# Patient Record
Sex: FEMALE | Race: BLACK OR AFRICAN AMERICAN | Employment: OTHER | URBAN - METROPOLITAN AREA
[De-identification: names, ages, dates, MRNs, and addresses within clinical notes are randomized per-mention and may not be internally consistent; named-entity substitution may affect disease eponyms.]

---

## 2017-03-10 ENCOUNTER — HOSPITAL ENCOUNTER (INPATIENT)
Age: 62
LOS: 1 days | Discharge: HOME HEALTH | DRG: 227 | End: 2017-03-14
Attending: EMERGENCY MEDICINE | Admitting: EMERGENCY MEDICINE
Payer: COMMERCIAL

## 2017-03-10 DIAGNOSIS — R42 DIZZINESS: Primary | ICD-10-CM

## 2017-03-10 LAB
ABSOLUTE EOS #: 0.3 K/UL (ref 0–0.4)
ABSOLUTE LYMPH #: 2.5 K/UL (ref 1–4.8)
ABSOLUTE MONO #: 0.5 K/UL (ref 0.1–1.2)
ALBUMIN SERPL-MCNC: 3.2 G/DL (ref 3.5–5.2)
ALBUMIN/GLOBULIN RATIO: 0.9 (ref 1–2.5)
ALP BLD-CCNC: 87 U/L (ref 35–104)
ALT SERPL-CCNC: 9 U/L (ref 5–33)
AMMONIA: 73 UMOL/L (ref 11–51)
ANION GAP SERPL CALCULATED.3IONS-SCNC: 14 MMOL/L (ref 9–17)
AST SERPL-CCNC: 14 U/L
BASOPHILS # BLD: 0 % (ref 0–2)
BASOPHILS ABSOLUTE: 0 K/UL (ref 0–0.2)
BILIRUB SERPL-MCNC: <0.1 MG/DL (ref 0.3–1.2)
BUN BLDV-MCNC: 21 MG/DL (ref 8–23)
BUN/CREAT BLD: ABNORMAL (ref 9–20)
CALCIUM IONIZED: 1.27 MMOL/L (ref 1.13–1.33)
CALCIUM SERPL-MCNC: 7.9 MG/DL (ref 8.6–10.4)
CHLORIDE BLD-SCNC: 106 MMOL/L (ref 98–107)
CO2: 19 MMOL/L (ref 20–31)
CREAT SERPL-MCNC: 0.65 MG/DL (ref 0.5–0.9)
DIFFERENTIAL TYPE: ABNORMAL
EOSINOPHILS RELATIVE PERCENT: 4 % (ref 1–4)
GFR AFRICAN AMERICAN: >60 ML/MIN
GFR NON-AFRICAN AMERICAN: >60 ML/MIN
GFR SERPL CREATININE-BSD FRML MDRD: ABNORMAL ML/MIN/{1.73_M2}
GFR SERPL CREATININE-BSD FRML MDRD: ABNORMAL ML/MIN/{1.73_M2}
GLUCOSE BLD-MCNC: 114 MG/DL (ref 65–105)
GLUCOSE BLD-MCNC: 71 MG/DL (ref 65–105)
GLUCOSE BLD-MCNC: 87 MG/DL (ref 70–99)
HCT VFR BLD CALC: 31.4 % (ref 36–46)
HEMOGLOBIN: 10.1 G/DL (ref 12–16)
LYMPHOCYTES # BLD: 27 % (ref 24–44)
MAGNESIUM: 2 MG/DL (ref 1.6–2.6)
MCH RBC QN AUTO: 26.2 PG (ref 26–34)
MCHC RBC AUTO-ENTMCNC: 32.3 G/DL (ref 31–37)
MCV RBC AUTO: 81 FL (ref 80–100)
MONOCYTES # BLD: 6 % (ref 2–11)
PDW BLD-RTO: 17.4 % (ref 12.5–15.4)
PLATELET # BLD: 284 K/UL (ref 140–450)
PLATELET ESTIMATE: ABNORMAL
PMV BLD AUTO: 7 FL (ref 6–12)
POTASSIUM SERPL-SCNC: 3.9 MMOL/L (ref 3.7–5.3)
RBC # BLD: 3.87 M/UL (ref 4–5.2)
RBC # BLD: ABNORMAL 10*6/UL
SEG NEUTROPHILS: 63 % (ref 36–66)
SEGMENTED NEUTROPHILS ABSOLUTE COUNT: 5.7 K/UL (ref 1.8–7.7)
SODIUM BLD-SCNC: 139 MMOL/L (ref 135–144)
TOTAL PROTEIN: 6.7 G/DL (ref 6.4–8.3)
WBC # BLD: 9 K/UL (ref 3.5–11)
WBC # BLD: ABNORMAL 10*3/UL

## 2017-03-10 PROCEDURE — 82140 ASSAY OF AMMONIA: CPT

## 2017-03-10 PROCEDURE — 6370000000 HC RX 637 (ALT 250 FOR IP): Performed by: EMERGENCY MEDICINE

## 2017-03-10 PROCEDURE — 80053 COMPREHEN METABOLIC PANEL: CPT

## 2017-03-10 PROCEDURE — 99285 EMERGENCY DEPT VISIT HI MDM: CPT

## 2017-03-10 PROCEDURE — 82947 ASSAY GLUCOSE BLOOD QUANT: CPT

## 2017-03-10 PROCEDURE — 85025 COMPLETE CBC W/AUTO DIFF WBC: CPT

## 2017-03-10 PROCEDURE — 94640 AIRWAY INHALATION TREATMENT: CPT

## 2017-03-10 PROCEDURE — 36415 COLL VENOUS BLD VENIPUNCTURE: CPT

## 2017-03-10 PROCEDURE — 93005 ELECTROCARDIOGRAM TRACING: CPT

## 2017-03-10 PROCEDURE — 94664 DEMO&/EVAL PT USE INHALER: CPT

## 2017-03-10 PROCEDURE — 83880 ASSAY OF NATRIURETIC PEPTIDE: CPT

## 2017-03-10 PROCEDURE — G0378 HOSPITAL OBSERVATION PER HR: HCPCS

## 2017-03-10 PROCEDURE — 82330 ASSAY OF CALCIUM: CPT

## 2017-03-10 PROCEDURE — 83735 ASSAY OF MAGNESIUM: CPT

## 2017-03-10 RX ORDER — INSULIN GLARGINE 100 [IU]/ML
42 INJECTION, SOLUTION SUBCUTANEOUS 2 TIMES DAILY
Status: DISCONTINUED | OUTPATIENT
Start: 2017-03-10 | End: 2017-03-14 | Stop reason: HOSPADM

## 2017-03-10 RX ORDER — COLCHICINE 0.6 MG/1
0.6 TABLET ORAL DAILY
Status: DISCONTINUED | OUTPATIENT
Start: 2017-03-10 | End: 2017-03-14 | Stop reason: HOSPADM

## 2017-03-10 RX ORDER — NICOTINE POLACRILEX 4 MG
15 LOZENGE BUCCAL PRN
Status: DISCONTINUED | OUTPATIENT
Start: 2017-03-10 | End: 2017-03-14 | Stop reason: HOSPADM

## 2017-03-10 RX ORDER — ACETAMINOPHEN 325 MG/1
650 TABLET ORAL EVERY 4 HOURS PRN
Status: DISCONTINUED | OUTPATIENT
Start: 2017-03-10 | End: 2017-03-11

## 2017-03-10 RX ORDER — FAMOTIDINE 20 MG/1
20 TABLET, FILM COATED ORAL DAILY
Status: DISCONTINUED | OUTPATIENT
Start: 2017-03-10 | End: 2017-03-14 | Stop reason: HOSPADM

## 2017-03-10 RX ORDER — ATORVASTATIN CALCIUM 20 MG/1
20 TABLET, FILM COATED ORAL NIGHTLY
Status: DISCONTINUED | OUTPATIENT
Start: 2017-03-10 | End: 2017-03-14 | Stop reason: HOSPADM

## 2017-03-10 RX ORDER — IPRATROPIUM BROMIDE AND ALBUTEROL SULFATE 2.5; .5 MG/3ML; MG/3ML
1 SOLUTION RESPIRATORY (INHALATION) EVERY 6 HOURS
Status: DISCONTINUED | OUTPATIENT
Start: 2017-03-10 | End: 2017-03-13

## 2017-03-10 RX ORDER — OXCARBAZEPINE 300 MG/1
300 TABLET, FILM COATED ORAL 2 TIMES DAILY
Status: DISCONTINUED | OUTPATIENT
Start: 2017-03-10 | End: 2017-03-14 | Stop reason: HOSPADM

## 2017-03-10 RX ORDER — DEXTROSE MONOHYDRATE 25 G/50ML
12.5 INJECTION, SOLUTION INTRAVENOUS PRN
Status: DISCONTINUED | OUTPATIENT
Start: 2017-03-10 | End: 2017-03-14 | Stop reason: HOSPADM

## 2017-03-10 RX ORDER — OLANZAPINE 10 MG/1
10 TABLET ORAL NIGHTLY
Status: DISCONTINUED | OUTPATIENT
Start: 2017-03-10 | End: 2017-03-14 | Stop reason: HOSPADM

## 2017-03-10 RX ORDER — DEXTROSE MONOHYDRATE 50 MG/ML
100 INJECTION, SOLUTION INTRAVENOUS PRN
Status: DISCONTINUED | OUTPATIENT
Start: 2017-03-10 | End: 2017-03-14 | Stop reason: HOSPADM

## 2017-03-10 RX ADMIN — ACETAMINOPHEN 650 MG: 325 TABLET ORAL at 22:07

## 2017-03-10 RX ADMIN — OLANZAPINE 10 MG: 10 TABLET, FILM COATED ORAL at 21:14

## 2017-03-10 RX ADMIN — IPRATROPIUM BROMIDE AND ALBUTEROL SULFATE 3 ML: .5; 3 SOLUTION RESPIRATORY (INHALATION) at 21:21

## 2017-03-10 RX ADMIN — OXCARBAZEPINE 300 MG: 300 TABLET, FILM COATED ORAL at 21:14

## 2017-03-10 RX ADMIN — IPRATROPIUM BROMIDE AND ALBUTEROL SULFATE 3 ML: .5; 3 SOLUTION RESPIRATORY (INHALATION) at 15:29

## 2017-03-10 RX ADMIN — METOPROLOL TARTRATE 25 MG: 25 TABLET ORAL at 21:14

## 2017-03-10 RX ADMIN — ATORVASTATIN CALCIUM 20 MG: 20 TABLET, FILM COATED ORAL at 21:14

## 2017-03-10 ASSESSMENT — PAIN DESCRIPTION - ORIENTATION
ORIENTATION: RIGHT
ORIENTATION: RIGHT

## 2017-03-10 ASSESSMENT — PAIN DESCRIPTION - ONSET
ONSET: ON-GOING
ONSET: ON-GOING

## 2017-03-10 ASSESSMENT — ENCOUNTER SYMPTOMS
SHORTNESS OF BREATH: 1
EYE DISCHARGE: 0
ABDOMINAL PAIN: 1
EYE REDNESS: 0
VOMITING: 0
RHINORRHEA: 0
CHEST TIGHTNESS: 0
NAUSEA: 0
SORE THROAT: 0
DIARRHEA: 0
COUGH: 1

## 2017-03-10 ASSESSMENT — PAIN DESCRIPTION - LOCATION
LOCATION: HEAD

## 2017-03-10 ASSESSMENT — PAIN DESCRIPTION - DESCRIPTORS
DESCRIPTORS: ACHING
DESCRIPTORS: SHARP
DESCRIPTORS: ACHING

## 2017-03-10 ASSESSMENT — PAIN DESCRIPTION - PAIN TYPE
TYPE: CHRONIC PAIN
TYPE: ACUTE PAIN
TYPE: CHRONIC PAIN
TYPE: CHRONIC PAIN

## 2017-03-10 ASSESSMENT — PAIN SCALES - GENERAL
PAINLEVEL_OUTOF10: 5
PAINLEVEL_OUTOF10: 5
PAINLEVEL_OUTOF10: 7
PAINLEVEL_OUTOF10: 5

## 2017-03-10 ASSESSMENT — PAIN DESCRIPTION - FREQUENCY
FREQUENCY: INTERMITTENT
FREQUENCY: INTERMITTENT

## 2017-03-11 LAB
BNP INTERPRETATION: ABNORMAL
EKG ATRIAL RATE: 102 BPM
EKG ATRIAL RATE: 84 BPM
EKG ATRIAL RATE: 90 BPM
EKG P AXIS: 33 DEGREES
EKG P AXIS: 42 DEGREES
EKG P AXIS: 48 DEGREES
EKG P-R INTERVAL: 150 MS
EKG P-R INTERVAL: 152 MS
EKG P-R INTERVAL: 152 MS
EKG Q-T INTERVAL: 334 MS
EKG Q-T INTERVAL: 372 MS
EKG Q-T INTERVAL: 380 MS
EKG QRS DURATION: 72 MS
EKG QRS DURATION: 76 MS
EKG QRS DURATION: 82 MS
EKG QTC CALCULATION (BAZETT): 435 MS
EKG QTC CALCULATION (BAZETT): 449 MS
EKG QTC CALCULATION (BAZETT): 455 MS
EKG R AXIS: -14 DEGREES
EKG R AXIS: -25 DEGREES
EKG R AXIS: -9 DEGREES
EKG T AXIS: 50 DEGREES
EKG T AXIS: 53 DEGREES
EKG T AXIS: 77 DEGREES
EKG VENTRICULAR RATE: 102 BPM
EKG VENTRICULAR RATE: 84 BPM
EKG VENTRICULAR RATE: 90 BPM
GLUCOSE BLD-MCNC: 155 MG/DL (ref 65–105)
GLUCOSE BLD-MCNC: 165 MG/DL (ref 65–105)
GLUCOSE BLD-MCNC: 201 MG/DL (ref 65–105)
GLUCOSE BLD-MCNC: 83 MG/DL (ref 65–105)
LV EF: 38 %
LVEF MODALITY: NORMAL
PRO-BNP: 390 PG/ML

## 2017-03-11 PROCEDURE — 6370000000 HC RX 637 (ALT 250 FOR IP): Performed by: EMERGENCY MEDICINE

## 2017-03-11 PROCEDURE — G0378 HOSPITAL OBSERVATION PER HR: HCPCS

## 2017-03-11 PROCEDURE — 2580000003 HC RX 258: Performed by: EMERGENCY MEDICINE

## 2017-03-11 PROCEDURE — 93306 TTE W/DOPPLER COMPLETE: CPT

## 2017-03-11 PROCEDURE — 82947 ASSAY GLUCOSE BLOOD QUANT: CPT

## 2017-03-11 PROCEDURE — 6370000000 HC RX 637 (ALT 250 FOR IP): Performed by: INTERNAL MEDICINE

## 2017-03-11 PROCEDURE — 94640 AIRWAY INHALATION TREATMENT: CPT

## 2017-03-11 PROCEDURE — 93005 ELECTROCARDIOGRAM TRACING: CPT

## 2017-03-11 RX ORDER — IBUPROFEN 800 MG/1
800 TABLET ORAL EVERY 6 HOURS PRN
Status: DISCONTINUED | OUTPATIENT
Start: 2017-03-11 | End: 2017-03-14 | Stop reason: HOSPADM

## 2017-03-11 RX ORDER — LISINOPRIL 2.5 MG/1
2.5 TABLET ORAL DAILY
Status: DISCONTINUED | OUTPATIENT
Start: 2017-03-11 | End: 2017-03-12

## 2017-03-11 RX ORDER — ONDANSETRON 2 MG/ML
4 INJECTION INTRAMUSCULAR; INTRAVENOUS EVERY 6 HOURS PRN
Status: DISCONTINUED | OUTPATIENT
Start: 2017-03-11 | End: 2017-03-14 | Stop reason: HOSPADM

## 2017-03-11 RX ORDER — PROMETHAZINE HYDROCHLORIDE 25 MG/ML
12.5 INJECTION, SOLUTION INTRAMUSCULAR; INTRAVENOUS EVERY 6 HOURS PRN
Status: DISCONTINUED | OUTPATIENT
Start: 2017-03-11 | End: 2017-03-14 | Stop reason: HOSPADM

## 2017-03-11 RX ORDER — SODIUM CHLORIDE 0.9 % (FLUSH) 0.9 %
10 SYRINGE (ML) INJECTION 2 TIMES DAILY
Status: DISCONTINUED | OUTPATIENT
Start: 2017-03-11 | End: 2017-03-14 | Stop reason: HOSPADM

## 2017-03-11 RX ORDER — LISINOPRIL 2.5 MG/1
2.5 TABLET ORAL DAILY
Qty: 30 TABLET | Refills: 3 | Status: SHIPPED | OUTPATIENT
Start: 2017-03-11 | End: 2017-10-17 | Stop reason: DRUGHIGH

## 2017-03-11 RX ADMIN — IPRATROPIUM BROMIDE AND ALBUTEROL SULFATE 3 ML: .5; 3 SOLUTION RESPIRATORY (INHALATION) at 08:10

## 2017-03-11 RX ADMIN — INSULIN LISPRO 10 UNITS: 100 INJECTION, SOLUTION INTRAVENOUS; SUBCUTANEOUS at 17:53

## 2017-03-11 RX ADMIN — METOPROLOL TARTRATE 25 MG: 25 TABLET ORAL at 21:20

## 2017-03-11 RX ADMIN — Medication 10 ML: at 09:53

## 2017-03-11 RX ADMIN — IPRATROPIUM BROMIDE AND ALBUTEROL SULFATE 3 ML: .5; 3 SOLUTION RESPIRATORY (INHALATION) at 14:21

## 2017-03-11 RX ADMIN — COLCHICINE 0.6 MG: 0.6 TABLET, FILM COATED ORAL at 09:53

## 2017-03-11 RX ADMIN — FAMOTIDINE 20 MG: 20 TABLET, FILM COATED ORAL at 09:52

## 2017-03-11 RX ADMIN — OLANZAPINE 10 MG: 10 TABLET, FILM COATED ORAL at 21:20

## 2017-03-11 RX ADMIN — OXCARBAZEPINE 300 MG: 300 TABLET, FILM COATED ORAL at 21:20

## 2017-03-11 RX ADMIN — Medication 42 UNITS: at 12:26

## 2017-03-11 RX ADMIN — Medication 10 ML: at 21:20

## 2017-03-11 RX ADMIN — IPRATROPIUM BROMIDE AND ALBUTEROL SULFATE 3 ML: .5; 3 SOLUTION RESPIRATORY (INHALATION) at 04:51

## 2017-03-11 RX ADMIN — Medication 42 UNITS: at 21:21

## 2017-03-11 RX ADMIN — METOPROLOL TARTRATE 25 MG: 25 TABLET ORAL at 09:52

## 2017-03-11 RX ADMIN — OXCARBAZEPINE 300 MG: 300 TABLET, FILM COATED ORAL at 08:52

## 2017-03-11 RX ADMIN — ATORVASTATIN CALCIUM 20 MG: 20 TABLET, FILM COATED ORAL at 21:20

## 2017-03-11 RX ADMIN — IPRATROPIUM BROMIDE AND ALBUTEROL SULFATE 3 ML: .5; 3 SOLUTION RESPIRATORY (INHALATION) at 20:19

## 2017-03-11 RX ADMIN — INSULIN LISPRO 10 UNITS: 100 INJECTION, SOLUTION INTRAVENOUS; SUBCUTANEOUS at 12:30

## 2017-03-11 RX ADMIN — LISINOPRIL 2.5 MG: 2.5 TABLET ORAL at 09:52

## 2017-03-11 ASSESSMENT — PAIN DESCRIPTION - FREQUENCY: FREQUENCY: INTERMITTENT

## 2017-03-11 ASSESSMENT — PAIN DESCRIPTION - PROGRESSION
CLINICAL_PROGRESSION: NOT CHANGED

## 2017-03-11 ASSESSMENT — PAIN DESCRIPTION - PAIN TYPE: TYPE: CHRONIC PAIN

## 2017-03-11 ASSESSMENT — PAIN DESCRIPTION - LOCATION: LOCATION: HEAD

## 2017-03-11 ASSESSMENT — PAIN DESCRIPTION - ONSET: ONSET: ON-GOING

## 2017-03-11 ASSESSMENT — PAIN SCALES - GENERAL: PAINLEVEL_OUTOF10: 5

## 2017-03-11 ASSESSMENT — PAIN DESCRIPTION - DESCRIPTORS: DESCRIPTORS: ACHING

## 2017-03-12 LAB
GLUCOSE BLD-MCNC: 103 MG/DL (ref 65–105)
GLUCOSE BLD-MCNC: 122 MG/DL (ref 65–105)
GLUCOSE BLD-MCNC: 99 MG/DL (ref 65–105)

## 2017-03-12 PROCEDURE — 94640 AIRWAY INHALATION TREATMENT: CPT

## 2017-03-12 PROCEDURE — G0378 HOSPITAL OBSERVATION PER HR: HCPCS

## 2017-03-12 PROCEDURE — 6370000000 HC RX 637 (ALT 250 FOR IP): Performed by: EMERGENCY MEDICINE

## 2017-03-12 PROCEDURE — 82947 ASSAY GLUCOSE BLOOD QUANT: CPT

## 2017-03-12 RX ORDER — LISINOPRIL 5 MG/1
5 TABLET ORAL DAILY
Status: DISCONTINUED | OUTPATIENT
Start: 2017-03-13 | End: 2017-03-14 | Stop reason: HOSPADM

## 2017-03-12 RX ORDER — BENZONATATE 100 MG/1
100 CAPSULE ORAL 3 TIMES DAILY PRN
Status: DISCONTINUED | OUTPATIENT
Start: 2017-03-12 | End: 2017-03-14 | Stop reason: HOSPADM

## 2017-03-12 RX ORDER — ASPIRIN 81 MG/1
81 TABLET ORAL DAILY
Status: DISCONTINUED | OUTPATIENT
Start: 2017-03-12 | End: 2017-03-14 | Stop reason: HOSPADM

## 2017-03-12 RX ADMIN — OXCARBAZEPINE 300 MG: 300 TABLET, FILM COATED ORAL at 21:52

## 2017-03-12 RX ADMIN — OXCARBAZEPINE 300 MG: 300 TABLET, FILM COATED ORAL at 10:42

## 2017-03-12 RX ADMIN — ATORVASTATIN CALCIUM 20 MG: 20 TABLET, FILM COATED ORAL at 21:53

## 2017-03-12 RX ADMIN — FAMOTIDINE 20 MG: 20 TABLET, FILM COATED ORAL at 10:40

## 2017-03-12 RX ADMIN — IBUPROFEN 800 MG: 800 TABLET, FILM COATED ORAL at 06:07

## 2017-03-12 RX ADMIN — Medication 42 UNITS: at 09:43

## 2017-03-12 RX ADMIN — IPRATROPIUM BROMIDE AND ALBUTEROL SULFATE 3 ML: .5; 3 SOLUTION RESPIRATORY (INHALATION) at 10:07

## 2017-03-12 RX ADMIN — COLCHICINE 0.6 MG: 0.6 TABLET, FILM COATED ORAL at 10:39

## 2017-03-12 RX ADMIN — OLANZAPINE 10 MG: 10 TABLET, FILM COATED ORAL at 21:52

## 2017-03-12 RX ADMIN — BENZONATATE 100 MG: 100 CAPSULE ORAL at 21:51

## 2017-03-12 RX ADMIN — ASPIRIN 81 MG: 81 TABLET, COATED ORAL at 22:34

## 2017-03-12 RX ADMIN — IPRATROPIUM BROMIDE AND ALBUTEROL SULFATE 3 ML: .5; 3 SOLUTION RESPIRATORY (INHALATION) at 20:44

## 2017-03-12 RX ADMIN — METOPROLOL TARTRATE 25 MG: 25 TABLET ORAL at 22:34

## 2017-03-12 RX ADMIN — METOPROLOL TARTRATE 25 MG: 25 TABLET ORAL at 10:40

## 2017-03-12 ASSESSMENT — PAIN SCALES - GENERAL
PAINLEVEL_OUTOF10: 9
PAINLEVEL_OUTOF10: 10

## 2017-03-13 ENCOUNTER — APPOINTMENT (OUTPATIENT)
Dept: CARDIAC CATH/INVASIVE PROCEDURES | Age: 62
DRG: 227 | End: 2017-03-13
Payer: COMMERCIAL

## 2017-03-13 ENCOUNTER — APPOINTMENT (OUTPATIENT)
Dept: GENERAL RADIOLOGY | Age: 62
DRG: 227 | End: 2017-03-13
Payer: COMMERCIAL

## 2017-03-13 PROBLEM — Z95.810 S/P IMPLANTATION OF AUTOMATIC CARDIOVERTER/DEFIBRILLATOR (AICD): Status: ACTIVE | Noted: 2017-03-13

## 2017-03-13 LAB
GLUCOSE BLD-MCNC: 69 MG/DL (ref 65–105)
GLUCOSE BLD-MCNC: 87 MG/DL (ref 65–105)
GLUCOSE BLD-MCNC: 99 MG/DL (ref 65–105)

## 2017-03-13 PROCEDURE — 82947 ASSAY GLUCOSE BLOOD QUANT: CPT

## 2017-03-13 PROCEDURE — 94640 AIRWAY INHALATION TREATMENT: CPT

## 2017-03-13 PROCEDURE — 6370000000 HC RX 637 (ALT 250 FOR IP): Performed by: EMERGENCY MEDICINE

## 2017-03-13 PROCEDURE — 6360000002 HC RX W HCPCS: Performed by: INTERNAL MEDICINE

## 2017-03-13 PROCEDURE — 71010 XR CHEST PORTABLE: CPT

## 2017-03-13 PROCEDURE — 6360000002 HC RX W HCPCS

## 2017-03-13 PROCEDURE — 6370000000 HC RX 637 (ALT 250 FOR IP): Performed by: INTERNAL MEDICINE

## 2017-03-13 PROCEDURE — C1894 INTRO/SHEATH, NON-LASER: HCPCS

## 2017-03-13 PROCEDURE — 02HK3KZ INSERTION OF DEFIBRILLATOR LEAD INTO RIGHT VENTRICLE, PERCUTANEOUS APPROACH: ICD-10-PCS | Performed by: INTERNAL MEDICINE

## 2017-03-13 PROCEDURE — 2580000003 HC RX 258

## 2017-03-13 PROCEDURE — 0JH608Z INSERTION OF DEFIBRILLATOR GENERATOR INTO CHEST SUBCUTANEOUS TISSUE AND FASCIA, OPEN APPROACH: ICD-10-PCS | Performed by: INTERNAL MEDICINE

## 2017-03-13 PROCEDURE — C1777 LEAD, AICD, ENDO SINGLE COIL: HCPCS

## 2017-03-13 PROCEDURE — 33249 INSJ/RPLCMT DEFIB W/LEAD(S): CPT | Performed by: INTERNAL MEDICINE

## 2017-03-13 PROCEDURE — 1200000000 HC SEMI PRIVATE

## 2017-03-13 PROCEDURE — C1882 AICD, OTHER THAN SING/DUAL: HCPCS

## 2017-03-13 PROCEDURE — 2500000003 HC RX 250 WO HCPCS

## 2017-03-13 PROCEDURE — 76937 US GUIDE VASCULAR ACCESS: CPT

## 2017-03-13 RX ORDER — IPRATROPIUM BROMIDE AND ALBUTEROL SULFATE 2.5; .5 MG/3ML; MG/3ML
1 SOLUTION RESPIRATORY (INHALATION) 3 TIMES DAILY
Status: DISCONTINUED | OUTPATIENT
Start: 2017-03-13 | End: 2017-03-14 | Stop reason: HOSPADM

## 2017-03-13 RX ORDER — VANCOMYCIN HYDROCHLORIDE 1 G/200ML
1000 INJECTION, SOLUTION INTRAVENOUS ONCE
Status: COMPLETED | OUTPATIENT
Start: 2017-03-13 | End: 2017-03-13

## 2017-03-13 RX ORDER — ACETAMINOPHEN 325 MG/1
650 TABLET ORAL EVERY 4 HOURS PRN
Status: DISCONTINUED | OUTPATIENT
Start: 2017-03-13 | End: 2017-03-14 | Stop reason: HOSPADM

## 2017-03-13 RX ORDER — SODIUM CHLORIDE 0.9 % (FLUSH) 0.9 %
10 SYRINGE (ML) INJECTION PRN
Status: DISCONTINUED | OUTPATIENT
Start: 2017-03-13 | End: 2017-03-14 | Stop reason: HOSPADM

## 2017-03-13 RX ORDER — SODIUM CHLORIDE 0.9 % (FLUSH) 0.9 %
10 SYRINGE (ML) INJECTION EVERY 12 HOURS SCHEDULED
Status: DISCONTINUED | OUTPATIENT
Start: 2017-03-13 | End: 2017-03-14 | Stop reason: HOSPADM

## 2017-03-13 RX ADMIN — METOPROLOL TARTRATE 25 MG: 25 TABLET ORAL at 09:21

## 2017-03-13 RX ADMIN — METOPROLOL TARTRATE 25 MG: 25 TABLET ORAL at 23:56

## 2017-03-13 RX ADMIN — ATORVASTATIN CALCIUM 20 MG: 20 TABLET, FILM COATED ORAL at 23:57

## 2017-03-13 RX ADMIN — IPRATROPIUM BROMIDE AND ALBUTEROL SULFATE 3 ML: .5; 3 SOLUTION RESPIRATORY (INHALATION) at 08:31

## 2017-03-13 RX ADMIN — Medication 42 UNITS: at 23:58

## 2017-03-13 RX ADMIN — ASPIRIN 81 MG: 81 TABLET, COATED ORAL at 09:22

## 2017-03-13 RX ADMIN — OXCARBAZEPINE 300 MG: 300 TABLET, FILM COATED ORAL at 23:57

## 2017-03-13 RX ADMIN — OXCARBAZEPINE 300 MG: 300 TABLET, FILM COATED ORAL at 09:21

## 2017-03-13 RX ADMIN — VANCOMYCIN HYDROCHLORIDE 1000 MG: 1 INJECTION, SOLUTION INTRAVENOUS at 18:35

## 2017-03-13 RX ADMIN — COLCHICINE 0.6 MG: 0.6 TABLET, FILM COATED ORAL at 09:21

## 2017-03-13 RX ADMIN — OLANZAPINE 10 MG: 10 TABLET, FILM COATED ORAL at 23:57

## 2017-03-13 RX ADMIN — FAMOTIDINE 20 MG: 20 TABLET, FILM COATED ORAL at 09:21

## 2017-03-13 RX ADMIN — LISINOPRIL 5 MG: 5 TABLET ORAL at 09:21

## 2017-03-13 ASSESSMENT — PAIN SCALES - GENERAL
PAINLEVEL_OUTOF10: 5
PAINLEVEL_OUTOF10: 5

## 2017-03-14 VITALS
WEIGHT: 172 LBS | DIASTOLIC BLOOD PRESSURE: 71 MMHG | TEMPERATURE: 97.7 F | HEART RATE: 111 BPM | OXYGEN SATURATION: 91 % | RESPIRATION RATE: 15 BRPM | SYSTOLIC BLOOD PRESSURE: 133 MMHG | HEIGHT: 61 IN | BODY MASS INDEX: 32.47 KG/M2

## 2017-03-14 LAB
GLUCOSE BLD-MCNC: 157 MG/DL (ref 65–105)
GLUCOSE BLD-MCNC: 206 MG/DL (ref 65–105)
GLUCOSE BLD-MCNC: 217 MG/DL (ref 65–105)

## 2017-03-14 PROCEDURE — 6370000000 HC RX 637 (ALT 250 FOR IP): Performed by: INTERNAL MEDICINE

## 2017-03-14 PROCEDURE — 6370000000 HC RX 637 (ALT 250 FOR IP): Performed by: EMERGENCY MEDICINE

## 2017-03-14 PROCEDURE — 82947 ASSAY GLUCOSE BLOOD QUANT: CPT

## 2017-03-14 PROCEDURE — 94640 AIRWAY INHALATION TREATMENT: CPT

## 2017-03-14 RX ADMIN — METOPROLOL TARTRATE 25 MG: 25 TABLET ORAL at 09:08

## 2017-03-14 RX ADMIN — INSULIN LISPRO 10 UNITS: 100 INJECTION, SOLUTION INTRAVENOUS; SUBCUTANEOUS at 12:38

## 2017-03-14 RX ADMIN — INSULIN LISPRO 10 UNITS: 100 INJECTION, SOLUTION INTRAVENOUS; SUBCUTANEOUS at 09:16

## 2017-03-14 RX ADMIN — COLCHICINE 0.6 MG: 0.6 TABLET, FILM COATED ORAL at 09:09

## 2017-03-14 RX ADMIN — ACETAMINOPHEN 650 MG: 325 TABLET ORAL at 13:35

## 2017-03-14 RX ADMIN — IPRATROPIUM BROMIDE AND ALBUTEROL SULFATE 3 ML: .5; 3 SOLUTION RESPIRATORY (INHALATION) at 08:24

## 2017-03-14 RX ADMIN — IPRATROPIUM BROMIDE AND ALBUTEROL SULFATE 3 ML: .5; 3 SOLUTION RESPIRATORY (INHALATION) at 14:22

## 2017-03-14 RX ADMIN — Medication 42 UNITS: at 09:15

## 2017-03-14 RX ADMIN — ASPIRIN 81 MG: 81 TABLET, COATED ORAL at 09:08

## 2017-03-14 RX ADMIN — IBUPROFEN 800 MG: 800 TABLET, FILM COATED ORAL at 09:12

## 2017-03-14 RX ADMIN — INSULIN LISPRO 10 UNITS: 100 INJECTION, SOLUTION INTRAVENOUS; SUBCUTANEOUS at 17:52

## 2017-03-14 RX ADMIN — LISINOPRIL 5 MG: 5 TABLET ORAL at 09:08

## 2017-03-14 RX ADMIN — OXCARBAZEPINE 300 MG: 300 TABLET, FILM COATED ORAL at 09:08

## 2017-03-14 RX ADMIN — FAMOTIDINE 20 MG: 20 TABLET, FILM COATED ORAL at 09:09

## 2017-03-14 ASSESSMENT — PAIN SCALES - GENERAL
PAINLEVEL_OUTOF10: 6
PAINLEVEL_OUTOF10: 3
PAINLEVEL_OUTOF10: 6

## 2017-03-25 ENCOUNTER — HOSPITAL ENCOUNTER (EMERGENCY)
Age: 62
Discharge: HOME | End: 2017-03-25
Attending: EMERGENCY MEDICINE
Payer: COMMERCIAL

## 2017-03-25 ENCOUNTER — APPOINTMENT (OUTPATIENT)
Dept: GENERAL RADIOLOGY | Age: 62
End: 2017-03-25
Payer: COMMERCIAL

## 2017-03-25 VITALS
TEMPERATURE: 98.1 F | RESPIRATION RATE: 16 BRPM | BODY MASS INDEX: 32.5 KG/M2 | DIASTOLIC BLOOD PRESSURE: 71 MMHG | HEART RATE: 90 BPM | WEIGHT: 172 LBS | SYSTOLIC BLOOD PRESSURE: 150 MMHG | OXYGEN SATURATION: 100 %

## 2017-03-25 DIAGNOSIS — D72.829 LEUKOCYTOSIS, UNSPECIFIED TYPE: ICD-10-CM

## 2017-03-25 DIAGNOSIS — D64.9 ANEMIA, UNSPECIFIED TYPE: ICD-10-CM

## 2017-03-25 DIAGNOSIS — R42 DIZZINESS: ICD-10-CM

## 2017-03-25 DIAGNOSIS — R79.89 ELEVATED BRAIN NATRIURETIC PEPTIDE (BNP) LEVEL: ICD-10-CM

## 2017-03-25 DIAGNOSIS — R06.02 SHORTNESS OF BREATH: Primary | ICD-10-CM

## 2017-03-25 DIAGNOSIS — E16.2 HYPOGLYCEMIA: ICD-10-CM

## 2017-03-25 LAB
ABSOLUTE EOS #: 0.31 K/UL (ref 0–0.4)
ABSOLUTE LYMPH #: 4.21 K/UL (ref 1–4.8)
ABSOLUTE MONO #: 0.47 K/UL (ref 0.1–0.8)
ANION GAP SERPL CALCULATED.3IONS-SCNC: 15 MMOL/L (ref 9–17)
BASOPHILS # BLD: 0 % (ref 0–2)
BASOPHILS ABSOLUTE: 0 K/UL (ref 0–0.2)
BNP INTERPRETATION: ABNORMAL
BUN BLDV-MCNC: 14 MG/DL (ref 8–23)
BUN/CREAT BLD: ABNORMAL (ref 9–20)
CALCIUM IONIZED: 1.19 MMOL/L (ref 1.13–1.33)
CALCIUM SERPL-MCNC: 8.7 MG/DL (ref 8.6–10.4)
CHLORIDE BLD-SCNC: 100 MMOL/L (ref 98–107)
CHP ED QC CHECK: NORMAL
CO2: 23 MMOL/L (ref 20–31)
CREAT SERPL-MCNC: 0.72 MG/DL (ref 0.5–0.9)
DIFFERENTIAL TYPE: ABNORMAL
EOSINOPHILS RELATIVE PERCENT: 2 % (ref 1–4)
GFR AFRICAN AMERICAN: >60 ML/MIN
GFR NON-AFRICAN AMERICAN: >60 ML/MIN
GFR SERPL CREATININE-BSD FRML MDRD: ABNORMAL ML/MIN/{1.73_M2}
GFR SERPL CREATININE-BSD FRML MDRD: ABNORMAL ML/MIN/{1.73_M2}
GLUCOSE BLD-MCNC: 118 MG/DL
GLUCOSE BLD-MCNC: 118 MG/DL (ref 65–105)
GLUCOSE BLD-MCNC: 44 MG/DL (ref 70–99)
HCT VFR BLD CALC: 35.3 % (ref 36–46)
HEMOGLOBIN: 11.7 G/DL (ref 12–16)
LYMPHOCYTES # BLD: 27 % (ref 24–44)
MAGNESIUM: 2 MG/DL (ref 1.6–2.6)
MCH RBC QN AUTO: 25.9 PG (ref 26–34)
MCHC RBC AUTO-ENTMCNC: 33.1 G/DL (ref 31–37)
MCV RBC AUTO: 78.4 FL (ref 80–100)
MONOCYTES # BLD: 3 % (ref 1–7)
MORPHOLOGY: ABNORMAL
PDW BLD-RTO: 17 % (ref 12.5–15.4)
PHOSPHORUS: 4 MG/DL (ref 2.6–4.5)
PLATELET # BLD: 337 K/UL (ref 140–450)
PLATELET ESTIMATE: ABNORMAL
PMV BLD AUTO: 7.1 FL (ref 6–12)
POC TROPONIN I: 0 NG/ML (ref 0–0.1)
POC TROPONIN I: 0.01 NG/ML (ref 0–0.1)
POC TROPONIN INTERP: NORMAL
POC TROPONIN INTERP: NORMAL
POTASSIUM SERPL-SCNC: 3.4 MMOL/L (ref 3.7–5.3)
PRO-BNP: 1002 PG/ML
RBC # BLD: 4.5 M/UL (ref 4–5.2)
RBC # BLD: ABNORMAL 10*6/UL
SEG NEUTROPHILS: 68 % (ref 36–66)
SEGMENTED NEUTROPHILS ABSOLUTE COUNT: 10.61 K/UL (ref 1.8–7.7)
SODIUM BLD-SCNC: 138 MMOL/L (ref 135–144)
WBC # BLD: 15.6 K/UL (ref 3.5–11)
WBC # BLD: ABNORMAL 10*3/UL

## 2017-03-25 PROCEDURE — 82947 ASSAY GLUCOSE BLOOD QUANT: CPT

## 2017-03-25 PROCEDURE — 2580000003 HC RX 258: Performed by: EMERGENCY MEDICINE

## 2017-03-25 PROCEDURE — 84484 ASSAY OF TROPONIN QUANT: CPT

## 2017-03-25 PROCEDURE — 85025 COMPLETE CBC W/AUTO DIFF WBC: CPT

## 2017-03-25 PROCEDURE — 83735 ASSAY OF MAGNESIUM: CPT

## 2017-03-25 PROCEDURE — 96374 THER/PROPH/DIAG INJ IV PUSH: CPT

## 2017-03-25 PROCEDURE — 84100 ASSAY OF PHOSPHORUS: CPT

## 2017-03-25 PROCEDURE — 71020 XR CHEST STANDARD TWO VW: CPT

## 2017-03-25 PROCEDURE — 83880 ASSAY OF NATRIURETIC PEPTIDE: CPT

## 2017-03-25 PROCEDURE — 6370000000 HC RX 637 (ALT 250 FOR IP): Performed by: EMERGENCY MEDICINE

## 2017-03-25 PROCEDURE — 93005 ELECTROCARDIOGRAM TRACING: CPT

## 2017-03-25 PROCEDURE — 80048 BASIC METABOLIC PNL TOTAL CA: CPT

## 2017-03-25 PROCEDURE — 96375 TX/PRO/DX INJ NEW DRUG ADDON: CPT

## 2017-03-25 PROCEDURE — 82330 ASSAY OF CALCIUM: CPT

## 2017-03-25 PROCEDURE — 99285 EMERGENCY DEPT VISIT HI MDM: CPT

## 2017-03-25 PROCEDURE — 2500000003 HC RX 250 WO HCPCS: Performed by: EMERGENCY MEDICINE

## 2017-03-25 RX ORDER — ASPIRIN 81 MG/1
324 TABLET, CHEWABLE ORAL DAILY
Status: DISCONTINUED | OUTPATIENT
Start: 2017-03-25 | End: 2017-03-25 | Stop reason: HOSPADM

## 2017-03-25 RX ORDER — 0.9 % SODIUM CHLORIDE 0.9 %
1000 INTRAVENOUS SOLUTION INTRAVENOUS ONCE
Status: DISCONTINUED | OUTPATIENT
Start: 2017-03-25 | End: 2017-03-25 | Stop reason: HOSPADM

## 2017-03-25 RX ORDER — BUMETANIDE 0.25 MG/ML
0.5 INJECTION, SOLUTION INTRAMUSCULAR; INTRAVENOUS ONCE
Status: COMPLETED | OUTPATIENT
Start: 2017-03-25 | End: 2017-03-25

## 2017-03-25 RX ORDER — MECLIZINE HCL 12.5 MG/1
25 TABLET ORAL ONCE
Status: COMPLETED | OUTPATIENT
Start: 2017-03-25 | End: 2017-03-25

## 2017-03-25 RX ORDER — DEXTROSE MONOHYDRATE 25 G/50ML
25 INJECTION, SOLUTION INTRAVENOUS ONCE
Status: COMPLETED | OUTPATIENT
Start: 2017-03-25 | End: 2017-03-25

## 2017-03-25 RX ADMIN — MECLIZINE HCL 25 MG: 12.5 TABLET ORAL at 18:26

## 2017-03-25 RX ADMIN — BUMETANIDE 0.5 MG: 0.25 INJECTION, SOLUTION INTRAMUSCULAR; INTRAVENOUS at 20:17

## 2017-03-25 RX ADMIN — ASPIRIN 81 MG 324 MG: 81 TABLET ORAL at 18:26

## 2017-03-25 RX ADMIN — DEXTROSE MONOHYDRATE 25 G: 25 INJECTION, SOLUTION INTRAVENOUS at 19:52

## 2017-03-25 ASSESSMENT — PAIN SCALES - GENERAL: PAINLEVEL_OUTOF10: 10

## 2017-03-25 ASSESSMENT — PAIN DESCRIPTION - FREQUENCY: FREQUENCY: CONTINUOUS

## 2017-03-25 ASSESSMENT — PAIN DESCRIPTION - ORIENTATION: ORIENTATION: MID

## 2017-03-25 ASSESSMENT — PAIN DESCRIPTION - DESCRIPTORS: DESCRIPTORS: ACHING

## 2017-03-25 ASSESSMENT — PAIN DESCRIPTION - ONSET: ONSET: SUDDEN

## 2017-03-25 ASSESSMENT — PAIN DESCRIPTION - PAIN TYPE: TYPE: ACUTE PAIN

## 2017-03-28 LAB
EKG ATRIAL RATE: 95 BPM
EKG P AXIS: 20 DEGREES
EKG P-R INTERVAL: 140 MS
EKG Q-T INTERVAL: 326 MS
EKG QRS DURATION: 80 MS
EKG QTC CALCULATION (BAZETT): 409 MS
EKG R AXIS: -28 DEGREES
EKG T AXIS: 77 DEGREES
EKG VENTRICULAR RATE: 95 BPM

## 2017-04-01 ENCOUNTER — HOSPITAL ENCOUNTER (EMERGENCY)
Age: 62
Discharge: HOME | End: 2017-04-01
Attending: EMERGENCY MEDICINE
Payer: COMMERCIAL

## 2017-04-01 VITALS
SYSTOLIC BLOOD PRESSURE: 135 MMHG | BODY MASS INDEX: 32.47 KG/M2 | RESPIRATION RATE: 20 BRPM | HEIGHT: 61 IN | TEMPERATURE: 97.6 F | OXYGEN SATURATION: 96 % | HEART RATE: 94 BPM | DIASTOLIC BLOOD PRESSURE: 74 MMHG | WEIGHT: 172 LBS

## 2017-04-01 DIAGNOSIS — E16.2 HYPOGLYCEMIA: Primary | ICD-10-CM

## 2017-04-01 LAB — GLUCOSE BLD-MCNC: 87 MG/DL (ref 65–105)

## 2017-04-01 PROCEDURE — 82947 ASSAY GLUCOSE BLOOD QUANT: CPT

## 2017-04-01 PROCEDURE — 99284 EMERGENCY DEPT VISIT MOD MDM: CPT

## 2017-04-03 LAB — GLUCOSE BLD-MCNC: 97 MG/DL (ref 65–105)

## 2017-08-11 ENCOUNTER — APPOINTMENT (OUTPATIENT)
Dept: CT IMAGING | Age: 62
End: 2017-08-11
Payer: COMMERCIAL

## 2017-08-11 ENCOUNTER — HOSPITAL ENCOUNTER (EMERGENCY)
Age: 62
Discharge: HOME | End: 2017-08-11
Attending: EMERGENCY MEDICINE | Admitting: EMERGENCY MEDICINE
Payer: COMMERCIAL

## 2017-08-11 VITALS
DIASTOLIC BLOOD PRESSURE: 79 MMHG | RESPIRATION RATE: 20 BRPM | TEMPERATURE: 99.1 F | HEIGHT: 61 IN | BODY MASS INDEX: 34.55 KG/M2 | WEIGHT: 183 LBS | SYSTOLIC BLOOD PRESSURE: 152 MMHG | HEART RATE: 93 BPM | OXYGEN SATURATION: 97 %

## 2017-08-11 DIAGNOSIS — R10.10 PAIN OF UPPER ABDOMEN: Primary | ICD-10-CM

## 2017-08-11 LAB
ABSOLUTE EOS #: 0.3 K/UL (ref 0–0.4)
ABSOLUTE LYMPH #: 2.7 K/UL (ref 1–4.8)
ABSOLUTE MONO #: 0.8 K/UL (ref 0.1–1.2)
ALBUMIN SERPL-MCNC: 4 G/DL (ref 3.5–5.2)
ALBUMIN/GLOBULIN RATIO: 1.1 (ref 1–2.5)
ALP BLD-CCNC: 96 U/L (ref 35–104)
ALT SERPL-CCNC: 9 U/L (ref 5–33)
ANION GAP SERPL CALCULATED.3IONS-SCNC: 11 MMOL/L (ref 9–17)
AST SERPL-CCNC: 14 U/L
BASOPHILS # BLD: 1 %
BASOPHILS ABSOLUTE: 0.1 K/UL (ref 0–0.2)
BILIRUB SERPL-MCNC: <0.1 MG/DL (ref 0.3–1.2)
BUN BLDV-MCNC: 19 MG/DL (ref 8–23)
BUN/CREAT BLD: ABNORMAL (ref 9–20)
CALCIUM SERPL-MCNC: 8.9 MG/DL (ref 8.6–10.4)
CHLORIDE BLD-SCNC: 97 MMOL/L (ref 98–107)
CO2: 29 MMOL/L (ref 20–31)
CREAT SERPL-MCNC: 0.89 MG/DL (ref 0.5–0.9)
DIFFERENTIAL TYPE: ABNORMAL
EOSINOPHILS RELATIVE PERCENT: 3 %
GFR AFRICAN AMERICAN: >60 ML/MIN
GFR NON-AFRICAN AMERICAN: >60 ML/MIN
GFR SERPL CREATININE-BSD FRML MDRD: ABNORMAL ML/MIN/{1.73_M2}
GFR SERPL CREATININE-BSD FRML MDRD: ABNORMAL ML/MIN/{1.73_M2}
GLUCOSE BLD-MCNC: 96 MG/DL (ref 70–99)
HCT VFR BLD CALC: 29.9 % (ref 36–46)
HEMOGLOBIN: 9.4 G/DL (ref 12–16)
LIPASE: 24 U/L (ref 13–60)
LYMPHOCYTES # BLD: 25 %
MCH RBC QN AUTO: 24.4 PG (ref 26–34)
MCHC RBC AUTO-ENTMCNC: 31.5 G/DL (ref 31–37)
MCV RBC AUTO: 77.5 FL (ref 80–100)
MONOCYTES # BLD: 7 %
PDW BLD-RTO: 16.2 % (ref 12.5–15.4)
PLATELET # BLD: 377 K/UL (ref 140–450)
PLATELET ESTIMATE: ABNORMAL
PMV BLD AUTO: 7.6 FL (ref 6–12)
POC TROPONIN I: 0 NG/ML (ref 0–0.1)
POC TROPONIN INTERP: NORMAL
POTASSIUM SERPL-SCNC: 4.3 MMOL/L (ref 3.7–5.3)
RBC # BLD: 3.86 M/UL (ref 4–5.2)
RBC # BLD: ABNORMAL 10*6/UL
SEG NEUTROPHILS: 64 %
SEGMENTED NEUTROPHILS ABSOLUTE COUNT: 6.9 K/UL (ref 1.8–7.7)
SODIUM BLD-SCNC: 137 MMOL/L (ref 135–144)
TOTAL PROTEIN: 7.8 G/DL (ref 6.4–8.3)
WBC # BLD: 10.7 K/UL (ref 3.5–11)
WBC # BLD: ABNORMAL 10*3/UL

## 2017-08-11 PROCEDURE — 99285 EMERGENCY DEPT VISIT HI MDM: CPT

## 2017-08-11 PROCEDURE — 74176 CT ABD & PELVIS W/O CONTRAST: CPT

## 2017-08-11 PROCEDURE — 80053 COMPREHEN METABOLIC PANEL: CPT

## 2017-08-11 PROCEDURE — 83690 ASSAY OF LIPASE: CPT

## 2017-08-11 PROCEDURE — 93005 ELECTROCARDIOGRAM TRACING: CPT

## 2017-08-11 PROCEDURE — 84484 ASSAY OF TROPONIN QUANT: CPT

## 2017-08-11 PROCEDURE — 85025 COMPLETE CBC W/AUTO DIFF WBC: CPT

## 2017-08-11 PROCEDURE — 6370000000 HC RX 637 (ALT 250 FOR IP): Performed by: EMERGENCY MEDICINE

## 2017-08-11 RX ORDER — HYDROCODONE BITARTRATE AND ACETAMINOPHEN 5; 325 MG/1; MG/1
2 TABLET ORAL ONCE
Status: COMPLETED | OUTPATIENT
Start: 2017-08-11 | End: 2017-08-11

## 2017-08-11 RX ADMIN — HYDROCODONE BITARTRATE AND ACETAMINOPHEN 2 TABLET: 5; 325 TABLET ORAL at 18:34

## 2017-08-11 ASSESSMENT — PAIN SCALES - GENERAL: PAINLEVEL_OUTOF10: 8

## 2017-08-11 ASSESSMENT — ENCOUNTER SYMPTOMS
CHEST TIGHTNESS: 0
TROUBLE SWALLOWING: 0
SHORTNESS OF BREATH: 0
EYE PAIN: 0
NAUSEA: 0
VOMITING: 0
COUGH: 0
SORE THROAT: 0
WHEEZING: 0
STRIDOR: 0
CONSTIPATION: 0
DIARRHEA: 0
ABDOMINAL PAIN: 1

## 2017-08-12 ENCOUNTER — HOSPITAL ENCOUNTER (EMERGENCY)
Age: 62
Discharge: HOME | End: 2017-08-12
Attending: EMERGENCY MEDICINE
Payer: COMMERCIAL

## 2017-08-12 VITALS
WEIGHT: 183 LBS | DIASTOLIC BLOOD PRESSURE: 72 MMHG | HEART RATE: 92 BPM | RESPIRATION RATE: 20 BRPM | HEIGHT: 61 IN | TEMPERATURE: 98.3 F | BODY MASS INDEX: 34.55 KG/M2 | OXYGEN SATURATION: 96 % | SYSTOLIC BLOOD PRESSURE: 115 MMHG

## 2017-08-12 DIAGNOSIS — N30.00 ACUTE CYSTITIS WITHOUT HEMATURIA: Primary | ICD-10-CM

## 2017-08-12 LAB
-: ABNORMAL
AMORPHOUS: ABNORMAL
BACTERIA: ABNORMAL
BILIRUBIN URINE: NEGATIVE
CASTS UA: ABNORMAL /LPF (ref 0–2)
COLOR: YELLOW
COMMENT UA: ABNORMAL
CRYSTALS, UA: ABNORMAL /HPF
EPITHELIAL CELLS UA: ABNORMAL /HPF (ref 0–5)
GLUCOSE URINE: NEGATIVE
KETONES, URINE: NEGATIVE
LEUKOCYTE ESTERASE, URINE: ABNORMAL
MUCUS: ABNORMAL
NITRITE, URINE: NEGATIVE
OTHER OBSERVATIONS UA: ABNORMAL
PH UA: 6.5 (ref 5–8)
PROTEIN UA: NEGATIVE
RBC UA: ABNORMAL /HPF (ref 0–2)
RENAL EPITHELIAL, UA: ABNORMAL /HPF
SPECIFIC GRAVITY UA: 1.03 (ref 1–1.03)
TRICHOMONAS: ABNORMAL
TURBIDITY: CLEAR
URINE HGB: NEGATIVE
UROBILINOGEN, URINE: NORMAL
WBC UA: ABNORMAL /HPF (ref 0–5)
YEAST: ABNORMAL

## 2017-08-12 PROCEDURE — 99284 EMERGENCY DEPT VISIT MOD MDM: CPT

## 2017-08-12 PROCEDURE — 87086 URINE CULTURE/COLONY COUNT: CPT

## 2017-08-12 PROCEDURE — 6370000000 HC RX 637 (ALT 250 FOR IP): Performed by: EMERGENCY MEDICINE

## 2017-08-12 PROCEDURE — 81001 URINALYSIS AUTO W/SCOPE: CPT

## 2017-08-12 RX ORDER — CEPHALEXIN 500 MG/1
500 CAPSULE ORAL ONCE
Status: DISCONTINUED | OUTPATIENT
Start: 2017-08-12 | End: 2017-08-12

## 2017-08-12 RX ORDER — CEPHALEXIN 500 MG/1
500 CAPSULE ORAL 2 TIMES DAILY
Qty: 14 CAPSULE | Refills: 0 | Status: ON HOLD | OUTPATIENT
Start: 2017-08-12 | End: 2017-08-20 | Stop reason: HOSPADM

## 2017-08-12 RX ORDER — CEPHALEXIN 500 MG/1
500 CAPSULE ORAL ONCE
Status: COMPLETED | OUTPATIENT
Start: 2017-08-12 | End: 2017-08-12

## 2017-08-12 RX ADMIN — CEPHALEXIN 500 MG: 500 CAPSULE ORAL at 15:29

## 2017-08-12 ASSESSMENT — ENCOUNTER SYMPTOMS
CHEST TIGHTNESS: 0
BACK PAIN: 0
BLOOD IN STOOL: 0
WHEEZING: 0
ABDOMINAL PAIN: 1
SHORTNESS OF BREATH: 0
NAUSEA: 0
DIARRHEA: 0
VOMITING: 0
COUGH: 0

## 2017-08-12 ASSESSMENT — PAIN DESCRIPTION - DESCRIPTORS: DESCRIPTORS: CONSTANT

## 2017-08-12 ASSESSMENT — PAIN DESCRIPTION - LOCATION: LOCATION: ABDOMEN

## 2017-08-12 ASSESSMENT — PAIN SCALES - GENERAL: PAINLEVEL_OUTOF10: 10

## 2017-08-12 ASSESSMENT — PAIN DESCRIPTION - PROGRESSION: CLINICAL_PROGRESSION: NOT CHANGED

## 2017-08-12 ASSESSMENT — PAIN DESCRIPTION - FREQUENCY: FREQUENCY: CONTINUOUS

## 2017-08-12 ASSESSMENT — PAIN DESCRIPTION - PAIN TYPE: TYPE: ACUTE PAIN

## 2017-08-13 LAB
CULTURE: NORMAL
CULTURE: NORMAL
Lab: NORMAL
SPECIMEN DESCRIPTION: NORMAL
STATUS: NORMAL

## 2017-08-14 ENCOUNTER — HOSPITAL ENCOUNTER (OUTPATIENT)
Age: 62
Setting detail: OBSERVATION
Discharge: HOME | End: 2017-08-15
Attending: EMERGENCY MEDICINE | Admitting: EMERGENCY MEDICINE
Payer: COMMERCIAL

## 2017-08-14 ENCOUNTER — APPOINTMENT (OUTPATIENT)
Dept: NUCLEAR MEDICINE | Age: 62
End: 2017-08-14
Payer: COMMERCIAL

## 2017-08-14 ENCOUNTER — APPOINTMENT (OUTPATIENT)
Dept: GENERAL RADIOLOGY | Age: 62
End: 2017-08-14
Payer: COMMERCIAL

## 2017-08-14 DIAGNOSIS — R55 NEAR SYNCOPE: Primary | ICD-10-CM

## 2017-08-14 DIAGNOSIS — R79.89 ELEVATED D-DIMER: ICD-10-CM

## 2017-08-14 DIAGNOSIS — R79.89 ELEVATED BRAIN NATRIURETIC PEPTIDE (BNP) LEVEL: ICD-10-CM

## 2017-08-14 DIAGNOSIS — D64.9 ANEMIA, UNSPECIFIED TYPE: ICD-10-CM

## 2017-08-14 DIAGNOSIS — N17.9 ACUTE KIDNEY INJURY (HCC): ICD-10-CM

## 2017-08-14 DIAGNOSIS — D72.829 LEUKOCYTOSIS, UNSPECIFIED TYPE: ICD-10-CM

## 2017-08-14 LAB
-: NORMAL
ABSOLUTE EOS #: 0.2 K/UL (ref 0–0.4)
ABSOLUTE LYMPH #: 2.3 K/UL (ref 1–4.8)
ABSOLUTE MONO #: 0.6 K/UL (ref 0.1–1.2)
AMORPHOUS: NORMAL
ANION GAP SERPL CALCULATED.3IONS-SCNC: 14 MMOL/L (ref 9–17)
BACTERIA: NORMAL
BASOPHILS # BLD: 0 %
BASOPHILS ABSOLUTE: 0 K/UL (ref 0–0.2)
BILIRUBIN URINE: NEGATIVE
BNP INTERPRETATION: ABNORMAL
BUN BLDV-MCNC: 15 MG/DL (ref 8–23)
BUN/CREAT BLD: ABNORMAL (ref 9–20)
CALCIUM SERPL-MCNC: 8.7 MG/DL (ref 8.6–10.4)
CASTS UA: NORMAL /LPF (ref 0–8)
CHLORIDE BLD-SCNC: 98 MMOL/L (ref 98–107)
CO2: 26 MMOL/L (ref 20–31)
COLOR: YELLOW
COMMENT UA: ABNORMAL
CREAT SERPL-MCNC: 1 MG/DL (ref 0.5–0.9)
CRYSTALS, UA: NORMAL /HPF
D-DIMER QUANTITATIVE: 2.27 MG/L FEU
DIFFERENTIAL TYPE: ABNORMAL
EOSINOPHILS RELATIVE PERCENT: 2 %
EPITHELIAL CELLS UA: NORMAL /HPF (ref 0–5)
GFR AFRICAN AMERICAN: >60 ML/MIN
GFR NON-AFRICAN AMERICAN: 57 ML/MIN
GFR SERPL CREATININE-BSD FRML MDRD: ABNORMAL ML/MIN/{1.73_M2}
GFR SERPL CREATININE-BSD FRML MDRD: ABNORMAL ML/MIN/{1.73_M2}
GLUCOSE BLD-MCNC: 133 MG/DL (ref 70–99)
GLUCOSE BLD-MCNC: 218 MG/DL (ref 65–105)
GLUCOSE BLD-MCNC: 63 MG/DL (ref 65–105)
GLUCOSE URINE: NEGATIVE
HCT VFR BLD CALC: 30 % (ref 36–46)
HEMOGLOBIN: 9.6 G/DL (ref 12–16)
KETONES, URINE: NEGATIVE
LEUKOCYTE ESTERASE, URINE: ABNORMAL
LYMPHOCYTES # BLD: 20 %
MCH RBC QN AUTO: 25.1 PG (ref 26–34)
MCHC RBC AUTO-ENTMCNC: 32.1 G/DL (ref 31–37)
MCV RBC AUTO: 78.1 FL (ref 80–100)
MONOCYTES # BLD: 5 %
MUCUS: NORMAL
NITRITE, URINE: NEGATIVE
OTHER OBSERVATIONS UA: NORMAL
PDW BLD-RTO: 16.1 % (ref 12.5–15.4)
PH UA: 7.5 (ref 5–8)
PLATELET # BLD: 359 K/UL (ref 140–450)
PLATELET ESTIMATE: ABNORMAL
PMV BLD AUTO: 7.5 FL (ref 6–12)
POC TROPONIN I: 0 NG/ML (ref 0–0.1)
POC TROPONIN I: 0 NG/ML (ref 0–0.1)
POC TROPONIN INTERP: NORMAL
POC TROPONIN INTERP: NORMAL
POTASSIUM SERPL-SCNC: 4.5 MMOL/L (ref 3.7–5.3)
PRO-BNP: 2320 PG/ML
PROTEIN UA: NEGATIVE
RBC # BLD: 3.84 M/UL (ref 4–5.2)
RBC # BLD: ABNORMAL 10*6/UL
RBC UA: NORMAL /HPF (ref 0–4)
RENAL EPITHELIAL, UA: NORMAL /HPF
SEG NEUTROPHILS: 73 %
SEGMENTED NEUTROPHILS ABSOLUTE COUNT: 8.2 K/UL (ref 1.8–7.7)
SODIUM BLD-SCNC: 138 MMOL/L (ref 135–144)
SPECIFIC GRAVITY UA: 1.01 (ref 1–1.03)
TRICHOMONAS: NORMAL
TROPONIN INTERP: NORMAL
TROPONIN T: <0.03 NG/ML
TURBIDITY: CLEAR
URINE HGB: NEGATIVE
UROBILINOGEN, URINE: NORMAL
WBC # BLD: 11.4 K/UL (ref 3.5–11)
WBC # BLD: ABNORMAL 10*3/UL
WBC UA: NORMAL /HPF (ref 0–5)
YEAST: NORMAL

## 2017-08-14 PROCEDURE — 80048 BASIC METABOLIC PNL TOTAL CA: CPT

## 2017-08-14 PROCEDURE — 87086 URINE CULTURE/COLONY COUNT: CPT

## 2017-08-14 PROCEDURE — 93005 ELECTROCARDIOGRAM TRACING: CPT

## 2017-08-14 PROCEDURE — 81001 URINALYSIS AUTO W/SCOPE: CPT

## 2017-08-14 PROCEDURE — 6370000000 HC RX 637 (ALT 250 FOR IP): Performed by: EMERGENCY MEDICINE

## 2017-08-14 PROCEDURE — 85379 FIBRIN DEGRADATION QUANT: CPT

## 2017-08-14 PROCEDURE — A9538 TC99M PYROPHOSPHATE: HCPCS | Performed by: EMERGENCY MEDICINE

## 2017-08-14 PROCEDURE — 94640 AIRWAY INHALATION TREATMENT: CPT

## 2017-08-14 PROCEDURE — 6360000002 HC RX W HCPCS: Performed by: EMERGENCY MEDICINE

## 2017-08-14 PROCEDURE — 84484 ASSAY OF TROPONIN QUANT: CPT

## 2017-08-14 PROCEDURE — 71020 XR CHEST STANDARD TWO VW: CPT

## 2017-08-14 PROCEDURE — A9540 TC99M MAA: HCPCS | Performed by: EMERGENCY MEDICINE

## 2017-08-14 PROCEDURE — 2580000003 HC RX 258: Performed by: EMERGENCY MEDICINE

## 2017-08-14 PROCEDURE — 78582 LUNG VENTILAT&PERFUS IMAGING: CPT

## 2017-08-14 PROCEDURE — 83880 ASSAY OF NATRIURETIC PEPTIDE: CPT

## 2017-08-14 PROCEDURE — 99285 EMERGENCY DEPT VISIT HI MDM: CPT

## 2017-08-14 PROCEDURE — 36415 COLL VENOUS BLD VENIPUNCTURE: CPT

## 2017-08-14 PROCEDURE — 85025 COMPLETE CBC W/AUTO DIFF WBC: CPT

## 2017-08-14 PROCEDURE — 96374 THER/PROPH/DIAG INJ IV PUSH: CPT

## 2017-08-14 PROCEDURE — 3430000000 HC RX DIAGNOSTIC RADIOPHARMACEUTICAL: Performed by: EMERGENCY MEDICINE

## 2017-08-14 PROCEDURE — 93970 EXTREMITY STUDY: CPT

## 2017-08-14 PROCEDURE — G0378 HOSPITAL OBSERVATION PER HR: HCPCS

## 2017-08-14 PROCEDURE — 82947 ASSAY GLUCOSE BLOOD QUANT: CPT

## 2017-08-14 RX ORDER — 0.9 % SODIUM CHLORIDE 0.9 %
1000 INTRAVENOUS SOLUTION INTRAVENOUS ONCE
Status: COMPLETED | OUTPATIENT
Start: 2017-08-14 | End: 2017-08-14

## 2017-08-14 RX ORDER — OLANZAPINE 10 MG/1
10 TABLET ORAL NIGHTLY
Status: DISCONTINUED | OUTPATIENT
Start: 2017-08-14 | End: 2017-08-15 | Stop reason: HOSPADM

## 2017-08-14 RX ORDER — DEXTROSE MONOHYDRATE 25 G/50ML
12.5 INJECTION, SOLUTION INTRAVENOUS PRN
Status: DISCONTINUED | OUTPATIENT
Start: 2017-08-14 | End: 2017-08-15 | Stop reason: HOSPADM

## 2017-08-14 RX ORDER — ATORVASTATIN CALCIUM 20 MG/1
20 TABLET, FILM COATED ORAL NIGHTLY
Status: DISCONTINUED | OUTPATIENT
Start: 2017-08-14 | End: 2017-08-15 | Stop reason: HOSPADM

## 2017-08-14 RX ORDER — SODIUM CHLORIDE 9 MG/ML
INJECTION, SOLUTION INTRAVENOUS CONTINUOUS
Status: CANCELLED | OUTPATIENT
Start: 2017-08-14

## 2017-08-14 RX ORDER — ONDANSETRON 2 MG/ML
4 INJECTION INTRAMUSCULAR; INTRAVENOUS ONCE
Status: COMPLETED | OUTPATIENT
Start: 2017-08-14 | End: 2017-08-14

## 2017-08-14 RX ORDER — DEXTROSE MONOHYDRATE 50 MG/ML
100 INJECTION, SOLUTION INTRAVENOUS PRN
Status: DISCONTINUED | OUTPATIENT
Start: 2017-08-14 | End: 2017-08-15 | Stop reason: HOSPADM

## 2017-08-14 RX ORDER — ONDANSETRON 2 MG/ML
4 INJECTION INTRAMUSCULAR; INTRAVENOUS EVERY 6 HOURS PRN
Status: DISCONTINUED | OUTPATIENT
Start: 2017-08-14 | End: 2017-08-15 | Stop reason: HOSPADM

## 2017-08-14 RX ORDER — OXCARBAZEPINE 300 MG/1
300 TABLET, FILM COATED ORAL 2 TIMES DAILY
Status: DISCONTINUED | OUTPATIENT
Start: 2017-08-14 | End: 2017-08-15 | Stop reason: HOSPADM

## 2017-08-14 RX ORDER — IPRATROPIUM BROMIDE AND ALBUTEROL SULFATE 2.5; .5 MG/3ML; MG/3ML
1 SOLUTION RESPIRATORY (INHALATION) EVERY 6 HOURS
Status: DISCONTINUED | OUTPATIENT
Start: 2017-08-14 | End: 2017-08-15 | Stop reason: HOSPADM

## 2017-08-14 RX ORDER — SODIUM CHLORIDE 0.9 % (FLUSH) 0.9 %
10 SYRINGE (ML) INJECTION EVERY 12 HOURS SCHEDULED
Status: DISCONTINUED | OUTPATIENT
Start: 2017-08-14 | End: 2017-08-15 | Stop reason: HOSPADM

## 2017-08-14 RX ORDER — ACETAMINOPHEN 325 MG/1
650 TABLET ORAL EVERY 6 HOURS PRN
Status: DISCONTINUED | OUTPATIENT
Start: 2017-08-14 | End: 2017-08-15 | Stop reason: HOSPADM

## 2017-08-14 RX ORDER — ASPIRIN 81 MG/1
81 TABLET ORAL DAILY
Status: DISCONTINUED | OUTPATIENT
Start: 2017-08-14 | End: 2017-08-15 | Stop reason: HOSPADM

## 2017-08-14 RX ORDER — NICOTINE POLACRILEX 4 MG
15 LOZENGE BUCCAL PRN
Status: DISCONTINUED | OUTPATIENT
Start: 2017-08-14 | End: 2017-08-15 | Stop reason: HOSPADM

## 2017-08-14 RX ORDER — LISINOPRIL 2.5 MG/1
2.5 TABLET ORAL DAILY
Status: DISCONTINUED | OUTPATIENT
Start: 2017-08-14 | End: 2017-08-15 | Stop reason: HOSPADM

## 2017-08-14 RX ORDER — INSULIN GLARGINE 100 [IU]/ML
42 INJECTION, SOLUTION SUBCUTANEOUS 2 TIMES DAILY
Status: DISCONTINUED | OUTPATIENT
Start: 2017-08-14 | End: 2017-08-15 | Stop reason: HOSPADM

## 2017-08-14 RX ORDER — SODIUM CHLORIDE 0.9 % (FLUSH) 0.9 %
10 SYRINGE (ML) INJECTION PRN
Status: DISCONTINUED | OUTPATIENT
Start: 2017-08-14 | End: 2017-08-15 | Stop reason: HOSPADM

## 2017-08-14 RX ORDER — FAMOTIDINE 20 MG/1
20 TABLET, FILM COATED ORAL DAILY
Status: DISCONTINUED | OUTPATIENT
Start: 2017-08-14 | End: 2017-08-15 | Stop reason: HOSPADM

## 2017-08-14 RX ADMIN — Medication 6 MILLICURIE: at 15:45

## 2017-08-14 RX ADMIN — Medication 10 ML: at 22:47

## 2017-08-14 RX ADMIN — OLANZAPINE 10 MG: 10 TABLET, FILM COATED ORAL at 22:41

## 2017-08-14 RX ADMIN — Medication 40 MILLICURIE: at 15:30

## 2017-08-14 RX ADMIN — SODIUM CHLORIDE 1000 ML: 9 INJECTION, SOLUTION INTRAVENOUS at 13:51

## 2017-08-14 RX ADMIN — ONDANSETRON 4 MG: 2 INJECTION INTRAMUSCULAR; INTRAVENOUS at 13:51

## 2017-08-14 RX ADMIN — OXCARBAZEPINE 300 MG: 300 TABLET, FILM COATED ORAL at 22:41

## 2017-08-14 RX ADMIN — IPRATROPIUM BROMIDE AND ALBUTEROL SULFATE 3 ML: .5; 3 SOLUTION RESPIRATORY (INHALATION) at 20:38

## 2017-08-14 RX ADMIN — LISINOPRIL 2.5 MG: 2.5 TABLET ORAL at 22:40

## 2017-08-14 ASSESSMENT — ENCOUNTER SYMPTOMS
VOMITING: 0
COLOR CHANGE: 0
SHORTNESS OF BREATH: 0
NAUSEA: 1
WHEEZING: 0

## 2017-08-14 ASSESSMENT — HEART SCORE: ECG: 1

## 2017-08-15 VITALS
RESPIRATION RATE: 20 BRPM | HEART RATE: 83 BPM | SYSTOLIC BLOOD PRESSURE: 134 MMHG | BODY MASS INDEX: 35.76 KG/M2 | WEIGHT: 189.4 LBS | OXYGEN SATURATION: 94 % | DIASTOLIC BLOOD PRESSURE: 76 MMHG | TEMPERATURE: 97.1 F | HEIGHT: 61 IN

## 2017-08-15 LAB
EKG ATRIAL RATE: 100 BPM
EKG ATRIAL RATE: 84 BPM
EKG ATRIAL RATE: 93 BPM
EKG ATRIAL RATE: 95 BPM
EKG P AXIS: 45 DEGREES
EKG P AXIS: 47 DEGREES
EKG P AXIS: 50 DEGREES
EKG P AXIS: 51 DEGREES
EKG P-R INTERVAL: 160 MS
EKG P-R INTERVAL: 166 MS
EKG P-R INTERVAL: 168 MS
EKG P-R INTERVAL: 170 MS
EKG Q-T INTERVAL: 316 MS
EKG Q-T INTERVAL: 316 MS
EKG Q-T INTERVAL: 318 MS
EKG Q-T INTERVAL: 382 MS
EKG QRS DURATION: 66 MS
EKG QRS DURATION: 80 MS
EKG QRS DURATION: 84 MS
EKG QRS DURATION: 86 MS
EKG QTC CALCULATION (BAZETT): 392 MS
EKG QTC CALCULATION (BAZETT): 399 MS
EKG QTC CALCULATION (BAZETT): 407 MS
EKG QTC CALCULATION (BAZETT): 451 MS
EKG R AXIS: -10 DEGREES
EKG R AXIS: -13 DEGREES
EKG R AXIS: -15 DEGREES
EKG R AXIS: -24 DEGREES
EKG T AXIS: 128 DEGREES
EKG T AXIS: 64 DEGREES
EKG T AXIS: 73 DEGREES
EKG T AXIS: 97 DEGREES
EKG VENTRICULAR RATE: 100 BPM
EKG VENTRICULAR RATE: 84 BPM
EKG VENTRICULAR RATE: 93 BPM
EKG VENTRICULAR RATE: 95 BPM
GLUCOSE BLD-MCNC: 38 MG/DL (ref 65–105)
GLUCOSE BLD-MCNC: 48 MG/DL (ref 65–105)
GLUCOSE BLD-MCNC: 56 MG/DL (ref 65–105)
GLUCOSE BLD-MCNC: 71 MG/DL (ref 65–105)
GLUCOSE BLD-MCNC: 74 MG/DL (ref 65–105)

## 2017-08-15 PROCEDURE — 93005 ELECTROCARDIOGRAM TRACING: CPT

## 2017-08-15 PROCEDURE — 2580000003 HC RX 258: Performed by: EMERGENCY MEDICINE

## 2017-08-15 PROCEDURE — 6370000000 HC RX 637 (ALT 250 FOR IP): Performed by: EMERGENCY MEDICINE

## 2017-08-15 PROCEDURE — 82947 ASSAY GLUCOSE BLOOD QUANT: CPT

## 2017-08-15 PROCEDURE — 94640 AIRWAY INHALATION TREATMENT: CPT

## 2017-08-15 PROCEDURE — G0378 HOSPITAL OBSERVATION PER HR: HCPCS

## 2017-08-15 RX ADMIN — IPRATROPIUM BROMIDE AND ALBUTEROL SULFATE 3 ML: .5; 3 SOLUTION RESPIRATORY (INHALATION) at 14:37

## 2017-08-15 RX ADMIN — LISINOPRIL 2.5 MG: 2.5 TABLET ORAL at 08:43

## 2017-08-15 RX ADMIN — METOPROLOL TARTRATE 25 MG: 25 TABLET ORAL at 08:42

## 2017-08-15 RX ADMIN — OXCARBAZEPINE 300 MG: 300 TABLET, FILM COATED ORAL at 08:43

## 2017-08-15 RX ADMIN — ASPIRIN 81 MG: 81 TABLET ORAL at 08:43

## 2017-08-15 RX ADMIN — FAMOTIDINE 20 MG: 20 TABLET, FILM COATED ORAL at 08:42

## 2017-08-15 RX ADMIN — DEXTROSE 15 G: 15 GEL ORAL at 08:41

## 2017-08-15 RX ADMIN — DEXTROSE MONOHYDRATE 100 ML/HR: 50 INJECTION, SOLUTION INTRAVENOUS at 08:57

## 2017-08-15 RX ADMIN — IPRATROPIUM BROMIDE AND ALBUTEROL SULFATE 3 ML: .5; 3 SOLUTION RESPIRATORY (INHALATION) at 09:39

## 2017-08-16 ENCOUNTER — APPOINTMENT (OUTPATIENT)
Dept: CT IMAGING | Age: 62
End: 2017-08-16
Payer: COMMERCIAL

## 2017-08-16 ENCOUNTER — HOSPITAL ENCOUNTER (EMERGENCY)
Age: 62
Discharge: HOME | End: 2017-08-16
Attending: EMERGENCY MEDICINE
Payer: COMMERCIAL

## 2017-08-16 VITALS
OXYGEN SATURATION: 97 % | BODY MASS INDEX: 34.55 KG/M2 | HEIGHT: 61 IN | DIASTOLIC BLOOD PRESSURE: 69 MMHG | TEMPERATURE: 97 F | WEIGHT: 183 LBS | SYSTOLIC BLOOD PRESSURE: 136 MMHG | RESPIRATION RATE: 12 BRPM | HEART RATE: 85 BPM

## 2017-08-16 DIAGNOSIS — G44.021 INTRACTABLE CHRONIC CLUSTER HEADACHE: Primary | ICD-10-CM

## 2017-08-16 LAB
CULTURE: NORMAL
CULTURE: NORMAL
Lab: NORMAL
SPECIMEN DESCRIPTION: NORMAL
STATUS: NORMAL

## 2017-08-16 PROCEDURE — 6360000002 HC RX W HCPCS: Performed by: EMERGENCY MEDICINE

## 2017-08-16 PROCEDURE — 99284 EMERGENCY DEPT VISIT MOD MDM: CPT

## 2017-08-16 PROCEDURE — 70450 CT HEAD/BRAIN W/O DYE: CPT

## 2017-08-16 PROCEDURE — 96372 THER/PROPH/DIAG INJ SC/IM: CPT

## 2017-08-16 RX ORDER — DIPHENHYDRAMINE HYDROCHLORIDE 50 MG/ML
50 INJECTION INTRAMUSCULAR; INTRAVENOUS ONCE
Status: COMPLETED | OUTPATIENT
Start: 2017-08-16 | End: 2017-08-16

## 2017-08-16 RX ORDER — HYDROCODONE BITARTRATE AND ACETAMINOPHEN 5; 325 MG/1; MG/1
1 TABLET ORAL EVERY 6 HOURS PRN
Qty: 10 TABLET | Refills: 0 | Status: SHIPPED | OUTPATIENT
Start: 2017-08-16 | End: 2017-08-23

## 2017-08-16 RX ORDER — METOCLOPRAMIDE HYDROCHLORIDE 5 MG/ML
10 INJECTION INTRAMUSCULAR; INTRAVENOUS ONCE
Status: COMPLETED | OUTPATIENT
Start: 2017-08-16 | End: 2017-08-16

## 2017-08-16 RX ADMIN — DIPHENHYDRAMINE HYDROCHLORIDE 50 MG: 50 INJECTION, SOLUTION INTRAMUSCULAR; INTRAVENOUS at 10:56

## 2017-08-16 RX ADMIN — METOCLOPRAMIDE 10 MG: 5 INJECTION, SOLUTION INTRAMUSCULAR; INTRAVENOUS at 10:56

## 2017-08-16 ASSESSMENT — ENCOUNTER SYMPTOMS
WHEEZING: 0
DIARRHEA: 0
NAUSEA: 0
BACK PAIN: 0
ABDOMINAL PAIN: 0
VOMITING: 0
COUGH: 0
SHORTNESS OF BREATH: 0
ORTHOPNEA: 0

## 2017-08-16 ASSESSMENT — PAIN DESCRIPTION - LOCATION: LOCATION: HEAD

## 2017-08-16 ASSESSMENT — PAIN SCALES - GENERAL: PAINLEVEL_OUTOF10: 10

## 2017-08-16 ASSESSMENT — PAIN DESCRIPTION - PAIN TYPE: TYPE: ACUTE PAIN

## 2017-08-17 ENCOUNTER — HOSPITAL ENCOUNTER (OUTPATIENT)
Age: 62
Setting detail: OBSERVATION
Discharge: HOME HEALTH | End: 2017-08-18
Attending: EMERGENCY MEDICINE | Admitting: EMERGENCY MEDICINE
Payer: COMMERCIAL

## 2017-08-17 ENCOUNTER — APPOINTMENT (OUTPATIENT)
Dept: GENERAL RADIOLOGY | Age: 62
End: 2017-08-17
Payer: COMMERCIAL

## 2017-08-17 DIAGNOSIS — D64.9 ANEMIA, UNSPECIFIED TYPE: ICD-10-CM

## 2017-08-17 DIAGNOSIS — R79.89 ELEVATED BRAIN NATRIURETIC PEPTIDE (BNP) LEVEL: ICD-10-CM

## 2017-08-17 DIAGNOSIS — E87.0 HYPERNATREMIA: ICD-10-CM

## 2017-08-17 DIAGNOSIS — R42 LIGHTHEADEDNESS: ICD-10-CM

## 2017-08-17 DIAGNOSIS — R55 SYNCOPE, UNSPECIFIED SYNCOPE TYPE: Primary | ICD-10-CM

## 2017-08-17 LAB
-: ABNORMAL
ABSOLUTE EOS #: 0.3 K/UL (ref 0–0.4)
ABSOLUTE LYMPH #: 2 K/UL (ref 1–4.8)
ABSOLUTE MONO #: 0.7 K/UL (ref 0.1–1.2)
AMORPHOUS: ABNORMAL
ANION GAP SERPL CALCULATED.3IONS-SCNC: 14 MMOL/L (ref 9–17)
BACTERIA: ABNORMAL
BASOPHILS # BLD: 1 %
BASOPHILS ABSOLUTE: 0.1 K/UL (ref 0–0.2)
BILIRUBIN URINE: NEGATIVE
BNP INTERPRETATION: ABNORMAL
BUN BLDV-MCNC: 16 MG/DL (ref 8–23)
BUN/CREAT BLD: ABNORMAL (ref 9–20)
CALCIUM SERPL-MCNC: 8.8 MG/DL (ref 8.6–10.4)
CASTS UA: ABNORMAL /LPF (ref 0–8)
CHLORIDE BLD-SCNC: 104 MMOL/L (ref 98–107)
CO2: 28 MMOL/L (ref 20–31)
COLOR: YELLOW
COMMENT UA: ABNORMAL
CREAT SERPL-MCNC: 0.87 MG/DL (ref 0.5–0.9)
CRYSTALS, UA: ABNORMAL /HPF
DIFFERENTIAL TYPE: ABNORMAL
EOSINOPHILS RELATIVE PERCENT: 3 %
EPITHELIAL CELLS UA: ABNORMAL /HPF (ref 0–5)
GFR AFRICAN AMERICAN: >60 ML/MIN
GFR NON-AFRICAN AMERICAN: >60 ML/MIN
GFR SERPL CREATININE-BSD FRML MDRD: ABNORMAL ML/MIN/{1.73_M2}
GFR SERPL CREATININE-BSD FRML MDRD: ABNORMAL ML/MIN/{1.73_M2}
GLUCOSE BLD-MCNC: 117 MG/DL (ref 70–99)
GLUCOSE BLD-MCNC: 157 MG/DL (ref 65–105)
GLUCOSE BLD-MCNC: 196 MG/DL (ref 65–105)
GLUCOSE BLD-MCNC: 97 MG/DL (ref 65–105)
GLUCOSE URINE: NEGATIVE
HCT VFR BLD CALC: 27.9 % (ref 36–46)
HEMOGLOBIN: 9.2 G/DL (ref 12–16)
KETONES, URINE: NEGATIVE
LEUKOCYTE ESTERASE, URINE: ABNORMAL
LYMPHOCYTES # BLD: 21 %
MCH RBC QN AUTO: 25.7 PG (ref 26–34)
MCHC RBC AUTO-ENTMCNC: 33 G/DL (ref 31–37)
MCV RBC AUTO: 77.8 FL (ref 80–100)
MONOCYTES # BLD: 7 %
MUCUS: ABNORMAL
NITRITE, URINE: NEGATIVE
OTHER OBSERVATIONS UA: ABNORMAL
PDW BLD-RTO: 15.9 % (ref 12.5–15.4)
PH UA: 7.5 (ref 5–8)
PLATELET # BLD: 374 K/UL (ref 140–450)
PLATELET ESTIMATE: ABNORMAL
PMV BLD AUTO: 7.7 FL (ref 6–12)
POC TROPONIN I: 0 NG/ML (ref 0–0.1)
POC TROPONIN INTERP: NORMAL
POTASSIUM SERPL-SCNC: 4 MMOL/L (ref 3.7–5.3)
PRO-BNP: 1782 PG/ML
PROTEIN UA: NEGATIVE
RBC # BLD: 3.59 M/UL (ref 4–5.2)
RBC # BLD: ABNORMAL 10*6/UL
RBC UA: ABNORMAL /HPF (ref 0–4)
RENAL EPITHELIAL, UA: ABNORMAL /HPF
SEG NEUTROPHILS: 68 %
SEGMENTED NEUTROPHILS ABSOLUTE COUNT: 6.3 K/UL (ref 1.8–7.7)
SODIUM BLD-SCNC: 146 MMOL/L (ref 135–144)
SPECIFIC GRAVITY UA: 1.02 (ref 1–1.03)
TRICHOMONAS: ABNORMAL
TROPONIN INTERP: NORMAL
TROPONIN INTERP: NORMAL
TROPONIN T: <0.03 NG/ML
TROPONIN T: <0.03 NG/ML
TURBIDITY: ABNORMAL
URINE HGB: ABNORMAL
UROBILINOGEN, URINE: NORMAL
WBC # BLD: 9.4 K/UL (ref 3.5–11)
WBC # BLD: ABNORMAL 10*3/UL
WBC UA: ABNORMAL /HPF (ref 0–5)
YEAST: ABNORMAL

## 2017-08-17 PROCEDURE — 84484 ASSAY OF TROPONIN QUANT: CPT

## 2017-08-17 PROCEDURE — 87186 SC STD MICRODIL/AGAR DIL: CPT

## 2017-08-17 PROCEDURE — 99285 EMERGENCY DEPT VISIT HI MDM: CPT

## 2017-08-17 PROCEDURE — 82947 ASSAY GLUCOSE BLOOD QUANT: CPT

## 2017-08-17 PROCEDURE — 87077 CULTURE AEROBIC IDENTIFY: CPT

## 2017-08-17 PROCEDURE — 6370000000 HC RX 637 (ALT 250 FOR IP): Performed by: EMERGENCY MEDICINE

## 2017-08-17 PROCEDURE — G0378 HOSPITAL OBSERVATION PER HR: HCPCS

## 2017-08-17 PROCEDURE — 85025 COMPLETE CBC W/AUTO DIFF WBC: CPT

## 2017-08-17 PROCEDURE — 94664 DEMO&/EVAL PT USE INHALER: CPT

## 2017-08-17 PROCEDURE — 87086 URINE CULTURE/COLONY COUNT: CPT

## 2017-08-17 PROCEDURE — 71020 XR CHEST STANDARD TWO VW: CPT

## 2017-08-17 PROCEDURE — 93005 ELECTROCARDIOGRAM TRACING: CPT

## 2017-08-17 PROCEDURE — 2580000003 HC RX 258: Performed by: EMERGENCY MEDICINE

## 2017-08-17 PROCEDURE — 80048 BASIC METABOLIC PNL TOTAL CA: CPT

## 2017-08-17 PROCEDURE — 83880 ASSAY OF NATRIURETIC PEPTIDE: CPT

## 2017-08-17 PROCEDURE — 81001 URINALYSIS AUTO W/SCOPE: CPT

## 2017-08-17 PROCEDURE — 94640 AIRWAY INHALATION TREATMENT: CPT

## 2017-08-17 PROCEDURE — 36415 COLL VENOUS BLD VENIPUNCTURE: CPT

## 2017-08-17 RX ORDER — FAMOTIDINE 20 MG/1
20 TABLET, FILM COATED ORAL DAILY
Status: DISCONTINUED | OUTPATIENT
Start: 2017-08-17 | End: 2017-08-18 | Stop reason: HOSPADM

## 2017-08-17 RX ORDER — DEXTROSE MONOHYDRATE 25 G/50ML
12.5 INJECTION, SOLUTION INTRAVENOUS PRN
Status: DISCONTINUED | OUTPATIENT
Start: 2017-08-17 | End: 2017-08-18 | Stop reason: HOSPADM

## 2017-08-17 RX ORDER — IPRATROPIUM BROMIDE AND ALBUTEROL SULFATE 2.5; .5 MG/3ML; MG/3ML
1 SOLUTION RESPIRATORY (INHALATION) EVERY 6 HOURS
Status: DISCONTINUED | OUTPATIENT
Start: 2017-08-17 | End: 2017-08-17

## 2017-08-17 RX ORDER — ONDANSETRON 2 MG/ML
4 INJECTION INTRAMUSCULAR; INTRAVENOUS EVERY 6 HOURS PRN
Status: DISCONTINUED | OUTPATIENT
Start: 2017-08-17 | End: 2017-08-18 | Stop reason: HOSPADM

## 2017-08-17 RX ORDER — OLANZAPINE 10 MG/1
10 TABLET ORAL NIGHTLY
Status: DISCONTINUED | OUTPATIENT
Start: 2017-08-17 | End: 2017-08-18 | Stop reason: HOSPADM

## 2017-08-17 RX ORDER — IPRATROPIUM BROMIDE AND ALBUTEROL SULFATE 2.5; .5 MG/3ML; MG/3ML
1 SOLUTION RESPIRATORY (INHALATION) EVERY 6 HOURS
Status: DISCONTINUED | OUTPATIENT
Start: 2017-08-17 | End: 2017-08-18

## 2017-08-17 RX ORDER — SODIUM CHLORIDE 0.9 % (FLUSH) 0.9 %
10 SYRINGE (ML) INJECTION PRN
Status: DISCONTINUED | OUTPATIENT
Start: 2017-08-17 | End: 2017-08-18 | Stop reason: HOSPADM

## 2017-08-17 RX ORDER — ACETAMINOPHEN 325 MG/1
650 TABLET ORAL EVERY 6 HOURS PRN
Status: DISCONTINUED | OUTPATIENT
Start: 2017-08-17 | End: 2017-08-18 | Stop reason: HOSPADM

## 2017-08-17 RX ORDER — OXCARBAZEPINE 300 MG/1
300 TABLET, FILM COATED ORAL 2 TIMES DAILY
Status: DISCONTINUED | OUTPATIENT
Start: 2017-08-17 | End: 2017-08-18 | Stop reason: HOSPADM

## 2017-08-17 RX ORDER — INSULIN GLARGINE 100 [IU]/ML
42 INJECTION, SOLUTION SUBCUTANEOUS 2 TIMES DAILY
Status: DISCONTINUED | OUTPATIENT
Start: 2017-08-17 | End: 2017-08-18 | Stop reason: HOSPADM

## 2017-08-17 RX ORDER — DEXTROSE MONOHYDRATE 50 MG/ML
100 INJECTION, SOLUTION INTRAVENOUS PRN
Status: DISCONTINUED | OUTPATIENT
Start: 2017-08-17 | End: 2017-08-18 | Stop reason: HOSPADM

## 2017-08-17 RX ORDER — ASPIRIN 81 MG/1
81 TABLET ORAL DAILY
Status: DISCONTINUED | OUTPATIENT
Start: 2017-08-18 | End: 2017-08-18 | Stop reason: HOSPADM

## 2017-08-17 RX ORDER — CEPHALEXIN 500 MG/1
500 CAPSULE ORAL 2 TIMES DAILY
Status: DISCONTINUED | OUTPATIENT
Start: 2017-08-17 | End: 2017-08-18 | Stop reason: HOSPADM

## 2017-08-17 RX ORDER — ATORVASTATIN CALCIUM 20 MG/1
20 TABLET, FILM COATED ORAL NIGHTLY
Status: DISCONTINUED | OUTPATIENT
Start: 2017-08-17 | End: 2017-08-18 | Stop reason: HOSPADM

## 2017-08-17 RX ORDER — SODIUM CHLORIDE 0.9 % (FLUSH) 0.9 %
10 SYRINGE (ML) INJECTION EVERY 12 HOURS SCHEDULED
Status: DISCONTINUED | OUTPATIENT
Start: 2017-08-17 | End: 2017-08-18 | Stop reason: HOSPADM

## 2017-08-17 RX ORDER — NICOTINE POLACRILEX 4 MG
15 LOZENGE BUCCAL PRN
Status: DISCONTINUED | OUTPATIENT
Start: 2017-08-17 | End: 2017-08-18 | Stop reason: HOSPADM

## 2017-08-17 RX ORDER — LISINOPRIL 2.5 MG/1
2.5 TABLET ORAL DAILY
Status: DISCONTINUED | OUTPATIENT
Start: 2017-08-18 | End: 2017-08-18 | Stop reason: HOSPADM

## 2017-08-17 RX ADMIN — IPRATROPIUM BROMIDE AND ALBUTEROL SULFATE 3 ML: .5; 3 SOLUTION RESPIRATORY (INHALATION) at 21:05

## 2017-08-17 RX ADMIN — Medication 10 ML: at 20:21

## 2017-08-17 RX ADMIN — OXCARBAZEPINE 300 MG: 300 TABLET, FILM COATED ORAL at 20:19

## 2017-08-17 RX ADMIN — OLANZAPINE 10 MG: 10 TABLET, FILM COATED ORAL at 20:20

## 2017-08-17 RX ADMIN — CEPHALEXIN 500 MG: 500 CAPSULE ORAL at 20:20

## 2017-08-17 RX ADMIN — INSULIN LISPRO 10 UNITS: 100 INJECTION, SOLUTION INTRAVENOUS; SUBCUTANEOUS at 18:19

## 2017-08-17 RX ADMIN — METOPROLOL TARTRATE 25 MG: 25 TABLET ORAL at 20:19

## 2017-08-17 RX ADMIN — ATORVASTATIN CALCIUM 20 MG: 20 TABLET, FILM COATED ORAL at 20:19

## 2017-08-17 ASSESSMENT — PAIN SCALES - GENERAL: PAINLEVEL_OUTOF10: 0

## 2017-08-17 ASSESSMENT — ENCOUNTER SYMPTOMS
SHORTNESS OF BREATH: 0
NAUSEA: 0
COLOR CHANGE: 0
VOMITING: 0
WHEEZING: 0

## 2017-08-17 ASSESSMENT — HEART SCORE: ECG: 1

## 2017-08-18 ENCOUNTER — APPOINTMENT (OUTPATIENT)
Dept: CARDIAC CATH/INVASIVE PROCEDURES | Age: 62
End: 2017-08-18
Payer: COMMERCIAL

## 2017-08-18 VITALS
RESPIRATION RATE: 15 BRPM | WEIGHT: 183 LBS | OXYGEN SATURATION: 100 % | BODY MASS INDEX: 34.55 KG/M2 | HEIGHT: 61 IN | HEART RATE: 91 BPM | TEMPERATURE: 98.4 F | DIASTOLIC BLOOD PRESSURE: 92 MMHG | SYSTOLIC BLOOD PRESSURE: 163 MMHG

## 2017-08-18 LAB
EKG ATRIAL RATE: 87 BPM
EKG ATRIAL RATE: 92 BPM
EKG P AXIS: 37 DEGREES
EKG P AXIS: 45 DEGREES
EKG P-R INTERVAL: 166 MS
EKG P-R INTERVAL: 166 MS
EKG Q-T INTERVAL: 340 MS
EKG Q-T INTERVAL: 348 MS
EKG QRS DURATION: 78 MS
EKG QRS DURATION: 84 MS
EKG QTC CALCULATION (BAZETT): 418 MS
EKG QTC CALCULATION (BAZETT): 420 MS
EKG R AXIS: -22 DEGREES
EKG R AXIS: -25 DEGREES
EKG T AXIS: 104 DEGREES
EKG T AXIS: 85 DEGREES
EKG VENTRICULAR RATE: 87 BPM
EKG VENTRICULAR RATE: 92 BPM
GLUCOSE BLD-MCNC: 103 MG/DL (ref 65–105)
GLUCOSE BLD-MCNC: 160 MG/DL (ref 65–105)

## 2017-08-18 PROCEDURE — 2580000003 HC RX 258: Performed by: EMERGENCY MEDICINE

## 2017-08-18 PROCEDURE — G8978 MOBILITY CURRENT STATUS: HCPCS

## 2017-08-18 PROCEDURE — G8979 MOBILITY GOAL STATUS: HCPCS

## 2017-08-18 PROCEDURE — 93005 ELECTROCARDIOGRAM TRACING: CPT

## 2017-08-18 PROCEDURE — 6370000000 HC RX 637 (ALT 250 FOR IP): Performed by: EMERGENCY MEDICINE

## 2017-08-18 PROCEDURE — 82947 ASSAY GLUCOSE BLOOD QUANT: CPT

## 2017-08-18 PROCEDURE — G0378 HOSPITAL OBSERVATION PER HR: HCPCS

## 2017-08-18 PROCEDURE — 94640 AIRWAY INHALATION TREATMENT: CPT

## 2017-08-18 PROCEDURE — 97530 THERAPEUTIC ACTIVITIES: CPT

## 2017-08-18 PROCEDURE — 97162 PT EVAL MOD COMPLEX 30 MIN: CPT

## 2017-08-18 RX ORDER — IPRATROPIUM BROMIDE AND ALBUTEROL SULFATE 2.5; .5 MG/3ML; MG/3ML
1 SOLUTION RESPIRATORY (INHALATION) EVERY 6 HOURS PRN
Status: DISCONTINUED | OUTPATIENT
Start: 2017-08-18 | End: 2017-08-18 | Stop reason: HOSPADM

## 2017-08-18 RX ORDER — SULFAMETHOXAZOLE AND TRIMETHOPRIM 800; 160 MG/1; MG/1
1 TABLET ORAL 2 TIMES DAILY
Qty: 10 TABLET | Refills: 0 | Status: SHIPPED | OUTPATIENT
Start: 2017-08-18 | End: 2017-08-18 | Stop reason: HOSPADM

## 2017-08-18 RX ORDER — LISINOPRIL 2.5 MG/1
2.5 TABLET ORAL DAILY
Qty: 30 TABLET | Refills: 3 | Status: SHIPPED | OUTPATIENT
Start: 2017-08-18 | End: 2017-10-17 | Stop reason: DRUGHIGH

## 2017-08-18 RX ADMIN — CEPHALEXIN 500 MG: 500 CAPSULE ORAL at 08:53

## 2017-08-18 RX ADMIN — FAMOTIDINE 20 MG: 20 TABLET, FILM COATED ORAL at 08:53

## 2017-08-18 RX ADMIN — LISINOPRIL 2.5 MG: 2.5 TABLET ORAL at 14:02

## 2017-08-18 RX ADMIN — IPRATROPIUM BROMIDE AND ALBUTEROL SULFATE 3 ML: .5; 3 SOLUTION RESPIRATORY (INHALATION) at 08:45

## 2017-08-18 RX ADMIN — Medication 10 ML: at 09:00

## 2017-08-18 RX ADMIN — ASPIRIN 81 MG: 81 TABLET ORAL at 14:01

## 2017-08-18 RX ADMIN — OXCARBAZEPINE 300 MG: 300 TABLET, FILM COATED ORAL at 08:53

## 2017-08-18 RX ADMIN — METOPROLOL TARTRATE 25 MG: 25 TABLET ORAL at 14:01

## 2017-08-18 ASSESSMENT — PAIN SCALES - GENERAL: PAINLEVEL_OUTOF10: 10

## 2017-08-19 ENCOUNTER — HOSPITAL ENCOUNTER (OUTPATIENT)
Age: 62
Setting detail: OBSERVATION
Discharge: HOME | End: 2017-08-20
Attending: EMERGENCY MEDICINE | Admitting: EMERGENCY MEDICINE
Payer: COMMERCIAL

## 2017-08-19 ENCOUNTER — APPOINTMENT (OUTPATIENT)
Dept: GENERAL RADIOLOGY | Age: 62
End: 2017-08-19
Payer: COMMERCIAL

## 2017-08-19 DIAGNOSIS — R55 SYNCOPE AND COLLAPSE: Primary | ICD-10-CM

## 2017-08-19 LAB
-: ABNORMAL
ABSOLUTE EOS #: 0.23 K/UL (ref 0–0.4)
ABSOLUTE LYMPH #: 2.71 K/UL (ref 1–4.8)
ABSOLUTE MONO #: 0.9 K/UL (ref 0.1–0.8)
AMORPHOUS: ABNORMAL
ANION GAP SERPL CALCULATED.3IONS-SCNC: 14 MMOL/L (ref 9–17)
BACTERIA: ABNORMAL
BASOPHILS # BLD: 0 %
BASOPHILS ABSOLUTE: 0 K/UL (ref 0–0.2)
BILIRUBIN URINE: ABNORMAL
BNP INTERPRETATION: ABNORMAL
BUN BLDV-MCNC: 13 MG/DL (ref 8–23)
BUN/CREAT BLD: ABNORMAL (ref 9–20)
CALCIUM SERPL-MCNC: 9.4 MG/DL (ref 8.6–10.4)
CASTS UA: ABNORMAL /LPF (ref 0–2)
CHLORIDE BLD-SCNC: 100 MMOL/L (ref 98–107)
CO2: 26 MMOL/L (ref 20–31)
COLOR: YELLOW
COMMENT UA: ABNORMAL
CREAT SERPL-MCNC: 0.95 MG/DL (ref 0.5–0.9)
CRYSTALS, UA: ABNORMAL /HPF
CULTURE: ABNORMAL
CULTURE: ABNORMAL
DIFFERENTIAL TYPE: ABNORMAL
EOSINOPHILS RELATIVE PERCENT: 2 %
EPITHELIAL CELLS UA: ABNORMAL /HPF (ref 0–5)
GFR AFRICAN AMERICAN: >60 ML/MIN
GFR NON-AFRICAN AMERICAN: >60 ML/MIN
GFR SERPL CREATININE-BSD FRML MDRD: ABNORMAL ML/MIN/{1.73_M2}
GFR SERPL CREATININE-BSD FRML MDRD: ABNORMAL ML/MIN/{1.73_M2}
GLUCOSE BLD-MCNC: 155 MG/DL (ref 65–105)
GLUCOSE BLD-MCNC: 171 MG/DL (ref 65–105)
GLUCOSE BLD-MCNC: 58 MG/DL (ref 65–105)
GLUCOSE BLD-MCNC: 91 MG/DL (ref 70–99)
GLUCOSE URINE: NEGATIVE
HCT VFR BLD CALC: 32.9 % (ref 36–46)
HEMOGLOBIN: 10.6 G/DL (ref 12–16)
KETONES, URINE: NEGATIVE
LEUKOCYTE ESTERASE, URINE: ABNORMAL
LYMPHOCYTES # BLD: 24 %
Lab: ABNORMAL
MCH RBC QN AUTO: 25 PG (ref 26–34)
MCHC RBC AUTO-ENTMCNC: 32 G/DL (ref 31–37)
MCV RBC AUTO: 77.9 FL (ref 80–100)
MONOCYTES # BLD: 8 %
MORPHOLOGY: ABNORMAL
MUCUS: ABNORMAL
NITRITE, URINE: NEGATIVE
ORGANISM: ABNORMAL
OTHER OBSERVATIONS UA: ABNORMAL
PDW BLD-RTO: 15.8 % (ref 12.5–15.4)
PH UA: 6.5 (ref 5–8)
PLATELET # BLD: 382 K/UL (ref 140–450)
PLATELET ESTIMATE: ABNORMAL
PMV BLD AUTO: 7.5 FL (ref 6–12)
POC TROPONIN I: 0 NG/ML (ref 0–0.1)
POC TROPONIN I: 0 NG/ML (ref 0–0.1)
POC TROPONIN INTERP: NORMAL
POC TROPONIN INTERP: NORMAL
POTASSIUM SERPL-SCNC: 4.1 MMOL/L (ref 3.7–5.3)
PRO-BNP: 3080 PG/ML
PROTEIN UA: ABNORMAL
RBC # BLD: 4.23 M/UL (ref 4–5.2)
RBC # BLD: ABNORMAL 10*6/UL
RBC UA: ABNORMAL /HPF (ref 0–2)
RENAL EPITHELIAL, UA: ABNORMAL /HPF
SEG NEUTROPHILS: 66 %
SEGMENTED NEUTROPHILS ABSOLUTE COUNT: 7.46 K/UL (ref 1.8–7.7)
SODIUM BLD-SCNC: 140 MMOL/L (ref 135–144)
SPECIFIC GRAVITY UA: 1.01 (ref 1–1.03)
SPECIMEN DESCRIPTION: ABNORMAL
STATUS: ABNORMAL
TRICHOMONAS: ABNORMAL
TURBIDITY: CLEAR
URINE HGB: ABNORMAL
UROBILINOGEN, URINE: NORMAL
WBC # BLD: 11.3 K/UL (ref 3.5–11)
WBC # BLD: ABNORMAL 10*3/UL
WBC UA: ABNORMAL /HPF (ref 0–5)
YEAST: ABNORMAL

## 2017-08-19 PROCEDURE — 85025 COMPLETE CBC W/AUTO DIFF WBC: CPT

## 2017-08-19 PROCEDURE — 83880 ASSAY OF NATRIURETIC PEPTIDE: CPT

## 2017-08-19 PROCEDURE — 93005 ELECTROCARDIOGRAM TRACING: CPT

## 2017-08-19 PROCEDURE — 6370000000 HC RX 637 (ALT 250 FOR IP): Performed by: EMERGENCY MEDICINE

## 2017-08-19 PROCEDURE — 99285 EMERGENCY DEPT VISIT HI MDM: CPT

## 2017-08-19 PROCEDURE — G0378 HOSPITAL OBSERVATION PER HR: HCPCS

## 2017-08-19 PROCEDURE — 84484 ASSAY OF TROPONIN QUANT: CPT

## 2017-08-19 PROCEDURE — 6360000002 HC RX W HCPCS: Performed by: EMERGENCY MEDICINE

## 2017-08-19 PROCEDURE — 2580000003 HC RX 258: Performed by: EMERGENCY MEDICINE

## 2017-08-19 PROCEDURE — 96374 THER/PROPH/DIAG INJ IV PUSH: CPT

## 2017-08-19 PROCEDURE — 80048 BASIC METABOLIC PNL TOTAL CA: CPT

## 2017-08-19 PROCEDURE — 96372 THER/PROPH/DIAG INJ SC/IM: CPT

## 2017-08-19 PROCEDURE — 94640 AIRWAY INHALATION TREATMENT: CPT

## 2017-08-19 PROCEDURE — 81001 URINALYSIS AUTO W/SCOPE: CPT

## 2017-08-19 PROCEDURE — 71020 XR CHEST STANDARD TWO VW: CPT

## 2017-08-19 PROCEDURE — 82947 ASSAY GLUCOSE BLOOD QUANT: CPT

## 2017-08-19 RX ORDER — SODIUM CHLORIDE 0.9 % (FLUSH) 0.9 %
10 SYRINGE (ML) INJECTION PRN
Status: DISCONTINUED | OUTPATIENT
Start: 2017-08-19 | End: 2017-08-20 | Stop reason: HOSPADM

## 2017-08-19 RX ORDER — DEXTROSE MONOHYDRATE 25 G/50ML
12.5 INJECTION, SOLUTION INTRAVENOUS PRN
Status: DISCONTINUED | OUTPATIENT
Start: 2017-08-19 | End: 2017-08-20 | Stop reason: HOSPADM

## 2017-08-19 RX ORDER — ACETAMINOPHEN 325 MG/1
650 TABLET ORAL EVERY 4 HOURS PRN
Status: DISCONTINUED | OUTPATIENT
Start: 2017-08-19 | End: 2017-08-20 | Stop reason: HOSPADM

## 2017-08-19 RX ORDER — DEXTROSE MONOHYDRATE 50 MG/ML
100 INJECTION, SOLUTION INTRAVENOUS PRN
Status: DISCONTINUED | OUTPATIENT
Start: 2017-08-19 | End: 2017-08-20 | Stop reason: HOSPADM

## 2017-08-19 RX ORDER — INSULIN GLARGINE 100 [IU]/ML
35 INJECTION, SOLUTION SUBCUTANEOUS DAILY
Status: DISCONTINUED | OUTPATIENT
Start: 2017-08-20 | End: 2017-08-19

## 2017-08-19 RX ORDER — INSULIN GLARGINE 100 [IU]/ML
42 INJECTION, SOLUTION SUBCUTANEOUS 2 TIMES DAILY
Status: DISCONTINUED | OUTPATIENT
Start: 2017-08-19 | End: 2017-08-19

## 2017-08-19 RX ORDER — OLANZAPINE 10 MG/1
10 TABLET ORAL NIGHTLY
Status: DISCONTINUED | OUTPATIENT
Start: 2017-08-19 | End: 2017-08-20 | Stop reason: HOSPADM

## 2017-08-19 RX ORDER — NICOTINE POLACRILEX 4 MG
15 LOZENGE BUCCAL PRN
Status: DISCONTINUED | OUTPATIENT
Start: 2017-08-19 | End: 2017-08-20 | Stop reason: HOSPADM

## 2017-08-19 RX ORDER — ATORVASTATIN CALCIUM 20 MG/1
20 TABLET, FILM COATED ORAL NIGHTLY
Status: DISCONTINUED | OUTPATIENT
Start: 2017-08-19 | End: 2017-08-20 | Stop reason: HOSPADM

## 2017-08-19 RX ORDER — AMOXICILLIN 250 MG
1 CAPSULE ORAL DAILY
Status: ON HOLD | COMMUNITY
End: 2017-12-18

## 2017-08-19 RX ORDER — IPRATROPIUM BROMIDE AND ALBUTEROL SULFATE 2.5; .5 MG/3ML; MG/3ML
1 SOLUTION RESPIRATORY (INHALATION) EVERY 6 HOURS
Status: DISCONTINUED | OUTPATIENT
Start: 2017-08-19 | End: 2017-08-20 | Stop reason: HOSPADM

## 2017-08-19 RX ORDER — FAMOTIDINE 20 MG/1
20 TABLET, FILM COATED ORAL DAILY
Status: DISCONTINUED | OUTPATIENT
Start: 2017-08-19 | End: 2017-08-20 | Stop reason: HOSPADM

## 2017-08-19 RX ORDER — ONDANSETRON 2 MG/ML
4 INJECTION INTRAMUSCULAR; INTRAVENOUS EVERY 8 HOURS PRN
Status: DISCONTINUED | OUTPATIENT
Start: 2017-08-19 | End: 2017-08-20 | Stop reason: HOSPADM

## 2017-08-19 RX ORDER — COLCHICINE 0.6 MG/1
0.6 TABLET ORAL DAILY
Status: DISCONTINUED | OUTPATIENT
Start: 2017-08-19 | End: 2017-08-20 | Stop reason: HOSPADM

## 2017-08-19 RX ORDER — MORPHINE SULFATE 2 MG/ML
2 INJECTION, SOLUTION INTRAMUSCULAR; INTRAVENOUS
Status: DISCONTINUED | OUTPATIENT
Start: 2017-08-19 | End: 2017-08-20

## 2017-08-19 RX ORDER — SODIUM CHLORIDE 0.9 % (FLUSH) 0.9 %
10 SYRINGE (ML) INJECTION EVERY 12 HOURS SCHEDULED
Status: DISCONTINUED | OUTPATIENT
Start: 2017-08-19 | End: 2017-08-20 | Stop reason: HOSPADM

## 2017-08-19 RX ORDER — INSULIN GLARGINE 100 [IU]/ML
36 INJECTION, SOLUTION SUBCUTANEOUS NIGHTLY
Status: DISCONTINUED | OUTPATIENT
Start: 2017-08-19 | End: 2017-08-20 | Stop reason: HOSPADM

## 2017-08-19 RX ORDER — MORPHINE SULFATE 4 MG/ML
4 INJECTION, SOLUTION INTRAMUSCULAR; INTRAVENOUS
Status: DISCONTINUED | OUTPATIENT
Start: 2017-08-19 | End: 2017-08-20

## 2017-08-19 RX ORDER — LISINOPRIL 2.5 MG/1
2.5 TABLET ORAL DAILY
Status: DISCONTINUED | OUTPATIENT
Start: 2017-08-19 | End: 2017-08-20 | Stop reason: HOSPADM

## 2017-08-19 RX ORDER — SODIUM CHLORIDE 9 MG/ML
INJECTION, SOLUTION INTRAVENOUS CONTINUOUS
Status: DISCONTINUED | OUTPATIENT
Start: 2017-08-19 | End: 2017-08-20 | Stop reason: HOSPADM

## 2017-08-19 RX ORDER — CEPHALEXIN 500 MG/1
500 CAPSULE ORAL 2 TIMES DAILY
Status: DISCONTINUED | OUTPATIENT
Start: 2017-08-19 | End: 2017-08-20 | Stop reason: HOSPADM

## 2017-08-19 RX ORDER — INSULIN GLARGINE 100 [IU]/ML
30 INJECTION, SOLUTION SUBCUTANEOUS DAILY
Status: DISCONTINUED | OUTPATIENT
Start: 2017-08-19 | End: 2017-08-19

## 2017-08-19 RX ORDER — OXCARBAZEPINE 300 MG/1
300 TABLET, FILM COATED ORAL 2 TIMES DAILY
Status: DISCONTINUED | OUTPATIENT
Start: 2017-08-19 | End: 2017-08-20 | Stop reason: HOSPADM

## 2017-08-19 RX ORDER — ASPIRIN 81 MG/1
81 TABLET ORAL DAILY
Status: DISCONTINUED | OUTPATIENT
Start: 2017-08-19 | End: 2017-08-20 | Stop reason: HOSPADM

## 2017-08-19 RX ADMIN — IPRATROPIUM BROMIDE AND ALBUTEROL SULFATE 3 ML: .5; 3 SOLUTION RESPIRATORY (INHALATION) at 20:44

## 2017-08-19 RX ADMIN — METOPROLOL TARTRATE 25 MG: 25 TABLET ORAL at 21:26

## 2017-08-19 RX ADMIN — MORPHINE SULFATE 2 MG: 2 INJECTION, SOLUTION INTRAMUSCULAR; INTRAVENOUS at 21:27

## 2017-08-19 RX ADMIN — INSULIN GLARGINE 36 UNITS: 100 INJECTION, SOLUTION SUBCUTANEOUS at 21:54

## 2017-08-19 RX ADMIN — ATORVASTATIN CALCIUM 20 MG: 20 TABLET, FILM COATED ORAL at 21:26

## 2017-08-19 RX ADMIN — INSULIN LISPRO 1 UNITS: 100 INJECTION, SOLUTION INTRAVENOUS; SUBCUTANEOUS at 21:29

## 2017-08-19 RX ADMIN — ENOXAPARIN SODIUM 40 MG: 40 INJECTION SUBCUTANEOUS at 21:26

## 2017-08-19 RX ADMIN — CEPHALEXIN 500 MG: 500 CAPSULE ORAL at 21:26

## 2017-08-19 RX ADMIN — SODIUM CHLORIDE: 9 INJECTION, SOLUTION INTRAVENOUS at 17:31

## 2017-08-19 RX ADMIN — OXCARBAZEPINE 300 MG: 300 TABLET, FILM COATED ORAL at 21:50

## 2017-08-19 ASSESSMENT — PAIN DESCRIPTION - ORIENTATION
ORIENTATION: LEFT
ORIENTATION: LEFT

## 2017-08-19 ASSESSMENT — ENCOUNTER SYMPTOMS
SHORTNESS OF BREATH: 0
STRIDOR: 0
CONSTIPATION: 0
ABDOMINAL PAIN: 0
COUGH: 0
WHEEZING: 0
DIARRHEA: 0
NAUSEA: 0

## 2017-08-19 ASSESSMENT — PAIN DESCRIPTION - PROGRESSION
CLINICAL_PROGRESSION: NOT CHANGED

## 2017-08-19 ASSESSMENT — PAIN DESCRIPTION - LOCATION
LOCATION: ARM
LOCATION: ARM

## 2017-08-19 ASSESSMENT — PAIN SCALES - GENERAL
PAINLEVEL_OUTOF10: 10
PAINLEVEL_OUTOF10: 6
PAINLEVEL_OUTOF10: 10

## 2017-08-19 ASSESSMENT — PAIN DESCRIPTION - DESCRIPTORS: DESCRIPTORS: ACHING

## 2017-08-19 ASSESSMENT — PAIN DESCRIPTION - PAIN TYPE
TYPE: ACUTE PAIN
TYPE: CHRONIC PAIN

## 2017-08-20 VITALS
WEIGHT: 179.4 LBS | DIASTOLIC BLOOD PRESSURE: 94 MMHG | TEMPERATURE: 97.3 F | SYSTOLIC BLOOD PRESSURE: 151 MMHG | HEIGHT: 61 IN | RESPIRATION RATE: 18 BRPM | OXYGEN SATURATION: 100 % | BODY MASS INDEX: 33.87 KG/M2 | HEART RATE: 79 BPM

## 2017-08-20 LAB — GLUCOSE BLD-MCNC: 111 MG/DL (ref 65–105)

## 2017-08-20 PROCEDURE — 82947 ASSAY GLUCOSE BLOOD QUANT: CPT

## 2017-08-20 PROCEDURE — G8988 SELF CARE GOAL STATUS: HCPCS

## 2017-08-20 PROCEDURE — G0378 HOSPITAL OBSERVATION PER HR: HCPCS

## 2017-08-20 PROCEDURE — 97530 THERAPEUTIC ACTIVITIES: CPT

## 2017-08-20 PROCEDURE — 97535 SELF CARE MNGMENT TRAINING: CPT

## 2017-08-20 PROCEDURE — G8979 MOBILITY GOAL STATUS: HCPCS

## 2017-08-20 PROCEDURE — 97162 PT EVAL MOD COMPLEX 30 MIN: CPT

## 2017-08-20 PROCEDURE — G8978 MOBILITY CURRENT STATUS: HCPCS

## 2017-08-20 PROCEDURE — 6370000000 HC RX 637 (ALT 250 FOR IP): Performed by: EMERGENCY MEDICINE

## 2017-08-20 PROCEDURE — G8987 SELF CARE CURRENT STATUS: HCPCS

## 2017-08-20 PROCEDURE — 6370000000 HC RX 637 (ALT 250 FOR IP): Performed by: STUDENT IN AN ORGANIZED HEALTH CARE EDUCATION/TRAINING PROGRAM

## 2017-08-20 PROCEDURE — 94640 AIRWAY INHALATION TREATMENT: CPT

## 2017-08-20 PROCEDURE — 97166 OT EVAL MOD COMPLEX 45 MIN: CPT

## 2017-08-20 RX ORDER — SENNA AND DOCUSATE SODIUM 50; 8.6 MG/1; MG/1
2 TABLET, FILM COATED ORAL DAILY PRN
Status: DISCONTINUED | OUTPATIENT
Start: 2017-08-20 | End: 2017-08-20 | Stop reason: HOSPADM

## 2017-08-20 RX ADMIN — DOCUSATE SODIUM -SENNOSIDES 2 TABLET: 50; 8.6 TABLET, COATED ORAL at 10:38

## 2017-08-20 RX ADMIN — OXCARBAZEPINE 300 MG: 300 TABLET, FILM COATED ORAL at 10:38

## 2017-08-20 RX ADMIN — IPRATROPIUM BROMIDE AND ALBUTEROL SULFATE 3 ML: .5; 3 SOLUTION RESPIRATORY (INHALATION) at 09:38

## 2017-08-20 ASSESSMENT — PAIN DESCRIPTION - PROGRESSION

## 2017-08-20 ASSESSMENT — PAIN DESCRIPTION - PAIN TYPE
TYPE: CHRONIC PAIN
TYPE: ACUTE PAIN
TYPE: ACUTE PAIN

## 2017-08-20 ASSESSMENT — PAIN SCALES - GENERAL
PAINLEVEL_OUTOF10: 5
PAINLEVEL_OUTOF10: 5
PAINLEVEL_OUTOF10: 10

## 2017-08-20 ASSESSMENT — PAIN DESCRIPTION - LOCATION
LOCATION: LEG;ABDOMEN
LOCATION: LEG;ABDOMEN
LOCATION: ARM

## 2017-08-20 ASSESSMENT — PAIN DESCRIPTION - ORIENTATION
ORIENTATION: RIGHT
ORIENTATION: RIGHT

## 2017-08-20 ASSESSMENT — PAIN DESCRIPTION - FREQUENCY: FREQUENCY: INTERMITTENT

## 2017-08-20 ASSESSMENT — PAIN DESCRIPTION - DESCRIPTORS: DESCRIPTORS: ACHING

## 2017-08-23 LAB
EKG ATRIAL RATE: 82 BPM
EKG P AXIS: 49 DEGREES
EKG P-R INTERVAL: 168 MS
EKG Q-T INTERVAL: 348 MS
EKG QRS DURATION: 74 MS
EKG QTC CALCULATION (BAZETT): 406 MS
EKG R AXIS: -6 DEGREES
EKG T AXIS: 114 DEGREES
EKG VENTRICULAR RATE: 82 BPM

## 2017-09-16 ENCOUNTER — HOSPITAL ENCOUNTER (EMERGENCY)
Age: 62
Discharge: HOME | End: 2017-09-16
Attending: EMERGENCY MEDICINE
Payer: COMMERCIAL

## 2017-09-16 ENCOUNTER — APPOINTMENT (OUTPATIENT)
Dept: GENERAL RADIOLOGY | Age: 62
End: 2017-09-16
Payer: COMMERCIAL

## 2017-09-16 VITALS
TEMPERATURE: 97.9 F | OXYGEN SATURATION: 98 % | RESPIRATION RATE: 16 BRPM | SYSTOLIC BLOOD PRESSURE: 140 MMHG | WEIGHT: 175 LBS | BODY MASS INDEX: 33.04 KG/M2 | HEART RATE: 83 BPM | HEIGHT: 61 IN | DIASTOLIC BLOOD PRESSURE: 78 MMHG

## 2017-09-16 DIAGNOSIS — M79.18 MUSCULOSKELETAL PAIN: Primary | ICD-10-CM

## 2017-09-16 PROCEDURE — 73590 X-RAY EXAM OF LOWER LEG: CPT

## 2017-09-16 PROCEDURE — 6370000000 HC RX 637 (ALT 250 FOR IP): Performed by: EMERGENCY MEDICINE

## 2017-09-16 PROCEDURE — 99285 EMERGENCY DEPT VISIT HI MDM: CPT

## 2017-09-16 PROCEDURE — 72170 X-RAY EXAM OF PELVIS: CPT

## 2017-09-16 PROCEDURE — 73562 X-RAY EXAM OF KNEE 3: CPT

## 2017-09-16 PROCEDURE — 73630 X-RAY EXAM OF FOOT: CPT

## 2017-09-16 RX ORDER — ACETAMINOPHEN 500 MG
1000 TABLET ORAL ONCE
Status: COMPLETED | OUTPATIENT
Start: 2017-09-16 | End: 2017-09-16

## 2017-09-16 RX ORDER — NAPROXEN 500 MG/1
500 TABLET ORAL 2 TIMES DAILY
Qty: 30 TABLET | Refills: 0 | Status: ON HOLD | OUTPATIENT
Start: 2017-09-16 | End: 2017-12-01 | Stop reason: HOSPADM

## 2017-09-16 RX ADMIN — ACETAMINOPHEN 1000 MG: 500 TABLET ORAL at 11:17

## 2017-09-16 ASSESSMENT — PAIN SCALES - GENERAL
PAINLEVEL_OUTOF10: 10
PAINLEVEL_OUTOF10: 10

## 2017-09-16 ASSESSMENT — ENCOUNTER SYMPTOMS
SHORTNESS OF BREATH: 0
ABDOMINAL PAIN: 0
NAUSEA: 0
VOMITING: 0
COUGH: 0

## 2017-09-16 ASSESSMENT — PAIN DESCRIPTION - LOCATION: LOCATION: GENERALIZED

## 2017-09-16 ASSESSMENT — PAIN DESCRIPTION - PAIN TYPE: TYPE: ACUTE PAIN

## 2017-09-16 ASSESSMENT — PAIN DESCRIPTION - DESCRIPTORS: DESCRIPTORS: CONSTANT

## 2017-09-16 ASSESSMENT — PAIN DESCRIPTION - FREQUENCY: FREQUENCY: CONTINUOUS

## 2017-09-16 ASSESSMENT — PAIN DESCRIPTION - PROGRESSION: CLINICAL_PROGRESSION: NOT CHANGED

## 2017-09-16 ASSESSMENT — PAIN DESCRIPTION - ORIENTATION: ORIENTATION: LEFT

## 2017-09-19 ENCOUNTER — APPOINTMENT (OUTPATIENT)
Dept: GENERAL RADIOLOGY | Age: 62
End: 2017-09-19
Payer: COMMERCIAL

## 2017-09-19 ENCOUNTER — HOSPITAL ENCOUNTER (EMERGENCY)
Age: 62
Discharge: HOME | End: 2017-09-19
Attending: EMERGENCY MEDICINE
Payer: COMMERCIAL

## 2017-09-19 VITALS
WEIGHT: 173 LBS | TEMPERATURE: 98.2 F | HEART RATE: 94 BPM | OXYGEN SATURATION: 95 % | HEIGHT: 61 IN | RESPIRATION RATE: 18 BRPM | DIASTOLIC BLOOD PRESSURE: 60 MMHG | BODY MASS INDEX: 32.66 KG/M2 | SYSTOLIC BLOOD PRESSURE: 113 MMHG

## 2017-09-19 DIAGNOSIS — E16.2 HYPOGLYCEMIA: Primary | ICD-10-CM

## 2017-09-19 LAB
-: NORMAL
ABSOLUTE EOS #: 0.3 K/UL (ref 0–0.4)
ABSOLUTE LYMPH #: 2.3 K/UL (ref 1–4.8)
ABSOLUTE MONO #: 0.6 K/UL (ref 0.1–1.2)
ALBUMIN SERPL-MCNC: 3.9 G/DL (ref 3.5–5.2)
ALBUMIN/GLOBULIN RATIO: 1 (ref 1–2.5)
ALP BLD-CCNC: 100 U/L (ref 35–104)
ALT SERPL-CCNC: 14 U/L (ref 5–33)
AMORPHOUS: NORMAL
ANION GAP SERPL CALCULATED.3IONS-SCNC: 12 MMOL/L (ref 9–17)
AST SERPL-CCNC: 33 U/L
BACTERIA: NORMAL
BASOPHILS # BLD: 1 %
BASOPHILS ABSOLUTE: 0.1 K/UL (ref 0–0.2)
BILIRUB SERPL-MCNC: 0.19 MG/DL (ref 0.3–1.2)
BILIRUBIN URINE: NEGATIVE
BNP INTERPRETATION: ABNORMAL
BUN BLDV-MCNC: 18 MG/DL (ref 8–23)
BUN/CREAT BLD: ABNORMAL (ref 9–20)
CALCIUM SERPL-MCNC: 8.8 MG/DL (ref 8.6–10.4)
CASTS UA: NORMAL /LPF (ref 0–8)
CHLORIDE BLD-SCNC: 97 MMOL/L (ref 98–107)
CO2: 26 MMOL/L (ref 20–31)
COLOR: YELLOW
COMMENT UA: ABNORMAL
CREAT SERPL-MCNC: 1.01 MG/DL (ref 0.5–0.9)
CRYSTALS, UA: NORMAL /HPF
DIFFERENTIAL TYPE: ABNORMAL
EOSINOPHILS RELATIVE PERCENT: 3 %
EPITHELIAL CELLS UA: NORMAL /HPF (ref 0–5)
GFR AFRICAN AMERICAN: >60 ML/MIN
GFR NON-AFRICAN AMERICAN: 56 ML/MIN
GFR SERPL CREATININE-BSD FRML MDRD: ABNORMAL ML/MIN/{1.73_M2}
GFR SERPL CREATININE-BSD FRML MDRD: ABNORMAL ML/MIN/{1.73_M2}
GLUCOSE BLD-MCNC: 104 MG/DL (ref 65–105)
GLUCOSE BLD-MCNC: 143 MG/DL (ref 70–99)
GLUCOSE URINE: NEGATIVE
HCT VFR BLD CALC: 33.4 % (ref 36–46)
HEMOGLOBIN: 10.8 G/DL (ref 12–16)
KETONES, URINE: NEGATIVE
LEUKOCYTE ESTERASE, URINE: ABNORMAL
LYMPHOCYTES # BLD: 20 %
MCH RBC QN AUTO: 24.8 PG (ref 26–34)
MCHC RBC AUTO-ENTMCNC: 32.4 G/DL (ref 31–37)
MCV RBC AUTO: 76.7 FL (ref 80–100)
MONOCYTES # BLD: 5 %
MUCUS: NORMAL
NITRITE, URINE: NEGATIVE
OTHER OBSERVATIONS UA: NORMAL
PDW BLD-RTO: 15.8 % (ref 12.5–15.4)
PH UA: 5 (ref 5–8)
PLATELET # BLD: 347 K/UL (ref 140–450)
PLATELET ESTIMATE: ABNORMAL
PMV BLD AUTO: 7.6 FL (ref 6–12)
POC TROPONIN I: 0.01 NG/ML (ref 0–0.1)
POC TROPONIN I: 0.01 NG/ML (ref 0–0.1)
POC TROPONIN INTERP: NORMAL
POC TROPONIN INTERP: NORMAL
POTASSIUM SERPL-SCNC: 4.2 MMOL/L (ref 3.7–5.3)
PRO-BNP: 2118 PG/ML
PROTEIN UA: NEGATIVE
RBC # BLD: 4.35 M/UL (ref 4–5.2)
RBC # BLD: ABNORMAL 10*6/UL
RBC UA: NORMAL /HPF (ref 0–4)
RENAL EPITHELIAL, UA: NORMAL /HPF
SEG NEUTROPHILS: 71 %
SEGMENTED NEUTROPHILS ABSOLUTE COUNT: 8.2 K/UL (ref 1.8–7.7)
SODIUM BLD-SCNC: 135 MMOL/L (ref 135–144)
SPECIFIC GRAVITY UA: 1.01 (ref 1–1.03)
TOTAL PROTEIN: 8 G/DL (ref 6.4–8.3)
TRICHOMONAS: NORMAL
TSH SERPL DL<=0.05 MIU/L-ACNC: 0.98 MIU/L (ref 0.3–5)
TURBIDITY: CLEAR
URINE HGB: NEGATIVE
UROBILINOGEN, URINE: NORMAL
WBC # BLD: 11.4 K/UL (ref 3.5–11)
WBC # BLD: ABNORMAL 10*3/UL
WBC UA: NORMAL /HPF (ref 0–5)
YEAST: NORMAL

## 2017-09-19 PROCEDURE — 87086 URINE CULTURE/COLONY COUNT: CPT

## 2017-09-19 PROCEDURE — 83880 ASSAY OF NATRIURETIC PEPTIDE: CPT

## 2017-09-19 PROCEDURE — 84484 ASSAY OF TROPONIN QUANT: CPT

## 2017-09-19 PROCEDURE — 85025 COMPLETE CBC W/AUTO DIFF WBC: CPT

## 2017-09-19 PROCEDURE — 71010 XR CHEST PORTABLE: CPT

## 2017-09-19 PROCEDURE — 80053 COMPREHEN METABOLIC PANEL: CPT

## 2017-09-19 PROCEDURE — 81001 URINALYSIS AUTO W/SCOPE: CPT

## 2017-09-19 PROCEDURE — 84443 ASSAY THYROID STIM HORMONE: CPT

## 2017-09-19 PROCEDURE — 93005 ELECTROCARDIOGRAM TRACING: CPT

## 2017-09-19 PROCEDURE — 99285 EMERGENCY DEPT VISIT HI MDM: CPT

## 2017-09-19 PROCEDURE — 82947 ASSAY GLUCOSE BLOOD QUANT: CPT

## 2017-09-19 ASSESSMENT — ENCOUNTER SYMPTOMS
SHORTNESS OF BREATH: 0
WHEEZING: 0
COUGH: 0
SORE THROAT: 0
CHEST TIGHTNESS: 0
VOMITING: 0
BACK PAIN: 0
ABDOMINAL PAIN: 0
TROUBLE SWALLOWING: 0
NAUSEA: 0

## 2017-09-19 ASSESSMENT — PAIN DESCRIPTION - LOCATION: LOCATION: ABDOMEN

## 2017-09-20 LAB
CULTURE: NORMAL
CULTURE: NORMAL
Lab: NORMAL
SPECIMEN DESCRIPTION: NORMAL
STATUS: NORMAL

## 2017-09-21 LAB
EKG ATRIAL RATE: 86 BPM
EKG P AXIS: 38 DEGREES
EKG P-R INTERVAL: 156 MS
EKG Q-T INTERVAL: 386 MS
EKG QRS DURATION: 78 MS
EKG QTC CALCULATION (BAZETT): 461 MS
EKG R AXIS: -27 DEGREES
EKG T AXIS: 90 DEGREES
EKG VENTRICULAR RATE: 86 BPM

## 2017-09-28 ENCOUNTER — TELEPHONE (OUTPATIENT)
Dept: FAMILY MEDICINE CLINIC | Age: 62
End: 2017-09-28

## 2017-10-04 ENCOUNTER — TELEPHONE (OUTPATIENT)
Dept: FAMILY MEDICINE CLINIC | Age: 62
End: 2017-10-04

## 2017-10-10 ENCOUNTER — TELEPHONE (OUTPATIENT)
Dept: FAMILY MEDICINE CLINIC | Age: 62
End: 2017-10-10

## 2017-10-16 ENCOUNTER — TELEPHONE (OUTPATIENT)
Dept: FAMILY MEDICINE CLINIC | Age: 62
End: 2017-10-16

## 2017-10-17 ENCOUNTER — CARE COORDINATION (OUTPATIENT)
Dept: FAMILY MEDICINE CLINIC | Age: 62
End: 2017-10-17

## 2017-10-17 RX ORDER — LISINOPRIL 5 MG/1
5 TABLET ORAL DAILY
Status: ON HOLD | COMMUNITY
End: 2017-11-28 | Stop reason: HOSPADM

## 2017-10-17 RX ORDER — CARVEDILOL 3.12 MG/1
3.12 TABLET ORAL 2 TIMES DAILY WITH MEALS
Status: ON HOLD | COMMUNITY
End: 2017-12-15 | Stop reason: HOSPADM

## 2017-10-17 RX ORDER — ATORVASTATIN CALCIUM 20 MG/1
20 TABLET, FILM COATED ORAL DAILY
Status: ON HOLD | COMMUNITY
End: 2017-11-28 | Stop reason: HOSPADM

## 2017-10-17 NOTE — CARE COORDINATION
[facility] Nurse Evaluation    10/17/2017      Cherylene Bleacher Stone 1955    Living Situation    Vaughn Lazo is a 65 y.o. female living alone. The home is: apartment, number of outside stairs: 1 step, number of inside stairs: 0, can live on one level, bedroom on first floor, bathroom on first floor, tub-shower, equipment: Walkers, Type:  Michael Duenas. She has minimal supports. Support includes: patient, friend, adult caretaker, boyfriend, cousin. She has applied for disability housing with [facility] [address] with [address] in [facility] and reports she has been accepted for senior housing. She does not have a move in date and is preparing to move to new apartment. Social History:   Social History     Social History    Marital status: Single     Spouse name: N/A    Number of children: N/A    Years of education: N/A     Occupational History    Not on file. Social History Main Topics    Smoking status: Current Every Day Smoker     Packs/day: 1.00    Smokeless tobacco: Not on file    Alcohol use Yes      Comment: 1 bottle per month    Drug use: No    Sexual activity: Yes     Partners: Male     Other Topics Concern    Not on file     Social History Narrative    No narrative on file       Smoke Detectors in home: Yes  Phone/Emergency Numbers accessible: Yes  Alarm System: No      Self Care Deficits and Durable Medical Equipment     Vaughn Lazo presented Alert, oriented, thought content appropriate, alertness: alert, orientation: time, date, person, place, city, affect: normal, increased in range and redirectable, thought content exhibits tangential connections, when questioned about suicide, the patient expresses no suicidal ideation. Self Care Deficits Noted:  grooming, bathing, ambulating, housekeeping, meal preparation, shopping and transportation.       Durable Medical Equipment: walker and wheelchair  Oxygen: No   Nebulizer: No   CPAP: No   Enteral Feedings: No   Home Infusions: No   Wound Vac: No   Transportation services-  Medicaid cab    Risk Analysis    Frequent Admission or ED visits: Yes   Chronic Illness: Coronary Artery Disease, HTN, Congestive Heart Failure, Cerebral Vascular Accident, Diabetes Mellitus, Emotional Problems  Homeless or lacks support: No   Drives: No   Compliance Issues: no  Guardianship issues:no  Age > 80: No   Uninsured or under insured: No   10 + Medications: Yes   History of Falls: No   Balance Issues: Yes   Unsteady Gait: Yes     Risk for Falls- Internal Environment  Questions for the Client:    Do you urinate frequently, especially at night? No  If yes, how often? 1 times. Are you taking medication that increases urination frequency? No    Do you remain alert for sudden movements of pets underfoot, if present? NA      Do you avoid rushing when you move, such as to answer the door or telephone? Yes    Do you hold on to grab bars when getting in or out of the tub or shower or when changing position while bathing? Yes    Have you had any previous falls or injuries in the home?   No    Financial Assessment      Affordable Medications:   Reports she has had recent change in insurance and [address] [place] [address] Aid pharmacy needs clarification of insurance to obtain medication refills  Adequate Food: Yes   Appropriate Housing: No   Connected with [address] [place] [address] Aid  5 days a week      Health Literacy Assessment    Patient demonstrates the ability to:      Current Outpatient Prescriptions   Medication Sig Dispense Refill    atorvastatin (LIPITOR) 20 MG tablet Take 20 mg by mouth daily      lisinopril (PRINIVIL;ZESTRIL) 5 MG tablet Take 5 mg by mouth daily      carvedilol (COREG) 3.125 MG tablet Take 3.125 mg by mouth 2 times daily (with meals)      senna-docusate (PERICOLACE) 8.6-50 MG per tablet Take 1 tablet by mouth daily      metoprolol tartrate (LOPRESSOR) 25 MG tablet Take 1 tablet by mouth 2 times daily 60 tablet 0    aspirin 81 MG tablet Take 81 mg by mouth daily      ipratropium-albuterol (DUONEB) 0.5-2.5 (3) MG/3ML SOLN nebulizer solution Inhale 1 vial into the lungs every 6 hours      insulin glargine (LANTUS) 100 UNIT/ML injection vial Inject 35 Units into the skin every morning       naproxen (NAPROSYN) 500 MG tablet Take 1 tablet by mouth 2 times daily 30 tablet 0    lurasidone (LATUDA) 40 MG TABS tablet Take 60 mg by mouth daily       OLANZapine (ZYPREXA) 10 MG tablet Take 1 tablet by mouth nightly 30 tablet 0    colchicine (COLCRYS) 0.6 MG tablet Take 1 tablet by mouth daily 30 tablet 3    ketoconazole (NIZORAL) 2 % cream Apply once daily to bilateral groin for 2-3 weeks. Please follow up with your primary care doctor if it does not improve within a week 30 g 2    insulin lispro (HUMALOG KWIKPEN) 100 UNIT/ML pen Inject 10 Units into the skin 3 times daily (before meals). 2 Pen 3    famotidine (PEPCID) 20 MG tablet Take 20 mg by mouth daily.  OXcarbazepine (TRILEPTAL) 300 MG tablet Take 300 mg by mouth 2 times daily Per pharmacy she is to be taking 300 mg twice daily but patient states that she only takes once daily. No current facility-administered medications for this visit. Reviewed Medications with patient: Yes   Frequency of Internet use: No   Pt demonstrates accurate med administration: No    Medication discrepancies-  Patient pharmacy does not have validation of prescription coverage from insurance resulting in patient inability to obtain prescriptions. She does not have scripts for trileptal 300 mg, latuda 40 mg and zyprexa 10 mg in the home. Ms Gabby Vo reports difficulty in opening medication bottles. She takes medications from med bottles and leave caps off of the medication containers to assist in medication administration. Discussed options for medication set up- med box, medication blister packs. She is not receptive to use of med box or med blister pack at this time.     Vital signs   B/P 133/81   Pulse 72  resp  18 Oxygen saturation  96% room air    Anticipated Needs Post Evaluation     to follow up with [facility] to request clarification on patient medical insurance coverage for patient to obtain medications from pharmacy. Discussed community services for transportation. [employer] application to be completed for transportation services. Patient requests increase in frequency of [employer] home health aid services from 5 x week to 7 x week. [facility]. Community services -  She has [employer] home health aid services 5 days a week. [facility]    Ian Case RN, [address] [place] [address] in one week for re evaluation,  Completion of [employer] application and coordination with [employer] services. English

## 2017-10-19 ENCOUNTER — TELEPHONE (OUTPATIENT)
Dept: FAMILY MEDICINE CLINIC | Age: 62
End: 2017-10-19

## 2017-10-19 NOTE — TELEPHONE ENCOUNTER
Linda Calvin social work intern, met with client in the home. Client affect was flat with bouts of laughter. Thoughts were scattered and client had difficulty with recall of specifics. Client displayed ticks and involuntary tremors. Client swatted at unseen items around the head. Cristopher Bowen intern did not observe any gnats, flies or other flying objects around head. Client home was tidy. Client was excited about prospect of moving on November 2, 2017. Client had a lapse in insurance that began September 30, 2017. Client was not able to get medications delivered from pharmacy. Linda Calvin contacted client [employer] to get most recent medication list. While in the home, Linda Calvin collaborated with client, to facilitate reinstatement of [employer], via telephone. Cristopher Bowen social work intern, was able to get client medications from pharmacy and deliver to client. Cristopher Bowen will accompany client to appointment with [facility] for mental and behavioral health, and obtain current prescribed medication list from [facility] providers.

## 2017-10-22 ENCOUNTER — APPOINTMENT (OUTPATIENT)
Dept: CT IMAGING | Age: 62
DRG: 392 | End: 2017-10-22
Payer: COMMERCIAL

## 2017-10-22 ENCOUNTER — APPOINTMENT (OUTPATIENT)
Dept: GENERAL RADIOLOGY | Age: 62
DRG: 392 | End: 2017-10-22
Payer: COMMERCIAL

## 2017-10-22 ENCOUNTER — HOSPITAL ENCOUNTER (INPATIENT)
Age: 62
LOS: 4 days | Discharge: HOME HEALTH | DRG: 392 | End: 2017-10-27
Attending: EMERGENCY MEDICINE | Admitting: INTERNAL MEDICINE
Payer: COMMERCIAL

## 2017-10-22 DIAGNOSIS — K57.32 DIVERTICULITIS OF SIGMOID COLON: ICD-10-CM

## 2017-10-22 DIAGNOSIS — R11.2 INTRACTABLE VOMITING WITH NAUSEA, UNSPECIFIED VOMITING TYPE: ICD-10-CM

## 2017-10-22 DIAGNOSIS — N17.9 ACUTE RENAL FAILURE, UNSPECIFIED ACUTE RENAL FAILURE TYPE (HCC): Primary | ICD-10-CM

## 2017-10-22 LAB
-: NORMAL
ABSOLUTE EOS #: 0.19 K/UL (ref 0–0.4)
ABSOLUTE IMMATURE GRANULOCYTE: ABNORMAL K/UL (ref 0–0.3)
ABSOLUTE LYMPH #: 2.22 K/UL (ref 1–4.8)
ABSOLUTE MONO #: 0.74 K/UL (ref 0.1–0.8)
ALBUMIN SERPL-MCNC: 3.7 G/DL (ref 3.5–5.2)
ALBUMIN/GLOBULIN RATIO: 0.9 (ref 1–2.5)
ALP BLD-CCNC: 116 U/L (ref 35–104)
ALT SERPL-CCNC: 17 U/L (ref 5–33)
AMORPHOUS: NORMAL
ANION GAP SERPL CALCULATED.3IONS-SCNC: 14 MMOL/L (ref 9–17)
AST SERPL-CCNC: 22 U/L
BACTERIA: NORMAL
BASOPHILS # BLD: 1 %
BASOPHILS ABSOLUTE: 0.19 K/UL (ref 0–0.2)
BILIRUB SERPL-MCNC: 0.49 MG/DL (ref 0.3–1.2)
BILIRUBIN URINE: NEGATIVE
BUN BLDV-MCNC: 18 MG/DL (ref 8–23)
BUN/CREAT BLD: ABNORMAL (ref 9–20)
CALCIUM SERPL-MCNC: 9 MG/DL (ref 8.6–10.4)
CASTS UA: NORMAL /LPF (ref 0–8)
CHLORIDE BLD-SCNC: 96 MMOL/L (ref 98–107)
CO2: 26 MMOL/L (ref 20–31)
COLOR: YELLOW
COMMENT UA: ABNORMAL
CREAT SERPL-MCNC: 1.31 MG/DL (ref 0.5–0.9)
CRYSTALS, UA: NORMAL /HPF
DIFFERENTIAL TYPE: ABNORMAL
EOSINOPHILS RELATIVE PERCENT: 1 %
EPITHELIAL CELLS UA: NORMAL /HPF (ref 0–5)
GFR AFRICAN AMERICAN: 50 ML/MIN
GFR NON-AFRICAN AMERICAN: 41 ML/MIN
GFR SERPL CREATININE-BSD FRML MDRD: ABNORMAL ML/MIN/{1.73_M2}
GFR SERPL CREATININE-BSD FRML MDRD: ABNORMAL ML/MIN/{1.73_M2}
GLUCOSE BLD-MCNC: 166 MG/DL (ref 70–99)
GLUCOSE URINE: NEGATIVE
HCT VFR BLD CALC: 35 % (ref 36–46)
HEMOGLOBIN: 11.2 G/DL (ref 12–16)
IMMATURE GRANULOCYTES: ABNORMAL %
KETONES, URINE: ABNORMAL
LACTIC ACID, WHOLE BLOOD: 0.8 MMOL/L (ref 0.7–2.1)
LEUKOCYTE ESTERASE, URINE: ABNORMAL
LIPASE: 17 U/L (ref 13–60)
LYMPHOCYTES # BLD: 12 %
MCH RBC QN AUTO: 24.6 PG (ref 26–34)
MCHC RBC AUTO-ENTMCNC: 32.1 G/DL (ref 31–37)
MCV RBC AUTO: 76.8 FL (ref 80–100)
MONOCYTES # BLD: 4 %
MORPHOLOGY: ABNORMAL
MUCUS: NORMAL
NITRITE, URINE: NEGATIVE
OTHER OBSERVATIONS UA: NORMAL
PDW BLD-RTO: 16.3 % (ref 12.5–15.4)
PH UA: 5 (ref 5–8)
PLATELET # BLD: ABNORMAL K/UL (ref 140–450)
PLATELET ESTIMATE: ABNORMAL
PMV BLD AUTO: ABNORMAL FL (ref 6–12)
POTASSIUM SERPL-SCNC: 4.8 MMOL/L (ref 3.7–5.3)
PROTEIN UA: ABNORMAL
RBC # BLD: 4.55 M/UL (ref 4–5.2)
RBC # BLD: ABNORMAL 10*6/UL
RBC UA: NORMAL /HPF (ref 0–4)
RENAL EPITHELIAL, UA: NORMAL /HPF
SEG NEUTROPHILS: 82 %
SEGMENTED NEUTROPHILS ABSOLUTE COUNT: 15.16 K/UL (ref 1.8–7.7)
SODIUM BLD-SCNC: 136 MMOL/L (ref 135–144)
SPECIFIC GRAVITY UA: 1.02 (ref 1–1.03)
TOTAL PROTEIN: 8 G/DL (ref 6.4–8.3)
TRICHOMONAS: NORMAL
TURBIDITY: CLEAR
URINE HGB: NEGATIVE
UROBILINOGEN, URINE: NORMAL
WBC # BLD: 18.5 K/UL (ref 3.5–11)
WBC # BLD: ABNORMAL 10*3/UL
WBC UA: NORMAL /HPF (ref 0–5)
YEAST: NORMAL

## 2017-10-22 PROCEDURE — 74022 RADEX COMPL AQT ABD SERIES: CPT

## 2017-10-22 PROCEDURE — 83036 HEMOGLOBIN GLYCOSYLATED A1C: CPT

## 2017-10-22 PROCEDURE — 83690 ASSAY OF LIPASE: CPT

## 2017-10-22 PROCEDURE — 6360000002 HC RX W HCPCS: Performed by: PHYSICIAN ASSISTANT

## 2017-10-22 PROCEDURE — 81001 URINALYSIS AUTO W/SCOPE: CPT

## 2017-10-22 PROCEDURE — 2580000003 HC RX 258: Performed by: PHYSICIAN ASSISTANT

## 2017-10-22 PROCEDURE — 86403 PARTICLE AGGLUT ANTBDY SCRN: CPT

## 2017-10-22 PROCEDURE — 96372 THER/PROPH/DIAG INJ SC/IM: CPT

## 2017-10-22 PROCEDURE — 87086 URINE CULTURE/COLONY COUNT: CPT

## 2017-10-22 PROCEDURE — 80053 COMPREHEN METABOLIC PANEL: CPT

## 2017-10-22 PROCEDURE — 96374 THER/PROPH/DIAG INJ IV PUSH: CPT

## 2017-10-22 PROCEDURE — 83605 ASSAY OF LACTIC ACID: CPT

## 2017-10-22 PROCEDURE — 85025 COMPLETE CBC W/AUTO DIFF WBC: CPT

## 2017-10-22 PROCEDURE — 74176 CT ABD & PELVIS W/O CONTRAST: CPT

## 2017-10-22 PROCEDURE — 99285 EMERGENCY DEPT VISIT HI MDM: CPT

## 2017-10-22 RX ORDER — ONDANSETRON 2 MG/ML
4 INJECTION INTRAMUSCULAR; INTRAVENOUS ONCE
Status: COMPLETED | OUTPATIENT
Start: 2017-10-22 | End: 2017-10-22

## 2017-10-22 RX ORDER — KETOROLAC TROMETHAMINE 30 MG/ML
30 INJECTION, SOLUTION INTRAMUSCULAR; INTRAVENOUS ONCE
Status: COMPLETED | OUTPATIENT
Start: 2017-10-22 | End: 2017-10-22

## 2017-10-22 RX ORDER — 0.9 % SODIUM CHLORIDE 0.9 %
1000 INTRAVENOUS SOLUTION INTRAVENOUS ONCE
Status: COMPLETED | OUTPATIENT
Start: 2017-10-22 | End: 2017-10-22

## 2017-10-22 RX ORDER — DICYCLOMINE HYDROCHLORIDE 10 MG/ML
20 INJECTION INTRAMUSCULAR ONCE
Status: COMPLETED | OUTPATIENT
Start: 2017-10-22 | End: 2017-10-22

## 2017-10-22 RX ADMIN — SODIUM CHLORIDE 1000 ML: 9 INJECTION, SOLUTION INTRAVENOUS at 17:08

## 2017-10-22 RX ADMIN — ONDANSETRON 4 MG: 2 INJECTION INTRAMUSCULAR; INTRAVENOUS at 17:08

## 2017-10-22 RX ADMIN — DICYCLOMINE HYDROCHLORIDE 20 MG: 20 INJECTION, SOLUTION INTRAMUSCULAR at 17:08

## 2017-10-22 RX ADMIN — KETOROLAC TROMETHAMINE 30 MG: 30 INJECTION, SOLUTION INTRAMUSCULAR; INTRAVENOUS at 19:15

## 2017-10-22 ASSESSMENT — PAIN DESCRIPTION - ORIENTATION: ORIENTATION: MID

## 2017-10-22 ASSESSMENT — PAIN DESCRIPTION - LOCATION: LOCATION: ABDOMEN

## 2017-10-22 ASSESSMENT — PAIN DESCRIPTION - PAIN TYPE: TYPE: CHRONIC PAIN;ACUTE PAIN

## 2017-10-22 ASSESSMENT — ENCOUNTER SYMPTOMS
VOMITING: 1
ABDOMINAL PAIN: 1
BACK PAIN: 0
ALLERGIC/IMMUNOLOGIC NEGATIVE: 1
NAUSEA: 1
SHORTNESS OF BREATH: 1

## 2017-10-22 ASSESSMENT — PAIN SCALES - GENERAL
PAINLEVEL_OUTOF10: 10
PAINLEVEL_OUTOF10: 10

## 2017-10-22 NOTE — ED PROVIDER NOTES
[facility] ED  Emergency Department Encounter  Mid Level Provider     Pt Name: Inés Ramirez  MRN: [id number]  [place] 1955  Date of evaluation: 10/22/17  PCP:  Vane Jones MD    [address] [place] [address]       Chief Complaint   Patient presents with    Abdominal Pain     umbilical region, seen at [facility] yesterday, pt states \"I have an infection in my stomach, I know I do. \"    Nausea    Emesis       HISTORY OF PRESENT ILLNESS  (Location/Symptom, Timing/Onset, Context/Setting, Quality, Duration, Modifying Factors, Severity.)      Inés Ramirez is a 65 y.o. female who presents with A few days of diffuse abdominal pain with nausea and vomiting. She cannot tell me the last time she vomited. She lives at home alone in a trailer house and she ambulates via a cane and sometimes a wheelchair. She states that she's had a stroke and has left-sided deficits and vitals are at her baseline today. She said she had to call the ambulance to come and get her. She says that she normally is short of breath and is on 3 L of oxygen via nasal cannula at home. She denies any chest pain today. She has not taken anything for the nausea or vomiting. She says her last bowel movement was yesterday and was very hard. Patient states that she's not had any follow-up odor to her urine, any urinary frequency or dysuria or diarrhea. She status post hysterectomy is having no vaginal bleeding or discharge. She does not know of her ovaries were left intact or not. She cannot tell me what other abdominal surgeries. She's had but she tells me that she has her gallbladder and her appendix still. She denies having bowel obstruction or diverticulitis. Please note that I do not know. The patient is the best historian. She is a diabetic and says she has not been checking her blood sugar. She denies fevers or chills. No weakness in the upper or lower extremities. Patient says she does not take blood thinners.   She 8.6-50 MG per tablet Take 1 tablet by mouth daily    Historical Provider, MD   metoprolol tartrate (LOPRESSOR) 25 MG tablet Take 1 tablet by mouth 2 times daily 17   Kale Gaffney MD   OLANZapine (ZYPREXA) 10 MG tablet Take 1 tablet by mouth nightly 16   Yamil Cabrales MD   aspirin 81 MG tablet Take 81 mg by mouth daily    Historical Provider, MD   ipratropium-albuterol (DUONEB) 0.5-2.5 (3) MG/3ML SOLN nebulizer solution Inhale 1 vial into the lungs every 6 hours    Historical Provider, MD   insulin glargine (LANTUS) 100 UNIT/ML injection vial Inject 35 Units into the skin every morning     Historical Provider, MD   colchicine (COLCRYS) 0.6 MG tablet Take 1 tablet by mouth daily 7/15/16   Brigitte Goldberg, DO   ketoconazole (NIZORAL) 2 % cream Apply once daily to bilateral groin for 2-3 weeks. Please follow up with your primary care doctor if it does not improve within a week 5/3/16   Shonna Giron, DO   insulin lispro (HUMALOG KWIKPEN) 100 UNIT/ML pen Inject 10 Units into the skin 3 times daily (before meals). 1/27/15   Lakshmi Brandon MD   famotidine (PEPCID) 20 MG tablet Take 20 mg by mouth daily. Historical Provider, MD   OXcarbazepine (TRILEPTAL) 300 MG tablet Take 300 mg by mouth 2 times daily Per pharmacy she is to be taking 300 mg twice daily but patient states that she only takes once daily. Historical Provider, MD       REVIEW OF SYSTEMS    (2-9 systems for level 4, 10 or more for level 5)      Review of Systems   Constitutional: Positive for chills. Respiratory: Positive for shortness of breath (baseline). Cardiovascular: Negative. Negative for chest pain. Gastrointestinal: Positive for abdominal pain, nausea and vomiting. Genitourinary: Negative. Musculoskeletal: Negative. Negative for back pain. Skin: Negative. Negative for rash. Allergic/Immunologic: Negative. Neurological: Negative. Hematological: Negative. Psychiatric/Behavioral: Negative. PHYSICAL EXAM   (up to 7 for level 4, 8 or more for level 5)      INITIAL VITALS:  height is 5' (1.524 m) and weight is 173 lb (78.5 kg). Her oral temperature is 97.8 °F (36.6 °C). Her blood pressure is 119/78 and her pulse is 88. Her respiration is 18 and oxygen saturation is 94%. Physical Exam   Constitutional: She is oriented to person, place, and time. Vital signs are normal. She appears well-developed and well-nourished. No distress. Patient is laying in the bed in her nightgown resting in no acute distress and not actively vomiting. She is nondiaphoretic and nontoxic appearing   HENT:   Head: Normocephalic and atraumatic. Nose: Nose normal.   Mouth/Throat: Mucous membranes are normal.   Eyes: Conjunctivae are normal. Pupils are equal, round, and reactive to light. Neck: Trachea normal, normal range of motion and full passive range of motion without pain. Cardiovascular: Normal rate, regular rhythm, S1 normal, S2 normal, normal heart sounds and intact distal pulses. Exam reveals no gallop and no friction rub. No murmur heard. Pulses:       Radial pulses are 2+ on the right side, and 2+ on the left side. Pulmonary/Chest: Effort normal and breath sounds normal. No respiratory distress. She has no wheezes. She has no rales. Abdominal: Soft. Normal appearance and bowel sounds are normal. She exhibits no distension and no mass. There is tenderness. There is no rebound, no guarding and no CVA tenderness. Positive bowel sounds. Morbidly obese abdomen. No large surgical scars noted. Patient has tenderness diffusely, however it's greatest in the left lower and left upper quadrants. Negative Gil sign. Negative McBurney's point tenderness. No flank tenderness. Musculoskeletal: Normal range of motion. No pedal edema bilaterally. Soft and nontender. 1+ DP pulses bilaterally.   Patient has a contracture noted of the left upper extremity from stroke and has limited use of her hand and her hand is held in a fist.  Patient has normal sensation in the upper and lower extremities bilaterally. Neurological: She is alert and oriented to person, place, and time. She has normal strength. Skin: Skin is warm and intact. She is not diaphoretic. Psychiatric: She has a normal mood and affect. Her speech is normal and behavior is normal. Judgment and thought content normal.   Nursing note and vitals reviewed. DIFFERENTIAL  DIAGNOSIS   Diverticulitis. Hepatitis. Pancreatitis. Bowel obstruction. Free air in the abdomen. Hyperglycemia. Uncontrolled diabetes  PLAN (LABS / IMAGING / EKG):  Orders Placed This Encounter   Procedures    Urine culture clean catch    XR Acute Abd Series Chest 1 VW    CT ABDOMEN PELVIS W IV CONTRAST Additional Contrast? None    UA W/REFLEX CULTURE    CBC WITH AUTO DIFFERENTIAL    Comprehensive Metabolic Panel    LIPASE    Microscopic Urinalysis    Insert peripheral IV       MEDICATIONS ORDERED:  Orders Placed This Encounter   Medications    0.9 % sodium chloride bolus    dicyclomine (BENTYL) injection 20 mg    ondansetron (ZOFRAN) injection 4 mg    ketorolac (TORADOL) injection 30 mg       Controlled Substances Monitoring:      DIAGNOSTIC RESULTS / EMERGENCY DEPARTMENT COURSE / MDM     RADIOLOGY:   I directly visualized (with the attending physician) the following  images and reviewed the radiologist interpretations:  Xr Acute Abd Series Chest 1 Vw    Result Date: 10/22/2017  EXAMINATION: ACUTE ABDOMINAL SERIES 10/22/2017 5:03 pm COMPARISON: None. HISTORY: ORDERING SYSTEM PROVIDED HISTORY: nausea/vomitting ab pain x 2days, eval for obstructive pattern vs constipation. TECHNOLOGIST PROVIDED HISTORY: Reason for exam:->nausea/vomitting ab pain x 2days, eval for obstructive pattern vs constipation. FINDINGS: Frontal chest radiograph demonstrates mild cardiomegaly and evidence of a median sternotomy. Patient's left hand obscures the left lung base.   No mg/dL    BUN 18 8 - 23 mg/dL    CREATININE 1.31 (H) 0.50 - 0.90 mg/dL    Bun/Cre Ratio NOT REPORTED 9 - 20    Calcium 9.0 8.6 - 10.4 mg/dL    Sodium 136 135 - 144 mmol/L    Potassium 4.8 3.7 - 5.3 mmol/L    Chloride 96 (L) 98 - 107 mmol/L    CO2 26 20 - 31 mmol/L    Anion Gap 14 9 - 17 mmol/L    Alkaline Phosphatase 116 (H) 35 - 104 U/L    ALT 17 5 - 33 U/L    AST 22 <32 U/L    Total Bilirubin 0.49 0.3 - 1.2 mg/dL    Total Protein 8.0 6.4 - 8.3 g/dL    Alb 3.7 3.5 - 5.2 g/dL    Albumin/Globulin Ratio 0.9 (L) 1.0 - 2.5    GFR Non- 41 (L) >60 mL/min    GFR African American 50 (L) >60 mL/min    GFR Comment          GFR Staging NOT REPORTED    LIPASE   Result Value Ref Range    Lipase 17 13 - 60 U/L   Microscopic Urinalysis   Result Value Ref Range    -          WBC, UA 2 TO 5 0 - 5 /HPF    RBC, UA 0 TO 2 0 - 4 /HPF    Casts UA 10 TO 20 HYALINE 0 - 8 /LPF    Crystals UA NOT REPORTED NONE /HPF    Epithelial Cells UA 2 TO 5 0 - 5 /HPF    Renal Epithelial, Urine NOT REPORTED 0 /HPF    Bacteria, UA NOT REPORTED NONE    Mucus, UA NOT REPORTED NONE    Trichomonas, UA NOT REPORTED NONE    Amorphous, UA NOT REPORTED NONE    Other Observations UA NOT REPORTED NREQ    Yeast, UA NOT REPORTED NONE   Leukocytosis  Acute renal failure    EMERGENCY DEPARTMENT COURSE:  Peripheral IV, CBC, CMP, lipase, Zofran, fluid bolus, urinalysis with reflex culture, abdominal series x-ray. Evaluated patient a few times when I go in the room. She doesn't appear uncomfortable, but says her pain is not any better. After reviewing her labs, we'll plan on doing a CT scan based on her elevated white blood cell count. She could have diverticulitis. She also has not a very well-controlled diabetic. Lactate added on after leukocytosis noted. PROCEDURES:  None    FINAL IMPRESSION      1. Acute renal failure, unspecified acute renal failure type ([address]    2.  Intractable vomiting with nausea, unspecified vomiting type DISPOSITION / PLAN     DISPOSITION   Sign out to Dr. Ledy Gómez pending CT scan and possible admission. PATIENT REFERRED TO:  No follow-up provider specified.     DISCHARGE MEDICATIONS:  New Prescriptions    No medications on file       Ekaterina Narvaez PA-C PA-C  Emergency Medicine Physician Assistant    (Please note that portions of this note were completed with a voice recognition program.  Efforts were made to edit the dictations but occasionally words are mis-transcribed.)     Ekaterina Narvaez PA-C  10/22/17 [id number]

## 2017-10-22 NOTE — ED PROVIDER NOTES
[facility] ED     Emergency Department     Faculty Attestation        I performed a history and physical examination of the patient and discussed management with the resident. I reviewed the residents note and agree with the documented findings and plan of care. Any areas of disagreement are noted on the chart. I was personally present for the key portions of any procedures. I have documented in the chart those procedures where I was not present during the key portions. I have reviewed the emergency nurses triage note. I agree with the chief complaint, past medical history, past surgical history, allergies, medications, social and family history as documented unless otherwise noted below. For mid-level providers such as nurse practitioners as well as physicians assistants:    I have personally seen and evaluated the patient. I find the patient's history and physical exam are consistent with NP/PA documentation. I agree with the care provided, treatment rendered, disposition, & follow-up plan. Additional findings are as noted. Vital Signs: /78   Pulse 88   Temp 97.8 °F (36.6 °C) (Oral)   Resp 18   Ht 5' (1.524 m)   Wt 173 lb (78.5 kg)   LMP  (LMP Unknown)   SpO2 94%   BMI 33.79 kg/m²   PCP:  Lydia Kong MD    Pertinent Comments:     Patient reports diffuse nonspecific abdominal pain with nausea and one episode of vomiting. She is afebrile nontoxic. Sleeping when I entered the examination room. Abdomen is soft, minimally tender throughout. There is no rebound or guarding. Critical Care  None         Note, if the patient's blood pressure was elevated, and they have no history of hypertension, they were informed of the following:   The patient may have Pre-hypertension/Hypertension: The patient has been informed that they may have pre-hypertension or Hypertension based on a blood pressure reading in the emergency

## 2017-10-23 PROBLEM — K57.92 ACUTE DIVERTICULITIS: Status: ACTIVE | Noted: 2017-10-23

## 2017-10-23 LAB
CULTURE: ABNORMAL
CULTURE: ABNORMAL
ESTIMATED AVERAGE GLUCOSE: 148 MG/DL
GLUCOSE BLD-MCNC: 118 MG/DL (ref 65–105)
GLUCOSE BLD-MCNC: 186 MG/DL (ref 65–105)
GLUCOSE BLD-MCNC: 189 MG/DL (ref 65–105)
GLUCOSE BLD-MCNC: 271 MG/DL (ref 65–105)
HBA1C MFR BLD: 6.8 % (ref 4–6)
Lab: ABNORMAL
SPECIMEN DESCRIPTION: ABNORMAL
STATUS: ABNORMAL

## 2017-10-23 PROCEDURE — 6370000000 HC RX 637 (ALT 250 FOR IP): Performed by: INTERNAL MEDICINE

## 2017-10-23 PROCEDURE — 2500000003 HC RX 250 WO HCPCS: Performed by: NURSE PRACTITIONER

## 2017-10-23 PROCEDURE — 2580000003 HC RX 258: Performed by: NURSE PRACTITIONER

## 2017-10-23 PROCEDURE — 2580000003 HC RX 258: Performed by: INTERNAL MEDICINE

## 2017-10-23 PROCEDURE — 1200000000 HC SEMI PRIVATE

## 2017-10-23 PROCEDURE — 6360000002 HC RX W HCPCS: Performed by: INTERNAL MEDICINE

## 2017-10-23 PROCEDURE — S0028 INJECTION, FAMOTIDINE, 20 MG: HCPCS | Performed by: NURSE PRACTITIONER

## 2017-10-23 PROCEDURE — 99222 1ST HOSP IP/OBS MODERATE 55: CPT | Performed by: INTERNAL MEDICINE

## 2017-10-23 PROCEDURE — 6370000000 HC RX 637 (ALT 250 FOR IP): Performed by: NURSE PRACTITIONER

## 2017-10-23 PROCEDURE — 6360000002 HC RX W HCPCS: Performed by: NURSE PRACTITIONER

## 2017-10-23 PROCEDURE — 6370000000 HC RX 637 (ALT 250 FOR IP): Performed by: EMERGENCY MEDICINE

## 2017-10-23 PROCEDURE — 82947 ASSAY GLUCOSE BLOOD QUANT: CPT

## 2017-10-23 RX ORDER — IPRATROPIUM BROMIDE AND ALBUTEROL SULFATE 2.5; .5 MG/3ML; MG/3ML
1 SOLUTION RESPIRATORY (INHALATION) EVERY 6 HOURS
Status: DISCONTINUED | OUTPATIENT
Start: 2017-10-23 | End: 2017-10-24

## 2017-10-23 RX ORDER — DEXTROSE MONOHYDRATE 25 G/50ML
12.5 INJECTION, SOLUTION INTRAVENOUS PRN
Status: DISCONTINUED | OUTPATIENT
Start: 2017-10-23 | End: 2017-10-27 | Stop reason: HOSPADM

## 2017-10-23 RX ORDER — CEFTRIAXONE 1 G/1
1 INJECTION, POWDER, FOR SOLUTION INTRAMUSCULAR; INTRAVENOUS EVERY 24 HOURS
Status: DISCONTINUED | OUTPATIENT
Start: 2017-10-23 | End: 2017-10-23

## 2017-10-23 RX ORDER — NICOTINE POLACRILEX 4 MG
15 LOZENGE BUCCAL PRN
Status: DISCONTINUED | OUTPATIENT
Start: 2017-10-23 | End: 2017-10-27 | Stop reason: HOSPADM

## 2017-10-23 RX ORDER — SODIUM CHLORIDE 0.9 % (FLUSH) 0.9 %
10 SYRINGE (ML) INJECTION EVERY 12 HOURS SCHEDULED
Status: DISCONTINUED | OUTPATIENT
Start: 2017-10-23 | End: 2017-10-27 | Stop reason: HOSPADM

## 2017-10-23 RX ORDER — POTASSIUM CHLORIDE 7.45 MG/ML
10 INJECTION INTRAVENOUS PRN
Status: DISCONTINUED | OUTPATIENT
Start: 2017-10-23 | End: 2017-10-27 | Stop reason: HOSPADM

## 2017-10-23 RX ORDER — OLANZAPINE 10 MG/1
10 TABLET ORAL NIGHTLY
Status: DISCONTINUED | OUTPATIENT
Start: 2017-10-23 | End: 2017-10-27 | Stop reason: HOSPADM

## 2017-10-23 RX ORDER — SODIUM CHLORIDE 9 MG/ML
INJECTION, SOLUTION INTRAVENOUS CONTINUOUS
Status: DISCONTINUED | OUTPATIENT
Start: 2017-10-23 | End: 2017-10-26

## 2017-10-23 RX ORDER — MORPHINE SULFATE 2 MG/ML
2 INJECTION, SOLUTION INTRAMUSCULAR; INTRAVENOUS
Status: DISCONTINUED | OUTPATIENT
Start: 2017-10-23 | End: 2017-10-23

## 2017-10-23 RX ORDER — DIPHENHYDRAMINE HYDROCHLORIDE 50 MG/ML
25 INJECTION INTRAMUSCULAR; INTRAVENOUS EVERY 6 HOURS PRN
Status: DISCONTINUED | OUTPATIENT
Start: 2017-10-23 | End: 2017-10-27 | Stop reason: HOSPADM

## 2017-10-23 RX ORDER — POTASSIUM CHLORIDE 20MEQ/15ML
40 LIQUID (ML) ORAL PRN
Status: DISCONTINUED | OUTPATIENT
Start: 2017-10-23 | End: 2017-10-27 | Stop reason: HOSPADM

## 2017-10-23 RX ORDER — INSULIN GLARGINE 100 [IU]/ML
35 INJECTION, SOLUTION SUBCUTANEOUS EVERY MORNING
Status: DISCONTINUED | OUTPATIENT
Start: 2017-10-23 | End: 2017-10-23

## 2017-10-23 RX ORDER — OXCARBAZEPINE 300 MG/1
300 TABLET, FILM COATED ORAL 2 TIMES DAILY
Status: DISCONTINUED | OUTPATIENT
Start: 2017-10-23 | End: 2017-10-27 | Stop reason: HOSPADM

## 2017-10-23 RX ORDER — COLCHICINE 0.6 MG/1
0.6 TABLET ORAL DAILY
Status: DISCONTINUED | OUTPATIENT
Start: 2017-10-23 | End: 2017-10-27 | Stop reason: HOSPADM

## 2017-10-23 RX ORDER — LISINOPRIL 5 MG/1
5 TABLET ORAL DAILY
Status: DISCONTINUED | OUTPATIENT
Start: 2017-10-23 | End: 2017-10-27 | Stop reason: HOSPADM

## 2017-10-23 RX ORDER — MORPHINE SULFATE 4 MG/ML
4 INJECTION, SOLUTION INTRAMUSCULAR; INTRAVENOUS
Status: DISCONTINUED | OUTPATIENT
Start: 2017-10-23 | End: 2017-10-23

## 2017-10-23 RX ORDER — MAGNESIUM SULFATE 1 G/100ML
1 INJECTION INTRAVENOUS PRN
Status: DISCONTINUED | OUTPATIENT
Start: 2017-10-23 | End: 2017-10-27 | Stop reason: HOSPADM

## 2017-10-23 RX ORDER — ALBUTEROL SULFATE 2.5 MG/3ML
2.5 SOLUTION RESPIRATORY (INHALATION)
Status: DISCONTINUED | OUTPATIENT
Start: 2017-10-23 | End: 2017-10-24

## 2017-10-23 RX ORDER — SULFAMETHOXAZOLE AND TRIMETHOPRIM 800; 160 MG/1; MG/1
1 TABLET ORAL ONCE
Status: COMPLETED | OUTPATIENT
Start: 2017-10-23 | End: 2017-10-23

## 2017-10-23 RX ORDER — ATORVASTATIN CALCIUM 20 MG/1
20 TABLET, FILM COATED ORAL DAILY
Status: DISCONTINUED | OUTPATIENT
Start: 2017-10-23 | End: 2017-10-27 | Stop reason: HOSPADM

## 2017-10-23 RX ORDER — CARVEDILOL 3.12 MG/1
3.12 TABLET ORAL 2 TIMES DAILY WITH MEALS
Status: DISCONTINUED | OUTPATIENT
Start: 2017-10-23 | End: 2017-10-27 | Stop reason: HOSPADM

## 2017-10-23 RX ORDER — NICOTINE 21 MG/24HR
1 PATCH, TRANSDERMAL 24 HOURS TRANSDERMAL DAILY PRN
Status: DISCONTINUED | OUTPATIENT
Start: 2017-10-23 | End: 2017-10-27 | Stop reason: HOSPADM

## 2017-10-23 RX ORDER — ONDANSETRON 2 MG/ML
4 INJECTION INTRAMUSCULAR; INTRAVENOUS EVERY 6 HOURS PRN
Status: DISCONTINUED | OUTPATIENT
Start: 2017-10-23 | End: 2017-10-27 | Stop reason: HOSPADM

## 2017-10-23 RX ORDER — BISACODYL 10 MG
10 SUPPOSITORY, RECTAL RECTAL DAILY PRN
Status: DISCONTINUED | OUTPATIENT
Start: 2017-10-23 | End: 2017-10-27 | Stop reason: HOSPADM

## 2017-10-23 RX ORDER — INSULIN GLARGINE 100 [IU]/ML
36 INJECTION, SOLUTION SUBCUTANEOUS EVERY MORNING
Status: DISCONTINUED | OUTPATIENT
Start: 2017-10-24 | End: 2017-10-27 | Stop reason: HOSPADM

## 2017-10-23 RX ORDER — POTASSIUM CHLORIDE 20 MEQ/1
40 TABLET, EXTENDED RELEASE ORAL PRN
Status: DISCONTINUED | OUTPATIENT
Start: 2017-10-23 | End: 2017-10-27 | Stop reason: HOSPADM

## 2017-10-23 RX ORDER — ASPIRIN 81 MG/1
81 TABLET, CHEWABLE ORAL DAILY
Status: DISCONTINUED | OUTPATIENT
Start: 2017-10-23 | End: 2017-10-27 | Stop reason: HOSPADM

## 2017-10-23 RX ORDER — DEXTROSE MONOHYDRATE 50 MG/ML
100 INJECTION, SOLUTION INTRAVENOUS PRN
Status: DISCONTINUED | OUTPATIENT
Start: 2017-10-23 | End: 2017-10-27 | Stop reason: HOSPADM

## 2017-10-23 RX ORDER — METRONIDAZOLE 500 MG/1
500 TABLET ORAL ONCE
Status: COMPLETED | OUTPATIENT
Start: 2017-10-23 | End: 2017-10-23

## 2017-10-23 RX ORDER — SODIUM CHLORIDE 0.9 % (FLUSH) 0.9 %
10 SYRINGE (ML) INJECTION PRN
Status: DISCONTINUED | OUTPATIENT
Start: 2017-10-23 | End: 2017-10-27 | Stop reason: HOSPADM

## 2017-10-23 RX ADMIN — COLCHICINE 0.6 MG: 0.6 TABLET, FILM COATED ORAL at 10:31

## 2017-10-23 RX ADMIN — SODIUM CHLORIDE 1000 MG: 9 INJECTION, SOLUTION INTRAVENOUS at 19:03

## 2017-10-23 RX ADMIN — FAMOTIDINE 20 MG: 10 INJECTION, SOLUTION INTRAVENOUS at 20:04

## 2017-10-23 RX ADMIN — OXCARBAZEPINE 300 MG: 300 TABLET, FILM COATED ORAL at 10:31

## 2017-10-23 RX ADMIN — CARVEDILOL 3.12 MG: 3.12 TABLET, FILM COATED ORAL at 18:13

## 2017-10-23 RX ADMIN — INSULIN LISPRO 9 UNITS: 100 INJECTION, SOLUTION INTRAVENOUS; SUBCUTANEOUS at 12:40

## 2017-10-23 RX ADMIN — SODIUM CHLORIDE: 9 INJECTION, SOLUTION INTRAVENOUS at 12:30

## 2017-10-23 RX ADMIN — LISINOPRIL 5 MG: 5 TABLET ORAL at 10:31

## 2017-10-23 RX ADMIN — CARVEDILOL 3.12 MG: 3.12 TABLET, FILM COATED ORAL at 10:32

## 2017-10-23 RX ADMIN — ASPIRIN 81 MG 81 MG: 81 TABLET ORAL at 10:32

## 2017-10-23 RX ADMIN — SULFAMETHOXAZOLE AND TRIMETHOPRIM 1 TABLET: 800; 160 TABLET ORAL at 01:10

## 2017-10-23 RX ADMIN — FAMOTIDINE 20 MG: 10 INJECTION, SOLUTION INTRAVENOUS at 14:42

## 2017-10-23 RX ADMIN — INSULIN LISPRO 3 UNITS: 100 INJECTION, SOLUTION INTRAVENOUS; SUBCUTANEOUS at 22:17

## 2017-10-23 RX ADMIN — OLANZAPINE 10 MG: 10 TABLET, FILM COATED ORAL at 20:33

## 2017-10-23 RX ADMIN — CEFTRIAXONE SODIUM 1 G: 1 INJECTION, POWDER, FOR SOLUTION INTRAMUSCULAR; INTRAVENOUS at 14:43

## 2017-10-23 RX ADMIN — INSULIN LISPRO 2 UNITS: 100 INJECTION, SOLUTION INTRAVENOUS; SUBCUTANEOUS at 03:47

## 2017-10-23 RX ADMIN — METRONIDAZOLE 500 MG: 500 TABLET ORAL at 01:11

## 2017-10-23 RX ADMIN — ENOXAPARIN SODIUM 40 MG: 40 INJECTION SUBCUTANEOUS at 10:33

## 2017-10-23 RX ADMIN — ATORVASTATIN CALCIUM 20 MG: 20 TABLET, FILM COATED ORAL at 10:31

## 2017-10-23 RX ADMIN — METRONIDAZOLE 500 MG: 500 INJECTION, SOLUTION INTRAVENOUS at 12:30

## 2017-10-23 RX ADMIN — OXCARBAZEPINE 300 MG: 300 TABLET, FILM COATED ORAL at 20:33

## 2017-10-23 ASSESSMENT — PAIN SCALES - GENERAL: PAINLEVEL_OUTOF10: 6

## 2017-10-23 NOTE — PROGRESS NOTES
Physical Therapy  DATE: 10/23/2017    NAME: Davey Kayser  MRN: [id number]   : 1955    Patient not seen this date for Physical Therapy due to:  [] Blood transfusion in progress  [] Hemodialysis  [x]  Patient Declined; Pt. Educated on importance of getting up and PT.  [] Spine Precautions   [] Strict Bedrest  [] Surgery/ Procedure  [] Testing      [] Other        [] PT being discontinued at this time. Patient independent. No further needs. [] PT being discontinued at this time as the patient has been transferred to palliative care. No further needs.     Angelina Rodriguez

## 2017-10-23 NOTE — ED PROVIDER NOTES
[address] [facility]  Emergency Department  Faculty Sign-Out Addendum     Care of Kaylene Curry was assumed from previous attending and is being seen for Abdominal Pain (umbilical region, seen at [facility] [place] yesterday, pt states \"I have an infection in my stomach, I know I do.\"); Nausea; and Emesis  . The patient's initial evaluation and plan have been discussed with the prior provider who initially evaluated the patient.       EMERGENCY DEPARTMENT COURSE / MEDICAL DECISION MAKING:       MEDICATIONS GIVEN:  Orders Placed This Encounter   Medications    0.9 % sodium chloride bolus    dicyclomine (BENTYL) injection 20 mg    ondansetron (ZOFRAN) injection 4 mg    ketorolac (TORADOL) injection 30 mg    DISCONTD: ceFAZolin (ANCEF) 1 g in dextrose 5 % 50 mL IVPB (premix)    DISCONTD: metronidazole (FLAGYL) 500 mg in NaCl 100 mL IVPB premix    sulfamethoxazole-trimethoprim (BACTRIM DS;SEPTRA DS) 800-160 MG per tablet 1 tablet    metroNIDAZOLE (FLAGYL) tablet 500 mg    metronidazole (FLAGYL) 500 mg in NaCl 100 mL IVPB premix       LABS / RADIOLOGY:     Labs Reviewed   UA W/REFLEX CULTURE - Abnormal; Notable for the following:        Result Value    Ketones, Urine SMALL (*)     Protein, UA 1+ (*)     Leukocyte Esterase, Urine TRACE (*)     All other components within normal limits   CBC WITH AUTO DIFFERENTIAL - Abnormal; Notable for the following:     WBC 18.5 (*)     Hemoglobin 11.2 (*)     Hematocrit 35.0 (*)     MCV 76.8 (*)     MCH 24.6 (*)     RDW 16.3 (*)     Segs Absolute 15.16 (*)     All other components within normal limits   COMPREHENSIVE METABOLIC PANEL - Abnormal; Notable for the following:     Glucose 166 (*)     CREATININE 1.31 (*)     Chloride 96 (*)     Alkaline Phosphatase 116 (*)     Albumin/Globulin Ratio 0.9 (*)     GFR Non- 41 (*)     GFR  50 (*)     All other components within normal limits   URINE CULTURE CLEAN CATCH   LIPASE   MICROSCOPIC URINALYSIS LACTIC ACID, WHOLE BLOOD       Ct Abdomen Pelvis Wo Iv Contrast Additional Contrast? None    Result Date: 10/22/2017  EXAMINATION: CT OF THE ABDOMEN AND PELVIS WITHOUT CONTRAST 10/22/2017 10:52 pm TECHNIQUE: CT of the abdomen and pelvis was performed without the administration of intravenous contrast. Multiplanar reformatted images are provided for review. Dose modulation, iterative reconstruction, and/or weight based adjustment of the mA/kV was utilized to reduce the radiation dose to as low as reasonably achievable. COMPARISON: 08/11/2017 HISTORY: ORDERING SYSTEM PROVIDED HISTORY: ABDOMINAL PAIN TECHNOLOGIST PROVIDED HISTORY: Additional Contrast?->None FINDINGS: Lower Chest: Mild right lower lobe atelectasis is identified. Coronary artery disease is noted. Organs: The solid organs are limited in evaluation due to lack of use of intravenous contrast.  The contours of the liver, spleen, pancreas, adrenal glands and kidneys are unremarkable. The gallbladder is intact. GI/Bowel: Small hiatal hernia is noted. The appendix is normal.  Small fat containing periumbilical hernia is noted incidentally. Moderate sigmoid colon diverticulosis is appreciated. There is inflammation involving proximal sigmoid colon compatible with acute diverticulitis. Pelvis: The urinary bladder is collapsed. Hysterectomy is identified. Peritoneum/Retroperitoneum: No lymphadenopathy is evident. Mild aortic atherosclerosis is identified. Bones/Soft Tissues: No acute osseous abnormality is seen. Sternotomy changes are identified. Osteopenia is suspected. There is deformity of the superior endplate of L4 secondary to a Schmorl's node. Mild acute sigmoid colon diverticulitis. No abscess or perforation is evident. Moderate diverticulosis is noted. Hysterectomy. Small hiatal hernia. Xr Acute Abd Series Chest 1 Vw    Result Date: 10/22/2017  EXAMINATION: ACUTE ABDOMINAL SERIES 10/22/2017 5:03 pm COMPARISON: None.  HISTORY: ORDERING

## 2017-10-23 NOTE — PLAN OF CARE
Problem: Falls - Risk of  Goal: Absence of falls  Pt alert and oriented, pt responds to questions at times, flat affect, bed in lowest position at all times, call light in reach and patient able to call out for assistance as needed

## 2017-10-23 NOTE — ED NOTES
Ct call states line is no good, Dr. Lyndsay Edward and Dr. Omar Ware aware okay to ct without contrast .     Nadia Velazquez RN  10/22/17 3593

## 2017-10-23 NOTE — H&P
[facility] [address]    HISTORY AND PHYSICAL EXAMINATION            Date:   10/23/2017  Patient name:  George Sandoval  Date of admission:  10/22/2017  3:16 PM  MRN:   [id number]  Account:  [de-identified]  YOB: 1955  PCP:    Ollie Burroughs MD  Room:   [facility]0318-  Code Status:    Full Code    Chief Complaint:     Chief Complaint   Patient presents with    Abdominal Pain     umbilical region, seen at [address] yesterday, pt states \"I have an infection in my stomach, I know I do. \"    Nausea    Emesis       History Obtained From:     patient, electronic medical record    History of Present Illness: The patient is a 65 y.o. Non-/non  female who presents with Abdominal Pain (umbilical region, seen at [address] yesterday, pt states \"I have an infection in my stomach, I know I do.\"); Nausea; and Emesis   and she is admitted to the hospital for the management of Acute diverticulitis. She has the following significant co-morbidities: IDDM type 2, HTN, previous CVA with residual left-sided weakness, CAD s/p BMS stent placement on plavix and Ischemic CM s/p AICD placement in March of 2017. She presents to the ED with intractable abdominal pain, nausea and vomiting. She believes ? Mr. Juventino Abdullahi? Has implanted an abdominal infection in her again. Patient does not elaborate when asked for details? Otherwise, patient is fully oriented. On evaluation, CT Abd Pelvis revealed Acute diverticulitis of the sigmoid colon. Labs revealed DHARMESH, with elevated creatinine. She was admitted for further care.      Past Medical History:     Past Medical History:   Diagnosis Date    Arthritis     Asthma     CAD (coronary artery disease)     CHF (congestive heart failure) (HCC)     Clinical trial participant at discharge 03/13/2017    [employer]     COPD [facility]ch[facility] obstructive pulmonary disease) ([facility])     Diabetes mellitus ([facility] [address]     Hepatitis C famotidine (PEPCID) 20 MG tablet Take 20 mg by mouth daily. Historical Provider, MD        Allergies:     Levofloxacin and Pcn [penicillins]    Social History:     Tobacco:    reports that she has been smoking. She has been smoking about 1.00 pack per day. She has never used smokeless tobacco.  Alcohol:      reports that she drinks alcohol. Drug Use:  reports that she does not use drugs. Family History:     Family History   Problem Relation Age of Onset    Family history unknown: Yes       Review of Systems:     Positive and Negative as described in HPI. CONSTITUTIONAL:  negative for fevers, chills, sweats, fatigue, weight loss  HEENT:  negative for vision, hearing changes, runny nose, throat pain  RESPIRATORY:  negative for shortness of breath, cough, congestion, wheezing. CARDIOVASCULAR:  negative for chest pain, palpitations. GASTROINTESTINAL:  +nausea yesterday. +constipation. +abdominal pain  GENITOURINARY:  negative for difficulty of urination, burning with urination, frequency   INTEGUMENT:  negative for rash, skin lesions, easy bruising   HEMATOLOGIC/LYMPHATIC:  negative for swelling/edema   ALLERGIC/IMMUNOLOGIC:  negative for urticaria , itching  ENDOCRINE:  negative increase in drinking, increase in urination, hot or cold intolerance  MUSCULOSKELETAL:  negative joint pains, muscle aches, swelling of joints  NEUROLOGICAL:  negative for headaches, dizziness, lightheadedness, numbness, pain, tingling extremities  BEHAVIOR/PSYCH:  + delusions, + depression.  ?suicidal ideation    Physical Exam:   BP (!) 150/78   Pulse 104   Temp 98.8 °F (37.1 °C)   Resp 20   Ht 5' (1.524 m)   Wt 173 lb (78.5 kg)   LMP  (LMP Unknown)   SpO2 100%   BMI 33.79 kg/m²   Temp (24hrs), Av.4 °F (36.9 °C), Min:97.8 °F (36.6 °C), Max:98.8 °F (37.1 °C)    Recent Labs      10/23/17   0344   POCGLU  189*     No intake or output data in the 24 hours ending 10/23/17 0905    General Appearance:  Lying bed, expresses that she has abdominal pain, asks for a glass of milk  Mental status: oriented to person, place, and time, However, expressing delusional notions  Head:  normocephalic, atraumatic. Eye: no icterus, redness, pupils equal and reactive, extraocular eye movements intact, conjunctiva clear  Ear: normal external ear, no discharge, hearing intact  Nose:  no drainage noted  Mouth: mucous membranes moist  Neck: supple, no carotid bruits, thyroid not palpable  Lungs: Bilateral equal air entry, clear to ausculation, no wheezing, rales or rhonchi, normal effort  Cardiovascular: normal rate, regular rhythm, no murmur, gallop, rub. Abdomen: Soft, nondistended, normal bowel sounds, no hepatomegaly or splenomegaly. +tenderness to superficial palpation of abdomen, particularly clayton-umbilically and LLQ. Neurologic: 3/5 strength Left upper and lower extremities noted. This is residual from previous CVA. Skin: No gross lesions, rashes, bruising or bleeding on exposed skin area  Extremities:  peripheral pulses palpable, no pedal edema or calf pain with palpation  Psych: delusions, depression, ?suicidal ideation    Investigations:      Laboratory Testing:  Recent Results (from the past 24 hour(s))   Urine culture clean catch    Collection Time: 10/22/17  4:15 PM   Result Value Ref Range    Specimen Description . CLEAN CATCH URINE     Special Requests NOT REPORTED     Culture CULTURE IN PROGRESS     Culture       [address] [phone]    Status Pending    UA W/REFLEX CULTURE    Collection Time: 10/22/17  6:49 PM   Result Value Ref Range    Color, UA YELLOW YEL    Turbidity UA CLEAR CLEAR    Glucose, Ur NEGATIVE NEG    Bilirubin Urine NEGATIVE NEG    Ketones, Urine SMALL (A) NEG    Specific Gravity, UA 1.019 1.005 - 1.030    Urine Hgb NEGATIVE NEG    pH, UA 5.0 5.0 - 8.0    Protein, UA 1+ (A) NEG    Urobilinogen, Urine Normal NORM    Nitrite, Urine NEGATIVE NEG    Leukocyte Esterase, Urine Microscopic Urinalysis    Collection Time: 10/22/17  6:49 PM   Result Value Ref Range    -          WBC, UA 2 TO 5 0 - 5 /HPF    RBC, UA 0 TO 2 0 - 4 /HPF    Casts UA 10 TO 20 HYALINE 0 - 8 /LPF    Crystals UA NOT REPORTED NONE /HPF    Epithelial Cells UA 2 TO 5 0 - 5 /HPF    Renal Epithelial, Urine NOT REPORTED 0 /HPF    Bacteria, UA NOT REPORTED NONE    Mucus, UA NOT REPORTED NONE    Trichomonas, UA NOT REPORTED NONE    Amorphous, UA NOT REPORTED NONE    Other Observations UA NOT REPORTED NREQ    Yeast, UA NOT REPORTED NONE   Lactic Acid, Whole Blood    Collection Time: 10/22/17 10:04 PM   Result Value Ref Range    Lactic Acid, Whole Blood 0.8 0.7 - 2.1 mmol/L   POC Glucose Fingerstick    Collection Time: 10/23/17  3:44 AM   Result Value Ref Range    POC Glucose 189 (H) 65 - 105 mg/dL       Imaging/Diagnostics:    EXAMINATION: ACUTE ABDOMINAL SERIES   10/22/2017 5:03 pm  Impression Scattered gas in the jejunal loops suggesting mild degree of adynamic small bowel ileus. EXAMINATION: CT OF THE ABDOMEN AND PELVIS WITHOUT CONTRAST 10/22/2017 10:52 pm  Impression Mild acute sigmoid colon diverticulitis. No abscess or perforation is evident. Moderate diverticulosis is noted. Hysterectomy. Small hiatal hernia. Assessment :      Primary Problem  Acute diverticulitis    Active Hospital Problems    Diagnosis Date Noted    Acute diverticulitis [K57.92] 10/23/2017    DHARMESH (acute kidney injury) ([facility] [address] [N17.9] 05/07/2016    Diabetes mellitus type 2, controlled ([facility] [address] [E11.9] 06/15/2013       Plan:     Patient status Admit as inpatient in the  Med/Surge    Acute Diverticulitis of Sigmoid colon, as seen on CT Abd pelvis, and further corroborated by patient's location and character of pain. Cont IVF Hydration, but no more than 75 cc/hr due to patient's sig h/o ischemic CM s/p AICD placement. Will start on clear liquid diet. Cont IV flagyl and IV rocephin (patient has penicillin allergy.  This was discussed with patient

## 2017-10-23 NOTE — PLAN OF CARE
Problem: RESPIRATORY  Intervention: Respiratory assessment  NNEKA GUO, [facility]. Sigmoid diverticulitis [K57.32] . Vitals:    10/23/17 0315   BP: 120/75   Pulse: 98   Resp: 18   Temp: 98.5 °F (36.9 °C)   SpO2: 100%   . Patients home meds are   Prior to Admission medications    Medication Sig Start Date End Date Taking? Authorizing Provider   atorvastatin (LIPITOR) 20 MG tablet Take 20 mg by mouth daily   Yes Nawaf Burks MD   lisinopril (PRINIVIL;ZESTRIL) 5 MG tablet Take 5 mg by mouth daily   Yes Maria Elena Zepeda MD   naproxen (NAPROSYN) 500 MG tablet Take 1 tablet by mouth 2 times daily 9/16/17  Yes Maris Ramirez MD   aspirin 81 MG tablet Take 81 mg by mouth daily   Yes Historical Provider, MD   insulin glargine (LANTUS) 100 UNIT/ML injection vial Inject 35 Units into the skin every morning    Yes Historical Provider, MD   insulin lispro (HUMALOG KWIKPEN) 100 UNIT/ML pen Inject 10 Units into the skin 3 times daily (before meals). 1/27/15  Yes Lakshmi Rosas MD   OXcarbazepine (TRILEPTAL) 300 MG tablet Take 300 mg by mouth 2 times daily Per pharmacy she is to be taking 300 mg twice daily but patient states that she only takes once daily. Yes Historical Provider, MD   carvedilol (COREG) 3.125 MG tablet Take 3.125 mg by mouth 2 times daily (with meals)    Citlali Lassiter, DO   senna-docusate (PERICOLACE) 8.6-50 MG per tablet Take 1 tablet by mouth daily    Historical Provider, MD   metoprolol tartrate (LOPRESSOR) 25 MG tablet Take 1 tablet by mouth 2 times daily 8/18/17   Neela Vega MD   OLANZapine (ZYPREXA) 10 MG tablet Take 1 tablet by mouth nightly 11/30/16   Demond Carter MD   colchicine (COLCRYS) 0.6 MG tablet Take 1 tablet by mouth daily 7/15/16   Jung Ast P Blood, DO   famotidine (PEPCID) 20 MG tablet Take 20 mg by mouth daily. Historical Provider, MD       Assessment: Pt is currently not c/o any shortness of breath. Pt is currently on home O2 (2-3L).  Pt is [phone]

## 2017-10-23 NOTE — ED NOTES
Dr. Shepherd [place] made aware of no IV access for this patient after mutiple attempts even with US> Paramedic at bedside to attempt nothing noted .       Sumeet Morales RN  10/23/17 0994

## 2017-10-23 NOTE — ED NOTES
Pt back in ED from [address] [facility] [address] [facility], [facility]  80/61/83 [id number]

## 2017-10-24 LAB
ABSOLUTE EOS #: 0.3 K/UL (ref 0–0.4)
ABSOLUTE IMMATURE GRANULOCYTE: ABNORMAL K/UL (ref 0–0.3)
ABSOLUTE LYMPH #: 2.4 K/UL (ref 1–4.8)
ABSOLUTE MONO #: 0.8 K/UL (ref 0.1–1.2)
ALBUMIN SERPL-MCNC: 3 G/DL (ref 3.5–5.2)
ALBUMIN/GLOBULIN RATIO: 0.8 (ref 1–2.5)
ALP BLD-CCNC: 92 U/L (ref 35–104)
ALT SERPL-CCNC: 7 U/L (ref 5–33)
ANION GAP SERPL CALCULATED.3IONS-SCNC: 13 MMOL/L (ref 9–17)
AST SERPL-CCNC: 8 U/L
BASOPHILS # BLD: 1 %
BASOPHILS ABSOLUTE: 0.1 K/UL (ref 0–0.2)
BILIRUB SERPL-MCNC: <0.1 MG/DL (ref 0.3–1.2)
BUN BLDV-MCNC: 13 MG/DL (ref 8–23)
BUN/CREAT BLD: ABNORMAL (ref 9–20)
CALCIUM SERPL-MCNC: 8.6 MG/DL (ref 8.6–10.4)
CHLORIDE BLD-SCNC: 102 MMOL/L (ref 98–107)
CO2: 24 MMOL/L (ref 20–31)
CREAT SERPL-MCNC: 0.82 MG/DL (ref 0.5–0.9)
DIFFERENTIAL TYPE: ABNORMAL
EOSINOPHILS RELATIVE PERCENT: 3 %
GFR AFRICAN AMERICAN: >60 ML/MIN
GFR NON-AFRICAN AMERICAN: >60 ML/MIN
GFR SERPL CREATININE-BSD FRML MDRD: ABNORMAL ML/MIN/{1.73_M2}
GFR SERPL CREATININE-BSD FRML MDRD: ABNORMAL ML/MIN/{1.73_M2}
GLUCOSE BLD-MCNC: 119 MG/DL (ref 65–105)
GLUCOSE BLD-MCNC: 125 MG/DL (ref 70–99)
GLUCOSE BLD-MCNC: 143 MG/DL (ref 65–105)
GLUCOSE BLD-MCNC: 158 MG/DL (ref 65–105)
GLUCOSE BLD-MCNC: 180 MG/DL (ref 65–105)
GLUCOSE BLD-MCNC: 187 MG/DL (ref 65–105)
GLUCOSE BLD-MCNC: 190 MG/DL (ref 65–105)
GLUCOSE BLD-MCNC: 194 MG/DL (ref 65–105)
HCT VFR BLD CALC: 30.6 % (ref 36–46)
HEMOGLOBIN: 9.7 G/DL (ref 12–16)
IMMATURE GRANULOCYTES: ABNORMAL %
LYMPHOCYTES # BLD: 26 %
MAGNESIUM: 2.3 MG/DL (ref 1.6–2.6)
MCH RBC QN AUTO: 24.5 PG (ref 26–34)
MCHC RBC AUTO-ENTMCNC: 31.8 G/DL (ref 31–37)
MCV RBC AUTO: 77.3 FL (ref 80–100)
MONOCYTES # BLD: 8 %
PDW BLD-RTO: 16 % (ref 12.5–15.4)
PLATELET # BLD: 280 K/UL (ref 140–450)
PLATELET ESTIMATE: ABNORMAL
PMV BLD AUTO: 7.5 FL (ref 6–12)
POTASSIUM SERPL-SCNC: 4.6 MMOL/L (ref 3.7–5.3)
RBC # BLD: 3.96 M/UL (ref 4–5.2)
RBC # BLD: ABNORMAL 10*6/UL
SEG NEUTROPHILS: 62 %
SEGMENTED NEUTROPHILS ABSOLUTE COUNT: 5.7 K/UL (ref 1.8–7.7)
SODIUM BLD-SCNC: 139 MMOL/L (ref 135–144)
TOTAL PROTEIN: 6.7 G/DL (ref 6.4–8.3)
WBC # BLD: 9.2 K/UL (ref 3.5–11)
WBC # BLD: ABNORMAL 10*3/UL

## 2017-10-24 PROCEDURE — G8987 SELF CARE CURRENT STATUS: HCPCS

## 2017-10-24 PROCEDURE — 6360000002 HC RX W HCPCS: Performed by: INTERNAL MEDICINE

## 2017-10-24 PROCEDURE — 6360000002 HC RX W HCPCS: Performed by: NURSE PRACTITIONER

## 2017-10-24 PROCEDURE — 2580000003 HC RX 258: Performed by: INTERNAL MEDICINE

## 2017-10-24 PROCEDURE — 85025 COMPLETE CBC W/AUTO DIFF WBC: CPT

## 2017-10-24 PROCEDURE — 97166 OT EVAL MOD COMPLEX 45 MIN: CPT

## 2017-10-24 PROCEDURE — 97535 SELF CARE MNGMENT TRAINING: CPT

## 2017-10-24 PROCEDURE — S0028 INJECTION, FAMOTIDINE, 20 MG: HCPCS | Performed by: NURSE PRACTITIONER

## 2017-10-24 PROCEDURE — 6370000000 HC RX 637 (ALT 250 FOR IP): Performed by: INTERNAL MEDICINE

## 2017-10-24 PROCEDURE — 83735 ASSAY OF MAGNESIUM: CPT

## 2017-10-24 PROCEDURE — 1200000000 HC SEMI PRIVATE

## 2017-10-24 PROCEDURE — 82947 ASSAY GLUCOSE BLOOD QUANT: CPT

## 2017-10-24 PROCEDURE — 6370000000 HC RX 637 (ALT 250 FOR IP): Performed by: NURSE PRACTITIONER

## 2017-10-24 PROCEDURE — 99232 SBSQ HOSP IP/OBS MODERATE 35: CPT | Performed by: INTERNAL MEDICINE

## 2017-10-24 PROCEDURE — G8988 SELF CARE GOAL STATUS: HCPCS

## 2017-10-24 PROCEDURE — 80053 COMPREHEN METABOLIC PANEL: CPT

## 2017-10-24 PROCEDURE — 2500000003 HC RX 250 WO HCPCS: Performed by: NURSE PRACTITIONER

## 2017-10-24 PROCEDURE — 94640 AIRWAY INHALATION TREATMENT: CPT

## 2017-10-24 PROCEDURE — 76937 US GUIDE VASCULAR ACCESS: CPT

## 2017-10-24 PROCEDURE — 36415 COLL VENOUS BLD VENIPUNCTURE: CPT

## 2017-10-24 RX ORDER — ALBUTEROL SULFATE 2.5 MG/3ML
2.5 SOLUTION RESPIRATORY (INHALATION) EVERY 6 HOURS PRN
Status: DISCONTINUED | OUTPATIENT
Start: 2017-10-24 | End: 2017-10-27 | Stop reason: HOSPADM

## 2017-10-24 RX ORDER — IPRATROPIUM BROMIDE AND ALBUTEROL SULFATE 2.5; .5 MG/3ML; MG/3ML
1 SOLUTION RESPIRATORY (INHALATION) EVERY 4 HOURS PRN
Status: DISCONTINUED | OUTPATIENT
Start: 2017-10-24 | End: 2017-10-27 | Stop reason: HOSPADM

## 2017-10-24 RX ADMIN — LISINOPRIL 5 MG: 5 TABLET ORAL at 09:31

## 2017-10-24 RX ADMIN — COLCHICINE 0.6 MG: 0.6 TABLET, FILM COATED ORAL at 09:32

## 2017-10-24 RX ADMIN — ATORVASTATIN CALCIUM 20 MG: 20 TABLET, FILM COATED ORAL at 09:32

## 2017-10-24 RX ADMIN — INSULIN LISPRO 3 UNITS: 100 INJECTION, SOLUTION INTRAVENOUS; SUBCUTANEOUS at 14:25

## 2017-10-24 RX ADMIN — CARVEDILOL 3.12 MG: 3.12 TABLET, FILM COATED ORAL at 09:32

## 2017-10-24 RX ADMIN — INSULIN LISPRO 3 UNITS: 100 INJECTION, SOLUTION INTRAVENOUS; SUBCUTANEOUS at 18:31

## 2017-10-24 RX ADMIN — ASPIRIN 81 MG 81 MG: 81 TABLET ORAL at 09:32

## 2017-10-24 RX ADMIN — CARVEDILOL 3.12 MG: 3.12 TABLET, FILM COATED ORAL at 18:31

## 2017-10-24 RX ADMIN — SODIUM CHLORIDE 1000 MG: 9 INJECTION, SOLUTION INTRAVENOUS at 18:50

## 2017-10-24 RX ADMIN — OXCARBAZEPINE 300 MG: 300 TABLET, FILM COATED ORAL at 20:15

## 2017-10-24 RX ADMIN — OXCARBAZEPINE 300 MG: 300 TABLET, FILM COATED ORAL at 09:32

## 2017-10-24 RX ADMIN — ENOXAPARIN SODIUM 40 MG: 40 INJECTION SUBCUTANEOUS at 09:33

## 2017-10-24 RX ADMIN — INSULIN LISPRO 3 UNITS: 100 INJECTION, SOLUTION INTRAVENOUS; SUBCUTANEOUS at 02:19

## 2017-10-24 RX ADMIN — INSULIN GLARGINE 36 UNITS: 100 INJECTION, SOLUTION SUBCUTANEOUS at 09:32

## 2017-10-24 RX ADMIN — FAMOTIDINE 20 MG: 10 INJECTION, SOLUTION INTRAVENOUS at 09:33

## 2017-10-24 RX ADMIN — OLANZAPINE 10 MG: 10 TABLET, FILM COATED ORAL at 20:15

## 2017-10-24 RX ADMIN — INSULIN LISPRO 3 UNITS: 100 INJECTION, SOLUTION INTRAVENOUS; SUBCUTANEOUS at 22:32

## 2017-10-24 RX ADMIN — LURASIDONE HYDROCHLORIDE 60 MG: 40 TABLET, FILM COATED ORAL at 09:32

## 2017-10-24 RX ADMIN — FAMOTIDINE 20 MG: 10 INJECTION, SOLUTION INTRAVENOUS at 20:15

## 2017-10-24 ASSESSMENT — PAIN DESCRIPTION - PAIN TYPE: TYPE: ACUTE PAIN

## 2017-10-24 ASSESSMENT — PAIN SCALES - GENERAL
PAINLEVEL_OUTOF10: 10

## 2017-10-24 ASSESSMENT — PAIN DESCRIPTION - ORIENTATION: ORIENTATION: MID

## 2017-10-24 ASSESSMENT — PAIN DESCRIPTION - DESCRIPTORS: DESCRIPTORS: CRAMPING

## 2017-10-24 ASSESSMENT — PAIN DESCRIPTION - FREQUENCY: FREQUENCY: CONTINUOUS

## 2017-10-24 ASSESSMENT — PAIN DESCRIPTION - LOCATION: LOCATION: ABDOMEN

## 2017-10-24 NOTE — PROGRESS NOTES
O2  Social/Functional History  Social/Functional History  Lives With: Alone  Type of Home: Apartment (Pt reports will be moving on the 3rd)  Home Layout: One level (Currently on 1st floor- will be moving to apartment with elevator)  Home Access: Stairs to enter without rails  Entrance Stairs - Number of Steps: 1  Bathroom Shower/Tub: Walk-in shower, Shower chair with back (Pt unsure of new apartment setup)  Bathroom Equipment: Hand-held shower, Grab bars in shower  Bathroom Accessibility: Accessible  Home Equipment: Darylene Re  ADL Assistance: Needs assistance (Home health assistant helps with bathing and cooking)  [address]: [address] [facility] [address] Responsibilities: Yes (Pt reports having a hard time completing d/t decreased lung compacity. Brother-in-law completes grocery shopping)  Ambulation Assistance: Needs assistance (Uses cane at home and wheelchair when in the community)  Transfer Assistance: Independent  Active : No  Patient's  Info: Brother in law drives  Occupation: On disability  Leisure & Hobbies: Watch TV, listen to music, casino, dancing, cleaning, spending time with grandchildren (4 and 8)       Objective   Vision: Impaired  Vision Exceptions: Wears glasses at all times  Hearing: Within functional limits    Orientation  Overall Orientation Status: Within Normal Limits   Cognition: Exceptions (pt display's decreased cognition at times AEB stating \"the doctors put the infection in me\" multiple time during conversation)     Balance  Sitting Balance: Supervision (Pt sat EOB for approximately 25 mins)  Standing Balance: Contact guard assistance  Standing Balance  Time: 1 Min  Activity: Pt stood at bedside but d/t dizziness pt unable to stand for more than 1 min.    Sit to stand: Contact guard assistance  Stand to sit: Contact guard assistance  Functional Mobility  Functional Mobility Comments: Unable to complete d/t pt reporting dizziness  ADL  Feeding: Independent (Pt sat EOB to eat breakfast)  Grooming: Increased time to complete;Minimal assistance;Setup  UE Bathing: Maximum assistance;Setup  LE Bathing: Maximum assistance;Setup  UE Dressing: Moderate assistance;Setup (D/t hemiperesis of L UE)  LE Dressing: Dependent/Total (Dependent to dariana slippers)  Toileting: Dependent/Total  Tone RUE  RUE Tone: Normotonic  Tone LUE  LUE Tone: Normotonic  Coordination  Movements Are Fluid And Coordinated: Yes  Coordination and Movement description: Decreased speed (Decreased speed d/t pain)     Bed mobility  Rolling to Left: Contact guard assistance  Rolling to Right: Contact guard assistance  Supine to Sit: Moderate assistance (Assist for LE )  Sit to Supine: Moderate assistance (Assist for LE )  Scooting: Minimal assistance  Transfers  Sit to stand: Contact guard assistance  Stand to sit: Contact guard assistance     Cognition  Overall Cognitive Status: WNL  Sensation  Overall Sensation Status: WNL     LUE AROM (degrees)  LUE General AROM: Pt reports having a stroke in 2014 (therapy after), unable to use L UE  RUE AROM (degrees)  RUE AROM : WFL  Right Hand AROM (degrees)  Right Hand AROM: WFL  LUE Strength  LUE Strength Comment: Unable to assess d/t hemiperesis of L UE from stroke  RUE Strength  Gross RUE Strength: WFL    Assessment   Performance deficits / Impairments: Decreased functional mobility ; Decreased ADL status; Decreased balance;Decreased endurance;Decreased high-level IADLs  Prognosis: Fair  Decision Making: Medium Complexity  Patient Education: Pt educated on OT POC, discharge recommendation, importance of mobility, pain management, and bed mobility techniques. Pt verbalized understanding. Discharge Recommendations: Subacute/Skilled Nursing Facility  REQUIRES OT FOLLOW UP: Yes  Activity Tolerance  Activity Tolerance: Patient limited by pain; Patient limited by fatigue  Safety Devices  Safety Devices in place: Yes  Type of devices: Left in bed;Nurse notified;Call light within

## 2017-10-24 NOTE — PROGRESS NOTES
BRONCHOSPASM/BRONCHOCONSTRICTION     [x]         IMPROVE AERATION/BREATH SOUNDS  [x]   ADMINISTER BRONCHODILATOR THERAPY AS APPROPRIATE  [x]   ASSESS BREATH SOUNDS  [x]   IMPLEMENT AEROSOL/MDI PROTOCOL  [x]   PATIENT EDUCATION AS NEEDED    [facility]. Sigmoid diverticulitis [K57.32] . Vitals:    10/23/17 1953   BP: 105/82   Pulse: 63   Resp: 18   Temp: 98.2 °F (36.8 °C)   SpO2: 96%   . Patients home meds are   Prior to Admission medications    Medication Sig Start Date End Date Taking? Authorizing Provider   atorvastatin (LIPITOR) 20 MG tablet Take 20 mg by mouth daily   Yes Vanna Dave MD   lisinopril (PRINIVIL;ZESTRIL) 5 MG tablet Take 5 mg by mouth daily   Yes Lydia Kong MD   naproxen (NAPROSYN) 500 MG tablet Take 1 tablet by mouth 2 times daily 9/16/17  Yes Tennille Ng MD   aspirin 81 MG tablet Take 81 mg by mouth daily   Yes Historical Provider, MD   insulin glargine (LANTUS) 100 UNIT/ML injection vial Inject 35 Units into the skin every morning    Yes Historical Provider, MD   insulin lispro (HUMALOG KWIKPEN) 100 UNIT/ML pen Inject 10 Units into the skin 3 times daily (before meals). 1/27/15  Yes Lakshmi Fournier MD   OXcarbazepine (TRILEPTAL) 300 MG tablet Take 300 mg by mouth 2 times daily Per pharmacy she is to be taking 300 mg twice daily but patient states that she only takes once daily.    Yes Historical Provider, MD   carvedilol (COREG) 3.125 MG tablet Take 3.125 mg by mouth 2 times daily (with meals)    Carol Merlos,    senna-docusate (PERICOLACE) 8.6-50 MG per tablet Take 1 tablet by mouth daily    Historical Provider, MD   metoprolol tartrate (LOPRESSOR) 25 MG tablet Take 1 tablet by mouth 2 times daily 8/18/17   Abbi Jimenez MD   OLANZapine (ZYPREXA) 10 MG tablet Take 1 tablet by mouth nightly 11/30/16   Antonio Beltran MD   colchicine (COLCRYS) 0.6 MG tablet Take 1 tablet by mouth daily 7/15/16   Spenser Apo P Blood, DO   famotidine (PEPCID) 20 MG tablet Take 20 mg by mouth daily.     Historical Provider, MD   .  Recent Surgical History: None = 0     Assessment  Pt has refused all treatments last 24 hours, will change to PRN  Peak Flow (asthma only)      RR 18  Breath Sounds: clear      · Bronchodilator assessment at level  1  · Hyperinflation assessment at level   · Secretion Management assessment at level    ·   · [x]    Bronchodilator Assessment  BRONCHODILATOR ASSESSMENT SCORE  Score 0 1 2 3 4 5   Breath Sounds   [x]  Patient Baseline [x]  No Wheeze good aeration []  Faint, scattered wheezing, good aeration []  Expiratory Wheezing and or moderately diminished []  Insp/Exp wheeze and/or very diminished []  Insp/Exp and/ or marked distress   Respiratory Rate   [x]  Patient Baseline [x]  Less than 20 [x]  Less than 20 []  20-25 []  Greater than 25 []  Greater than 25   Peak flow % of Pred or PB [x]  NA   []  Greater than 90%  []  81-90% []  71-80% []  Less than or equal to 70%  or unable to perform []  Unable due to Respiratory Distress   Dyspnea re []  Patient Baseline [x]  No SOB [x]  No SOB []  SOB on exertion []  SOB min activity []  At rest/acute   e FEV% Predicted       [x]  NA []  Above 69%  []  Unable []  Above 60-69%  []  Unable []  Above 50-59%  []  Unable []  Above 35-49%  []  Unable []  Less than 35%  []  Unable

## 2017-10-24 NOTE — PROGRESS NOTES
Physical Therapy  DATE: 10/24/2017    NAME: George Sandoval  MRN: [id number]   : 1955    Patient not seen this date for Physical Therapy due to:  [] Blood transfusion in progress  [] Hemodialysis  [x]  Patient Declined- pt kept blanket over head during entire encounter. Explained to pt the importance of mobility, only thing pt stated was \"too early\" (time was 11:30am). Pt then ignored writer.   [] Spine Precautions   [] Strict Bedrest  [] Surgery/ Procedure  [] Testing      [] Other        [] PT being discontinued at this time. Patient independent. No further needs. [] PT being discontinued at this time as the patient has been transferred to palliative care. No further needs.     Beto Odonnell, PT

## 2017-10-24 NOTE — PLAN OF CARE
Problem: RESPIRATORY  Intervention: Respiratory assessment  BRONCHOSPASM/BRONCHOCONSTRICTION   [x]  IMPROVE AERATION/BREATH SOUNDS  [x]   ADMINISTER BRONCHODILATOR THERAPY AS APPROPRIATE  [x]   ASSESS BREATH SOUNDS  [x]   IMPLEMENT AEROSOL/MDI PROTOCOL  [x]   PATIENT EDUCATION AS NEEDED

## 2017-10-25 LAB
GLUCOSE BLD-MCNC: 112 MG/DL (ref 65–105)
GLUCOSE BLD-MCNC: 128 MG/DL (ref 65–105)
GLUCOSE BLD-MCNC: 147 MG/DL (ref 65–105)
GLUCOSE BLD-MCNC: 267 MG/DL (ref 65–105)
GLUCOSE BLD-MCNC: 74 MG/DL (ref 65–105)

## 2017-10-25 PROCEDURE — 6370000000 HC RX 637 (ALT 250 FOR IP): Performed by: NURSE PRACTITIONER

## 2017-10-25 PROCEDURE — 6360000002 HC RX W HCPCS: Performed by: NURSE PRACTITIONER

## 2017-10-25 PROCEDURE — 6360000002 HC RX W HCPCS: Performed by: INTERNAL MEDICINE

## 2017-10-25 PROCEDURE — 1200000000 HC SEMI PRIVATE

## 2017-10-25 PROCEDURE — 2580000003 HC RX 258: Performed by: INTERNAL MEDICINE

## 2017-10-25 PROCEDURE — 99232 SBSQ HOSP IP/OBS MODERATE 35: CPT | Performed by: INTERNAL MEDICINE

## 2017-10-25 PROCEDURE — S0028 INJECTION, FAMOTIDINE, 20 MG: HCPCS | Performed by: NURSE PRACTITIONER

## 2017-10-25 PROCEDURE — 6370000000 HC RX 637 (ALT 250 FOR IP): Performed by: INTERNAL MEDICINE

## 2017-10-25 PROCEDURE — 2500000003 HC RX 250 WO HCPCS: Performed by: NURSE PRACTITIONER

## 2017-10-25 PROCEDURE — 82947 ASSAY GLUCOSE BLOOD QUANT: CPT

## 2017-10-25 RX ORDER — FAMOTIDINE 20 MG/1
20 TABLET, FILM COATED ORAL 2 TIMES DAILY
Status: DISCONTINUED | OUTPATIENT
Start: 2017-10-25 | End: 2017-10-27 | Stop reason: HOSPADM

## 2017-10-25 RX ORDER — METOPROLOL TARTRATE 5 MG/5ML
5 INJECTION INTRAVENOUS EVERY 4 HOURS PRN
Status: DISCONTINUED | OUTPATIENT
Start: 2017-10-25 | End: 2017-10-27 | Stop reason: HOSPADM

## 2017-10-25 RX ADMIN — ONDANSETRON 4 MG: 2 INJECTION, SOLUTION INTRAMUSCULAR; INTRAVENOUS at 10:50

## 2017-10-25 RX ADMIN — CARVEDILOL 3.12 MG: 3.12 TABLET, FILM COATED ORAL at 17:26

## 2017-10-25 RX ADMIN — ATORVASTATIN CALCIUM 20 MG: 20 TABLET, FILM COATED ORAL at 08:59

## 2017-10-25 RX ADMIN — OXCARBAZEPINE 300 MG: 300 TABLET, FILM COATED ORAL at 22:05

## 2017-10-25 RX ADMIN — COLCHICINE 0.6 MG: 0.6 TABLET, FILM COATED ORAL at 08:59

## 2017-10-25 RX ADMIN — CARVEDILOL 3.12 MG: 3.12 TABLET, FILM COATED ORAL at 08:59

## 2017-10-25 RX ADMIN — LURASIDONE HYDROCHLORIDE 60 MG: 40 TABLET, FILM COATED ORAL at 08:57

## 2017-10-25 RX ADMIN — ASPIRIN 81 MG 81 MG: 81 TABLET ORAL at 08:57

## 2017-10-25 RX ADMIN — FAMOTIDINE 20 MG: 10 INJECTION, SOLUTION INTRAVENOUS at 08:59

## 2017-10-25 RX ADMIN — LISINOPRIL 5 MG: 5 TABLET ORAL at 08:59

## 2017-10-25 RX ADMIN — SODIUM CHLORIDE 1000 MG: 9 INJECTION, SOLUTION INTRAVENOUS at 17:45

## 2017-10-25 RX ADMIN — ENOXAPARIN SODIUM 40 MG: 40 INJECTION SUBCUTANEOUS at 09:00

## 2017-10-25 RX ADMIN — INSULIN LISPRO 9 UNITS: 100 INJECTION, SOLUTION INTRAVENOUS; SUBCUTANEOUS at 17:26

## 2017-10-25 RX ADMIN — OXCARBAZEPINE 300 MG: 300 TABLET, FILM COATED ORAL at 08:59

## 2017-10-25 ASSESSMENT — PAIN SCALES - GENERAL
PAINLEVEL_OUTOF10: 5
PAINLEVEL_OUTOF10: 8
PAINLEVEL_OUTOF10: 6
PAINLEVEL_OUTOF10: 4
PAINLEVEL_OUTOF10: 5

## 2017-10-25 ASSESSMENT — PAIN DESCRIPTION - LOCATION
LOCATION: ABDOMEN

## 2017-10-25 NOTE — PLAN OF CARE
Problem: Falls - Risk of  Goal: Absence of falls  Outcome: Ongoing      Problem: Pain:  Goal: Pain level will decrease  Pain level will decrease   Outcome: Ongoing    Goal: Control of acute pain  Control of acute pain   Outcome: Ongoing    Goal: Control of chronic pain  Control of chronic pain   Outcome: Ongoing      Problem: Risk for Impaired Skin Integrity  Goal: Tissue integrity - skin and mucous membranes  Structural intactness and normal physiological function of skin and  mucous membranes.    Outcome: Ongoing

## 2017-10-25 NOTE — PROGRESS NOTES
Manisha Montenegro 20    Progress Note    10/24/2017    9:04 PM    Name:   Kaylene Curry  MRN:     [id number]     Acct:      [de-identified]   Room:   [facility] Day:  1  Admit Date:  10/22/2017  3:16 PM    PCP:   Shaun Fernandez MD  Code Status:  Full Code    Subjective:     C/C:   Chief Complaint   Patient presents with    Abdominal Pain     umbilical region, seen at [facility] yesterday, pt states \"I have an infection in my stomach, I know I do. \"    Nausea    Emesis     Interval History Status: improved. Patient despondent today. Little response to questions. Denies nausea or vomiting. Says her stomach \"still hurts\". I reviewed new lab results, imaging, and vitals summary from the past 24 hours. Also, I spoke with the RN caring for patient. Also, I reviewed all nursing/consult/specialty notes and addressed any concerns that may have been brought to light by Consultants, RNs, , or Social Workers. Brief History:     The patient is a 65 y.o. Non-/non  female who presents with Abdominal Pain (umbilical region, seen at [facility] yesterday, pt states \"I have an infection in my stomach, I know I do.\"); Nausea; and Emesis   and she is admitted to the hospital for the management of Acute diverticulitis.      She has the following significant co-morbidities: IDDM type 2, HTN, previous CVA with residual left-sided weakness, CAD s/p BMS stent placement on plavix and Ischemic CM s/p AICD placement in March of 2017.     She presents to the ED with intractable abdominal pain, nausea and vomiting. She believes ? Mr. Nathaly Suarez? Has implanted an abdominal infection in her again. Patient does not elaborate when asked for details? Otherwise, patient is fully oriented.      On evaluation, CT Abd Pelvis revealed Acute diverticulitis of the sigmoid colon. Labs revealed DHARMESH, with elevated creatinine.      She was admitted for further care. Review of Systems:     Constitutional:  negative for chills, fevers, sweats  Respiratory:  negative for cough, dyspnea on exertion, hemoptysis, shortness of breath, wheezing  Cardiovascular:  negative for chest pain, chest pressure/discomfort, lower extremity edema, palpitations  Gastrointestinal:  negative for constipation, diarrhea, nausea, vomiting. +abdominal pain  Neurological:  negative for dizziness, headache    Medications: Allergies: Allergies   Allergen Reactions    Levofloxacin Hives    Pcn [Penicillins] Hives       Current Meds:   Scheduled Meds:    aspirin  81 mg Oral Daily    atorvastatin  20 mg Oral Daily    carvedilol  3.125 mg Oral BID WC    colchicine  0.6 mg Oral Daily    insulin lispro  10 Units Subcutaneous TID AC    lisinopril  5 mg Oral Daily    lurasidone  60 mg Oral Daily    OLANZapine  10 mg Oral Nightly    OXcarbazepine  300 mg Oral BID    sodium chloride flush  10 mL Intravenous 2 times per day    enoxaparin  40 mg Subcutaneous Daily    famotidine (PEPCID) injection  20 mg Intravenous BID    insulin lispro  0-18 Units Subcutaneous Q4H    insulin glargine  36 Units Subcutaneous QAM    ertapenem (INVANZ) IVPB  1 g Intravenous Q24H     Continuous Infusions:    dextrose      sodium chloride 75 mL/hr at 10/23/17 1230     PRN Meds: ipratropium-albuterol, albuterol, sodium chloride flush, potassium chloride **OR** potassium chloride **OR** potassium chloride, magnesium sulfate, bisacodyl, ondansetron, nicotine, glucose, dextrose, glucagon (rDNA), dextrose, HYDROmorphone **OR** HYDROmorphone, diphenhydrAMINE    Data:     Past Medical History:   has a past medical history of Arthritis; Asthma; CAD (coronary artery disease); [facility]est[facility] Clinical trial participant at discharge; COPD (chronic obstructive pulmonary disease) ([facility] Diabetes mellitus ([facility] Hepatitis C; Hyperlipidemia;  Hypertension; Pneumonia; and Unspecified cerebral artery occlusion with cerebral infarction. Social History:   reports that she has been smoking. She has been smoking about 1.00 pack per day. She has never used smokeless tobacco. She reports that she drinks alcohol. She reports that she does not use drugs. Family History:   Family History   Problem Relation Age of Onset    Family history unknown: Yes       Vitals:  BP (!) 159/78   Pulse 91   Temp 97.7 °F (36.5 °C) (Oral)   Resp 16   Ht 5' (1.524 m)   Wt 173 lb (78.5 kg)   LMP  (LMP Unknown)   SpO2 98%   BMI 33.79 kg/m²   Temp (24hrs), Av.1 °F (36.7 °C), Min:97.6 °F (36.4 °C), Max:98.6 °F (37 °C)    Recent Labs      10/24/17   0649  10/24/17   1116  10/24/17   1359  10/24/17   1637   POCGLU  119*  187*  180*  194*       I/O (24Hr): Intake/Output Summary (Last 24 hours) at 10/24/17 210  Last data filed at 10/24/17 0650   Gross per 24 hour   Intake              400 ml   Output                3 ml   Net              397 ml       Labs:    Hematology:  Recent Labs      10/22/17   1849  10/24/17   0649   WBC  18.5*  9.2   RBC  4.55  3.96*   HGB  11.2*  9.7*   HCT  35.0*  30.6*   MCV  76.8*  77.3*   MCH  24.6*  24.5*   MCHC  32.1  31.8   RDW  16.3*  16.0*   PLT  Platelet clumps present, count appears adequate.   280   MPV  NOT REPORTED  7.5     Chemistry:  Recent Labs      10/22/17   1849  10/22/17   2204  10/24/17   0649   NA  136   --   139   K  4.8   --   4.6   CL  96*   --   102   CO2  26   --   24   GLUCOSE  166*   --   125*   BUN  18   --   13   CREATININE  1.31*   --   0.82   MG   --    --   2.3   ANIONGAP  14   --   13   LABGLOM  41*   --   >60   GFRAA  50*   --   >60   CALCIUM  9.0   --   8.6   LACTACIDWB   --   0.8   --      Recent Labs      10/22/17   1849   10/23/17   2159  10/24/17   0214  10/24/17   0649  10/24/17   1116  10/24/17   1359  10/24/17   1637   PROT  8.0   --    --    --   6.7   --    --    --    LABALBU  3.7   --    --    --   3.0*   --    --    --    LABA1C  6.8*   --    --

## 2017-10-25 NOTE — PROGRESS NOTES
Manisha Montenegro 20    Progress Note    10/25/2017    8:06 AM    Name:   Landy   MRN:     [id number]     Alainaide:      [de-identified]   Room:   [address] [place] [address] Day:  2  Admit Date:  10/22/2017  3:16 PM    PCP:   Deb White MD  Code Status:  Full Code    Subjective:     C/C:   Chief Complaint   Patient presents with    Abdominal Pain     umbilical region, seen at [facility] yesterday, pt states \"I have an infection in my stomach, I know I do. \"    Nausea    Emesis     Interval History Status: improved. Patient occasionally cooperative and able to answer questions appropriately, denying nausea, vomiting, abdominal pain. But at other times, she is seemingly experiencing delusions and saying someone is making her sick, and throwing her blanket over her head. Concerned about patient's ability to care for herself once discharged and safety at home. Reaching out to Dr. Alta Hooks, psychiatrist, for help. I reviewed new lab results, imaging, and vitals summary from the past 24 hours. Also, I spoke with the RN caring for patient. Also, I reviewed all nursing/consult/specialty notes and addressed any concerns that may have been brought to light by Consultants, RNs, , or Social Workers. Brief History:     The patient is a 65 y.o. Non-/non  female who presents with Abdominal Pain (umbilical region, seen at [facility] yesterday, pt states \"I have an infection in my stomach, I know I do.\"); Nausea; and Emesis   and she is admitted to the hospital for the management of Acute diverticulitis.      She has the following significant co-morbidities: IDDM type 2, HTN, previous CVA with residual left-sided weakness, CAD s/p BMS stent placement on plavix and Ischemic CM s/p AICD placement in March of 2017.     She presents to the ED with intractable abdominal pain, nausea and vomiting. She believes ? Mr. Brian Au?  Has implanted an abdominal infection in her again. Patient does not elaborate when asked for details? Otherwise, patient is fully oriented.      On evaluation, CT Abd Pelvis revealed Acute diverticulitis of the sigmoid colon. Labs revealed DHARMESH, with elevated creatinine.      She was admitted for further care. Review of Systems:     Constitutional:  negative for chills, fevers, sweats  Respiratory:  negative for cough, dyspnea on exertion, hemoptysis, shortness of breath, wheezing  Cardiovascular:  negative for chest pain, chest pressure/discomfort, lower extremity edema, palpitations  Gastrointestinal:  negative for constipation, diarrhea, nausea, vomiting. +abdominal pain  Neurological:  negative for dizziness, headache    Medications: Allergies:     Allergies   Allergen Reactions    Levofloxacin Hives    Pcn [Penicillins] Hives       Current Meds:   Scheduled Meds:    aspirin  81 mg Oral Daily    atorvastatin  20 mg Oral Daily    carvedilol  3.125 mg Oral BID WC    colchicine  0.6 mg Oral Daily    insulin lispro  10 Units Subcutaneous TID AC    lisinopril  5 mg Oral Daily    lurasidone  60 mg Oral Daily    OLANZapine  10 mg Oral Nightly    OXcarbazepine  300 mg Oral BID    sodium chloride flush  10 mL Intravenous 2 times per day    enoxaparin  40 mg Subcutaneous Daily    famotidine (PEPCID) injection  20 mg Intravenous BID    insulin lispro  0-18 Units Subcutaneous Q4H    insulin glargine  36 Units Subcutaneous QAM    ertapenem (INVANZ) IVPB  1 g Intravenous Q24H     Continuous Infusions:    dextrose      sodium chloride 75 mL/hr at 10/23/17 1230     PRN Meds: ipratropium-albuterol, albuterol, sodium chloride flush, potassium chloride **OR** potassium chloride **OR** potassium chloride, magnesium sulfate, bisacodyl, ondansetron, nicotine, glucose, dextrose, glucagon (rDNA), dextrose, HYDROmorphone **OR** HYDROmorphone, diphenhydrAMINE    Data:     Past Medical History:   has a past medical history of 10/25/17   0209  10/25/17   0629   PROT  8.0   --   6.7   --    --    --    --    --    --    --    LABALBU  3.7   --   3.0*   --    --    --    --    --    --    --    LABA1C  6.8*   --    --    --    --    --    --    --    --    --    AST  22   --   8   --    --    --    --    --    --    --    ALT  17   --   7   --    --    --    --    --    --    --    ALKPHOS  116*   --   92   --    --    --    --    --    --    --    BILITOT  0.49   --   <0.10*   --    --    --    --    --    --    --    LIPASE  17   --    --    --    --    --    --    --    --    --    POCGLU   --    < >  119*   < >  180*  194*  158*  143*  112*  74    < > = values in this interval not displayed. Lab Results   Component Value Date/Time    SPECIAL NOT REPORTED 10/22/2017 04:15 PM     Lab Results   Component Value Date/Time    CULTURE (A) 10/22/2017 04:15 PM     STREPTOCOCCI, BETA HEMOLYTIC GROUP B 10 to 50,000 CFU/ML    CULTURE  10/22/2017 04:15 PM     [facility] [id number] [place] [address] [phone]       No results found for: POCPH, PHART, PH, POCPCO2, RSE4CZV, PCO2, POCPO2, PO2ART, PO2, POCHCO3, AYL8EHX, HCO3, NBEA, PBEA, BEART, BE, THGBART, THB, DKY7QYM, KJVT1JRQ, O6VHKRTI, O2SAT, FIO2    Radiology:    EXAMINATION: ACUTE ABDOMINAL SERIES   10/22/2017 5:03 pm  Impression Scattered gas in the jejunal loops suggesting mild degree of adynamic small bowel ileus.     EXAMINATION: CT OF THE ABDOMEN AND PELVIS WITHOUT CONTRAST 10/22/2017 10:52 pm  Impression Mild acute sigmoid colon diverticulitis.  No abscess or perforation is evident. Moderate diverticulosis is noted.   Hysterectomy.   Small hiatal hernia.     Physical Examination:        General appearance:  alert, cooperative and no distress  Mental Status:  oriented to person, place and time and normal affect  Lungs:  clear to auscultation bilaterally, normal effort  Heart:  regular rate and rhythm, no murmur  Abdomen:  soft, nondistended, normal bowel sounds, no

## 2017-10-26 LAB
GLUCOSE BLD-MCNC: 105 MG/DL (ref 65–105)
GLUCOSE BLD-MCNC: 170 MG/DL (ref 65–105)
GLUCOSE BLD-MCNC: 206 MG/DL (ref 65–105)
GLUCOSE BLD-MCNC: 230 MG/DL (ref 65–105)
GLUCOSE BLD-MCNC: 95 MG/DL (ref 65–105)

## 2017-10-26 PROCEDURE — 6360000002 HC RX W HCPCS: Performed by: INTERNAL MEDICINE

## 2017-10-26 PROCEDURE — 2500000003 HC RX 250 WO HCPCS: Performed by: NURSE PRACTITIONER

## 2017-10-26 PROCEDURE — 97162 PT EVAL MOD COMPLEX 30 MIN: CPT

## 2017-10-26 PROCEDURE — 6370000000 HC RX 637 (ALT 250 FOR IP): Performed by: INTERNAL MEDICINE

## 2017-10-26 PROCEDURE — 99232 SBSQ HOSP IP/OBS MODERATE 35: CPT | Performed by: INTERNAL MEDICINE

## 2017-10-26 PROCEDURE — G8979 MOBILITY GOAL STATUS: HCPCS

## 2017-10-26 PROCEDURE — 6370000000 HC RX 637 (ALT 250 FOR IP): Performed by: NURSE PRACTITIONER

## 2017-10-26 PROCEDURE — G8978 MOBILITY CURRENT STATUS: HCPCS

## 2017-10-26 PROCEDURE — 82947 ASSAY GLUCOSE BLOOD QUANT: CPT

## 2017-10-26 PROCEDURE — 1200000000 HC SEMI PRIVATE

## 2017-10-26 PROCEDURE — 97530 THERAPEUTIC ACTIVITIES: CPT

## 2017-10-26 PROCEDURE — 6360000002 HC RX W HCPCS: Performed by: NURSE PRACTITIONER

## 2017-10-26 PROCEDURE — 2580000003 HC RX 258: Performed by: INTERNAL MEDICINE

## 2017-10-26 RX ORDER — SULFAMETHOXAZOLE AND TRIMETHOPRIM 800; 160 MG/1; MG/1
1 TABLET ORAL 2 TIMES DAILY
Qty: 14 TABLET | Refills: 0 | Status: SHIPPED | OUTPATIENT
Start: 2017-10-26 | End: 2017-11-02

## 2017-10-26 RX ORDER — IPRATROPIUM BROMIDE AND ALBUTEROL SULFATE 2.5; .5 MG/3ML; MG/3ML
1 SOLUTION RESPIRATORY (INHALATION) EVERY 4 HOURS PRN
Qty: 360 ML | Refills: 2 | Status: ON HOLD | OUTPATIENT
Start: 2017-10-26 | End: 2017-12-18

## 2017-10-26 RX ORDER — SULFAMETHOXAZOLE AND TRIMETHOPRIM 800; 160 MG/1; MG/1
1 TABLET ORAL EVERY 12 HOURS SCHEDULED
Status: DISCONTINUED | OUTPATIENT
Start: 2017-10-26 | End: 2017-10-27 | Stop reason: HOSPADM

## 2017-10-26 RX ADMIN — OXCARBAZEPINE 300 MG: 300 TABLET, FILM COATED ORAL at 10:01

## 2017-10-26 RX ADMIN — SULFAMETHOXAZOLE AND TRIMETHOPRIM 1 TABLET: 800; 160 TABLET ORAL at 20:36

## 2017-10-26 RX ADMIN — INSULIN LISPRO 6 UNITS: 100 INJECTION, SOLUTION INTRAVENOUS; SUBCUTANEOUS at 13:30

## 2017-10-26 RX ADMIN — CARVEDILOL 3.12 MG: 3.12 TABLET, FILM COATED ORAL at 18:47

## 2017-10-26 RX ADMIN — ATORVASTATIN CALCIUM 20 MG: 20 TABLET, FILM COATED ORAL at 10:02

## 2017-10-26 RX ADMIN — METOPROLOL TARTRATE 5 MG: 5 INJECTION INTRAVENOUS at 02:21

## 2017-10-26 RX ADMIN — INSULIN LISPRO 3 UNITS: 100 INJECTION, SOLUTION INTRAVENOUS; SUBCUTANEOUS at 18:47

## 2017-10-26 RX ADMIN — OLANZAPINE 10 MG: 10 TABLET, FILM COATED ORAL at 20:36

## 2017-10-26 RX ADMIN — LISINOPRIL 5 MG: 5 TABLET ORAL at 10:02

## 2017-10-26 RX ADMIN — CARVEDILOL 3.12 MG: 3.12 TABLET, FILM COATED ORAL at 10:02

## 2017-10-26 RX ADMIN — INSULIN GLARGINE 36 UNITS: 100 INJECTION, SOLUTION SUBCUTANEOUS at 10:02

## 2017-10-26 RX ADMIN — LURASIDONE HYDROCHLORIDE 60 MG: 40 TABLET, FILM COATED ORAL at 10:01

## 2017-10-26 RX ADMIN — INSULIN LISPRO 6 UNITS: 100 INJECTION, SOLUTION INTRAVENOUS; SUBCUTANEOUS at 20:40

## 2017-10-26 RX ADMIN — OXCARBAZEPINE 300 MG: 300 TABLET, FILM COATED ORAL at 20:36

## 2017-10-26 RX ADMIN — ENOXAPARIN SODIUM 40 MG: 40 INJECTION SUBCUTANEOUS at 10:02

## 2017-10-26 RX ADMIN — COLCHICINE 0.6 MG: 0.6 TABLET, FILM COATED ORAL at 10:02

## 2017-10-26 RX ADMIN — FAMOTIDINE 20 MG: 20 TABLET, FILM COATED ORAL at 20:36

## 2017-10-26 RX ADMIN — ASPIRIN 81 MG 81 MG: 81 TABLET ORAL at 10:02

## 2017-10-26 RX ADMIN — FAMOTIDINE 20 MG: 20 TABLET, FILM COATED ORAL at 10:02

## 2017-10-26 ASSESSMENT — PAIN SCALES - GENERAL
PAINLEVEL_OUTOF10: 0
PAINLEVEL_OUTOF10: 6

## 2017-10-26 ASSESSMENT — PAIN DESCRIPTION - LOCATION: LOCATION: LEG;ARM

## 2017-10-26 ASSESSMENT — PAIN DESCRIPTION - PAIN TYPE: TYPE: CHRONIC PAIN

## 2017-10-26 ASSESSMENT — PAIN DESCRIPTION - ORIENTATION: ORIENTATION: LEFT

## 2017-10-26 NOTE — PLAN OF CARE
Problem: Risk for Impaired Skin Integrity  Goal: Tissue integrity - skin and mucous membranes  Structural intactness and normal physiological function of skin and  mucous membranes.      Intervention: SKIN ASSESSMENT  IV site intact, no drainage or redness noted on assessment, pt able to get up with assistance per cane, no open skin areas noted on assessment

## 2017-10-26 NOTE — PLAN OF CARE
Problem: Falls - Risk of  Goal: Absence of falls  Pt able to call out for assistance as needed, pt uses cane at bedside, history of CVA and left sided weakness, bed in lowest position at all times, call light in reach at all times

## 2017-10-26 NOTE — PROGRESS NOTES
Physical Therapy    Facility/Department: [facility] RENAL//MED SURG  Initial Assessment    NAME: Lj Buckner  : 1955  MRN: [id number]    Date of Service: 10/26/2017  The patient is a 65 y.o. Non-/non  female who presents with Abdominal Pain (umbilical region, seen at [facility] yesterday, pt states \"I have an infection in my stomach, I know I do.\"); Nausea; and Emesis   and she is admitted to the hospital for the management of Acute diverticulitis.      She has the following significant co-morbidities: IDDM type 2, HTN, previous CVA with residual left-sided weakness, CAD s/p BMS stent placement on plavix and Ischemic CM s/p AICD placement in 2017.     She presents to the ED with intractable abdominal pain, nausea and vomiting. She believes ? Mr. Hussein Davis? Has implanted an abdominal infection in her again. Patient does not elaborate when asked for details? Otherwise, patient is fully oriented.      On evaluation, CT Abd Pelvis revealed Acute diverticulitis of the sigmoid colon. Labs revealed DHARMESH, with elevated creatinine.      She was admitted for further care. Patient Diagnosis(es): The primary encounter diagnosis was Acute renal failure, unspecified acute renal failure type ([address] Diagnoses of Intractable vomiting with nausea, unspecified vomiting type and Diverticulitis of sigmoid colon were also pertinent to this visit. has a past medical history of Arthritis; Asthma; CAD (coronary artery disease); CHF (congestive heart failure) ([address] Clinical trial participant at discharge; COPD (chronic obstructive pulmonary disease) ([address] Diabetes mellitus ([address] Hepatitis C; Hyperlipidemia; Hypertension; Pneumonia; and Unspecified cerebral artery occlusion with cerebral infarction. has a past surgical history that includes Tonsillectomy; Foot surgery; Cardiac surgery (2016); and Cardiac pacemaker placement.     Restrictions  Restrictions/Precautions  Restrictions/Precautions: Contact Precautions, touched  Strength RUE  Strength RUE: WFL  Strength LUE  Comment: Unable to assess  Motor Control  Gross Motor?: WFL (With RUE)     Bed mobility  Supine to Sit: Supervision. HOB was raised. To EOB with difficulty but patient dd not want assist.  Sit to Supine: Supervision  Scooting: Supervision  Transfers  Sit to Stand: Supervision  Stand to sit: Supervision  Ambulation  Ambulation?: Yes  Ambulation 1  Surface: level tile  Device: Large Anthony Gresham  Assistance: Supervision  Quality of Gait: Step to gait pattern. Unsteady  Distance: 10 ft to bathoom and back. Pt with c/o dizziness. Pt rolled x 6 for placement of brief. Independent to left. Min A to right. Balance  Sitting - Static: Good  Sitting - Dynamic: Good  Standing - Static: Fair (+)   Standing - Dynamic: Fair    Assessment   Body structures, Functions, Activity limitations: Decreased functional mobility ; Decreased endurance;Decreased balance  Prognosis: Good  Decision Making: Medium Complexity  Patient Education: PT POC  REQUIRES PT FOLLOW UP: Yes  Activity Tolerance  Activity Tolerance: Patient limited by pain; Patient limited by endurance; Patient limited by fatigue  PT Equipment Recommendations  Equipment Needed: No     Discharge Recommendations:  Continue to assess pending progress, Home with nursing aide      Plan   Plan  Times per week: 5x/week  Current Treatment Recommendations: Balance Training, Functional Mobility Training, Transfer Training, Gait Training, Endurance Training, Safety Education & Training  Safety Devices  Type of devices: Nurse notified, Call light within reach, Left in bed    G-Code  PT G-Codes  Functional Assessment Tool Used: [place] Tool  Score: 16  Functional Limitation: Mobility: Walking and moving around  Mobility: Walking and Moving Around Current Status (): At least 40 percent but less than 60 percent impaired, limited or restricted  Mobility: Walking and Moving Around Goal Status ():  At least 1 percent but less

## 2017-10-26 NOTE — PROGRESS NOTES
Smoking Cessation - topics covered   []  Health Risks  []  Benefits of Quitting   []  Smoking Cessation  []  Patient has no history of tobacco use  []  Patient is former smoker. Patient quit in   []  No need for tobacco cessation education. []  Booklet given  []  Patient verbalizes understanding. [x]  Patient denies need for tobacco cessation education.   Ya Casillas  1:01 PM

## 2017-10-26 NOTE — PROGRESS NOTES
Manisha Montenegro 20    Progress Note    10/26/2017    7:27 AM    Name:   Kaylene Curry  MRN:     [id number]     Jovitalyside:      [de-identified]   Room:   [address] [place] [address] Day:  3  Admit Date:  10/22/2017  3:16 PM    PCP:   Shaun Fernandez MD  Code Status:  Full Code    Subjective:     C/C:   Chief Complaint   Patient presents with    Abdominal Pain     umbilical region, seen at [facility] yesterday, pt states \"I have an infection in my stomach, I know I do. \"    Nausea    Emesis     Interval History Status: improved. Concerned about patient's ability to care for herself at home. Spoke with Dr. Cady Shine yesterday, Psychiatrist. Per Dr. Cady Shine, she is not a candidate for inpatient psychiatric rehabilitation as she is not a harm to herself or others and can still make decisions for herself, despite her delusions. CM working to get home health arranged for her. I reviewed new lab results, imaging, and vitals summary from the past 24 hours. Also, I spoke with the RN caring for patient. Also, I reviewed all nursing/consult/specialty notes and addressed any concerns that may have been brought to light by Consultants, RNs, , or Social Workers. Brief History:     The patient is a 65 y.o. Non-/non  female who presents with Abdominal Pain (umbilical region, seen at [facility] yesterday, pt states \"I have an infection in my stomach, I know I do.\"); Nausea; and Emesis   and she is admitted to the hospital for the management of Acute diverticulitis.      She has the following significant co-morbidities: IDDM type 2, HTN, previous CVA with residual left-sided weakness, CAD s/p BMS stent placement on plavix and Ischemic CM s/p AICD placement in March of 2017.     She presents to the ED with intractable abdominal pain, nausea and vomiting. She believes ? Mr. Nathaly Suarez? Has implanted an abdominal infection in her again.  Patient does not elaborate when asked for details? Otherwise, patient is fully oriented.      On evaluation, CT Abd Pelvis revealed Acute diverticulitis of the sigmoid colon. Labs revealed DHARMESH, with elevated creatinine.      She was admitted for further care. Review of Systems:     Constitutional:  negative for chills, fevers, sweats  Respiratory:  negative for cough, dyspnea on exertion, hemoptysis, shortness of breath, wheezing  Cardiovascular:  negative for chest pain, chest pressure/discomfort, lower extremity edema, palpitations  Gastrointestinal:  negative for constipation, diarrhea, nausea, vomiting. +abdominal pain  Neurological:  negative for dizziness, headache    Medications: Allergies: Allergies   Allergen Reactions    Levofloxacin Hives    Pcn [Penicillins] Hives       Current Meds:   Scheduled Meds:    famotidine  20 mg Oral BID    aspirin  81 mg Oral Daily    atorvastatin  20 mg Oral Daily    carvedilol  3.125 mg Oral BID WC    colchicine  0.6 mg Oral Daily    insulin lispro  10 Units Subcutaneous TID AC    lisinopril  5 mg Oral Daily    lurasidone  60 mg Oral Daily    OLANZapine  10 mg Oral Nightly    OXcarbazepine  300 mg Oral BID    sodium chloride flush  10 mL Intravenous 2 times per day    enoxaparin  40 mg Subcutaneous Daily    insulin lispro  0-18 Units Subcutaneous Q4H    insulin glargine  36 Units Subcutaneous QAM    ertapenem (INVANZ) IVPB  1 g Intravenous Q24H     Continuous Infusions:    dextrose      sodium chloride 75 mL/hr at 10/23/17 1230     PRN Meds: metoprolol, ipratropium-albuterol, albuterol, sodium chloride flush, potassium chloride **OR** potassium chloride **OR** potassium chloride, magnesium sulfate, bisacodyl, ondansetron, nicotine, glucose, dextrose, glucagon (rDNA), dextrose, HYDROmorphone **OR** HYDROmorphone, diphenhydrAMINE    Data:     Past Medical History:   has a past medical history of Arthritis;  Asthma; CAD (coronary artery disease); CHF (congestive heart failure) ([facility] Clinical trial participant at discharge; COPD (chronic obstructive pulmonary disease) ([facility] Diabetes mellitus ([facility] Hepatitis C; Hyperlipidemia; Hypertension; Pneumonia; and Unspecified cerebral artery occlusion with cerebral infarction. Social History:   reports that she has been smoking. She has been smoking about 1.00 pack per day. She has never used smokeless tobacco. She reports that she drinks alcohol. She reports that she does not use drugs. Family History:   Family History   Problem Relation Age of Onset    Family history unknown: Yes       Vitals:  BP (!) 160/98   Pulse 90   Temp 98 °F (36.7 °C) (Oral)   Resp 17   Ht 5' (1.524 m)   Wt 173 lb (78.5 kg)   LMP  (LMP Unknown)   SpO2 100%   BMI 33.79 kg/m²   Temp (24hrs), Av.4 °F (36.3 °C), Min:96.5 °F (35.8 °C), Max:98 °F (36.7 °C)    Recent Labs      10/25/17   1116  10/25/17   1633  10/25/17   2154  10/26/17   0354   POCGLU  128*  267*  147*  95       I/O (24Hr):   No intake or output data in the 24 hours ending 10/26/17 0727    Labs:    Hematology:  Recent Labs      10/24/17   0649   WBC  9.2   RBC  3.96*   HGB  9.7*   HCT  30.6*   MCV  77.3*   MCH  24.5*   MCHC  31.8   RDW  16.0*   PLT  280   MPV  7.5     Chemistry:  Recent Labs      10/24/17   0649   NA  139   K  4.6   CL  102   CO2  24   GLUCOSE  125*   BUN  13   CREATININE  0.82   MG  2.3   ANIONGAP  13   LABGLOM  >60   GFRAA  >60   CALCIUM  8.6     Recent Labs      10/24/17   0649   10/25/17   0209  10/25/17   0629  10/25/17   1116  10/25/17   1633  10/25/17   2154  10/26/17   0354   PROT  6.7   --    --    --    --    --    --    --    LABALBU  3.0*   --    --    --    --    --    --    --    AST  8   --    --    --    --    --    --    --    ALT  7   --    --    --    --    --    --    --    ALKPHOS  92   --    --    --    --    --    --    --    BILITOT  <0.10*   --    --    --    --    --    --    --    POCGLU  119*   < >  112*  74  128*  267*  147*  95    < > = values in this interval not displayed. Lab Results   Component Value Date/Time    SPECIAL NOT REPORTED 10/22/2017 04:15 PM     Lab Results   Component Value Date/Time    CULTURE (A) 10/22/2017 04:15 PM     STREPTOCOCCI, BETA HEMOLYTIC GROUP B 10 to 50,000 CFU/ML    CULTURE  10/22/2017 04:15 PM     [facility] [address] [facility], [address] [place] [address] [phone]       No results found for: POCPH, PHART, PH, POCPCO2, JIW1TPJ, PCO2, POCPO2, PO2ART, PO2, POCHCO3, EPV0FNQ, HCO3, NBEA, PBEA, BEART, BE, THGBART, THB, CPR7SDD, DPSD4IUU, U2CCTYWK, O2SAT, FIO2    Radiology:    EXAMINATION: ACUTE ABDOMINAL SERIES   10/22/2017 5:03 pm  Impression Scattered gas in the jejunal loops suggesting mild degree of adynamic small bowel ileus.     EXAMINATION: CT OF THE ABDOMEN AND PELVIS WITHOUT CONTRAST 10/22/2017 10:52 pm  Impression Mild acute sigmoid colon diverticulitis.  No abscess or perforation is evident. Moderate diverticulosis is noted.   Hysterectomy.   Small hiatal hernia. Physical Examination:        General appearance:  alert, cooperative and no distress  Mental Status:  oriented to person, place and time and normal affect  Lungs:  clear to auscultation bilaterally, normal effort  Heart:  regular rate and rhythm, no murmur  Abdomen:  soft, nondistended, normal bowel sounds, no masses, hepatomegaly, splenomegaly. Tenderness to palpation of LLQ and periumbilical region. Extremities:  no edema, redness, tenderness in the calves  Skin:  no gross lesions, rashes, induration    Assessment:        Primary Problem  Acute diverticulitis    Active Hospital Problems    Diagnosis Date Noted    Acute diverticulitis [K57.92] 10/23/2017    DHARMESH (acute kidney injury) ([facility] [N17.9] 05/07/2016    Diabetes mellitus type 2, controlled ([address] [E11.9] 06/15/2013       Plan:        Acute Diverticulitis of Sigmoid colon, as seen on CT Abd pelvis, and further corroborated by patient's location and character of pain.  Cont IVF Hydration, but no more than 75 cc/hr due to patient's sig h/o ischemic CM s/p AICD placement. Changed Abx to Ertapenem 10/23. Also, advanced diet as patient is tolerating well. DHARMESH, likely pre-renal 2/2 dehydration, resolved. NS at 75 cc/hr and repeat BMP in AM. Avoid nephrotoxins.      Psychotic Disorder. Psych on board. Cont zyprexa and latuda. Appreciate their help in the management of this patient.      Pain Mx/Nausea Mx/DVT PPx. Discharge planning: Likely discharge with home health today.      Isaiah Guardado MD  10/26/2017  7:27 AM

## 2017-10-27 VITALS
WEIGHT: 179 LBS | HEART RATE: 103 BPM | BODY MASS INDEX: 35.14 KG/M2 | SYSTOLIC BLOOD PRESSURE: 150 MMHG | DIASTOLIC BLOOD PRESSURE: 98 MMHG | OXYGEN SATURATION: 100 % | RESPIRATION RATE: 6 BRPM | TEMPERATURE: 98.3 F | HEIGHT: 60 IN

## 2017-10-27 LAB
ABSOLUTE EOS #: 0.2 K/UL (ref 0–0.4)
ABSOLUTE IMMATURE GRANULOCYTE: ABNORMAL K/UL (ref 0–0.3)
ABSOLUTE LYMPH #: 1.8 K/UL (ref 1–4.8)
ABSOLUTE MONO #: 0.8 K/UL (ref 0.1–1.2)
ANION GAP SERPL CALCULATED.3IONS-SCNC: 12 MMOL/L (ref 9–17)
BASOPHILS # BLD: 1 %
BASOPHILS ABSOLUTE: 0.1 K/UL (ref 0–0.2)
BUN BLDV-MCNC: 11 MG/DL (ref 8–23)
BUN/CREAT BLD: ABNORMAL (ref 9–20)
CALCIUM SERPL-MCNC: 8.5 MG/DL (ref 8.6–10.4)
CHLORIDE BLD-SCNC: 100 MMOL/L (ref 98–107)
CO2: 26 MMOL/L (ref 20–31)
CREAT SERPL-MCNC: 0.8 MG/DL (ref 0.5–0.9)
DIFFERENTIAL TYPE: ABNORMAL
EOSINOPHILS RELATIVE PERCENT: 2 %
GFR AFRICAN AMERICAN: >60 ML/MIN
GFR NON-AFRICAN AMERICAN: >60 ML/MIN
GFR SERPL CREATININE-BSD FRML MDRD: ABNORMAL ML/MIN/{1.73_M2}
GFR SERPL CREATININE-BSD FRML MDRD: ABNORMAL ML/MIN/{1.73_M2}
GLUCOSE BLD-MCNC: 138 MG/DL (ref 65–105)
GLUCOSE BLD-MCNC: 153 MG/DL (ref 70–99)
GLUCOSE BLD-MCNC: 160 MG/DL (ref 65–105)
GLUCOSE BLD-MCNC: 60 MG/DL (ref 65–105)
GLUCOSE BLD-MCNC: 87 MG/DL (ref 65–105)
HCT VFR BLD CALC: 30.1 % (ref 36–46)
HEMOGLOBIN: 10.1 G/DL (ref 12–16)
IMMATURE GRANULOCYTES: ABNORMAL %
LYMPHOCYTES # BLD: 17 %
MCH RBC QN AUTO: 25.1 PG (ref 26–34)
MCHC RBC AUTO-ENTMCNC: 33.4 G/DL (ref 31–37)
MCV RBC AUTO: 75.3 FL (ref 80–100)
MONOCYTES # BLD: 8 %
PDW BLD-RTO: 15.9 % (ref 12.5–15.4)
PLATELET # BLD: 293 K/UL (ref 140–450)
PLATELET ESTIMATE: ABNORMAL
PMV BLD AUTO: 7.6 FL (ref 6–12)
POTASSIUM SERPL-SCNC: 3.8 MMOL/L (ref 3.7–5.3)
RBC # BLD: 4 M/UL (ref 4–5.2)
RBC # BLD: ABNORMAL 10*6/UL
SEG NEUTROPHILS: 72 %
SEGMENTED NEUTROPHILS ABSOLUTE COUNT: 7.5 K/UL (ref 1.8–7.7)
SODIUM BLD-SCNC: 138 MMOL/L (ref 135–144)
WBC # BLD: 10.5 K/UL (ref 3.5–11)
WBC # BLD: ABNORMAL 10*3/UL

## 2017-10-27 PROCEDURE — 82947 ASSAY GLUCOSE BLOOD QUANT: CPT

## 2017-10-27 PROCEDURE — 6370000000 HC RX 637 (ALT 250 FOR IP): Performed by: INTERNAL MEDICINE

## 2017-10-27 PROCEDURE — 36415 COLL VENOUS BLD VENIPUNCTURE: CPT

## 2017-10-27 PROCEDURE — 80048 BASIC METABOLIC PNL TOTAL CA: CPT

## 2017-10-27 PROCEDURE — 6370000000 HC RX 637 (ALT 250 FOR IP): Performed by: NURSE PRACTITIONER

## 2017-10-27 PROCEDURE — 99232 SBSQ HOSP IP/OBS MODERATE 35: CPT | Performed by: INTERNAL MEDICINE

## 2017-10-27 PROCEDURE — 85025 COMPLETE CBC W/AUTO DIFF WBC: CPT

## 2017-10-27 PROCEDURE — 6360000002 HC RX W HCPCS: Performed by: NURSE PRACTITIONER

## 2017-10-27 RX ADMIN — OXCARBAZEPINE 300 MG: 300 TABLET, FILM COATED ORAL at 09:51

## 2017-10-27 RX ADMIN — CARVEDILOL 3.12 MG: 3.12 TABLET, FILM COATED ORAL at 09:51

## 2017-10-27 RX ADMIN — LURASIDONE HYDROCHLORIDE 60 MG: 40 TABLET, FILM COATED ORAL at 09:52

## 2017-10-27 RX ADMIN — INSULIN GLARGINE 36 UNITS: 100 INJECTION, SOLUTION SUBCUTANEOUS at 09:51

## 2017-10-27 RX ADMIN — FAMOTIDINE 20 MG: 20 TABLET, FILM COATED ORAL at 09:52

## 2017-10-27 RX ADMIN — INSULIN LISPRO 3 UNITS: 100 INJECTION, SOLUTION INTRAVENOUS; SUBCUTANEOUS at 00:57

## 2017-10-27 RX ADMIN — COLCHICINE 0.6 MG: 0.6 TABLET, FILM COATED ORAL at 09:52

## 2017-10-27 RX ADMIN — SULFAMETHOXAZOLE AND TRIMETHOPRIM 1 TABLET: 800; 160 TABLET ORAL at 09:51

## 2017-10-27 RX ADMIN — ENOXAPARIN SODIUM 40 MG: 40 INJECTION SUBCUTANEOUS at 09:52

## 2017-10-27 ASSESSMENT — PAIN SCALES - GENERAL: PAINLEVEL_OUTOF10: 3

## 2017-10-27 NOTE — PROGRESS NOTES
Physical Therapy    DATE: 10/27/2017    NAME: Mary Grace Durant  MRN: [id number]   : 1955    Patient not seen this date for Physical Therapy due to:  [] Blood transfusion in progress  [] Hemodialysis  [x]  Patient Declined - sleeping  [] Spine Precautions   [] Strict Bedrest  [] Surgery/ Procedure  [] Testing      [x] Other: 2nd check, awaiting DC        [] PT being discontinued at this time. Patient independent. No further needs. [] PT being discontinued at this time as the patient has been transferred to palliative care. No further needs.     Tonya Velez, PTA

## 2017-10-27 NOTE — PROGRESS NOTES
Dr. Jonelle Goodpasture notified of pts IV infiltrating while antibiotic was infusing, she was ok with her IV being removed and she placed orders for PO antibiotics, plan for discharge tomorrow with home care

## 2017-10-27 NOTE — PLAN OF CARE
Problem: Falls - Risk of  Goal: Absence of falls  Outcome: Ongoing      Problem: Pain:  Goal: Pain level will decrease  Pain level will decrease   Outcome: Ongoing

## 2017-10-27 NOTE — PROGRESS NOTES
for chills, fevers, sweats  Respiratory:  negative for cough, dyspnea on exertion, hemoptysis, shortness of breath, wheezing  Cardiovascular:  negative for chest pain, chest pressure/discomfort, lower extremity edema, palpitations  Gastrointestinal:  negative for constipation, diarrhea, nausea, vomiting. +abdominal pain  Neurological:  negative for dizziness, headache    Medications: Allergies: Allergies   Allergen Reactions    Levofloxacin Hives    Pcn [Penicillins] Hives       Current Meds:   Scheduled Meds:    sulfamethoxazole-trimethoprim  1 tablet Oral 2 times per day    famotidine  20 mg Oral BID    aspirin  81 mg Oral Daily    atorvastatin  20 mg Oral Daily    carvedilol  3.125 mg Oral BID WC    colchicine  0.6 mg Oral Daily    insulin lispro  10 Units Subcutaneous TID AC    lisinopril  5 mg Oral Daily    lurasidone  60 mg Oral Daily    OLANZapine  10 mg Oral Nightly    OXcarbazepine  300 mg Oral BID    sodium chloride flush  10 mL Intravenous 2 times per day    enoxaparin  40 mg Subcutaneous Daily    insulin lispro  0-18 Units Subcutaneous Q4H    insulin glargine  36 Units Subcutaneous QAM     Continuous Infusions:    dextrose       PRN Meds: metoprolol, ipratropium-albuterol, albuterol, sodium chloride flush, potassium chloride **OR** potassium chloride **OR** potassium chloride, magnesium sulfate, bisacodyl, ondansetron, nicotine, glucose, dextrose, glucagon (rDNA), dextrose, HYDROmorphone **OR** HYDROmorphone, diphenhydrAMINE    Data:     Past Medical History:   has a past medical history of Arthritis; Asthma; CAD (coronary artery disease); CHF (congestive heart failure) ([facility] Clinical trial participant at discharge; COPD (chronic obstructive pulmonary disease) ([facility] Diabetes mellitus ([facility] Hepatitis C; Hyperlipidemia; Hypertension; Pneumonia; and Unspecified cerebral artery occlusion with cerebral infarction. Social History:   reports that she has been smoking.   She has been smoking about 1.00 pack per day. She has never used smokeless tobacco. She reports that she drinks alcohol. She reports that she does not use drugs. Family History:   Family History   Problem Relation Age of Onset    Family history unknown: Yes       Vitals:  BP (!) 157/83   Pulse 105   Temp 98.4 °F (36.9 °C) (Oral)   Resp 16   Ht 5' (1.524 m)   Wt 179 lb (81.2 kg)   LMP  (LMP Unknown)   SpO2 100%   BMI 34.96 kg/m²   Temp (24hrs), Av °F (36.7 °C), Min:97.6 °F (36.4 °C), Max:98.4 °F (36.9 °C)    Recent Labs      10/26/17   2007  10/27/17   0014  10/27/17   0419  10/27/17   0631   POCGLU  206*  160*  60*  138*       I/O (24Hr):     Intake/Output Summary (Last 24 hours) at 10/27/17 0822  Last data filed at 10/27/17 0503   Gross per 24 hour   Intake              600 ml   Output                0 ml   Net              600 ml       Labs:    Hematology:  Recent Labs      10/27/17   0627   WBC  10.5   RBC  4.00   HGB  10.1*   HCT  30.1*   MCV  75.3*   MCH  25.1*   MCHC  33.4   RDW  15.9*   PLT  293   MPV  7.6     Chemistry:  Recent Labs      10/27/17   0627   NA  138   K  3.8   CL  100   CO2  26   GLUCOSE  153*   BUN  11   CREATININE  0.80   ANIONGAP  12   LABGLOM  >60   GFRAA  >60   CALCIUM  8.5*     Recent Labs      10/26/17   1237  10/26/17   1705  10/26/17   2007  10/27/17   0014  10/27/17   0419  10/27/17   0631   POCGLU  230*  170*  206*  160*  60*  138*         Lab Results   Component Value Date/Time    SPECIAL NOT REPORTED 10/22/2017 04:15 PM     Lab Results   Component Value Date/Time    CULTURE (A) 10/22/2017 04:15 PM     STREPTOCOCCI, BETA HEMOLYTIC GROUP B 10 to 50,000 CFU/ML    CULTURE  10/22/2017 04:15 PM     [address], [address] [place] [address] [phone]       No results found for: POCPH, PHART, PH, POCPCO2, XPS0LRL, PCO2, POCPO2, PO2ART, PO2, POCHCO3, PBE8HZQ, HCO3, NBEA, PBEA, BEART, BE, THGBART, THB, LFO2CEF, XSOU0OZB, F8FXZGIC, O2SAT, FIO2    Radiology:    EXAMINATION: ACUTE ABDOMINAL SERIES   10/22/2017 5:03 pm  Impression Scattered gas in the jejunal loops suggesting mild degree of adynamic small bowel ileus.     EXAMINATION: CT OF THE ABDOMEN AND PELVIS WITHOUT CONTRAST 10/22/2017 10:52 pm  Impression Mild acute sigmoid colon diverticulitis.  No abscess or perforation is evident. Moderate diverticulosis is noted.   Hysterectomy.   Small hiatal hernia. Physical Examination:        General appearance:  alert, cooperative and no distress  Mental Status:  oriented to person, place and time and normal affect  Lungs:  clear to auscultation bilaterally, normal effort  Heart:  regular rate and rhythm, no murmur  Abdomen:  soft, nondistended, normal bowel sounds, no masses, hepatomegaly, splenomegaly. Tenderness to palpation of LLQ and periumbilical region. Extremities:  no edema, redness, tenderness in the calves  Skin:  no gross lesions, rashes, induration    Assessment:        Primary Problem  Acute diverticulitis    Active Hospital Problems    Diagnosis Date Noted    Acute diverticulitis [K57.92] 10/23/2017    DHARMESH (acute kidney injury) ([facility] [N17.9] 05/07/2016    Diabetes mellitus type 2, controlled ([facility] [E11.9] 06/15/2013       Plan:        Acute Diverticulitis of Sigmoid colon, as seen on CT Abd pelvis, and further corroborated by patient's location and character of pain. Cont IVF Hydration, but no more than 75 cc/hr due to patient's sig h/o ischemic CM s/p AICD placement. Changed Abx to Ertapenem 10/23. Also, advanced diet as patient is tolerating well. Will dc her with bactrim for 6 more days. To have home health services. DHARMESH, likely pre-renal 2/2 dehydration, resolved. NS at 75 cc/hr and repeat BMP in AM. Avoid nephrotoxins.      Psychotic Disorder, not in acute episode. Psych on board. Patient is not harm to herself or others. Cont zyprexa and latuda.  Appreciate their help in the management of this patient.      Pain Mx/Nausea

## 2017-10-27 NOTE — PROGRESS NOTES
She is doing slightly better and responding to the questions more spontaneously but continues to be delusional as claims that somebody is putting infection in her body which causes her GI problems. Affect-Superficial  Mood - improving  Thought process -  Slightly better organized but still showing looseness of association . Reality testing-Impaired  Cognition -  Intact  Memory- Recent-Intact                Remote-Intact  Orientation-Oriented to time ,place and person. Insight-Poor  Judgement-Superficial                                              Attempted to develop insight and improve reality testing. Medications reviewed. Side effects of medications reviewed and medication education provided  No side effects including akathisia,tremers,dystonia or orthostasis reported or observed. She  is not in any psychiatric distress ,does not need inpatient psychiatric t/t so may be discharged when medically stable. She has follow-up scheduled at [facility]: Stabilization with antipsychotic  medications, supportive therapy and develop coping skills,discharge to community. Marisel Kapoor

## 2017-10-29 NOTE — DISCHARGE SUMMARY
Manisha Montenegro 19    Discharge Summary     Patient ID: Miriam Rashid  :  1955   MRN: [id number]     ACCOUNT:  [de-identified]   Patient's PCP: Hermes Alonzo MD  Admit Date: 10/22/2017   Discharge Date: 10/27/17    Length of Stay: 4  Code Status:  Prior  Admitting Physician: Orlando Fontaine MD  Discharge Physician: Orlando Fontaine MD     Active Discharge Diagnoses:     Primary Problem  Acute diverticulitis      Hospital Problems  Active Hospital Problems    Diagnosis Date Noted    Acute diverticulitis [K57.92] 10/23/2017    DHARMESH (acute kidney injury) ([facility] [N17.9] 2016    Diabetes mellitus type 2, controlled ([facility] [E11.9] 06/15/2013       Admission Condition:  fair     Discharged Condition: good    Hospital Stay:     Hospital Course:  Miriam Rashid is a 65 y.o. female who was admitted for the management of   Acute diverticulitis , presented to ER with Abdominal Pain (umbilical region, seen at [facility] yesterday, pt states \"I have an infection in my stomach, I know I do.\"); Nausea; and Emesis    The patient is a 65 y.o.  Non-/non  female who presents with Abdominal Pain (umbilical region, seen at [facility] yesterday, pt states \"I have an infection in my stomach, I know I do.\"); Nausea; and Emesis   and she is admitted to the hospital for the management of Acute diverticulitis.      She has the following significant co-morbidities: IDDM type 2, HTN, previous CVA with residual left-sided weakness, CAD s/p BMS stent placement on plavix and Ischemic CM s/p AICD placement in 2017.     She presents to the ED with intractable abdominal pain, nausea and vomiting. She believes ? Mr. Deborah Stoddard? Has implanted an abdominal infection in her again. Patient does not elaborate when asked for details? Otherwise, patient is fully oriented.      On evaluation, CT Abd Pelvis revealed Acute diverticulitis of the sigmoid colon.  Labs revealed DHARMESH, with elevated examined on day of discharge and this discharge summary is in conjunction with any daily progress note from day of discharge. Discharge plan:     Disposition: Home with [facility]    Physician Follow Up:     Ollie Burroughs MD  [address]  [phone]             Requiring Further Evaluation/Follow Up POST HOSPITALIZATION/Incidental Findings: F/U with Comm Psych, PCP. Home health. Diet: clear liquids, advance as tolerated    Activity: As tolerated    Instructions to Patient: Ruiz Luis course of bactrim. F/U with PCP, Psychiatry    Discharge Medications:      Medication List      START taking these medications    sulfamethoxazole-trimethoprim 800-160 MG per tablet  Commonly known as:  BACTRIM DS  Take 1 tablet by mouth 2 times daily for 7 days        CHANGE how you take these medications    ipratropium-albuterol 0.5-2.5 (3) MG/3ML Soln nebulizer solution  Commonly known as:  DUONEB  Inhale 3 mLs into the lungs every 4 hours as needed for Shortness of Breath  What changed:  · when to take this  · reasons to take this        CONTINUE taking these medications    aspirin 81 MG tablet     atorvastatin 20 MG tablet  Commonly known as:  LIPITOR     carvedilol 3.125 MG tablet  Commonly known as:  COREG     colchicine 0.6 MG tablet  Commonly known as:  COLCRYS  Take 1 tablet by mouth daily     famotidine 20 MG tablet  Commonly known as:  PEPCID     insulin glargine 100 UNIT/ML injection vial  Commonly known as:  LANTUS     insulin lispro 100 UNIT/ML pen  Commonly known as:  HUMALOG KWIKPEN  Inject 10 Units into the skin 3 times daily (before meals).      lisinopril 5 MG tablet  Commonly known as:  PRINIVIL;ZESTRIL     naproxen 500 MG tablet  Commonly known as:  NAPROSYN  Take 1 tablet by mouth 2 times daily     OLANZapine 10 MG tablet  Commonly known as:  ZYPREXA  Take 1 tablet by mouth nightly     OXcarbazepine 300 MG tablet  Commonly known as:  TRILEPTAL     senna-docusate 8.6-50 MG per tablet  Commonly known as:  PERICOLACE        STOP taking these medications    metoprolol tartrate 25 MG tablet  Commonly known as:  LOPRESSOR           Where to Get Your Medications      These medications were sent to [address] [place] [address]    Phone:  [phone]   · ipratropium-albuterol 0.5-2.5 (3) MG/3ML Soln nebulizer solution  · sulfamethoxazole-trimethoprim 800-160 MG per tablet         Time Spent on discharge is  31 mins in patient examination, evaluation, counseling as well as medication reconciliation, prescriptions for required medications, discharge plan and follow up. Electronically signed by   Jose Castillo MD  10/28/2017  9:16 PM      Thank you Dr. Amilcar Goldsmith MD for the opportunity to be involved in this patient's care.

## 2017-10-30 ENCOUNTER — CARE COORDINATION (OUTPATIENT)
Dept: FAMILY MEDICINE CLINIC | Age: 62
End: 2017-10-30

## 2017-10-30 VITALS — DIASTOLIC BLOOD PRESSURE: 96 MMHG | SYSTOLIC BLOOD PRESSURE: 144 MMHG

## 2017-11-06 ENCOUNTER — CARE COORDINATION (OUTPATIENT)
Dept: FAMILY MEDICINE CLINIC | Age: 62
End: 2017-11-06

## 2017-11-14 ENCOUNTER — APPOINTMENT (OUTPATIENT)
Dept: CT IMAGING | Age: 62
End: 2017-11-14
Payer: COMMERCIAL

## 2017-11-14 ENCOUNTER — APPOINTMENT (OUTPATIENT)
Dept: GENERAL RADIOLOGY | Age: 62
End: 2017-11-14
Payer: COMMERCIAL

## 2017-11-14 ENCOUNTER — HOSPITAL ENCOUNTER (OUTPATIENT)
Age: 62
Setting detail: OBSERVATION
Discharge: HOME | End: 2017-11-15
Attending: EMERGENCY MEDICINE | Admitting: EMERGENCY MEDICINE
Payer: COMMERCIAL

## 2017-11-14 DIAGNOSIS — R79.89 ELEVATED BRAIN NATRIURETIC PEPTIDE (BNP) LEVEL: ICD-10-CM

## 2017-11-14 DIAGNOSIS — R79.9 ELEVATED BUN: ICD-10-CM

## 2017-11-14 DIAGNOSIS — D64.9 ANEMIA, UNSPECIFIED TYPE: ICD-10-CM

## 2017-11-14 DIAGNOSIS — R07.9 CHEST PAIN, UNSPECIFIED TYPE: Primary | ICD-10-CM

## 2017-11-14 LAB
ABSOLUTE EOS #: 0.26 K/UL (ref 0–0.44)
ABSOLUTE IMMATURE GRANULOCYTE: 0.05 K/UL (ref 0–0.3)
ABSOLUTE LYMPH #: 2.51 K/UL (ref 1.1–3.7)
ABSOLUTE MONO #: 0.78 K/UL (ref 0.1–1.2)
ANION GAP SERPL CALCULATED.3IONS-SCNC: 10 MMOL/L (ref 9–17)
BASOPHILS # BLD: 1 % (ref 0–2)
BASOPHILS ABSOLUTE: 0.07 K/UL (ref 0–0.2)
BNP INTERPRETATION: ABNORMAL
BUN BLDV-MCNC: 28 MG/DL (ref 8–23)
BUN/CREAT BLD: ABNORMAL (ref 9–20)
CALCIUM SERPL-MCNC: 9.2 MG/DL (ref 8.6–10.4)
CHLORIDE BLD-SCNC: 102 MMOL/L (ref 98–107)
CO2: 27 MMOL/L (ref 20–31)
CREAT SERPL-MCNC: 0.78 MG/DL (ref 0.5–0.9)
D-DIMER QUANTITATIVE: 1.07 MG/L FEU
DIFFERENTIAL TYPE: ABNORMAL
EOSINOPHILS RELATIVE PERCENT: 2 % (ref 1–4)
GFR AFRICAN AMERICAN: >60 ML/MIN
GFR NON-AFRICAN AMERICAN: >60 ML/MIN
GFR SERPL CREATININE-BSD FRML MDRD: ABNORMAL ML/MIN/{1.73_M2}
GFR SERPL CREATININE-BSD FRML MDRD: ABNORMAL ML/MIN/{1.73_M2}
GLUCOSE BLD-MCNC: 148 MG/DL (ref 65–105)
GLUCOSE BLD-MCNC: 48 MG/DL (ref 65–105)
GLUCOSE BLD-MCNC: 68 MG/DL (ref 65–105)
GLUCOSE BLD-MCNC: 86 MG/DL (ref 70–99)
HCT VFR BLD CALC: 33.9 % (ref 36.3–47.1)
HEMOGLOBIN: 10.3 G/DL (ref 11.9–15.1)
IMMATURE GRANULOCYTES: 0 %
LYMPHOCYTES # BLD: 22 % (ref 24–43)
MCH RBC QN AUTO: 23.9 PG (ref 25.2–33.5)
MCHC RBC AUTO-ENTMCNC: 30.4 G/DL (ref 28.4–34.8)
MCV RBC AUTO: 78.7 FL (ref 82.6–102.9)
MONOCYTES # BLD: 7 % (ref 3–12)
PDW BLD-RTO: 15.9 % (ref 11.8–14.4)
PLATELET # BLD: ABNORMAL K/UL (ref 138–453)
PLATELET ESTIMATE: ABNORMAL
PLATELET, FLUORESCENCE: NORMAL K/UL (ref 138–453)
PLATELET, IMMATURE FRACTION: NORMAL % (ref 1.1–10.3)
PMV BLD AUTO: ABNORMAL FL (ref 8.1–13.5)
POC TROPONIN I: 0.01 NG/ML (ref 0–0.1)
POC TROPONIN INTERP: NORMAL
POTASSIUM SERPL-SCNC: 4.6 MMOL/L (ref 3.7–5.3)
PRO-BNP: 1133 PG/ML
RBC # BLD: 4.31 M/UL (ref 3.95–5.11)
RBC # BLD: ABNORMAL 10*6/UL
SEG NEUTROPHILS: 68 % (ref 36–65)
SEGMENTED NEUTROPHILS ABSOLUTE COUNT: 7.63 K/UL (ref 1.5–8.1)
SODIUM BLD-SCNC: 139 MMOL/L (ref 135–144)
TROPONIN INTERP: NORMAL
TROPONIN INTERP: NORMAL
TROPONIN T: <0.03 NG/ML
TROPONIN T: <0.03 NG/ML
WBC # BLD: 11.3 K/UL (ref 3.5–11.3)
WBC # BLD: ABNORMAL 10*3/UL

## 2017-11-14 PROCEDURE — 2580000003 HC RX 258: Performed by: EMERGENCY MEDICINE

## 2017-11-14 PROCEDURE — 6370000000 HC RX 637 (ALT 250 FOR IP): Performed by: EMERGENCY MEDICINE

## 2017-11-14 PROCEDURE — 99285 EMERGENCY DEPT VISIT HI MDM: CPT

## 2017-11-14 PROCEDURE — 85025 COMPLETE CBC W/AUTO DIFF WBC: CPT

## 2017-11-14 PROCEDURE — 93005 ELECTROCARDIOGRAM TRACING: CPT

## 2017-11-14 PROCEDURE — 36415 COLL VENOUS BLD VENIPUNCTURE: CPT

## 2017-11-14 PROCEDURE — 93970 EXTREMITY STUDY: CPT

## 2017-11-14 PROCEDURE — 71260 CT THORAX DX C+: CPT

## 2017-11-14 PROCEDURE — 96374 THER/PROPH/DIAG INJ IV PUSH: CPT

## 2017-11-14 PROCEDURE — 82947 ASSAY GLUCOSE BLOOD QUANT: CPT

## 2017-11-14 PROCEDURE — G0378 HOSPITAL OBSERVATION PER HR: HCPCS

## 2017-11-14 PROCEDURE — 6360000004 HC RX CONTRAST MEDICATION: Performed by: EMERGENCY MEDICINE

## 2017-11-14 PROCEDURE — 71020 XR CHEST STANDARD TWO VW: CPT

## 2017-11-14 PROCEDURE — 6360000002 HC RX W HCPCS: Performed by: EMERGENCY MEDICINE

## 2017-11-14 PROCEDURE — 83880 ASSAY OF NATRIURETIC PEPTIDE: CPT

## 2017-11-14 PROCEDURE — 84484 ASSAY OF TROPONIN QUANT: CPT

## 2017-11-14 PROCEDURE — 85379 FIBRIN DEGRADATION QUANT: CPT

## 2017-11-14 PROCEDURE — 80048 BASIC METABOLIC PNL TOTAL CA: CPT

## 2017-11-14 RX ORDER — ASPIRIN 81 MG/1
162 TABLET, CHEWABLE ORAL ONCE
Status: COMPLETED | OUTPATIENT
Start: 2017-11-14 | End: 2017-11-14

## 2017-11-14 RX ORDER — FAMOTIDINE 20 MG/1
20 TABLET, FILM COATED ORAL DAILY
Status: DISCONTINUED | OUTPATIENT
Start: 2017-11-14 | End: 2017-11-15 | Stop reason: HOSPADM

## 2017-11-14 RX ORDER — OLANZAPINE 10 MG/1
10 TABLET ORAL NIGHTLY
Status: DISCONTINUED | OUTPATIENT
Start: 2017-11-14 | End: 2017-11-15 | Stop reason: HOSPADM

## 2017-11-14 RX ORDER — IPRATROPIUM BROMIDE AND ALBUTEROL SULFATE 2.5; .5 MG/3ML; MG/3ML
1 SOLUTION RESPIRATORY (INHALATION) EVERY 4 HOURS PRN
Status: DISCONTINUED | OUTPATIENT
Start: 2017-11-14 | End: 2017-11-15 | Stop reason: HOSPADM

## 2017-11-14 RX ORDER — DEXTROSE MONOHYDRATE 25 G/50ML
12.5 INJECTION, SOLUTION INTRAVENOUS PRN
Status: DISCONTINUED | OUTPATIENT
Start: 2017-11-14 | End: 2017-11-15 | Stop reason: HOSPADM

## 2017-11-14 RX ORDER — LISINOPRIL 5 MG/1
5 TABLET ORAL DAILY
Status: DISCONTINUED | OUTPATIENT
Start: 2017-11-15 | End: 2017-11-15 | Stop reason: HOSPADM

## 2017-11-14 RX ORDER — INSULIN GLARGINE 100 [IU]/ML
35 INJECTION, SOLUTION SUBCUTANEOUS EVERY MORNING
Status: DISCONTINUED | OUTPATIENT
Start: 2017-11-15 | End: 2017-11-15 | Stop reason: HOSPADM

## 2017-11-14 RX ORDER — OXCARBAZEPINE 300 MG/1
300 TABLET, FILM COATED ORAL 2 TIMES DAILY
Status: DISCONTINUED | OUTPATIENT
Start: 2017-11-14 | End: 2017-11-15 | Stop reason: HOSPADM

## 2017-11-14 RX ORDER — FENTANYL CITRATE 50 UG/ML
50 INJECTION, SOLUTION INTRAMUSCULAR; INTRAVENOUS
Status: DISCONTINUED | OUTPATIENT
Start: 2017-11-14 | End: 2017-11-15 | Stop reason: HOSPADM

## 2017-11-14 RX ORDER — HYDROCODONE BITARTRATE AND ACETAMINOPHEN 5; 325 MG/1; MG/1
2 TABLET ORAL EVERY 6 HOURS PRN
Status: DISCONTINUED | OUTPATIENT
Start: 2017-11-14 | End: 2017-11-15 | Stop reason: HOSPADM

## 2017-11-14 RX ORDER — NITROGLYCERIN 0.4 MG/1
0.4 TABLET SUBLINGUAL EVERY 5 MIN PRN
Status: DISCONTINUED | OUTPATIENT
Start: 2017-11-14 | End: 2017-11-15 | Stop reason: HOSPADM

## 2017-11-14 RX ORDER — SENNA AND DOCUSATE SODIUM 50; 8.6 MG/1; MG/1
1 TABLET, FILM COATED ORAL DAILY
Status: DISCONTINUED | OUTPATIENT
Start: 2017-11-14 | End: 2017-11-15 | Stop reason: HOSPADM

## 2017-11-14 RX ORDER — NICOTINE POLACRILEX 4 MG
15 LOZENGE BUCCAL PRN
Status: DISCONTINUED | OUTPATIENT
Start: 2017-11-14 | End: 2017-11-15 | Stop reason: HOSPADM

## 2017-11-14 RX ORDER — FENTANYL CITRATE 50 UG/ML
50 INJECTION, SOLUTION INTRAMUSCULAR; INTRAVENOUS ONCE
Status: DISCONTINUED | OUTPATIENT
Start: 2017-11-14 | End: 2017-11-15 | Stop reason: HOSPADM

## 2017-11-14 RX ORDER — CARVEDILOL 3.12 MG/1
3.12 TABLET ORAL 2 TIMES DAILY WITH MEALS
Status: DISCONTINUED | OUTPATIENT
Start: 2017-11-14 | End: 2017-11-15 | Stop reason: HOSPADM

## 2017-11-14 RX ORDER — NITROGLYCERIN 0.4 MG/1
0.4 TABLET SUBLINGUAL EVERY 5 MIN PRN
Status: DISCONTINUED | OUTPATIENT
Start: 2017-11-14 | End: 2017-11-14

## 2017-11-14 RX ORDER — DEXTROSE MONOHYDRATE 50 MG/ML
100 INJECTION, SOLUTION INTRAVENOUS PRN
Status: DISCONTINUED | OUTPATIENT
Start: 2017-11-14 | End: 2017-11-15 | Stop reason: HOSPADM

## 2017-11-14 RX ORDER — SODIUM CHLORIDE 0.9 % (FLUSH) 0.9 %
10 SYRINGE (ML) INJECTION PRN
Status: DISCONTINUED | OUTPATIENT
Start: 2017-11-14 | End: 2017-11-14

## 2017-11-14 RX ORDER — SODIUM CHLORIDE 0.9 % (FLUSH) 0.9 %
10 SYRINGE (ML) INJECTION EVERY 12 HOURS SCHEDULED
Status: DISCONTINUED | OUTPATIENT
Start: 2017-11-14 | End: 2017-11-15 | Stop reason: HOSPADM

## 2017-11-14 RX ORDER — ONDANSETRON 2 MG/ML
4 INJECTION INTRAMUSCULAR; INTRAVENOUS EVERY 6 HOURS PRN
Status: DISCONTINUED | OUTPATIENT
Start: 2017-11-14 | End: 2017-11-15 | Stop reason: HOSPADM

## 2017-11-14 RX ORDER — SODIUM CHLORIDE 9 MG/ML
INJECTION, SOLUTION INTRAVENOUS ONCE
Status: DISCONTINUED | OUTPATIENT
Start: 2017-11-14 | End: 2017-11-14

## 2017-11-14 RX ORDER — METOPROLOL TARTRATE 5 MG/5ML
2.5 INJECTION INTRAVENOUS PRN
Status: DISCONTINUED | OUTPATIENT
Start: 2017-11-14 | End: 2017-11-14

## 2017-11-14 RX ORDER — ACETAMINOPHEN 325 MG/1
650 TABLET ORAL EVERY 6 HOURS PRN
Status: DISCONTINUED | OUTPATIENT
Start: 2017-11-14 | End: 2017-11-15 | Stop reason: HOSPADM

## 2017-11-14 RX ORDER — SODIUM CHLORIDE 0.9 % (FLUSH) 0.9 %
10 SYRINGE (ML) INJECTION PRN
Status: DISCONTINUED | OUTPATIENT
Start: 2017-11-14 | End: 2017-11-15 | Stop reason: HOSPADM

## 2017-11-14 RX ORDER — AMINOPHYLLINE DIHYDRATE 25 MG/ML
100 INJECTION, SOLUTION INTRAVENOUS
Status: DISCONTINUED | OUTPATIENT
Start: 2017-11-14 | End: 2017-11-14

## 2017-11-14 RX ADMIN — CARVEDILOL 3.12 MG: 3.12 TABLET, FILM COATED ORAL at 17:47

## 2017-11-14 RX ADMIN — DEXTROSE 15 G: 15 GEL ORAL at 16:31

## 2017-11-14 RX ADMIN — OXCARBAZEPINE 300 MG: 300 TABLET, FILM COATED ORAL at 22:48

## 2017-11-14 RX ADMIN — Medication 10 ML: at 22:48

## 2017-11-14 RX ADMIN — ASPIRIN 81 MG 162 MG: 81 TABLET ORAL at 10:36

## 2017-11-14 RX ADMIN — IOVERSOL 75 ML: 741 INJECTION INTRA-ARTERIAL; INTRAVENOUS at 13:59

## 2017-11-14 RX ADMIN — NITROGLYCERIN 0.4 MG: 0.4 TABLET SUBLINGUAL at 10:36

## 2017-11-14 RX ADMIN — OLANZAPINE 10 MG: 10 TABLET, FILM COATED ORAL at 22:48

## 2017-11-14 ASSESSMENT — ENCOUNTER SYMPTOMS
SHORTNESS OF BREATH: 1
WHEEZING: 0
NAUSEA: 0
COLOR CHANGE: 0
VOMITING: 0

## 2017-11-14 ASSESSMENT — PAIN DESCRIPTION - LOCATION: LOCATION: CHEST

## 2017-11-14 ASSESSMENT — PAIN DESCRIPTION - ORIENTATION: ORIENTATION: MID

## 2017-11-14 ASSESSMENT — HEART SCORE: ECG: 1

## 2017-11-14 ASSESSMENT — PAIN SCALES - GENERAL: PAINLEVEL_OUTOF10: 4

## 2017-11-14 NOTE — ED PROVIDER NOTES
for the following:        Result Value    BUN 28 (*)     All other components within normal limits   CBC WITH AUTO DIFFERENTIAL - Abnormal; Notable for the following:     Hemoglobin 10.3 (*)     Hematocrit 33.9 (*)     MCV 78.7 (*)     MCH 23.9 (*)     RDW 15.9 (*)     Seg Neutrophils 68 (*)     Lymphocytes 22 (*)     All other components within normal limits   BRAIN NATRIURETIC PEPTIDE - Abnormal; Notable for the following:     Pro-BNP 1,133 (*)     All other components within normal limits   D-DIMER, QUANTITATIVE   IMMATURE PLATELET FRACTION   POCT TROPONIN   POCT TROPONIN   POCT TROPONIN         EMERGENCY DEPARTMENT COURSE:     -------------------------  BP: (!) 136/103, Temp: 98.6 °F (37 °C), Pulse: 95, Resp: 22      Comments            Reynolds MD, F.A.C.E.P.   Attending Emergency Physician         Yessy Alcantara MD  11/14/17 [id number]

## 2017-11-14 NOTE — ED NOTES
Unsuccessful IVC attempts by writer and Ana Lilia Montgomery RN. Yahaira phlebotomist to bedside.      Shelly Carroll RN  11/14/17 [id number]

## 2017-11-14 NOTE — ED PROVIDER NOTES
[facility]  Emergency Department Encounter  Emergency Medicine Resident     Pt Name: Ronald Drummond  MRN: [id number]  [place] 1955  Date of evaluation: 11/14/17  PCP:  Alvaro Albrecht MD    [address] [place] [address]       Chief Complaint   Patient presents with    Chest Pain     triple bypass last year    Dizziness       HISTORY OF PRESENT ILLNESS  (Location/Symptom, Timing/Onset, Context/Setting, Quality, Duration, Modifying Factors, Severity.)      Ronald Drummond is a 65 y.o. female who presents By squad with chest pain since this morning. Complains of lightheadedness/presyncope, shortness of breath,  and chest pain. No true dizziness or vertigo. No actual syncope and no falls, no trauma. She has a history of triple bypass and per chart review last had stents placed at her catheterization on May 2016. Does wear 4 L nasal cannula oxygen at home at all times. Also has some left-sided weakness residual from a stroke. No history of DVT or PE. Reports she is a patient of Dr. Jevon Marroquin. Other past history is below    PAST MEDICAL / SURGICAL / SOCIAL / FAMILY HISTORY      has a past medical history of Arthritis; Asthma; CAD (coronary artery disease); CHF (congestive heart failure) ([facility] [address] Clinical trial participant at discharge; COPD (chronic obstructive pulmonary disease) ([facility] [address] Diabetes mellitus ([facility] [address] Hepatitis C; Hyperlipidemia; Hypertension; Pneumonia; and Unspecified cerebral artery occlusion with cerebral infarction. has a past surgical history that includes Tonsillectomy; Foot surgery; Cardiac surgery (5/6/2016); and Cardiac pacemaker placement. Social History     Social History    Marital status: Single     Spouse name: N/A    Number of children: N/A    Years of education: N/A     Occupational History    Not on file.      Social History Main Topics    Smoking status: Current Every Day Smoker     Packs/day: 1.00     Types: Cigarettes    Smokeless tobacco: Never Used    Alcohol use Yes Exercise or Rx    VL DUP LOWER EXTREMITY VENOUS BILATERAL    Basic Metabolic Panel    CBC Auto Differential    D-Dimer, Quantitative    Brain Natriuretic Peptide    Immature Platelet Fraction    Troponin    Diet NPO Effective Now    Continuous Pulse Oximetry    Telemetry monitoring    Orthostatic blood pressure and pulse    Vital signs per unit routine    Notify physician    Notify physician    Place intermittent pneumatic compression device    Full Code    Inpatient consult to Cardiology    Stress Test/Cardiac Monitor - do not uncheck    POCT troponin    POCT troponin    EKG 12 Lead    EKG 12 Lead    Insert peripheral IV    PATIENT STATUS (FROM ED OR OR/PROCEDURAL) Observation     If the patient was admitted, some of the above orders may have been placed by the admitting team(s) and were auto populated above when I refreshed my note prior to signing it. If there is any question, please check for the responsible provider for individual orders in the EHR system.     MEDICATIONS ORDERED:  Orders Placed This Encounter   Medications    aspirin chewable tablet 162 mg    nitroGLYCERIN (NITROSTAT) SL tablet 0.4 mg    fentaNYL (SUBLIMAZE) injection 50 mcg    OXcarbazepine (TRILEPTAL) tablet 300 mg    famotidine (PEPCID) tablet 20 mg    insulin lispro (HUMALOG) injection pen 10 Units    insulin glargine (LANTUS) injection vial 35 Units    OLANZapine (ZYPREXA) tablet 10 mg    senna-docusate (PERICOLACE) 8.6-50 MG per tablet 1 tablet    lisinopril (PRINIVIL;ZESTRIL) tablet 5 mg    carvedilol (COREG) tablet 3.125 mg    ipratropium-albuterol (DUONEB) nebulizer solution 3 mL    regadenoson (LEXISCAN) injection 0.4 mg    aminophylline injection 100 mg    0.9 % sodium chloride infusion    sodium chloride flush 0.9 % injection 10 mL    metoprolol (LOPRESSOR) injection 2.5 mg    nitroGLYCERIN (NITROSTAT) SL tablet 0.4 mg    ondansetron (ZOFRAN) injection 4 mg    HYDROcodone-acetaminophen (NORCO) 5-325 MG per tablet 2 tablet    fentaNYL (SUBLIMAZE) injection 50 mcg    sodium chloride flush 0.9 % injection 10 mL    sodium chloride flush 0.9 % injection 10 mL    acetaminophen (TYLENOL) tablet 650 mg    enoxaparin (LOVENOX) injection 40 mg    ioversol (OPTIRAY) 74 % injection 75 mL     If the patient was admitted, some of the above orders may have been placed by the admitting team(s) and were auto populated above when I refreshed my note prior to signing it. If there is any question, please check for the responsible provider for individual orders in the EHR system. DDX: Cardiac patient, rule out PE and dissection with CT scan    DIAGNOSTIC RESULTS / EMERGENCY DEPARTMENT COURSE / MDM     LABS:  Labs Reviewed   BASIC METABOLIC PANEL - Abnormal; Notable for the following:        Result Value    BUN 28 (*)     All other components within normal limits   CBC WITH AUTO DIFFERENTIAL - Abnormal; Notable for the following:     Hemoglobin 10.3 (*)     Hematocrit 33.9 (*)     MCV 78.7 (*)     MCH 23.9 (*)     RDW 15.9 (*)     Seg Neutrophils 68 (*)     Lymphocytes 22 (*)     All other components within normal limits   BRAIN NATRIURETIC PEPTIDE - Abnormal; Notable for the following:     Pro-BNP 1,133 (*)     All other components within normal limits   D-DIMER, QUANTITATIVE   IMMATURE PLATELET FRACTION   TROPONIN   TROPONIN   POCT TROPONIN   POCT TROPONIN   POCT TROPONIN     If the patient was admitted, some of the above labs may have been ordered by the admitting team(s) and were auto populated above when I refreshed my note prior to signing it. If there is any question, please check for the responsible provider for individual orders in the EHR system. Additionally, some of the above labs results may still be pending. RADIOLOGY:  All forms of imaging other than ED bedside ultrasounds are viewed and interpreted by a radiologist and their interpretations are displayed below.     Xr Chest Standard (2 EKG    Interpreted by me    Rhythm: normal sinus   Rate: normal  Axis: normal  Ectopy: none  Conduction: normal  ST Segments: no acute change  T Waves: no acute change  Q Waves: no acute change    Clinical Impression: normal sinus rhythm, no acute changes    Heart Score for chest pain patients  History: Moderately Suspicious  ECG: Non-Specifc repolarization disturbance/LBTB/PM  Patient Age: > 39 and < 65 years  *Risk factors for Atherosclerotic disease: Coronary Artery Disease, Hypercholesterolemia, Hypertension, Diabetes Mellitus, Cigarette smoking  Risk Factors: > 3 Risk factors or history of atherosclerotic disease*  Troponin: < 1X normal limit  Heart Score Total: 5     EMERGENCY DEPARTMENT COURSE AND MEDICAL DECISION MAKING:  ED Course as of Nov 14 1450   Tue Nov 14, 2017   1154 RN and medic staff still unable to get a iV access. Did get enough blood for an initial troponin. Requested They use ultrasound guidanceFor further peripheral access attempts  [DC]   Ann mid-level with [facility] cardiology consultants aware that patient is here and that I have ordered a stress test for the extended treatment unit and we'll trend troponins. She agrees and states they'll see the patient. [DC]      ED Course User Index  [DC] Subha Valdivia,      58-year-old female with chest pain, lightheadedness, shortness of breath. Cardiac patient. CT negative for dissection and PE. BNP elevated which is noted but actually below patient's Baseline and not in Having clinical signs of CHF. Anemia but again at baseline for patient. We'll admit to the extended treatment unit for stress testing, cardiology consulted and I discussed case, see above. .  On my reassessment patient reported much improvement in her chest pain and declined any further pain medicine at that time.   Serial troponins ordered for the floor as well    PROCEDURES:  None     CONSULTS:  IP CONSULT TO CARDIOLOGY  If the patient was admitted, some of the above consults may have been placed by the admitting team(s) and were auto populated above when I refreshed my note prior to signing it. If there is any question, please check for the responsible provider for individual orders in the EHR system. CRITICAL CARE:  None     FINAL IMPRESSION      1. Chest pain, unspecified type    2. Elevated brain natriuretic peptide (BNP) level    3. Elevated BUN    4. Anemia, unspecified type             DISPOSITION / PLAN     DISPOSITION Admitted     If discharged, the patient was instructed to return to the emergency department with any worsening symptoms, if new symptoms arise, or if they have any other concerns. Patient was instructed not to drive home if discharged today and received pain medications or other mind-altering medications while here. Pre-hypertension/Hypertension: In the case that there was an elevated blood pressure reading for this patient today in the emergency department, the patient was informed that he or she may have pre-hypertension or hypertension. It was recommended to the patient to call the primary care provider listed in the discharge instructions or a physician of the patient's choice this week to arrange follow up for further evaluation of possible pre-hypertension or hypertension. PATIENT REFERRED TO:  Kinsey Yeung MD  [address]  [phone]            DISCHARGE MEDICATIONS:  Current Discharge Medication List          Guillermo OHARA   Emergency Medicine Resident Physician    (Please note that portions of this note were completed with a voice recognition program.  Efforts were made to edit the dictations but occasionally words are mis-transcribed.)     Guillermo Dunne DO  Resident  11/14/17 6458

## 2017-11-14 NOTE — ED TRIAGE NOTES
Pt to ED via EMS with c/o dizziness and chest pain since she woke up this morning. Pt reports hx of triple bypass a year ago and hx of CVA in 2014. Pt wears O2 at home via [place] on 4L.   Pt is alert and oriented x4, placed on full cardiac monitor upon arrival.

## 2017-11-14 NOTE — ED NOTES
Pt transported to [address] [place] [address], On license of [facility]  11/14/17 4900

## 2017-11-15 ENCOUNTER — APPOINTMENT (OUTPATIENT)
Dept: NUCLEAR MEDICINE | Age: 62
End: 2017-11-15
Payer: COMMERCIAL

## 2017-11-15 VITALS
TEMPERATURE: 98.5 F | HEART RATE: 85 BPM | WEIGHT: 175 LBS | HEIGHT: 61 IN | SYSTOLIC BLOOD PRESSURE: 132 MMHG | OXYGEN SATURATION: 99 % | DIASTOLIC BLOOD PRESSURE: 77 MMHG | RESPIRATION RATE: 16 BRPM | BODY MASS INDEX: 33.04 KG/M2

## 2017-11-15 LAB
EKG ATRIAL RATE: 75 BPM
EKG ATRIAL RATE: 83 BPM
EKG ATRIAL RATE: 91 BPM
EKG P AXIS: 38 DEGREES
EKG P AXIS: 45 DEGREES
EKG P AXIS: 9 DEGREES
EKG P-R INTERVAL: 156 MS
EKG P-R INTERVAL: 164 MS
EKG P-R INTERVAL: 174 MS
EKG Q-T INTERVAL: 336 MS
EKG Q-T INTERVAL: 380 MS
EKG Q-T INTERVAL: 382 MS
EKG QRS DURATION: 82 MS
EKG QRS DURATION: 82 MS
EKG QRS DURATION: 88 MS
EKG QTC CALCULATION (BAZETT): 413 MS
EKG QTC CALCULATION (BAZETT): 426 MS
EKG QTC CALCULATION (BAZETT): 446 MS
EKG R AXIS: -20 DEGREES
EKG R AXIS: -25 DEGREES
EKG R AXIS: -26 DEGREES
EKG T AXIS: 36 DEGREES
EKG T AXIS: 62 DEGREES
EKG T AXIS: 74 DEGREES
EKG VENTRICULAR RATE: 75 BPM
EKG VENTRICULAR RATE: 83 BPM
EKG VENTRICULAR RATE: 91 BPM
GLUCOSE BLD-MCNC: 109 MG/DL (ref 65–105)
GLUCOSE BLD-MCNC: 112 MG/DL (ref 65–105)
LV EF: 36 %
LVEF MODALITY: NORMAL

## 2017-11-15 PROCEDURE — 6360000002 HC RX W HCPCS: Performed by: EMERGENCY MEDICINE

## 2017-11-15 PROCEDURE — G0378 HOSPITAL OBSERVATION PER HR: HCPCS

## 2017-11-15 PROCEDURE — 82947 ASSAY GLUCOSE BLOOD QUANT: CPT

## 2017-11-15 PROCEDURE — 2580000003 HC RX 258: Performed by: EMERGENCY MEDICINE

## 2017-11-15 PROCEDURE — 78452 HT MUSCLE IMAGE SPECT MULT: CPT

## 2017-11-15 PROCEDURE — 96372 THER/PROPH/DIAG INJ SC/IM: CPT

## 2017-11-15 PROCEDURE — A9500 TC99M SESTAMIBI: HCPCS | Performed by: EMERGENCY MEDICINE

## 2017-11-15 PROCEDURE — 3430000000 HC RX DIAGNOSTIC RADIOPHARMACEUTICAL: Performed by: EMERGENCY MEDICINE

## 2017-11-15 PROCEDURE — 6370000000 HC RX 637 (ALT 250 FOR IP): Performed by: STUDENT IN AN ORGANIZED HEALTH CARE EDUCATION/TRAINING PROGRAM

## 2017-11-15 PROCEDURE — 93017 CV STRESS TEST TRACING ONLY: CPT

## 2017-11-15 PROCEDURE — 93005 ELECTROCARDIOGRAM TRACING: CPT

## 2017-11-15 PROCEDURE — 6370000000 HC RX 637 (ALT 250 FOR IP): Performed by: EMERGENCY MEDICINE

## 2017-11-15 RX ORDER — METOPROLOL TARTRATE 5 MG/5ML
2.5 INJECTION INTRAVENOUS PRN
Status: DISCONTINUED | OUTPATIENT
Start: 2017-11-15 | End: 2017-11-15 | Stop reason: ALTCHOICE

## 2017-11-15 RX ORDER — NITROGLYCERIN 0.4 MG/1
0.4 TABLET SUBLINGUAL EVERY 5 MIN PRN
Status: DISCONTINUED | OUTPATIENT
Start: 2017-11-15 | End: 2017-11-15 | Stop reason: ALTCHOICE

## 2017-11-15 RX ORDER — AMINOPHYLLINE DIHYDRATE 25 MG/ML
100 INJECTION, SOLUTION INTRAVENOUS
Status: COMPLETED | OUTPATIENT
Start: 2017-11-15 | End: 2017-11-15

## 2017-11-15 RX ORDER — ATORVASTATIN CALCIUM 20 MG/1
20 TABLET, FILM COATED ORAL DAILY
Status: DISCONTINUED | OUTPATIENT
Start: 2017-11-15 | End: 2017-11-15 | Stop reason: HOSPADM

## 2017-11-15 RX ORDER — SODIUM CHLORIDE 0.9 % (FLUSH) 0.9 %
10 SYRINGE (ML) INJECTION 2 TIMES DAILY
Status: DISCONTINUED | OUTPATIENT
Start: 2017-11-15 | End: 2017-11-15 | Stop reason: HOSPADM

## 2017-11-15 RX ORDER — SODIUM CHLORIDE 0.9 % (FLUSH) 0.9 %
10 SYRINGE (ML) INJECTION PRN
Status: DISCONTINUED | OUTPATIENT
Start: 2017-11-15 | End: 2017-11-15 | Stop reason: ALTCHOICE

## 2017-11-15 RX ORDER — SODIUM CHLORIDE 9 MG/ML
INJECTION, SOLUTION INTRAVENOUS ONCE
Status: COMPLETED | OUTPATIENT
Start: 2017-11-15 | End: 2017-11-15

## 2017-11-15 RX ADMIN — LISINOPRIL 5 MG: 5 TABLET ORAL at 13:39

## 2017-11-15 RX ADMIN — INSULIN GLARGINE 35 UNITS: 100 INJECTION, SOLUTION SUBCUTANEOUS at 13:42

## 2017-11-15 RX ADMIN — TETRAKIS(2-METHOXYISOBUTYLISOCYANIDE)COPPER(I) TETRAFLUOROBORATE 15.3 MILLICURIE: 1 INJECTION, POWDER, LYOPHILIZED, FOR SOLUTION INTRAVENOUS at 07:24

## 2017-11-15 RX ADMIN — ENOXAPARIN SODIUM 40 MG: 40 INJECTION SUBCUTANEOUS at 13:41

## 2017-11-15 RX ADMIN — SODIUM CHLORIDE, PRESERVATIVE FREE 10 ML: 5 INJECTION INTRAVENOUS at 07:24

## 2017-11-15 RX ADMIN — REGADENOSON 0.4 MG: 0.08 INJECTION, SOLUTION INTRAVENOUS at 11:42

## 2017-11-15 RX ADMIN — SODIUM CHLORIDE, PRESERVATIVE FREE 10 ML: 5 INJECTION INTRAVENOUS at 11:40

## 2017-11-15 RX ADMIN — CARVEDILOL 3.12 MG: 3.12 TABLET, FILM COATED ORAL at 13:46

## 2017-11-15 RX ADMIN — AMINOPHYLLINE 50 MG: 25 INJECTION, SOLUTION INTRAVENOUS at 11:45

## 2017-11-15 RX ADMIN — FAMOTIDINE 20 MG: 20 TABLET, FILM COATED ORAL at 13:40

## 2017-11-15 RX ADMIN — Medication 10 ML: at 11:26

## 2017-11-15 RX ADMIN — ATORVASTATIN CALCIUM 20 MG: 20 TABLET, FILM COATED ORAL at 13:40

## 2017-11-15 RX ADMIN — TETRAKIS(2-METHOXYISOBUTYLISOCYANIDE)COPPER(I) TETRAFLUOROBORATE 40 MILLICURIE: 1 INJECTION, POWDER, LYOPHILIZED, FOR SOLUTION INTRAVENOUS at 11:40

## 2017-11-15 RX ADMIN — SODIUM CHLORIDE: 9 INJECTION, SOLUTION INTRAVENOUS at 11:27

## 2017-11-15 RX ADMIN — DOCUSATE SODIUM -SENNOSIDES 1 TABLET: 50; 8.6 TABLET, COATED ORAL at 13:39

## 2017-11-15 RX ADMIN — OXCARBAZEPINE 300 MG: 300 TABLET, FILM COATED ORAL at 13:39

## 2017-11-15 ASSESSMENT — ENCOUNTER SYMPTOMS
PHOTOPHOBIA: 0
WHEEZING: 0
SORE THROAT: 0
ABDOMINAL PAIN: 0
TROUBLE SWALLOWING: 0
COLOR CHANGE: 0
CHEST TIGHTNESS: 1
SHORTNESS OF BREATH: 1
DIARRHEA: 0
VOMITING: 0
CHOKING: 0
ABDOMINAL DISTENTION: 0
CONSTIPATION: 0
BACK PAIN: 0
COUGH: 0
NAUSEA: 0

## 2017-11-15 NOTE — H&P
pressure, aching and radiating  Radiation: into back  Severity:  4/10  Timing/Duration: Intermittent   Modifying Factors: none    REVIEW OF SYSTEMS       Review of Systems   Constitutional: Negative for chills and fever. HENT: Negative for congestion, nosebleeds, sore throat and trouble swallowing. Eyes: Negative for photophobia and visual disturbance. Respiratory: Positive for chest tightness and shortness of breath. Negative for cough, choking and wheezing. Cardiovascular: Negative for chest pain. Gastrointestinal: Negative for abdominal distention, abdominal pain, constipation, diarrhea, nausea and vomiting. Endocrine: Negative for polydipsia, polyphagia and polyuria. Genitourinary: Negative for difficulty urinating, dysuria, flank pain, frequency and hematuria. Musculoskeletal: Negative for arthralgias, back pain, joint swelling, myalgias and neck stiffness. Skin: Negative for color change and pallor. Neurological: Positive for dizziness and light-headedness. Negative for syncope, speech difficulty, weakness, numbness and headaches. (PQRS) Advance directives on face sheet per hospital policy. No change unless specifically mentioned in chart    PAST MEDICAL HISTORY    has a past medical history of Arthritis; Asthma; CAD (coronary artery disease); CHF (congestive heart failure) ([facility] [address] Clinical trial participant at discharge; COPD (chronic obstructive pulmonary disease) ([facility] [address] Diabetes mellitus ([facility] [address] Hepatitis C; Hyperlipidemia; Hypertension; Pneumonia; and Unspecified cerebral artery occlusion with cerebral infarction. I have reviewed the past medical history with the patient and it is pertinent to this complaint. SURGICAL HISTORY      has a past surgical history that includes Tonsillectomy; Foot surgery; Cardiac surgery (5/6/2016); and Cardiac pacemaker placement. I have reviewed and agree with Surgical History entered and it is pertinent to this complaint.      CURRENT MEDICATIONS       nitroGLYCERIN (NITROSTAT) SL tablet 0.4 mg Q5 Min PRN   fentaNYL (SUBLIMAZE) injection 50 mcg Once   OXcarbazepine (TRILEPTAL) tablet 300 mg BID   famotidine (PEPCID) tablet 20 mg Daily   insulin lispro (HUMALOG) injection pen 10 Units TID AC   insulin glargine (LANTUS) injection vial 35 Units QAM   OLANZapine (ZYPREXA) tablet 10 mg Nightly   sennosides-docusate sodium (SENOKOT-S) 8.6-50 MG tablet 1 tablet Daily   lisinopril (PRINIVIL;ZESTRIL) tablet 5 mg Daily   carvedilol (COREG) tablet 3.125 mg BID WC   ipratropium-albuterol (DUONEB) nebulizer solution 3 mL Q4H PRN   ondansetron (ZOFRAN) injection 4 mg Q6H PRN   HYDROcodone-acetaminophen (NORCO) 5-325 MG per tablet 2 tablet Q6H PRN   fentaNYL (SUBLIMAZE) injection 50 mcg Q3H PRN   sodium chloride flush 0.9 % injection 10 mL 2 times per day   sodium chloride flush 0.9 % injection 10 mL PRN   acetaminophen (TYLENOL) tablet 650 mg Q6H PRN   enoxaparin (LOVENOX) injection 40 mg Daily   glucose (GLUTOSE) 40 % oral gel 15 g PRN   dextrose 50 % solution 12.5 g PRN   glucagon (rDNA) injection 1 mg PRN   dextrose 5 % solution PRN     All medication charted and reviewed. ALLERGIES     is allergic to levofloxacin and pcn [penicillins]. FAMILY HISTORY     has no family status information on file. Family history is unknown by patient. The patient denies any pertinent family history. I have reviewed and agree with the family history entered. I have reviewed the Family History and it is not significant to the case    SOCIAL HISTORY      reports that she has been smoking Cigarettes. She has been smoking about 1.00 pack per day. She has never used smokeless tobacco. She reports that she drinks alcohol. She reports that she does not use drugs. I have reviewed and agree with all Social.  There are no concerns for substance abuse/use. PHYSICAL EXAM     INITIAL VITALS:  height is 5' 1\" (1.549 m) and weight is 175 lb (79.4 kg).  Her oral temperature Concepcion Schaefer MD  Observation Resident      RADIOLOGY:   I directly visualized the following  images and reviewed the radiologist interpretations:    Xr Chest Standard (2 Vw)    Result Date: 11/14/2017  EXAMINATION: TWO VIEWS OF THE CHEST 11/14/2017 11:12 am COMPARISON: September 19, 2017 HISTORY: Right anterior chest pain FINDINGS: Postsurgical changes of CABG are re- demonstrated. Stable left-sided ICD which partially obscures the left mid lung field. Stable borderline cardiomegaly. No consolidation, sizable or pneumothorax. Osseous structures grossly intact without acute process. No acute process. Ct Chest Pulmonary Embolism W Contrast    Result Date: 11/14/2017  EXAMINATION: CTA OF THE CHEST 11/14/2017 2:07 pm TECHNIQUE: CTA of the chest was performed after the administration of intravenous contrast.  Multiplanar reformatted images are provided for review. MIP images are provided for review. Dose modulation, iterative reconstruction, and/or weight based adjustment of the mA/kV was utilized to reduce the radiation dose to as low as reasonably achievable. 75 mL intravenous Optiray 350 was used. COMPARISON: None. HISTORY: ORDERING SYSTEM PROVIDED HISTORY: r/o pe FINDINGS: Pulmonary Arteries: Pulmonary arteries are adequately opacified for evaluation. No evidence of intraluminal filling defect to suggest pulmonary embolism. Main pulmonary artery is normal in caliber. Mediastinum: No evidence of mediastinal lymphadenopathy. The heart and pericardium demonstrate no acute abnormality. Heart is mildly enlarged. There is extensive calcification of the native coronary arteries with evidence of previous CABG. Cardiac pacemaker device is noted. There is no acute abnormality of the thoracic aorta. There is a tiny hiatal hernia. Lungs/pleura: The lungs are without acute process. No focal consolidation or pulmonary edema. No evidence of pleural effusion or pneumothorax.  Upper Abdomen: Limited images of the +------------------------------------+----------+---------------+----------+ ! Dist Femoral                        !Yes       ! Yes            ! None      ! +------------------------------------+----------+---------------+----------+ ! Popliteal                           !Yes       ! Yes            ! None      ! +------------------------------------+----------+---------------+----------+ ! Sapheno Femoral Junction            ! Yes       ! Yes            ! None      ! +------------------------------------+----------+---------------+----------+ ! PTV                                 ! Yes       ! Yes            ! None      ! +------------------------------------+----------+---------------+----------+ ! Peroneal                            !Partial   !Yes            ! None      ! +------------------------------------+----------+---------------+----------+ ! Gastroc                             ! Yes       ! Yes            ! None      ! +------------------------------------+----------+---------------+----------+ ! GSV Thigh                           ! Yes       ! Yes            ! None      ! +------------------------------------+----------+---------------+----------+ ! GSV Knee                            ! No        !               !          ! +------------------------------------+----------+---------------+----------+ ! GSV Ankle                           ! Yes       ! Yes            ! None      ! +------------------------------------+----------+---------------+----------+ ! SSV                                 ! Yes       ! Yes            ! None      ! +------------------------------------+----------+---------------+----------+ Left Doppler Measurements +-----------------------------+------+------+------------------------------+ ! Location                     ! Signal!Reflux! Reflux (sec)                  ! +-----------------------------+------+------+------------------------------+ ! Common Femoral               !Phasic!      ! Glucose 109 (*)     All other components within normal limits   D-DIMER, QUANTITATIVE   IMMATURE PLATELET FRACTION   TROPONIN   TROPONIN   POCT TROPONIN   POC GLUCOSE FINGERSTICK   POCT TROPONIN   POCT TROPONIN       HEART Risk Score for Chest Pain Patients      History and Physical Exam Suspicion Level   · Nausea/Vomiting   · Diaphoresis   · Radiation   · Related to Exertion  · Quality of Pain     EKG Interpretation  · Normal (0 pts)  · Non-Specific Repolarization Disturbance (1 pt)  · Significant ST-Depression (2 pts)     Age of Patient (in years)  · = 45 (0 pts)  · 46-64 (1 pt)  · = 65 (2 pts)    Risk Factors (number present)  · Hypercholesterolemia  · Hypertension  · Diabetes Mellitus  · Cigarette smoking  · Positive family history  · Obesity  · CAD  · Automatically get 2 points for - SLE, CKDz, HIV, Cocaine abuse     Troponin Levels  · = Normal Limit (0 pts)  · 1-3 Times Normal Limit (1 pt)  · > 3 Times Normal Limit (2 pts)      H&P ECG  Patient Age Risk Factors Troponins   0   Slight Normal   45   None   Normal level   1   Moderate Non-specific    46-64   1-2 risk factors   1-3 x Normal   2   High ST Changes   65 or older   3+ risk factors   3+ x Normal   Total 1 1 1 2 0     TOTAL: 5    Percent Risk for Major Adverse Cardiac Event (MACE)  0-3 pts indicates low risk for MACE   2.5% (DISCHARGE)   4-7 pts indicates moderate risk for MACE  20.3% (OBS)  8-10 pts indicates high risk for MACE   72.7% (EARLY INVASIVE TX)    CDU IMPRESSION / PLAN     Ronald Drummond is a 65 y.o. female presented to the emergency department with Acute onset of chest pain yesterday morning. Patient denies any activity at the time of her chest pain but did state that she became short of breath and lightheaded at the same time. Overnight, the patient's stated that her pain was well controlled and is considerably better. Primary Complaint: Acute chest pain localized to the left anterior chest with radiation into the back.   Chest pain

## 2017-11-15 NOTE — CARE COORDINATION
1500 Patient informs writer that she cannot ride in a taxi to go home and must ride in a wheelchair via [address]. Writer informs the patient that [facility] Walls requires medical necessity and the patient 's insurance might not pay for non emergency transport home from the hospital.  Patient states that she always requires [facility] to return home. Writer looked back to find that [facility] was used the last 2 visits to return home. Writer called [facility] and spoke to Margot who informed writer that if oxygen is needed, she will have to travel via stretcher. Transport is set up for 1930.
services. Pt is unsure how often each service is seeing her. Pt states she receives [employer] three times a week but she states this will end in 2 weeks. Pt is on 4l O2 supplied by [facility]. Pt states she usually is transported home by [facility] [employer] ambulance. [facility]OYA initiated, face sheet faxed. Pt is also current with [facility].          Electronically signed by Melissa Tran RN on 11/14/17 at 1:40 PM

## 2017-11-15 NOTE — CONSULTS
[facility]    Consult / H&P               Today's Date: 11/15/2017  Patient Name: Mitchell Schaefer  Date of admission: 11/14/2017 10:10 AM  Patient's age: 65 y.o., 1955  Admission Dx: Chest pain [R07.9]    Reason for Consult:  Cardiac evaluation    Requesting Physician: Charmian Burkitt, MD    CHIEF COMPLAINT:  Chest pain    History Obtained From:   electronic medical record    HISTORY OF PRESENT ILLNESS:      The patient is a 65 y.o.  female who is admitted to the hospital for chest pain which started yesterday morning. His chest pain on the right side of the chest non radiating, reproducible. She has some baseline shortness of breath she is on 4 L home oxygen she denies any worsening of shortness of breath. She also complained of some dizziness and blackout. Denies losing any consciousness. No blood per bowel incontinence or seizure-like activity. Her troponin's were negative. D- Dimer were elevated to 1.07. Doppler Lower extremity done was negative and  r/o any DVT. Past Medical History:   has a past medical history of Arthritis; Asthma; CAD (coronary artery disease); CHF (congestive heart failure) ([facility] [address] Clinical trial participant at discharge; COPD (chronic obstructive pulmonary disease) ([facility] [address] Diabetes mellitus ([facility] [address] Hepatitis C; Hyperlipidemia; Hypertension; Pneumonia; and Unspecified cerebral artery occlusion with cerebral infarction. Past Surgical History:   has a past surgical history that includes Tonsillectomy; Foot surgery; Cardiac surgery (5/6/2016); and Cardiac pacemaker placement. Home Medications:    Prior to Admission medications    Medication Sig Start Date End Date Taking?  Authorizing Provider   lisinopril (PRINIVIL;ZESTRIL) 5 MG tablet Take 5 mg by mouth daily   Yes Milena Gonzalez MD   carvedilol (COREG) 3.125 MG tablet Take 3.125 mg by mouth 2 times daily (with meals)   Yes Manohar Davis DO   aspirin 81 MG tablet Take 81 mg by mouth daily   Yes Historical Provider, MD   insulin glargine (LANTUS) 100 UNIT/ML injection vial Inject 35 Units into the skin every morning    Yes Historical Provider, MD   colchicine (COLCRYS) 0.6 MG tablet Take 1 tablet by mouth daily 7/15/16  Yes Ayaan Goldberg,    insulin lispro (HUMALOG KWIKPEN) 100 UNIT/ML pen Inject 10 Units into the skin 3 times daily (before meals). 1/27/15  Yes Bridget Osman MD   famotidine (PEPCID) 20 MG tablet Take 20 mg by mouth daily. Yes Historical Provider, MD   OXcarbazepine (TRILEPTAL) 300 MG tablet Take 300 mg by mouth 2 times daily Per pharmacy she is to be taking 300 mg twice daily but patient states that she only takes once daily.    Yes Historical Provider, MD   ipratropium-albuterol (DUONEB) 0.5-2.5 (3) MG/3ML SOLN nebulizer solution Inhale 3 mLs into the lungs every 4 hours as needed for Shortness of Breath 10/26/17   Shalini Burch MD   atorvastatin (LIPITOR) 20 MG tablet Take 20 mg by mouth daily    Tae Austin MD   naproxen (NAPROSYN) 500 MG tablet Take 1 tablet by mouth 2 times daily 9/16/17   Negin Woods MD   senna-docusate (PERICOLACE) 8.6-50 MG per tablet Take 1 tablet by mouth daily    Historical Provider, MD   OLANZapine (ZYPREXA) 10 MG tablet Take 1 tablet by mouth nightly 11/30/16   Dalila Woo MD      Current Facility-Administered Medications: nitroGLYCERIN (NITROSTAT) SL tablet 0.4 mg, 0.4 mg, Sublingual, Q5 Min PRN  fentaNYL (SUBLIMAZE) injection 50 mcg, 50 mcg, Intravenous, Once  OXcarbazepine (TRILEPTAL) tablet 300 mg, 300 mg, Oral, BID  famotidine (PEPCID) tablet 20 mg, 20 mg, Oral, Daily  insulin lispro (HUMALOG) injection pen 10 Units, 10 Units, Subcutaneous, TID AC  insulin glargine (LANTUS) injection vial 35 Units, 35 Units, Subcutaneous, QAM  OLANZapine (ZYPREXA) tablet 10 mg, 10 mg, Oral, Nightly  sennosides-docusate sodium (SENOKOT-S) 8.6-50 MG tablet 1 tablet, 1 tablet, Oral, Daily  lisinopril (PRINIVIL;ZESTRIL) tablet 5 mg, 5 mg, Oral, Daily  carvedilol (COREG) tablet 3.125 mg, 3.125 mg, Oral, BID WC  ipratropium-albuterol (DUONEB) nebulizer solution 3 mL, 1 vial, Inhalation, Q4H PRN  ondansetron (ZOFRAN) injection 4 mg, 4 mg, Intravenous, Q6H PRN  HYDROcodone-acetaminophen (NORCO) 5-325 MG per tablet 2 tablet, 2 tablet, Oral, Q6H PRN  fentaNYL (SUBLIMAZE) injection 50 mcg, 50 mcg, Intravenous, Q3H PRN  sodium chloride flush 0.9 % injection 10 mL, 10 mL, Intravenous, 2 times per day  sodium chloride flush 0.9 % injection 10 mL, 10 mL, Intravenous, PRN  acetaminophen (TYLENOL) tablet 650 mg, 650 mg, Oral, Q6H PRN  enoxaparin (LOVENOX) injection 40 mg, 40 mg, Subcutaneous, Daily  glucose (GLUTOSE) 40 % oral gel 15 g, 15 g, Oral, PRN  dextrose 50 % solution 12.5 g, 12.5 g, Intravenous, PRN  glucagon (rDNA) injection 1 mg, 1 mg, Intramuscular, PRN  dextrose 5 % solution, 100 mL/hr, Intravenous, PRN    Allergies:  Levofloxacin and Pcn [penicillins]    Social History:   reports that she has been smoking Cigarettes. She has been smoking about 1.00 pack per day. She has never used smokeless tobacco. She reports that she drinks alcohol. She reports that she does not use drugs. Family History: Family history is unknown by patient. No h/o sudden cardiac death. for premature CAD    REVIEW OF SYSTEMS:    · Constitutional: there has been no unanticipated weight loss. There's been No change in energy level, No change in activity level. · Eyes: No visual changes or diplopia. No scleral icterus. · ENT: No Headaches  · Cardiovascular: As above. · Respiratory: No previous pulmonary problems, No cough  · Gastrointestinal: No abdominal pain. No change in bowel or bladder habits. · Genitourinary: No dysuria, trouble voiding, or hematuria. · Musculoskeletal:  No gait disturbance, No weakness or joint complaints. · Integumentary: No rash or pruritis. · Neurological: No headache, diplopia, change in muscle strength, numbness or tingling.  No change in gait, balance, coordination, mood, affect, memory, mentation, behavior. · Psychiatric: No anxiety, or depression. · Endocrine: No temperature intolerance. No excessive thirst, fluid intake, or urination. No tremor. · Hematologic/Lymphatic: No abnormal bruising or bleeding, blood clots or swollen lymph nodes. · Allergic/Immunologic: No nasal congestion or hives. PHYSICAL EXAM:      /77   Pulse 85   Temp 98.5 °F (36.9 °C) (Oral)   Resp 16   Ht 5' 1\" (1.549 m)   Wt 175 lb (79.4 kg)   LMP  (LMP Unknown)   SpO2 99%   BMI 33.07 kg/m²      Constitutional and General Appearance: alert, cooperative, no distress and appears stated age  HEENT: PERRL, no cervical lymphadenopathy. No masses palpable. Normal oral mucosa  Respiratory:  · Normal excursion and expansion without use of accessory muscles  · Resp Auscultation: Good respiratory effort. No for increased work of breathing. On auscultation: clear to auscultation bilaterally  Cardiovascular:  · The apical impulse is not displaced  · Heart tones are crisp and normal. regular S1 and S2.  · Jugular venous pulsation Normal  · The carotid upstroke is normal in amplitude and contour without delay or bruit  · Peripheral pulses are symmetrical and full   Abdomen:   · No masses or tenderness  · Bowel sounds present  Extremities:  ·  No Cyanosis or Clubbing  ·  Lower extremity edema: No  ·  Skin: Warm and dry  Neurological:  · Alert and oriented. · Moves all extremities well  · No abnormalities of mood, affect, memory, mentation, or behavior are noted      DATA:    Diagnostics:    ICD 3/13/17: Medtronic device placed by Dr. Mckinley Oliver for ICM.    CABG 9/1/16: LIMA-LAD, SVG-OM3.      CATH 8/15/16: Severe MVD. Normal right-sided pulmonary and wedge pressure.      ECHO 5/6/16: EF 15-20% with wall motion abnormalities, DD, mild MR/TR, RVSP is 41 mmHg. Trivial circumferential PCE.      1. CAD, s/p STEMI on 05/06/2016.  Coronary angiography showed LMCA mild disease,

## 2017-11-15 NOTE — PLAN OF CARE
Problem: Pain:  Goal: Pain level will decrease  Pain level will decrease   Outcome: Ongoing    Goal: Control of acute pain  Control of acute pain   Outcome: Ongoing    Goal: Control of chronic pain  Control of chronic pain   Outcome: Ongoing      Problem: Falls - Risk of  Goal: Absence of falls  Outcome: Ongoing

## 2017-11-17 ENCOUNTER — CARE COORDINATION (OUTPATIENT)
Dept: FAMILY MEDICINE CLINIC | Age: 62
End: 2017-11-17

## 2017-11-20 NOTE — CARE COORDINATION
script. Does the patient have a list of all the medications that were prescribed to be taken after discharge? yes   Does the patient have all the medications that were prescribed?  no - See notes. Patient refused to take Salome Roldan and does not have script in the home   Is the patient taking all the prescribed medications?  no - See previous notes   Does the patient understand what all the medications are taken for? yes      ( Update medication list and refresh medication smartlinks below)        All Active Meds in Chart - (keep all current active meds, add hospital meds)  Current Outpatient Prescriptions   Medication Sig Dispense Refill    atorvastatin (LIPITOR) 20 MG tablet Take 20 mg by mouth daily      lisinopril (PRINIVIL;ZESTRIL) 5 MG tablet Take 5 mg by mouth daily      carvedilol (COREG) 3.125 MG tablet Take 3.125 mg by mouth 2 times daily (with meals)      naproxen (NAPROSYN) 500 MG tablet Take 1 tablet by mouth 2 times daily 30 tablet 0    OLANZapine (ZYPREXA) 10 MG tablet Take 1 tablet by mouth nightly 30 tablet 0    aspirin 81 MG tablet Take 81 mg by mouth daily      insulin glargine (LANTUS) 100 UNIT/ML injection vial Inject 35 Units into the skin every morning       famotidine (PEPCID) 20 MG tablet Take 20 mg by mouth daily.  OXcarbazepine (TRILEPTAL) 300 MG tablet Take 300 mg by mouth 2 times daily Per pharmacy she is to be taking 300 mg twice daily but patient states that she only takes once daily.  ipratropium-albuterol (DUONEB) 0.5-2.5 (3) MG/3ML SOLN nebulizer solution Inhale 3 mLs into the lungs every 4 hours as needed for Shortness of Breath 360 mL 2    senna-docusate (PERICOLACE) 8.6-50 MG per tablet Take 1 tablet by mouth daily      colchicine (COLCRYS) 0.6 MG tablet Take 1 tablet by mouth daily 30 tablet 3    insulin lispro (HUMALOG KWIKPEN) 100 UNIT/ML pen Inject 10 Units into the skin 3 times daily (before meals).  2 Pen 3     No current facility-administered medications for this visit. Current Medications (record all meds as 'taken' or 'not taken' in home med activity)  Outpatient Prescriptions Marked as Taking for the 11/17/17 encounter (Care Coordinator Phone) with Swapna Rizo RN   Medication Sig Dispense Refill    atorvastatin (LIPITOR) 20 MG tablet Take 20 mg by mouth daily      lisinopril (PRINIVIL;ZESTRIL) 5 MG tablet Take 5 mg by mouth daily      carvedilol (COREG) 3.125 MG tablet Take 3.125 mg by mouth 2 times daily (with meals)      naproxen (NAPROSYN) 500 MG tablet Take 1 tablet by mouth 2 times daily 30 tablet 0    OLANZapine (ZYPREXA) 10 MG tablet Take 1 tablet by mouth nightly 30 tablet 0    aspirin 81 MG tablet Take 81 mg by mouth daily      insulin glargine (LANTUS) 100 UNIT/ML injection vial Inject 35 Units into the skin every morning       famotidine (PEPCID) 20 MG tablet Take 20 mg by mouth daily.  OXcarbazepine (TRILEPTAL) 300 MG tablet Take 300 mg by mouth 2 times daily Per pharmacy she is to be taking 300 mg twice daily but patient states that she only takes once daily. Are there any questions about how the patient should take care of herself? Yes  Ms Tim Cornelius requires home health aid daily Monday- Friday for bathing assistance, dressing and Grooming   Does the patient understand her diet regimen? yes   Does the patient understand his or her activity level? yes   Does the patient understand how to care for her wound? N/A   Does the patient understand how to monitor her weight?  yes   Does the patient understand what to do if she becomes short of breath? yes   Does the patient understand what to do if she has increased edema? yes    Does the patient understand how to monitor vital signs? Blood Pressure?  no - unable to perform independently    Heart Rate?  no - unable to perform    Blood sugar?  yes    Is the patient having any trouble with ADL's?  Yes   She has paralysis of left arm and is partial assistance for bathing, dressing, grooming, dressing and has passport home aid daily   Any problems showering or bathing? Yes unable to perform independently   Does the patient have trouble eating or feeding themselves? No   Is the patient having trouble getting out of bed or going to the toilet? No   Is the patient able to move around as expected? No  Patient has paralysis       Home care services initiated: No     Services provided: CHF Nurse      Does that patient have equipment needs? No   Does the patient have the appropriate equipment? Yes   Can the patient use the equipment properly and safely? Yes    Reinforce Follow-Up  - Does patient have an appointment with her PCP? yes  - Does patient have an appointment with her specialist physicians? Yes        Follow up appointment date: (7 days for more severe illness, 14 days for others)  No future appointments. Dr Albin Morris  11/16/17    Other Interventions or Actions taken as result of  Assessment  - Lungs clear. She denies chest pain and is able to resume routine activities without pain or shortness of breath.          BESS Rios [place]

## 2017-11-22 ENCOUNTER — HOSPITAL ENCOUNTER (INPATIENT)
Age: 62
LOS: 6 days | Discharge: HOME HEALTH | DRG: 247 | End: 2017-11-28
Attending: EMERGENCY MEDICINE | Admitting: INTERNAL MEDICINE
Payer: COMMERCIAL

## 2017-11-22 DIAGNOSIS — R55 NEAR SYNCOPE: Primary | ICD-10-CM

## 2017-11-22 PROBLEM — I20.0 UNSTABLE ANGINA (HCC): Status: ACTIVE | Noted: 2017-11-22

## 2017-11-22 LAB
-: ABNORMAL
ALBUMIN SERPL-MCNC: 3.9 G/DL (ref 3.5–5.2)
ALBUMIN/GLOBULIN RATIO: 1 (ref 1–2.5)
ALP BLD-CCNC: 112 U/L (ref 35–104)
ALT SERPL-CCNC: 13 U/L (ref 5–33)
AMORPHOUS: ABNORMAL
ANION GAP SERPL CALCULATED.3IONS-SCNC: 11 MMOL/L (ref 9–17)
AST SERPL-CCNC: 20 U/L
BACTERIA: ABNORMAL
BILIRUB SERPL-MCNC: 0.34 MG/DL (ref 0.3–1.2)
BILIRUBIN URINE: NEGATIVE
BNP INTERPRETATION: ABNORMAL
BUN BLDV-MCNC: 24 MG/DL (ref 8–23)
BUN/CREAT BLD: ABNORMAL (ref 9–20)
CALCIUM SERPL-MCNC: 9 MG/DL (ref 8.6–10.4)
CASTS UA: ABNORMAL /LPF (ref 0–8)
CHLORIDE BLD-SCNC: 100 MMOL/L (ref 98–107)
CO2: 28 MMOL/L (ref 20–31)
COLOR: ABNORMAL
CREAT SERPL-MCNC: 0.8 MG/DL (ref 0.5–0.9)
CRYSTALS, UA: ABNORMAL /HPF
EKG ATRIAL RATE: 86 BPM
EKG P AXIS: 31 DEGREES
EKG P-R INTERVAL: 156 MS
EKG Q-T INTERVAL: 330 MS
EKG QRS DURATION: 86 MS
EKG QTC CALCULATION (BAZETT): 394 MS
EKG R AXIS: -25 DEGREES
EKG T AXIS: 104 DEGREES
EKG VENTRICULAR RATE: 86 BPM
EPITHELIAL CELLS UA: ABNORMAL /HPF (ref 0–5)
GFR AFRICAN AMERICAN: >60 ML/MIN
GFR NON-AFRICAN AMERICAN: >60 ML/MIN
GFR SERPL CREATININE-BSD FRML MDRD: ABNORMAL ML/MIN/{1.73_M2}
GFR SERPL CREATININE-BSD FRML MDRD: ABNORMAL ML/MIN/{1.73_M2}
GLUCOSE BLD-MCNC: 110 MG/DL (ref 65–105)
GLUCOSE BLD-MCNC: 243 MG/DL (ref 65–105)
GLUCOSE BLD-MCNC: 73 MG/DL (ref 70–99)
GLUCOSE URINE: NEGATIVE
HCT VFR BLD CALC: 35.6 % (ref 36.3–47.1)
HEMOGLOBIN: 10.7 G/DL (ref 11.9–15.1)
KETONES, URINE: NEGATIVE
LEUKOCYTE ESTERASE, URINE: ABNORMAL
MCH RBC QN AUTO: 24 PG (ref 25.2–33.5)
MCHC RBC AUTO-ENTMCNC: 30.1 G/DL (ref 28.4–34.8)
MCV RBC AUTO: 80 FL (ref 82.6–102.9)
MUCUS: ABNORMAL
NITRITE, URINE: NEGATIVE
OTHER OBSERVATIONS UA: ABNORMAL
PDW BLD-RTO: 16 % (ref 11.8–14.4)
PH UA: 5 (ref 5–8)
PLATELET # BLD: 255 K/UL (ref 138–453)
PMV BLD AUTO: 9.6 FL (ref 8.1–13.5)
POC TROPONIN I: 0 NG/ML (ref 0–0.1)
POC TROPONIN I: 0 NG/ML (ref 0–0.1)
POC TROPONIN INTERP: NORMAL
POC TROPONIN INTERP: NORMAL
POTASSIUM SERPL-SCNC: 4.1 MMOL/L (ref 3.7–5.3)
PRO-BNP: 1599 PG/ML
PROTEIN UA: NEGATIVE
RBC # BLD: 4.45 M/UL (ref 3.95–5.11)
RBC UA: ABNORMAL /HPF (ref 0–4)
RENAL EPITHELIAL, UA: ABNORMAL /HPF
SODIUM BLD-SCNC: 139 MMOL/L (ref 135–144)
SPECIFIC GRAVITY UA: 1.02 (ref 1–1.03)
TOTAL PROTEIN: 7.9 G/DL (ref 6.4–8.3)
TRICHOMONAS: ABNORMAL
TROPONIN INTERP: NORMAL
TROPONIN INTERP: NORMAL
TROPONIN T: <0.03 NG/ML
TROPONIN T: <0.03 NG/ML
TURBIDITY: CLEAR
URINE HGB: ABNORMAL
UROBILINOGEN, URINE: NORMAL
WBC # BLD: 9.7 K/UL (ref 3.5–11.3)
WBC UA: ABNORMAL /HPF (ref 0–5)
YEAST: ABNORMAL

## 2017-11-22 PROCEDURE — 82947 ASSAY GLUCOSE BLOOD QUANT: CPT

## 2017-11-22 PROCEDURE — G0384 LEV 5 HOSP TYPE B ED VISIT: HCPCS

## 2017-11-22 PROCEDURE — 6370000000 HC RX 637 (ALT 250 FOR IP): Performed by: INTERNAL MEDICINE

## 2017-11-22 PROCEDURE — 85027 COMPLETE CBC AUTOMATED: CPT

## 2017-11-22 PROCEDURE — 93005 ELECTROCARDIOGRAM TRACING: CPT

## 2017-11-22 PROCEDURE — 36415 COLL VENOUS BLD VENIPUNCTURE: CPT

## 2017-11-22 PROCEDURE — 1200000000 HC SEMI PRIVATE

## 2017-11-22 PROCEDURE — 2580000003 HC RX 258: Performed by: EMERGENCY MEDICINE

## 2017-11-22 PROCEDURE — B2151ZZ FLUOROSCOPY OF LEFT HEART USING LOW OSMOLAR CONTRAST: ICD-10-PCS | Performed by: INTERNAL MEDICINE

## 2017-11-22 PROCEDURE — 6360000002 HC RX W HCPCS: Performed by: INTERNAL MEDICINE

## 2017-11-22 PROCEDURE — 83880 ASSAY OF NATRIURETIC PEPTIDE: CPT

## 2017-11-22 PROCEDURE — 027135Z DILATION OF CORONARY ARTERY, TWO ARTERIES WITH TWO DRUG-ELUTING INTRALUMINAL DEVICES, PERCUTANEOUS APPROACH: ICD-10-PCS | Performed by: INTERNAL MEDICINE

## 2017-11-22 PROCEDURE — 84484 ASSAY OF TROPONIN QUANT: CPT

## 2017-11-22 PROCEDURE — 81001 URINALYSIS AUTO W/SCOPE: CPT

## 2017-11-22 PROCEDURE — 80053 COMPREHEN METABOLIC PANEL: CPT

## 2017-11-22 PROCEDURE — B2111ZZ FLUOROSCOPY OF MULTIPLE CORONARY ARTERIES USING LOW OSMOLAR CONTRAST: ICD-10-PCS | Performed by: INTERNAL MEDICINE

## 2017-11-22 PROCEDURE — 4A023N7 MEASUREMENT OF CARDIAC SAMPLING AND PRESSURE, LEFT HEART, PERCUTANEOUS APPROACH: ICD-10-PCS | Performed by: INTERNAL MEDICINE

## 2017-11-22 PROCEDURE — B2131ZZ FLUOROSCOPY OF MULTIPLE CORONARY ARTERY BYPASS GRAFTS USING LOW OSMOLAR CONTRAST: ICD-10-PCS | Performed by: INTERNAL MEDICINE

## 2017-11-22 RX ORDER — SODIUM CHLORIDE 0.9 % (FLUSH) 0.9 %
10 SYRINGE (ML) INJECTION EVERY 12 HOURS SCHEDULED
Status: DISCONTINUED | OUTPATIENT
Start: 2017-11-22 | End: 2017-11-28 | Stop reason: HOSPADM

## 2017-11-22 RX ORDER — LISINOPRIL 5 MG/1
5 TABLET ORAL DAILY
Status: DISCONTINUED | OUTPATIENT
Start: 2017-11-22 | End: 2017-11-24

## 2017-11-22 RX ORDER — SODIUM CHLORIDE 0.9 % (FLUSH) 0.9 %
10 SYRINGE (ML) INJECTION PRN
Status: DISCONTINUED | OUTPATIENT
Start: 2017-11-22 | End: 2017-11-28 | Stop reason: HOSPADM

## 2017-11-22 RX ORDER — IPRATROPIUM BROMIDE AND ALBUTEROL SULFATE 2.5; .5 MG/3ML; MG/3ML
1 SOLUTION RESPIRATORY (INHALATION) EVERY 4 HOURS PRN
Status: DISCONTINUED | OUTPATIENT
Start: 2017-11-22 | End: 2017-11-28 | Stop reason: HOSPADM

## 2017-11-22 RX ORDER — FAMOTIDINE 20 MG/1
20 TABLET, FILM COATED ORAL DAILY
Status: DISCONTINUED | OUTPATIENT
Start: 2017-11-23 | End: 2017-11-28 | Stop reason: HOSPADM

## 2017-11-22 RX ORDER — COLCHICINE 0.6 MG/1
0.6 TABLET ORAL DAILY
Status: DISCONTINUED | OUTPATIENT
Start: 2017-11-23 | End: 2017-11-28 | Stop reason: HOSPADM

## 2017-11-22 RX ORDER — ACETAMINOPHEN 325 MG/1
650 TABLET ORAL EVERY 4 HOURS PRN
Status: DISCONTINUED | OUTPATIENT
Start: 2017-11-22 | End: 2017-11-28 | Stop reason: HOSPADM

## 2017-11-22 RX ORDER — ATORVASTATIN CALCIUM 20 MG/1
20 TABLET, FILM COATED ORAL DAILY
Status: DISCONTINUED | OUTPATIENT
Start: 2017-11-22 | End: 2017-11-27

## 2017-11-22 RX ORDER — INSULIN GLARGINE 100 [IU]/ML
35 INJECTION, SOLUTION SUBCUTANEOUS EVERY MORNING
Status: DISCONTINUED | OUTPATIENT
Start: 2017-11-23 | End: 2017-11-24

## 2017-11-22 RX ORDER — 0.9 % SODIUM CHLORIDE 0.9 %
500 INTRAVENOUS SOLUTION INTRAVENOUS ONCE
Status: COMPLETED | OUTPATIENT
Start: 2017-11-22 | End: 2017-11-22

## 2017-11-22 RX ORDER — SENNA AND DOCUSATE SODIUM 50; 8.6 MG/1; MG/1
1 TABLET, FILM COATED ORAL DAILY
Status: DISCONTINUED | OUTPATIENT
Start: 2017-11-22 | End: 2017-11-28 | Stop reason: HOSPADM

## 2017-11-22 RX ORDER — NAPROXEN 250 MG/1
500 TABLET ORAL 2 TIMES DAILY
Status: DISCONTINUED | OUTPATIENT
Start: 2017-11-22 | End: 2017-11-27

## 2017-11-22 RX ORDER — ONDANSETRON 2 MG/ML
4 INJECTION INTRAMUSCULAR; INTRAVENOUS EVERY 6 HOURS PRN
Status: DISCONTINUED | OUTPATIENT
Start: 2017-11-22 | End: 2017-11-28 | Stop reason: HOSPADM

## 2017-11-22 RX ORDER — OXCARBAZEPINE 300 MG/1
300 TABLET, FILM COATED ORAL 2 TIMES DAILY
Status: DISCONTINUED | OUTPATIENT
Start: 2017-11-22 | End: 2017-11-28 | Stop reason: HOSPADM

## 2017-11-22 RX ORDER — CARVEDILOL 3.12 MG/1
3.12 TABLET ORAL 2 TIMES DAILY WITH MEALS
Status: DISCONTINUED | OUTPATIENT
Start: 2017-11-22 | End: 2017-11-24

## 2017-11-22 RX ORDER — ASPIRIN 81 MG/1
81 TABLET ORAL DAILY
Status: DISCONTINUED | OUTPATIENT
Start: 2017-11-23 | End: 2017-11-28 | Stop reason: HOSPADM

## 2017-11-22 RX ORDER — OLANZAPINE 10 MG/1
10 TABLET ORAL NIGHTLY
Status: DISCONTINUED | OUTPATIENT
Start: 2017-11-22 | End: 2017-11-28 | Stop reason: HOSPADM

## 2017-11-22 RX ADMIN — SODIUM CHLORIDE 500 ML: 9 INJECTION, SOLUTION INTRAVENOUS at 11:45

## 2017-11-22 RX ADMIN — ATORVASTATIN CALCIUM 20 MG: 20 TABLET, FILM COATED ORAL at 18:55

## 2017-11-22 RX ADMIN — INSULIN LISPRO 2 UNITS: 100 INJECTION, SOLUTION INTRAVENOUS; SUBCUTANEOUS at 21:08

## 2017-11-22 RX ADMIN — ENOXAPARIN SODIUM 40 MG: 40 INJECTION SUBCUTANEOUS at 18:55

## 2017-11-22 RX ADMIN — LISINOPRIL 5 MG: 5 TABLET ORAL at 18:58

## 2017-11-22 RX ADMIN — DOCUSATE SODIUM -SENNOSIDES 1 TABLET: 50; 8.6 TABLET, COATED ORAL at 18:56

## 2017-11-22 RX ADMIN — OLANZAPINE 10 MG: 10 TABLET, FILM COATED ORAL at 21:07

## 2017-11-22 RX ADMIN — NAPROXEN 500 MG: 250 TABLET ORAL at 21:07

## 2017-11-22 RX ADMIN — CARVEDILOL 3.12 MG: 3.12 TABLET, FILM COATED ORAL at 18:55

## 2017-11-22 RX ADMIN — OXCARBAZEPINE 300 MG: 300 TABLET, FILM COATED ORAL at 21:07

## 2017-11-22 ASSESSMENT — ENCOUNTER SYMPTOMS
DIARRHEA: 0
PHOTOPHOBIA: 0
COUGH: 0
HEMOPTYSIS: 0
VOMITING: 0
ORTHOPNEA: 0
NAUSEA: 0
ABDOMINAL PAIN: 0
SPUTUM PRODUCTION: 0
CONSTIPATION: 0
BACK PAIN: 0
SINUS PRESSURE: 0
BLURRED VISION: 1
EYE PAIN: 0
SINUS PAIN: 0
HEARTBURN: 0
CHEST TIGHTNESS: 0
SHORTNESS OF BREATH: 1
DOUBLE VISION: 0
SORE THROAT: 0
SHORTNESS OF BREATH: 0
EYE DISCHARGE: 0

## 2017-11-22 ASSESSMENT — PAIN SCALES - GENERAL
PAINLEVEL_OUTOF10: 0
PAINLEVEL_OUTOF10: 0
PAINLEVEL_OUTOF10: 10
PAINLEVEL_OUTOF10: 0
PAINLEVEL_OUTOF10: 3

## 2017-11-22 ASSESSMENT — PAIN DESCRIPTION - PAIN TYPE: TYPE: CHRONIC PAIN

## 2017-11-22 ASSESSMENT — PAIN DESCRIPTION - LOCATION: LOCATION: ABDOMEN

## 2017-11-22 NOTE — PLAN OF CARE
Problem: Respiratory  Intervention: Respiratory assessment  BRONCHOSPASM/BRONCHOCONSTRICTION     [x]         IMPROVE AERATION/BREATH SOUNDS  [x]   ADMINISTER BRONCHODILATOR THERAPY AS APPROPRIATE  [x]   ASSESS BREATH SOUNDS  []   IMPLEMENT AEROSOL/MDI PROTOCOL  [x]   PATIENT EDUCATION AS NEEDED    Pt states she wears oxygen at 4lpm at home.   Pt state she has taken breathing treatments at home in the past, nothing currently

## 2017-11-22 NOTE — ED NOTES
Pt uses bedside commode. She is aware of plan for admission and the wait for a clean bed.       Kayleigh Jarrell RN  11/22/17 [id number]

## 2017-11-22 NOTE — PROGRESS NOTES
Cardiac Testing      STRESS 11/15/17:   There is apparent remote infarction within the inferior wall and   moderate-sized partially reversible perfusion defect within the anterior   wall, concerning for ischemia. WMA. EF 36%     ICD 3/13/17: Medtronic device placed by Dr. Nieves Paredes for ICM.    CABG 9/1/16: LIMA-LAD, SVG-OM3.      CATH 8/15/16: Severe MVD. Normal right-sided pulmonary and wedge pressure.      ECHO 5/6/16: EF 15-20% with wall motion abnormalities, DD, mild MR/TR, RVSP is 41 mmHg. Trivial circumferential PCE.      1. CAD, s/p STEMI on 05/06/2016. Coronary angiography showed LMCA mild disease, LAD 99% proximal stenosis, reduced to 0% using a 2.75 x 23 mm BMS, 80% stenosis proximal RCA, reduced to 0% using 2.75 x 23 BMS, 100% very distal PDA occlusion, small in caliber, LCx mild disease and OM3 70% stenosis with JOHNNA III flow. 2. Hypertension   3. Ischemic cardiomyopathy. 4. CKD. 5. Cardiac cath 08/15/16: Severe MV CAD. EF 35%   6. CABG 09/01/16: LIMA-LAD, SVG-OM3.   7. AICD implant 03/13/17   8. Hx of CVA with residual left sided weakness.

## 2017-11-22 NOTE — PROGRESS NOTES
SHORT CALL NOTE:     65year old female with significant past medical history of coronary artery disease, status post CABG, stents, hypertension, ischemic cardiomyopathy, CK D, AICD implantation, history of CVA with residual left-sided weakness presenting after an episode of near syncope. Pt seen and examined in the ED. Pt is alert and oriented. Denies any chest pain, SOB, nausea, vomiting. ASSESSMENT AND PLAN:    Principal Problem:    Dizziness  Active Problems:    Hypertension    Diabetes type 2, uncontrolled ([address]    History of CVA (cerebrovascular accident)    Near syncope    ST elevation myocardial infarction involving left anterior descending (LAD) coronary artery (HCC)    S/P implantation of automatic cardioverter/defibrillator (AICD) 3/13/1- Dr. Rachel Borden    Unstable angina [facility])    Dizziness with extensive cardiac history  Cardiology consult : plan for cardiac cath on Friday   Trend troponin  BNP at baseline   Orthostatic vitals, but was given IV fluids in the ER    DM   Monitor Glucose  Insulin sliding scale     Diet- cardiac   GI prophylaxis- Pepcid  DVT prophylaxis- Lovenox  Consults- cardiology  Outpatient needs- pending    Allena Najjar, MD   PGY-1  Department of Internal Medicine  [facility]         11/22/2017, 4:42 PM    I have discussed the care of Zuri Mora , including pertinent history and exam findings,    today with the resident. I have seen and examined the patient and the key elements of all parts of the encounter have been performed by me . I agree with the assessment, plan and orders as documented by the resident.      Principal Problem:    Dizziness  Active Problems:    Hypertension    Diabetes type 2, uncontrolled ([facility] [address]    History of CVA (cerebrovascular accident)    Near syncope    ST elevation myocardial infarction involving left anterior descending (LAD) coronary artery (HCC)    S/P implantation of automatic cardioverter/defibrillator (AICD) 3/13/1- Dr. Dumont Agent    Unstable angina [facility])        Symptoms or ailment  course ;                                   are improving over time.         Electronically signed by Misty Weathers MD

## 2017-11-22 NOTE — ED NOTES
Bed: 33  Expected date:   Expected time:   Means of arrival:   Comments:  [id number] Rossana Zacarias RN  11/22/17 [id number]

## 2017-11-22 NOTE — ED NOTES
Report attempted to [facility]. RN states that she just got a patient and that she is going to talk to the charge. Told to call back. Report to Ana Cabrera RN. Pt remains alert and oriented. Vitals stable.       Genaro Hartman RN  11/22/17 [id number]

## 2017-11-22 NOTE — H&P
Internal Medicine         History and Physical Examination         NAME:  Azra Michaud  MRN:  [id number]  Acct: [de-identified]   : 1955  PCP:Damaris Maldonado MD  Admit date:2017  History Obtained From:  Patient     CHIEF COMPLAINT:  \"Dizziness\"    HISTORY OF PRESENT ILLNESS:    The patient is a 65 y.o. female with significant past medical history of coronary artery disease, status post CABG, stents, hypertension, ischemic cardiomyopathy, CK D, AICD implantation, history of CVA with residual left-sided weakness , type 2 diabetes presented with complaints of lightheadedness. She states that she has episodes of lightheadedness and near syncope. She is not short of breath. She says that her vision closes and on her when this happens. She reports that she feels dizzy when she gets up from sitting position sometimes but on my encounter she felt dizzy while she was laying back in the bed while talking with me. He reports this does dizzy spell lasts for about 20 minutes every time. She has never lost her consciousness during these episodes. She was seen for the same complaint of week ago and was admitted in the observation unit. She got a stress test done which showed dyskinesis of the apex, severe hypokinesis of the septum, inferoseptal wall and inferior wall with calculated ejection fraction of 36%. She has residual left upper extremity spasticity from previous CVA. She has been eating and drinking properly. [facility] before which she was living by herself. REVIEW OF SYSTEMS:  Review of Systems   Constitutional: Negative for chills, diaphoresis, fever, malaise/fatigue and weight loss. HENT: Negative for congestion, ear discharge, ear pain, hearing loss, nosebleeds, sinus pain and tinnitus. Eyes: Positive for blurred vision (during the dizzy spells). Negative for double vision, photophobia and pain.    Respiratory: Negative for cough, hemoptysis, sputum production and shortness of breath. Cardiovascular: Negative for chest pain, palpitations and orthopnea. Gastrointestinal: Negative for abdominal pain, constipation, diarrhea, heartburn, nausea and vomiting. Genitourinary: Negative for dysuria, frequency and urgency. Musculoskeletal: Negative for back pain, joint pain, myalgias and neck pain. Skin: Negative for itching and rash. Neurological: Positive for dizziness and weakness. Negative for tingling, tremors, sensory change, speech change, focal weakness, seizures, loss of consciousness and headaches. PAST MEDICAL HISTORY:  Past Medical History:   Diagnosis Date    Arthritis     Asthma     CAD (coronary artery disease)     [facility]     Clinical trial participant at discharge 03/13/2017    [employer]     [facility]     Diabetes mellitus [facility]     Hepatitis C     Hyperlipidemia     Hypertension     Pneumonia     Unspecified cerebral artery occlusion with cerebral infarction        PAST SURGICAL HISTORY:   Past Surgical History:   Procedure Laterality Date    CARDIAC PACEMAK[facility] PLACEMENT      [facility]    cardiac cath-1xBMS prox. RCA; 1xBMS prox. LAD    FOOT SURGERY      cyst removed from L foot    TONSILLECTOMY         MEDICATIONS PRIOR TO ADMISSION:  Not in a hospital admission.     ALLERGIES:  Levofloxacin and Pcn [penicillins]    SOCIAL HISTORY:  Social History     Social History    Marital status: Single     Spouse name: N/A    Number of children: N/A    Years of education: N/A     Social History Main Topics    Smoking status: Current Every Day Smoker     Packs/day: 1.00     Types: Cigarettes    Smokeless tobacco: Never Used    Alcohol use Yes      Comment: 1 bottle per month wine    Drug use: No    Sexual activity: Yes     Partners: Male     Other Topics Concern    None     Social History Narrative    None       FAMILY HISTORY:  Family History   Problem Relation Age of Onset    Family history unknown: Yes       PHYSICAL EXAM:  BP (!) 143/78   Pulse 87   Temp 98.3 °F (36.8 °C) (Oral)   Resp 15   Wt 175 lb (79.4 kg)   LMP  (LMP Unknown)   SpO2 100%   BMI 33.07 kg/m²   Physical Exam   Constitutional: She is oriented to person, place, and time. She appears well-developed and well-nourished. No distress. HENT:   Head: Normocephalic and atraumatic. Eyes: Conjunctivae and EOM are normal. Pupils are equal, round, and reactive to light. Neck: Normal range of motion. Neck supple. Cardiovascular: Normal rate and regular rhythm. Exam reveals friction rub. Exam reveals no gallop. No murmur heard. Pulmonary/Chest: Effort normal and breath sounds normal. No respiratory distress. She has no wheezes. She has no rales. She exhibits no tenderness. Abdominal: Soft. Bowel sounds are normal. She exhibits no distension. There is no tenderness. Musculoskeletal: Normal range of motion. She exhibits no edema, tenderness or deformity. Neurological: She is alert and oriented to person, place, and time. She has normal reflexes. Left arm spasticity   Skin: Skin is warm. She is not diaphoretic.        DATA:  CBC:   Lab Results   Component Value Date    WBC 9.7 11/22/2017    RBC 4.45 11/22/2017    HGB 10.7 11/22/2017    HCT 35.6 11/22/2017    MCV 80.0 11/22/2017    MCH 24.0 11/22/2017    MCHC 30.1 11/22/2017    RDW 16.0 11/22/2017     11/22/2017    MPV 9.6 11/22/2017     BMP:   Lab Results   Component Value Date     11/22/2017    K 4.1 11/22/2017     11/22/2017    CO2 28 11/22/2017    BUN 24 11/22/2017    LABALBU 3.9 11/22/2017    CREATININE 0.80 11/22/2017    CALCIUM 9.0 11/22/2017    GFRAA >60 11/22/2017    LABGLOM >60 11/22/2017    GLUCOSE 73 11/22/2017     Hepatic Function Panel:    Lab Results   Component Value Date    ALKPHOS 112 11/22/2017    ALT 13 11/22/2017    AST 20 plan and orders as documented by the resident. Principal Problem:    Dizziness  Active Problems:    Hypertension    Diabetes type 2, uncontrolled ([facility])    History of CVA (cerebrovascular accident)    Near syncope    ST elevation myocardial infarction involving left anterior descending (LAD) coronary artery ([facility])    S/P implantation of automatic cardioverter/defibrillator (AICD) 3/13/1- Dr. Dumont Agent    Unstable angina [facility])        Symptoms or ailment  course ;                                   are improving over time.         Electronically signed by Misty Weathers MD

## 2017-11-22 NOTE — ED NOTES
Pt is brought by EMS to room 33. Pt reports dizziness x20-30 minutes pta. She denies LOC and fall. She states that she has \"black out episodes. \"  Pt is also reporting chronic abd pain. Pt is alert, oriented, speaking in full sentences. Cardiac workup in progress.       Kayleigh Jarrell RN  11/22/17 [id number]

## 2017-11-23 LAB
ABSOLUTE EOS #: 0.42 K/UL (ref 0–0.44)
ABSOLUTE IMMATURE GRANULOCYTE: 0.06 K/UL (ref 0–0.3)
ABSOLUTE LYMPH #: 3.49 K/UL (ref 1.1–3.7)
ABSOLUTE MONO #: 0.91 K/UL (ref 0.1–1.2)
ANION GAP SERPL CALCULATED.3IONS-SCNC: 14 MMOL/L (ref 9–17)
BASOPHILS # BLD: 1 % (ref 0–2)
BASOPHILS ABSOLUTE: 0.06 K/UL (ref 0–0.2)
BUN BLDV-MCNC: 20 MG/DL (ref 8–23)
BUN/CREAT BLD: ABNORMAL (ref 9–20)
CALCIUM SERPL-MCNC: 8.5 MG/DL (ref 8.6–10.4)
CHLORIDE BLD-SCNC: 101 MMOL/L (ref 98–107)
CO2: 24 MMOL/L (ref 20–31)
CREAT SERPL-MCNC: 0.77 MG/DL (ref 0.5–0.9)
DIFFERENTIAL TYPE: ABNORMAL
EOSINOPHILS RELATIVE PERCENT: 5 % (ref 1–4)
GFR AFRICAN AMERICAN: >60 ML/MIN
GFR NON-AFRICAN AMERICAN: >60 ML/MIN
GFR SERPL CREATININE-BSD FRML MDRD: ABNORMAL ML/MIN/{1.73_M2}
GFR SERPL CREATININE-BSD FRML MDRD: ABNORMAL ML/MIN/{1.73_M2}
GLUCOSE BLD-MCNC: 135 MG/DL (ref 65–105)
GLUCOSE BLD-MCNC: 163 MG/DL (ref 65–105)
GLUCOSE BLD-MCNC: 237 MG/DL (ref 65–105)
GLUCOSE BLD-MCNC: 36 MG/DL (ref 70–99)
GLUCOSE BLD-MCNC: 39 MG/DL (ref 65–105)
HCT VFR BLD CALC: 31.4 % (ref 36.3–47.1)
HEMOGLOBIN: 9.7 G/DL (ref 11.9–15.1)
IMMATURE GRANULOCYTES: 1 %
LYMPHOCYTES # BLD: 39 % (ref 24–43)
MCH RBC QN AUTO: 24.1 PG (ref 25.2–33.5)
MCHC RBC AUTO-ENTMCNC: 30.9 G/DL (ref 28.4–34.8)
MCV RBC AUTO: 77.9 FL (ref 82.6–102.9)
MONOCYTES # BLD: 10 % (ref 3–12)
PDW BLD-RTO: 16.1 % (ref 11.8–14.4)
PLATELET # BLD: 184 K/UL (ref 138–453)
PLATELET ESTIMATE: ABNORMAL
PMV BLD AUTO: 10.1 FL (ref 8.1–13.5)
POTASSIUM SERPL-SCNC: 4.7 MMOL/L (ref 3.7–5.3)
RBC # BLD: 4.03 M/UL (ref 3.95–5.11)
RBC # BLD: ABNORMAL 10*6/UL
SEG NEUTROPHILS: 44 % (ref 36–65)
SEGMENTED NEUTROPHILS ABSOLUTE COUNT: 3.93 K/UL (ref 1.5–8.1)
SODIUM BLD-SCNC: 139 MMOL/L (ref 135–144)
TROPONIN INTERP: NORMAL
TROPONIN T: <0.03 NG/ML
WBC # BLD: 8.9 K/UL (ref 3.5–11.3)
WBC # BLD: ABNORMAL 10*3/UL

## 2017-11-23 PROCEDURE — 6370000000 HC RX 637 (ALT 250 FOR IP): Performed by: INTERNAL MEDICINE

## 2017-11-23 PROCEDURE — 84484 ASSAY OF TROPONIN QUANT: CPT

## 2017-11-23 PROCEDURE — 6360000002 HC RX W HCPCS: Performed by: INTERNAL MEDICINE

## 2017-11-23 PROCEDURE — 36415 COLL VENOUS BLD VENIPUNCTURE: CPT

## 2017-11-23 PROCEDURE — 82947 ASSAY GLUCOSE BLOOD QUANT: CPT

## 2017-11-23 PROCEDURE — 1200000000 HC SEMI PRIVATE

## 2017-11-23 PROCEDURE — 2580000003 HC RX 258: Performed by: INTERNAL MEDICINE

## 2017-11-23 PROCEDURE — 80048 BASIC METABOLIC PNL TOTAL CA: CPT

## 2017-11-23 PROCEDURE — 99223 1ST HOSP IP/OBS HIGH 75: CPT | Performed by: INTERNAL MEDICINE

## 2017-11-23 PROCEDURE — 85025 COMPLETE CBC W/AUTO DIFF WBC: CPT

## 2017-11-23 RX ORDER — DEXTROSE MONOHYDRATE 50 MG/ML
100 INJECTION, SOLUTION INTRAVENOUS PRN
Status: CANCELLED | OUTPATIENT
Start: 2017-11-23

## 2017-11-23 RX ORDER — DEXTROSE MONOHYDRATE 25 G/50ML
12.5 INJECTION, SOLUTION INTRAVENOUS PRN
Status: CANCELLED | OUTPATIENT
Start: 2017-11-23

## 2017-11-23 RX ORDER — NICOTINE POLACRILEX 4 MG
15 LOZENGE BUCCAL PRN
Status: CANCELLED | OUTPATIENT
Start: 2017-11-23

## 2017-11-23 RX ADMIN — NAPROXEN 500 MG: 250 TABLET ORAL at 08:32

## 2017-11-23 RX ADMIN — CARVEDILOL 3.12 MG: 3.12 TABLET, FILM COATED ORAL at 08:32

## 2017-11-23 RX ADMIN — FAMOTIDINE 20 MG: 20 TABLET, FILM COATED ORAL at 08:33

## 2017-11-23 RX ADMIN — ENOXAPARIN SODIUM 40 MG: 40 INJECTION SUBCUTANEOUS at 08:31

## 2017-11-23 RX ADMIN — ATORVASTATIN CALCIUM 20 MG: 20 TABLET, FILM COATED ORAL at 08:32

## 2017-11-23 RX ADMIN — OXCARBAZEPINE 300 MG: 300 TABLET, FILM COATED ORAL at 21:18

## 2017-11-23 RX ADMIN — Medication 10 ML: at 08:33

## 2017-11-23 RX ADMIN — COLCHICINE 0.6 MG: 0.6 TABLET, FILM COATED ORAL at 08:31

## 2017-11-23 RX ADMIN — LISINOPRIL 5 MG: 5 TABLET ORAL at 08:32

## 2017-11-23 RX ADMIN — NAPROXEN 500 MG: 250 TABLET ORAL at 21:18

## 2017-11-23 RX ADMIN — INSULIN LISPRO 4 UNITS: 100 INJECTION, SOLUTION INTRAVENOUS; SUBCUTANEOUS at 17:30

## 2017-11-23 RX ADMIN — Medication 10 ML: at 21:18

## 2017-11-23 RX ADMIN — ASPIRIN 81 MG: 81 TABLET, COATED ORAL at 08:33

## 2017-11-23 RX ADMIN — CARVEDILOL 3.12 MG: 3.12 TABLET, FILM COATED ORAL at 17:31

## 2017-11-23 RX ADMIN — DOCUSATE SODIUM -SENNOSIDES 1 TABLET: 50; 8.6 TABLET, COATED ORAL at 08:32

## 2017-11-23 RX ADMIN — INSULIN LISPRO 1 UNITS: 100 INJECTION, SOLUTION INTRAVENOUS; SUBCUTANEOUS at 21:19

## 2017-11-23 RX ADMIN — OLANZAPINE 10 MG: 10 TABLET, FILM COATED ORAL at 21:18

## 2017-11-23 ASSESSMENT — PAIN SCALES - GENERAL
PAINLEVEL_OUTOF10: 0
PAINLEVEL_OUTOF10: 9
PAINLEVEL_OUTOF10: 0
PAINLEVEL_OUTOF10: 10

## 2017-11-23 NOTE — CONSULTS
[facility] Cardiology Cardiology    Consult / H&P               Today's Date: 11/23/2017  Patient Name: Gail Gale  Date of admission: 11/22/2017 10:41 AM  Patient's age: 65 y.o., 1955  Admission Dx: Unstable angina ([facility] [I20.0]    Reason for Consult:  Cardiac evaluation    Requesting Physician: Patrice Gonzalez MD    CHIEF COMPLAINT:  dizziness    History Obtained From:  patient    HISTORY OF PRESENT ILLNESS:      The patient is a 65 y.o.  female who is admitted to the hospital for lightheaded. Patient states that she has recurrent episodes of blurred vision and lightheadedness while she sits up. She states she cannot move when she has these episodes and is also not aware of the surroundings. She denies any tonic clonic movements of the extremities    On reviewing the chart it looks like she has these episodes of lightheadedness since long time, 2015 and had extensive work up. She was just discharged on 11/17/17, after being admitted for similar complaints and had abnormal stress test .      Past Medical History:   has a past medical history of Arthritis; Asthma; CAD (coronary artery disease); CHF (congestive heart failure) ([facility] Clinical trial participant at discharge; COPD (chronic obstructive pulmonary disease) ([facility] Diabetes mellitus ([facility] Hepatitis C; Hyperlipidemia; Hypertension; Pneumonia; and Unspecified cerebral artery occlusion with cerebral infarction. Past Surgical History:   has a past surgical history that includes Tonsillectomy; Foot surgery; Cardiac surgery (5/6/2016); and Cardiac pacemaker placement. Home Medications:    Prior to Admission medications    Medication Sig Start Date End Date Taking?  Authorizing Provider   ipratropium-albuterol (DUONEB) 0.5-2.5 (3) MG/3ML SOLN nebulizer solution Inhale 3 mLs into the lungs every 4 hours as needed for Shortness of Breath 10/26/17  Yes Naty Ford MD   atorvastatin (LIPITOR) 20 MG tablet Take 20 mg by mouth daily 97.7 °F (36.5 °C) (Oral)   Resp 18   Ht 5' 1\" (1.549 m)   Wt 179 lb 6.4 oz (81.4 kg)   LMP  (LMP Unknown)   SpO2 100%   BMI 33.90 kg/m²    Constitutional and General Appearance: alert, cooperative, no distress and appears stated age  HEENT: PERRL, no cervical lymphadenopathy. No masses palpable. Normal oral mucosa  Respiratory:  · Normal excursion and expansion without use of accessory muscles  · Resp Auscultation: Good respiratory effort. No for increased work of breathing. On auscultation: clear to auscultation bilaterally  Cardiovascular:  · The apical impulse is not displaced  · Heart tones are crisp and normal. regular S1 and S2.  · Jugular venous pulsation Normal  · The carotid upstroke is normal in amplitude and contour without delay or bruit  · Peripheral pulses are symmetrical and full   Abdomen:   · No masses or tenderness  · Bowel sounds present  Extremities:  ·  No Cyanosis or Clubbing  ·  Lower extremity edema: No  ·  Skin: Warm and dry  Neurological:  · Alert and oriented. · Left upper and lower extremity weakness  · No abnormalities of mood, affect, memory, mentation, or behavior are noted    DATA:    Diagnostics:    STRESS 11/15/17:   There is apparent remote infarction within the inferior wall and   moderate-sized partially reversible perfusion defect within the anterior   wall, concerning for ischemia. WMA. EF 36%      ICD 3/13/17: [employer]tr[employer] device placed by Dr. Qamar Guerra for ICM.    CABG 9/1/16: LIMA-LAD, SVG-OM3.      CATH 8/15/16: Severe MVD. Normal right-sided pulmonary and wedge pressure.      ECHO 5/6/16: EF 15-20% with wall motion abnormalities, DD, mild MR/TR, RVSP is 41 mmHg.  Trivial circumferential PCE      Labs:     CBC:   Recent Labs      11/22/17   1114   WBC  9.7   HGB  10.7*   HCT  35.6*   PLT  255     BMP:   Recent Labs      11/22/17   1114   NA  139   K  4.1   CO2  28   BUN  24*   CREATININE  0.80   LABGLOM  >60   GLUCOSE  73     BNP: No results for input(s): BNP in the last 72 hours. PT/INR: No results for input(s): PROTIME, INR in the last 72 hours. APTT:No results for input(s): APTT in the last 72 hours. CARDIAC ENZYMES:  Recent Labs      11/22/17   1106  11/22/17   1418   TROPONINI  0.00  0.00     FASTING LIPID PANEL:  Lab Results   Component Value Date    HDL 67 07/15/2016    TRIG 90 07/15/2016     LIVER PROFILE:  Recent Labs      11/22/17   1114   AST  20   ALT  13   LABALBU  3.9           Patient Active Problem List   Diagnosis    Cerebral infarction ([address]    Hypertension    Diabetes mellitus type 2, controlled ([facility]    Candidal intertrigo    Diverticulitis of colon    Lower urinary tract infectious disease    Diabetes type 2, uncontrolled ([facility]    Noncompliance with diabetes treatment    History of CVA (cerebrovascular accident)    Left-sided weakness    Dizziness    Acute hyperglycemia    Near syncope    Noncompliance    Encounter for counseling    Hepatitis C    ST elevation myocardial infarction involving left anterior descending (LAD) coronary artery (HCC)    DHARMESH (acute kidney injury) ([facility]    ST elevation myocardial infarction (STEMI) (HCC)    Leukocytosis    Acute idiopathic gout of left knee    Late effects of CVA (cerebrovascular accident)    Inability to perform activities of daily living    S/P implantation of automatic cardioverter/defibrillator (AICD) 3/13/1- Dr. Lofton Lobe    Acute diverticulitis    Unstable angina ([facility]     IMPRESSION:  1. Recurrent Lightheadedness   2. CAD s/p CABG   3. Ischemic cardiomyopathy   4. Hx of CVA with left side weakness  5. DM2    RECOMMENDATIONS:  1. Please check orthostatics  2. Will get a cardiac cath on Friday , abnormal stress test on 11/15/17  3. Continue ASA, coreg , lisinopril, zyprexa and trileptal      Discussed with patient and Nurse.     Electronically signed by Adeola Owens MD on 11/23/2017 at 6:27 [address] [place] [address] [facility]      [phone]

## 2017-11-23 NOTE — PROGRESS NOTES
sodium chloride flush  10 mL Intravenous 2 times per day    enoxaparin  40 mg Subcutaneous Daily    insulin lispro  0-12 Units Subcutaneous TID WC    insulin lispro  0-6 Units Subcutaneous Nightly       Diagnostic Labs and Imaging    CBC: Recent Labs      11/22/17   1114  11/23/17   0637   WBC  9.7  8.9   RBC  4.45  4.03   HGB  10.7*  9.7*   HCT  35.6*  31.4*   MCV  80.0*  77.9*   RDW  16.0*  16.1*   PLT  255  184     BMP: Recent Labs      11/22/17   1114  11/23/17   0637   NA  139  139   K  4.1  4.7   CL  100  101   CO2  28  24   BUN  24*  20   CREATININE  0.80  0.77     BNP: No results for input(s): BNP in the last 72 hours. PT/INR: No results for input(s): PROTIME, INR in the last 72 hours. APTT: No results for input(s): APTT in the last 72 hours.   CARDIAC ENZYMES: Recent Labs      11/22/17   1106  11/22/17   1418   TROPONINI  0.00  0.00     FASTING LIPID PANEL:  Lab Results   Component Value Date    CHOL 287 (H) 07/15/2016    HDL 67 07/15/2016    TRIG 90 07/15/2016     LIVER PROFILE: Recent Labs      11/22/17   1114   AST  20   ALT  13   BILITOT  0.34   ALKPHOS  112*        Assessment and Plan:   Principal Problem:    Dizziness  Active Problems:    Hypertension    Diabetes type 2, uncontrolled ([address]    History of CVA (cerebrovascular accident)    Near syncope    ST elevation myocardial infarction involving left anterior descending (LAD) coronary artery (HCC)    S/P implantation of automatic cardioverter/defibrillator (AICD) 3/13/1- Dr. Anthony Ashley    Unstable angina [facility])    Dizziness with extensive cardiac history  Cardiology consult : plan for cardiac cath on Friday   Trend troponin: Negative   BNP at baseline   Orthostatic vitals     DM   Monitor Glucose  Insulin sliding scale  lantus 35 U in the AM- held today       Diet- cardiac   GI prophylaxis- Pepcid  DVT prophylaxis- Lovenox  Consults- cardiology  Outpatient needs- pending        Aren Brower MD   PGY-1  Department of Internal Medicine  [facility] [id number]          11/23/2017, 10:20 AM    I have discussed the care of [facility] , including pertinent history and exam findings,    today with the resident. I have seen and examined the patient and the key elements of all parts of the encounter have been performed by me . I agree with the assessment, plan and orders as documented by the resident. Principal Problem:    Dizziness  Active Problems:    Hypertension    Diabetes type 2, uncontrolled ([facility])    History of CVA (cerebrovascular accident)    Near syncope    ST elevation myocardial infarction involving left anterior descending (LAD) coronary artery ([facility])    S/P implantation of automatic cardioverter/defibrillator (AICD) 3/13/1- Dr. Marlon Jarrell    Unstable angina [facility])        Symptoms or ailment  course ;                                   are improving over time.         Electronically signed by Jose Moya MD

## 2017-11-23 NOTE — PROGRESS NOTES
Physical Therapy  DATE: 2017    NAME: Ronald Drummond  MRN: [id number]   : 1955    Patient not seen this date for Physical Therapy due to:  [] Blood transfusion in progress  [] Hemodialysis  []  Patient Declined  [] Spine Precautions   [] Strict Bedrest  [] Surgery/ Procedure  [] Testing      [x] Other--pt was sleeping soundly--I was able to awaken her, but she fell back to sleep; I attempted to get her OOB, she was just too sleepy (she states she didn't sleep well last night). Pt requested PT to return tomorrow. [] PT being discontinued at this time. Patient independent. No further needs. [] PT being discontinued at this time as the patient has been transferred to palliative care. No further needs.     Arcelia Hall, PT

## 2017-11-24 LAB
ANION GAP SERPL CALCULATED.3IONS-SCNC: 11 MMOL/L (ref 9–17)
BUN BLDV-MCNC: 16 MG/DL (ref 8–23)
BUN/CREAT BLD: ABNORMAL (ref 9–20)
CALCIUM SERPL-MCNC: 8.4 MG/DL (ref 8.6–10.4)
CHLORIDE BLD-SCNC: 96 MMOL/L (ref 98–107)
CO2: 24 MMOL/L (ref 20–31)
CREAT SERPL-MCNC: 0.73 MG/DL (ref 0.5–0.9)
GFR AFRICAN AMERICAN: >60 ML/MIN
GFR NON-AFRICAN AMERICAN: >60 ML/MIN
GFR SERPL CREATININE-BSD FRML MDRD: ABNORMAL ML/MIN/{1.73_M2}
GFR SERPL CREATININE-BSD FRML MDRD: ABNORMAL ML/MIN/{1.73_M2}
GLUCOSE BLD-MCNC: 159 MG/DL (ref 65–105)
GLUCOSE BLD-MCNC: 185 MG/DL (ref 65–105)
GLUCOSE BLD-MCNC: 186 MG/DL (ref 65–105)
GLUCOSE BLD-MCNC: 197 MG/DL (ref 70–99)
GLUCOSE BLD-MCNC: 208 MG/DL (ref 65–105)
GLUCOSE BLD-MCNC: 64 MG/DL (ref 65–105)
GLUCOSE BLD-MCNC: 72 MG/DL (ref 65–105)
POTASSIUM SERPL-SCNC: 4 MMOL/L (ref 3.7–5.3)
SODIUM BLD-SCNC: 131 MMOL/L (ref 135–144)

## 2017-11-24 PROCEDURE — 82947 ASSAY GLUCOSE BLOOD QUANT: CPT

## 2017-11-24 PROCEDURE — 6370000000 HC RX 637 (ALT 250 FOR IP): Performed by: INTERNAL MEDICINE

## 2017-11-24 PROCEDURE — 97166 OT EVAL MOD COMPLEX 45 MIN: CPT

## 2017-11-24 PROCEDURE — 1200000000 HC SEMI PRIVATE

## 2017-11-24 PROCEDURE — 6360000002 HC RX W HCPCS: Performed by: INTERNAL MEDICINE

## 2017-11-24 PROCEDURE — G8988 SELF CARE GOAL STATUS: HCPCS

## 2017-11-24 PROCEDURE — 80048 BASIC METABOLIC PNL TOTAL CA: CPT

## 2017-11-24 PROCEDURE — 6370000000 HC RX 637 (ALT 250 FOR IP): Performed by: STUDENT IN AN ORGANIZED HEALTH CARE EDUCATION/TRAINING PROGRAM

## 2017-11-24 PROCEDURE — 97535 SELF CARE MNGMENT TRAINING: CPT

## 2017-11-24 PROCEDURE — 2580000003 HC RX 258: Performed by: INTERNAL MEDICINE

## 2017-11-24 PROCEDURE — G8987 SELF CARE CURRENT STATUS: HCPCS

## 2017-11-24 PROCEDURE — 36415 COLL VENOUS BLD VENIPUNCTURE: CPT

## 2017-11-24 RX ORDER — BACITRACIN 500 [USP'U]/G
OINTMENT TOPICAL 4 TIMES DAILY PRN
Status: DISCONTINUED | OUTPATIENT
Start: 2017-11-24 | End: 2017-11-28 | Stop reason: HOSPADM

## 2017-11-24 RX ORDER — METOPROLOL SUCCINATE 25 MG/1
25 TABLET, EXTENDED RELEASE ORAL DAILY
Status: DISCONTINUED | OUTPATIENT
Start: 2017-11-24 | End: 2017-11-25

## 2017-11-24 RX ADMIN — FAMOTIDINE 20 MG: 20 TABLET, FILM COATED ORAL at 10:37

## 2017-11-24 RX ADMIN — METOPROLOL SUCCINATE 25 MG: 25 TABLET, FILM COATED, EXTENDED RELEASE ORAL at 14:49

## 2017-11-24 RX ADMIN — DOCUSATE SODIUM -SENNOSIDES 1 TABLET: 50; 8.6 TABLET, COATED ORAL at 14:50

## 2017-11-24 RX ADMIN — ZINC OXIDE: 200 OINTMENT TOPICAL at 14:50

## 2017-11-24 RX ADMIN — Medication 10 ML: at 10:39

## 2017-11-24 RX ADMIN — OLANZAPINE 10 MG: 10 TABLET, FILM COATED ORAL at 20:44

## 2017-11-24 RX ADMIN — OXCARBAZEPINE 300 MG: 300 TABLET, FILM COATED ORAL at 10:38

## 2017-11-24 RX ADMIN — OXCARBAZEPINE 300 MG: 300 TABLET, FILM COATED ORAL at 20:44

## 2017-11-24 RX ADMIN — COLCHICINE 0.6 MG: 0.6 TABLET, FILM COATED ORAL at 10:38

## 2017-11-24 RX ADMIN — ENOXAPARIN SODIUM 40 MG: 40 INJECTION SUBCUTANEOUS at 10:38

## 2017-11-24 RX ADMIN — NAPROXEN 500 MG: 250 TABLET ORAL at 10:37

## 2017-11-24 RX ADMIN — INSULIN LISPRO 2 UNITS: 100 INJECTION, SOLUTION INTRAVENOUS; SUBCUTANEOUS at 17:52

## 2017-11-24 RX ADMIN — ATORVASTATIN CALCIUM 20 MG: 20 TABLET, FILM COATED ORAL at 10:37

## 2017-11-24 RX ADMIN — ASPIRIN 81 MG: 81 TABLET, COATED ORAL at 10:38

## 2017-11-24 RX ADMIN — NAPROXEN 500 MG: 250 TABLET ORAL at 20:44

## 2017-11-24 ASSESSMENT — PAIN SCALES - GENERAL
PAINLEVEL_OUTOF10: 7
PAINLEVEL_OUTOF10: 6
PAINLEVEL_OUTOF10: 6

## 2017-11-24 ASSESSMENT — PAIN DESCRIPTION - LOCATION
LOCATION: ABDOMEN
LOCATION: ABDOMEN

## 2017-11-24 ASSESSMENT — PAIN DESCRIPTION - DESCRIPTORS: DESCRIPTORS: DISCOMFORT;CRAMPING;CONSTANT

## 2017-11-24 ASSESSMENT — PAIN DESCRIPTION - ONSET: ONSET: ON-GOING

## 2017-11-24 ASSESSMENT — PAIN DESCRIPTION - FREQUENCY: FREQUENCY: CONTINUOUS

## 2017-11-24 ASSESSMENT — PAIN DESCRIPTION - PAIN TYPE
TYPE: CHRONIC PAIN
TYPE: CHRONIC PAIN

## 2017-11-24 NOTE — PROGRESS NOTES
[address] [place] [address]     Department of Internal Medicine - Staff Internal Medicine Service     DAILY PROGRESS NOTE  TEAM 2      Patient:  Meseret San  YOB: 1955  MRN: [id number]     Acct: [de-identified]     Admit date: 11/22/2017  Admitting Diagnosis: Dizziness    Subjective:   Pt seen and Chart reviewed. No acute overnight events  Plan for cardiac cath today.    Denies any SOB, chest pain, or palpitations   Remains hemodynamically stable   She has been refusing orthostatics      Objective:   BP (!) 156/95   Pulse 83   Temp 98.2 °F (36.8 °C) (Oral)   Resp 16   Ht 5' 1\" (1.549 m)   Wt 180 lb (81.6 kg)   LMP  (LMP Unknown)   SpO2 97%   BMI 34.01 kg/m²     General appearance - alert, well appearing, and in no distress  Mental status - alert, oriented to person, place, and time  Chest - clear to auscultation, no wheezes, rales or rhonchi, symmetric air entry  Heart - normal rate, regular rhythm, normal S1, S2, no murmurs, rubs, clicks or gallops  Abdomen - soft, nontender, nondistended, no masses or organomegaly  Extremities - peripheral pulses normal, no pedal edema, no clubbing or cyanosis  Skin - normal coloration and turgor, no rashes, no suspicious skin lesions noted      Intake/Output Summary (Last 24 hours) at 11/24/17 0938  Last data filed at 11/24/17 0634   Gross per 24 hour   Intake             1240 ml   Output              450 ml   Net              790 ml         Medications:      OXcarbazepine  300 mg Oral BID    famotidine  20 mg Oral Daily    colchicine  0.6 mg Oral Daily    aspirin  81 mg Oral Daily    OLANZapine  10 mg Oral Nightly    sennosides-docusate sodium  1 tablet Oral Daily    naproxen  500 mg Oral BID    atorvastatin  20 mg Oral Daily    lisinopril  5 mg Oral Daily    carvedilol  3.125 mg Oral BID WC    sodium chloride flush  10 mL Intravenous 2 times per day    enoxaparin  40 mg Subcutaneous Daily    insulin lispro  0-12 Units Subcutaneous TID WC    insulin lispro  0-6 Units Subcutaneous Nightly       Diagnostic Labs and Imaging    CBC:   Recent Labs      11/22/17   1114  11/23/17   0637   WBC  9.7  8.9   RBC  4.45  4.03   HGB  10.7*  9.7*   HCT  35.6*  31.4*   MCV  80.0*  77.9*   RDW  16.0*  16.1*   PLT  255  184     BMP:   Recent Labs      11/22/17   1114  11/23/17   0637   NA  139  139   K  4.1  4.7   CL  100  101   CO2  28  24   BUN  24*  20   CREATININE  0.80  0.77     BNP: No results for input(s): BNP in the last 72 hours. PT/INR: No results for input(s): PROTIME, INR in the last 72 hours. APTT: No results for input(s): APTT in the last 72 hours.   CARDIAC ENZYMES:   Recent Labs      11/22/17   1106  11/22/17   1418   TROPONINI  0.00  0.00     FASTING LIPID PANEL:  Lab Results   Component Value Date    CHOL 287 (H) 07/15/2016    HDL 67 07/15/2016    TRIG 90 07/15/2016     LIVER PROFILE:   Recent Labs      11/22/17   1114   AST  20   ALT  13   BILITOT  0.34   ALKPHOS  112*        Assessment and Plan:   Principal Problem:    Dizziness  Active Problems:    Hypertension    Diabetes type 2, uncontrolled ([address]    History of CVA (cerebrovascular accident)    Near syncope    ST elevation myocardial infarction involving left anterior descending (LAD) coronary artery (HCC)    S/P implantation of automatic cardioverter/defibrillator (AICD) 3/13/1- Dr. Shant Plascencia    Unstable angina [facility])    Dizziness with extensive cardiac history  Cardiology consult : plan for cardiac cath today  Trend troponin: Negative   BNP at baseline   Orthostatic vitals  Continue ASA, Coreg, lisinopril      DM   Monitor Glucose  Insulin sliding scale       Diet- NPO for now   GI prophylaxis- Pepcid  DVT prophylaxis- Lovenox  Consults- cardiology        Marina Bateman MD   PGY-1  Department of Internal Medicine  [facility]         11/24/2017, 9:38 AM  Attending Physician Statement For Internal Medicine  I have discussed the care of George Sandoval, including pertinent history and exam findings,  with the Resident. I have seen and examined the patient with the resident and reviewed the key elements of all parts of the encounter have been performed by me. I agree with the assessment, plan and orders as documented by the resident.     Liz Andersen MD, MRCP Golden Herzog, FACP   11/24/2017 12:32 PM  Internal Medicine and Nephrology

## 2017-11-24 NOTE — PROGRESS NOTES
Smoking Cessation - topics covered   []  Health Risks  []  Benefits of Quitting   []  Smoking Cessation  []  Patient has no history of tobacco use  []  Patient is former smoker. []  No need for tobacco cessation education. []  Booklet given  [x]  Patient verbalizes understanding. [x]  Patient denies need for tobacco cessation education.   Rob Hernandez  11:49 AM

## 2017-11-24 NOTE — FLOWSHEET NOTE
· Patient was sitting in chair watching TV. · Patient spoke of the nervousness she feels about the upcoming procedure she is having today. · Patient accepted prayer with . ·  provided a listening and supportive presence, and prayer. Chaplains to remain available for support if needed. 11/24/17 0924   Encounter Summary   Services provided to: Patient   Referral/Consult From: [facility] Family members   Continue Visiting (11/24/17)   Complexity of Encounter Low   Length of Encounter 15 minutes   Spiritual Assessment Completed Yes   Spiritual/[employer]   Type Spiritual support   Assessment Calm; Approachable   Intervention Active listening;Explored feelings, thoughts, concerns;Prayer   Outcome Expressed gratitude;Engaged in conversation;Expressed feelings/needs/concerns;Receptive

## 2017-11-24 NOTE — FLOWSHEET NOTE
11/24/17 1009   Vital Signs   Pulse 86   BP (!) 123/107   BP Location Right upper arm   BP Upper/Lower Upper   MAP (mmHg) 110   Patient Position Sitting     Pt refused orthostaic BP due to inability to stand for very long on sore feet

## 2017-11-24 NOTE — PROGRESS NOTES
(LMP Unknown)   SpO2 96%   BMI 34.01 kg/m²     General appearance: awake, alert, in no apparent respiratory distress   HEENT: Head: Normocephalic, no lesions, without obvious abnormality  Neck: no JVD  Lungs: clear to auscultation bilaterally, no basilar rales, no wheezing   Heart: regular rate and rhythm, S1, S2 normal, no murmur, click, rub or gallop  Abdomen: soft, non-tender; bowel sounds normal  Extremities: No LE edema  Neurologic: Mental status: Alert, oriented. Motor and sensory not done. STRESS 11/15/17:   There is apparent remote infarction within the inferior wall and   moderate-sized partially reversible perfusion defect within the anterior   wall, concerning for ischemia. WMA. EF 36%      ICD 3/13/17: [employer] device placed by Dr. Laura Lindsey for ICM.    CABG 9/1/16: LIMA-LAD, SVG-OM3.      CATH 8/15/16: Severe MVD. Normal right-sided pulmonary and wedge pressure.      ECHO 5/6/16: EF 15-20% with wall motion abnormalities, DD, mild MR/TR, RVSP is 41 mmHg. Trivial circumferential PCE        Assessment / Acute Cardiac Problems/PLAN:   1. Recurrent Lightheadedness   2. CAD s/p CABG   3. Ischemic cardiomyopathy   4. Hx of CVA with left side weakness  5. DM2  6. Cardiac cath on monday , abnormal stress test on 11/15/17  7. Continue ASA, zyprexa and trileptal  8. Will hold Lisinopril and Coreg      Discussed with patient and nursing. Zena Petty MD      Department of Internal Medicine  [facility]         11/24/2017, 6:53 AM    Attending Cardiologist Addendum: I have reviewed and performed the history, physical, subjective, objective, assessment, and plan with the resident/fellow and agree with the note. I performed the history and physical personally. I have made changes to the note above as needed. Thank you for allowing me to participate in the care of this patient, please do not hesitate to call if you have any questions.     [facility]  [facility]. [facility]  [phone]

## 2017-11-24 NOTE — PLAN OF CARE
Problem: Falls - Risk of  Goal: Absence of falls  Outcome: Ongoing  Pt remained free of falls this shift.

## 2017-11-24 NOTE — PROGRESS NOTES
Physical Therapy  DATE: 2017    NAME: Nahum Thomas  MRN: [id number]   : 1955    Patient not seen this date for Physical Therapy due to:  [] Blood transfusion in progress  [] Hemodialysis  []  Patient Declined  [] Spine Precautions   [] Strict Bedrest  [] Surgery/ Procedure  [] Testing      [x] Other--pt eating breakfast, then OT going in to treat pt; ck back later        [] PT being discontinued at this time. Patient independent. No further needs. [] PT being discontinued at this time as the patient has been transferred to palliative care. No further needs.     Amita Juarez PT

## 2017-11-24 NOTE — PROGRESS NOTES
Occupational Therapy   Occupational Therapy Initial Assessment  Date: 2017   Patient Name: David Deal  MRN: [id number]     : 1955       HISTORY OF PRESENT ILLNESS:    The patient is a 65 y.o. female with significant past medical history of coronary artery disease, status post CABG, stents, hypertension, ischemic cardiomyopathy, CK D, AICD implantation, history of CVA with residual left-sided weakness , type 2 diabetes presented with complaints of lightheadedness. She states that she has episodes of lightheadedness and near syncope. She is not short of breath. She says that her vision closes and on her when this happens. She reports that she feels dizzy when she gets up from sitting position sometimes but on my encounter she felt dizzy while she was laying back in the bed while talking with me. He reports this does dizzy spell lasts for about 20 minutes every time. She has never lost her consciousness during these episodes.     She was seen for the same complaint of week ago and was admitted in the observation unit. She got a stress test done which showed dyskinesis of the apex, severe hypokinesis of the septum, inferoseptal wall and inferior wall with calculated ejection fraction of 36%.     She has residual left upper extremity spasticity from previous CVA. She has been eating and drinking properly. [facility] at [facility] facility before which she was living by herself.       Patient Diagnosis(es): The encounter diagnosis was Near syncope.     has a past medical history of Arthritis; Asthma; CAD (coronary artery disease); CHF (congestive heart failure) ([facility] [address] Clinical trial participant at discharge; COPD (chronic obstructive pulmonary disease) ([facility] [address] Diabetes mellitus ([facility] [address] Hepatitis C; Hyperlipidemia; Hypertension; Pneumonia; and Unspecified cerebral artery occlusion with cerebral infarction. has a past surgical history that includes Tonsillectomy;  Foot surgery; Cardiac surgery cane, pt reports residual deficits from previous CVA  Functional Mobility  Functional - Mobility Device: Cane  Activity: To/from bathroom  Assist Level: Contact guard assistance  Toilet Transfers  Toilet - Technique: Ambulating  Equipment Used: Standard toilet  Toilet Transfer: Contact guard assistance  Toilet Transfers Comments: use of grab bar  Shower Transfers  Shower - Transfer From: Aurora & Chris - Transfer Type: To and From  Shower - Transfer To: Shower seat with back  Shower - Technique: Ambulating  Shower Transfers: Contact Guard  ADL  Feeding: Modified independent ;Setup  Grooming: Contact guard assistance (to wash hair)  UE Bathing:  (to wash R side of chest, back and RUE )  LE Bathing: Maximum assistance (to wash legs and feet seated in chair)  UE Dressing: Moderate assistance (to don/ doff gown)  LE Dressing: Maximum assistance (to don/ doff socks )  Toileting: Minimal assistance  Additional Comments: Pt supine in bed on arrival. Pt assisted to complete bed mobility and sat at EOB. Pt performed sit <> stand transfer and functional mobility into shower for ADL. Pt assisted to complete ADL activities with above documented assist. Pt assisted to return to chair, encouraged to remain up for as long as possible. Pt remained in chair, call light in reach and RN notified on therapist exit.  RN Shabnam Elizondo present during session to assist.   Tone RUE  RUE Tone: Normotonic  Tone LUE  LUE Tone: Hypertonic     Bed mobility  Supine to Sit: Minimal assistance  Sit to Supine:  (pt left up in chair)  Scooting: Contact guard assistance  Transfers  Stand Step Transfers: Contact guard assistance (use of quad cane, wide ENRIQUE, pt noted to swing LLE wide to assist with mobility )  Stand to sit: Contact guard assistance     Cognition  Overall Cognitive Status: Exceptions  Safety Judgement: Decreased awareness of need for assistance;Decreased awareness of need for safety  Insights: Decreased awareness of deficits (awareness of need for assistance)  Cognition Comment: Pt impulsive during session, poor return to education to allow writer to assist pt for safety. Pt became agitated on attempts to assist pt stating \"Stop treating me like a child\", writer apologized to pt and RN and writer explained to pt that assist is required for safety of all involved during ADL. Perception  Overall Perceptual Status: WFL     Sensation  Overall Sensation Status: Impaired (LUE)      LUE AROM (degrees)  LUE AROM : Exceptions  Left Hand PROM (degrees)  Left Hand PROM: Exceptions  Left Hand AROM (degrees)  Left Hand AROM: Exceptions  RUE AROM (degrees)  RUE AROM : WFL  LUE Strength  Gross LUE Strength: Exceptions to WFL  LUE Strength Comment: contracted d/t previous CVA  RUE Strength  Gross RUE Strength: WFL    Assessment   Performance deficits / Impairments: Decreased functional mobility ; Decreased endurance;Decreased ADL status; Decreased high-level IADLs;Decreased safe awareness  Prognosis: Fair  Decision Making: Medium Complexity  Patient Education: OT POC, safety, purpose for evaluation   REQUIRES OT FOLLOW UP: Yes  Activity Tolerance  Activity Tolerance: Patient Tolerated treatment well  Safety Devices  Safety Devices in place: Yes  Type of devices: Nurse notified; Left in chair;Call light within reach; Chair alarm in place;Gait belt;Patient at risk for falls  Restraints  Initially in place: No  OT Equipment Recommendations  Equipment Needed: No        Plan   Plan  Times per week: 4-5x/wk   Current Treatment Recommendations: Functional Mobility Training, Endurance Training, Safety Education & Training, Self-Care / ADL, Patient/Caregiver Education & Training, Equipment Evaluation, Education, & procurement    G-Code  OT G-codes  Functional Assessment Tool Used: Barthel Index  Score: 12/20  Functional Limitation: Self care  Self Care Current Status (): At least 40 percent but less than 60 percent impaired, limited or restricted  Self Care Goal Status ():  At least 20 percent but less than 40 percent impaired, limited or restricted    Goals  Short term goals  Time Frame for Short term goals: by discharge, pt will  Short term goal 1: demo min A for UE ADL activites with AD as needed  Short term goal 2: demo MI in LE ADL activites with AD as needed  Short term goal 3: demo good safety awareness during functional activites   Short term goal 4: demo understanding and I use of EC/WS, fall prevention tech and proper pursed lipped breathing tech during functional activites   Patient Goals   Patient goals : to go home      Therapy Time   Individual Concurrent Group Co-treatment   Time In 1115         Time Out 1205         Minutes 50          See above for LOF. RN reports patient is medically stable for therapy treatment this date. Chart reviewed prior to treatment and patient is agreeable for therapy. All lines intact and patient positioned comfortably at end of treatment. All patient needs addressed prior to ending therapy session.       Cynthia Moody, OTR/L

## 2017-11-25 LAB
ABSOLUTE EOS #: 0.56 K/UL (ref 0–0.44)
ABSOLUTE IMMATURE GRANULOCYTE: <0.03 K/UL (ref 0–0.3)
ABSOLUTE LYMPH #: 2.33 K/UL (ref 1.1–3.7)
ABSOLUTE MONO #: 0.79 K/UL (ref 0.1–1.2)
ANION GAP SERPL CALCULATED.3IONS-SCNC: 13 MMOL/L (ref 9–17)
BASOPHILS # BLD: 1 % (ref 0–2)
BASOPHILS ABSOLUTE: 0.05 K/UL (ref 0–0.2)
BUN BLDV-MCNC: 12 MG/DL (ref 8–23)
BUN/CREAT BLD: ABNORMAL (ref 9–20)
CALCIUM SERPL-MCNC: 8.5 MG/DL (ref 8.6–10.4)
CHLORIDE BLD-SCNC: 97 MMOL/L (ref 98–107)
CO2: 27 MMOL/L (ref 20–31)
CREAT SERPL-MCNC: 0.89 MG/DL (ref 0.5–0.9)
DIFFERENTIAL TYPE: ABNORMAL
EOSINOPHILS RELATIVE PERCENT: 8 % (ref 1–4)
GFR AFRICAN AMERICAN: >60 ML/MIN
GFR NON-AFRICAN AMERICAN: >60 ML/MIN
GFR SERPL CREATININE-BSD FRML MDRD: ABNORMAL ML/MIN/{1.73_M2}
GFR SERPL CREATININE-BSD FRML MDRD: ABNORMAL ML/MIN/{1.73_M2}
GLUCOSE BLD-MCNC: 114 MG/DL (ref 65–105)
GLUCOSE BLD-MCNC: 161 MG/DL (ref 70–99)
GLUCOSE BLD-MCNC: 179 MG/DL (ref 65–105)
GLUCOSE BLD-MCNC: 190 MG/DL (ref 65–105)
GLUCOSE BLD-MCNC: 222 MG/DL (ref 65–105)
HCT VFR BLD CALC: 34.2 % (ref 36.3–47.1)
HEMOGLOBIN: 10.2 G/DL (ref 11.9–15.1)
IMMATURE GRANULOCYTES: 0 %
LYMPHOCYTES # BLD: 32 % (ref 24–43)
MCH RBC QN AUTO: 24.2 PG (ref 25.2–33.5)
MCHC RBC AUTO-ENTMCNC: 29.8 G/DL (ref 28.4–34.8)
MCV RBC AUTO: 81 FL (ref 82.6–102.9)
MONOCYTES # BLD: 11 % (ref 3–12)
PDW BLD-RTO: 15.9 % (ref 11.8–14.4)
PLATELET # BLD: 236 K/UL (ref 138–453)
PLATELET ESTIMATE: ABNORMAL
PMV BLD AUTO: 9.9 FL (ref 8.1–13.5)
POTASSIUM SERPL-SCNC: 3.9 MMOL/L (ref 3.7–5.3)
RBC # BLD: 4.22 M/UL (ref 3.95–5.11)
RBC # BLD: ABNORMAL 10*6/UL
SEG NEUTROPHILS: 49 % (ref 36–65)
SEGMENTED NEUTROPHILS ABSOLUTE COUNT: 3.61 K/UL (ref 1.5–8.1)
SODIUM BLD-SCNC: 137 MMOL/L (ref 135–144)
WBC # BLD: 7.4 K/UL (ref 3.5–11.3)
WBC # BLD: ABNORMAL 10*3/UL

## 2017-11-25 PROCEDURE — 97530 THERAPEUTIC ACTIVITIES: CPT

## 2017-11-25 PROCEDURE — 97162 PT EVAL MOD COMPLEX 30 MIN: CPT

## 2017-11-25 PROCEDURE — 1200000000 HC SEMI PRIVATE

## 2017-11-25 PROCEDURE — 36415 COLL VENOUS BLD VENIPUNCTURE: CPT

## 2017-11-25 PROCEDURE — 6370000000 HC RX 637 (ALT 250 FOR IP): Performed by: INTERNAL MEDICINE

## 2017-11-25 PROCEDURE — 99233 SBSQ HOSP IP/OBS HIGH 50: CPT | Performed by: INTERNAL MEDICINE

## 2017-11-25 PROCEDURE — 2580000003 HC RX 258: Performed by: INTERNAL MEDICINE

## 2017-11-25 PROCEDURE — 6360000002 HC RX W HCPCS: Performed by: INTERNAL MEDICINE

## 2017-11-25 PROCEDURE — G8979 MOBILITY GOAL STATUS: HCPCS

## 2017-11-25 PROCEDURE — G8978 MOBILITY CURRENT STATUS: HCPCS

## 2017-11-25 PROCEDURE — 82947 ASSAY GLUCOSE BLOOD QUANT: CPT

## 2017-11-25 PROCEDURE — 80048 BASIC METABOLIC PNL TOTAL CA: CPT

## 2017-11-25 PROCEDURE — 6370000000 HC RX 637 (ALT 250 FOR IP): Performed by: STUDENT IN AN ORGANIZED HEALTH CARE EDUCATION/TRAINING PROGRAM

## 2017-11-25 PROCEDURE — 85025 COMPLETE CBC W/AUTO DIFF WBC: CPT

## 2017-11-25 RX ORDER — METOPROLOL SUCCINATE 50 MG/1
50 TABLET, EXTENDED RELEASE ORAL DAILY
Status: DISCONTINUED | OUTPATIENT
Start: 2017-11-25 | End: 2017-11-28 | Stop reason: HOSPADM

## 2017-11-25 RX ADMIN — METOPROLOL SUCCINATE 50 MG: 50 TABLET, FILM COATED, EXTENDED RELEASE ORAL at 13:27

## 2017-11-25 RX ADMIN — OXCARBAZEPINE 300 MG: 300 TABLET, FILM COATED ORAL at 20:41

## 2017-11-25 RX ADMIN — Medication 10 ML: at 13:32

## 2017-11-25 RX ADMIN — ENOXAPARIN SODIUM 40 MG: 40 INJECTION SUBCUTANEOUS at 13:27

## 2017-11-25 RX ADMIN — NAPROXEN 500 MG: 250 TABLET ORAL at 20:41

## 2017-11-25 RX ADMIN — ATORVASTATIN CALCIUM 20 MG: 20 TABLET, FILM COATED ORAL at 09:16

## 2017-11-25 RX ADMIN — NAPROXEN 500 MG: 250 TABLET ORAL at 09:17

## 2017-11-25 RX ADMIN — COLCHICINE 0.6 MG: 0.6 TABLET, FILM COATED ORAL at 09:17

## 2017-11-25 RX ADMIN — INSULIN LISPRO 2 UNITS: 100 INJECTION, SOLUTION INTRAVENOUS; SUBCUTANEOUS at 18:02

## 2017-11-25 RX ADMIN — Medication 10 ML: at 20:42

## 2017-11-25 RX ADMIN — FAMOTIDINE 20 MG: 20 TABLET, FILM COATED ORAL at 09:17

## 2017-11-25 RX ADMIN — INSULIN LISPRO 2 UNITS: 100 INJECTION, SOLUTION INTRAVENOUS; SUBCUTANEOUS at 13:30

## 2017-11-25 RX ADMIN — INSULIN LISPRO 2 UNITS: 100 INJECTION, SOLUTION INTRAVENOUS; SUBCUTANEOUS at 21:23

## 2017-11-25 RX ADMIN — ASPIRIN 81 MG: 81 TABLET, COATED ORAL at 09:16

## 2017-11-25 RX ADMIN — OLANZAPINE 10 MG: 10 TABLET, FILM COATED ORAL at 20:41

## 2017-11-25 RX ADMIN — DOCUSATE SODIUM -SENNOSIDES 1 TABLET: 50; 8.6 TABLET, COATED ORAL at 09:17

## 2017-11-25 RX ADMIN — OXCARBAZEPINE 300 MG: 300 TABLET, FILM COATED ORAL at 09:17

## 2017-11-25 ASSESSMENT — PAIN SCALES - GENERAL
PAINLEVEL_OUTOF10: 6
PAINLEVEL_OUTOF10: 5

## 2017-11-25 NOTE — PROGRESS NOTES
[facility]   Progress Note                   Date:   11/25/2017  Patient name: Yovana Corona  Date of admission:  11/22/2017 10:41 AM  MRN:   [id number]  YOB: 1955  PCP: Fernando Snell MD    Reason for Admission:      Subjective: There were no acute events overnight, remained hemodynamically stable, denies chest pain, dyspnea, orthopnea or palpitations. Patient refused orthostatic vitals yesterday    Medications:   Scheduled Meds:   metoprolol succinate  25 mg Oral Daily    OXcarbazepine  300 mg Oral BID    famotidine  20 mg Oral Daily    colchicine  0.6 mg Oral Daily    aspirin  81 mg Oral Daily    OLANZapine  10 mg Oral Nightly    sennosides-docusate sodium  1 tablet Oral Daily    naproxen  500 mg Oral BID    atorvastatin  20 mg Oral Daily    sodium chloride flush  10 mL Intravenous 2 times per day    enoxaparin  40 mg Subcutaneous Daily    insulin lispro  0-12 Units Subcutaneous TID WC    insulin lispro  0-6 Units Subcutaneous Nightly       Continuous Infusions:     CBC:   Recent Labs      11/22/17   1114  11/23/17   0637   WBC  9.7  8.9   HGB  10.7*  9.7*   PLT  255  184     BMP:    Recent Labs      11/22/17   1114  11/23/17   0637  11/24/17   1404   NA  139  139  131*   K  4.1  4.7  4.0   CL  100  101  96*   CO2  28  24  24   BUN  24*  20  16   CREATININE  0.80  0.77  0.73   GLUCOSE  73  36*  197*     Hepatic:   Recent Labs      11/22/17   1114   AST  20   ALT  13   BILITOT  0.34   ALKPHOS  112*     Troponin:   Recent Labs      11/22/17   1106  11/22/17   1418   TROPONINI  0.00  0.00     BNP: No results for input(s): BNP in the last 72 hours. Lipids: No results for input(s): CHOL, HDL in the last 72 hours. Invalid input(s): LDLCALCU  INR: No results for input(s): INR in the last 72 hours.     Objective:   Vitals: BP (!) 152/71   Pulse 93   Temp 98.3 °F (36.8 °C) (Oral)   Resp 18   Ht 5' 1\" (1.549 m)   Wt 180 lb (81.6 kg)   LMP  (LMP Unknown) Consultants  [facility]  [phone]

## 2017-11-25 NOTE — PROGRESS NOTES
[address] [place] [address]     Department of Internal Medicine - Staff Internal Medicine Service     DAILY PROGRESS NOTE  TEAM 2      Patient:  Kaylene Curry  YOB: 1955  MRN: [id number]     Acct: [de-identified]     Admit date: 11/22/2017  Admitting Diagnosis: Dizziness    Subjective:   Pt seen and Chart reviewed. No acute overnight events  Denies any chest pain, SOB, lightheadedness.       Objective:   BP (!) 152/71   Pulse 93   Temp 98.3 °F (36.8 °C) (Oral)   Resp 18   Ht 5' 1\" (1.549 m)   Wt 180 lb (81.6 kg)   LMP  (LMP Unknown)   SpO2 100%   BMI 34.01 kg/m²     General appearance - alert, well appearing, and in no distress  Mental status - alert, oriented to person, place, and time  Chest - clear to auscultation, no wheezes, rales or rhonchi, symmetric air entry  Heart - normal rate, regular rhythm, normal S1, S2, no murmurs, rubs, clicks or gallops  Abdomen - soft, nontender, nondistended, no masses or organomegaly  Extremities - peripheral pulses normal, no pedal edema, no clubbing or cyanosis  Skin - normal coloration and turgor, no rashes, no suspicious skin lesions noted      Intake/Output Summary (Last 24 hours) at 11/25/17 0651  Last data filed at 11/25/17 0425   Gross per 24 hour   Intake              460 ml   Output              750 ml   Net             -290 ml         Medications:      metoprolol succinate  25 mg Oral Daily    OXcarbazepine  300 mg Oral BID    famotidine  20 mg Oral Daily    colchicine  0.6 mg Oral Daily    aspirin  81 mg Oral Daily    OLANZapine  10 mg Oral Nightly    sennosides-docusate sodium  1 tablet Oral Daily    naproxen  500 mg Oral BID    atorvastatin  20 mg Oral Daily    sodium chloride flush  10 mL Intravenous 2 times per day    enoxaparin  40 mg Subcutaneous Daily    insulin lispro  0-12 Units Subcutaneous TID WC    insulin lispro  0-6 Units Subcutaneous Nightly       Diagnostic Labs and Imaging    CBC:   Recent Labs      11/22/17 1114  11/23/17   0637   WBC  9.7  8.9   RBC  4.45  4.03   HGB  10.7*  9.7*   HCT  35.6*  31.4*   MCV  80.0*  77.9*   RDW  16.0*  16.1*   PLT  255  184     BMP:   Recent Labs      11/22/17   1114  11/23/17   0637  11/24/17   1404   NA  139  139  131*   K  4.1  4.7  4.0   CL  100  101  96*   CO2  28  24  24   BUN  24*  20  16   CREATININE  0.80  0.77  0.73     BNP: No results for input(s): BNP in the last 72 hours. PT/INR: No results for input(s): PROTIME, INR in the last 72 hours. APTT: No results for input(s): APTT in the last 72 hours. CARDIAC ENZYMES:   Recent Labs      11/22/17   1106  11/22/17   1418   TROPONINI  0.00  0.00     FASTING LIPID PANEL:  Lab Results   Component Value Date    CHOL 287 (H) 07/15/2016    HDL 67 07/15/2016    TRIG 90 07/15/2016     LIVER PROFILE:   Recent Labs      11/22/17   1114   AST  20   ALT  13   BILITOT  0.34   ALKPHOS  112*        Assessment and Plan:   Principal Problem:    Dizziness  Active Problems:    Hypertension    Diabetes type 2, uncontrolled ([address]    History of CVA (cerebrovascular accident)    Near syncope    ST elevation myocardial infarction involving left anterior descending (LAD) coronary artery (HCC)    S/P implantation of automatic cardioverter/defibrillator (AICD) 3/13/1- Dr. Serena Thomson    Unstable angina [facility])    Dizziness with extensive cardiac history  Cardiology consult : plan for cardiac cath on Monday   Trend troponin: Negative   BNP at baseline   Orthostatic vitals  Continue ASA, Coreg, lisinopril      DM   Monitor Glucose  Insulin sliding scale       Diet- cardiac- NPO from Nov 26th midnight   GI prophylaxis- Pepcid  DVT prophylaxis- Lovenox  Consults- cardiology        John Gomez MD   PGY-1  Department of Internal Medicine  [facility]         11/25/2017, 6:51 AM      Attending Physician Statement  I have discussed the care of Ya Gaines, including pertinent history and exam findings with the resident.  I have

## 2017-11-25 NOTE — PROGRESS NOTES
Equipment: Shower chair, Grab bars in shower  Bathroom Accessibility: Accessible  Home Equipment: Wheelchair-manual, Oxygen, Quad cane (Use of quad cane in the apartment, use of manual w/c when leaving apartment, 4L of O2 use at all times)  Receives Help From: Home health (Jace Valdez comes every other day)  ADL Assistance: Needs assistance  Homemaking Assistance: Needs assistance  Homemaking Responsibilities: Yes  Meal Prep Responsibility: Primary (Pt states she has been starting to cook some of her own meals because if note \"they always send me the same thing\")  Laundry Responsibility: No (aide)  Cleaning Responsibility: No (aide)  Ambulation Assistance: Independent (with quad cane)  Transfer Assistance: Independent  Active : No  Patient's  Info: Brother in law drives  Mode of Transportation: Family  Occupation: On disability  Leisure & Hobbies: Likes to watch [facility]  Objective          AROM RLE (degrees)  RLE AROM: WFL  PROM LLE (degrees)  LLE General PROM: Pt refused to let therapist attempt ROM and refuses to show available AROM  AROM LLE (degrees)  LLE General AROM: Pt refused to let therapist attempt ROM and refuses to show available AROM  AROM RUE (degrees)  RUE AROM : WFL  PROM LUE (degrees)  LUE General PROM: Pt refused to let therapist attempt ROM and refuses to show available AROM  AROM LUE (degrees)  LUE General AROM: Pt refused to let therapist attempt ROM and refuses to show available AROM  Strength RLE  Strength RLE: WFL  Strength LLE  Comment: Pt refuses attempting even AROM  Strength RUE  Strength RUE: WFL  Strength LUE  Comment: Pt refuses attempting even AROM        Bed mobility  Supine to Sit: Minimal assistance  Sit to Supine: Stand by assistance  Scooting: Contact guard assistance  Transfers  Sit to Stand: Stand by assistance  Stand to sit: Stand by assistance  Ambulation  Ambulation?: Yes  Ambulation 1  Surface: level tile  Device: Small Anthony Christian  Assistance: Stand by assistance;Contact guard assistance  Quality of Gait: mild unsteady without LOB, decreased left step length-limited availability of advancement, hold LUE into elbow flex/IR during gait, decreased L knee flexion noted in ambulation  Distance: 15ftx2  Stairs/Curb  Stairs?: No     Balance  Posture: Fair  Sitting - Static: Good  Sitting - Dynamic: Good  Standing - Static: Fair;+  Standing - Dynamic: Fair  Comments: standing balance assessed with quad cane        Assessment   Body structures, Functions, Activity limitations: Decreased functional mobility ; Decreased ROM; Decreased strength;Decreased balance;Decreased endurance  Assessment: Pt with baseline LUE and LLE deficits secondary to prior stroke. Pt able to ambulate 15ftx2 with quad cane SB-CGA. Pt would benefit from additional acute care therapy as well as possible post acute therapy for general ROM and strengthening x4 extremities as able. Prognosis: Good  Decision Making: Medium Complexity  Patient Education: Educated on importance of mobility and attempting ROM of LUE/LE  Barriers to Learning: None  REQUIRES PT FOLLOW UP: Yes  Activity Tolerance  Activity Tolerance: Patient limited by fatigue  PT Equipment Recommendations  Equipment Needed: No  Other: owns quad cane         Plan   Plan  Times per week: 5x  Times per day: Daily  Current Treatment Recommendations: Strengthening, Balance Training, ROM, Functional Mobility Training, Transfer Training, Endurance Training, Gait Training, Neuromuscular Re-education, Home Exercise Program, Safety Education & Training, Patient/Caregiver Education & Training  Safety Devices  Type of devices: Call light within reach, Gait belt, Left in bed, Nurse notified  Restraints  Initially in place: No    G-Code  PT G-Codes  Functional Assessment Tool Used: [place] Tool  Score: 15/28  Functional Limitation: Mobility: Walking and moving around  Mobility: Walking and Moving Around Current Status ():  At least 40 percent but less than 60 percent impaired, limited or restricted  Mobility: Walking and Moving Around Goal Status ():  At least 1 percent but less than 20 percent impaired, limited or restricted    Goals  Short term goals  Time Frame for Short term goals: 14 visits  Short term goal 1: Pt to ambulate 100ft modified independent with quad cane  Short term goal 2: Pt to sit to stand transfer independent  Short term goal 3: Pt to demonstrate independent bed mobility  Short term goal 4: Pt to tolerate 30 minutes of therapy and participate in exercises x4 extremities  Short term goal 5: Pt to demonstrate good (-) dynamic standing balance to decrease risk of falls  Patient Goals   Patient goals : Pt would like to go home       Therapy Time   Individual Concurrent Group Co-treatment   Time In 0819         Time Out 0838         Minutes 19         Timed Code Treatment Minutes: [id number] [facility], PT

## 2017-11-26 LAB
GLUCOSE BLD-MCNC: 149 MG/DL (ref 65–105)
GLUCOSE BLD-MCNC: 176 MG/DL (ref 65–105)
GLUCOSE BLD-MCNC: 180 MG/DL (ref 65–105)
GLUCOSE BLD-MCNC: 215 MG/DL (ref 65–105)

## 2017-11-26 PROCEDURE — 6370000000 HC RX 637 (ALT 250 FOR IP): Performed by: INTERNAL MEDICINE

## 2017-11-26 PROCEDURE — 1200000000 HC SEMI PRIVATE

## 2017-11-26 PROCEDURE — 82947 ASSAY GLUCOSE BLOOD QUANT: CPT

## 2017-11-26 PROCEDURE — 99232 SBSQ HOSP IP/OBS MODERATE 35: CPT | Performed by: INTERNAL MEDICINE

## 2017-11-26 PROCEDURE — 6370000000 HC RX 637 (ALT 250 FOR IP): Performed by: HOSPITALIST

## 2017-11-26 PROCEDURE — 6370000000 HC RX 637 (ALT 250 FOR IP): Performed by: STUDENT IN AN ORGANIZED HEALTH CARE EDUCATION/TRAINING PROGRAM

## 2017-11-26 PROCEDURE — 2580000003 HC RX 258: Performed by: INTERNAL MEDICINE

## 2017-11-26 PROCEDURE — 6360000002 HC RX W HCPCS: Performed by: INTERNAL MEDICINE

## 2017-11-26 RX ORDER — NICOTINE POLACRILEX 4 MG
15 LOZENGE BUCCAL PRN
Status: DISCONTINUED | OUTPATIENT
Start: 2017-11-26 | End: 2017-11-28 | Stop reason: HOSPADM

## 2017-11-26 RX ORDER — DEXTROSE MONOHYDRATE 50 MG/ML
100 INJECTION, SOLUTION INTRAVENOUS PRN
Status: DISCONTINUED | OUTPATIENT
Start: 2017-11-26 | End: 2017-11-28 | Stop reason: HOSPADM

## 2017-11-26 RX ORDER — DEXTROSE MONOHYDRATE 25 G/50ML
12.5 INJECTION, SOLUTION INTRAVENOUS PRN
Status: DISCONTINUED | OUTPATIENT
Start: 2017-11-26 | End: 2017-11-28 | Stop reason: HOSPADM

## 2017-11-26 RX ADMIN — METOPROLOL SUCCINATE 50 MG: 50 TABLET, FILM COATED, EXTENDED RELEASE ORAL at 09:53

## 2017-11-26 RX ADMIN — Medication 10 ML: at 11:29

## 2017-11-26 RX ADMIN — ATORVASTATIN CALCIUM 20 MG: 20 TABLET, FILM COATED ORAL at 09:52

## 2017-11-26 RX ADMIN — COLCHICINE 0.6 MG: 0.6 TABLET, FILM COATED ORAL at 09:52

## 2017-11-26 RX ADMIN — INSULIN LISPRO 2 UNITS: 100 INJECTION, SOLUTION INTRAVENOUS; SUBCUTANEOUS at 11:28

## 2017-11-26 RX ADMIN — INSULIN LISPRO 3 UNITS: 100 INJECTION, SOLUTION INTRAVENOUS; SUBCUTANEOUS at 21:31

## 2017-11-26 RX ADMIN — NAPROXEN 500 MG: 250 TABLET ORAL at 21:39

## 2017-11-26 RX ADMIN — OXCARBAZEPINE 300 MG: 300 TABLET, FILM COATED ORAL at 21:39

## 2017-11-26 RX ADMIN — OLANZAPINE 10 MG: 10 TABLET, FILM COATED ORAL at 21:40

## 2017-11-26 RX ADMIN — ENOXAPARIN SODIUM 40 MG: 40 INJECTION SUBCUTANEOUS at 09:54

## 2017-11-26 RX ADMIN — OXCARBAZEPINE 300 MG: 300 TABLET, FILM COATED ORAL at 09:52

## 2017-11-26 RX ADMIN — INSULIN LISPRO 2 UNITS: 100 INJECTION, SOLUTION INTRAVENOUS; SUBCUTANEOUS at 09:45

## 2017-11-26 RX ADMIN — DOCUSATE SODIUM -SENNOSIDES 1 TABLET: 50; 8.6 TABLET, COATED ORAL at 09:52

## 2017-11-26 RX ADMIN — NAPROXEN 500 MG: 250 TABLET ORAL at 09:52

## 2017-11-26 RX ADMIN — Medication 10 ML: at 21:44

## 2017-11-26 RX ADMIN — INSULIN LISPRO 3 UNITS: 100 INJECTION, SOLUTION INTRAVENOUS; SUBCUTANEOUS at 18:17

## 2017-11-26 RX ADMIN — FAMOTIDINE 20 MG: 20 TABLET, FILM COATED ORAL at 09:52

## 2017-11-26 RX ADMIN — ASPIRIN 81 MG: 81 TABLET, COATED ORAL at 09:52

## 2017-11-26 ASSESSMENT — PAIN SCALES - GENERAL
PAINLEVEL_OUTOF10: 6
PAINLEVEL_OUTOF10: 2

## 2017-11-26 NOTE — PROGRESS NOTES
11/25/17   1448   WBC  7.4   RBC  4.22   HGB  10.2*   HCT  34.2*   MCV  81.0*   RDW  15.9*   PLT  236     BMP:   Recent Labs      11/24/17   1404  11/25/17   1448   NA  131*  137   K  4.0  3.9   CL  96*  97*   CO2  24  27   BUN  16  12   CREATININE  0.73  0.89     BNP: No results for input(s): BNP in the last 72 hours. PT/INR: No results for input(s): PROTIME, INR in the last 72 hours. APTT: No results for input(s): APTT in the last 72 hours. CARDIAC ENZYMES:   No results for input(s): CKMB, CKMBINDEX, TROPONINI in the last 72 hours. Invalid input(s): CKTOTAL;3  FASTING LIPID PANEL:  Lab Results   Component Value Date    CHOL 287 (H) 07/15/2016    HDL 67 07/15/2016    TRIG 90 07/15/2016     LIVER PROFILE:   No results for input(s): AST, ALT, ALB, BILIDIR, BILITOT, ALKPHOS in the last 72 hours. Assessment and Plan:   Principal Problem:    Dizziness  Active Problems:    Hypertension    Diabetes type 2, uncontrolled (HCC)    History of CVA (cerebrovascular accident)    Near syncope    ST elevation myocardial infarction involving left anterior descending (LAD) coronary artery (HCC)    S/P implantation of automatic cardioverter/defibrillator (AICD) 3/13/1- Dr. Alexandro Blas    Unstable angina [facility])    Dizziness with extensive cardiac history  Cardiology consult : plan for cardiac cath on Monday   Trend troponin: Negative   BNP at baseline   Orthostatic vitals  Continue Toprol XL 50 mg      DM   Monitor Glucose  Insulin sliding scale     Home medications Zyprexa and Trileptal resumed    Diet- Carb control. NPO after midnight    GI prophylaxis- Pepcid  DVT prophylaxis- Lovenox  Consults- cardiology        Carmelo Germain MD   PGY-1  Department of Internal Medicine  [address] [facility]         11/26/2017, 9:20 AM    Attending Physician Statement  I have discussed the care of Von Arriaga, including pertinent history and exam findings with the resident.  I have reviewed the key elements of all

## 2017-11-26 NOTE — PLAN OF CARE
Problem: Falls - Risk of  Goal: Absence of falls  Outcome: Ongoing      Comments: Electronically signed by Mireya Hwang RN on 11/25/2017 at 10:03 PM

## 2017-11-26 NOTE — PROGRESS NOTES
[facility]   Progress Note                   Date:   11/26/2017  Patient name: Mary Grace Durant  Date of admission:  11/22/2017 10:41 AM  MRN:   [id number]  YOB: 1955  PCP: Lyric Churchill MD    Reason for Admission:      Subjective: There were no acute events overnight, remained hemodynamically stable, denies chest pain, dyspnea, orthopnea or palpitations. Dizziness improving    Medications:   Scheduled Meds:   metoprolol succinate  50 mg Oral Daily    OXcarbazepine  300 mg Oral BID    famotidine  20 mg Oral Daily    colchicine  0.6 mg Oral Daily    aspirin  81 mg Oral Daily    OLANZapine  10 mg Oral Nightly    sennosides-docusate sodium  1 tablet Oral Daily    naproxen  500 mg Oral BID    atorvastatin  20 mg Oral Daily    sodium chloride flush  10 mL Intravenous 2 times per day    enoxaparin  40 mg Subcutaneous Daily    insulin lispro  0-12 Units Subcutaneous TID WC    insulin lispro  0-6 Units Subcutaneous Nightly       Continuous Infusions:     CBC:   Recent Labs      11/25/17   1448   WBC  7.4   HGB  10.2*   PLT  236     BMP:    Recent Labs      11/24/17   1404  11/25/17   1448   NA  131*  137   K  4.0  3.9   CL  96*  97*   CO2  24  27   BUN  16  12   CREATININE  0.73  0.89   GLUCOSE  197*  161*     Hepatic:   No results for input(s): AST, ALT, ALB, BILITOT, ALKPHOS in the last 72 hours. Troponin:   No results for input(s): TROPONINI in the last 72 hours. BNP: No results for input(s): BNP in the last 72 hours. Lipids: No results for input(s): CHOL, HDL in the last 72 hours. Invalid input(s): LDLCALCU  INR: No results for input(s): INR in the last 72 hours.     Objective:   Vitals: BP (!) 161/88   Pulse 83   Temp 99 °F (37.2 °C) (Oral)   Resp 14   Ht 5' 1\" (1.549 m)   Wt 180 lb (81.6 kg)   LMP  (LMP Unknown)   SpO2 100%   BMI 34.01 kg/m²     General appearance: awake, alert, in no apparent respiratory distress   HEENT: Head: Normocephalic, no lesions, without obvious abnormality  Neck: no JVD  Lungs: clear to auscultation bilaterally, no basilar rales, no wheezing   Heart: regular rate and rhythm, S1, S2 normal, no murmur, click, rub or gallop  Abdomen: soft, non-tender; bowel sounds normal  Extremities: No LE edema  Neurologic: Mental status: Alert, oriented. Motor and sensory not done. STRESS 11/15/17:   There is apparent remote infarction within the inferior wall and   moderate-sized partially reversible perfusion defect within the anterior   wall, concerning for ischemia. WMA. EF 36%      ICD 3/13/17: Medtronic device placed by Dr. Raheem Odom for ICM.    CABG 9/1/16: LIMA-LAD, SVG-OM3.      CATH 8/15/16: Severe MVD. Normal right-sided pulmonary and wedge pressure.      ECHO 5/6/16: EF 15-20% with wall motion abnormalities, DD, mild MR/TR, RVSP is 41 mmHg. Trivial circumferential PCE        Assessment / Acute Cardiac Problems/PLAN:   1. Recurrent Lightheadedness-Will continue to hold Lisinopril  2. On Toprol XL 50 daily  3. CAD s/p CABG   4. Ischemic cardiomyopathy   5. Hx of CVA with left side weakness  6. DM 2  7. Abnormal stress- Cardiac cath on monday , abnormal stress test on 11/15/17. Risks, benefits, alternatives, and details discussed extensively. Accepts and consents. 8.     Discussed with patient and nursing. Pravin Billingsley MD      Department of Internal Medicine  [facility]         11/26/2017, 7:20 AM      Attending Cardiologist Addendum: I have reviewed and performed the history, physical, subjective, objective, assessment, and plan with the resident/fellow and agree with the note. I performed the history and physical personally. I have made changes to the note above as needed. Thank you for allowing me to participate in the care of this patient, please do not hesitate to call if you have any questions. Chely Cheatham, [address] [facility]  [facility] [employer]  [phone]

## 2017-11-27 ENCOUNTER — TELEPHONE (OUTPATIENT)
Dept: FAMILY MEDICINE CLINIC | Age: 62
End: 2017-11-27

## 2017-11-27 LAB
ACTIVATED CLOTTING TIME: 225 SEC (ref 79–149)
GLUCOSE BLD-MCNC: 100 MG/DL (ref 65–105)
GLUCOSE BLD-MCNC: 128 MG/DL (ref 65–105)
GLUCOSE BLD-MCNC: 211 MG/DL (ref 65–105)
GLUCOSE BLD-MCNC: 231 MG/DL (ref 65–105)
GLUCOSE BLD-MCNC: 262 MG/DL (ref 65–105)
GLUCOSE BLD-MCNC: 311 MG/DL (ref 65–105)
TROPONIN INTERP: NORMAL
TROPONIN INTERP: NORMAL
TROPONIN T: <0.03 NG/ML
TROPONIN T: <0.03 NG/ML

## 2017-11-27 PROCEDURE — C1760 CLOSURE DEV, VASC: HCPCS

## 2017-11-27 PROCEDURE — C1725 CATH, TRANSLUMIN NON-LASER: HCPCS

## 2017-11-27 PROCEDURE — 76937 US GUIDE VASCULAR ACCESS: CPT

## 2017-11-27 PROCEDURE — C1874 STENT, COATED/COV W/DEL SYS: HCPCS

## 2017-11-27 PROCEDURE — C1887 CATHETER, GUIDING: HCPCS

## 2017-11-27 PROCEDURE — 7100000011 HC PHASE II RECOVERY - ADDTL 15 MIN

## 2017-11-27 PROCEDURE — 93459 L HRT ART/GRFT ANGIO: CPT | Performed by: INTERNAL MEDICINE

## 2017-11-27 PROCEDURE — 6360000002 HC RX W HCPCS

## 2017-11-27 PROCEDURE — 94762 N-INVAS EAR/PLS OXIMTRY CONT: CPT

## 2017-11-27 PROCEDURE — 92928 PRQ TCAT PLMT NTRAC ST 1 LES: CPT | Performed by: INTERNAL MEDICINE

## 2017-11-27 PROCEDURE — 36415 COLL VENOUS BLD VENIPUNCTURE: CPT

## 2017-11-27 PROCEDURE — 99211 OFF/OP EST MAY X REQ PHY/QHP: CPT

## 2017-11-27 PROCEDURE — 7100000010 HC PHASE II RECOVERY - FIRST 15 MIN

## 2017-11-27 PROCEDURE — 2580000003 HC RX 258: Performed by: STUDENT IN AN ORGANIZED HEALTH CARE EDUCATION/TRAINING PROGRAM

## 2017-11-27 PROCEDURE — 2500000003 HC RX 250 WO HCPCS

## 2017-11-27 PROCEDURE — 6370000000 HC RX 637 (ALT 250 FOR IP): Performed by: STUDENT IN AN ORGANIZED HEALTH CARE EDUCATION/TRAINING PROGRAM

## 2017-11-27 PROCEDURE — C1894 INTRO/SHEATH, NON-LASER: HCPCS

## 2017-11-27 PROCEDURE — 85347 COAGULATION TIME ACTIVATED: CPT

## 2017-11-27 PROCEDURE — 6370000000 HC RX 637 (ALT 250 FOR IP): Performed by: HOSPITALIST

## 2017-11-27 PROCEDURE — 92929 HC PRQ CARD STENT W/ANGIO ADDL: CPT | Performed by: INTERNAL MEDICINE

## 2017-11-27 PROCEDURE — 82947 ASSAY GLUCOSE BLOOD QUANT: CPT

## 2017-11-27 PROCEDURE — 2580000003 HC RX 258: Performed by: INTERNAL MEDICINE

## 2017-11-27 PROCEDURE — 6370000000 HC RX 637 (ALT 250 FOR IP)

## 2017-11-27 PROCEDURE — C1769 GUIDE WIRE: HCPCS

## 2017-11-27 PROCEDURE — 1200000000 HC SEMI PRIVATE

## 2017-11-27 PROCEDURE — 6370000000 HC RX 637 (ALT 250 FOR IP): Performed by: INTERNAL MEDICINE

## 2017-11-27 PROCEDURE — 84484 ASSAY OF TROPONIN QUANT: CPT

## 2017-11-27 RX ORDER — ACETAMINOPHEN 325 MG/1
650 TABLET ORAL EVERY 4 HOURS PRN
Status: DISCONTINUED | OUTPATIENT
Start: 2017-11-27 | End: 2017-11-28 | Stop reason: HOSPADM

## 2017-11-27 RX ORDER — SODIUM CHLORIDE 0.9 % (FLUSH) 0.9 %
10 SYRINGE (ML) INJECTION PRN
Status: DISCONTINUED | OUTPATIENT
Start: 2017-11-27 | End: 2017-11-28 | Stop reason: HOSPADM

## 2017-11-27 RX ORDER — SODIUM CHLORIDE 0.9 % (FLUSH) 0.9 %
10 SYRINGE (ML) INJECTION EVERY 12 HOURS SCHEDULED
Status: DISCONTINUED | OUTPATIENT
Start: 2017-11-27 | End: 2017-11-28 | Stop reason: HOSPADM

## 2017-11-27 RX ORDER — LABETALOL HYDROCHLORIDE 5 MG/ML
10 INJECTION, SOLUTION INTRAVENOUS EVERY 30 MIN PRN
Status: DISCONTINUED | OUTPATIENT
Start: 2017-11-27 | End: 2017-11-28 | Stop reason: HOSPADM

## 2017-11-27 RX ORDER — SODIUM CHLORIDE 9 MG/ML
INJECTION, SOLUTION INTRAVENOUS CONTINUOUS
Status: DISCONTINUED | OUTPATIENT
Start: 2017-11-27 | End: 2017-11-28 | Stop reason: HOSPADM

## 2017-11-27 RX ORDER — CLOPIDOGREL BISULFATE 75 MG/1
75 TABLET ORAL DAILY
Status: DISCONTINUED | OUTPATIENT
Start: 2017-11-27 | End: 2017-11-28 | Stop reason: HOSPADM

## 2017-11-27 RX ORDER — ATORVASTATIN CALCIUM 40 MG/1
40 TABLET, FILM COATED ORAL NIGHTLY
Status: DISCONTINUED | OUTPATIENT
Start: 2017-11-28 | End: 2017-11-28 | Stop reason: HOSPADM

## 2017-11-27 RX ORDER — SODIUM CHLORIDE 9 MG/ML
INJECTION, SOLUTION INTRAVENOUS CONTINUOUS
Status: DISCONTINUED | OUTPATIENT
Start: 2017-11-27 | End: 2017-11-27

## 2017-11-27 RX ORDER — ONDANSETRON 2 MG/ML
4 INJECTION INTRAMUSCULAR; INTRAVENOUS EVERY 6 HOURS PRN
Status: DISCONTINUED | OUTPATIENT
Start: 2017-11-27 | End: 2017-11-28 | Stop reason: HOSPADM

## 2017-11-27 RX ADMIN — INSULIN LISPRO 5 UNITS: 100 INJECTION, SOLUTION INTRAVENOUS; SUBCUTANEOUS at 21:03

## 2017-11-27 RX ADMIN — OLANZAPINE 10 MG: 10 TABLET, FILM COATED ORAL at 20:57

## 2017-11-27 RX ADMIN — SODIUM CHLORIDE: 9 INJECTION, SOLUTION INTRAVENOUS at 12:18

## 2017-11-27 RX ADMIN — Medication 10 ML: at 21:06

## 2017-11-27 RX ADMIN — OXCARBAZEPINE 300 MG: 300 TABLET, FILM COATED ORAL at 20:57

## 2017-11-27 RX ADMIN — FAMOTIDINE 20 MG: 20 TABLET, FILM COATED ORAL at 09:57

## 2017-11-27 RX ADMIN — OXCARBAZEPINE 300 MG: 300 TABLET, FILM COATED ORAL at 09:57

## 2017-11-27 RX ADMIN — METOPROLOL SUCCINATE 50 MG: 50 TABLET, FILM COATED, EXTENDED RELEASE ORAL at 09:57

## 2017-11-27 ASSESSMENT — PAIN SCALES - GENERAL: PAINLEVEL_OUTOF10: 0

## 2017-11-27 NOTE — PROGRESS NOTES
[address]  Occupational Therapy Not Seen Note    Patient not available for Occupational Therapy due to:    [] Testing:    [] Hemodialysis    [] Blood Transfusion in Progress    []Refusal by Patient:    [] Surgery/Procedure:    [] Strict Bedrest    [] Sedation    [] Spine Precautions     [] Pt being transferred to palliative care at this time. Spoke with pt/family and OT services to be defered. [] Pt independent with functional mobility and functional tasks. Pt with no OT acute care needs at this time, will defer OT eval.    [x] Other: Pt just back from cath lab, must lay flat for another hour. @1540pm    Next Scheduled Treatment: 11-28-17    Signature: SHANNAN Milton

## 2017-11-27 NOTE — PROGRESS NOTES
Labs      11/25/17   1448   WBC  7.4   RBC  4.22   HGB  10.2*   HCT  34.2*   MCV  81.0*   RDW  15.9*   PLT  236     BMP:   Recent Labs      11/24/17   1404  11/25/17   1448   NA  131*  137   K  4.0  3.9   CL  96*  97*   CO2  24  27   BUN  16  12   CREATININE  0.73  0.89     BNP: No results for input(s): BNP in the last 72 hours. PT/INR: No results for input(s): PROTIME, INR in the last 72 hours. APTT: No results for input(s): APTT in the last 72 hours. CARDIAC ENZYMES:   No results for input(s): CKMB, CKMBINDEX, TROPONINI in the last 72 hours. Invalid input(s): CKTOTAL;3  FASTING LIPID PANEL:  Lab Results   Component Value Date    CHOL 287 (H) 07/15/2016    HDL 67 07/15/2016    TRIG 90 07/15/2016     LIVER PROFILE:   No results for input(s): AST, ALT, ALB, BILIDIR, BILITOT, ALKPHOS in the last 72 hours. Assessment and Plan:   Principal Problem:    Dizziness  Active Problems:    Hypertension    Diabetes type 2, uncontrolled (HCC)    History of CVA (cerebrovascular accident)    Near syncope    ST elevation myocardial infarction involving left anterior descending (LAD) coronary artery (HCC)    S/P implantation of automatic cardioverter/defibrillator (AICD) 3/13/1- Dr. Enid Jones    Unstable angina [facility])    Dizziness with extensive cardiac history  Cardiology consult : plan for cardiac cath today  Cont IV fluids. Trend troponin: Negative   BNP at baseline   Orthostatic vitals  Continue Toprol XL 50 mg      DM   Monitor Glucose  Insulin sliding scale     Home medications Zyprexa and Trileptal resumed    Diet- Carb control. NPO after midnight    GI prophylaxis- Pepcid  DVT prophylaxis- Lovenox  Consults- cardiology      Sierra Martinez MD   PGY-2  Department of Internal Medicine  [facility]         11/27/2017, 8:36 AM  Attending Physician Statement For Internal Medicine  I have discussed the care of Landy Da Silva, including pertinent history and exam findings,  with the Resident. I have seen and examined the patient with the resident and reviewed the key elements of all parts of the encounter have been performed by me. I agree with the assessment, plan and orders as documented by the resident.     Antoine Durbin MD, MRCP Hilario Waller, FACP   11/27/2017 12:48 PM  Internal Medicine and Nephrology

## 2017-11-27 NOTE — CARE COORDINATION
Discharge plan remains for pt to return home, is current with [facility] home care, wants to continue

## 2017-11-27 NOTE — PROGRESS NOTES
Physical Therapy  DATE: 2017    NAME: Emanuel Rockwell  MRN: [id number]   : 1955    Patient not seen this date for Physical Therapy due to:  [] Blood transfusion in progress  [] Hemodialysis  []  Patient Declined  [] Spine Precautions   [] Strict Bedrest  [] Surgery/ Procedure  [x] Testing: cath lab per RN. Will check       [] Other        [] PT being discontinued at this time. Patient independent. No further needs. [] PT being discontinued at this time as the patient has been transferred to palliative care. No further needs.     Mirella Neely, PT

## 2017-11-27 NOTE — PROGRESS NOTES
Patient received from # [id number] to [address] [place] [address] # 4 for Cardiac cath. Assessment obtained. Denies pain at present. Verbalized understanding of procedure and consents signed. Bilateral groins prepped. Resting without complaints or S/S of distress. Side rails up 2/2 with call light in reach. No visitors present. Patient incontinent. Brief changed and clayton care given, redness noted to perineal area.

## 2017-11-27 NOTE — PROGRESS NOTES
[id number] [facility] Wound Ostomy Continence Nurse  Consult Note       NAME:  Flor Jack  MEDICAL RECORD NUMBER:  [id number]  AGE: 65 y.o. GENDER: female  : 1955  TODAY'S DATE:  2017    Subjective:     Reason for WOCN Evaluation and Assessment: \"extreme excoriation under breast and groin abdominal folds. \"      Flor Jack is a 65 y.o. female referred by:   [] Physician  [] Nursing  [] Other:     Wound Identification:  Wound Type: no wounds found          PAST MEDICAL HISTORY        Diagnosis Date    Arthritis     Asthma     CAD (coronary artery disease)     [facility]ive [facility]     Clinical trial participant at discharge 2017    [employer]tr[employer] PSR     COPD (chronic obstructive pulmonary disease) [facility]     Diabetes mellitus ([facility]     Hepatitis C     Hyperlipidemia     Hypertension     Pneumonia     Unspecified cerebral artery occlusion with cerebral infarction        PAST SURGICAL HISTORY    Past Surgical History:   Procedure Laterality Date    CARDIAC CATHETERIZATION  2017    CARDIAC PACEMAKER PLACEMENT      placed 1 month ago at [address] [facility]  2016    cardiac cath-1xBMS prox. RCA; 1xBMS prox.  LAD    FOOT SURGERY      cyst removed from L foot    TONSILLECTOMY         FAMILY HISTORY    Family History   Problem Relation Age of Onset    Family history unknown: Yes       SOCIAL HISTORY    Social History   Substance Use Topics    Smoking status: Current Every Day Smoker     Packs/day: 1.00     Types: Cigarettes    Smokeless tobacco: Never Used    Alcohol use Yes      Comment: 1 bottle per month wine       ALLERGIES    Allergies   Allergen Reactions    Levofloxacin Hives    Pcn [Penicillins] Hives           Objective:      BP (!) 169/84   Pulse 75   Temp 97.8 °F (36.6 °C) (Oral)   Resp 11   Ht 5' 1\" (1.549 m)   Wt 177 lb 11.2 oz (80.6 kg)   LMP  (LMP Unknown)   SpO2 100%   BMI 33.58 kg/m²   Tony Risk Score: Tony Scale Score: Plan:  Placement for patient upon discharge: home with support   Hospice Care: No   Patient appropriate for Outpatient [address] [facility] [address]: No    Patient/Caregiver Teaching:    [] Indicates understanding       [] Needs reinforcement  [] Unsuccessful      [x] Verbal Understanding  [] Demonstrated understanding       [] No evidence of learning  [] Refused teaching         [] N/A       Electronically signed by Boogie Joiner RN, CWON on 11/27/2017 at 1:26 PM

## 2017-11-27 NOTE — OP NOTE
examined the patient personally  Agree with treatment plan, correction innotes was made as appropriate, and discussed final arrangement based on  my evaluation and exam  Risk and benefit of procedure explained     Procedure was performed by me, with all aspect of the procedure being done using standard protocol.     Dereje Kiser MD  [facility]

## 2017-11-28 ENCOUNTER — TELEPHONE (OUTPATIENT)
Dept: FAMILY MEDICINE CLINIC | Age: 62
End: 2017-11-28

## 2017-11-28 VITALS
WEIGHT: 179 LBS | HEIGHT: 61 IN | DIASTOLIC BLOOD PRESSURE: 72 MMHG | OXYGEN SATURATION: 100 % | HEART RATE: 88 BPM | BODY MASS INDEX: 33.79 KG/M2 | RESPIRATION RATE: 18 BRPM | SYSTOLIC BLOOD PRESSURE: 148 MMHG | TEMPERATURE: 97.9 F

## 2017-11-28 LAB
ABSOLUTE EOS #: 0.43 K/UL (ref 0–0.44)
ABSOLUTE IMMATURE GRANULOCYTE: 0.04 K/UL (ref 0–0.3)
ABSOLUTE LYMPH #: 1.44 K/UL (ref 1.1–3.7)
ABSOLUTE MONO #: 0.68 K/UL (ref 0.1–1.2)
ANION GAP SERPL CALCULATED.3IONS-SCNC: 15 MMOL/L (ref 9–17)
BASOPHILS # BLD: 1 % (ref 0–2)
BASOPHILS ABSOLUTE: 0.05 K/UL (ref 0–0.2)
BUN BLDV-MCNC: 15 MG/DL (ref 8–23)
BUN/CREAT BLD: ABNORMAL (ref 9–20)
CALCIUM SERPL-MCNC: 8.6 MG/DL (ref 8.6–10.4)
CHLORIDE BLD-SCNC: 94 MMOL/L (ref 98–107)
CO2: 23 MMOL/L (ref 20–31)
CREAT SERPL-MCNC: 0.85 MG/DL (ref 0.5–0.9)
DIFFERENTIAL TYPE: ABNORMAL
EOSINOPHILS RELATIVE PERCENT: 5 % (ref 1–4)
GFR AFRICAN AMERICAN: >60 ML/MIN
GFR NON-AFRICAN AMERICAN: >60 ML/MIN
GFR SERPL CREATININE-BSD FRML MDRD: ABNORMAL ML/MIN/{1.73_M2}
GFR SERPL CREATININE-BSD FRML MDRD: ABNORMAL ML/MIN/{1.73_M2}
GLUCOSE BLD-MCNC: 165 MG/DL (ref 65–105)
GLUCOSE BLD-MCNC: 173 MG/DL (ref 65–105)
GLUCOSE BLD-MCNC: 210 MG/DL (ref 65–105)
GLUCOSE BLD-MCNC: 248 MG/DL (ref 70–99)
HCT VFR BLD CALC: 32 % (ref 36.3–47.1)
HEMOGLOBIN: 10 G/DL (ref 11.9–15.1)
IMMATURE GRANULOCYTES: 1 %
LYMPHOCYTES # BLD: 18 % (ref 24–43)
MCH RBC QN AUTO: 24.3 PG (ref 25.2–33.5)
MCHC RBC AUTO-ENTMCNC: 31.3 G/DL (ref 28.4–34.8)
MCV RBC AUTO: 77.9 FL (ref 82.6–102.9)
MONOCYTES # BLD: 9 % (ref 3–12)
PDW BLD-RTO: 16.2 % (ref 11.8–14.4)
PLATELET # BLD: 240 K/UL (ref 138–453)
PLATELET ESTIMATE: ABNORMAL
PMV BLD AUTO: 9.7 FL (ref 8.1–13.5)
POTASSIUM SERPL-SCNC: 5 MMOL/L (ref 3.7–5.3)
RBC # BLD: 4.11 M/UL (ref 3.95–5.11)
RBC # BLD: ABNORMAL 10*6/UL
SEG NEUTROPHILS: 67 % (ref 36–65)
SEGMENTED NEUTROPHILS ABSOLUTE COUNT: 5.38 K/UL (ref 1.5–8.1)
SODIUM BLD-SCNC: 132 MMOL/L (ref 135–144)
WBC # BLD: 8 K/UL (ref 3.5–11.3)
WBC # BLD: ABNORMAL 10*3/UL

## 2017-11-28 PROCEDURE — 36415 COLL VENOUS BLD VENIPUNCTURE: CPT

## 2017-11-28 PROCEDURE — 6370000000 HC RX 637 (ALT 250 FOR IP): Performed by: STUDENT IN AN ORGANIZED HEALTH CARE EDUCATION/TRAINING PROGRAM

## 2017-11-28 PROCEDURE — 82947 ASSAY GLUCOSE BLOOD QUANT: CPT

## 2017-11-28 PROCEDURE — 6370000000 HC RX 637 (ALT 250 FOR IP): Performed by: NURSE PRACTITIONER

## 2017-11-28 PROCEDURE — 94762 N-INVAS EAR/PLS OXIMTRY CONT: CPT

## 2017-11-28 PROCEDURE — 6370000000 HC RX 637 (ALT 250 FOR IP): Performed by: INTERNAL MEDICINE

## 2017-11-28 PROCEDURE — 97530 THERAPEUTIC ACTIVITIES: CPT

## 2017-11-28 PROCEDURE — 97116 GAIT TRAINING THERAPY: CPT

## 2017-11-28 PROCEDURE — 6360000002 HC RX W HCPCS: Performed by: INTERNAL MEDICINE

## 2017-11-28 PROCEDURE — 85025 COMPLETE CBC W/AUTO DIFF WBC: CPT

## 2017-11-28 PROCEDURE — 80048 BASIC METABOLIC PNL TOTAL CA: CPT

## 2017-11-28 PROCEDURE — 6370000000 HC RX 637 (ALT 250 FOR IP): Performed by: HOSPITALIST

## 2017-11-28 RX ORDER — LISINOPRIL 2.5 MG/1
2.5 TABLET ORAL DAILY
Status: DISCONTINUED | OUTPATIENT
Start: 2017-11-28 | End: 2017-11-28

## 2017-11-28 RX ORDER — LISINOPRIL 5 MG/1
5 TABLET ORAL DAILY
Status: DISCONTINUED | OUTPATIENT
Start: 2017-11-28 | End: 2017-11-28

## 2017-11-28 RX ORDER — LISINOPRIL 2.5 MG/1
2.5 TABLET ORAL DAILY
Qty: 30 TABLET | Refills: 3 | Status: ON HOLD | OUTPATIENT
Start: 2017-11-28 | End: 2017-12-01 | Stop reason: HOSPADM

## 2017-11-28 RX ORDER — ATORVASTATIN CALCIUM 40 MG/1
40 TABLET, FILM COATED ORAL NIGHTLY
Qty: 30 TABLET | Refills: 11 | Status: ON HOLD | OUTPATIENT
Start: 2017-11-28 | End: 2017-12-01 | Stop reason: HOSPADM

## 2017-11-28 RX ORDER — NITROGLYCERIN 0.4 MG/1
0.4 TABLET SUBLINGUAL EVERY 5 MIN PRN
Qty: 25 TABLET | Refills: 3 | Status: ON HOLD | OUTPATIENT
Start: 2017-11-28 | End: 2017-12-05

## 2017-11-28 RX ORDER — BACITRACIN 500 [USP'U]/G
OINTMENT TOPICAL
Qty: 1 TUBE | Refills: 0 | Status: ON HOLD | OUTPATIENT
Start: 2017-11-28 | End: 2017-12-18 | Stop reason: HOSPADM

## 2017-11-28 RX ORDER — CLOPIDOGREL BISULFATE 75 MG/1
75 TABLET ORAL DAILY
Qty: 30 TABLET | Refills: 11 | Status: ON HOLD | OUTPATIENT
Start: 2017-11-29 | End: 2017-12-15 | Stop reason: HOSPADM

## 2017-11-28 RX ORDER — LISINOPRIL 2.5 MG/1
2.5 TABLET ORAL DAILY
Status: DISCONTINUED | OUTPATIENT
Start: 2017-11-28 | End: 2017-11-28 | Stop reason: HOSPADM

## 2017-11-28 RX ADMIN — METOPROLOL SUCCINATE 50 MG: 50 TABLET, FILM COATED, EXTENDED RELEASE ORAL at 10:14

## 2017-11-28 RX ADMIN — OXCARBAZEPINE 300 MG: 300 TABLET, FILM COATED ORAL at 10:15

## 2017-11-28 RX ADMIN — LISINOPRIL 2.5 MG: 2.5 TABLET ORAL at 13:46

## 2017-11-28 RX ADMIN — CLOPIDOGREL 75 MG: 75 TABLET, FILM COATED ORAL at 10:15

## 2017-11-28 RX ADMIN — FAMOTIDINE 20 MG: 20 TABLET, FILM COATED ORAL at 10:13

## 2017-11-28 RX ADMIN — ENOXAPARIN SODIUM 40 MG: 40 INJECTION SUBCUTANEOUS at 10:13

## 2017-11-28 RX ADMIN — INSULIN LISPRO 3 UNITS: 100 INJECTION, SOLUTION INTRAVENOUS; SUBCUTANEOUS at 10:20

## 2017-11-28 RX ADMIN — COLCHICINE 0.6 MG: 0.6 TABLET, FILM COATED ORAL at 10:13

## 2017-11-28 RX ADMIN — DOCUSATE SODIUM -SENNOSIDES 1 TABLET: 50; 8.6 TABLET, COATED ORAL at 18:04

## 2017-11-28 RX ADMIN — ASPIRIN 81 MG: 81 TABLET, COATED ORAL at 10:14

## 2017-11-28 ASSESSMENT — PAIN SCALES - GENERAL
PAINLEVEL_OUTOF10: 0

## 2017-11-28 NOTE — PROGRESS NOTES
Oral Daily    sodium chloride flush  10 mL Intravenous 2 times per day    enoxaparin  40 mg Subcutaneous Daily       Diagnostic Labs and Imaging    CBC:   Recent Labs      11/25/17   1448  11/28/17   1030   WBC  7.4  8.0   RBC  4.22  4.11   HGB  10.2*  10.0*   HCT  34.2*  32.0*   MCV  81.0*  77.9*   RDW  15.9*  16.2*   PLT  236  240     BMP:   Recent Labs      11/25/17   1448  11/28/17   1030   NA  137  132*   K  3.9  5.0   CL  97*  94*   CO2  27  23   BUN  12  15   CREATININE  0.89  0.85     BNP: No results for input(s): BNP in the last 72 hours. PT/INR: No results for input(s): PROTIME, INR in the last 72 hours. APTT: No results for input(s): APTT in the last 72 hours. CARDIAC ENZYMES:   No results for input(s): CKMB, CKMBINDEX, TROPONINI in the last 72 hours. Invalid input(s): CKTOTAL;3  FASTING LIPID PANEL:  Lab Results   Component Value Date    CHOL 287 (H) 07/15/2016    HDL 67 07/15/2016    TRIG 90 07/15/2016     LIVER PROFILE:   No results for input(s): AST, ALT, ALB, BILIDIR, BILITOT, ALKPHOS in the last 72 hours. Assessment and Plan:   Principal Problem:    Dizziness  Active Problems:    Hypertension    Diabetes type 2, uncontrolled (HCC)    History of CVA (cerebrovascular accident)    Near syncope    ST elevation myocardial infarction involving left anterior descending (LAD) coronary artery ([facility])    S/P implantation of automatic cardioverter/defibrillator (AICD) 3/13/1- Dr. Jenn Shabazz    Unstable angina [facility])    Dizziness with extensive cardiac history  S/p cardiac cath and stents yesterday.   ASA and plavix   Lisinopril 5 mg daily   Lipitor 40 mg nightly   Trend troponin: Negative   BNP at baseline   Continue Toprol XL 50 mg      DM   Monitor Glucose  Insulin sliding scale     Home medications Zyprexa and Trileptal resumed    GI prophylaxis- Pepcid  DVT prophylaxis- Lovenox  Consults- cardiology    Plan for discharge home with home care if cleared from cardiology standpoint        Lexy Christianson, MD   PGY-1  Department of Internal Medicine  [facility]         11/28/2017, 11:17 AM  Attending Physician Statement For Internal Medicine  I have discussed the care of Azra Michaud, including pertinent history and exam findings,  with the Resident. I have seen and examined the patient with the resident and reviewed the key elements of all parts of the encounter have been performed by me. I agree with the assessment, plan and orders as documented by the resident.     Warden Ana WU, MRCFELIBERTO Fuentes, FACP   11/28/2017 11:35 AM  Internal Medicine and Nephrology

## 2017-11-28 NOTE — PROGRESS NOTES
CATHETERIZATION  11/27/2017    CARDIAC PACEMAKER PLACEMENT      placed 1 month ago at st [address]  5/6/2016    cardiac cath-1xBMS prox. RCA; 1xBMS prox. LAD    FOOT SURGERY      cyst removed from L foot    TONSILLECTOMY         Restrictions  Restrictions/Precautions  Restrictions/Precautions: Fall Risk  Required Braces or Orthoses?: No  Position Activity Restriction  Other position/activity restrictions: Up with assistance  Subjective    Pt sitting up in bed upon arrival.   Pt has no c/o pain   Orientation    Overall Orientation Status: Within Functional Limits  Objective     Bed mobility  Supine to Sit: SBA  Sit to Supine: Stand by assistance  Scooting: SBA  Transfers  Sit to Stand: Stand by assistance  Stand to sit: Stand by assistance  Ambulation  Ambulation?: Yes  Ambulation 1  Surface: level tile  Device: Small Anthony Christian  Assistance: Stand by assistance;Contact guard assistance  Quality of Gait: mild unsteady without LOB, decreased left step length-limited availability of advancement, hold LUE into elbow flex/IR during gait, decreased L knee flexion noted in ambulation  Distance: 25' x 1  02: 3L via NC. Stairs/Curb  Stairs?: No     Balance  Posture: Fair  Sitting - Static: Good  Sitting - Dynamic: Good  Standing - Static: Fair;+  Standing - Dynamic: Fair  Comments: standing balance assessed with quad cane   Assessment     Body structures, Functions, Activity limitations: Decreased functional mobility ; Decreased ROM; Decreased strength;Decreased balance;Decreased endurance  Assessment: Pt with baseline LUE and LLE deficits secondary to prior stroke. Pt able to ambulate 25' with quad cane SB-CGA. Pt would benefit from additional acute care therapy as well as possible post acute therapy for general ROM and strengthening x4 extremities as able.    Prognosis: Good  Decision Making: Medium Complexity  Patient Education: Educated on importance of mobility and attempting ROM of LUE/LE  Barriers to Learning: None  REQUIRES PT FOLLOW UP: Yes  Activity Tolerance  Activity Tolerance: Patient limited by fatigue  PT Equipment Recommendations  Equipment Needed: No  Other: owns quad cane     Goals  Short term goals  Time Frame for Short term goals: 14 visits  Short term goal 1: Pt to ambulate 100ft modified independent with quad cane  Short term goal 2: Pt to sit to stand transfer independent  Short term goal 3: Pt to demonstrate independent bed mobility  Short term goal 4: Pt to tolerate 30 minutes of therapy and participate in exercises x4 extremities  Short term goal 5: Pt to demonstrate good (-) dynamic standing balance to decrease risk of falls  Patient Goals   Patient goals : Pt would like to go home    Plan    Plan  Times per week: 5x  Times per day: Daily  Current Treatment Recommendations: Strengthening, Balance Training, ROM, Functional Mobility Training, Transfer Training, Endurance Training, Gait Training, Neuromuscular Re-education, Home Exercise Program, Safety Education & Training, Patient/Caregiver Education & Training  Safety Devices  Type of devices: Call light within reach, Gait belt, Left in bed, Nurse notified  Restraints  Initially in place: No     Therapy Time   Individual Concurrent Group Co-treatment   Time In  1015         Time Out  1045         Minutes  30                 [place], [place]

## 2017-11-28 NOTE — PROGRESS NOTES
[facility]   Progress Note                   Date:   11/28/2017  Patient name: Ami Roper  Date of admission:  11/22/2017 10:41 AM  MRN:   [id number]  YOB: 1955  PCP: Beverley Sanabria MD    Reason for Admission: Unstable angina ([facility] [address] [I20.0]    Subjective:       Clinical Changes / Abnormalities: Patient seen and examined. Denies Cp, SOB, N/V, or dizziness. Denies post cath site complications. States she has been up walking in the room and feels ready to go home. Staff without present concerns. ROS    Medications:   Scheduled Meds:   lisinopril  5 mg Oral Daily    atorvastatin  40 mg Oral Nightly    clopidogrel  75 mg Oral Daily    sodium chloride flush  10 mL Intravenous 2 times per day    insulin lispro  0-18 Units Subcutaneous TID WC    insulin lispro  0-9 Units Subcutaneous Nightly    metoprolol succinate  50 mg Oral Daily    OXcarbazepine  300 mg Oral BID    famotidine  20 mg Oral Daily    colchicine  0.6 mg Oral Daily    aspirin  81 mg Oral Daily    OLANZapine  10 mg Oral Nightly    sennosides-docusate sodium  1 tablet Oral Daily    sodium chloride flush  10 mL Intravenous 2 times per day    enoxaparin  40 mg Subcutaneous Daily     Continuous Infusions:   sodium chloride      dextrose       CBC:   Recent Labs      11/25/17   1448  11/28/17   1030   WBC  7.4  8.0   HGB  10.2*  10.0*   PLT  236  240     BMP:    Recent Labs      11/25/17   1448   NA  137   K  3.9   CL  97*   CO2  27   BUN  12   CREATININE  0.89   GLUCOSE  161*     Hepatic: No results for input(s): AST, ALT, ALB, BILITOT, ALKPHOS in the last 72 hours. Troponin: No results for input(s): TROPONINI in the last 72 hours. BNP: No results for input(s): BNP in the last 72 hours. Lipids: No results for input(s): CHOL, HDL in the last 72 hours. Invalid input(s): LDLCALCU  INR: No results for input(s): INR in the last 72 hours.     Objective:   Vitals: BP (!) 125/112   Pulse 83   Temp 97.6

## 2017-11-29 ENCOUNTER — HOSPITAL ENCOUNTER (OUTPATIENT)
Age: 62
Setting detail: OBSERVATION
Discharge: HOME HEALTH | DRG: 069 | End: 2017-12-01
Attending: EMERGENCY MEDICINE | Admitting: INTERNAL MEDICINE
Payer: COMMERCIAL

## 2017-11-29 ENCOUNTER — APPOINTMENT (OUTPATIENT)
Dept: CT IMAGING | Age: 62
DRG: 069 | End: 2017-11-29
Payer: COMMERCIAL

## 2017-11-29 ENCOUNTER — APPOINTMENT (OUTPATIENT)
Dept: GENERAL RADIOLOGY | Age: 62
DRG: 069 | End: 2017-11-29
Payer: COMMERCIAL

## 2017-11-29 DIAGNOSIS — G45.0 VERTEBROBASILAR INSUFFICIENCY: Primary | ICD-10-CM

## 2017-11-29 PROBLEM — I77.9 VERTEBRAL ARTERY DISEASE (HCC): Status: ACTIVE | Noted: 2017-11-29

## 2017-11-29 LAB
ANION GAP SERPL CALCULATED.3IONS-SCNC: 11 MMOL/L (ref 9–17)
BUN BLDV-MCNC: 17 MG/DL (ref 8–23)
BUN/CREAT BLD: ABNORMAL (ref 9–20)
CALCIUM SERPL-MCNC: 8.3 MG/DL (ref 8.6–10.4)
CHLORIDE BLD-SCNC: 103 MMOL/L (ref 98–107)
CO2: 27 MMOL/L (ref 20–31)
CREAT SERPL-MCNC: 0.84 MG/DL (ref 0.5–0.9)
GFR AFRICAN AMERICAN: >60 ML/MIN
GFR NON-AFRICAN AMERICAN: >60 ML/MIN
GFR SERPL CREATININE-BSD FRML MDRD: ABNORMAL ML/MIN/{1.73_M2}
GFR SERPL CREATININE-BSD FRML MDRD: ABNORMAL ML/MIN/{1.73_M2}
GLUCOSE BLD-MCNC: 125 MG/DL (ref 70–99)
HCT VFR BLD CALC: 29.1 % (ref 36.3–47.1)
HEMOGLOBIN: 8.9 G/DL (ref 11.9–15.1)
MCH RBC QN AUTO: 24.5 PG (ref 25.2–33.5)
MCHC RBC AUTO-ENTMCNC: 30.6 G/DL (ref 28.4–34.8)
MCV RBC AUTO: 80.2 FL (ref 82.6–102.9)
PDW BLD-RTO: 16.3 % (ref 11.8–14.4)
PLATELET # BLD: 242 K/UL (ref 138–453)
PMV BLD AUTO: 9.6 FL (ref 8.1–13.5)
POTASSIUM SERPL-SCNC: 4.4 MMOL/L (ref 3.7–5.3)
RBC # BLD: 3.63 M/UL (ref 3.95–5.11)
SODIUM BLD-SCNC: 141 MMOL/L (ref 135–144)
WBC # BLD: 8.7 K/UL (ref 3.5–11.3)

## 2017-11-29 PROCEDURE — 80048 BASIC METABOLIC PNL TOTAL CA: CPT

## 2017-11-29 PROCEDURE — 2060000000 HC ICU INTERMEDIATE R&B

## 2017-11-29 PROCEDURE — G0378 HOSPITAL OBSERVATION PER HR: HCPCS

## 2017-11-29 PROCEDURE — 71010 XR CHEST PORTABLE: CPT

## 2017-11-29 PROCEDURE — 82607 VITAMIN B-12: CPT

## 2017-11-29 PROCEDURE — 99222 1ST HOSP IP/OBS MODERATE 55: CPT | Performed by: NURSE PRACTITIONER

## 2017-11-29 PROCEDURE — G0384 LEV 5 HOSP TYPE B ED VISIT: HCPCS

## 2017-11-29 PROCEDURE — 70498 CT ANGIOGRAPHY NECK: CPT

## 2017-11-29 PROCEDURE — 6370000000 HC RX 637 (ALT 250 FOR IP): Performed by: NURSE PRACTITIONER

## 2017-11-29 PROCEDURE — 76937 US GUIDE VASCULAR ACCESS: CPT

## 2017-11-29 PROCEDURE — 82746 ASSAY OF FOLIC ACID SERUM: CPT

## 2017-11-29 PROCEDURE — 96372 THER/PROPH/DIAG INJ SC/IM: CPT

## 2017-11-29 PROCEDURE — 85027 COMPLETE CBC AUTOMATED: CPT

## 2017-11-29 PROCEDURE — 70496 CT ANGIOGRAPHY HEAD: CPT

## 2017-11-29 PROCEDURE — 93005 ELECTROCARDIOGRAM TRACING: CPT

## 2017-11-29 PROCEDURE — 6360000004 HC RX CONTRAST MEDICATION: Performed by: EMERGENCY MEDICINE

## 2017-11-29 PROCEDURE — 6360000002 HC RX W HCPCS: Performed by: EMERGENCY MEDICINE

## 2017-11-29 PROCEDURE — 6370000000 HC RX 637 (ALT 250 FOR IP): Performed by: EMERGENCY MEDICINE

## 2017-11-29 PROCEDURE — 70450 CT HEAD/BRAIN W/O DYE: CPT

## 2017-11-29 PROCEDURE — 99223 1ST HOSP IP/OBS HIGH 75: CPT | Performed by: INTERNAL MEDICINE

## 2017-11-29 RX ORDER — CLOPIDOGREL BISULFATE 75 MG/1
75 TABLET ORAL DAILY
Status: DISCONTINUED | OUTPATIENT
Start: 2017-11-30 | End: 2017-12-01 | Stop reason: HOSPADM

## 2017-11-29 RX ORDER — LIDOCAINE HYDROCHLORIDE 10 MG/ML
5 INJECTION, SOLUTION INFILTRATION; PERINEURAL ONCE
Status: DISCONTINUED | OUTPATIENT
Start: 2017-11-29 | End: 2017-11-30

## 2017-11-29 RX ORDER — FENTANYL CITRATE 50 UG/ML
25 INJECTION, SOLUTION INTRAMUSCULAR; INTRAVENOUS ONCE
Status: COMPLETED | OUTPATIENT
Start: 2017-11-29 | End: 2017-11-29

## 2017-11-29 RX ORDER — SODIUM CHLORIDE 0.9 % (FLUSH) 0.9 %
10 SYRINGE (ML) INJECTION EVERY 12 HOURS SCHEDULED
Status: DISCONTINUED | OUTPATIENT
Start: 2017-11-29 | End: 2017-12-01 | Stop reason: HOSPADM

## 2017-11-29 RX ORDER — SODIUM CHLORIDE 0.9 % (FLUSH) 0.9 %
10 SYRINGE (ML) INJECTION PRN
Status: DISCONTINUED | OUTPATIENT
Start: 2017-11-29 | End: 2017-12-01 | Stop reason: HOSPADM

## 2017-11-29 RX ORDER — MECLIZINE HCL 12.5 MG/1
25 TABLET ORAL ONCE
Status: COMPLETED | OUTPATIENT
Start: 2017-11-29 | End: 2017-11-29

## 2017-11-29 RX ORDER — 0.9 % SODIUM CHLORIDE 0.9 %
500 INTRAVENOUS SOLUTION INTRAVENOUS ONCE
Status: DISCONTINUED | OUTPATIENT
Start: 2017-11-29 | End: 2017-11-30

## 2017-11-29 RX ORDER — ATORVASTATIN CALCIUM 80 MG/1
80 TABLET, FILM COATED ORAL NIGHTLY
Status: DISCONTINUED | OUTPATIENT
Start: 2017-11-29 | End: 2017-12-01 | Stop reason: HOSPADM

## 2017-11-29 RX ADMIN — FENTANYL CITRATE 25 MCG: 50 INJECTION, SOLUTION INTRAMUSCULAR; INTRAVENOUS at 14:39

## 2017-11-29 RX ADMIN — IOVERSOL 90 ML: 741 INJECTION INTRA-ARTERIAL; INTRAVENOUS at 16:30

## 2017-11-29 RX ADMIN — MECLIZINE HCL 25 MG: 12.5 TABLET ORAL at 12:11

## 2017-11-29 RX ADMIN — ATORVASTATIN CALCIUM 80 MG: 80 TABLET, FILM COATED ORAL at 22:36

## 2017-11-29 ASSESSMENT — PAIN DESCRIPTION - LOCATION: LOCATION: GENERALIZED

## 2017-11-29 ASSESSMENT — PAIN SCALES - GENERAL
PAINLEVEL_OUTOF10: 10
PAINLEVEL_OUTOF10: 8

## 2017-11-29 ASSESSMENT — ENCOUNTER SYMPTOMS
ABDOMINAL PAIN: 0
PHOTOPHOBIA: 0
SORE THROAT: 0
NAUSEA: 0
DIARRHEA: 0
VOMITING: 0
SHORTNESS OF BREATH: 0
CHEST TIGHTNESS: 0

## 2017-11-29 ASSESSMENT — PAIN DESCRIPTION - DESCRIPTORS: DESCRIPTORS: DISCOMFORT;ACHING

## 2017-11-29 NOTE — DISCHARGE SUMMARY
[address]   Department of Internal Medicine - Staff Internal Medicine Service    INPATIENT DISCHARGE SUMMARY      PATIENT IDENTIFICATION:  NAME: George Sandoval : 1955 MRN: [id number]  ACCT: [de-identified]     Admit Date: 2017  Discharge date: 2017  7:50 PM     Attending Provider: Emily att. providers found                                     Dictating Provider: Marina Bateman MD  ______________________________________________________________________________    REASON FOR HOSPITALIZATION:     Principal Problem:    Dizziness  Active Problems:    Hypertension    Diabetes type 2, uncontrolled ([address]    History of CVA (cerebrovascular accident)    Near syncope    ST elevation myocardial infarction involving left anterior descending (LAD) coronary artery (HCC)    S/P implantation of automatic cardioverter/defibrillator (AICD) 3/13/1- Dr. Shant Plascencia    Unstable angina [facility] COURSE AND TREATMENT:  George Sandoval is a 65 y.o. female with significant past medical history of coronary artery disease, status post CABG, stents, hypertension, ischemic cardiomyopathy, CK D, AICD implantation, history of CVA with residual left-sided weakness , type 2 diabetes presented with complaints of lightheadedness. She was seen for the same complaint of week ago and was admitted in the observation unit. She got a stress test done which showed dyskinesis of the apex, severe hypokinesis of the septum, inferoseptal wall and inferior wall with calculated ejection fraction of 36%.     She has residual left upper extremity spasticity from previous CVA. Cardiology was consulted. Pt had cardiac cath done on  with stents placement. During course of hospitalization pt remained stable. Chest pain improved. No further episodes of near syncope were noted.  Pt to continue ASA and plavix   Lisinopril 5 mg daily and Lipitor 40 mg nightly     Consults: cardiology     Procedures:  Cath     Any Hospital Acquired Infections: none       PATIENT'S DISCHARGE CONDITION:      [facility] MEDICATIONS    Discharge Medication List as of 11/28/2017  4:58 PM      START taking these medications    Details   clopidogrel (PLAVIX) 75 MG tablet Take 1 tablet by mouth daily, Disp-30 tablet, R-11Print      nitroGLYCERIN (NITROSTAT) 0.4 MG SL tablet Place 1 tablet under the tongue every 5 minutes as needed for Chest pain up to max of 3 total doses. If no relief after 1 dose, call 911., Disp-25 tablet, R-3Print      zinc oxide 20 % ointment Apply topically as needed. , Disp-1 Tube, R-0, Normal         CONTINUE these medications which have CHANGED    Details   atorvastatin (LIPITOR) 40 MG tablet Take 1 tablet by mouth nightly, Disp-30 tablet, R-11Print      lisinopril (PRINIVIL;ZESTRIL) 2.5 MG tablet Take 1 tablet by mouth daily, Disp-30 tablet, R-3Print         CONTINUE these medications which have NOT CHANGED    Details   ipratropium-albuterol (DUONEB) 0.5-2.5 (3) MG/3ML SOLN nebulizer solution Inhale 3 mLs into the lungs every 4 hours as needed for Shortness of Breath, Disp-360 mL, R-2Normal      carvedilol (COREG) 3.125 MG tablet Take 3.125 mg by mouth 2 times daily (with meals)Historical Med      naproxen (NAPROSYN) 500 MG tablet Take 1 tablet by mouth 2 times daily, Disp-30 tablet, R-0Print      senna-docusate (PERICOLACE) 8.6-50 MG per tablet Take 1 tablet by mouth dailyHistorical Med      OLANZapine (ZYPREXA) 10 MG tablet Take 1 tablet by mouth nightly, Disp-30 tablet, R-0      aspirin 81 MG tablet Take 81 mg by mouth daily      insulin glargine (LANTUS) 100 UNIT/ML injection vial Inject 35 Units into the skin every morning Historical Med      colchicine (COLCRYS) 0.6 MG tablet Take 1 tablet by mouth daily, Disp-30 tablet, R-3      insulin lispro (HUMALOG KWIKPEN) 100 UNIT/ML pen Inject 10 Units into the skin 3 times daily (before meals). , Disp-2 Pen, R-3      famotidine (PEPCID) 20 MG tablet Take 20 mg by

## 2017-11-29 NOTE — PROGRESS NOTES
Pt. Discharged home via [facility] with all possesions. Groin site clean, dry, intact. No new complaints at this time.

## 2017-11-29 NOTE — TELEPHONE ENCOUNTER
Met with patient in the hospital.  Patient was admitted on 11/22 and is scheduled for discharge today. Patient stated that she has been having episodes of feeling she is \"blacking out\". Asked patient if she has been monitoring her blood sugars in the home and taking medication as prescribed. Patient answered yes to both questions. Patient called [facility] during visit to resume in home care for tomorrow. Asked patient if she needed assistance with scheduling a PCP visit, to which she declined. Patient stated that she is happy in her new home and it is now fully furnished. Patient stated that she stays to herself to avoid drama in the building. Patient stated that she has a new \"friend\" and no longer associates with her former significant other, \"Saul\". Reminded patient that she will have a transitional visit with a day or so from the [facility] and since there was possibly a change in her medication, her medication will be reviewed.

## 2017-11-30 LAB
ABSOLUTE EOS #: 0.53 K/UL (ref 0–0.44)
ABSOLUTE IMMATURE GRANULOCYTE: <0.03 K/UL (ref 0–0.3)
ABSOLUTE LYMPH #: 2.71 K/UL (ref 1.1–3.7)
ABSOLUTE MONO #: 0.87 K/UL (ref 0.1–1.2)
ABSOLUTE RETIC #: 0.03 M/UL (ref 0.03–0.08)
ANION GAP SERPL CALCULATED.3IONS-SCNC: 15 MMOL/L (ref 9–17)
BASOPHILS # BLD: 1 % (ref 0–2)
BASOPHILS ABSOLUTE: 0.05 K/UL (ref 0–0.2)
BUN BLDV-MCNC: 17 MG/DL (ref 8–23)
BUN/CREAT BLD: ABNORMAL (ref 9–20)
CALCIUM SERPL-MCNC: 8.7 MG/DL (ref 8.6–10.4)
CHLORIDE BLD-SCNC: 99 MMOL/L (ref 98–107)
CHOLESTEROL/HDL RATIO: 3.6
CHOLESTEROL: 192 MG/DL
CO2: 25 MMOL/L (ref 20–31)
CREAT SERPL-MCNC: 0.87 MG/DL (ref 0.5–0.9)
DIFFERENTIAL TYPE: ABNORMAL
EOSINOPHILS RELATIVE PERCENT: 7 % (ref 1–4)
FERRITIN: 80 UG/L (ref 13–150)
FOLATE: 9.8 NG/ML
GFR AFRICAN AMERICAN: >60 ML/MIN
GFR NON-AFRICAN AMERICAN: >60 ML/MIN
GFR SERPL CREATININE-BSD FRML MDRD: ABNORMAL ML/MIN/{1.73_M2}
GFR SERPL CREATININE-BSD FRML MDRD: ABNORMAL ML/MIN/{1.73_M2}
GLUCOSE BLD-MCNC: 101 MG/DL (ref 65–105)
GLUCOSE BLD-MCNC: 126 MG/DL (ref 65–105)
GLUCOSE BLD-MCNC: 135 MG/DL (ref 65–105)
GLUCOSE BLD-MCNC: 142 MG/DL (ref 70–99)
GLUCOSE BLD-MCNC: 204 MG/DL (ref 65–105)
HCT VFR BLD CALC: 33.5 % (ref 36.3–47.1)
HDLC SERPL-MCNC: 53 MG/DL
HEMOGLOBIN: 9.9 G/DL (ref 11.9–15.1)
IMMATURE GRANULOCYTES: 0 %
IMMATURE RETIC FRACT: 16.7 % (ref 2.7–18.3)
IRON SATURATION: 15 % (ref 20–55)
IRON: 48 UG/DL (ref 37–145)
LDL CHOLESTEROL: 100 MG/DL (ref 0–130)
LV EF: 35 %
LVEF MODALITY: NORMAL
LYMPHOCYTES # BLD: 34 % (ref 24–43)
MCH RBC QN AUTO: 24 PG (ref 25.2–33.5)
MCHC RBC AUTO-ENTMCNC: 29.6 G/DL (ref 28.4–34.8)
MCV RBC AUTO: 81.3 FL (ref 82.6–102.9)
MONOCYTES # BLD: 11 % (ref 3–12)
PDW BLD-RTO: 16.5 % (ref 11.8–14.4)
PLATELET # BLD: 288 K/UL (ref 138–453)
PLATELET ESTIMATE: ABNORMAL
PMV BLD AUTO: 9.9 FL (ref 8.1–13.5)
POTASSIUM SERPL-SCNC: 4.4 MMOL/L (ref 3.7–5.3)
RBC # BLD: 4.12 M/UL (ref 3.95–5.11)
RBC # BLD: ABNORMAL 10*6/UL
RETIC %: 0.8 % (ref 0.5–1.9)
RETIC HEMOGLOBIN: 30.6 PG (ref 28.2–35.7)
SEG NEUTROPHILS: 47 % (ref 36–65)
SEGMENTED NEUTROPHILS ABSOLUTE COUNT: 3.72 K/UL (ref 1.5–8.1)
SODIUM BLD-SCNC: 139 MMOL/L (ref 135–144)
TOTAL IRON BINDING CAPACITY: 313 UG/DL (ref 250–450)
TRIGL SERPL-MCNC: 193 MG/DL
UNSATURATED IRON BINDING CAPACITY: 265 UG/DL (ref 112–347)
VITAMIN B-12: 721 PG/ML (ref 211–946)
VLDLC SERPL CALC-MCNC: ABNORMAL MG/DL (ref 1–30)
WBC # BLD: 7.9 K/UL (ref 3.5–11.3)
WBC # BLD: ABNORMAL 10*3/UL

## 2017-11-30 PROCEDURE — 6370000000 HC RX 637 (ALT 250 FOR IP): Performed by: NURSE PRACTITIONER

## 2017-11-30 PROCEDURE — 6370000000 HC RX 637 (ALT 250 FOR IP): Performed by: STUDENT IN AN ORGANIZED HEALTH CARE EDUCATION/TRAINING PROGRAM

## 2017-11-30 PROCEDURE — 80048 BASIC METABOLIC PNL TOTAL CA: CPT

## 2017-11-30 PROCEDURE — 96374 THER/PROPH/DIAG INJ IV PUSH: CPT

## 2017-11-30 PROCEDURE — 80061 LIPID PANEL: CPT

## 2017-11-30 PROCEDURE — 2060000000 HC ICU INTERMEDIATE R&B

## 2017-11-30 PROCEDURE — 99232 SBSQ HOSP IP/OBS MODERATE 35: CPT | Performed by: PSYCHIATRY & NEUROLOGY

## 2017-11-30 PROCEDURE — 2580000003 HC RX 258: Performed by: EMERGENCY MEDICINE

## 2017-11-30 PROCEDURE — G0378 HOSPITAL OBSERVATION PER HR: HCPCS

## 2017-11-30 PROCEDURE — 85045 AUTOMATED RETICULOCYTE COUNT: CPT

## 2017-11-30 PROCEDURE — 2500000003 HC RX 250 WO HCPCS: Performed by: STUDENT IN AN ORGANIZED HEALTH CARE EDUCATION/TRAINING PROGRAM

## 2017-11-30 PROCEDURE — 83540 ASSAY OF IRON: CPT

## 2017-11-30 PROCEDURE — 83550 IRON BINDING TEST: CPT

## 2017-11-30 PROCEDURE — 82728 ASSAY OF FERRITIN: CPT

## 2017-11-30 PROCEDURE — 6360000002 HC RX W HCPCS: Performed by: STUDENT IN AN ORGANIZED HEALTH CARE EDUCATION/TRAINING PROGRAM

## 2017-11-30 PROCEDURE — 82947 ASSAY GLUCOSE BLOOD QUANT: CPT

## 2017-11-30 PROCEDURE — 36415 COLL VENOUS BLD VENIPUNCTURE: CPT

## 2017-11-30 PROCEDURE — 85025 COMPLETE CBC W/AUTO DIFF WBC: CPT

## 2017-11-30 PROCEDURE — 2580000003 HC RX 258: Performed by: STUDENT IN AN ORGANIZED HEALTH CARE EDUCATION/TRAINING PROGRAM

## 2017-11-30 PROCEDURE — 99222 1ST HOSP IP/OBS MODERATE 55: CPT | Performed by: PSYCHIATRY & NEUROLOGY

## 2017-11-30 PROCEDURE — 96372 THER/PROPH/DIAG INJ SC/IM: CPT

## 2017-11-30 PROCEDURE — 93306 TTE W/DOPPLER COMPLETE: CPT

## 2017-11-30 RX ORDER — DEXTROSE MONOHYDRATE 50 MG/ML
100 INJECTION, SOLUTION INTRAVENOUS PRN
Status: DISCONTINUED | OUTPATIENT
Start: 2017-11-30 | End: 2017-12-01 | Stop reason: HOSPADM

## 2017-11-30 RX ORDER — LABETALOL HYDROCHLORIDE 5 MG/ML
10 INJECTION, SOLUTION INTRAVENOUS ONCE
Status: COMPLETED | OUTPATIENT
Start: 2017-11-30 | End: 2017-11-30

## 2017-11-30 RX ORDER — OXCARBAZEPINE 300 MG/1
300 TABLET, FILM COATED ORAL 2 TIMES DAILY
Status: DISCONTINUED | OUTPATIENT
Start: 2017-11-30 | End: 2017-12-01 | Stop reason: HOSPADM

## 2017-11-30 RX ORDER — CARVEDILOL 3.12 MG/1
3.12 TABLET ORAL 2 TIMES DAILY WITH MEALS
Status: DISCONTINUED | OUTPATIENT
Start: 2017-11-30 | End: 2017-12-01 | Stop reason: HOSPADM

## 2017-11-30 RX ORDER — FAMOTIDINE 20 MG/1
20 TABLET, FILM COATED ORAL DAILY
Status: DISCONTINUED | OUTPATIENT
Start: 2017-11-30 | End: 2017-12-01 | Stop reason: HOSPADM

## 2017-11-30 RX ORDER — OLANZAPINE 10 MG/1
10 TABLET ORAL NIGHTLY
Status: DISCONTINUED | OUTPATIENT
Start: 2017-11-30 | End: 2017-12-01 | Stop reason: HOSPADM

## 2017-11-30 RX ORDER — DEXTROSE MONOHYDRATE 25 G/50ML
12.5 INJECTION, SOLUTION INTRAVENOUS PRN
Status: DISCONTINUED | OUTPATIENT
Start: 2017-11-30 | End: 2017-12-01 | Stop reason: HOSPADM

## 2017-11-30 RX ORDER — ACETAMINOPHEN 325 MG/1
650 TABLET ORAL EVERY 4 HOURS PRN
Status: DISCONTINUED | OUTPATIENT
Start: 2017-11-30 | End: 2017-12-01 | Stop reason: HOSPADM

## 2017-11-30 RX ORDER — LISINOPRIL 5 MG/1
5 TABLET ORAL DAILY
Status: DISCONTINUED | OUTPATIENT
Start: 2017-11-30 | End: 2017-12-01 | Stop reason: HOSPADM

## 2017-11-30 RX ORDER — INSULIN GLARGINE 100 [IU]/ML
35 INJECTION, SOLUTION SUBCUTANEOUS EVERY MORNING
Status: DISCONTINUED | OUTPATIENT
Start: 2017-11-30 | End: 2017-12-01 | Stop reason: HOSPADM

## 2017-11-30 RX ORDER — SODIUM CHLORIDE 0.9 % (FLUSH) 0.9 %
10 SYRINGE (ML) INJECTION PRN
Status: DISCONTINUED | OUTPATIENT
Start: 2017-11-30 | End: 2017-12-01 | Stop reason: HOSPADM

## 2017-11-30 RX ORDER — COLCHICINE 0.6 MG/1
0.6 TABLET ORAL DAILY
Status: DISCONTINUED | OUTPATIENT
Start: 2017-11-30 | End: 2017-11-30

## 2017-11-30 RX ORDER — NICOTINE POLACRILEX 4 MG
15 LOZENGE BUCCAL PRN
Status: DISCONTINUED | OUTPATIENT
Start: 2017-11-30 | End: 2017-12-01 | Stop reason: HOSPADM

## 2017-11-30 RX ORDER — LISINOPRIL 2.5 MG/1
2.5 TABLET ORAL DAILY
Status: DISCONTINUED | OUTPATIENT
Start: 2017-11-30 | End: 2017-11-30

## 2017-11-30 RX ORDER — SODIUM CHLORIDE 0.9 % (FLUSH) 0.9 %
10 SYRINGE (ML) INJECTION EVERY 12 HOURS SCHEDULED
Status: DISCONTINUED | OUTPATIENT
Start: 2017-11-30 | End: 2017-12-01 | Stop reason: HOSPADM

## 2017-11-30 RX ORDER — IPRATROPIUM BROMIDE AND ALBUTEROL SULFATE 2.5; .5 MG/3ML; MG/3ML
1 SOLUTION RESPIRATORY (INHALATION) EVERY 4 HOURS PRN
Status: DISCONTINUED | OUTPATIENT
Start: 2017-11-30 | End: 2017-12-01 | Stop reason: HOSPADM

## 2017-11-30 RX ORDER — ONDANSETRON 2 MG/ML
4 INJECTION INTRAMUSCULAR; INTRAVENOUS EVERY 6 HOURS PRN
Status: DISCONTINUED | OUTPATIENT
Start: 2017-11-30 | End: 2017-12-01 | Stop reason: HOSPADM

## 2017-11-30 RX ADMIN — INSULIN LISPRO 4 UNITS: 100 INJECTION, SOLUTION INTRAVENOUS; SUBCUTANEOUS at 14:45

## 2017-11-30 RX ADMIN — Medication 10 ML: at 20:18

## 2017-11-30 RX ADMIN — ENOXAPARIN SODIUM 40 MG: 40 INJECTION SUBCUTANEOUS at 09:39

## 2017-11-30 RX ADMIN — INSULIN GLARGINE 35 UNITS: 100 INJECTION, SOLUTION SUBCUTANEOUS at 10:53

## 2017-11-30 RX ADMIN — ASPIRIN 325 MG: 325 TABLET, COATED ORAL at 09:38

## 2017-11-30 RX ADMIN — ATORVASTATIN CALCIUM 80 MG: 80 TABLET, FILM COATED ORAL at 20:18

## 2017-11-30 RX ADMIN — CLOPIDOGREL 75 MG: 75 TABLET, FILM COATED ORAL at 09:38

## 2017-11-30 RX ADMIN — Medication 10 ML: at 09:42

## 2017-11-30 RX ADMIN — OLANZAPINE 10 MG: 10 TABLET, FILM COATED ORAL at 20:18

## 2017-11-30 RX ADMIN — CARVEDILOL 3.12 MG: 3.12 TABLET, FILM COATED ORAL at 09:38

## 2017-11-30 RX ADMIN — LISINOPRIL 5 MG: 2.5 TABLET ORAL at 09:38

## 2017-11-30 RX ADMIN — Medication 10 ML: at 20:19

## 2017-11-30 RX ADMIN — FAMOTIDINE 20 MG: 20 TABLET, FILM COATED ORAL at 09:38

## 2017-11-30 RX ADMIN — LABETALOL HYDROCHLORIDE 10 MG: 5 INJECTION INTRAVENOUS at 21:58

## 2017-11-30 RX ADMIN — OXCARBAZEPINE 300 MG: 300 TABLET, FILM COATED ORAL at 09:39

## 2017-11-30 RX ADMIN — CARVEDILOL 3.12 MG: 3.12 TABLET, FILM COATED ORAL at 16:52

## 2017-11-30 RX ADMIN — OXCARBAZEPINE 300 MG: 300 TABLET, FILM COATED ORAL at 20:18

## 2017-11-30 ASSESSMENT — PAIN DESCRIPTION - PAIN TYPE: TYPE: CHRONIC PAIN

## 2017-11-30 ASSESSMENT — PAIN SCALES - GENERAL
PAINLEVEL_OUTOF10: 5
PAINLEVEL_OUTOF10: 0

## 2017-12-01 VITALS
WEIGHT: 172 LBS | OXYGEN SATURATION: 98 % | RESPIRATION RATE: 16 BRPM | BODY MASS INDEX: 32.47 KG/M2 | HEART RATE: 95 BPM | DIASTOLIC BLOOD PRESSURE: 137 MMHG | TEMPERATURE: 97.2 F | HEIGHT: 61 IN | SYSTOLIC BLOOD PRESSURE: 154 MMHG

## 2017-12-01 LAB
ABSOLUTE EOS #: 0.45 K/UL (ref 0–0.44)
ABSOLUTE IMMATURE GRANULOCYTE: <0.03 K/UL (ref 0–0.3)
ABSOLUTE LYMPH #: 2.82 K/UL (ref 1.1–3.7)
ABSOLUTE MONO #: 0.89 K/UL (ref 0.1–1.2)
ANION GAP SERPL CALCULATED.3IONS-SCNC: 13 MMOL/L (ref 9–17)
BASOPHILS # BLD: 1 % (ref 0–2)
BASOPHILS ABSOLUTE: 0.04 K/UL (ref 0–0.2)
BUN BLDV-MCNC: 33 MG/DL (ref 8–23)
BUN/CREAT BLD: ABNORMAL (ref 9–20)
CALCIUM SERPL-MCNC: 8.4 MG/DL (ref 8.6–10.4)
CHLORIDE BLD-SCNC: 104 MMOL/L (ref 98–107)
CO2: 24 MMOL/L (ref 20–31)
CREAT SERPL-MCNC: 0.83 MG/DL (ref 0.5–0.9)
DIFFERENTIAL TYPE: ABNORMAL
EOSINOPHILS RELATIVE PERCENT: 6 % (ref 1–4)
GFR AFRICAN AMERICAN: >60 ML/MIN
GFR NON-AFRICAN AMERICAN: >60 ML/MIN
GFR SERPL CREATININE-BSD FRML MDRD: ABNORMAL ML/MIN/{1.73_M2}
GFR SERPL CREATININE-BSD FRML MDRD: ABNORMAL ML/MIN/{1.73_M2}
GLUCOSE BLD-MCNC: 156 MG/DL (ref 65–105)
GLUCOSE BLD-MCNC: 161 MG/DL (ref 70–99)
GLUCOSE BLD-MCNC: 180 MG/DL (ref 65–105)
HCT VFR BLD CALC: 29.2 % (ref 36.3–47.1)
HEMOGLOBIN: 8.6 G/DL (ref 11.9–15.1)
IMMATURE GRANULOCYTES: 0 %
LYMPHOCYTES # BLD: 36 % (ref 24–43)
MCH RBC QN AUTO: 24.4 PG (ref 25.2–33.5)
MCHC RBC AUTO-ENTMCNC: 29.5 G/DL (ref 28.4–34.8)
MCV RBC AUTO: 82.7 FL (ref 82.6–102.9)
MONOCYTES # BLD: 12 % (ref 3–12)
PATHOLOGIST REVIEW: NORMAL
PDW BLD-RTO: 16.5 % (ref 11.8–14.4)
PLATELET # BLD: 264 K/UL (ref 138–453)
PLATELET ESTIMATE: ABNORMAL
PMV BLD AUTO: 9.6 FL (ref 8.1–13.5)
POTASSIUM SERPL-SCNC: 4.4 MMOL/L (ref 3.7–5.3)
RBC # BLD: 3.53 M/UL (ref 3.95–5.11)
RBC # BLD: ABNORMAL 10*6/UL
SEG NEUTROPHILS: 45 % (ref 36–65)
SEGMENTED NEUTROPHILS ABSOLUTE COUNT: 3.55 K/UL (ref 1.5–8.1)
SODIUM BLD-SCNC: 141 MMOL/L (ref 135–144)
SURGICAL PATHOLOGY REPORT: NORMAL
WBC # BLD: 7.8 K/UL (ref 3.5–11.3)
WBC # BLD: ABNORMAL 10*3/UL

## 2017-12-01 PROCEDURE — 82947 ASSAY GLUCOSE BLOOD QUANT: CPT

## 2017-12-01 PROCEDURE — 36415 COLL VENOUS BLD VENIPUNCTURE: CPT

## 2017-12-01 PROCEDURE — G0378 HOSPITAL OBSERVATION PER HR: HCPCS

## 2017-12-01 PROCEDURE — 99233 SBSQ HOSP IP/OBS HIGH 50: CPT | Performed by: INTERNAL MEDICINE

## 2017-12-01 PROCEDURE — 99232 SBSQ HOSP IP/OBS MODERATE 35: CPT | Performed by: PSYCHIATRY & NEUROLOGY

## 2017-12-01 PROCEDURE — 80048 BASIC METABOLIC PNL TOTAL CA: CPT

## 2017-12-01 PROCEDURE — 85025 COMPLETE CBC W/AUTO DIFF WBC: CPT

## 2017-12-01 RX ORDER — LISINOPRIL 5 MG/1
5 TABLET ORAL DAILY
Qty: 30 TABLET | Refills: 3 | Status: ON HOLD | OUTPATIENT
Start: 2017-12-02 | End: 2017-12-18

## 2017-12-01 RX ORDER — MECLIZINE HCL 12.5 MG/1
12.5 TABLET ORAL 3 TIMES DAILY PRN
Qty: 30 TABLET | Refills: 0 | Status: ON HOLD | OUTPATIENT
Start: 2017-12-01 | End: 2017-12-15 | Stop reason: HOSPADM

## 2017-12-01 RX ORDER — MECLIZINE HCL 12.5 MG/1
12.5 TABLET ORAL 3 TIMES DAILY PRN
Status: DISCONTINUED | OUTPATIENT
Start: 2017-12-01 | End: 2017-12-01 | Stop reason: HOSPADM

## 2017-12-01 RX ORDER — ATORVASTATIN CALCIUM 80 MG/1
80 TABLET, FILM COATED ORAL NIGHTLY
Qty: 30 TABLET | Refills: 3 | Status: ON HOLD | OUTPATIENT
Start: 2017-12-01 | End: 2017-12-15 | Stop reason: HOSPADM

## 2017-12-03 ENCOUNTER — HOSPITAL ENCOUNTER (INPATIENT)
Age: 62
LOS: 2 days | Discharge: HOME HEALTH | DRG: 281 | End: 2017-12-05
Attending: EMERGENCY MEDICINE | Admitting: INTERNAL MEDICINE
Payer: MEDICARE

## 2017-12-03 ENCOUNTER — APPOINTMENT (OUTPATIENT)
Dept: CT IMAGING | Age: 62
DRG: 281 | End: 2017-12-03
Payer: MEDICARE

## 2017-12-03 ENCOUNTER — APPOINTMENT (OUTPATIENT)
Dept: GENERAL RADIOLOGY | Age: 62
DRG: 281 | End: 2017-12-03
Payer: MEDICARE

## 2017-12-03 DIAGNOSIS — R42 VERTIGO: Primary | ICD-10-CM

## 2017-12-03 DIAGNOSIS — R11.0 NAUSEA: ICD-10-CM

## 2017-12-03 DIAGNOSIS — G45.0 VERTEBROBASILAR INSUFFICIENCY: ICD-10-CM

## 2017-12-03 DIAGNOSIS — R55 NEAR SYNCOPE: ICD-10-CM

## 2017-12-03 PROBLEM — I95.1 ORTHOSTATIC HYPOTENSION: Status: ACTIVE | Noted: 2017-12-03

## 2017-12-03 LAB
ABSOLUTE EOS #: 0.58 K/UL (ref 0–0.44)
ABSOLUTE IMMATURE GRANULOCYTE: 0.07 K/UL (ref 0–0.3)
ABSOLUTE LYMPH #: 2.89 K/UL (ref 1.1–3.7)
ABSOLUTE MONO #: 1.07 K/UL (ref 0.1–1.2)
ALBUMIN SERPL-MCNC: 3.8 G/DL (ref 3.5–5.2)
ALBUMIN/GLOBULIN RATIO: 0.9 (ref 1–2.5)
ALP BLD-CCNC: 112 U/L (ref 35–104)
ALT SERPL-CCNC: 11 U/L (ref 5–33)
ANION GAP SERPL CALCULATED.3IONS-SCNC: 17 MMOL/L (ref 9–17)
AST SERPL-CCNC: 18 U/L
BASOPHILS # BLD: 1 % (ref 0–2)
BASOPHILS ABSOLUTE: 0.09 K/UL (ref 0–0.2)
BILIRUB SERPL-MCNC: 0.44 MG/DL (ref 0.3–1.2)
BUN BLDV-MCNC: 24 MG/DL (ref 8–23)
BUN/CREAT BLD: ABNORMAL (ref 9–20)
CALCIUM SERPL-MCNC: 8.8 MG/DL (ref 8.6–10.4)
CHLORIDE BLD-SCNC: 99 MMOL/L (ref 98–107)
CO2: 23 MMOL/L (ref 20–31)
CREAT SERPL-MCNC: 0.97 MG/DL (ref 0.5–0.9)
DIFFERENTIAL TYPE: ABNORMAL
EOSINOPHILS RELATIVE PERCENT: 4 % (ref 1–4)
GFR AFRICAN AMERICAN: >60 ML/MIN
GFR NON-AFRICAN AMERICAN: 58 ML/MIN
GFR SERPL CREATININE-BSD FRML MDRD: ABNORMAL ML/MIN/{1.73_M2}
GFR SERPL CREATININE-BSD FRML MDRD: ABNORMAL ML/MIN/{1.73_M2}
GLUCOSE BLD-MCNC: 168 MG/DL (ref 70–99)
GLUCOSE BLD-MCNC: 188 MG/DL (ref 65–105)
GLUCOSE BLD-MCNC: 196 MG/DL (ref 65–105)
HCT VFR BLD CALC: 34.5 % (ref 36.3–47.1)
HEMOGLOBIN: 10.3 G/DL (ref 11.9–15.1)
IMMATURE GRANULOCYTES: 1 %
LACTIC ACID, WHOLE BLOOD: 1.9 MMOL/L (ref 0.7–2.1)
LYMPHOCYTES # BLD: 19 % (ref 24–43)
MCH RBC QN AUTO: 24 PG (ref 25.2–33.5)
MCHC RBC AUTO-ENTMCNC: 29.9 G/DL (ref 28.4–34.8)
MCV RBC AUTO: 80.4 FL (ref 82.6–102.9)
MONOCYTES # BLD: 7 % (ref 3–12)
PDW BLD-RTO: 16.5 % (ref 11.8–14.4)
PLATELET # BLD: 378 K/UL (ref 138–453)
PLATELET ESTIMATE: ABNORMAL
PMV BLD AUTO: 9.1 FL (ref 8.1–13.5)
POC TROPONIN I: 0.01 NG/ML (ref 0–0.1)
POC TROPONIN I: 0.04 NG/ML (ref 0–0.1)
POC TROPONIN INTERP: NORMAL
POC TROPONIN INTERP: NORMAL
POTASSIUM SERPL-SCNC: 4.1 MMOL/L (ref 3.7–5.3)
RBC # BLD: 4.29 M/UL (ref 3.95–5.11)
RBC # BLD: ABNORMAL 10*6/UL
SEG NEUTROPHILS: 69 % (ref 36–65)
SEGMENTED NEUTROPHILS ABSOLUTE COUNT: 10.71 K/UL (ref 1.5–8.1)
SODIUM BLD-SCNC: 139 MMOL/L (ref 135–144)
TOTAL PROTEIN: 7.9 G/DL (ref 6.4–8.3)
TROPONIN INTERP: ABNORMAL
TROPONIN T: 0.28 NG/ML
WBC # BLD: 15.4 K/UL (ref 3.5–11.3)
WBC # BLD: ABNORMAL 10*3/UL

## 2017-12-03 PROCEDURE — 1200000000 HC SEMI PRIVATE

## 2017-12-03 PROCEDURE — 6370000000 HC RX 637 (ALT 250 FOR IP): Performed by: STUDENT IN AN ORGANIZED HEALTH CARE EDUCATION/TRAINING PROGRAM

## 2017-12-03 PROCEDURE — 96374 THER/PROPH/DIAG INJ IV PUSH: CPT

## 2017-12-03 PROCEDURE — 80053 COMPREHEN METABOLIC PANEL: CPT

## 2017-12-03 PROCEDURE — 93005 ELECTROCARDIOGRAM TRACING: CPT

## 2017-12-03 PROCEDURE — 36415 COLL VENOUS BLD VENIPUNCTURE: CPT

## 2017-12-03 PROCEDURE — 70450 CT HEAD/BRAIN W/O DYE: CPT

## 2017-12-03 PROCEDURE — 2580000003 HC RX 258: Performed by: INTERNAL MEDICINE

## 2017-12-03 PROCEDURE — 2580000003 HC RX 258: Performed by: STUDENT IN AN ORGANIZED HEALTH CARE EDUCATION/TRAINING PROGRAM

## 2017-12-03 PROCEDURE — 99285 EMERGENCY DEPT VISIT HI MDM: CPT

## 2017-12-03 PROCEDURE — 6360000002 HC RX W HCPCS: Performed by: STUDENT IN AN ORGANIZED HEALTH CARE EDUCATION/TRAINING PROGRAM

## 2017-12-03 PROCEDURE — 84484 ASSAY OF TROPONIN QUANT: CPT

## 2017-12-03 PROCEDURE — 83605 ASSAY OF LACTIC ACID: CPT

## 2017-12-03 PROCEDURE — 71020 XR CHEST STANDARD TWO VW: CPT

## 2017-12-03 PROCEDURE — 85025 COMPLETE CBC W/AUTO DIFF WBC: CPT

## 2017-12-03 PROCEDURE — 82947 ASSAY GLUCOSE BLOOD QUANT: CPT

## 2017-12-03 RX ORDER — OXCARBAZEPINE 300 MG/1
300 TABLET, FILM COATED ORAL 2 TIMES DAILY
Status: DISCONTINUED | OUTPATIENT
Start: 2017-12-03 | End: 2017-12-05 | Stop reason: HOSPADM

## 2017-12-03 RX ORDER — OLANZAPINE 10 MG/1
10 TABLET ORAL NIGHTLY
Status: DISCONTINUED | OUTPATIENT
Start: 2017-12-03 | End: 2017-12-05 | Stop reason: HOSPADM

## 2017-12-03 RX ORDER — MECLIZINE HCL 12.5 MG/1
25 TABLET ORAL ONCE
Status: COMPLETED | OUTPATIENT
Start: 2017-12-03 | End: 2017-12-03

## 2017-12-03 RX ORDER — ATORVASTATIN CALCIUM 80 MG/1
80 TABLET, FILM COATED ORAL NIGHTLY
Status: DISCONTINUED | OUTPATIENT
Start: 2017-12-03 | End: 2017-12-05 | Stop reason: HOSPADM

## 2017-12-03 RX ORDER — LISINOPRIL 5 MG/1
5 TABLET ORAL DAILY
Status: DISCONTINUED | OUTPATIENT
Start: 2017-12-03 | End: 2017-12-05 | Stop reason: HOSPADM

## 2017-12-03 RX ORDER — SODIUM CHLORIDE 0.9 % (FLUSH) 0.9 %
10 SYRINGE (ML) INJECTION EVERY 12 HOURS SCHEDULED
Status: DISCONTINUED | OUTPATIENT
Start: 2017-12-03 | End: 2017-12-05 | Stop reason: HOSPADM

## 2017-12-03 RX ORDER — PROMETHAZINE HYDROCHLORIDE 25 MG/ML
6.25 INJECTION, SOLUTION INTRAMUSCULAR; INTRAVENOUS EVERY 6 HOURS PRN
Status: DISCONTINUED | OUTPATIENT
Start: 2017-12-03 | End: 2017-12-05 | Stop reason: HOSPADM

## 2017-12-03 RX ORDER — CLOPIDOGREL BISULFATE 75 MG/1
75 TABLET ORAL DAILY
Status: DISCONTINUED | OUTPATIENT
Start: 2017-12-03 | End: 2017-12-05 | Stop reason: HOSPADM

## 2017-12-03 RX ORDER — COLCHICINE 0.6 MG/1
0.6 TABLET ORAL DAILY
Status: DISCONTINUED | OUTPATIENT
Start: 2017-12-03 | End: 2017-12-05 | Stop reason: HOSPADM

## 2017-12-03 RX ORDER — SODIUM CHLORIDE 9 MG/ML
INJECTION, SOLUTION INTRAVENOUS CONTINUOUS
Status: DISCONTINUED | OUTPATIENT
Start: 2017-12-03 | End: 2017-12-05

## 2017-12-03 RX ORDER — ONDANSETRON 2 MG/ML
4 INJECTION INTRAMUSCULAR; INTRAVENOUS ONCE
Status: COMPLETED | OUTPATIENT
Start: 2017-12-03 | End: 2017-12-03

## 2017-12-03 RX ORDER — MECLIZINE HCL 12.5 MG/1
12.5 TABLET ORAL 3 TIMES DAILY PRN
Status: DISCONTINUED | OUTPATIENT
Start: 2017-12-03 | End: 2017-12-05 | Stop reason: HOSPADM

## 2017-12-03 RX ORDER — SODIUM CHLORIDE 0.9 % (FLUSH) 0.9 %
10 SYRINGE (ML) INJECTION PRN
Status: DISCONTINUED | OUTPATIENT
Start: 2017-12-03 | End: 2017-12-05 | Stop reason: HOSPADM

## 2017-12-03 RX ORDER — 0.9 % SODIUM CHLORIDE 0.9 %
1000 INTRAVENOUS SOLUTION INTRAVENOUS ONCE
Status: COMPLETED | OUTPATIENT
Start: 2017-12-03 | End: 2017-12-03

## 2017-12-03 RX ORDER — ONDANSETRON 2 MG/ML
4 INJECTION INTRAMUSCULAR; INTRAVENOUS EVERY 6 HOURS PRN
Status: DISCONTINUED | OUTPATIENT
Start: 2017-12-03 | End: 2017-12-05 | Stop reason: HOSPADM

## 2017-12-03 RX ORDER — CARVEDILOL 3.12 MG/1
3.12 TABLET ORAL 2 TIMES DAILY WITH MEALS
Status: DISCONTINUED | OUTPATIENT
Start: 2017-12-03 | End: 2017-12-05 | Stop reason: HOSPADM

## 2017-12-03 RX ORDER — ACETAMINOPHEN 325 MG/1
650 TABLET ORAL EVERY 4 HOURS PRN
Status: DISCONTINUED | OUTPATIENT
Start: 2017-12-03 | End: 2017-12-05 | Stop reason: HOSPADM

## 2017-12-03 RX ORDER — INSULIN GLARGINE 100 [IU]/ML
35 INJECTION, SOLUTION SUBCUTANEOUS EVERY MORNING
Status: DISCONTINUED | OUTPATIENT
Start: 2017-12-04 | End: 2017-12-05 | Stop reason: HOSPADM

## 2017-12-03 RX ORDER — FAMOTIDINE 20 MG/1
20 TABLET, FILM COATED ORAL DAILY
Status: DISCONTINUED | OUTPATIENT
Start: 2017-12-03 | End: 2017-12-05 | Stop reason: HOSPADM

## 2017-12-03 RX ADMIN — OXCARBAZEPINE 300 MG: 300 TABLET, FILM COATED ORAL at 20:58

## 2017-12-03 RX ADMIN — ASPIRIN 325 MG: 325 TABLET, COATED ORAL at 17:28

## 2017-12-03 RX ADMIN — CLOPIDOGREL 75 MG: 75 TABLET, FILM COATED ORAL at 17:29

## 2017-12-03 RX ADMIN — LISINOPRIL 5 MG: 5 TABLET ORAL at 17:29

## 2017-12-03 RX ADMIN — MECLIZINE HCL 25 MG: 12.5 TABLET ORAL at 11:07

## 2017-12-03 RX ADMIN — CARVEDILOL 3.12 MG: 3.12 TABLET, FILM COATED ORAL at 17:29

## 2017-12-03 RX ADMIN — ATORVASTATIN CALCIUM 80 MG: 80 TABLET, FILM COATED ORAL at 20:58

## 2017-12-03 RX ADMIN — ONDANSETRON 4 MG: 2 INJECTION INTRAMUSCULAR; INTRAVENOUS at 10:54

## 2017-12-03 RX ADMIN — COLCHICINE 0.6 MG: 0.6 TABLET, FILM COATED ORAL at 17:29

## 2017-12-03 RX ADMIN — INSULIN LISPRO 2 UNITS: 100 INJECTION, SOLUTION INTRAVENOUS; SUBCUTANEOUS at 21:01

## 2017-12-03 RX ADMIN — INSULIN LISPRO 3 UNITS: 100 INJECTION, SOLUTION INTRAVENOUS; SUBCUTANEOUS at 17:08

## 2017-12-03 RX ADMIN — OLANZAPINE 10 MG: 10 TABLET, FILM COATED ORAL at 20:58

## 2017-12-03 RX ADMIN — ENOXAPARIN SODIUM 40 MG: 40 INJECTION SUBCUTANEOUS at 17:29

## 2017-12-03 RX ADMIN — SODIUM CHLORIDE 1000 ML: 9 INJECTION, SOLUTION INTRAVENOUS at 11:07

## 2017-12-03 RX ADMIN — FAMOTIDINE 20 MG: 20 TABLET, FILM COATED ORAL at 17:29

## 2017-12-03 ASSESSMENT — PAIN DESCRIPTION - PAIN TYPE: TYPE: CHRONIC PAIN

## 2017-12-03 ASSESSMENT — ENCOUNTER SYMPTOMS
HEMOPTYSIS: 0
CONSTIPATION: 0
NAUSEA: 1
SORE THROAT: 0
HEARTBURN: 0
BACK PAIN: 0
ABDOMINAL PAIN: 0
VOMITING: 1
PHOTOPHOBIA: 0
BLURRED VISION: 1
COUGH: 0
SHORTNESS OF BREATH: 1
ORTHOPNEA: 0
DOUBLE VISION: 0
DIARRHEA: 0

## 2017-12-03 ASSESSMENT — PAIN DESCRIPTION - LOCATION: LOCATION: BACK

## 2017-12-03 ASSESSMENT — PAIN SCALES - GENERAL
PAINLEVEL_OUTOF10: 0
PAINLEVEL_OUTOF10: 10
PAINLEVEL_OUTOF10: 0

## 2017-12-03 ASSESSMENT — PAIN DESCRIPTION - DESCRIPTORS: DESCRIPTORS: DISCOMFORT

## 2017-12-03 NOTE — ED NOTES
Pt transported to [address] [place] [address], [address] [place] [address]  12/03/17 1218

## 2017-12-03 NOTE — Clinical Note
Patient Class: Inpatient [101]   REQUIRED: Diagnosis: Vertigo [590184]   Estimated Length of Stay: Estimated stay of more than 2 midnights   Future Attending Provider: Saima Rouse [[id number]]   Admitting Provider: Saima Rouse [[id number]]   Telemetry Bed Required?: Yes

## 2017-12-03 NOTE — ED PROVIDER NOTES
Care  None      (Please note that portions of this note were completed with a voice recognition program. Efforts were made to edit the dictations but occasionally words are mis-transcribed.  Whenever words are used in this note in any gender, they shall be construed as though they were used in the gender appropriate to the circumstances; and whenever words are used in this note in the singular or plural form, they shall be construed as though they were used in the form appropriate to the circumstances.)    Gera Moon MD [facility]  Attending Emergency Medicine Physician             Andrea Sullivan MD  12/03/17 1037       Andrea Sullivan MD  12/03/17 1135

## 2017-12-03 NOTE — CONSULTS
NEUROLOGY INPATIENT CONSULT NOTE    12/3/2017         Ignacia White is a  65 y.o. female admitted on 12/3/2017 with  Vertigo [R42]  Vertigo [R42]      History is obtained mostly from the patient and the medical record. Chart is reviewed and patient is examined. Briefly, this is a  65 y.o. right handed female admitted on 12/3/2017 with past medical history of Vertebrobasilar insufficiency, CAD, CHF s/p AICD, COPD, HTN presented with dizziness and nausea. Patient states that she woke up this morning went to restroom and increased the heat on, when she felt dizzy. Had sweating and blackout of vision. describes it as spinning sensation of herself not associated with positional change, denies ringing sensation in ears , fullness in ears, speech problems, palpitations, did not pass out. Has  been having dizziness for two months. The episode lasted for 5 minutes. No recent change in medications, admits to poor oral intake of solute. Has had recent hospital admission with similar complaints. Hx of stroke with left sided residual deficits walks with a walker. States she has history of seizures and is not on any medication. No current facility-administered medications on file prior to encounter. Current Outpatient Prescriptions on File Prior to Encounter   Medication Sig Dispense Refill    aspirin 325 MG EC tablet Take 1 tablet by mouth daily 30 tablet 3    atorvastatin (LIPITOR) 80 MG tablet Take 1 tablet by mouth nightly 30 tablet 3    lisinopril (PRINIVIL;ZESTRIL) 5 MG tablet Take 1 tablet by mouth daily 30 tablet 3    meclizine (ANTIVERT) 12.5 MG tablet Take 1 tablet by mouth 3 times daily as needed for Dizziness 30 tablet 0    clopidogrel (PLAVIX) 75 MG tablet Take 1 tablet by mouth daily 30 tablet 11    zinc oxide 20 % ointment Apply topically as needed.  1 Tube 0    ipratropium-albuterol (DUONEB) 0.5-2.5 (3) MG/3ML SOLN nebulizer solution Inhale 3 mLs into the lungs every 4 hours as needed for Shortness of Breath 360 mL 2    carvedilol (COREG) 3.125 MG tablet Take 3.125 mg by mouth 2 times daily (with meals)      senna-docusate (PERICOLACE) 8.6-50 MG per tablet Take 1 tablet by mouth daily      insulin glargine (LANTUS) 100 UNIT/ML injection vial Inject 35 Units into the skin every morning       colchicine (COLCRYS) 0.6 MG tablet Take 1 tablet by mouth daily 30 tablet 3    famotidine (PEPCID) 20 MG tablet Take 20 mg by mouth daily.  OXcarbazepine (TRILEPTAL) 300 MG tablet Take 300 mg by mouth 2 times daily Per pharmacy she is to be taking 300 mg twice daily but patient states that she only takes once daily.  nitroGLYCERIN (NITROSTAT) 0.4 MG SL tablet Place 1 tablet under the tongue every 5 minutes as needed for Chest pain up to max of 3 total doses. If no relief after 1 dose, call 911. 25 tablet 3    OLANZapine (ZYPREXA) 10 MG tablet Take 1 tablet by mouth nightly 30 tablet 0     Allergies: Patrick Hou is allergic to levofloxacin and pcn [penicillins]. Past Medical History:   Diagnosis Date    Arthritis     Asthma     CAD (coronary artery disease)     [facility]congestive heart failure) [facility]     Clinical trial participant at discharge 03/13/2017    [employer]     [facility]     Diabetes mellitus ([facility]     Hepatitis C     Hyperlipidemia     Hypertension     Pneumonia     Unspecified cerebral artery occlusion with cerebral infarction        Past Surgical History:   Procedure Laterality Date    CARDIAC CATHETERIZATION  11/27/2017    CARDIAC PACEMAKER PLACEMENT      [facility]  5/6/2016    cardiac cath-1xBMS prox. RCA; 1xBMS prox. LAD    FOOT SURGERY      cyst removed from L foot    TONSILLECTOMY       Social History: Patrick Hou  reports that she has been smoking Cigarettes. She has been smoking about 1.00 pack per day.  She has never used smokeless tobacco. She reports that she drinks alcohol. She reports that she does not use drugs. Family History   Problem Relation Age of Onset    Family history unknown: Yes       Current Medications:     sodium chloride flush  10 mL Intravenous 2 times per day    aspirin  325 mg Oral Daily    atorvastatin  80 mg Oral Nightly    carvedilol  3.125 mg Oral BID WC    clopidogrel  75 mg Oral Daily    colchicine  0.6 mg Oral Daily    famotidine  20 mg Oral Daily    [START ON 12/4/2017] insulin glargine  35 Units Subcutaneous QAM    lisinopril  5 mg Oral Daily    OLANZapine  10 mg Oral Nightly    OXcarbazepine  300 mg Oral BID    sodium chloride flush  10 mL Intravenous 2 times per day    enoxaparin  40 mg Subcutaneous Daily    insulin lispro  0-18 Units Subcutaneous TID WC    insulin lispro  0-9 Units Subcutaneous Nightly     PRN Meds include: sodium chloride flush, acetaminophen, meclizine, sodium chloride flush, acetaminophen, magnesium hydroxide, ondansetron, promethazine    ROS:   Constitutional Negative for fever and chills   HEENT Negative for ear discharge, ear pain, nosebleed   Eyes Negative for photophobia, pain and discharge   Respiratory Negative for hemoptysis and sputum   Cardiovascular Negative for orthopnea, claudication and PND   Gastrointestinal Positive for nausea, vomiting and dizziness   Musculoskeletal Negative for joint pain, negative for myalgia   Skin Negative for rash or itching   Endo/heme/allergies Negative for polydipsia, environmental allergy   Psychiatric Negative for suicidal ideation.   Patient is not anxious           Objective:   BP (!) 170/105   Pulse 95   Temp 98.1 °F (36.7 °C) (Oral)   Resp 13   Ht 5' 1\" (1.549 m)   Wt 179 lb 1 oz (81.2 kg)   LMP  (LMP Unknown)   SpO2 97%   BMI 33.83 kg/m²     Blood pressure range: Systolic (78BTT), TIE:155 , Min:88 , VYY:766   ; Diastolic (55FRG), BELA:99, Min:65, Max:105      Continuous infusions:      ON EXAMINATION:  GENERAL  Appears comfortable and in no distress   HEENT  NC/ AT   NECK  Supple and no bruits heard   MENTAL STATUS:  Alert, oriented, intact memory, no confusion, normal speech, normal language, no hallucination or delusion   CRANIAL NERVES: II     -       Pupils reactive b/l. Visual fields intact to confrontation  III,IV,VI -  EOMs full, no afferent defect, no                      MOIRA, no ptosis  V     -     Normal facial sensation  VII    -     Normal facial symmetry  VIII   -     Intact hearing  IX,X -     Symmetrical palate  XI    -     Symmetrical shoulder shrug  XII   -     Midline tongue, no atrophy    MOTOR FUNCTION:  Normal bulk, normal tone, normal power;  no involuntary movements, no tremor   SENSORY FUNCTION:  Normal touch, normal pin, normal vibration, normal proprioception   CEREBELLAR FUNCTION:  Intact fine motor control over upper limbs   REFLEX FUNCTION:  Symmetric, no perverted reflex, no Babinski sign   STATION and GAIT  Not tested         Data:    Lab Results:     Lab Results   Component Value Date    CHOL 192 11/30/2017    LDLCHOLESTEROL 100 11/30/2017    HDL 53 11/30/2017    TRIG 193 (H) 11/30/2017    ALT 11 12/03/2017    AST 18 12/03/2017    TSH 0.98 09/19/2017    INR 1.0 11/27/2016    LABA1C 6.8 (H) 10/22/2017    [id number] 721 11/29/2017     Hematology:  Recent Labs      12/01/17   0550  12/03/17   1047   WBC  7.8  15.4*   HGB  8.6*  10.3*   HCT  29.2*  34.5*   PLT  264  378     Chemistry:  Recent Labs      12/01/17   0550  12/03/17   1047   NA  141  139   K  4.4  4.1   CL  104  99   CO2  24  23   GLUCOSE  161*  168*   BUN  33*  24*   CREATININE  0.83  0.97*   CALCIUM  8.4*  8.8     Recent Labs      12/03/17   1047   PROT  7.9   LABALBU  3.8   AST  18   ALT  11       No results found for: PHENYTOIN, PHENYTOIN, VALPROATE, CBMZ        Diagnostic data reviewed:  CXR normal.  Pending carotid dopplers and UDS  CT head no acute intracranial abnormality seen.  Scattered subcortical and periventricular white matter hypo densities with

## 2017-12-03 NOTE — ED PROVIDER NOTES
the \"blockage\" in her brain and insists she has been told she needs surgery this week. PAST MEDICAL / SURGICAL / SOCIAL / FAMILY HISTORY      has a past medical history of Arthritis; Asthma; CAD (coronary artery disease); CHF (congestive heart failure) ([facility] Clinical trial participant at discharge; [facility] Diabetes mellitus ([facility] Hepatitis C; Hyperlipidemia; Hypertension; Pneumonia; and Unspecified cerebral artery occlusion with cerebral infarction. has a past surgical history that includes Tonsillectomy; Foot surgery; Cardiac surgery (5/6/2016); Cardiac pacemaker placement; and Cardiac catheterization (11/27/2017). Social History     Social History    Marital status: Single     Spouse name: N/A    Number of children: N/A    Years of education: N/A     Occupational History    Not on file. Social History Main Topics    Smoking status: Current Every Day Smoker     Packs/day: 1.00     Types: Cigarettes    Smokeless tobacco: Never Used    Alcohol use Yes      Comment: 1 bottle per month wine    Drug use: No    Sexual activity: Yes     Partners: Male     Other Topics Concern    Not on file     Social History Narrative    No narrative on file       Family History   Problem Relation Age of Onset    Family history unknown: Yes       Allergies:  Levofloxacin and Pcn [penicillins]    Home Medications:  Prior to Admission medications    Medication Sig Start Date End Date Taking?  Authorizing Provider   aspirin 325 MG EC tablet Take 1 tablet by mouth daily 12/2/17  Yes Cynthia Bojorquez MD   atorvastatin (LIPITOR) 80 MG tablet Take 1 tablet by mouth nightly 12/1/17  Yes Cynthia Bojorquez MD   lisinopril (PRINIVIL;ZESTRIL) 5 MG tablet Take 1 tablet by mouth daily 12/2/17  Yes Cynthia Bojorquez MD   meclizine (ANTIVERT) 12.5 MG tablet Take 1 tablet by mouth 3 times daily as needed for Dizziness 12/1/17 12/11/17 Yes Mary Guzman MD   clopidogrel Notify physician    Place intermittent pneumatic compression device    Telemetry Monitoring    Vital signs per unit routine    Telemetry monitoring    Orthostatic blood pressure and pulse    Neuro checks    Tobacco cessation education    Notify physician    Up with assistance    Intake and output    Daily weights    Elevate heels off of bed at all times if patient is not able to move lower extremities    Turn or assist with turn every 2 hours if patient is unable to turn self. Remind patient to turn if necessary.     Inspect skin per unit guidelines    Maintain HOB at the lowest elevation consistent with medical plan of care    Gradual Compression Stockings (FOUZIA)    Full Code    Inpatient consult to Neurology    Inpatient consult to Internal Medicine    Consult to Cardiology    Inpatient consult to IV Team    Initiate Oxygen Therapy Protocol    Pacer Interrogate    POCT troponin    POCT troponin    POCT troponin    POC Glucose Fingerstick    EKG 12 Lead    PATIENT STATUS (FROM ED OR OR/PROCEDURAL) Inpatient    PATIENT STATUS (FROM ED OR OR/PROCEDURAL) Inpatient       MEDICATIONS ORDERED:  Orders Placed This Encounter   Medications    ondansetron (ZOFRAN) injection 4 mg    meclizine (ANTIVERT) tablet 25 mg    0.9 % sodium chloride bolus    sodium chloride flush 0.9 % injection 10 mL    sodium chloride flush 0.9 % injection 10 mL    acetaminophen (TYLENOL) tablet 650 mg    aspirin EC tablet 325 mg    atorvastatin (LIPITOR) tablet 80 mg    carvedilol (COREG) tablet 3.125 mg    clopidogrel (PLAVIX) tablet 75 mg    colchicine (COLCRYS) tablet 0.6 mg    famotidine (PEPCID) tablet 20 mg    insulin glargine (LANTUS) injection vial 35 Units    lisinopril (PRINIVIL;ZESTRIL) tablet 5 mg    meclizine (ANTIVERT) tablet 12.5 mg    OLANZapine (ZYPREXA) tablet 10 mg    OXcarbazepine (TRILEPTAL) tablet 300 mg    sodium chloride flush 0.9 % injection 10 mL    sodium chloride flush 0.9 taken her dose today. RADIOLOGY:  XR CHEST STANDARD (2 VW) (Final result)   Result time 12/03/17 12:33:51   Final result by Melyssa Guajardo DO (12/03/17 12:33:51)                Impression:    No acute cardiopulmonary process. Narrative:    EXAMINATION:  TWO VIEWS OF THE CHEST    12/3/2017 12:24 pm    COMPARISON:  Chest November 29, 2017. HISTORY:  ORDERING SYSTEM PROVIDED HISTORY: SOB  TECHNOLOGIST PROVIDED HISTORY:  Reason for exam:->SOB  Acuity: Unknown  Type of Exam: Unknown    FINDINGS:  Defibrillator device is in place.  Right IJ line has now been removed. Sternotomy wires are present. Iraida Bonds appears normal in size.  No focal lung  consolidation, pneumothorax, pleural effusion or free air.  Osseous  structures are grossly intact.                    CT HEAD WO CONTRAST (Final result)   Result time 12/03/17 12:30:03   Final result by Julian Juárez MD (12/03/17 12:30:03)                Impression:    No acute intracranial abnormality. Scattered subcortical and periventricular white matter hypodensities are most  compatible with chronic small vessel disease. Narrative:    EXAMINATION:  CT OF THE HEAD WITHOUT CONTRAST  12/3/2017 12:17 pm    TECHNIQUE:  CT of the head was performed without the administration of intravenous  contrast. Dose modulation, iterative reconstruction, and/or weight based  adjustment of the mA/kV was utilized to reduce the radiation dose to as low  as reasonably achievable. COMPARISON:  None. HISTORY:  ORDERING SYSTEM PROVIDED HISTORY: vertigo, vomiting, on plavix  TECHNOLOGIST PROVIDED HISTORY:  Has a \"code stroke\" or \"stroke alert\" been called? ->No    FINDINGS:  BRAIN/VENTRICLES: There is no acute intracranial hemorrhage, mass effect or  midline shift.  No abnormal extra-axial fluid collection.  The gray-white  differentiation is maintained without evidence of an acute infarct.  There is  no evidence of hydrocephalus.     ORBITS: The visualized

## 2017-12-03 NOTE — H&P
and hemoptysis. Cardiovascular: Negative for chest pain, palpitations and orthopnea. Gastrointestinal: Positive for nausea and vomiting. Negative for heartburn. Genitourinary: Negative for dysuria and urgency. Musculoskeletal: Negative for myalgias and neck pain. Skin: Negative for rash. Neurological: Positive for dizziness and headaches. Endo/Heme/Allergies: Negative for environmental allergies. Does not bruise/bleed easily. Psychiatric/Behavioral: Negative for depression and suicidal ideas. PHYSICAL EXAM:  /88   Pulse 95   Temp 98.1 °F (36.7 °C) (Oral)   Resp 13   Ht 5' 1\" (1.549 m)   Wt 179 lb 1 oz (81.2 kg)   LMP  (LMP Unknown)   SpO2 97%   BMI 33.83 kg/m²   Physical Exam   Constitutional: She is oriented to person, place, and time. She appears well-developed. No distress. HENT:   Head: Normocephalic. Eyes: EOM are normal. Pupils are equal, round, and reactive to light. Right eye exhibits no discharge. Left eye exhibits no discharge. No scleral icterus. Neck: Normal range of motion. Neck supple. No JVD present. No tracheal deviation present. No thyromegaly present. Cardiovascular: Normal rate and regular rhythm. No murmur heard. Pulmonary/Chest: Effort normal and breath sounds normal. No stridor. No respiratory distress. She has no wheezes. Abdominal: Soft. She exhibits no distension. There is no tenderness. There is no guarding. Musculoskeletal: Normal range of motion. She exhibits no edema. Neurological: She is alert and oriented to person, place, and time. She displays normal reflexes. No cranial nerve deficit or sensory deficit. She exhibits normal muscle tone. Skin: Skin is warm and dry. She is not diaphoretic. Psychiatric: She has a normal mood and affect.          Lab and Imaging Review   Recent Labs      12/01/17   0550  12/03/17   1047   WBC  7.8  15.4*   HGB  8.6*  10.3*   MCV  82.7  80.4*   PLT  264  378   NA  141  139   K  4.4  4.1   CL  104 hemorrhage, mass effect or midline shift. No abnormal extra-axial fluid collection. The gray-white differentiation is maintained without evidence of an acute infarct. There is no evidence of hydrocephalus. ORBITS: The visualized portion of the orbits demonstrate no acute abnormality. SINUSES: The visualized paranasal sinuses and mastoid air cells demonstrate no acute abnormality. SOFT TISSUES/SKULL:  No acute abnormality of the visualized skull or soft tissues. No acute intracranial abnormality. Scattered subcortical and periventricular white matter hypodensities are most compatible with chronic small vessel disease. Ct Head Wo Contrast    Result Date: 11/29/2017  EXAMINATION: CT OF THE HEAD WITHOUT CONTRAST  11/29/2017 4:43 pm TECHNIQUE: CT of the head was performed without the administration of intravenous contrast. Dose modulation, iterative reconstruction, and/or weight based adjustment of the mA/kV was utilized to reduce the radiation dose to as low as reasonably achievable. COMPARISON: August 16, 2017 HISTORY: ORDERING SYSTEM PROVIDED HISTORY: vertigo, headache FINDINGS: BRAIN/VENTRICLES: There is no acute intracranial hemorrhage, mass effect or midline shift. No abnormal extra-axial fluid collection. The gray-white differentiation is maintained without evidence of an acute infarct. No evidence of hydrocephalus. There are nonspecific areas of hypoattenuation within the periventricular and subcortical white matter, which likely represent chronic microvascular ischemic change. Bilateral basal ganglia calcifications. ORBITS: The visualized portion of the orbits demonstrate no acute abnormality. SINUSES: The visualized paranasal sinuses and mastoid air cells demonstrate no acute abnormality. Minimal polypoid mucosal thickening is again noted and inferior left maxillary sinus. SOFT TISSUES/SKULL: No acute abnormality of the visualized skull or soft tissues.      Stable noncontrast examination of the brain without CT evidence of acute intracranial abnormality and chronic findings as described. Xr Chest Portable    Result Date: 11/29/2017  EXAMINATION: SINGLE VIEW OF THE CHEST 11/29/2017 3:43 pm COMPARISON: 11/14/2017 HISTORY: ORDERING SYSTEM PROVIDED HISTORY: RIJ placement TECHNOLOGIST PROVIDED HISTORY: Reason for exam:->RIJ placement FINDINGS: Interval placement right jugular central venous catheter with its tip projecting in the expected location of the right atrium. No definite pneumothorax is seen. Sternal wires and clips from prior heart surgery. Left subclavian defibrillator re- demonstrated. Stable mild cardiomegaly. Lungs show no focal infiltrates or large pleural effusions. Monitor leads overlie the chest     Right jugular central venous catheter tip projects over the expected location of the right atrium without convincing evidence of pneumothorax. Ct Chest Pulmonary Embolism W Contrast    Result Date: 11/14/2017  EXAMINATION: CTA OF THE CHEST 11/14/2017 2:07 pm TECHNIQUE: CTA of the chest was performed after the administration of intravenous contrast.  Multiplanar reformatted images are provided for review. MIP images are provided for review. Dose modulation, iterative reconstruction, and/or weight based adjustment of the mA/kV was utilized to reduce the radiation dose to as low as reasonably achievable. 75 mL intravenous Optiray 350 was used. COMPARISON: None. HISTORY: ORDERING SYSTEM PROVIDED HISTORY: r/o pe FINDINGS: Pulmonary Arteries: Pulmonary arteries are adequately opacified for evaluation. No evidence of intraluminal filling defect to suggest pulmonary embolism. Main pulmonary artery is normal in caliber. Mediastinum: No evidence of mediastinal lymphadenopathy. The heart and pericardium demonstrate no acute abnormality. Heart is mildly enlarged. There is extensive calcification of the native coronary arteries with evidence of previous CABG.   Cardiac pacemaker device is noted.  There is no acute abnormality of the thoracic aorta. There is a tiny hiatal hernia. Lungs/pleura: The lungs are without acute process. No focal consolidation or pulmonary edema. No evidence of pleural effusion or pneumothorax. Upper Abdomen: Limited images of the upper abdomen are unremarkable. Soft Tissues/Bones: No acute bone or soft tissue abnormality. No evidence of pulmonary embolism or acute pulmonary abnormality. Vl Dup Lower Extremity Venous Bilateral    Result Date: 11/14/2017    [facility]  Vascular Lower Extremities DVT Study Procedure   Patient Name  Kade Mcbride Date of Study         11/14/2017                D   Date of Birth 1955  Gender                Female   Age           65 year(s)  Race                  Black   Room Number   0339   Corporate ID  [id number]  #   Patient Acct  [de-identified]  #   MR #          [id number]     Sonographer           Bill Wick   Accession #   [id number]   Interpreting          [facility]                            Physician   Referring                 Referring Physician   Sri Gonzalez, *  Nurse  Practitioner  Procedure Type of Study:   Veins: Lower Extremities DVT Study, Venous Scan Lower Bilateral.  Indications for Study:R/O DVT. Risk Factors History +---------+----+-----------------------------------------------------------+ ! Diagnosis! Date! Comments                                                   ! +---------+----+-----------------------------------------------------------+ ! Other    ! !H/O CVA; PM                                                ! +---------+----+-----------------------------------------------------------+ Findings:   Right                                Left  The common femoral, femoral,         The common femoral, femoral,  popliteal, tibials, and saphenous    popliteal, tibials and saphenous  veins are compressible with normal   veins are compressible with normal  doppler responses. doppler responses. Right great saphenous vein           Left great saphenous vein harvested. harvested. Limited visualization of the  Limited visualization of the lower   posterior tibial and peroneal veins. thigh and calf veins. Allergies   - Allergy:Penicillin(Drug). Patient Status: In Patient. Technical Quality:Limited visualization. Velocities are measured in cm/s ; Diameters are measured in mm Right Lower Extremities DVT Study Measurements Right 2D Measurements +------------------------------------+----------+---------------+----------+ ! Location                            ! Visualized! Compressibility! Thrombosis! +------------------------------------+----------+---------------+----------+ ! Common Femoral                      !Yes       ! Yes            ! None      ! +------------------------------------+----------+---------------+----------+ ! Prox Femoral                        !Yes       ! Yes            ! None      ! +------------------------------------+----------+---------------+----------+ ! Mid Femoral                         !Yes       ! Yes            ! None      ! +------------------------------------+----------+---------------+----------+ ! Dist Femoral                        !Yes       ! Yes            ! None      ! +------------------------------------+----------+---------------+----------+ ! Popliteal                           !Yes       ! Yes            ! None      ! +------------------------------------+----------+---------------+----------+ ! Sapheno Femoral Junction            ! Yes       ! Yes            ! None      ! +------------------------------------+----------+---------------+----------+ ! PTV                                 ! Yes       ! Yes            ! None      ! +------------------------------------+----------+---------------+----------+ ! Peroneal                            !Partial   !Yes            ! None      ! RECOMMENDATIONS: Managing Incidental Thyroid Nodule Detected at CT or MRI or US 1. Further evaluation by thyroid Ultrasound recommended for these incidental nodules: Patient Age 25 years or less - Any nodule. Patient Age 21-27 years old - Nodule 1 cm in size or greater Patient Age 28 years or more - Nodule 1.5 cm in size or greater 2. Follow up thyroid ultrasound also recommend in these scenarios -Solitary nodule with high risk imaging features (locally invasive nodule or suspicious lymph nodes) -Any nodule in a heterogeneous enlarged thyroid gland 3. NO further imaging is recommended in the following scenarios -No f/u imaging is recommended for ITNs not meeting the above criteria. -No US or f/u recommended for ITNs without high risk features in pts. with limited life expectancy or significant co-morbidities, unless clinically warranted. Note: These recommendations do not apply to pts. w/ increased risk for thyroid cancer or pts. with symptomatic thyroid disease. ________________________________________________________________ Recommendations for f/u of Incidental Thyroid Nodules (ITN) found on CT, MR, NM and Extrathyroidal US are based upon the ACR white paper and [facility] 3-tiered system for managing ITNs: J Am Katia Radiol. 2015 Feb;12(2): 143-50     Cta Head W Contrast    Addendum Date: 11/30/2017    ADDENDUM: 3D images were also submitted as part of the exam.     Result Date: 11/30/2017  EXAMINATION: CTA OF THE HEAD WITH CONTRAST; CTA OF THE NECK  11/29/2017 4:38 pm: TECHNIQUE: CTA of the head/brain was performed with the administration of intravenous contrast. Multiplanar reformatted images are provided for review. MIP images are provided for review.  Dose modulation, iterative reconstruction, and/or weight based adjustment of the mA/kV was utilized to reduce the radiation dose to as low as reasonably achievable.; CTA of the neck was performed with the administration of intravenous contrast. Multiplanar Incidental Thyroid Nodule Detected at CT or MRI or US 1. Further evaluation by thyroid Ultrasound recommended for these incidental nodules: Patient Age 25 years or less - Any nodule. Patient Age 21-27 years old - Nodule 1 cm in size or greater Patient Age 28 years or more - Nodule 1.5 cm in size or greater 2. Follow up thyroid ultrasound also recommend in these scenarios -Solitary nodule with high risk imaging features (locally invasive nodule or suspicious lymph nodes) -Any nodule in a heterogeneous enlarged thyroid gland 3. NO further imaging is recommended in the following scenarios -No f/u imaging is recommended for ITNs not meeting the above criteria. -No US or f/u recommended for ITNs without high risk features in pts. with limited life expectancy or significant co-morbidities, unless clinically warranted. Note: These recommendations do not apply to pts. w/ increased risk for thyroid cancer or pts. with symptomatic thyroid disease. ________________________________________________________________ Recommendations for f/u of Incidental Thyroid Nodules (ITN) found on CT, MR, NM and Extrathyroidal US are based upon the ACR white paper and [facility] 3-tiered system for managing ITNs: J Am Katia Radiol. 2015 Feb;12(2): 143-50     Nm Myocardial Spect Rest Exercise Or Rx    Result Date: 11/15/2017  EXAMINATION: MYOCARDIAL PERFUSION IMAGING 11/15/2017 1:28 pm TECHNIQUE: For the rest study, 15.3 mCi of Tc 99 labeled sestamibi were injected. SPECT images were acquired. Under cardiology supervision, 0.4mg [place] was infused. After pharmacologic stress, 40.0 mCi of Tc 99 labeled sestamibi were injected. SPECT images with ECG gating were acquired. COMPARISON: None Available.  HISTORY: ORDERING SYSTEM PROVIDED HISTORY: stress test TECHNOLOGIST PROVIDED HISTORY: Ordering Physician Provided Reason for Exam: Chest pain,SOB,lightheaded,dizzy,nausea/vomiting,unusual sweating,prior heart attack,paemaker,prior heart bypass surgery,high cholesterol,prior smoker and family Hx of CAD. FINDINGS: Examination is suboptimal as the patient could not raise her arm for the examination. Additionally, photopenia from the patient's implanted cardiac device partially obscures evaluation of the left ventricle. Images interpreted utilizing [employer] and [employer]. There are perfusion defect within the anterior and inferior walls. Moderate-sized perfusion defect within the anterior wall extends from the base to the apex. Similar distribution identified within the inferior wall. There is apparent partial reversibility of the anterior wall perfusion defect, concerning for ischemia. Inferior wall perfusion defect appears fixed, most compatible with infarction. Perfusion scores are visually adjusted to account for artifact. Summed stress score:  18 Summed rest score:  9 Summed reversibility score:  9 Function: End diastolic volume:  82CI Left ventricular ejection fraction:  36% Wall motion abnormalities:  Dyskinesis within the apex. Severe hypokinesis of the septum, inferoseptal wall, and inferior walls. TID score:  0.92  (Scores greater than 1.39 are considered elevated for Lexiscan stress with Tc99m)     1. Artifact limited examination as described above. Within these limitations, there is apparent remote infarction within the inferior wall and moderate-sized partially reversible perfusion defect within the anterior wall, concerning for ischemia. 2. Dyskinesis of the apex. Severe hypokinesis of the septum, inferoseptal wall, and inferior wall. Calculated ejection fraction of 36%. 3. Risk stratification: High Notes concerning risk stratification: Risk stratification incorporates both clinical history and some testing results. Final risk determination is the responsibility of the ordering provider as other patient information and test results may increase or decrease the risk assessment reported for this examination.  Risk

## 2017-12-03 NOTE — ED NOTES
Bed: 26  Expected date: 12/3/17  Expected time: 10:10 AM  Means of arrival: Ambulance  Comments:  Isabel Amaral RN  12/03/17 1016

## 2017-12-03 NOTE — ED NOTES
Pt resting on cart, remains in no distress, rr even and NL. Awaiting CT, will continue to monitor.      Lukas Strong RN  12/03/17 0316

## 2017-12-04 ENCOUNTER — APPOINTMENT (OUTPATIENT)
Dept: CARDIAC CATH/INVASIVE PROCEDURES | Age: 62
DRG: 281 | End: 2017-12-04
Payer: MEDICARE

## 2017-12-04 LAB
ALBUMIN SERPL-MCNC: 3.6 G/DL (ref 3.5–5.2)
ALBUMIN/GLOBULIN RATIO: 1 (ref 1–2.5)
ALP BLD-CCNC: 108 U/L (ref 35–104)
ALT SERPL-CCNC: 22 U/L (ref 5–33)
ANION GAP SERPL CALCULATED.3IONS-SCNC: 17 MMOL/L (ref 9–17)
AST SERPL-CCNC: 102 U/L
BILIRUB SERPL-MCNC: 0.32 MG/DL (ref 0.3–1.2)
BUN BLDV-MCNC: 26 MG/DL (ref 8–23)
BUN/CREAT BLD: ABNORMAL (ref 9–20)
CALCIUM SERPL-MCNC: 8.5 MG/DL (ref 8.6–10.4)
CHLORIDE BLD-SCNC: 96 MMOL/L (ref 98–107)
CO2: 22 MMOL/L (ref 20–31)
CREAT SERPL-MCNC: 0.85 MG/DL (ref 0.5–0.9)
EKG ATRIAL RATE: 80 BPM
EKG ATRIAL RATE: 85 BPM
EKG P AXIS: -13 DEGREES
EKG P AXIS: 3 DEGREES
EKG P-R INTERVAL: 156 MS
EKG P-R INTERVAL: 156 MS
EKG Q-T INTERVAL: 380 MS
EKG Q-T INTERVAL: 402 MS
EKG QRS DURATION: 82 MS
EKG QRS DURATION: 88 MS
EKG QTC CALCULATION (BAZETT): 452 MS
EKG QTC CALCULATION (BAZETT): 463 MS
EKG R AXIS: -18 DEGREES
EKG R AXIS: -23 DEGREES
EKG T AXIS: 68 DEGREES
EKG T AXIS: 71 DEGREES
EKG VENTRICULAR RATE: 80 BPM
EKG VENTRICULAR RATE: 85 BPM
GFR AFRICAN AMERICAN: >60 ML/MIN
GFR NON-AFRICAN AMERICAN: >60 ML/MIN
GFR SERPL CREATININE-BSD FRML MDRD: ABNORMAL ML/MIN/{1.73_M2}
GFR SERPL CREATININE-BSD FRML MDRD: ABNORMAL ML/MIN/{1.73_M2}
GLUCOSE BLD-MCNC: 112 MG/DL (ref 65–105)
GLUCOSE BLD-MCNC: 179 MG/DL (ref 65–105)
GLUCOSE BLD-MCNC: 211 MG/DL (ref 70–99)
GLUCOSE BLD-MCNC: 244 MG/DL (ref 65–105)
GLUCOSE BLD-MCNC: 267 MG/DL (ref 65–105)
HCT VFR BLD CALC: 30 % (ref 36.3–47.1)
HEMOGLOBIN: 9.1 G/DL (ref 11.9–15.1)
MCH RBC QN AUTO: 23.9 PG (ref 25.2–33.5)
MCHC RBC AUTO-ENTMCNC: 30.3 G/DL (ref 28.4–34.8)
MCV RBC AUTO: 78.9 FL (ref 82.6–102.9)
PARTIAL THROMBOPLASTIN TIME: 20.1 SEC (ref 21.3–31.3)
PARTIAL THROMBOPLASTIN TIME: 21.7 SEC (ref 21.3–31.3)
PARTIAL THROMBOPLASTIN TIME: 32.6 SEC (ref 21.3–31.3)
PDW BLD-RTO: 16.6 % (ref 11.8–14.4)
PLATELET # BLD: 374 K/UL (ref 138–453)
PMV BLD AUTO: 9.3 FL (ref 8.1–13.5)
POTASSIUM SERPL-SCNC: 4.4 MMOL/L (ref 3.7–5.3)
RBC # BLD: 3.8 M/UL (ref 3.95–5.11)
SODIUM BLD-SCNC: 135 MMOL/L (ref 135–144)
TOTAL PROTEIN: 7.2 G/DL (ref 6.4–8.3)
TROPONIN INTERP: ABNORMAL
TROPONIN T: 0.55 NG/ML
TROPONIN T: 1 NG/ML
TROPONIN T: 1.06 NG/ML
WBC # BLD: 13.4 K/UL (ref 3.5–11.3)

## 2017-12-04 PROCEDURE — 6360000002 HC RX W HCPCS: Performed by: STUDENT IN AN ORGANIZED HEALTH CARE EDUCATION/TRAINING PROGRAM

## 2017-12-04 PROCEDURE — 6370000000 HC RX 637 (ALT 250 FOR IP): Performed by: STUDENT IN AN ORGANIZED HEALTH CARE EDUCATION/TRAINING PROGRAM

## 2017-12-04 PROCEDURE — C1769 GUIDE WIRE: HCPCS

## 2017-12-04 PROCEDURE — 87040 BLOOD CULTURE FOR BACTERIA: CPT

## 2017-12-04 PROCEDURE — B2181ZZ FLUOROSCOPY OF LEFT INTERNAL MAMMARY BYPASS GRAFT USING LOW OSMOLAR CONTRAST: ICD-10-PCS | Performed by: INTERNAL MEDICINE

## 2017-12-04 PROCEDURE — 36415 COLL VENOUS BLD VENIPUNCTURE: CPT

## 2017-12-04 PROCEDURE — 2060000000 HC ICU INTERMEDIATE R&B

## 2017-12-04 PROCEDURE — 84484 ASSAY OF TROPONIN QUANT: CPT

## 2017-12-04 PROCEDURE — C1760 CLOSURE DEV, VASC: HCPCS

## 2017-12-04 PROCEDURE — 6360000002 HC RX W HCPCS

## 2017-12-04 PROCEDURE — B2151ZZ FLUOROSCOPY OF LEFT HEART USING LOW OSMOLAR CONTRAST: ICD-10-PCS | Performed by: INTERNAL MEDICINE

## 2017-12-04 PROCEDURE — 99223 1ST HOSP IP/OBS HIGH 75: CPT | Performed by: PSYCHIATRY & NEUROLOGY

## 2017-12-04 PROCEDURE — 85730 THROMBOPLASTIN TIME PARTIAL: CPT

## 2017-12-04 PROCEDURE — 85027 COMPLETE CBC AUTOMATED: CPT

## 2017-12-04 PROCEDURE — 99233 SBSQ HOSP IP/OBS HIGH 50: CPT | Performed by: INTERNAL MEDICINE

## 2017-12-04 PROCEDURE — C1725 CATH, TRANSLUMIN NON-LASER: HCPCS

## 2017-12-04 PROCEDURE — C1894 INTRO/SHEATH, NON-LASER: HCPCS

## 2017-12-04 PROCEDURE — 94762 N-INVAS EAR/PLS OXIMTRY CONT: CPT

## 2017-12-04 PROCEDURE — B2131ZZ FLUOROSCOPY OF MULTIPLE CORONARY ARTERY BYPASS GRAFTS USING LOW OSMOLAR CONTRAST: ICD-10-PCS | Performed by: INTERNAL MEDICINE

## 2017-12-04 PROCEDURE — 93459 L HRT ART/GRFT ANGIO: CPT | Performed by: INTERNAL MEDICINE

## 2017-12-04 PROCEDURE — 2580000003 HC RX 258: Performed by: STUDENT IN AN ORGANIZED HEALTH CARE EDUCATION/TRAINING PROGRAM

## 2017-12-04 PROCEDURE — 4A023N7 MEASUREMENT OF CARDIAC SAMPLING AND PRESSURE, LEFT HEART, PERCUTANEOUS APPROACH: ICD-10-PCS | Performed by: INTERNAL MEDICINE

## 2017-12-04 PROCEDURE — 80053 COMPREHEN METABOLIC PANEL: CPT

## 2017-12-04 PROCEDURE — B2111ZZ FLUOROSCOPY OF MULTIPLE CORONARY ARTERIES USING LOW OSMOLAR CONTRAST: ICD-10-PCS | Performed by: INTERNAL MEDICINE

## 2017-12-04 PROCEDURE — 82947 ASSAY GLUCOSE BLOOD QUANT: CPT

## 2017-12-04 RX ORDER — HEPARIN SODIUM 10000 [USP'U]/100ML
12 INJECTION, SOLUTION INTRAVENOUS CONTINUOUS
Status: DISCONTINUED | OUTPATIENT
Start: 2017-12-04 | End: 2017-12-05

## 2017-12-04 RX ORDER — DEXTROSE MONOHYDRATE 25 G/50ML
12.5 INJECTION, SOLUTION INTRAVENOUS PRN
Status: DISCONTINUED | OUTPATIENT
Start: 2017-12-04 | End: 2017-12-05 | Stop reason: HOSPADM

## 2017-12-04 RX ORDER — HEPARIN SODIUM 1000 [USP'U]/ML
4000 INJECTION, SOLUTION INTRAVENOUS; SUBCUTANEOUS PRN
Status: DISCONTINUED | OUTPATIENT
Start: 2017-12-04 | End: 2017-12-05

## 2017-12-04 RX ORDER — HEPARIN SODIUM 1000 [USP'U]/ML
4000 INJECTION, SOLUTION INTRAVENOUS; SUBCUTANEOUS ONCE
Status: COMPLETED | OUTPATIENT
Start: 2017-12-04 | End: 2017-12-04

## 2017-12-04 RX ORDER — NICOTINE POLACRILEX 4 MG
15 LOZENGE BUCCAL PRN
Status: DISCONTINUED | OUTPATIENT
Start: 2017-12-04 | End: 2017-12-05 | Stop reason: HOSPADM

## 2017-12-04 RX ORDER — SODIUM CHLORIDE 0.9 % (FLUSH) 0.9 %
10 SYRINGE (ML) INJECTION EVERY 12 HOURS SCHEDULED
Status: DISCONTINUED | OUTPATIENT
Start: 2017-12-04 | End: 2017-12-05 | Stop reason: HOSPADM

## 2017-12-04 RX ORDER — SODIUM CHLORIDE 0.9 % (FLUSH) 0.9 %
10 SYRINGE (ML) INJECTION EVERY 12 HOURS SCHEDULED
Status: CANCELLED | OUTPATIENT
Start: 2017-12-04

## 2017-12-04 RX ORDER — DEXTROSE MONOHYDRATE 50 MG/ML
100 INJECTION, SOLUTION INTRAVENOUS PRN
Status: DISCONTINUED | OUTPATIENT
Start: 2017-12-04 | End: 2017-12-05 | Stop reason: HOSPADM

## 2017-12-04 RX ORDER — ASPIRIN 81 MG/1
81 TABLET, CHEWABLE ORAL DAILY
Status: DISCONTINUED | OUTPATIENT
Start: 2017-12-04 | End: 2017-12-05 | Stop reason: HOSPADM

## 2017-12-04 RX ORDER — SODIUM CHLORIDE 0.9 % (FLUSH) 0.9 %
10 SYRINGE (ML) INJECTION PRN
Status: DISCONTINUED | OUTPATIENT
Start: 2017-12-04 | End: 2017-12-05 | Stop reason: HOSPADM

## 2017-12-04 RX ORDER — NITROGLYCERIN 0.4 MG/1
0.4 TABLET SUBLINGUAL EVERY 5 MIN PRN
Status: DISCONTINUED | OUTPATIENT
Start: 2017-12-04 | End: 2017-12-05 | Stop reason: HOSPADM

## 2017-12-04 RX ORDER — HEPARIN SODIUM 1000 [USP'U]/ML
2000 INJECTION, SOLUTION INTRAVENOUS; SUBCUTANEOUS PRN
Status: DISCONTINUED | OUTPATIENT
Start: 2017-12-04 | End: 2017-12-05

## 2017-12-04 RX ADMIN — INSULIN LISPRO 5 UNITS: 100 INJECTION, SOLUTION INTRAVENOUS; SUBCUTANEOUS at 22:02

## 2017-12-04 RX ADMIN — HEPARIN SODIUM 4000 UNITS: 1000 INJECTION, SOLUTION INTRAVENOUS; SUBCUTANEOUS at 20:57

## 2017-12-04 RX ADMIN — HEPARIN SODIUM AND DEXTROSE 9.7 UNITS/KG/HR: 10000; 5 INJECTION INTRAVENOUS at 20:49

## 2017-12-04 RX ADMIN — ATORVASTATIN CALCIUM 80 MG: 80 TABLET, FILM COATED ORAL at 21:36

## 2017-12-04 RX ADMIN — OLANZAPINE 10 MG: 10 TABLET, FILM COATED ORAL at 21:36

## 2017-12-04 RX ADMIN — OXCARBAZEPINE 300 MG: 300 TABLET, FILM COATED ORAL at 21:36

## 2017-12-04 RX ADMIN — HEPARIN SODIUM 4000 UNITS: 1000 INJECTION, SOLUTION INTRAVENOUS; SUBCUTANEOUS at 03:09

## 2017-12-04 RX ADMIN — HEPARIN SODIUM AND DEXTROSE 14 UNITS/KG/HR: 10000; 5 INJECTION INTRAVENOUS at 11:07

## 2017-12-04 RX ADMIN — NITROGLYCERIN 0.4 MG: 0.4 TABLET SUBLINGUAL at 00:45

## 2017-12-04 RX ADMIN — Medication 10 ML: at 21:37

## 2017-12-04 RX ADMIN — HEPARIN SODIUM AND DEXTROSE 12 UNITS/KG/HR: 10000; 5 INJECTION INTRAVENOUS at 03:09

## 2017-12-04 RX ADMIN — HEPARIN SODIUM 2000 UNITS: 1000 INJECTION, SOLUTION INTRAVENOUS; SUBCUTANEOUS at 11:05

## 2017-12-04 ASSESSMENT — PAIN SCALES - GENERAL: PAINLEVEL_OUTOF10: 0

## 2017-12-04 NOTE — CONSULTS
Q4H PRN  magnesium hydroxide (MILK OF MAGNESIA) 400 MG/5ML suspension 30 mL, 30 mL, Oral, Daily PRN  ondansetron (ZOFRAN) injection 4 mg, 4 mg, Intravenous, Q6H PRN  insulin lispro (HUMALOG) injection vial 0-18 Units, 0-18 Units, Subcutaneous, TID WC  insulin lispro (HUMALOG) injection vial 0-9 Units, 0-9 Units, Subcutaneous, Nightly  promethazine (PHENERGAN) injection 6.25 mg, 6.25 mg, Intravenous, Q6H PRN  0.9 % sodium chloride infusion, , Intravenous, Continuous    Allergies:  Levofloxacin and Pcn [penicillins]    Social History:   reports that she has been smoking Cigarettes. She has been smoking about 1.00 pack per day. She has never used smokeless tobacco. She reports that she drinks alcohol. She reports that she does not use drugs. Family History: Family history is unknown by patient. No h/o sudden cardiac death. No for premature CAD    REVIEW OF SYSTEMS:    · Constitutional: there has been no unanticipated weight loss. There's been No change in energy level, No change in activity level. · Eyes: No visual changes or diplopia. No scleral icterus. · ENT: No Headaches  · Cardiovascular: chest pain,vominting,nausea  · Respiratory: No previous pulmonary problems, No cough  · Gastrointestinal: No abdominal pain. No change in bowel or bladder habits. · Genitourinary: No dysuria, trouble voiding, or hematuria. · Musculoskeletal:L sided weakness  · Integumentary: No rash or pruritis. · Neurological: L sided residual deficit after stroke in 2014, diazziness   · Psychiatric: No anxiety, or depression. · Endocrine: No temperature intolerance. No excessive thirst, fluid intake, or urination. No tremor. · Hematologic/Lymphatic: No abnormal bruising or bleeding, blood clots or swollen lymph nodes. · Allergic/Immunologic: No nasal congestion or hives.       PHYSICAL EXAM:      BP (!) 158/94   Pulse 97   Temp 97.5 °F (36.4 °C) (Oral)   Resp 20   Ht 5' 1\" (1.549 m)   Wt 179 lb 1 oz (81.2 kg)   LMP  (LMP Unknown)   SpO2 100%   BMI 33.83 kg/m²    Constitutional and General Appearance: alert, cooperative, no distress and appears stated age  [de-identified]: PERRL, no cervical lymphadenopathy. No masses palpable. Normal oral mucosa  Respiratory:  · Normal excursion and expansion without use of accessory muscles  · Resp Auscultation: Good respiratory effort. No for increased work of breathing. On auscultation: clear to auscultation bilaterally  Cardiovascular:  · The apical impulse is not displaced  · AICD placed   · Jugular venous pulsation Normal  · The carotid upstroke is normal in amplitude and contour without delay or bruit  · Peripheral pulses are symmetrical and full   Abdomen:   · No masses or tenderness  · Bowel sounds present  Extremities:  ·  No Cyanosis or Clubbing  ·  Lower extremity edema: No  ·  Skin: Warm and dry  Neurological:  · Alert and oriented. Lsided weakness  DATA:    Diagnostics:      EKG: L atrial enlargement,old anterior and inferior infarcts  ECHO:     Stress Test:STRESS 11/15/17:   There is apparent remote infarction within the inferior wall and   moderate-sized partially reversible perfusion defect within the anterior   wall, concerning for ischemia. WMA. EF 36%       Cardiac Angiography: Patent LIMA - LAD and SVG - OM3   Patent RCA stent   Compromised flow in LCX and OM2 required PTCA - ULISES in both lesions.   Moderate to severe LV dysfunction   Louis-apical akinesia    Labs:     CBC:   Recent Labs      12/03/17   1047  12/04/17   0206   WBC  15.4*  13.4*   HGB  10.3*  9.1*   HCT  34.5*  30.0*   PLT  378  374     BMP:   Recent Labs      12/03/17   1047   NA  139   K  4.1   CO2  23   BUN  24*   CREATININE  0.97*   LABGLOM  58*   GLUCOSE  168*     BNP: No results for input(s): BNP in the last 72 hours. PT/INR: No results for input(s): PROTIME, INR in the last 72 hours.   APTT:  Recent Labs      12/04/17   0206   APTT  21.7     CARDIAC ENZYMES:  Recent Labs      12/03/17   1034  12/03/17   1235 TROPONINI  0.04  0.01     FASTING LIPID PANEL:  Lab Results   Component Value Date    HDL 53 11/30/2017    TRIG 193 11/30/2017     LIVER PROFILE:  Recent Labs      12/03/17   1047   AST  18   ALT  11   LABALBU  3.8       IMPRESSION:    Patient Active Problem List   Diagnosis    Cerebral infarction ([address]    Hypertension    Diabetes mellitus type 2, controlled ([facility]    Candidal intertrigo    Diverticulitis of colon    Lower urinary tract infectious disease    Diabetes type 2, uncontrolled ([facility]    Noncompliance with diabetes treatment    History of CVA (cerebrovascular accident)    Left-sided weakness    Postural dizziness with presyncope    Acute hyperglycemia    Near syncope    Noncompliance    Encounter for counseling    Hepatitis C    ST elevation myocardial infarction involving left anterior descending (LAD) coronary artery (HCC)    DHARMESH (acute kidney injury) ([address]    ST elevation myocardial infarction (STEMI) (HCC)    Leukocytosis    Acute idiopathic gout of left knee    Late effects of CVA (cerebrovascular accident)    Inability to perform activities of daily living    S/P implantation of automatic cardioverter/defibrillator (AICD) 3/13/1- Dr. Praveen Knight    Acute diverticulitis    Unstable angina ([facility]    Vertebral artery disease ([facility]    Vertebrobasilar insufficiency    Vertigo    Orthostatic hypotension       RECOMMENDATIONS:  1. Unstable angina/NSTEMI- optimize meds  2. Dizziness - Neuro on board    3. Combined Systolic and diastolic HF- optimize meds    Will discuss with rounding attending  for final recommendations. Corie Le MD      Attending Cardiologist Addendum: I have reviewed and performed the history, physical, subjective, objective, assessment, and plan with the resident/fellow and agree with the note. I performed the history and physical personally. I have made changes to the note above as needed.     Thank you for allowing me to participate in the care of this patient, please do not hesitate to call if you have any questions. Ashanti Banks, [id number] [facility]  [facility]. [facility]  [phone]

## 2017-12-04 NOTE — OP NOTE
[facility]        Date:   12/4/2017  Patient name:  Ami Roper  Date of admission:  12/3/2017 10:16 AM  MRN:   [id number]  YOB: 1955    CARDIAC CATHETERIZATION    Operators:  Primary: Orly Cortes M.D. Indications for cath: Elevated Troponin. Hx of recent PCI     Procedure performed: Coronary Angiography. Left Ventriculography. Access:  Right Femoral artery    Procedure: After informed consent was obtained with explanation of the risks and benefits, patient was brought to the cath lab. The right and left groin were prepped and draped in sterile fashion. 1% lidocaine was used for local block. The  right femoral artery was cannulated with 6  Fr sheath with brisk arterial blood return. The side port was frequently flushed and aspirated with normal saline. Findings:    Left main: Normal    LAD: 100% mid stenosis    LIMA-LAD: patent graft    LCX: 100% proximal stenosis. SVG-OM3 patent graft and supplying OM1 in a retrograde fashion. RCA: diffuse irregularities 40-50% with patent prior stents. The LV gram was performed in the GAITAN 30 position. LVEF: 30%. LV Wall Motion: global hypokinesis      Conclusions:  1. Multivessel CAD. 2. Patent LIMA-LAD and SVG-OM3.  3. Patent prior RCA stents with non-obstructive disease  4. Moderately reduced LV systolic function     Recommendations:  1. Medical Therapy. 2. Risk Factors Modification including smoking cessation. Domonique Ambrose MD  Fellow, Cardiovascular Diseases    [address] [place] [address]          Attending Physician Statement  I have discussed the case of Ami Roper including pertinent history and exam findings with the resident. I have seen and examined the patient and the key elements of the encounter have been performed by me. I agree with the assessment, plan and orders as documented by the resident With changes made to the note.  Procedure performed by me.    Electronically signed by Eric Mirza MD on 12/8/2017 at 10:10 AM.    [facility]      [phone]

## 2017-12-04 NOTE — PROGRESS NOTES
Smoking Cessation - topics covered   []  Health Risks  []  Benefits of Quitting   []  Smoking Cessation  []  Patient has no history of tobacco use  []  Patient is former smoker. []  No need for tobacco cessation education. []  Booklet given  [x]  Patient verbalizes understanding. [x]  Patient denies need for tobacco cessation education.   Duong Marion  8:28 AM

## 2017-12-04 NOTE — PROGRESS NOTES
results for input(s): BNP in the last 72 hours. Glucose:  Recent Labs      12/01/17   1501  12/03/17   1608  12/03/17   2036   POCGLU  180*  188*  196*     HgbA1C: No results for input(s): LABA1C in the last 72 hours. INR: No results for input(s): INR in the last 72 hours. Hepatic:   Recent Labs      12/04/17   0840   ALKPHOS  108*   ALT  22   AST  102*   PROT  7.2   BILITOT  0.32   LABALBU  3.6     Amylase and Lipase:No results for input(s): LACTA, AMYLASE in the last 72 hours. Lactic Acid: No results for input(s): LACTA in the last 72 hours. CARDIAC ENZYMES:  Recent Labs      12/03/17   1034  12/03/17   1235   TROPONINI  0.04  0.01     BNP: No results for input(s): BNP in the last 72 hours. Lipids: No results for input(s): CHOL, TRIG, HDL, LDLCALC in the last 72 hours. Invalid input(s): LDL  ABGs: No results found for: PH, PCO2, PO2, HCO3, O2SAT  Thyroid:   Lab Results   Component Value Date    TSH 0.98 09/19/2017      Urinalysis:   Color, UA   Date Value Ref Range Status   11/22/2017 DARK YELLOW (A) YEL Final     pH, UA   Date Value Ref Range Status   11/22/2017 5.0 5.0 - 8.0 Final     Specific Gravity, UA   Date Value Ref Range Status   11/22/2017 1.021 1.005 - 1.030 Final     Protein, UA   Date Value Ref Range Status   11/22/2017 NEGATIVE NEG Final     RBC, UA   Date Value Ref Range Status   11/22/2017 None 0 - 4 /HPF Final     Comment:     Reference range defined for non-centrifuged specimen.      Bacteria, UA   Date Value Ref Range Status   11/22/2017 NOT REPORTED NONE Final     Nitrite, Urine   Date Value Ref Range Status   11/22/2017 NEGATIVE NEG Final     WBC, UA   Date Value Ref Range Status   11/22/2017 5 TO 10 0 - 5 /HPF Final     Leukocyte Esterase, Urine   Date Value Ref Range Status   11/22/2017 SMALL (A) NEG Final     Yeast, UA   Date Value Ref Range Status   11/22/2017 NOT REPORTED NONE Final     Glucose, Ur   Date Value Ref Range Status   11/22/2017 NEGATIVE NEG Final     Bilirubin Urine Date Value Ref Range Status   11/22/2017 NEGATIVE NEG Final       Assessment:  Principal Problem:    Orthostatic hypotension  Active Problems:    S/P implantation of automatic cardioverter/defibrillator (AICD) 3/13/1- Dr. Enid Jones    Hypertension    Diabetes type 2, uncontrolled (HCC)    Postural dizziness with presyncope    Near syncope    Vertebral artery disease (HCC)    Vertebrobasilar insufficiency    Vertigo    Plan:    Postural Hypotension  - Oral meclizine.  When necessary IV Phenergan  - Orthostatics - shared above  - Continue oxcarbazepine  - Neurology consulted.  Spoke with Dr. Raiza Drew , who feels that her symptoms are due to multiple medical issues. CTH and CXR negative for any acute pathology. [place] elevation, CAD s/p ULISES in LAD and proximal RCA,Hypertension, Chronic combined systolic and diastolic CHF in the setting of CAD - s/p AICD. Elevated WBC. Likely NSTEMI  - Trend trops x 4.  - Heparin gtt  - Cardiology consulted. - Pacer interrogation  - Continue aspirin, Plavix, atorvastatin and lisinopril. - Appreciate cardiology recs. Possible Cath with Cardiology. Diabetes Mellitus - insulin dependent  HbA1C - 6.8 (10/22). Lantus 35 units morning. High dose ISS. Hyperlipidemia  Lipitor 80 nightly. Chronic respiratory failure in the setting of COPD being treated with O2 supplementation, resp eval and treat  - Active smoker  - Encourage cessation.  - Albuterol nebulizations prn      PT/OT  DVT prophylaxis - Heparin gtt, Pepcid OD/  SW: Discharge planning  Diet: NPO in morning. Vinny Saenz MD  PGY-1, Internal Medicine  [facility]    I have discussed the care of Landy   including pertinent history and exam findings with the resident. I have reviewed the key elements of all parts of the encounter with the resident.  I have seen and examined the patient with the resident and the key elements of all parts of the encounter have been performed by me.    See H&P  Kaykay Cooley MD   12/4/17

## 2017-12-04 NOTE — PROGRESS NOTES
Pt. Dangled on the edge of the stretcher. Tolerated well. Assisted to the bathroom. Gait is steady. Voided post op. Assisted to the chair.

## 2017-12-04 NOTE — CONSULTS
for polydipsia, environmental allergy   Psychiatric Negative for suicidal ideation. Patient is not anxious           Objective:   BP (!) 149/86   Pulse 88   Temp 98.4 °F (36.9 °C) (Oral)   Resp 18   Ht 5' 1\" (1.549 m)   Wt 179 lb 1 oz (81.2 kg)   LMP  (LMP Unknown)   SpO2 100%   BMI 33.83 kg/m²     Blood pressure range: Systolic (80HFI), NNQ:740 , Min:147 , BPJ:371   ; Diastolic (28LSO), TAMI:76, Min:75, Max:105      Continuous infusions:    heparin (porcine) 14 Units/kg/hr (12/04/17 1107)    sodium chloride         ON EXAMINATION:    Pt presently in cardiac cath lab, can not be examined. Data:    Lab Results:     Lab Results   Component Value Date    CHOL 192 11/30/2017    LDLCHOLESTEROL 100 11/30/2017    HDL 53 11/30/2017    TRIG 193 (H) 11/30/2017    ALT 22 12/04/2017     (H) 12/04/2017    TSH 0.98 09/19/2017    INR 1.0 11/27/2016    LABA1C 6.8 (H) 10/22/2017    NEYZTQOG21 721 11/29/2017     Hematology:  Recent Labs      12/03/17   1047  12/04/17   0206   WBC  15.4*  13.4*   HGB  10.3*  9.1*   HCT  34.5*  30.0*   PLT  378  374     Chemistry:  Recent Labs      12/03/17   1047  12/04/17   0840   NA  139  135   K  4.1  4.4   CL  99  96*   CO2  23  22   GLUCOSE  168*  211*   BUN  24*  26*   CREATININE  0.97*  0.85   CALCIUM  8.8  8.5*     Recent Labs      12/03/17   1047  12/04/17   0840   PROT  7.9  7.2   LABALBU  3.8  3.6   AST  18  102*   ALT  11  22               Diagnostic data reviewed:  Xr Chest Standard (2 Vw)    Result Date: 12/3/2017  EXAMINATION: TWO VIEWS OF THE CHEST 12/3/2017 12:24 pm COMPARISON: Chest November 29, 2017. HISTORY: ORDERING SYSTEM PROVIDED HISTORY: SOB TECHNOLOGIST PROVIDED HISTORY: Reason for exam:->SOB Acuity: Unknown Type of Exam: Unknown FINDINGS: Defibrillator device is in place. Right IJ line has now been removed. Sternotomy wires are present. Heart appears normal in size. No focal lung consolidation, pneumothorax, pleural effusion or free air.   Osseous structures are grossly intact. No acute cardiopulmonary process. Xr Chest Standard (2 Vw)    Result Date: 11/14/2017  EXAMINATION: TWO VIEWS OF THE CHEST 11/14/2017 11:12 am COMPARISON: September 19, 2017 HISTORY: Right anterior chest pain FINDINGS: Postsurgical changes of CABG are re- demonstrated. Stable left-sided ICD which partially obscures the left mid lung field. Stable borderline cardiomegaly. No consolidation, sizable or pneumothorax. Osseous structures grossly intact without acute process. No acute process. Ct Head Wo Contrast    Result Date: 12/3/2017  EXAMINATION: CT OF THE HEAD WITHOUT CONTRAST  12/3/2017 12:17 pm TECHNIQUE: CT of the head was performed without the administration of intravenous contrast. Dose modulation, iterative reconstruction, and/or weight based adjustment of the mA/kV was utilized to reduce the radiation dose to as low as reasonably achievable. COMPARISON: None. HISTORY: ORDERING SYSTEM PROVIDED HISTORY: vertigo, vomiting, on plavix TECHNOLOGIST PROVIDED HISTORY: Has a \"code stroke\" or \"stroke alert\" been called? ->No FINDINGS: BRAIN/VENTRICLES: There is no acute intracranial hemorrhage, mass effect or midline shift. No abnormal extra-axial fluid collection. The gray-white differentiation is maintained without evidence of an acute infarct. There is no evidence of hydrocephalus. ORBITS: The visualized portion of the orbits demonstrate no acute abnormality. SINUSES: The visualized paranasal sinuses and mastoid air cells demonstrate no acute abnormality. SOFT TISSUES/SKULL:  No acute abnormality of the visualized skull or soft tissues. No acute intracranial abnormality. Scattered subcortical and periventricular white matter hypodensities are most compatible with chronic small vessel disease.      Ct Head Wo Contrast    Result Date: 11/29/2017  EXAMINATION: CT OF THE HEAD WITHOUT CONTRAST  11/29/2017 4:43 pm TECHNIQUE: CT of the head was performed without the administration of intravenous contrast. Dose modulation, iterative reconstruction, and/or weight based adjustment of the mA/kV was utilized to reduce the radiation dose to as low as reasonably achievable. COMPARISON: August 16, 2017 HISTORY: ORDERING SYSTEM PROVIDED HISTORY: vertigo, headache FINDINGS: BRAIN/VENTRICLES: There is no acute intracranial hemorrhage, mass effect or midline shift. No abnormal extra-axial fluid collection. The gray-white differentiation is maintained without evidence of an acute infarct. No evidence of hydrocephalus. There are nonspecific areas of hypoattenuation within the periventricular and subcortical white matter, which likely represent chronic microvascular ischemic change. Bilateral basal ganglia calcifications. ORBITS: The visualized portion of the orbits demonstrate no acute abnormality. SINUSES: The visualized paranasal sinuses and mastoid air cells demonstrate no acute abnormality. Minimal polypoid mucosal thickening is again noted and inferior left maxillary sinus. SOFT TISSUES/SKULL: No acute abnormality of the visualized skull or soft tissues. Stable noncontrast examination of the brain without CT evidence of acute intracranial abnormality and chronic findings as described. Xr Chest Portable    Result Date: 11/29/2017  EXAMINATION: SINGLE VIEW OF THE CHEST 11/29/2017 3:43 pm COMPARISON: 11/14/2017 HISTORY: ORDERING SYSTEM PROVIDED HISTORY: RIJ placement TECHNOLOGIST PROVIDED HISTORY: Reason for exam:->RIJ placement FINDINGS: Interval placement right jugular central venous catheter with its tip projecting in the expected location of the right atrium. No definite pneumothorax is seen. Sternal wires and clips from prior heart surgery. Left subclavian defibrillator re- demonstrated. Stable mild cardiomegaly. Lungs show no focal infiltrates or large pleural effusions.   Monitor leads overlie the chest     Right jugular central venous catheter tip projects over the expected location of the right atrium without convincing evidence of pneumothorax. Ct Chest Pulmonary Embolism W Contrast    Result Date: 11/14/2017  EXAMINATION: CTA OF THE CHEST 11/14/2017 2:07 pm TECHNIQUE: CTA of the chest was performed after the administration of intravenous contrast.  Multiplanar reformatted images are provided for review. MIP images are provided for review. Dose modulation, iterative reconstruction, and/or weight based adjustment of the mA/kV was utilized to reduce the radiation dose to as low as reasonably achievable. 75 mL intravenous Optiray 350 was used. COMPARISON: None. HISTORY: ORDERING SYSTEM PROVIDED HISTORY: r/o pe FINDINGS: Pulmonary Arteries: Pulmonary arteries are adequately opacified for evaluation. No evidence of intraluminal filling defect to suggest pulmonary embolism. Main pulmonary artery is normal in caliber. Mediastinum: No evidence of mediastinal lymphadenopathy. The heart and pericardium demonstrate no acute abnormality. Heart is mildly enlarged. There is extensive calcification of the native coronary arteries with evidence of previous CABG. Cardiac pacemaker device is noted. There is no acute abnormality of the thoracic aorta. There is a tiny hiatal hernia. Lungs/pleura: The lungs are without acute process. No focal consolidation or pulmonary edema. No evidence of pleural effusion or pneumothorax. Upper Abdomen: Limited images of the upper abdomen are unremarkable. Soft Tissues/Bones: No acute bone or soft tissue abnormality. No evidence of pulmonary embolism or acute pulmonary abnormality.      Vl Dup Lower Extremity Venous Bilateral    Result Date: 11/14/2017    [facility]  Vascular Lower Extremities DVT Study Procedure   Patient Name  Jose Copier Date of Study         11/14/2017                D   Date of Birth 1955  Gender                Female   Age           65 year(s)  Race                  Black   Room Number   [address] [employer] ID  [id number]  #   Patient Jennifer  [de-identified]  #   MR #          [id number]     Sonographer           William Callahan   Accession #   [id number]   Interpreting          [facility]   Referring                 Referring Physician   Catalina Wall, *  Nurse  Practitioner  Procedure Type of Study:   Veins: Lower Extremities DVT Study, Venous Scan Lower Bilateral.  Indications for Study:R/O DVT. Risk Factors History +---------+----+-----------------------------------------------------------+ ! Diagnosis! Date! Comments                                                   ! +---------+----+-----------------------------------------------------------+ ! Other    ! !H/O CVA; PM                                                ! +---------+----+-----------------------------------------------------------+ Findings:   Right                                Left  The common femoral, femoral,         The common femoral, femoral,  popliteal, tibials, and saphenous    popliteal, tibials and saphenous  veins are compressible with normal   veins are compressible with normal  doppler responses. doppler responses. Right great saphenous vein           Left great saphenous vein harvested. harvested. Limited visualization of the  Limited visualization of the lower   posterior tibial and peroneal veins. thigh and calf veins. Allergies   - Allergy:Penicillin(Drug). Patient Status: In Patient. Technical Quality:Limited visualization. Velocities are measured in cm/s ; Diameters are measured in mm Right Lower Extremities DVT Study Measurements Right 2D Measurements +------------------------------------+----------+---------------+----------+ ! Location                            ! Visualized! Compressibility! Thrombosis! +------------------------------------+----------+---------------+----------+ ! Common Femoral                      !Yes       ! Yes !None      ! +------------------------------------+----------+---------------+----------+ ! Prox Femoral                        !Yes       ! Yes            ! None      ! +------------------------------------+----------+---------------+----------+ ! Mid Femoral                         !Yes       ! Yes            ! None      ! +------------------------------------+----------+---------------+----------+ ! Dist Femoral                        !Yes       ! Yes            ! None      ! +------------------------------------+----------+---------------+----------+ ! Popliteal                           !Yes       ! Yes            ! None      ! +------------------------------------+----------+---------------+----------+ ! Sapheno Femoral Junction            ! Yes       ! Yes            ! None      ! +------------------------------------+----------+---------------+----------+ ! PTV                                 ! Yes       ! Yes            ! None      ! +------------------------------------+----------+---------------+----------+ ! Peroneal                            !Partial   !Yes            ! None      ! +------------------------------------+----------+---------------+----------+ ! Gastroc                             ! Yes       ! Yes            ! None      ! +------------------------------------+----------+---------------+----------+ ! GSV Thigh                           ! Yes       ! Yes            ! None      ! +------------------------------------+----------+---------------+----------+ ! GSV Knee                            ! No        !               !          ! +------------------------------------+----------+---------------+----------+ ! GSV Ankle                           ! Yes       ! Yes            ! None      ! +------------------------------------+----------+---------------+----------+ ! SSV                                 ! Yes       ! Yes            ! None      ! +------------------------------------+----------+---------------+----------+ Right Doppler Measurements +-----------------------------+------+------+------------------------------+ ! Location                     ! Signal!Reflux! Reflux (sec)                  ! +-----------------------------+------+------+------------------------------+ ! Common Femoral               !Phasic!      !                              ! +-----------------------------+------+------+------------------------------+ ! Prox Femoral                 !Phasic!      !                              ! +-----------------------------+------+------+------------------------------+ ! Popliteal                    !Phasic!      !                              ! +-----------------------------+------+------+------------------------------+ Left Lower Extremities DVT Study Measurements Left 2D Measurements +------------------------------------+----------+---------------+----------+ ! Location                            ! Visualized! Compressibility! Thrombosis! +------------------------------------+----------+---------------+----------+ ! Common Femoral                      !Yes       ! Yes            ! None      ! +------------------------------------+----------+---------------+----------+ ! Prox Femoral                        !Yes       ! Yes            ! None      ! +------------------------------------+----------+---------------+----------+ ! Mid Femoral                         !Yes       ! Yes            ! None      ! +------------------------------------+----------+---------------+----------+ ! Dist Femoral                        !Yes       ! Yes            ! None      ! +------------------------------------+----------+---------------+----------+ ! Popliteal                           !Yes       ! Yes            ! None      ! +------------------------------------+----------+---------------+----------+ ! Sapheno Femoral Junction            ! Yes       ! Yes            ! None      ! +------------------------------------+----------+---------------+----------+ ! PTV !Yes       !Yes            ! None      ! +------------------------------------+----------+---------------+----------+ ! Peroneal                            !Partial   !Yes            ! None      ! +------------------------------------+----------+---------------+----------+ ! Gastroc                             ! Yes       ! Yes            ! None      ! +------------------------------------+----------+---------------+----------+ ! GSV Thigh                           ! Yes       ! Yes            ! None      ! +------------------------------------+----------+---------------+----------+ ! GSV Knee                            ! No        !               !          ! +------------------------------------+----------+---------------+----------+ ! GSV Ankle                           ! Yes       ! Yes            ! None      ! +------------------------------------+----------+---------------+----------+ ! SSV                                 ! Yes       ! Yes            ! None      ! +------------------------------------+----------+---------------+----------+ Left Doppler Measurements +-----------------------------+------+------+------------------------------+ ! Location                     ! Signal!Reflux! Reflux (sec)                  ! +-----------------------------+------+------+------------------------------+ ! Common Femoral               !Phasic!      !                              ! +-----------------------------+------+------+------------------------------+ ! Prox Femoral                 !Phasic!      !                              ! +-----------------------------+------+------+------------------------------+ ! Popliteal                    !Phasic!      !                              ! +-----------------------------+------+------+------------------------------+  Conclusions   Summary   No evidence of superficial or deep venous thrombosis in both lower  extremities. Technically limited visualization.    Signature warranted. Note: These recommendations do not apply to pts. w/ increased risk for thyroid cancer or pts. with symptomatic thyroid disease. ________________________________________________________________ Recommendations for f/u of Incidental Thyroid Nodules (ITN) found on CT, MR, NM and Extrathyroidal US are based upon the ACR white paper and [facility] 3-tiered system for managing ITNs: J Am Katia Radiol. 2015 Feb;12(2): 143-50     Nm Myocardial Spect Rest Exercise Or Rx    Result Date: 11/15/2017  EXAMINATION: MYOCARDIAL PERFUSION IMAGING 11/15/2017 1:28 pm TECHNIQUE: For the rest study, 15.3 mCi of Tc 99 labeled sestamibi were injected. SPECT images were acquired. Under cardiology supervision, 0.4mg Excell Crock was infused. After pharmacologic stress, 40.0 mCi of Tc 99 labeled sestamibi were injected. SPECT images with ECG gating were acquired. COMPARISON: None Available. HISTORY: ORDERING SYSTEM PROVIDED HISTORY: stress test TECHNOLOGIST PROVIDED HISTORY: Ordering Physician Provided Reason for Exam: Chest pain,SOB,lightheaded,dizzy,nausea/vomiting,unusual sweating,prior heart attack,paemaker,prior heart bypass surgery,high cholesterol,prior smoker and family Hx of CAD. FINDINGS: Examination is suboptimal as the patient could not raise her arm for the examination. Additionally, photopenia from the patient's implanted cardiac device partially obscures evaluation of the left ventricle. Images interpreted utilizing [employer] system and [employer]. There are perfusion defect within the anterior and inferior walls. Moderate-sized perfusion defect within the anterior wall extends from the base to the apex. Similar distribution identified within the inferior wall. There is apparent partial reversibility of the anterior wall perfusion defect, concerning for ischemia. Inferior wall perfusion defect appears fixed, most compatible with infarction. Perfusion scores are visually adjusted to account for artifact.  Summed Intermediate risk (1% to 3% annual death or MI) 1. Mild/moderate resting LV dysfunction (LVEF 35% to 49%) not readily explained by non coronary causes. 2. Resting perfusion abnormalities in 5%-9.9% of the myocardium in patients without a history or prior evidence of MI 3. Stress-induced perfusion abnormality encumbering 5%-9.9% of the myocardium or stress segmental scores indicating 1 vascular territory with abnormalities but without LV dilation 4. Small wall motion abnormality involving 1-2 segments and only 1 coronary bed. Low Risk (Less than 1% annual death or MI) 1. Normal or small myocardial perfusion defect at rest or with stress encumbering less than 5% of the myocardium. Impression and Plan: Ms. Joe is a 65 y.o. female with 2-month Hx/o dizziness with cerebrovascular and cardiac evaluation / test results as above, presenting again with c/o dizziness accompanied by N/V. She has acute NSTEMI and is now in cath lab. Endovascular service declined intervention for B VA severe stenosis just a few days ago but plans on 1-month f/u and possible later conventional cerebral angiography. Her admission symptoms from a management standpoint should be considered due to NSTEMI. No change in treatment nor additional neurologic testing during this admission on a neurologic basis is anticipated. Will return to evaluate patient 12/5. Discussed with  Nurse. Will follow with you. Thank you for consultation.

## 2017-12-04 NOTE — PROGRESS NOTES
Unable to restart iv. Called supervisor to restart. Able to start iv but then she jerked and her iv came out. Order placed for iv team to start. Resident notified.

## 2017-12-04 NOTE — CONSULTS
[facility]    Consult / H&P               Today's Date: 12/4/2017  Patient Name: Robyn Bennett  Date of admission: 12/3/2017 10:16 AM  Patient's age: 65 y.o., 1955  Admission Dx: Vertigo [R42]  Vertigo [R42]    Reason for Consult:  Cardiac evaluation    Requesting Physician: Heber Chau MD    CHIEF COMPLAINT:  Dizziness    History Obtained From:  patient    HISTORY OF PRESENT ILLNESS:      The patient is a 65 y.o.  female with  significant past medical history of vertebrobasilar insufficency, CAD , has AICD, chf , copd   who is admitted to the hospital for dizziness and nausea. She states that she is felling dizzy for last few days. She also had black out yesterday but did not pass out. She had vomiting also and was not eating and drinking properly. She was admitted on 29th also for same complaints. She had CT head done which showed no acute abnormality. She had chest pain yesterday and her first 2 trops were negative and then 3rd one  was 0.28 and then 0.55. Now 1.00. Cardiology was consulted. Had echo done  on 1st december which showed EF 35 % along with grade 1 diastolic dysfunction. Pacing lead seen in the right atrium. Pacemaker / ICD lead seen in right ventricle. Cardiac cath was done on 27th November which showed  Patent LIMA - LAD and SVG - OM3   Patent RCA stent   Compromised flow in LCX and OM2 required PTCA - ULISES in both lesions.   Moderate to severe LV dysfunction   Louis-apical akinesia    STRESS 11/15/17:   There is apparent remote infarction within the inferior wall and   moderate-sized partially reversible perfusion defect within the anterior   wall, concerning for ischemia. WMA. EF 36%      ICD 3/13/17: Medtronic device placed by Dr. Loree Waterman for ICM.    CABG 9/1/16: LIMA-LAD, SVG-OM3.                Past Medical History:   has a past medical history of Arthritis;  Asthma; CAD (coronary artery disease); CHF (congestive heart failure) ([address] Clinical trial participant at discharge; COPD (chronic obstructive pulmonary disease) ([facility] Diabetes mellitus ([facility] Hepatitis C; Hyperlipidemia; Hypertension; Pneumonia; and Unspecified cerebral artery occlusion with cerebral infarction. Past Surgical History:   has a past surgical history that includes Tonsillectomy; Foot surgery; Cardiac surgery (5/6/2016); Cardiac pacemaker placement; and Cardiac catheterization (11/27/2017). Home Medications:    Prior to Admission medications    Medication Sig Start Date End Date Taking? Authorizing Provider   aspirin 325 MG EC tablet Take 1 tablet by mouth daily 12/2/17  Yes Cris Gallo MD   atorvastatin (LIPITOR) 80 MG tablet Take 1 tablet by mouth nightly 12/1/17  Yes Cris Gallo MD   lisinopril (PRINIVIL;ZESTRIL) 5 MG tablet Take 1 tablet by mouth daily 12/2/17  Yes Cris Gallo MD   meclizine (ANTIVERT) 12.5 MG tablet Take 1 tablet by mouth 3 times daily as needed for Dizziness 12/1/17 12/11/17 Yes Rossy Hough MD   clopidogrel (PLAVIX) 75 MG tablet Take 1 tablet by mouth daily 11/29/17  Yes ROSALIO Owens   zinc oxide 20 % ointment Apply topically as needed. 11/28/17  Yes Aurea Alvarez MD   ipratropium-albuterol (DUONEB) 0.5-2.5 (3) MG/3ML SOLN nebulizer solution Inhale 3 mLs into the lungs every 4 hours as needed for Shortness of Breath 10/26/17  Yes Malik Wall MD   carvedilol (COREG) 3.125 MG tablet Take 3.125 mg by mouth 2 times daily (with meals)   Yes Tj Solis,    senna-docusate (PERICOLACE) 8.6-50 MG per tablet Take 1 tablet by mouth daily   Yes Historical Provider, MD   insulin glargine (LANTUS) 100 UNIT/ML injection vial Inject 35 Units into the skin every morning    Yes Historical Provider, MD   colchicine (COLCRYS) 0.6 MG tablet Take 1 tablet by mouth daily 7/15/16  Yes Ayaan Goldberg,    famotidine (PEPCID) 20 MG tablet Take 20 mg by mouth daily.    Yes Historical Provider, MD   OXcarbazepine (TRILEPTAL) 300 MG tablet Take 300 mg by mouth 2 times daily Per pharmacy she is to be taking 300 mg twice daily but patient states that she only takes once daily. Yes Historical Provider, MD   nitroGLYCERIN (NITROSTAT) 0.4 MG SL tablet Place 1 tablet under the tongue every 5 minutes as needed for Chest pain up to max of 3 total doses.  If no relief after 1 dose, call 911. 11/28/17   ROSALIO Lopez   OLANZapine (ZYPREXA) 10 MG tablet Take 1 tablet by mouth nightly 11/30/16   Tony Brown MD      Current Facility-Administered Medications: nitroGLYCERIN (NITROSTAT) SL tablet 0.4 mg, 0.4 mg, Sublingual, Q5 Min PRN  aspirin chewable tablet 81 mg, 81 mg, Oral, Daily  heparin (porcine) injection 4,000 Units, 4,000 Units, Intravenous, PRN  heparin (porcine) injection 2,000 Units, 2,000 Units, Intravenous, PRN  heparin 25,000 units in dextrose 5% 250 mL infusion, 12 Units/kg/hr, Intravenous, Continuous  sodium chloride flush 0.9 % injection 10 mL, 10 mL, Intravenous, 2 times per day  sodium chloride flush 0.9 % injection 10 mL, 10 mL, Intravenous, PRN  acetaminophen (TYLENOL) tablet 650 mg, 650 mg, Oral, Q4H PRN  atorvastatin (LIPITOR) tablet 80 mg, 80 mg, Oral, Nightly  carvedilol (COREG) tablet 3.125 mg, 3.125 mg, Oral, BID WC  clopidogrel (PLAVIX) tablet 75 mg, 75 mg, Oral, Daily  colchicine (COLCRYS) tablet 0.6 mg, 0.6 mg, Oral, Daily  famotidine (PEPCID) tablet 20 mg, 20 mg, Oral, Daily  insulin glargine (LANTUS) injection vial 35 Units, 35 Units, Subcutaneous, QAM  lisinopril (PRINIVIL;ZESTRIL) tablet 5 mg, 5 mg, Oral, Daily  meclizine (ANTIVERT) tablet 12.5 mg, 12.5 mg, Oral, TID PRN  OLANZapine (ZYPREXA) tablet 10 mg, 10 mg, Oral, Nightly  OXcarbazepine (TRILEPTAL) tablet 300 mg, 300 mg, Oral, BID  sodium chloride flush 0.9 % injection 10 mL, 10 mL, Intravenous, 2 times per day  sodium chloride flush 0.9 % injection 10 mL, 10 mL, Intravenous, PRN  acetaminophen (TYLENOL) tablet 650 mg, 650 mg, Oral, Q4H PRN  magnesium hydroxide (MILK OF MAGNESIA) 400 MG/5ML suspension 30 mL, 30 mL, Oral, Daily PRN  ondansetron (ZOFRAN) injection 4 mg, 4 mg, Intravenous, Q6H PRN  insulin lispro (HUMALOG) injection vial 0-18 Units, 0-18 Units, Subcutaneous, TID WC  insulin lispro (HUMALOG) injection vial 0-9 Units, 0-9 Units, Subcutaneous, Nightly  promethazine (PHENERGAN) injection 6.25 mg, 6.25 mg, Intravenous, Q6H PRN  0.9 % sodium chloride infusion, , Intravenous, Continuous    Allergies:  Levofloxacin and Pcn [penicillins]    Social History:   reports that she has been smoking Cigarettes. She has been smoking about 1.00 pack per day. She has never used smokeless tobacco. She reports that she drinks alcohol. She reports that she does not use drugs. Family History: Family history is unknown by patient. No h/o sudden cardiac death. No for premature CAD    REVIEW OF SYSTEMS:    · Constitutional: there has been no unanticipated weight loss. There's been No change in energy level, No change in activity level. · Eyes: No visual changes or diplopia. No scleral icterus. · ENT: No Headaches  · Cardiovascular: chest pain,vominting,nausea  · Respiratory: No previous pulmonary problems, No cough  · Gastrointestinal: No abdominal pain. No change in bowel or bladder habits. · Genitourinary: No dysuria, trouble voiding, or hematuria. · Musculoskeletal:L sided weakness  · Integumentary: No rash or pruritis. · Neurological: L sided residual deficit after stroke in 2014, diazziness   · Psychiatric: No anxiety, or depression. · Endocrine: No temperature intolerance. No excessive thirst, fluid intake, or urination. No tremor. · Hematologic/Lymphatic: No abnormal bruising or bleeding, blood clots or swollen lymph nodes. · Allergic/Immunologic: No nasal congestion or hives.       PHYSICAL EXAM:      BP (!) 149/86   Pulse 88   Temp 98.4 °F (36.9 °C) (Oral)   Resp 18   Ht 5' 1\" (1.549 m)   Wt 179 lb 1 oz (81.2 kg)   LMP  (LMP Unknown)   SpO2 100%   BMI 33.83 kg/m²    Constitutional and General Appearance: alert, cooperative, no distress and appears stated age  [de-identified]: PERRL, no cervical lymphadenopathy. No masses palpable. Normal oral mucosa  Respiratory:  · Normal excursion and expansion without use of accessory muscles  · Resp Auscultation: Good respiratory effort. No for increased work of breathing. On auscultation: clear to auscultation bilaterally  Cardiovascular:  · The apical impulse is not displaced  · AICD placed   · Jugular venous pulsation Normal  · The carotid upstroke is normal in amplitude and contour without delay or bruit  · Peripheral pulses are symmetrical and full   Abdomen:   · No masses or tenderness  · Bowel sounds present  Extremities:  ·  No Cyanosis or Clubbing  ·  Lower extremity edema: No  ·  Skin: Warm and dry  Neurological:  · Alert and oriented. Lsided weakness  DATA:    Diagnostics:      EKG: L atrial enlargement,old anterior and inferior infarcts  ECHO:     Stress Test:STRESS 11/15/17:   There is apparent remote infarction within the inferior wall and   moderate-sized partially reversible perfusion defect within the anterior   wall, concerning for ischemia. WMA. EF 36%       Cardiac Angiography: Patent LIMA - LAD and SVG - OM3   Patent RCA stent   Compromised flow in LCX and OM2 required PTCA - ULISES in both lesions.   Moderate to severe LV dysfunction   Louis-apical akinesia    Labs:     CBC:   Recent Labs      12/03/17   1047  12/04/17   0206   WBC  15.4*  13.4*   HGB  10.3*  9.1*   HCT  34.5*  30.0*   PLT  378  374     BMP:   Recent Labs      12/03/17   1047  12/04/17   0840   NA  139  135   K  4.1  4.4   CO2  23  22   BUN  24*  26*   CREATININE  0.97*  0.85   LABGLOM  58*  >60   GLUCOSE  168*  211*     BNP: No results for input(s): BNP in the last 72 hours. PT/INR: No results for input(s): PROTIME, INR in the last 72 hours.   APTT:  Recent Labs      12/04/17   0206  12/04/17   0840   APTT  21.7  32.6* CARDIAC ENZYMES:  Recent Labs      12/03/17   1034  12/03/17   1235   TROPONINI  0.04  0.01     FASTING LIPID PANEL:  Lab Results   Component Value Date    HDL 53 11/30/2017    TRIG 193 11/30/2017     LIVER PROFILE:  Recent Labs      12/03/17   1047  12/04/17   0840   AST  18  102*   ALT  11  22   LABALBU  3.8  3.6         Patient Active Problem List   Diagnosis    Cerebral infarction ([address]    Hypertension    Diabetes mellitus type 2, controlled ([address]    Candidal intertrigo    Diverticulitis of colon    Lower urinary tract infectious disease    Diabetes type 2, uncontrolled ([address]    Noncompliance with diabetes treatment    History of CVA (cerebrovascular accident)    Left-sided weakness    Postural dizziness with presyncope    Acute hyperglycemia    Near syncope    Noncompliance    Encounter for counseling    Hepatitis C    ST elevation myocardial infarction involving left anterior descending (LAD) coronary artery (HCC)    DHARMESH (acute kidney injury) ([address]    ST elevation myocardial infarction (STEMI) (HCC)    Leukocytosis    Acute idiopathic gout of left knee    Late effects of CVA (cerebrovascular accident)    Inability to perform activities of daily living    S/P implantation of automatic cardioverter/defibrillator (AICD) 3/13/1- Dr. Candelaria Cruz    Acute diverticulitis    Unstable angina ([address]    Vertebral artery disease ([address]    Vertebrobasilar insufficiency    Vertigo    Orthostatic hypotension     Cardiac Testing      ECHO 11/30/17: EF 35%, grade I DD.      CATH 11/27/17: Patent LIMA-LAD and SVG-OM3. Patent RCA stent. Compromised flow in LCx and OM2 required PTCA/ES in both lesions. EF 35%. Reinaldo-apical akinesis.      STRESS 11/15/17: Apparent remote infarction within the inferior wall and moderate-sized partially reversible perfusion defect within the anterior wall, concerning for ischemia. Dyskinesis of the apex.  Severe hypokinesis of the septum, inferoseptal wall and inferior wall. EF 36%.      ICD 3/13/17: Medtronic device placed by Dr. Jd Recinos for ICM.    CABG 9/1/16: LIMA-LAD, SVG-OM3.     IMPRESSION &   RECOMMENDATIONS:  1. NSTEMI- Troponin now 1.00, normal renal function. Hx of multiple stents and CABG recently (see above). Active chest pain. Will send to cath lab for cath. Risks, benefits, alternatives, and details discussed extensively. Accepts and consents. 2. Dizziness- neuro following  3. Combined Systolic and diastolic HF    Attending Cardiologist Addendum: I have reviewed and performed the history, physical, subjective, objective, assessment, and plan with the resident/fellow and agree with the note. I performed the history and physical personally. I have made changes to the note above as needed. Thank you for allowing me to participate in the care of this patient, please do not hesitate to call if you have any questions. Roge Treviño, [id number] [facility]  [facility]. [facility]  [phone]

## 2017-12-05 VITALS
RESPIRATION RATE: 16 BRPM | SYSTOLIC BLOOD PRESSURE: 116 MMHG | DIASTOLIC BLOOD PRESSURE: 69 MMHG | BODY MASS INDEX: 33.81 KG/M2 | HEIGHT: 61 IN | WEIGHT: 179.06 LBS | HEART RATE: 106 BPM | TEMPERATURE: 98.2 F | OXYGEN SATURATION: 100 %

## 2017-12-05 PROBLEM — N30.00 ACUTE CYSTITIS: Status: ACTIVE | Noted: 2017-12-05

## 2017-12-05 LAB
-: ABNORMAL
AMORPHOUS: ABNORMAL
AMPHETAMINE SCREEN URINE: NEGATIVE
BACTERIA: ABNORMAL
BARBITURATE SCREEN URINE: NEGATIVE
BENZODIAZEPINE SCREEN, URINE: POSITIVE
BILIRUBIN URINE: NEGATIVE
BUPRENORPHINE URINE: ABNORMAL
CANNABINOID SCREEN URINE: NEGATIVE
CASTS UA: ABNORMAL /LPF (ref 0–8)
COCAINE METABOLITE, URINE: NEGATIVE
COLOR: YELLOW
COMMENT UA: ABNORMAL
CRYSTALS, UA: ABNORMAL /HPF
EPITHELIAL CELLS UA: ABNORMAL /HPF (ref 0–5)
GLUCOSE BLD-MCNC: 184 MG/DL (ref 65–105)
GLUCOSE URINE: ABNORMAL
KETONES, URINE: NEGATIVE
LEUKOCYTE ESTERASE, URINE: ABNORMAL
MDMA URINE: ABNORMAL
METHADONE SCREEN, URINE: NEGATIVE
METHAMPHETAMINE, URINE: ABNORMAL
MUCUS: ABNORMAL
NITRITE, URINE: POSITIVE
OPIATES, URINE: NEGATIVE
OTHER OBSERVATIONS UA: ABNORMAL
OXYCODONE SCREEN URINE: NEGATIVE
PH UA: 5.5 (ref 5–8)
PHENCYCLIDINE, URINE: NEGATIVE
PROPOXYPHENE, URINE: ABNORMAL
PROTEIN UA: NEGATIVE
RBC UA: ABNORMAL /HPF (ref 0–4)
RENAL EPITHELIAL, UA: ABNORMAL /HPF
SPECIFIC GRAVITY UA: 1.02 (ref 1–1.03)
TEST INFORMATION: ABNORMAL
TRICHOMONAS: ABNORMAL
TRICYCLIC ANTIDEPRESSANTS, UR: ABNORMAL
TURBIDITY: ABNORMAL
URINE HGB: NEGATIVE
UROBILINOGEN, URINE: NORMAL
WBC UA: ABNORMAL /HPF (ref 0–5)
YEAST: ABNORMAL

## 2017-12-05 PROCEDURE — 6370000000 HC RX 637 (ALT 250 FOR IP): Performed by: STUDENT IN AN ORGANIZED HEALTH CARE EDUCATION/TRAINING PROGRAM

## 2017-12-05 PROCEDURE — 81001 URINALYSIS AUTO W/SCOPE: CPT

## 2017-12-05 PROCEDURE — 87088 URINE BACTERIA CULTURE: CPT

## 2017-12-05 PROCEDURE — 94762 N-INVAS EAR/PLS OXIMTRY CONT: CPT

## 2017-12-05 PROCEDURE — 99233 SBSQ HOSP IP/OBS HIGH 50: CPT | Performed by: PSYCHIATRY & NEUROLOGY

## 2017-12-05 PROCEDURE — 87086 URINE CULTURE/COLONY COUNT: CPT

## 2017-12-05 PROCEDURE — 87186 SC STD MICRODIL/AGAR DIL: CPT

## 2017-12-05 PROCEDURE — 80307 DRUG TEST PRSMV CHEM ANLYZR: CPT

## 2017-12-05 PROCEDURE — 2580000003 HC RX 258: Performed by: INTERNAL MEDICINE

## 2017-12-05 PROCEDURE — 93970 EXTREMITY STUDY: CPT

## 2017-12-05 PROCEDURE — 99232 SBSQ HOSP IP/OBS MODERATE 35: CPT | Performed by: INTERNAL MEDICINE

## 2017-12-05 PROCEDURE — 82947 ASSAY GLUCOSE BLOOD QUANT: CPT

## 2017-12-05 RX ORDER — ASPIRIN 81 MG/1
81 TABLET, CHEWABLE ORAL DAILY
Qty: 30 TABLET | Refills: 3 | Status: ON HOLD | OUTPATIENT
Start: 2017-12-05 | End: 2017-12-18

## 2017-12-05 RX ORDER — NITROGLYCERIN 0.4 MG/1
0.4 TABLET SUBLINGUAL EVERY 5 MIN PRN
Qty: 25 TABLET | Refills: 3 | Status: ON HOLD | OUTPATIENT
Start: 2017-12-05 | End: 2017-12-18

## 2017-12-05 RX ORDER — SULFAMETHOXAZOLE AND TRIMETHOPRIM 800; 160 MG/1; MG/1
1 TABLET ORAL ONCE
Status: DISCONTINUED | OUTPATIENT
Start: 2017-12-05 | End: 2017-12-05 | Stop reason: HOSPADM

## 2017-12-05 RX ORDER — SULFAMETHOXAZOLE AND TRIMETHOPRIM 800; 160 MG/1; MG/1
1 TABLET ORAL 2 TIMES DAILY
Qty: 10 TABLET | Refills: 0 | Status: ON HOLD | OUTPATIENT
Start: 2017-12-05 | End: 2017-12-15 | Stop reason: HOSPADM

## 2017-12-05 RX ADMIN — LISINOPRIL 5 MG: 5 TABLET ORAL at 09:51

## 2017-12-05 RX ADMIN — COLCHICINE 0.6 MG: 0.6 TABLET, FILM COATED ORAL at 09:51

## 2017-12-05 RX ADMIN — MECLIZINE HCL 12.5 MG: 12.5 TABLET ORAL at 09:55

## 2017-12-05 RX ADMIN — INSULIN GLARGINE 35 UNITS: 100 INJECTION, SOLUTION SUBCUTANEOUS at 10:00

## 2017-12-05 RX ADMIN — CARVEDILOL 3.12 MG: 3.12 TABLET, FILM COATED ORAL at 09:00

## 2017-12-05 RX ADMIN — ASPIRIN 81 MG: 81 TABLET, CHEWABLE ORAL at 09:50

## 2017-12-05 RX ADMIN — FAMOTIDINE 20 MG: 20 TABLET, FILM COATED ORAL at 09:52

## 2017-12-05 RX ADMIN — INSULIN LISPRO 9 UNITS: 100 INJECTION, SOLUTION INTRAVENOUS; SUBCUTANEOUS at 10:01

## 2017-12-05 RX ADMIN — INSULIN LISPRO 3 UNITS: 100 INJECTION, SOLUTION INTRAVENOUS; SUBCUTANEOUS at 13:15

## 2017-12-05 RX ADMIN — Medication 10 ML: at 10:03

## 2017-12-05 RX ADMIN — OXCARBAZEPINE 300 MG: 300 TABLET, FILM COATED ORAL at 09:00

## 2017-12-05 RX ADMIN — CLOPIDOGREL 75 MG: 75 TABLET, FILM COATED ORAL at 09:50

## 2017-12-05 ASSESSMENT — PAIN SCALES - GENERAL
PAINLEVEL_OUTOF10: 0
PAINLEVEL_OUTOF10: 0

## 2017-12-05 NOTE — DISCHARGE SUMMARY
[address] [place] [address]     Department of Internal Medicine - Staff Internal Medicine Service    INPATIENT DISCHARGE SUMMARY      PATIENT IDENTIFICATION:  NAME:  Ava Pichardo   :   1955  MRN:    [id number]     Acct:    [de-identified]   Admit Date:  12/3/2017  Discharge date:  2017  3:39 PM   Attending Provider: No att. providers found                                     REASON FOR HOSPITALIZATION:   Ava Pichardo is a 65 y.o. female who presented with lightheadedness. DIAGNOSES:  Principal Problem:    NSTEMI (non-ST elevated myocardial infarction) ([facility]  Active Problems:    S/P implantation of automatic cardioverter/defibrillator (AICD) 3/13/1- Dr. Melvi Alvarado hypertension    Diabetes type 2, uncontrolled (HCC)    Postural dizziness with presyncope    Near syncope    Vertebral artery disease ([facility]    Vertebrobasilar insufficiency    Vertigo    Orthostatic hypotension    Acute cystitis    Uncontrolled diabetes mellitus type 2 without complications (HCC)    Chronic respiratory failure (HCC)      TREATMENT:  Brief Inpatient Course: pt is a 65 YOF with PMH of CAD s/p CABG and recent stents was admitted with light headedness and she had orthostatic hypotension. EKG was NSR, no ST-T changes. She was admitted and her troponins were trended and she developed new chest pain and elevated troponin with new T inversion in lateral leads. Cardiology was consulted and heparin was started. She had a cardiac cath which showed MVD but patent stents and grafts. Her symptoms improved with hydration. She also has UTI and is being discharged on bactrim. Consults:   cardiology    Procedures:  Cardiac cath   Multi-vessel Coronary Artery Disease.   Patent grafts to LAD and OM.   Patent prior RCA stents with non-obstructive disease.   Moderately impaired ventricular function.      Recommendations      Medical therapy as needed.   Risk factor modification.     Any Hospital Acquired Infections: [address] [facility] [address]  [address] [place] [id number]    Phone:  [phone]   · aspirin 81 MG chewable tablet  · nitroGLYCERIN 0.4 MG SL tablet     These medications were sent to [address], [address] [facility]  [address] [facility], [address] [facility] [address] [address]    Phone:  [phone]   · sulfamethoxazole-trimethoprim 800-160 MG per tablet       Activity: activity as tolerated    Diet: cardiac diet    Disposition: home with home care. Follow-up:  in 2 weeks with PCP. With endovascular surgery in 4 weeks. Carolina Pelayo MD,   PGY-3 Internal Medicine Resident. [facility] [address] [facility].   12/7/2017  9:12 AM

## 2017-12-05 NOTE — PROGRESS NOTES
NEUROLOGY INPATIENT PROGRESS NOTE    12/5/2017         Subjective: Flor Jack is a  65 y.o. female admitted on 12/3/2017 with Vertigo [R42]  Vertigo [R42]      Briefly, this is a  65 y.o. female readmitted on 12/3/2017  after short hospitalization 11/29/2017 with essentially same c/o of 2 months  dizziness. When seen 11/30/2017 by Dr. Jocelyn Lucas - endovascular fellow and neurointensivist - he described recent contrast CTA head and neck for which the reading is severe stenosis origin proximal R VA and B distal VA, saying that this does not require immediate endovascular intervention and recommended maximal medical management - dual antiplatelet Rx and lipitor with 1-month o/p endovascular clinic f/u and consideration later of conventional cerebral angiography. He also indicated that there might well be a hemodynamic cause for her dizziness. She has PMH of previous CVA in 2014 with residual left sided weakness, CAD s/p CABG stent and pacemaker, HTN, IDDM, Hep C. She had echo done  on12/1/17 which showed EF 35 % along with grade 1 diastolic dysfunction; pacing lead seen in the right atrium; pacemaker / ICD lead seen in right ventricle. Cardiac cath 11/27/17 showed patent LIMA - LAD and SVG - OM3,   patent RCA stent, compromised flow in LCX and OM2 required PTCA - ULISES in both lesions; moderate to severe LV dysfunction   kristi-apical akinesia. Current cardiology consult indicates she has acute NSTEMI, troponin now 1.00 with active CP and plan to send pt to cath lab for cath; plus Dx/o systolic and diastolic HF. Impression of neurology consultant 12/4 was that her admission symptoms from a management standpoint should be considered due to NSTEMI. No change in treatment nor additional neurologic testing during this admission on a neurologic basis is anticipated. [facility] 12/4: multivessel CAD with patent prior RCA stents with nonobstructive disease, patent LIMA-LAD and SVG-OM3, mod reduced LVSF. Medical Tx recommended. Patient already cleared by IM.                    No current facility-administered medications on file prior to encounter. Current Outpatient Prescriptions on File Prior to Encounter   Medication Sig Dispense Refill    atorvastatin (LIPITOR) 80 MG tablet Take 1 tablet by mouth nightly 30 tablet 3    lisinopril (PRINIVIL;ZESTRIL) 5 MG tablet Take 1 tablet by mouth daily 30 tablet 3    meclizine (ANTIVERT) 12.5 MG tablet Take 1 tablet by mouth 3 times daily as needed for Dizziness 30 tablet 0    clopidogrel (PLAVIX) 75 MG tablet Take 1 tablet by mouth daily 30 tablet 11    zinc oxide 20 % ointment Apply topically as needed. 1 Tube 0    ipratropium-albuterol (DUONEB) 0.5-2.5 (3) MG/3ML SOLN nebulizer solution Inhale 3 mLs into the lungs every 4 hours as needed for Shortness of Breath 360 mL 2    carvedilol (COREG) 3.125 MG tablet Take 3.125 mg by mouth 2 times daily (with meals)      senna-docusate (PERICOLACE) 8.6-50 MG per tablet Take 1 tablet by mouth daily      insulin glargine (LANTUS) 100 UNIT/ML injection vial Inject 35 Units into the skin every morning       colchicine (COLCRYS) 0.6 MG tablet Take 1 tablet by mouth daily 30 tablet 3    famotidine (PEPCID) 20 MG tablet Take 20 mg by mouth daily.  OXcarbazepine (TRILEPTAL) 300 MG tablet Take 300 mg by mouth 2 times daily Per pharmacy she is to be taking 300 mg twice daily but patient states that she only takes once daily.  OLANZapine (ZYPREXA) 10 MG tablet Take 1 tablet by mouth nightly 30 tablet 0       Allergies: Natali Isaac is allergic to levofloxacin and pcn [penicillins].     Past Medical History:   Diagnosis Date    Arthritis     Asthma     CAD (coronary artery disease)     [facility]     Clinical trial participant at discharge 03/13/2017    Medtronic PSR     COPD (chronic obstructive [facility] disease) [facility]     Diabetes mellitus (HCC)     Hepatitis C     Hyperlipidemia     Hypertension  Pneumonia     Unspecified cerebral artery occlusion with cerebral infarction        Past Surgical History:   Procedure Laterality Date    CARDIAC CATHETERIZATION  11/27/2017    CARDIAC PACEMAKER PLACEMENT      placed 1 month ago at st [address]  5/6/2016    cardiac cath-1xBMS prox. RCA; 1xBMS prox. LAD    FOOT SURGERY      cyst removed from L foot    TONSILLECTOMY             Medications:     aspirin  81 mg Oral Daily    sodium chloride flush  10 mL Intravenous 2 times per day    sodium chloride flush  10 mL Intravenous 2 times per day    atorvastatin  80 mg Oral Nightly    carvedilol  3.125 mg Oral BID WC    clopidogrel  75 mg Oral Daily    colchicine  0.6 mg Oral Daily    famotidine  20 mg Oral Daily    insulin glargine  35 Units Subcutaneous QAM    lisinopril  5 mg Oral Daily    OLANZapine  10 mg Oral Nightly    OXcarbazepine  300 mg Oral BID    sodium chloride flush  10 mL Intravenous 2 times per day    insulin lispro  0-18 Units Subcutaneous TID WC    insulin lispro  0-9 Units Subcutaneous Nightly     PRN Meds include: nitroGLYCERIN, heparin (porcine), heparin (porcine), sodium chloride flush, glucose, dextrose, glucagon (rDNA), dextrose, sodium chloride flush, acetaminophen, meclizine, sodium chloride flush, acetaminophen, magnesium hydroxide, ondansetron, promethazine    Objective:   /69   Pulse 106   Temp 98.2 °F (36.8 °C) (Oral)   Resp 16   Ht 5' 1\" (1.549 m)   Wt 179 lb 1 oz (81.2 kg)   LMP  (LMP Unknown)   SpO2 100%   BMI 33.83 kg/m²     Blood pressure range: Systolic (25OAQ), TOK:514 , Min:96 , AWN:058   ; Diastolic (55YTC), OWZ:60, Min:59, Max:136    ON EXAMINATION:     Screening MS, CN, M/S/Cerebellar/TR exams WNL.     Data:     Date:                            12/4/2017  Patient name:              Nahum Thomas  Date of admission:      12/3/2017 10:16 AM  MRN:                           [id number]  Date of Birth: 09/19/2016    INR 1.0 11/27/2016    LABA1C 6.8 (H) 10/22/2017    [id number] 11/29/2017     Impression and Plan: Ms. Yovana Corona is a 65 y.o. female with 2-month Hx/o dizziness with cerebrovascular and cardiac evaluation / test results as above, presenting again with c/o dizziness accompanied by N/V. She has acute NSTEMI and is now in cath lab. Endovascular service declined intervention for B VA severe stenosis just a few days ago but plans on 1-month f/u and possible later conventional cerebral angiography. Her admission symptoms from a management standpoint should be considered due to NSTEMI. No change in treatment nor additional neurologic testing during this admission on a neurologic basis is recommended. Pt is neurologically clear for anticipated home d/c today.

## 2017-12-05 NOTE — CARE COORDINATION
Spoke with patient regarding discharge planning. Plan is home with [facility]. Called Clarissa Townsend with [facility] to notify of planned discharge today.   Patient requesting ambulette home, papers faxed to [facility],  time scheduled for 3pm

## 2017-12-05 NOTE — PROGRESS NOTES
[facility]   Progress Note                   Date:   12/5/2017  Patient name: Ignacia White  Date of admission:  12/3/2017 10:16 AM  MRN:   [id number]  YOB: 1955  PCP: Shannan Obregon MD    Reason for Admission:  NSTEMI     Subjective:       Patient is post repeat heart cath with results as below. She denies any further chest discomfort or anginal pain. No dyspnea. Continues to complain of dizziness and headache      Medications:   Scheduled Meds:   sulfamethoxazole-trimethoprim  1 tablet Oral Once    aspirin  81 mg Oral Daily    sodium chloride flush  10 mL Intravenous 2 times per day    sodium chloride flush  10 mL Intravenous 2 times per day    atorvastatin  80 mg Oral Nightly    carvedilol  3.125 mg Oral BID WC    clopidogrel  75 mg Oral Daily    colchicine  0.6 mg Oral Daily    famotidine  20 mg Oral Daily    insulin glargine  35 Units Subcutaneous QAM    lisinopril  5 mg Oral Daily    OLANZapine  10 mg Oral Nightly    OXcarbazepine  300 mg Oral BID    sodium chloride flush  10 mL Intravenous 2 times per day    insulin lispro  0-18 Units Subcutaneous TID WC    insulin lispro  0-9 Units Subcutaneous Nightly       Continuous Infusions:   dextrose         CBC:   Recent Labs      12/03/17   1047  12/04/17   0206   WBC  15.4*  13.4*   HGB  10.3*  9.1*   PLT  378  374     BMP:    Recent Labs      12/03/17   1047  12/04/17   0840   NA  139  135   K  4.1  4.4   CL  99  96*   CO2  23  22   BUN  24*  26*   CREATININE  0.97*  0.85   GLUCOSE  168*  211*     Hepatic:   Recent Labs      12/03/17   1047  12/04/17   0840   AST  18  102*   ALT  11  22   BILITOT  0.44  0.32   ALKPHOS  112*  108*     Troponin:   Recent Labs      12/03/17   1034  12/03/17   1235   TROPONINI  0.04  0.01     BNP: No results for input(s): BNP in the last 72 hours. Lipids: No results for input(s): CHOL, HDL in the last 72 hours.     Invalid input(s): LDLCALCU  INR: No results for input(s): INR in the last 72 hours. Objective:   Vitals: /69   Pulse 106   Temp 98.2 °F (36.8 °C) (Oral)   Resp 16   Ht 5' 1\" (1.549 m)   Wt 179 lb 1 oz (81.2 kg)   LMP  (LMP Unknown)   SpO2 100%   BMI 33.83 kg/m²     General appearance: awake, alert, in no apparent respiratory distress   HEENT: Head: Normocephalic, no lesions, without obvious abnormality  Neck: no JVD  Lungs: clear to auscultation bilaterally, no basilar rales, no wheezing   Heart: regular rate and rhythm, S1, S2 normal, no murmur, click, rub or gallop  Abdomen: soft, non-tender; bowel sounds normal  Extremities: No LE edema  Neurologic: Mental status: Alert, oriented. Motor and sensory not done. Coronary Angiography:   Left main: Normal  LAD: 100% mid stenosis. LIMA-LAD: patent graft  LCX: 100% proximal stenosis. SVG-OM3 patent graft and supplying OM1 in a retrograde fashion. RCA: diffuse irregularities 40-50% with patent prior stents. LVEF: 30%. LV Wall Motion: global hypokinesis      Assessment / Acute Cardiac Problems:   1. Non Q MI  2. Multivessel coronary artery disease s/p CABG X 2 and subsequent PCI of LCX/OM2. Cath results as above  3. Chronic systolic heart failure due to ischemic cardiomyopathy with ICD in situ   4. vertebro basilar insufficiency  5. Type 2 Diabetes mellitus  6. Hypertension   7. Hx of CVA with left hemiparesis   8. Hepatitis C       Plan of Treatment:   1. Optimize medical therapy with dual antiplatelets, BB and statin   2. Neurology following for c/o dizziness and vertebral artery stenosis with plans of future cerebral angiography noted in the chart   3. Recommend ambulation and PT/OT   4. OK to d/c from cardiology perspective with outpatient follow up     Discussed with patient and RN.        Janna Cox MD  Fellow, Cardiovascular Diseases   [facility]   Pager - [phone]

## 2017-12-06 LAB
CULTURE: ABNORMAL
CULTURE: ABNORMAL
Lab: ABNORMAL
ORGANISM: ABNORMAL
SPECIMEN DESCRIPTION: ABNORMAL
STATUS: ABNORMAL

## 2017-12-07 PROBLEM — J96.10 CHRONIC RESPIRATORY FAILURE (HCC): Status: ACTIVE | Noted: 2017-12-07

## 2017-12-08 ENCOUNTER — HOSPITAL ENCOUNTER (INPATIENT)
Age: 62
LOS: 8 days | Discharge: LEFT BEFORE DISCHARGE | DRG: 260 | End: 2017-12-16
Attending: EMERGENCY MEDICINE | Admitting: INTERNAL MEDICINE
Payer: MEDICARE

## 2017-12-08 ENCOUNTER — APPOINTMENT (OUTPATIENT)
Dept: CT IMAGING | Age: 62
DRG: 260 | End: 2017-12-08
Payer: MEDICARE

## 2017-12-08 ENCOUNTER — APPOINTMENT (OUTPATIENT)
Dept: GENERAL RADIOLOGY | Age: 62
DRG: 260 | End: 2017-12-08
Payer: MEDICARE

## 2017-12-08 DIAGNOSIS — B95.8 ENDOCARDITIS DUE TO STAPHYLOCOCCUS: ICD-10-CM

## 2017-12-08 DIAGNOSIS — I21.4 NSTEMI (NON-ST ELEVATED MYOCARDIAL INFARCTION) (HCC): ICD-10-CM

## 2017-12-08 DIAGNOSIS — E87.5 HYPERKALEMIA: Primary | ICD-10-CM

## 2017-12-08 DIAGNOSIS — N39.0 URINARY TRACT INFECTION WITHOUT HEMATURIA, SITE UNSPECIFIED: ICD-10-CM

## 2017-12-08 DIAGNOSIS — A41.9 SEPSIS, DUE TO UNSPECIFIED ORGANISM: ICD-10-CM

## 2017-12-08 DIAGNOSIS — I33.0 ENDOCARDITIS DUE TO STAPHYLOCOCCUS: ICD-10-CM

## 2017-12-08 LAB
-: ABNORMAL
ABSOLUTE EOS #: 0.05 K/UL (ref 0–0.44)
ABSOLUTE IMMATURE GRANULOCYTE: 0.07 K/UL (ref 0–0.3)
ABSOLUTE LYMPH #: 1.42 K/UL (ref 1.1–3.7)
ABSOLUTE MONO #: 1.27 K/UL (ref 0.1–1.2)
ALBUMIN SERPL-MCNC: 3.3 G/DL (ref 3.5–5.2)
ALBUMIN/GLOBULIN RATIO: 0.8 (ref 1–2.5)
ALLEN TEST: ABNORMAL
ALP BLD-CCNC: 87 U/L (ref 35–104)
ALT SERPL-CCNC: 14 U/L (ref 5–33)
AMORPHOUS: ABNORMAL
ANION GAP SERPL CALCULATED.3IONS-SCNC: 15 MMOL/L (ref 9–17)
ANION GAP: 7 MMOL/L (ref 7–16)
AST SERPL-CCNC: 22 U/L
BACTERIA: ABNORMAL
BASOPHILS # BLD: 0 % (ref 0–2)
BASOPHILS ABSOLUTE: 0.05 K/UL (ref 0–0.2)
BILIRUB SERPL-MCNC: 0.51 MG/DL (ref 0.3–1.2)
BILIRUBIN URINE: NEGATIVE
BNP INTERPRETATION: ABNORMAL
BUN BLDV-MCNC: 24 MG/DL (ref 8–23)
BUN/CREAT BLD: ABNORMAL (ref 9–20)
CALCIUM SERPL-MCNC: 8.2 MG/DL (ref 8.6–10.4)
CASTS UA: ABNORMAL /LPF (ref 0–8)
CHLORIDE BLD-SCNC: 95 MMOL/L (ref 98–107)
CK MB: 2.1 NG/ML
CO2: 22 MMOL/L (ref 20–31)
COLOR: ABNORMAL
CREAT SERPL-MCNC: 1.09 MG/DL (ref 0.5–0.9)
CRYSTALS, UA: ABNORMAL /HPF
D-DIMER QUANTITATIVE: 3.37 MG/L FEU
DIFFERENTIAL TYPE: ABNORMAL
EOSINOPHILS RELATIVE PERCENT: 0 % (ref 1–4)
EPITHELIAL CELLS UA: ABNORMAL /HPF (ref 0–5)
FIO2: ABNORMAL
GFR AFRICAN AMERICAN: >60 ML/MIN
GFR NON-AFRICAN AMERICAN: 51 ML/MIN
GFR NON-AFRICAN AMERICAN: 53 ML/MIN
GFR SERPL CREATININE-BSD FRML MDRD: >60 ML/MIN
GFR SERPL CREATININE-BSD FRML MDRD: ABNORMAL ML/MIN/{1.73_M2}
GLUCOSE BLD-MCNC: 222 MG/DL (ref 70–99)
GLUCOSE BLD-MCNC: 227 MG/DL (ref 74–100)
GLUCOSE BLD-MCNC: 335 MG/DL (ref 65–105)
GLUCOSE URINE: NEGATIVE
HCO3 VENOUS: 23.9 MMOL/L (ref 22–29)
HCT VFR BLD CALC: 27.5 % (ref 36.3–47.1)
HEMOGLOBIN: 8.4 G/DL (ref 11.9–15.1)
IMMATURE GRANULOCYTES: 0 %
INR BLD: 1
KETONES, URINE: ABNORMAL
LEUKOCYTE ESTERASE, URINE: ABNORMAL
LIPASE: 18 U/L (ref 13–60)
LYMPHOCYTES # BLD: 9 % (ref 24–43)
MCH RBC QN AUTO: 25.4 PG (ref 25.2–33.5)
MCHC RBC AUTO-ENTMCNC: 30.5 G/DL (ref 28.4–34.8)
MCV RBC AUTO: 83.1 FL (ref 82.6–102.9)
MODE: ABNORMAL
MONOCYTES # BLD: 8 % (ref 3–12)
MUCUS: ABNORMAL
MYOGLOBIN: 59 NG/ML (ref 25–58)
NEGATIVE BASE EXCESS, VEN: 1 (ref 0–2)
NITRITE, URINE: POSITIVE
O2 DEVICE/FLOW/%: ABNORMAL
O2 SAT, VEN: 55 % (ref 60–85)
OTHER OBSERVATIONS UA: ABNORMAL
PARTIAL THROMBOPLASTIN TIME: 16.9 SEC (ref 21.3–31.3)
PARTIAL THROMBOPLASTIN TIME: 21.1 SEC (ref 21.3–31.3)
PATIENT TEMP: ABNORMAL
PCO2, VEN: 40.3 MM HG (ref 41–51)
PDW BLD-RTO: 17.6 % (ref 11.8–14.4)
PH UA: 5 (ref 5–8)
PH VENOUS: 7.38 (ref 7.32–7.43)
PLATELET # BLD: 317 K/UL (ref 138–453)
PLATELET ESTIMATE: ABNORMAL
PMV BLD AUTO: 9.5 FL (ref 8.1–13.5)
PO2, VEN: 29.4 MM HG (ref 30–50)
POC CHLORIDE: 103 MMOL/L (ref 98–107)
POC CREATININE: 1.05 MG/DL (ref 0.51–1.19)
POC HEMATOCRIT: 26 % (ref 36–46)
POC HEMOGLOBIN: 8.7 G/DL (ref 12–16)
POC IONIZED CALCIUM: 1.12 MMOL/L (ref 1.15–1.33)
POC LACTIC ACID: 1.31 MMOL/L (ref 0.56–1.39)
POC PCO2 TEMP: ABNORMAL MM HG
POC PH TEMP: ABNORMAL
POC PO2 TEMP: ABNORMAL MM HG
POC POTASSIUM: 4.3 MMOL/L (ref 3.5–4.5)
POC SODIUM: 134 MMOL/L (ref 138–146)
POC TROPONIN I: 3.27 NG/ML (ref 0–0.1)
POC TROPONIN INTERP: ABNORMAL
POSITIVE BASE EXCESS, VEN: ABNORMAL (ref 0–3)
POTASSIUM SERPL-SCNC: 4.2 MMOL/L (ref 3.7–5.3)
PRO-BNP: 4715 PG/ML
PROTEIN UA: ABNORMAL
PROTHROMBIN TIME: 10.6 SEC (ref 9.4–12.6)
RBC # BLD: 3.31 M/UL (ref 3.95–5.11)
RBC # BLD: ABNORMAL 10*6/UL
RBC UA: ABNORMAL /HPF (ref 0–4)
RENAL EPITHELIAL, UA: ABNORMAL /HPF
SAMPLE SITE: ABNORMAL
SEG NEUTROPHILS: 83 % (ref 36–65)
SEGMENTED NEUTROPHILS ABSOLUTE COUNT: 13.91 K/UL (ref 1.5–8.1)
SODIUM BLD-SCNC: 132 MMOL/L (ref 135–144)
SPECIFIC GRAVITY UA: 1.02 (ref 1–1.03)
TOTAL CO2, VENOUS: 25 MMOL/L (ref 23–30)
TOTAL PROTEIN: 7.4 G/DL (ref 6.4–8.3)
TRICHOMONAS: ABNORMAL
TROPONIN INTERP: ABNORMAL
TROPONIN INTERP: ABNORMAL
TROPONIN T: 1.67 NG/ML
TROPONIN T: 1.9 NG/ML
TURBIDITY: ABNORMAL
URINE HGB: ABNORMAL
UROBILINOGEN, URINE: NORMAL
WBC # BLD: 16.8 K/UL (ref 3.5–11.3)
WBC # BLD: ABNORMAL 10*3/UL
WBC UA: ABNORMAL /HPF (ref 0–5)
YEAST: ABNORMAL

## 2017-12-08 PROCEDURE — 85610 PROTHROMBIN TIME: CPT

## 2017-12-08 PROCEDURE — 82553 CREATINE MB FRACTION: CPT

## 2017-12-08 PROCEDURE — 93005 ELECTROCARDIOGRAM TRACING: CPT

## 2017-12-08 PROCEDURE — 6370000000 HC RX 637 (ALT 250 FOR IP): Performed by: STUDENT IN AN ORGANIZED HEALTH CARE EDUCATION/TRAINING PROGRAM

## 2017-12-08 PROCEDURE — 87147 CULTURE TYPE IMMUNOLOGIC: CPT

## 2017-12-08 PROCEDURE — 82330 ASSAY OF CALCIUM: CPT

## 2017-12-08 PROCEDURE — 2580000003 HC RX 258: Performed by: EMERGENCY MEDICINE

## 2017-12-08 PROCEDURE — 83880 ASSAY OF NATRIURETIC PEPTIDE: CPT

## 2017-12-08 PROCEDURE — 81001 URINALYSIS AUTO W/SCOPE: CPT

## 2017-12-08 PROCEDURE — 6360000002 HC RX W HCPCS: Performed by: INTERNAL MEDICINE

## 2017-12-08 PROCEDURE — 85014 HEMATOCRIT: CPT

## 2017-12-08 PROCEDURE — 86403 PARTICLE AGGLUT ANTBDY SCRN: CPT

## 2017-12-08 PROCEDURE — 76937 US GUIDE VASCULAR ACCESS: CPT

## 2017-12-08 PROCEDURE — 87086 URINE CULTURE/COLONY COUNT: CPT

## 2017-12-08 PROCEDURE — 96374 THER/PROPH/DIAG INJ IV PUSH: CPT

## 2017-12-08 PROCEDURE — 87149 DNA/RNA DIRECT PROBE: CPT

## 2017-12-08 PROCEDURE — 83874 ASSAY OF MYOGLOBIN: CPT

## 2017-12-08 PROCEDURE — 02HV33Z INSERTION OF INFUSION DEVICE INTO SUPERIOR VENA CAVA, PERCUTANEOUS APPROACH: ICD-10-PCS | Performed by: EMERGENCY MEDICINE

## 2017-12-08 PROCEDURE — 85025 COMPLETE CBC W/AUTO DIFF WBC: CPT

## 2017-12-08 PROCEDURE — 71260 CT THORAX DX C+: CPT

## 2017-12-08 PROCEDURE — 6360000002 HC RX W HCPCS: Performed by: EMERGENCY MEDICINE

## 2017-12-08 PROCEDURE — 6360000004 HC RX CONTRAST MEDICATION: Performed by: EMERGENCY MEDICINE

## 2017-12-08 PROCEDURE — 84484 ASSAY OF TROPONIN QUANT: CPT

## 2017-12-08 PROCEDURE — 36415 COLL VENOUS BLD VENIPUNCTURE: CPT

## 2017-12-08 PROCEDURE — 87040 BLOOD CULTURE FOR BACTERIA: CPT

## 2017-12-08 PROCEDURE — 87186 SC STD MICRODIL/AGAR DIL: CPT

## 2017-12-08 PROCEDURE — 82947 ASSAY GLUCOSE BLOOD QUANT: CPT

## 2017-12-08 PROCEDURE — 87205 SMEAR GRAM STAIN: CPT

## 2017-12-08 PROCEDURE — 83690 ASSAY OF LIPASE: CPT

## 2017-12-08 PROCEDURE — 99285 EMERGENCY DEPT VISIT HI MDM: CPT

## 2017-12-08 PROCEDURE — 80053 COMPREHEN METABOLIC PANEL: CPT

## 2017-12-08 PROCEDURE — 85730 THROMBOPLASTIN TIME PARTIAL: CPT

## 2017-12-08 PROCEDURE — 2000000000 HC ICU R&B

## 2017-12-08 PROCEDURE — 36556 INSERT NON-TUNNEL CV CATH: CPT

## 2017-12-08 PROCEDURE — 71020 XR CHEST STANDARD TWO VW: CPT

## 2017-12-08 PROCEDURE — 83605 ASSAY OF LACTIC ACID: CPT

## 2017-12-08 PROCEDURE — 84132 ASSAY OF SERUM POTASSIUM: CPT

## 2017-12-08 PROCEDURE — 82565 ASSAY OF CREATININE: CPT

## 2017-12-08 PROCEDURE — 85379 FIBRIN DEGRADATION QUANT: CPT

## 2017-12-08 PROCEDURE — 2500000003 HC RX 250 WO HCPCS: Performed by: EMERGENCY MEDICINE

## 2017-12-08 PROCEDURE — 87088 URINE BACTERIA CULTURE: CPT

## 2017-12-08 PROCEDURE — 82435 ASSAY OF BLOOD CHLORIDE: CPT

## 2017-12-08 PROCEDURE — 84295 ASSAY OF SERUM SODIUM: CPT

## 2017-12-08 PROCEDURE — 82803 BLOOD GASES ANY COMBINATION: CPT

## 2017-12-08 PROCEDURE — 87077 CULTURE AEROBIC IDENTIFY: CPT

## 2017-12-08 RX ORDER — ACETAMINOPHEN 325 MG/1
650 TABLET ORAL EVERY 4 HOURS PRN
Status: DISCONTINUED | OUTPATIENT
Start: 2017-12-08 | End: 2017-12-17 | Stop reason: HOSPADM

## 2017-12-08 RX ORDER — INSULIN GLARGINE 100 [IU]/ML
30 INJECTION, SOLUTION SUBCUTANEOUS NIGHTLY
Status: DISCONTINUED | OUTPATIENT
Start: 2017-12-08 | End: 2017-12-17 | Stop reason: HOSPADM

## 2017-12-08 RX ORDER — POTASSIUM CHLORIDE 7.45 MG/ML
10 INJECTION INTRAVENOUS PRN
Status: DISCONTINUED | OUTPATIENT
Start: 2017-12-08 | End: 2017-12-17 | Stop reason: HOSPADM

## 2017-12-08 RX ORDER — HEPARIN SODIUM 1000 [USP'U]/ML
60 INJECTION, SOLUTION INTRAVENOUS; SUBCUTANEOUS ONCE
Status: COMPLETED | OUTPATIENT
Start: 2017-12-08 | End: 2017-12-08

## 2017-12-08 RX ORDER — HEPARIN SODIUM 1000 [USP'U]/ML
30 INJECTION, SOLUTION INTRAVENOUS; SUBCUTANEOUS PRN
Status: DISCONTINUED | OUTPATIENT
Start: 2017-12-08 | End: 2017-12-09

## 2017-12-08 RX ORDER — HEPARIN SODIUM 1000 [USP'U]/ML
60 INJECTION, SOLUTION INTRAVENOUS; SUBCUTANEOUS PRN
Status: DISCONTINUED | OUTPATIENT
Start: 2017-12-08 | End: 2017-12-09

## 2017-12-08 RX ORDER — 0.9 % SODIUM CHLORIDE 0.9 %
30 INTRAVENOUS SOLUTION INTRAVENOUS ONCE
Status: COMPLETED | OUTPATIENT
Start: 2017-12-08 | End: 2017-12-08

## 2017-12-08 RX ORDER — FAMOTIDINE 20 MG/1
20 TABLET, FILM COATED ORAL DAILY
Status: DISCONTINUED | OUTPATIENT
Start: 2017-12-08 | End: 2017-12-17 | Stop reason: HOSPADM

## 2017-12-08 RX ORDER — ACETAMINOPHEN 325 MG/1
650 TABLET ORAL EVERY 6 HOURS PRN
Status: DISCONTINUED | OUTPATIENT
Start: 2017-12-08 | End: 2017-12-17 | Stop reason: HOSPADM

## 2017-12-08 RX ORDER — FUROSEMIDE 10 MG/ML
40 INJECTION INTRAMUSCULAR; INTRAVENOUS ONCE
Status: COMPLETED | OUTPATIENT
Start: 2017-12-08 | End: 2017-12-08

## 2017-12-08 RX ORDER — CLOPIDOGREL BISULFATE 75 MG/1
75 TABLET ORAL DAILY
Status: DISCONTINUED | OUTPATIENT
Start: 2017-12-08 | End: 2017-12-17 | Stop reason: HOSPADM

## 2017-12-08 RX ORDER — SODIUM CHLORIDE 9 MG/ML
INJECTION, SOLUTION INTRAVENOUS CONTINUOUS
Status: DISCONTINUED | OUTPATIENT
Start: 2017-12-08 | End: 2017-12-08

## 2017-12-08 RX ORDER — OXCARBAZEPINE 300 MG/1
300 TABLET, FILM COATED ORAL 2 TIMES DAILY
Status: DISCONTINUED | OUTPATIENT
Start: 2017-12-08 | End: 2017-12-17 | Stop reason: HOSPADM

## 2017-12-08 RX ORDER — MAGNESIUM SULFATE 1 G/100ML
1 INJECTION INTRAVENOUS PRN
Status: DISCONTINUED | OUTPATIENT
Start: 2017-12-08 | End: 2017-12-17 | Stop reason: HOSPADM

## 2017-12-08 RX ORDER — MECLIZINE HCL 12.5 MG/1
12.5 TABLET ORAL 3 TIMES DAILY PRN
Status: DISCONTINUED | OUTPATIENT
Start: 2017-12-08 | End: 2017-12-17 | Stop reason: HOSPADM

## 2017-12-08 RX ORDER — SODIUM CHLORIDE 0.9 % (FLUSH) 0.9 %
10 SYRINGE (ML) INJECTION EVERY 12 HOURS SCHEDULED
Status: DISCONTINUED | OUTPATIENT
Start: 2017-12-08 | End: 2017-12-17 | Stop reason: HOSPADM

## 2017-12-08 RX ORDER — POTASSIUM CHLORIDE 20 MEQ/1
40 TABLET, EXTENDED RELEASE ORAL PRN
Status: DISCONTINUED | OUTPATIENT
Start: 2017-12-08 | End: 2017-12-17 | Stop reason: HOSPADM

## 2017-12-08 RX ORDER — ASPIRIN 81 MG/1
81 TABLET, CHEWABLE ORAL DAILY
Status: DISCONTINUED | OUTPATIENT
Start: 2017-12-08 | End: 2017-12-17 | Stop reason: HOSPADM

## 2017-12-08 RX ORDER — POTASSIUM CHLORIDE 20MEQ/15ML
40 LIQUID (ML) ORAL PRN
Status: DISCONTINUED | OUTPATIENT
Start: 2017-12-08 | End: 2017-12-17 | Stop reason: HOSPADM

## 2017-12-08 RX ORDER — LIDOCAINE HYDROCHLORIDE 10 MG/ML
5 INJECTION, SOLUTION INFILTRATION; PERINEURAL ONCE
Status: DISCONTINUED | OUTPATIENT
Start: 2017-12-08 | End: 2017-12-17 | Stop reason: HOSPADM

## 2017-12-08 RX ORDER — ONDANSETRON 2 MG/ML
4 INJECTION INTRAMUSCULAR; INTRAVENOUS EVERY 6 HOURS PRN
Status: DISCONTINUED | OUTPATIENT
Start: 2017-12-08 | End: 2017-12-17 | Stop reason: HOSPADM

## 2017-12-08 RX ORDER — HEPARIN SODIUM 10000 [USP'U]/100ML
12 INJECTION, SOLUTION INTRAVENOUS CONTINUOUS
Status: DISCONTINUED | OUTPATIENT
Start: 2017-12-08 | End: 2017-12-09

## 2017-12-08 RX ORDER — SODIUM CHLORIDE 0.9 % (FLUSH) 0.9 %
10 SYRINGE (ML) INJECTION PRN
Status: DISCONTINUED | OUTPATIENT
Start: 2017-12-08 | End: 2017-12-17 | Stop reason: HOSPADM

## 2017-12-08 RX ORDER — ATORVASTATIN CALCIUM 80 MG/1
80 TABLET, FILM COATED ORAL NIGHTLY
Status: DISCONTINUED | OUTPATIENT
Start: 2017-12-08 | End: 2017-12-17 | Stop reason: HOSPADM

## 2017-12-08 RX ADMIN — INSULIN GLARGINE 30 UNITS: 100 INJECTION, SOLUTION SUBCUTANEOUS at 20:59

## 2017-12-08 RX ADMIN — HEPARIN SODIUM 2900 UNITS: 1000 INJECTION, SOLUTION INTRAVENOUS; SUBCUTANEOUS at 14:50

## 2017-12-08 RX ADMIN — ATORVASTATIN CALCIUM 80 MG: 80 TABLET, FILM COATED ORAL at 20:53

## 2017-12-08 RX ADMIN — CEFTRIAXONE SODIUM 1 G: 2 INJECTION, POWDER, FOR SOLUTION INTRAMUSCULAR; INTRAVENOUS at 14:46

## 2017-12-08 RX ADMIN — CLOPIDOGREL 75 MG: 75 TABLET, FILM COATED ORAL at 20:47

## 2017-12-08 RX ADMIN — ACETAMINOPHEN 650 MG: 325 TABLET ORAL at 20:48

## 2017-12-08 RX ADMIN — SODIUM CHLORIDE 2340 ML: 9 INJECTION, SOLUTION INTRAVENOUS at 13:38

## 2017-12-08 RX ADMIN — HEPARIN SODIUM AND DEXTROSE 12 UNITS/KG/HR: 10000; 5 INJECTION INTRAVENOUS at 14:51

## 2017-12-08 RX ADMIN — FUROSEMIDE 40 MG: 10 INJECTION, SOLUTION INTRAMUSCULAR; INTRAVENOUS at 20:48

## 2017-12-08 RX ADMIN — OXCARBAZEPINE 300 MG: 300 TABLET, FILM COATED ORAL at 20:47

## 2017-12-08 RX ADMIN — INSULIN LISPRO 6 UNITS: 100 INJECTION, SOLUTION INTRAVENOUS; SUBCUTANEOUS at 20:59

## 2017-12-08 RX ADMIN — HEPARIN SODIUM 2900 UNITS: 1000 INJECTION, SOLUTION INTRAVENOUS; SUBCUTANEOUS at 20:59

## 2017-12-08 RX ADMIN — IOPAMIDOL 75 ML: 755 INJECTION, SOLUTION INTRAVENOUS at 17:50

## 2017-12-08 RX ADMIN — MECLIZINE HCL 12.5 MG: 12.5 TABLET ORAL at 22:55

## 2017-12-08 RX ADMIN — FAMOTIDINE 20 MG: 20 TABLET, FILM COATED ORAL at 20:47

## 2017-12-08 ASSESSMENT — ENCOUNTER SYMPTOMS
DOUBLE VISION: 0
EYE PAIN: 0
BLURRED VISION: 1
PHOTOPHOBIA: 0
HEMOPTYSIS: 0
CONSTIPATION: 0
EYE DISCHARGE: 0
VOMITING: 0
NAUSEA: 0
ORTHOPNEA: 0
BACK PAIN: 0
SPUTUM PRODUCTION: 0
COUGH: 0
DIARRHEA: 0
SHORTNESS OF BREATH: 0
HEARTBURN: 0
ABDOMINAL PAIN: 1

## 2017-12-08 ASSESSMENT — PAIN SCALES - GENERAL: PAINLEVEL_OUTOF10: 0

## 2017-12-08 NOTE — ED PROVIDER NOTES
[facility]  ED  Emergency Department Encounter  Emergency Medicine Resident     Pt Name: Azra Michaud  MRN: [id number]  [place] 1955  Date of evaluation: 12/8/17  PCP:  Debby Elliott MD    [address] [place] [address]       Chief Complaint   Patient presents with    Fatigue     Pt to ED per EMS with c/o generalized weakness, pt called 911 3 times other 2 times for falls, pt does not like questions being asked, will not state why she fell, poor historian, aggitated easily, denies pain, ambulates with a cane       HISTORY OF PRESENT ILLNESS  (Location/Symptom, Timing/Onset, Context/Setting, Quality, Duration, Modifying Factors, Severity.)      Azra Michaud is a 65 y.o. female who presents with Generalized fatigue. The patient reports that she has had chronic dizziness and vertigo for months and she came to the hospital approximately one week ago. She was subsequently admitted to the cardiac ICU for an NSTEMI and pneumonia. She states that she was discharged 3 days ago with a prescription for an antibiotic to treat her pneumonia. The patient states that she lives at home alone and is ambulatory with a cane. This morning around 7 AM she states that she had another \"dizzy spell\" at home and fell. She denies striking her head or lost consciousness. The patient states that her dizziness is unchanged from baseline despite taking Antivert. The patient was unable to get up and called EMS. The patient arrives complaining of generalized fatigue and abdominal pain. She does not list any provoking or palliating factors for her symptoms. She does take Plavix and aspirin but denies taking any anticoagulants. The patient's cardiologist is Dr. Meredith Peters and she does not think she had any changes to her medications after her last admission. Patient has history of a CABG and AICD placement. She had a cardiac catheterization on 11/27/17 which required 2 additional stents.   The patient states that she is Systems   Constitutional: Positive for fatigue. Negative for chills, diaphoresis and fever. HENT: Negative for congestion. Eyes: Negative for visual disturbance. Respiratory: Negative for cough and shortness of breath. Cardiovascular: Negative for chest pain. Gastrointestinal: Positive for abdominal pain. Negative for constipation, diarrhea, nausea and vomiting. Genitourinary: Negative for decreased urine volume, difficulty urinating, dysuria, flank pain, frequency, hematuria, pelvic pain, urgency, vaginal bleeding, vaginal discharge and vaginal pain. Urinary incontinence at baseline   Musculoskeletal: Negative for back pain, neck pain and neck stiffness. Skin: Negative for wound. Neurological: Positive for dizziness and weakness (generalized). Negative for syncope, light-headedness, numbness and headaches. All other systems reviewed and are negative. PHYSICAL EXAM   (up to 7 for level 4, 8 or more for level 5)      INITIAL VITALS:   /77   Pulse 105   Temp 99.3 °F (37.4 °C) (Oral)   Resp 18   Ht 5' 1\" (1.549 m)   Wt 172 lb (78 kg)   LMP  (LMP Unknown)   SpO2 100%   BMI 32.50 kg/m²     Physical Exam   Constitutional: She is oriented to person, place, and time. She appears well-developed and well-nourished. No distress. Smells of urine   HENT:   Head: Normocephalic and atraumatic. Right Ear: External ear normal.   Left Ear: External ear normal.   Nose: Nose normal.   Mouth/Throat: Oropharynx is clear and moist. No oropharyngeal exudate. Eyes: Conjunctivae and EOM are normal. Pupils are equal, round, and reactive to light. Right eye exhibits no discharge. Left eye exhibits no discharge. No scleral icterus. Neck: Normal range of motion. Neck supple. Cardiovascular: Regular rhythm, normal heart sounds and intact distal pulses. Tachycardiac. Capillary refill less than 2 seconds. Radial pulses 2+/4.    Pulmonary/Chest: Effort normal and breath sounds normal. No stridor. No respiratory distress. She has no wheezes. She has no rales. She exhibits no tenderness. Abdominal: Soft. She exhibits no distension and no mass. There is tenderness (Epigastric tenderness to palpation). There is no rebound and no guarding. No pulsatile mass. No pain over McBurney's point. Negative Gil sign. Musculoskeletal: Normal range of motion. She exhibits edema (2+ pitting peripheral edema) and deformity (left upper extremity contracture). No midline spinal tenderness to palpation, step off or deformity. No CVA tenderness. Compartments soft. Neurological: She is alert and oriented to person, place, and time. No focal sensory deficit. Left sided weakness and left upper extremity contracture. (residual from previous stroke). Moves all extremities. Normal speech. Skin: Skin is warm and dry. She is not diaphoretic.        DIFFERENTIAL  DIAGNOSIS     PLAN (LABS / IMAGING / EKG):  Orders Placed This Encounter   Procedures    Culture Blood #1    Culture Blood #1    Urine Culture    XR CHEST STANDARD (2 VW)    CT CHEST PULMONARY EMBOLISM W CONTRAST    CBC Auto Differential    Comprehensive Metabolic Panel    Protime-INR    APTT    Urinalysis with Microscopic    LIPASE    Hemoglobin and hematocrit, blood    SODIUM (POC)    POTASSIUM (POC)    CHLORIDE (POC)    CALCIUM, IONIC (POC)    Troponin    APTT    D-Dimer, Quantitative    CK MB    Myoglobin, Serum    Continuous Pulse Oximetry    Notify physician (specify)    Tip Confirmation System (TCS) and/ or Chest Xray for tip confirmation    Catheter Care    Straight cath    Inpatient consult to Cardiology    Inpatient consult to Internal Medicine    POC Blood Gas and Chemistry    POCT troponin    Venous Blood Gas, POC    POCT troponin    Creatinine W/GFR Point of Care    Lactic Acid, POC    POCT Glucose    Anion Gap (Calc) POC    EKG 12 Lead    EKG 12 Lead    Insert PICC line    PATIENT STATUS (FROM ED OR POC Sodium 134 (L) 138 - 146 mmol/L   POTASSIUM (POC)   Result Value Ref Range    POC Potassium 4.3 3.5 - 4.5 mmol/L   CHLORIDE (POC)   Result Value Ref Range    POC Chloride 103 98 - 107 mmol/L   CALCIUM, IONIC (POC)   Result Value Ref Range    POC Ionized Calcium 1.12 (L) 1.15 - 1.33 mmol/L   Troponin   Result Value Ref Range    Troponin T 1.90 (HH) <0.03 ng/mL    Troponin Interp         D-Dimer, Quantitative   Result Value Ref Range    D-Dimer, Quant 3.37 mg/L FEU   CK MB   Result Value Ref Range    CK-MB 2.1 <5.4 ng/mL   Myoglobin, Serum   Result Value Ref Range    Myoglobin 59 (H) 25 - 58 ng/mL   Venous Blood Gas, POC   Result Value Ref Range    pH, Frederic 7.382 7.320 - 7.430    pCO2, Frederic 40.3 (L) 41.0 - 51.0 mm Hg    pO2, Frederic 29.4 (L) 30.0 - 50.0 mm Hg    HCO3, Venous 23.9 22.0 - 29.0 mmol/L    Total CO2, Venous 25 23.0 - 30.0 mmol/L    Negative Base Excess, Frederic 1 0.0 - 2.0    Positive Base Excess, Frederic NOT REPORTED 0.0 - 3.0    O2 Sat, Frederic 55 (L) 60.0 - 85.0 %    O2 Device/Flow/% NOT REPORTED     Padilla Test NOT REPORTED     Sample Site NOT REPORTED     Mode NOT REPORTED     FIO2 NOT REPORTED     Pt Temp NOT REPORTED     POC pH Temp NOT REPORTED     POC pCO2 Temp NOT REPORTED mm Hg    POC pO2 Temp NOT REPORTED mm Hg   POCT troponin   Result Value Ref Range    POC Troponin I 3.27 (HH) 0.00 - 0.10 ng/mL    POC Troponin Interp       The Troponin-I (POC) results cannot be compared to the Troponin-T results.    Creatinine W/GFR Point of Care   Result Value Ref Range    POC Creatinine 1.05 0.51 - 1.19 mg/dL    GFR Comment >60 >60 mL/min    GFR Non- 53 (L) >60 mL/min    GFR Comment         Lactic Acid, POC   Result Value Ref Range    POC Lactic Acid 1.31 0.56 - 1.39 mmol/L   POCT Glucose   Result Value Ref Range    POC Glucose 227 (H) 74 - 100 mg/dL   Anion Gap (Calc) POC   Result Value Ref Range    Anion Gap 7 7 - 16 mmol/L   EKG 12 Lead   Result Value Ref Range    Ventricular Rate 112 BPM    Atrial unchanged. No focal airspace consolidation, pneumothorax, or pleural effusion. No free air beneath the diaphragm. No evidence of acute osseous abnormality. No acute intrathoracic process. EKG  EKG Interpretation #1    Interpreted by emergency department physician    Rhythm: ventricular tachycardia  Rate: tachycardia and 105  Axis: left  Ectopy: none  Conduction: normal  ST Segments: nonspecific changes I, aVL, III  T Waves: flattening in  v5 and v6 and inversion in  aVl  Q Waves: II and aVf    Clinical Impression: non-specific EKG, sinus tachycardia and unchanged from EKG on 12/3/17    [facility]     EKG Interpretation #2    Interpreted by emergency department physician    Rhythm: sinus tachycardia  Rate: tachycardia and 112  Axis: left  Ectopy: none  Conduction: normal  ST Segments: nonspecific changes  T Waves: flattening in  v5 and v6 and inversion in  aVl  Q Waves: III and aVf    Clinical Impression: no acute ST changes, unchanged from previous EKG    [facility]      All EKG's are interpreted by the Emergency Department Physician who either signs or Co-signs this chart in the absence of a cardiologist.    EMERGENCY DEPARTMENT COURSE:  1.    Date:12/8/2017   Time: 12:00 PM   Sepsis has been identified at this time. 2.  Septic shock (Hypotention or Lactate =>4.0)   Date: 12/8/2017   Time:12:00 PM   Sepsis focus exam completed. Sepsis Times and Checklist  Vital Signs: BP: 125/77  Pulse: 105  Resp: 18  Temp: 99.3 °F (37.4 °C) SpO2: 100 %  SIRS (>2)   Temp > 38.3C or < 36C   HR > 90   RR > 20   WBC > 12 or < 4 or >10% bands  SIRS (>2) and confirmed or suspected source of infection = Sepsis    Sepsis Identified   Date: 12/8/17  Time: 12:00PM   Sepsis Orders:   CBC: Yes   CMP: Yes   PT/PTT: Yes   Blood Cultures x2: Yes   Urinalysis and Urine Culture: Yes   Lactate: Yes   Broad Spectrum Antibiotics Given (within 3 hours of sepsis identification, after blood cultures):   Yes infarction) ([facility] [address]    2. Sepsis, due to unspecified organism ([facility] [address]    3.  Urinary tract infection without hematuria, site unspecified          DISPOSITION / PLAN     DISPOSITION Admitted      Shraddha Calero DO  Emergency Medicine Resident    (Please note that portions of this note were completed with a voice recognition program.  Efforts were made to edit the dictations but occasionally words are mis-transcribed.)       Shraddha Calero DO  Resident  12/08/17 [address],   Resident  12/08/17 [id number]

## 2017-12-08 NOTE — PROGRESS NOTES
Dr. Tammi Miller notified of events in at CT machine, possible infiltration of PIV during contrast administration. No new orders. Call back with any issues for the patient. Cardiology to follow patient.

## 2017-12-08 NOTE — H&P
[address] [place] [address]     Department of Internal Medicine - Staff Internal Medicine Service          ADMISSION NOTE/HISTORY AND PHYSICAL EXAMINATION       CHIEF COMPLAINT:  LOC    HISTORY OBTAIN FROM:  Patient     HISTORY OF PRESENT ILLNESS:      The patient is a pleasant 65 y.o. female with significant past medical history of CAD presented with dizziness and loss of consciousness. Patient was recently admitted for similar symptoms when she had fallen down and had lost consciousness. She was treated for an STEMI. Heparin drip at that time and had gotten a cardiac cath which had shown multi-vessel coronary artery disease with recommendations of being treated by medical management. She also has bilateral vertebral arteries stenosis and Endovascular surgery is following her for that. Currently they don't have any plans for any surgical therapy for the same. Today she was just sitting when she started to feel dizzy, some doubtful spinning sensation and then she lost consciousness. She contacted EMS who brought her here. In the ER she was also found to be hypotensive and she decided fluid boluses to which she responded. Her troponins was found to be elevated. She is on heparin drip as recommended by cardiology. However she does not have any significant EKG signs of ischemia and does not have any chest pain at this time.     PAST MEDICAL HISTORY:        Diagnosis Date    Arthritis     Asthma     CAD (coronary artery disease)     CHF (congestive heart failure) ([facility])     Clinical trial participant at discharge 03/13/2017    [employer]     COPD (chronic obstructive pulmonary disease) ([address]     Diabetes mellitus ([address]     Hepatitis C     Hyperlipidemia     Hypertension     Pneumonia     Unspecified cerebral artery occlusion with cerebral infarction        PAST SURGICAL HISTORY:        Procedure Laterality Date    CARDIAC CATHETERIZATION  11/27/2017    CARDIAC PACEMAKER PLACEMENT normal  LUNGS:  No increased work of breathing, good air exchange, clear to auscultation bilaterally, no crackles or wheezing  CARDIOVASCULAR:  Normal apical impulse, regular rate and rhythm, normal S1 and S2, no S3 or S4, and no murmur noted  ABDOMEN:  No scars, normal bowel sounds, soft, non-distended, non-tender, no masses palpated, no hepatosplenomegally  NEUROLOGIC:  Awake, alert, oriented to name, place and time. Cranial nerves II-XII are grossly intact. Motor is 5 out of 5 bilaterally. Cerebellar finger to nose, heel to shin intact. Sensory is intact. Babinski down going, Romberg negative, and gait is normal.      Lab and Imaging Review   Recent Labs      12/08/17   1331  12/08/17   1332   WBC  16.8*   --    HGB  8.4*   --    MCV  83.1   --    PLT  317   --    INR  1.0   --    NA  132*   --    K  4.2   --    CL  95*   --    CO2  22   --    BUN  24*   --    CREATININE  1.09*  1.05   GLUCOSE  222*   --    CALCIUM  8.2*   --    PROT  7.4   --    LABALBU  3.3*   --    AST  22   --    ALT  14   --    ALKPHOS  87   --    BILITOT  0.51   --    LIPASE  18   --      Recent Labs      12/08/17   1331   INR  1.0   PROTIME  10.6         DATA:  Xr Chest Standard (2 Vw)    Result Date: 12/8/2017  EXAMINATION: TWO VIEWS OF THE CHEST 12/8/2017 12:14 pm COMPARISON: Chest x-ray dated 12/03/2017 HISTORY: ORDERING SYSTEM PROVIDED HISTORY: currently being treated for pneumonia TECHNOLOGIST PROVIDED HISTORY: Reason for exam:->currently being treated for pneumonia FINDINGS: Cardiomediastinal silhouette is stable. Left chest defibrillator device is unchanged. No focal airspace consolidation, pneumothorax, or pleural effusion. No free air beneath the diaphragm. No evidence of acute osseous abnormality. No acute intrathoracic process. Xr Chest Standard (2 Vw)    Result Date: 12/3/2017  EXAMINATION: TWO VIEWS OF THE CHEST 12/3/2017 12:24 pm COMPARISON: Chest November 29, 2017.  HISTORY: ORDERING SYSTEM PROVIDED HISTORY: SOB TECHNOLOGIST PROVIDED HISTORY: Reason for exam:->SOB Acuity: Unknown Type of Exam: Unknown FINDINGS: Defibrillator device is in place. Right IJ line has now been removed. Sternotomy wires are present. Heart appears normal in size. No focal lung consolidation, pneumothorax, pleural effusion or free air. Osseous structures are grossly intact. No acute cardiopulmonary process. Xr Chest Standard (2 Vw)    Result Date: 11/14/2017  EXAMINATION: TWO VIEWS OF THE CHEST 11/14/2017 11:12 am COMPARISON: September 19, 2017 HISTORY: Right anterior chest pain FINDINGS: Postsurgical changes of CABG are re- demonstrated. Stable left-sided ICD which partially obscures the left mid lung field. Stable borderline cardiomegaly. No consolidation, sizable or pneumothorax. Osseous structures grossly intact without acute process. No acute process. Ct Head Wo Contrast    Result Date: 12/3/2017  EXAMINATION: CT OF THE HEAD WITHOUT CONTRAST  12/3/2017 12:17 pm TECHNIQUE: CT of the head was performed without the administration of intravenous contrast. Dose modulation, iterative reconstruction, and/or weight based adjustment of the mA/kV was utilized to reduce the radiation dose to as low as reasonably achievable. COMPARISON: None. HISTORY: ORDERING SYSTEM PROVIDED HISTORY: vertigo, vomiting, on plavix TECHNOLOGIST PROVIDED HISTORY: Has a \"code stroke\" or \"stroke alert\" been called? ->No FINDINGS: BRAIN/VENTRICLES: There is no acute intracranial hemorrhage, mass effect or midline shift. No abnormal extra-axial fluid collection. The gray-white differentiation is maintained without evidence of an acute infarct. There is no evidence of hydrocephalus. ORBITS: The visualized portion of the orbits demonstrate no acute abnormality. SINUSES: The visualized paranasal sinuses and mastoid air cells demonstrate no acute abnormality. SOFT TISSUES/SKULL:  No acute abnormality of the visualized skull or soft tissues.      No acute intracranial abnormality. Scattered subcortical and periventricular white matter hypodensities are most compatible with chronic small vessel disease. Ct Head Wo Contrast    Result Date: 11/29/2017  EXAMINATION: CT OF THE HEAD WITHOUT CONTRAST  11/29/2017 4:43 pm TECHNIQUE: CT of the head was performed without the administration of intravenous contrast. Dose modulation, iterative reconstruction, and/or weight based adjustment of the mA/kV was utilized to reduce the radiation dose to as low as reasonably achievable. COMPARISON: August 16, 2017 HISTORY: ORDERING SYSTEM PROVIDED HISTORY: vertigo, headache FINDINGS: BRAIN/VENTRICLES: There is no acute intracranial hemorrhage, mass effect or midline shift. No abnormal extra-axial fluid collection. The gray-white differentiation is maintained without evidence of an acute infarct. No evidence of hydrocephalus. There are nonspecific areas of hypoattenuation within the periventricular and subcortical white matter, which likely represent chronic microvascular ischemic change. Bilateral basal ganglia calcifications. ORBITS: The visualized portion of the orbits demonstrate no acute abnormality. SINUSES: The visualized paranasal sinuses and mastoid air cells demonstrate no acute abnormality. Minimal polypoid mucosal thickening is again noted and inferior left maxillary sinus. SOFT TISSUES/SKULL: No acute abnormality of the visualized skull or soft tissues. Stable noncontrast examination of the brain without CT evidence of acute intracranial abnormality and chronic findings as described. Xr Chest Portable    Result Date: 11/29/2017  EXAMINATION: SINGLE VIEW OF THE CHEST 11/29/2017 3:43 pm COMPARISON: 11/14/2017 HISTORY: ORDERING SYSTEM PROVIDED HISTORY: RIJ placement TECHNOLOGIST PROVIDED HISTORY: Reason for exam:->RIJ placement FINDINGS: Interval placement right jugular central venous catheter with its tip projecting in the expected location of the right atrium. Bilateral    Result Date: 12/5/2017    [facility]  Vascular Lower Extremities DVT Study Procedure   Patient Name   Bobetta Galeazzi Date of Study           12/05/2017                 D   Date of Birth  1955  Gender                  Female   Age            65 year(s)  Race                    Black   Room Number    1027        Height:                 61 inch, 154.94 cm   Corporate ID # [id number]  Weight:                 174 pounds, 78.9 kg   Patient Acct # [de-identified]   BSA:        1.78 m^2    BMI:       32.88 kg/m^2   MR #           [id number]     [facility]   Accession #    [id number]   Interpreting Physician  Alessandro Ruiz   Referring                  Referring Physician     [address] [place] [address]  Nurse  Practitioner  Procedure Type of Study:   Veins: Lower Extremities DVT Study, Venous Scan Lower Bilateral.  Risk Factors History +---------+----+-----------------------------------------------------------+ ! Diagnosis! Date! Comments                                                   ! +---------+----+-----------------------------------------------------------+ ! Other    ! !H/O CVA; PM                                                ! +---------+----+-----------------------------------------------------------+   - The patient's risk factor(s) include: untreated diabetes mellitus and     treated arterial hypertension.   - The patient has a current/recent (within 1 year) tobacco history. - The patient's last creatinine was 0.8 mg/dl. Findings:   Right                                Left  The common femoral, femoral,         The common femoral, femoral,  popliteal, tibials, and saphenous    popliteal, tibials and saphenous  veins are compressible with normal   veins are compressible with normal  doppler responses. doppler responses. Limited visualization of the lower   Limited visualization of the lower  thigh and calf veins. thigh and calf veins. Allergies   - Allergy:Penicillin(Drug). - Allergy:*Unlisted(Drug). Comments:levofloxacin Patient Status: In Patient. Technical Quality:Limited visualization. Limitation reason:Pt. could not position legs appropriately; pain. Velocities are measured in cm/s ; Diameters are measured in mm Right Lower Extremities DVT Study Measurements Right 2D Measurements +------------------------------------+----------+---------------+----------+ ! Location                            ! Visualized! Compressibility! Thrombosis! +------------------------------------+----------+---------------+----------+ ! Common Femoral                      !Yes       ! Yes            ! None      ! +------------------------------------+----------+---------------+----------+ ! Prox Femoral                        !Yes       ! Yes            ! None      ! +------------------------------------+----------+---------------+----------+ ! Mid Femoral                         !Yes       ! Yes            ! None      ! +------------------------------------+----------+---------------+----------+ ! Dist Femoral                        !Partial   !Yes            !          ! +------------------------------------+----------+---------------+----------+ ! Popliteal                           !Yes       ! Yes            ! None      ! +------------------------------------+----------+---------------+----------+ ! Sapheno Femoral Junction            ! Yes       ! Yes            ! None      ! +------------------------------------+----------+---------------+----------+ ! PTV                                 ! Partial   !Yes            !          ! +------------------------------------+----------+---------------+----------+ ! Peroneal                            !No        !               !          ! +------------------------------------+----------+---------------+----------+ ! Gastroc                             ! Yes       ! Yes            ! None      ! +------------------------------------+----------+---------------+----------+ ! Mid Femoral                         !Yes       ! Yes            ! None      ! +------------------------------------+----------+---------------+----------+ ! Dist Femoral                        !No        !               !          ! +------------------------------------+----------+---------------+----------+ ! Popliteal                           !Yes       ! Yes            ! None      ! +------------------------------------+----------+---------------+----------+ ! Sapheno Femoral Junction            ! Yes       ! Yes            ! None      ! +------------------------------------+----------+---------------+----------+ ! PTV                                 ! Partial   !Yes            !          ! +------------------------------------+----------+---------------+----------+ ! Peroneal                            !No        !               !          ! +------------------------------------+----------+---------------+----------+ ! Gastroc                             ! Yes       ! Yes            ! None      ! +------------------------------------+----------+---------------+----------+ ! GSV Thigh                           ! Yes       ! Yes            ! None      ! +------------------------------------+----------+---------------+----------+ ! GSV Knee                            ! Yes       ! Yes            ! None      ! +------------------------------------+----------+---------------+----------+ ! GSV Ankle                           ! Yes       ! Yes            ! None      ! +------------------------------------+----------+---------------+----------+ ! SSV                                 ! Yes       ! Yes            ! None      ! +------------------------------------+----------+---------------+----------+ Left Doppler Measurements +-----------------------------+------+------+------------------------------+ ! Location                     ! Signal!Reflux! Reflux (sec) ! +-----------------------------+------+------+------------------------------+ ! Common Femoral               !Phasic! No    !                              ! +-----------------------------+------+------+------------------------------+ ! Prox Femoral                 !Phasic! No    !                              ! +-----------------------------+------+------+------------------------------+ ! Popliteal                    !Phasic! No    !                              ! +-----------------------------+------+------+------------------------------+  Conclusions   Summary   Technically limited visualization. No evidence of superficial or deep venous thrombosis in both lower  extremities. Signature   ----------------------------------------------------------------  Electronically signed by Radha MontanoSonographer) on  12/05/2017 08:39 AM  ----------------------------------------------------------------   ----------------------------------------------------------------  Electronically signed by Alessandro Ruiz(Interpreting physician)  on 12/05/2017 09:25 PM  ----------------------------------------------------------------      Vl Dup Lower Extremity Venous Bilateral    Result Date: 11/14/2017    [facility]  Vascular Lower Extremities DVT Study Procedure   Patient Name  Debbi العراقي Date of Study         11/14/2017                D   Date of Birth 1955  Gender                Female   Age           65 year(s)  Race                  Black   Room Number   2599   Corporate ID  [id number]  #   Patient Acct  [de-identified]  #   MR #          [id number]     Sonographer           Raquel Lara   Accession #   [id number]   Interpreting          [address] [place] [address]                            Physician   Referring                 Referring Physician   Adarsh Rizvi, *  Nurse  Practitioner  Procedure Type of Study:   Veins: Lower Extremities DVT Study, Venous Scan Lower Bilateral.  Indications for Study:R/O DVT.  Risk Factors !None      ! +------------------------------------+----------+---------------+----------+ ! Popliteal                           !Yes       ! Yes            ! None      ! +------------------------------------+----------+---------------+----------+ ! Sapheno Femoral Junction            ! Yes       ! Yes            ! None      ! +------------------------------------+----------+---------------+----------+ ! PTV                                 ! Yes       ! Yes            ! None      ! +------------------------------------+----------+---------------+----------+ ! Peroneal                            !Partial   !Yes            ! None      ! +------------------------------------+----------+---------------+----------+ ! Gastroc                             ! Yes       ! Yes            ! None      ! +------------------------------------+----------+---------------+----------+ ! GSV Thigh                           ! Yes       ! Yes            ! None      ! +------------------------------------+----------+---------------+----------+ ! GSV Knee                            ! No        !               !          ! +------------------------------------+----------+---------------+----------+ ! GSV Ankle                           ! Yes       ! Yes            ! None      ! +------------------------------------+----------+---------------+----------+ ! SSV                                 ! Yes       ! Yes            ! None      ! +------------------------------------+----------+---------------+----------+ Right Doppler Measurements +-----------------------------+------+------+------------------------------+ ! Location                     ! Signal!Reflux! Reflux (sec)                  ! +-----------------------------+------+------+------------------------------+ ! Common Femoral               !Phasic!      !                              ! +-----------------------------+------+------+------------------------------+ ! Prox Femoral                 !Phasic!      ! ! +-----------------------------+------+------+------------------------------+ ! Popliteal                    !Phasic!      !                              ! +-----------------------------+------+------+------------------------------+ Left Lower Extremities DVT Study Measurements Left 2D Measurements +------------------------------------+----------+---------------+----------+ ! Location                            ! Visualized! Compressibility! Thrombosis! +------------------------------------+----------+---------------+----------+ ! Common Femoral                      !Yes       ! Yes            ! None      ! +------------------------------------+----------+---------------+----------+ ! Prox Femoral                        !Yes       ! Yes            ! None      ! +------------------------------------+----------+---------------+----------+ ! Mid Femoral                         !Yes       ! Yes            ! None      ! +------------------------------------+----------+---------------+----------+ ! Dist Femoral                        !Yes       ! Yes            ! None      ! +------------------------------------+----------+---------------+----------+ ! Popliteal                           !Yes       ! Yes            ! None      ! +------------------------------------+----------+---------------+----------+ ! Sapheno Femoral Junction            ! Yes       ! Yes            ! None      ! +------------------------------------+----------+---------------+----------+ ! PTV                                 ! Yes       ! Yes            ! None      ! +------------------------------------+----------+---------------+----------+ ! Peroneal                            !Partial   !Yes            ! None      ! +------------------------------------+----------+---------------+----------+ ! Gastroc                             ! Yes       ! Yes            ! None      ! +------------------------------------+----------+---------------+----------+ ! GSV Thigh !Yes       !Yes            ! None      ! +------------------------------------+----------+---------------+----------+ ! GSV Knee                            ! No        !               !          ! +------------------------------------+----------+---------------+----------+ ! GSV Ankle                           ! Yes       ! Yes            ! None      ! +------------------------------------+----------+---------------+----------+ ! SSV                                 ! Yes       ! Yes            ! None      ! +------------------------------------+----------+---------------+----------+ Left Doppler Measurements +-----------------------------+------+------+------------------------------+ ! Location                     ! Signal!Reflux! Reflux (sec)                  ! +-----------------------------+------+------+------------------------------+ ! Common Femoral               !Phasic!      !                              ! +-----------------------------+------+------+------------------------------+ ! Prox Femoral                 !Phasic!      !                              ! +-----------------------------+------+------+------------------------------+ ! Popliteal                    !Phasic!      !                              ! +-----------------------------+------+------+------------------------------+  Conclusions   Summary   No evidence of superficial or deep venous thrombosis in both lower  extremities. Technically limited visualization.    Signature   ----------------------------------------------------------------  Electronically signed by Rachna Campos on  11/14/2017 04:01 PM  ----------------------------------------------------------------   ----------------------------------------------------------------  Electronically signed by Marlane Mani Reyes,Arthur(Interpreting  physician) on 11/14/2017 07:43 PM  ----------------------------------------------------------------      Cta Neck W Contrast    Addendum Date: stenosis of the carotid siphons. There are degenerative changes of the spine. CTA head: The anterior and middle cerebral arteries demonstrate normal arborization patterns. Bilateral posterior communicating arteries are present. The basilar artery and posterior cerebral arteries are normal in course and caliber to the extent visualized. No aneurysm or vascular malformation is identified. Severe stenosis at the origin of the right vertebral artery and severe stenoses of both distal vertebral arteries. Mild-to-moderate stenoses of the carotid siphons. Incidentally noted enhancing thyroid nodules. RECOMMENDATIONS: Managing Incidental Thyroid Nodule Detected at CT or MRI or US 1. Further evaluation by thyroid Ultrasound recommended for these incidental nodules: Patient Age 25 years or less - Any nodule. Patient Age 21-27 years old - Nodule 1 cm in size or greater Patient Age 28 years or more - Nodule 1.5 cm in size or greater 2. Follow up thyroid ultrasound also recommend in these scenarios -Solitary nodule with high risk imaging features (locally invasive nodule or suspicious lymph nodes) -Any nodule in a heterogeneous enlarged thyroid gland 3. NO further imaging is recommended in the following scenarios -No f/u imaging is recommended for ITNs not meeting the above criteria. -No US or f/u recommended for ITNs without high risk features in pts. with limited life expectancy or significant co-morbidities, unless clinically warranted. Note: These recommendations do not apply to pts. w/ increased risk for thyroid cancer or pts. with symptomatic thyroid disease. ________________________________________________________________ Recommendations for f/u of Incidental Thyroid Nodules (ITN) found on CT, MR, NM and Extrathyroidal US are based upon the ACR white paper and [facility] 3-tiered system for managing ITNs: J Am Katia Radiol.  2015 Feb;12(2): 143-50     Cta Head W Contrast    Addendum Date: 11/30/2017    ADDENDUM: 3D images were also submitted as part of the exam.     Result Date: 11/30/2017  EXAMINATION: CTA OF THE HEAD WITH CONTRAST; CTA OF THE NECK  11/29/2017 4:38 pm: TECHNIQUE: CTA of the head/brain was performed with the administration of intravenous contrast. Multiplanar reformatted images are provided for review. MIP images are provided for review. Dose modulation, iterative reconstruction, and/or weight based adjustment of the mA/kV was utilized to reduce the radiation dose to as low as reasonably achievable.; CTA of the neck was performed with the administration of intravenous contrast. Multiplanar reformatted images are provided for review. MIP images are provided for review. Stenosis of the internal carotid arteries measured using NASCET criteria. Dose modulation, iterative reconstruction, and/or weight based adjustment of the mA/kV was utilized to reduce the radiation dose to as low as reasonably achievable. COMPARISON: None. HISTORY: ORDERING SYSTEM PROVIDED HISTORY: vertigo, presyncope, headache; ORDERING SYSTEM PROVIDED HISTORY: vertigo, headache, syncope FINDINGS: CTA neck: The lung apices are clear. A 6 mm hyperenhancing nodule is present within the right thyroid lobe with additional smaller enhancing nodules present in the left thyroid lobe. There is 50% stenosis of the left subclavian artery due to calcified and noncalcified plaque. There is a severe stenosis at the origin of the right vertebral artery due to calcified plaque. The right vertebral artery is dominant. There is a severe focal stenosis of the distal V4 segment of the right vertebral artery due to calcified plaque with severe stenosis of the V4 segment of the left vertebral artery also seen due to calcified plaque. Both carotids are tortuous. Calcified and noncalcified plaque is present within both ICAs but no significant stenosis of the cervical ICAs is noted by NASCET criteria. There is mild to moderate stenosis of the carotid siphons.  There are degenerative changes of the spine. CTA head: The anterior and middle cerebral arteries demonstrate normal arborization patterns. Bilateral posterior communicating arteries are present. The basilar artery and posterior cerebral arteries are normal in course and caliber to the extent visualized. No aneurysm or vascular malformation is identified. Severe stenosis at the origin of the right vertebral artery and severe stenoses of both distal vertebral arteries. Mild-to-moderate stenoses of the carotid siphons. Incidentally noted enhancing thyroid nodules. RECOMMENDATIONS: Managing Incidental Thyroid Nodule Detected at CT or MRI or US 1. Further evaluation by thyroid Ultrasound recommended for these incidental nodules: Patient Age 25 years or less - Any nodule. Patient Age 21-27 years old - Nodule 1 cm in size or greater Patient Age 28 years or more - Nodule 1.5 cm in size or greater 2. Follow up thyroid ultrasound also recommend in these scenarios -Solitary nodule with high risk imaging features (locally invasive nodule or suspicious lymph nodes) -Any nodule in a heterogeneous enlarged thyroid gland 3. NO further imaging is recommended in the following scenarios -No f/u imaging is recommended for ITNs not meeting the above criteria. -No US or f/u recommended for ITNs without high risk features in pts. with limited life expectancy or significant co-morbidities, unless clinically warranted. Note: These recommendations do not apply to pts. w/ increased risk for thyroid cancer or pts. with symptomatic thyroid disease. ________________________________________________________________ Recommendations for f/u of Incidental Thyroid Nodules (ITN) found on CT, MR, NM and Extrathyroidal US are based upon the ACR white paper and [facility] 3-tiered system for managing ITNs: J Am Katia Radiol.  2015 Feb;12(2): 143-50     Nm Myocardial Spect Rest Exercise Or Rx    Result Date: 11/15/2017  EXAMINATION: MYOCARDIAL PERFUSION IMAGING 1% annual death or MI) 1. Normal or small myocardial perfusion defect at rest or with stress encumbering less than 5% of the myocardium. Cardiac cath 12/4/2017  Findings:     Left main: Normal     LAD: 100% mid stenosis     LIMA-LAD: patent graft     LCX: 100% proximal stenosis.     SVG-OM3 patent graft and supplying OM1 in a retrograde fashion.      RCA: diffuse irregularities 40-50% with patent prior stents.     The LV gram was performed in the GAITAN 30 position. LVEF: 30%. LV Wall Motion: global hypokinesis        Conclusions:  1. Multivessel CAD. 2. Patent LIMA-LAD and SVG-OM3.  3. Patent prior RCA stents with non-obstructive disease  4. Moderately reduced LV systolic function      ASSESSMENT/PLAN:    1.  NSTEMI  2. Hypotension which was resolved after getting IV fluids  #3 possible recurrent urinary tract infection or continuation of the previously treated urinary tract infection. #4 rule out sepsis  #5 coronary artery disease with recent cardiac cath showing multi-vessel coronary artery disease with recommendations of medical management  #6 diabetes mellitus which is insulin-dependent  #7 hyperlipidemia  #08 COPD currently controlled  Chronic combined systolic and diastolic CHF in the setting of CAD - s/p AICD. B VA severe stenosis     -  Heparin drip as recommended by cardiology  Follow d-dimer and may need CT pulmonary angiogram if significantly positive  Trend troponins and monitor EKGs  Follow CK-MB to differentiate new nSTEMI from previously uptrending troponins especially in the absence of chest pain   Rocephin IV  - Follow urine exam and culture results and adjust antibiotics. Liset Lopez MD  Internal Medicine, PGY1 Resident  [facility]    Attending Physician Statement  I have discussed the care of Natali Isaac, including pertinent history and exam findings with the resident.  I have reviewed the key elements of all parts of the encounter with the resident. I have seen and examined the patient with the resident and the key elements of all parts of the encounter have been performed by me. Principal Problem:    Sepsis due to Escherichia coli [facility])  Active Problems:    Essential hypertension    Diabetes mellitus type 2, controlled ([facility]    History of CVA (cerebrovascular accident)    ST elevation myocardial infarction (STEMI) ([facility]    Vertebral artery disease ([facility]    NSTEMI (non-ST elevated myocardial infarction) [facility]    Acute cystitis without hematuria    Assessment/Plan  Patient admitted for hypotension and tachycardia. She has UTI. She was started on Bactrim and last visit but I suspect noncompliance. There was also suspicion of PE, CTA was done which was a poor study. Suspicion for PE is low. She has schizophrenia and that makes it challenging to take care of her. She has pulled out her IV and refusing to get as further IV in. She is also difficult IV access. She has received boluses overnight following which her blood pressure has improved to close to 421 systolic and she still is tachycardic around 120. She is alert and oriented and intermittently uncooperative. I discussed this in detail that she needs IV antibiotic and IV fluid otherwise her condition can worsen. She'll allow us to get IV line establish for that for now. Her troponin elevation is likely secondary to sepsis and tachycardia. It's trending down now. Start beta blockers as permitted by her blood pressure. IV bolus after establishing IV line. We'll also consult psych. She will need home care on discharge is improved medication compliance.       Melba Sánchez MD  Attending and Faculty Internal Medicine  [address] [place] [address]  Internal Medicine [address] [place] [address], S..   12/9/17

## 2017-12-08 NOTE — CONSULTS
[facility]  consult note                  Date:   12/8/2017  Patient name: Yovana Corona  Date of admission:  12/8/2017 11:37 AM  MRN:   [id number]  YOB: 1955    Reason for Admission: recurrent falls And cardiac evaluation    CHIEF COMPLAINT:   generalized fatigue and chronic vertigo    History Obtained From:  Patient and chart review     HISTORY OF PRESENT ILLNESS:    79-year-old female with pertinent history of multivessel coronary artery disease status post CABG x 2 in 2016 followed by stenting of OM2 last month  in the past who was admitted 2 days ago with a similar complaints of vertigo and dizziness and was noted to have troponin elevation. Patient underwent repeat cardiac catheterization which showed that both the grafts were patent. She was discharged couple of days ago and she returned to ER with the complaints of generalized fatigue instability falls and dizziness. Cardiology consulted again because of troponin elevation. point-of-care troponin elevated at 3.27 and formal troponin 1.9. Patient however denies any chest pain shortness of breath or palpitations. She also denies any syncopal episode. Past Medical History:   has a past medical history of Arthritis; Asthma; CAD (coronary artery disease); CHF (congestive heart failure) ([facility] Clinical trial participant at discharge; COPD (chronic obstructive pulmonary disease) ([facility] Diabetes mellitus ([facility] Hepatitis C; Hyperlipidemia; Hypertension; Pneumonia; and Unspecified cerebral artery occlusion with cerebral infarction. Past Surgical History:   has a past surgical history that includes Tonsillectomy; Foot surgery; Cardiac surgery (5/6/2016); Cardiac pacemaker placement; and Cardiac catheterization (11/27/2017). Home Medications:    Prior to Admission medications    Medication Sig Start Date End Date Taking?  Authorizing Provider   aspirin 81 MG chewable tablet Take 1 tablet by mouth daily 12/5/17   Jose MD Haile   nitroGLYCERIN (NITROSTAT) 0.4 MG SL tablet Place 1 tablet under the tongue every 5 minutes as needed for Chest pain up to max of 3 total doses. If no relief after 1 dose, call 911. 12/5/17   Kathya Armstrong MD   sulfamethoxazole-trimethoprim (BACTRIM DS) 800-160 MG per tablet Take 1 tablet by mouth 2 times daily for 5 days 12/5/17 12/10/17  Kathya Armstrong MD   atorvastatin (LIPITOR) 80 MG tablet Take 1 tablet by mouth nightly 12/1/17   Vince Snow MD   lisinopril (PRINIVIL;ZESTRIL) 5 MG tablet Take 1 tablet by mouth daily 12/2/17   Scout Valero MD   meclizine (ANTIVERT) 12.5 MG tablet Take 1 tablet by mouth 3 times daily as needed for Dizziness 12/1/17 12/11/17  Samantha Burns MD   clopidogrel (PLAVIX) 75 MG tablet Take 1 tablet by mouth daily 11/29/17   ROSALIO Olivares   zinc oxide 20 % ointment Apply topically as needed. 11/28/17   Lexy Christianson MD   ipratropium-albuterol (DUONEB) 0.5-2.5 (3) MG/3ML SOLN nebulizer solution Inhale 3 mLs into the lungs every 4 hours as needed for Shortness of Breath 10/26/17   Orlando Fontaine MD   carvedilol (COREG) 3.125 MG tablet Take 3.125 mg by mouth 2 times daily (with meals)    Gilford Rattler, DO   senna-docusate (PERICOLACE) 8.6-50 MG per tablet Take 1 tablet by mouth daily    Historical Provider, MD   OLANZapine (ZYPREXA) 10 MG tablet Take 1 tablet by mouth nightly 11/30/16   Melania Sherman MD   insulin glargine (LANTUS) 100 UNIT/ML injection vial Inject 35 Units into the skin every morning     Historical Provider, MD   colchicine (COLCRYS) 0.6 MG tablet Take 1 tablet by mouth daily 7/15/16   Pascual Goldberg DO   famotidine (PEPCID) 20 MG tablet Take 20 mg by mouth daily. Historical Provider, MD   OXcarbazepine (TRILEPTAL) 300 MG tablet Take 300 mg by mouth 2 times daily Per pharmacy she is to be taking 300 mg twice daily but patient states that she only takes once daily.     Historical Provider, MD       Allergies: Levofloxacin and Pcn [penicillins]    Social History:   reports that she has been smoking Cigarettes. She has been smoking about 1.00 pack per day. She has never used smokeless tobacco. She reports that she drinks alcohol. She reports that she does not use drugs. Family History:   negativefor early CAD    REVIEW OF SYSTEMS:    · Constitutional: there has been decline in functional capacity. · Eyes: No visual changes or diplopia. · ENT: positive for chronic vertigo  · Cardiovascular: denies any chest pain dyspnea or orthopnea positive for dizziness and lightheadedness  · Respiratory: No hx of productive cough, pleuritic chest pain   · Gastrointestinal: No abdominal pain, appetite loss, blood in stools. No change in bowel habits. · Genitourinary: No dysuria, trouble voiding, or hematuria. · Musculoskeletal:  No gait disturbance, No weakness or joint complaints. · Integumentary: No rash or pruritis. · Neurological: History of previous stroke with left hemiparesis  · Psychiatric: No anxiety, or depression. · Endocrine: No temperature intolerance. No excessive thirst, fluid intake, or urination. No tremor. · Hematologic/Lymphatic: No abnormal bruising or bleeding, blood clots or swollen lymph nodes. · Allergic/Immunologic: No nasal congestion or hives. PHYSICAL EXAM:    Physical Examination:    /86   Pulse 113   Temp 99.3 °F (37.4 °C) (Oral)   Resp 18   Ht 5' 1\" (1.549 m)   Wt 172 lb (78 kg)   LMP  (LMP Unknown)   SpO2 97%   BMI 32.50 kg/m²    Constitutional and General Appearance: lethargic, cooperative, in no distress   HEENT: PERRL, no cervical lymphadenopathy. Dry oral mucosa  Respiratory:  · Normal excursion and expansion without use of accessory muscles  · Resp Auscultation: Good respiratory effort. No for increased work of breathing.  On auscultation: \ decrease entry at the bases, exam limited on the posterior aspect  Cardiovascular:  · The apical impulse is not displaced  · Sinus tachycardia, regular S1 and S2. Murmurs: None   · Jugular venous pulsation Normal  · Peripheral pulses are symmetrical and full   Abdomen:  · No masses or tenderness  · Bowel sounds present  Extremities:  ·  No Cyanosis or Clubbing  ·  Lower extremity edema: No edema   ·  Skin: Warm and dry  Neurological:  · Not done     DATA:    Diagnostics:      EKG:   Sinus tachycardia and nonspecific ST-T changes no significant change from the previous, inferior infarct, LAE         Previous cardiac testing:   ECHO 11/30/17: EF 35%, grade I DD.      CATH 12/04/17:Multi-vessel Coronary Artery Disease.   Patent grafts to LAD and OM.   Patent prior RCA stents with non-obstructive disease.   Moderately impaired ventricular function--EF 30%     STRESS 11/15/17: Apparent remote infarction within the inferior wall and moderate-sized partially reversible perfusion defect within the anterior wall, concerning for ischemia. Dyskinesis of the apex. Severe hypokinesis of the septum, inferoseptal wall and inferior wall. EF 36%.      ICD 3/13/17: Medtronic device placed by Dr. Bradley Schuster for ICM.    CABG 9/1/16: LIMA-LAD, SVG-OM3. Labs:     CBC:   Recent Labs      12/08/17   1331   WBC  16.8*   HGB  8.4*   HCT  27.5*   PLT  317     BMP:   Recent Labs      12/08/17   1331  12/08/17   1332   NA  132*   --    K  4.2   --    CO2  22   --    BUN  24*   --    CREATININE  1.09*  1.05   LABGLOM  51*  53*   GLUCOSE  222*   --      BNP: No results for input(s): BNP in the last 72 hours.   PT/INR:   Recent Labs      12/08/17   1331   PROTIME  10.6   INR  1.0     APTT:  Recent Labs      12/08/17   1331   APTT  21.1*     CARDIAC ENZYMES:  Recent Labs      12/08/17   1336   TROPONINI  3.27*     FASTING LIPID PANEL:  Lab Results   Component Value Date    HDL 53 11/30/2017    TRIG 193 11/30/2017     LIVER PROFILE:  Recent Labs      12/08/17   1331   AST  22   ALT  14   LABALBU  3.3*         IMPRESSION:    Troponin elevation of unclear etiology with recent coronary angiography   CAD status post CABG in 2016 with LIMA to LAD and SVG to OM  Subsequent PCI of OM 2 in November 2017  Sinus tachycardia  Ischemic cardio myopathy with ICD in situ  Chronic vertigo and dizziness with frequent falls  History of ischemic CVA with residual left hemiparesis      RECOMMENDATIONS:  1. Recommend workup for thrombolic embolic process given sinus tachycardia and the symptoms  2. Start IV heparin low intensity protocol  3. Resume cardiac medications including aspirin, Plavix, beta blocker and statin  4. Neurology evaluation  5. No evidence of myocardial ischemia and given symptoms and overall picture will monitor troponin trend    Plan discussed with Dr. Christian Jurado. Discussed with patient and nursing. Electronically signed by Ludy Bowens MD on 12/8/2017 at 3:18 PM.  Fellow, [address] [facility], [address] [place] [address] Cardiology Physician:      Attending Physician Statement:    I have discussed the care of  Vicki Hinton , including pertinent history and exam findings, with the Cardiology fellow/resident. I have seen and examined the patient and the key elements of all parts of the encounter have been performed by me. I agree with the assessment, plan and orders as documented by the fellow/resident, after I modified exam findings and plan of treatments, and the final version is my approved version of the assessment. Additional Comments:   NO active cardiac symptoms .   Getting CTA to R/O PE   Dizziness possible neuro related     Attending Name: Dr. Gus Gordon

## 2017-12-08 NOTE — ED NOTES
Pt up to bedside commode with RN assistance. Provided warm blanket. Vitals recorded and updated. Pt updated on poc, verbalized understanding. Denied any other needs at this time.       Davina Perales RN  12/08/17 1484

## 2017-12-08 NOTE — ED NOTES
Bed: 04  Expected date:   Expected time:   Means of arrival:   Comments:  Nacho Rucker RN  12/08/17 [id number]

## 2017-12-08 NOTE — ED NOTES
Pt assisted with bedside commode, unable to urinate, BM noted     Layla Rutledge, BESS  12/08/17 8113

## 2017-12-08 NOTE — ED PROVIDER NOTES
[address] [facility]     Emergency Department     Faculty Attestation    I performed a history and physical examination of the patient and discussed management with the resident. I reviewed the residents note and agree with the documented findings and plan of care. Any areas of disagreement are noted on the chart. I was personally present for the key portions of any procedures. I have documented in the chart those procedures where I was not present during the key portions. I have reviewed the emergency nurses triage note. I agree with the chief complaint, past medical history, past surgical history, allergies, medications, social and family history as documented unless otherwise noted below. For Physician Assistant/ Nurse Practitioner cases/documentation I have personally evaluated this patient and have completed at least one if not all key elements of the E/M (history, physical exam, and MDM). Additional findings are as noted. Recent N- STEMI, patient has history of a left CVA, chest clear, heart exam normal, abdomen soft and nontender. Patient is hypotensive.   EKG Interpretation    Interpreted by me    Rhythm: normal sinus   Rate: 105  Axis: -14  Ectopy: none  Conduction: normal  Nonspecific ST and T-wave changes  Q Waves: anterior inferior    Clinical Impression: abnormal EKG without significant changes from EKG done December 3, 2017    Army Latisha WU  Attending Emergency  Physician    EKG Interpretation 2ns EKG    Interpreted by me (Repeat)    Rhythm: normal sinus   Rate: 112  Axis: -15  Ectopy: none  Conduction: normal  nonsignificant ST and T-wave changes  Q Waves: anterior and inferior    Clinical Impression: abnormal EKG without skin changes from EKG done earlier today              Kimani Diallo MD  12/08/17 [address] [facility], [place] [address], MD  12/08/17 [address] [facility], [place] [address], MD  12/08/17 [id number]

## 2017-12-08 NOTE — PROGRESS NOTES
Dr. Parris Figueroa called and updated on new consult. Notified of vitals and echo results, see orders.

## 2017-12-08 NOTE — PROGRESS NOTES
Cardiac Testing      ECHO 11/30/17: EF 35%, grade I DD.      CATH 12/04/17:Multi-vessel Coronary Artery Disease.   Patent grafts to LAD and OM.   Patent prior RCA stents with non-obstructive disease.   Moderately impaired ventricular function--EF 30%     STRESS 11/15/17: Apparent remote infarction within the inferior wall and moderate-sized partially reversible perfusion defect within the anterior wall, concerning for ischemia. Dyskinesis of the apex. Severe hypokinesis of the septum, inferoseptal wall and inferior wall. EF 36%.      ICD 3/13/17: Medtronic device placed by Dr. Krishan Overton for ICM.    CABG 9/1/16: LIMA-LAD, SVG-OM3.       1. CAD, s/p STEMI on 05/06/2016. Coronary angiography showed LMCA mild disease, LAD 99% proximal stenosis, reduced to 0% using a 2.75 x 23 mm BMS, 80% stenosis proximal RCA, reduced to 0% using 2.75 x 23 BMS, 100% very distal PDA occlusion, small in caliber, LCx mild disease and OM3 70% stenosis with JOHNNA III flow. 2. Hypertension   3. Ischemic cardiomyopathy. 4. CKD. 5. Cardiac cath 08/15/16: Severe MV CAD. EF 35%   6. CABG 09/01/16: LIMA-LAD, SVG-OM3.   7. AICD implant 03/13/17   8. Hx of CVA with residual left sided weakness.

## 2017-12-08 NOTE — ED NOTES
Pt up to bedside commode again, asking for orange juice, loose stools noted     Herminio Cole RN  12/08/17 2949

## 2017-12-08 NOTE — ED NOTES
Pt to xray, Dr Guardado Setting informed no IV or labs at this time, improvement noted in BP     [address] [place] [address], RN  12/08/17 1228

## 2017-12-08 NOTE — ED NOTES
Pt states cannot urine, refusing straight cath, pt wants to be left alone     Dayanara Farias, BESS  12/08/17 9849

## 2017-12-09 PROBLEM — I50.42 CHRONIC COMBINED SYSTOLIC AND DIASTOLIC CONGESTIVE HEART FAILURE (HCC): Chronic | Status: ACTIVE | Noted: 2017-12-09

## 2017-12-09 PROBLEM — A41.51 SEPSIS DUE TO ESCHERICHIA COLI (HCC): Status: ACTIVE | Noted: 2017-12-09

## 2017-12-09 PROBLEM — E27.40 ADRENAL INSUFFICIENCY (HCC): Status: ACTIVE | Noted: 2017-12-09

## 2017-12-09 LAB
ABSOLUTE EOS #: 0.03 K/UL (ref 0–0.44)
ABSOLUTE IMMATURE GRANULOCYTE: 0.14 K/UL (ref 0–0.3)
ABSOLUTE LYMPH #: 1.17 K/UL (ref 1.1–3.7)
ABSOLUTE MONO #: 0.95 K/UL (ref 0.1–1.2)
ANION GAP SERPL CALCULATED.3IONS-SCNC: 14 MMOL/L (ref 9–17)
BASOPHILS # BLD: 0 % (ref 0–2)
BASOPHILS ABSOLUTE: 0.04 K/UL (ref 0–0.2)
BUN BLDV-MCNC: 22 MG/DL (ref 8–23)
BUN/CREAT BLD: ABNORMAL (ref 9–20)
CALCIUM SERPL-MCNC: 7.7 MG/DL (ref 8.6–10.4)
CHLORIDE BLD-SCNC: 98 MMOL/L (ref 98–107)
CO2: 22 MMOL/L (ref 20–31)
CORTISOL COLLECTION INFO: NORMAL
CORTISOL: 18.4 UG/DL (ref 2.7–18.4)
CREAT SERPL-MCNC: 1.17 MG/DL (ref 0.5–0.9)
CULTURE: ABNORMAL
CULTURE: ABNORMAL
DIFFERENTIAL TYPE: ABNORMAL
EKG ATRIAL RATE: 105 BPM
EKG ATRIAL RATE: 112 BPM
EKG ATRIAL RATE: 136 BPM
EKG P AXIS: 46 DEGREES
EKG P AXIS: 53 DEGREES
EKG P AXIS: 53 DEGREES
EKG P-R INTERVAL: 138 MS
EKG P-R INTERVAL: 142 MS
EKG P-R INTERVAL: 144 MS
EKG Q-T INTERVAL: 280 MS
EKG Q-T INTERVAL: 322 MS
EKG Q-T INTERVAL: 344 MS
EKG QRS DURATION: 74 MS
EKG QRS DURATION: 76 MS
EKG QRS DURATION: 76 MS
EKG QTC CALCULATION (BAZETT): 421 MS
EKG QTC CALCULATION (BAZETT): 439 MS
EKG QTC CALCULATION (BAZETT): 454 MS
EKG R AXIS: -14 DEGREES
EKG R AXIS: -15 DEGREES
EKG R AXIS: -9 DEGREES
EKG T AXIS: 102 DEGREES
EKG T AXIS: 43 DEGREES
EKG T AXIS: 88 DEGREES
EKG VENTRICULAR RATE: 105 BPM
EKG VENTRICULAR RATE: 112 BPM
EKG VENTRICULAR RATE: 136 BPM
EOSINOPHILS RELATIVE PERCENT: 0 % (ref 1–4)
GFR AFRICAN AMERICAN: 57 ML/MIN
GFR NON-AFRICAN AMERICAN: 47 ML/MIN
GFR SERPL CREATININE-BSD FRML MDRD: ABNORMAL ML/MIN/{1.73_M2}
GFR SERPL CREATININE-BSD FRML MDRD: ABNORMAL ML/MIN/{1.73_M2}
GLUCOSE BLD-MCNC: 109 MG/DL (ref 65–105)
GLUCOSE BLD-MCNC: 182 MG/DL (ref 65–105)
GLUCOSE BLD-MCNC: 254 MG/DL (ref 70–99)
GLUCOSE BLD-MCNC: 255 MG/DL (ref 65–105)
HCT VFR BLD CALC: 23.8 % (ref 36.3–47.1)
HEMOGLOBIN: 7.4 G/DL (ref 11.9–15.1)
IMMATURE GRANULOCYTES: 1 %
LYMPHOCYTES # BLD: 7 % (ref 24–43)
Lab: ABNORMAL
MCH RBC QN AUTO: 24.7 PG (ref 25.2–33.5)
MCHC RBC AUTO-ENTMCNC: 31.1 G/DL (ref 28.4–34.8)
MCV RBC AUTO: 79.6 FL (ref 82.6–102.9)
MONOCYTES # BLD: 6 % (ref 3–12)
ORGANISM: ABNORMAL
PDW BLD-RTO: 17.9 % (ref 11.8–14.4)
PLATELET # BLD: 298 K/UL (ref 138–453)
PLATELET ESTIMATE: ABNORMAL
PMV BLD AUTO: 9.9 FL (ref 8.1–13.5)
POTASSIUM SERPL-SCNC: 4.3 MMOL/L (ref 3.7–5.3)
RBC # BLD: 2.99 M/UL (ref 3.95–5.11)
RBC # BLD: ABNORMAL 10*6/UL
SEG NEUTROPHILS: 86 % (ref 36–65)
SEGMENTED NEUTROPHILS ABSOLUTE COUNT: 14.48 K/UL (ref 1.5–8.1)
SODIUM BLD-SCNC: 134 MMOL/L (ref 135–144)
SPECIMEN DESCRIPTION: ABNORMAL
STATUS: ABNORMAL
TROPONIN INTERP: ABNORMAL
TROPONIN T: 1.45 NG/ML
WBC # BLD: 16.8 K/UL (ref 3.5–11.3)
WBC # BLD: ABNORMAL 10*3/UL

## 2017-12-09 PROCEDURE — 85025 COMPLETE CBC W/AUTO DIFF WBC: CPT

## 2017-12-09 PROCEDURE — 6370000000 HC RX 637 (ALT 250 FOR IP): Performed by: PSYCHIATRY & NEUROLOGY

## 2017-12-09 PROCEDURE — 2500000003 HC RX 250 WO HCPCS: Performed by: INTERNAL MEDICINE

## 2017-12-09 PROCEDURE — 99233 SBSQ HOSP IP/OBS HIGH 50: CPT | Performed by: INTERNAL MEDICINE

## 2017-12-09 PROCEDURE — 82947 ASSAY GLUCOSE BLOOD QUANT: CPT

## 2017-12-09 PROCEDURE — 6370000000 HC RX 637 (ALT 250 FOR IP): Performed by: INTERNAL MEDICINE

## 2017-12-09 PROCEDURE — 2580000003 HC RX 258: Performed by: INTERNAL MEDICINE

## 2017-12-09 PROCEDURE — 6360000002 HC RX W HCPCS: Performed by: INTERNAL MEDICINE

## 2017-12-09 PROCEDURE — 80048 BASIC METABOLIC PNL TOTAL CA: CPT

## 2017-12-09 PROCEDURE — 84484 ASSAY OF TROPONIN QUANT: CPT

## 2017-12-09 PROCEDURE — 6360000002 HC RX W HCPCS: Performed by: STUDENT IN AN ORGANIZED HEALTH CARE EDUCATION/TRAINING PROGRAM

## 2017-12-09 PROCEDURE — 36415 COLL VENOUS BLD VENIPUNCTURE: CPT

## 2017-12-09 PROCEDURE — 2580000003 HC RX 258: Performed by: STUDENT IN AN ORGANIZED HEALTH CARE EDUCATION/TRAINING PROGRAM

## 2017-12-09 PROCEDURE — 6370000000 HC RX 637 (ALT 250 FOR IP): Performed by: STUDENT IN AN ORGANIZED HEALTH CARE EDUCATION/TRAINING PROGRAM

## 2017-12-09 PROCEDURE — 2060000000 HC ICU INTERMEDIATE R&B

## 2017-12-09 PROCEDURE — 82533 TOTAL CORTISOL: CPT

## 2017-12-09 RX ORDER — DEXTROSE MONOHYDRATE 50 MG/ML
100 INJECTION, SOLUTION INTRAVENOUS PRN
Status: DISCONTINUED | OUTPATIENT
Start: 2017-12-09 | End: 2017-12-17 | Stop reason: HOSPADM

## 2017-12-09 RX ORDER — DEXTROSE MONOHYDRATE 25 G/50ML
12.5 INJECTION, SOLUTION INTRAVENOUS PRN
Status: DISCONTINUED | OUTPATIENT
Start: 2017-12-09 | End: 2017-12-17 | Stop reason: HOSPADM

## 2017-12-09 RX ORDER — OLANZAPINE 10 MG/1
10 TABLET ORAL NIGHTLY
Status: DISCONTINUED | OUTPATIENT
Start: 2017-12-09 | End: 2017-12-17 | Stop reason: HOSPADM

## 2017-12-09 RX ORDER — LORAZEPAM 1 MG/1
1 TABLET ORAL 2 TIMES DAILY
Status: DISCONTINUED | OUTPATIENT
Start: 2017-12-09 | End: 2017-12-09

## 2017-12-09 RX ORDER — CEFUROXIME AXETIL 500 MG/1
500 TABLET ORAL EVERY 12 HOURS SCHEDULED
Status: DISCONTINUED | OUTPATIENT
Start: 2017-12-09 | End: 2017-12-09

## 2017-12-09 RX ORDER — 0.9 % SODIUM CHLORIDE 0.9 %
1000 INTRAVENOUS SOLUTION INTRAVENOUS ONCE
Status: COMPLETED | OUTPATIENT
Start: 2017-12-09 | End: 2017-12-09

## 2017-12-09 RX ORDER — NICOTINE POLACRILEX 4 MG
15 LOZENGE BUCCAL PRN
Status: DISCONTINUED | OUTPATIENT
Start: 2017-12-09 | End: 2017-12-17 | Stop reason: HOSPADM

## 2017-12-09 RX ORDER — HEPARIN SODIUM 5000 [USP'U]/ML
5000 INJECTION, SOLUTION INTRAVENOUS; SUBCUTANEOUS EVERY 8 HOURS SCHEDULED
Status: DISCONTINUED | OUTPATIENT
Start: 2017-12-09 | End: 2017-12-12

## 2017-12-09 RX ORDER — HALOPERIDOL 5 MG/ML
5 INJECTION INTRAMUSCULAR 2 TIMES DAILY PRN
Status: DISCONTINUED | OUTPATIENT
Start: 2017-12-09 | End: 2017-12-17 | Stop reason: HOSPADM

## 2017-12-09 RX ORDER — 0.9 % SODIUM CHLORIDE 0.9 %
500 INTRAVENOUS SOLUTION INTRAVENOUS ONCE
Status: COMPLETED | OUTPATIENT
Start: 2017-12-09 | End: 2017-12-09

## 2017-12-09 RX ORDER — SODIUM CHLORIDE 9 MG/ML
INJECTION, SOLUTION INTRAVENOUS CONTINUOUS
Status: DISCONTINUED | OUTPATIENT
Start: 2017-12-09 | End: 2017-12-12

## 2017-12-09 RX ORDER — LORAZEPAM 0.5 MG/1
0.5 TABLET ORAL 2 TIMES DAILY
Status: COMPLETED | OUTPATIENT
Start: 2017-12-09 | End: 2017-12-10

## 2017-12-09 RX ADMIN — ENOXAPARIN SODIUM 80 MG: 80 INJECTION SUBCUTANEOUS at 06:30

## 2017-12-09 RX ADMIN — INSULIN GLARGINE 30 UNITS: 100 INJECTION, SOLUTION SUBCUTANEOUS at 21:09

## 2017-12-09 RX ADMIN — HYDROCORTISONE SODIUM SUCCINATE 100 MG: 100 INJECTION, POWDER, FOR SOLUTION INTRAMUSCULAR; INTRAVENOUS at 17:46

## 2017-12-09 RX ADMIN — CEFUROXIME AXETIL 500 MG: 500 TABLET ORAL at 11:15

## 2017-12-09 RX ADMIN — OXCARBAZEPINE 300 MG: 300 TABLET, FILM COATED ORAL at 20:56

## 2017-12-09 RX ADMIN — CLOPIDOGREL 75 MG: 75 TABLET, FILM COATED ORAL at 08:40

## 2017-12-09 RX ADMIN — OLANZAPINE 10 MG: 10 TABLET, FILM COATED ORAL at 20:56

## 2017-12-09 RX ADMIN — LORAZEPAM 1 MG: 1 TABLET ORAL at 11:15

## 2017-12-09 RX ADMIN — ASPIRIN 81 MG: 81 TABLET, CHEWABLE ORAL at 08:40

## 2017-12-09 RX ADMIN — ACETAMINOPHEN 650 MG: 325 TABLET ORAL at 21:06

## 2017-12-09 RX ADMIN — INSULIN LISPRO 2 UNITS: 100 INJECTION, SOLUTION INTRAVENOUS; SUBCUTANEOUS at 21:09

## 2017-12-09 RX ADMIN — SODIUM CHLORIDE 1000 ML: 9 INJECTION, SOLUTION INTRAVENOUS at 11:07

## 2017-12-09 RX ADMIN — LORAZEPAM 0.5 MG: 0.5 TABLET ORAL at 21:06

## 2017-12-09 RX ADMIN — SODIUM CHLORIDE: 9 INJECTION, SOLUTION INTRAVENOUS at 11:06

## 2017-12-09 RX ADMIN — CEFTRIAXONE SODIUM 1 G: 2 INJECTION, POWDER, FOR SOLUTION INTRAMUSCULAR; INTRAVENOUS at 17:45

## 2017-12-09 RX ADMIN — SODIUM CHLORIDE: 9 INJECTION, SOLUTION INTRAVENOUS at 02:15

## 2017-12-09 RX ADMIN — ATORVASTATIN CALCIUM 80 MG: 80 TABLET, FILM COATED ORAL at 20:56

## 2017-12-09 RX ADMIN — FAMOTIDINE 20 MG: 20 TABLET, FILM COATED ORAL at 08:40

## 2017-12-09 RX ADMIN — SODIUM CHLORIDE 1000 ML: 9 INJECTION, SOLUTION INTRAVENOUS at 17:46

## 2017-12-09 RX ADMIN — OXCARBAZEPINE 300 MG: 300 TABLET, FILM COATED ORAL at 08:40

## 2017-12-09 RX ADMIN — SODIUM CHLORIDE 500 ML: 9 INJECTION, SOLUTION INTRAVENOUS at 00:00

## 2017-12-09 RX ADMIN — INSULIN LISPRO 9 UNITS: 100 INJECTION, SOLUTION INTRAVENOUS; SUBCUTANEOUS at 11:57

## 2017-12-09 RX ADMIN — HEPARIN SODIUM 5000 UNITS: 5000 INJECTION, SOLUTION INTRAVENOUS; SUBCUTANEOUS at 21:10

## 2017-12-09 RX ADMIN — INSULIN LISPRO 3 UNITS: 100 INJECTION, SOLUTION INTRAVENOUS; SUBCUTANEOUS at 08:40

## 2017-12-09 RX ADMIN — HEPARIN SODIUM 5000 UNITS: 5000 INJECTION, SOLUTION INTRAVENOUS; SUBCUTANEOUS at 15:38

## 2017-12-09 NOTE — PROGRESS NOTES
Got page from RN regarding lower BP. Recked while during on phone in SBP in 90's, asked nurse to recheck in 30 -45 min, again BP notified 81/61 with HR > 100, gave 500 ml bolus in 2 house, She is able to lay down flat, saturating well. Complaining of dizziness. Will monitor for any overload and improvement of BP . Romaine Kay MD  PGY-1, Internal Medicine resident  [facility].

## 2017-12-09 NOTE — PROGRESS NOTES
[facility]   Progress Note                 Date:   12/9/2017  Patient name:  Robyn Bennett  Date of admission:  12/8/2017 11:37 AM  MRN:   [id number]  YOB: 1955  PCP:    Sarahy Leonard MD    Reason for Admission: Recurrent falls      Subjective:       Clinical Changes / Abnormalities:     Patient seen and examined. Uncooperative and not willing to answer questions. Has blanket over her head. Wants to be \"left alone. \"      Medications:   Scheduled Meds:    enoxaparin  1 mg/kg Subcutaneous BID    lidocaine 1 % injection  5 mL Intradermal Once    sodium chloride flush  10 mL Intravenous 2 times per day    aspirin  81 mg Oral Daily    atorvastatin  80 mg Oral Nightly    clopidogrel  75 mg Oral Daily    famotidine  20 mg Oral Daily    insulin glargine  30 Units Subcutaneous Nightly    OXcarbazepine  300 mg Oral BID    sodium chloride flush  10 mL Intravenous 2 times per day    insulin lispro  0-18 Units Subcutaneous TID WC    insulin lispro  0-9 Units Subcutaneous Nightly    cefTRIAXone (ROCEPHIN) IV  1 g Intravenous Q24H     Continuous Infusions:    sodium chloride Stopped (12/09/17 0400)    dextrose       CBC:   Recent Labs      12/08/17   1331   WBC  16.8*   HGB  8.4*   PLT  317     BMP:    Recent Labs      12/08/17   1331  12/08/17   1332   NA  132*   --    K  4.2   --    CL  95*   --    CO2  22   --    BUN  24*   --    CREATININE  1.09*  1.05   GLUCOSE  222*   --      Hepatic:   Recent Labs      12/08/17   1331   AST  22   ALT  14   BILITOT  0.51   ALKPHOS  87     Troponin:   Recent Labs      12/08/17   1336   TROPONINI  3.27*     BNP: No results for input(s): BNP in the last 72 hours. Lipids: No results for input(s): CHOL, HDL in the last 72 hours.     Invalid input(s): LDLCALCU  INR:   Recent Labs      12/08/17   1331   INR  1.0       Objective:   Vitals: BP 92/71   Pulse 121   Temp 100.5 °F (38.1 °C) (Oral)   Resp 18   Ht 5' 1\" (1.549 m)   Wt 170 lb 10.2 oz (77.4 kg)   LMP  (LMP Unknown)   SpO2 100%   BMI 32.24 kg/m²   General appearance: Alert. No acute distress. HEENT: Head: Normal, normocephalic, atraumatic. Neck: Supple, no carotid bruit, no JVD, trachea midline  Lungs: Clear to auscultation bilaterally  Heart: Regular rhythm with tachycardia, S1, S2 normal.  Abdomen: Soft, non-tender; bowel sounds normal  Extremities: No cyanosis or edema  Neurologic: No focal neurologic deficits  Mental status: Alert, oriented. Uncooperative    EKG:  Sinus tachycardia and nonspecific ST-T changes no significant change from the previous, inferior infarct, LAE     ECHO 11/30/17: EF 35%, grade I DD.      CATH 12/04/17:Multi-vessel Coronary Artery Disease.   Patent grafts to LAD and OM.   Patent prior RCA stents with non-obstructive disease.   Moderately impaired ventricular function--EF 30%     STRESS 11/15/17: Apparent remote infarction within the inferior wall and moderate-sized partially reversible perfusion defect within the anterior wall, concerning for ischemia. Dyskinesis of the apex. Severe hypokinesis of the septum, inferoseptal wall and inferior wall. EF 36%.      ICD 3/13/17: [employer] device placed by Dr. Verner Brighter for ICM.    CABG 9/1/16: LIMA-LAD, SVG-OM3. Assessment:   1. Elevated troponin - likely secondary to type 2 NSTEMI (patient hypotensive and likely septic)  2. Sepsis secondary to possible bacteremia (MRSA and coag neg staph in blood culture 1/2 from admission)  3. UTI  4. CAD status post CABG in 2016 with LIMA to LAD and SVG to OM  5. Subsequent PCI of OM 2 in November 2017  6. Sinus tachycardia  7. Ischemic cardiomyopathy with ICD in situ  8. Chronic vertigo and dizziness with frequent falls  9. History of ischemic CVA with residual left hemiparesis  10. Noncompliance with treatment and medications      Plan / Recommendations:   1. Troponins 1.9 at and trending down to 1.67  2. Troponin elevation possibly from last admission  3.  CT PE nondiagnostic due to timing of bolus  4. On IV heparin but patient is refusing all treatments  5. Treat patient's possible bacteremia and UTI  6. Would recommend primary team consult Psychiatry given patient's refusal to treatment  7. On aspirin, Plavix, beta blocker and statin  8. No evidence of myocardial ischemia, given symptoms and overall picture  9. No plans for cardiac cath at this time (has had 2 cath in less than 2 weeks)    D/W Primary Team and RN. Will sign off. Please contact us with questions. Electronically signed on 12/09/17 at 9:18 AM by:    Elizabeth Jordan MD   Fellow, [address]    Attending Physician Statement  I have discussed the care of the patient, including pertinent history and exam findings, with the resident. I have seen and examined the patient and the key elements of all parts of the encounter have been performed by me. I agree with the assessment, plan and orders as documented by the resident. Troponin likely trending down from last admission. Will sign off.     Dr. Gus Gordon

## 2017-12-09 NOTE — PROGRESS NOTES
Notified Amadeo Patel MD regarding pt been having issues with low SBP, last SBP 88/53 (60). Notified of 500 mL fluid bolus. All other vitals stable. Pulses in lower extremities have been Doppler, unable to doppler left PT. MD stated, \"That's fine just watch breathing carefully, she has low EF. \" Will continue to monitor.

## 2017-12-09 NOTE — PROGRESS NOTES
Night ops, Yg Gallagher RN at bedside. Patient refusing to have IV placed at this time. Will continue to monitor.

## 2017-12-09 NOTE — PROGRESS NOTES
Dr Ana Bhakta updated per perfect serve that ER obtained an IV with ultrasound and that IV fluids and bolus was started.

## 2017-12-09 NOTE — PROGRESS NOTES
Spoke with Dr. Cehrry Sargent from Internal Medicine. Notified of SBP and patient c/o of dizziness. Verbal order received to infuse 500 mL bolus if SBP drops below 90. No further orders at this time. Will continue to monitor.

## 2017-12-09 NOTE — PROGRESS NOTES
TROPONINI   --   3.27*     BNP: No results for input(s): BNP in the last 72 hours. Lipids: No results for input(s): CHOL, TRIG, HDL, LDLCALC in the last 72 hours. Invalid input(s): LDL  ABGs: No results found for: PH, PCO2, PO2, HCO3, O2SAT  Thyroid:   Lab Results   Component Value Date    TSH 0.98 09/19/2017      Urinalysis:   Color, UA   Date Value Ref Range Status   12/08/2017 DARK YELLOW (A) YEL Final     pH, UA   Date Value Ref Range Status   12/08/2017 5.0 5.0 - 8.0 Final     Specific Gravity, UA   Date Value Ref Range Status   12/08/2017 1.018 1.005 - 1.030 Final     Protein, UA   Date Value Ref Range Status   12/08/2017 1+ (A) NEG Final     RBC, UA   Date Value Ref Range Status   12/08/2017 0 TO 2 0 - 4 /HPF Final     Comment:     Reference range defined for non-centrifuged specimen.      Bacteria, UA   Date Value Ref Range Status   12/08/2017 MANY (A) NONE Final     Comment:     [facility] [id number] [place] [address], [address] [place] [address] [phone]     Nitrite, Urine   Date Value Ref Range Status   12/08/2017 POSITIVE (A) NEG Final     WBC, UA   Date Value Ref Range Status   12/08/2017 TOO NUMEROUS TO COUNT 0 - 5 /HPF Final     Leukocyte Esterase, Urine   Date Value Ref Range Status   12/08/2017 MODERATE (A) NEG Final     Yeast, UA   Date Value Ref Range Status   12/08/2017 NOT REPORTED NONE Final     Glucose, Ur   Date Value Ref Range Status   12/08/2017 NEGATIVE NEG Final     Bilirubin Urine   Date Value Ref Range Status   12/08/2017 NEGATIVE NEG Final           Assessment:  Principal Problem:    Sepsis due to Escherichia coli [facility])  Active Problems:    Essential hypertension    Diabetes mellitus type 2, controlled ([facility]    History of CVA (cerebrovascular accident)    ST elevation myocardial infarction (STEMI) ([facility]    Vertebral artery disease ([facility]    NSTEMI (non-ST elevated myocardial infarction) ([facility]    Acute cystitis without hematuria    Sepsis ([facility]      Plan:  UTI on last admission, now in Sepsis

## 2017-12-09 NOTE — PROGRESS NOTES
Dr Ania David rounded with cardiac fellow, aware of current status, labs and vital signs. No new orders at this time, will be tachycardic due to hypovolemic state. Will sign off of case at this time. Call if any further needed arise.

## 2017-12-09 NOTE — PLAN OF CARE
Problem: Cardiac Output - Decreased:  Goal: Hemodynamic stability will improve  Hemodynamic stability will improve   Outcome: Ongoing      Problem: Falls - Risk of:  Goal: Will remain free from falls  Will remain free from falls   Outcome: Ongoing    Goal: Absence of physical injury  Absence of physical injury   Outcome: Met This Shift      Problem: Pain:  Goal: Pain level will decrease  Pain level will decrease   Outcome: Ongoing    Goal: Control of chronic pain  Control of chronic pain   Outcome: Ongoing

## 2017-12-09 NOTE — PLAN OF CARE
Problem: Cardiac Output - Decreased:  Goal: Hemodynamic stability will improve  Hemodynamic stability will improve   Outcome: Ongoing  Heart rate remains elevated after IV bolus and with IVF running, blood pressure stabilized    Problem: Falls - Risk of:  Goal: Will remain free from falls  Will remain free from falls   Outcome: Met This Shift  Patient has not attempted to get OOB without assistance and bed alarm is on  Goal: Absence of physical injury  Absence of physical injury   Outcome: Met This Shift  Patient has not attempted to get OOB without assistance and bed alarm is on    Problem: Pain:  Goal: Pain level will decrease  Pain level will decrease   Outcome: Ongoing  Patient will say that she hurts but denies pain medications when offered to administer  Goal: Control of acute pain  Control of acute pain   Outcome: Ongoing  Patient will say that she hurts but denies pain medications when offered to administer  Goal: Control of chronic pain  Control of chronic pain   Outcome: Ongoing  Patient will say that she hurts but denies pain medications when offered to administer

## 2017-12-09 NOTE — PROGRESS NOTES
Dr Zohaib Gold with endovascular rounded, made aware that consult was cancelled. States that the plan would not have been different than last time. Requests to talk to admitting team - paged dr miranda per perfect serve with Dr Arroyo Erps phone number for dr miranda to call.

## 2017-12-09 NOTE — PROGRESS NOTES
Dr Pineda, Cardiac fellow, rounded, aware of current status and vital signs. Aware patient is not cooperative. No cardiac workup to be ordered, patient may eat from their standpoint. Trops are probably trending down from last admission.

## 2017-12-09 NOTE — PROGRESS NOTES
Dr Mary Whelan at bedside, aware of patients lack of cooperation and threatening the nurse. Talked with Dr Santos Catalan (cardiac fellow) that there is no plan for further testing. Orders received to discontinue the CT chest, okay with an oral diet order. Residents to order oral antibiotic. Psych consult. Discussion with patient regarding the importance of obtaining an IV and the need for IVF. Verbal order for IV bolus of 1 liter NS when IV is obtained.

## 2017-12-09 NOTE — PROGRESS NOTES
Dr Kimmy Antonio, paged per perfect serve - awaiting response Perfect serve message is as follows:  second blood culture came back positive for gram + cocci in cluster - that makes 2 of 2 drawn on 12/8/17    Notified and states to continue ceftriaxone

## 2017-12-09 NOTE — H&P
[address] [place] [address]      Department of Internal Medicine - Staff Internal Medicine Service          ADMISSION NOTE/HISTORY AND PHYSICAL EXAMINATION         CHIEF COMPLAINT:  LOC     HISTORY OBTAIN FROM:  Patient      HISTORY OF PRESENT ILLNESS:       The patient is a pleasant 65 y.o. female with significant past medical history of CAD presented with dizziness and loss of consciousness. Patient was recently admitted for similar symptoms when she had fallen down and had lost consciousness. She was treated for an STEMI. Heparin drip at that time and had gotten a cardiac cath which had shown multi-vessel coronary artery disease with recommendations of being treated by medical management. She also has bilateral vertebral arteries stenosis and Endovascular surgery is following her for that. Currently they don't have any plans for any surgical therapy for the same. Today she was just sitting when she started to feel dizzy, some doubtful spinning sensation and then she lost consciousness. She contacted EMS who brought her here. In the ER she was also found to be hypotensive and she decided fluid boluses to which she responded. Her troponins was found to be elevated. She is on heparin drip as recommended by cardiology.   However she does not have any significant EKG signs of ischemia and does not have any chest pain at this time.     PAST MEDICAL HISTORY:    Past Medical History             Diagnosis Date    Arthritis      Asthma      CAD (coronary artery disease)      CH[facility]congestive heart failure) ([facility]      Clinical trial participant at discharge 03/13/2017     [employer] PSR     COPD (chronic obstructive pulmonary disease) ([facility])      Diabetes mellitus ([facility] [address]      Hepatitis C      Hyperlipidemia      Hypertension      Pneumonia      Unspecified cerebral artery occlusion with cerebral infarction              PAST SURGICAL HISTORY:    Past Surgical History             Procedure Laterality Date    CARDIAC CATHETERIZATION   11/27/2017    CARDIAC PACEMAKER PLACEMENT         placed 1 month ago at st [address]   5/6/2016     cardiac cath-1xBMS prox. RCA; 1xBMS prox. LAD    FOOT SURGERY         cyst removed from L foot    TONSILLECTOMY                MEDICATION PRIOR TO ADMISSION:  Prescriptions Prior to Admission   Not in a hospital admission.        Allergies:  Levofloxacin and Pcn [penicillins]     SOCIAL HISTORY:   TOBACCO:   reports that she has been smoking Cigarettes. She has been smoking about 1.00 pack per day. She has never used smokeless tobacco.  ETOH:   reports that she drinks alcohol. DRUGS:   reports that she does not use drugs.     FAMILY HISTORY:   Family History             Problem Relation Age of Onset    Family history unknown: Yes            REVIEW OF SYSTEMS:  Review of Systems   Constitutional: Positive for diaphoresis. Negative for chills and fever. HENT: Negative for ear discharge, hearing loss and tinnitus. Eyes: Positive for blurred vision. Negative for double vision, photophobia, pain and discharge. Respiratory: Negative for cough, hemoptysis and sputum production. Cardiovascular: Negative for chest pain, palpitations, orthopnea, claudication, leg swelling and PND. Gastrointestinal: Negative for heartburn, nausea and vomiting. Genitourinary: Negative for dysuria, frequency and urgency. Musculoskeletal: Negative for myalgias and neck pain. Skin: Negative for itching and rash. Neurological: Positive for dizziness and weakness. Negative for tingling, sensory change and headaches. Endo/Heme/Allergies: Does not bruise/bleed easily.    Psychiatric/Behavioral: Negative for depression and suicidal ideas.         PHYSICAL EXAM:  /77   Pulse 105   Temp 99.3 °F (37.4 °C) (Oral)   Resp 18   Ht 5' 1\" (1.549 m)   Wt 172 lb (78 kg)   LMP  (LMP Unknown)   SpO2 100%   BMI 32.50 kg/m²   CONSTITUTIONAL:  awake, alert, cooperative, no apparent distress, and appears stated age  NECK:  Supple, symmetrical, trachea midline, no adenopathy, thyroid symmetric, not enlarged and no tenderness, skin normal  LUNGS:  No increased work of breathing, good air exchange, clear to auscultation bilaterally, no crackles or wheezing  CARDIOVASCULAR:  Normal apical impulse, regular rate and rhythm, normal S1 and S2, no S3 or S4, and no murmur noted  ABDOMEN:  No scars, normal bowel sounds, soft, non-distended, non-tender, no masses palpated, no hepatosplenomegally  NEUROLOGIC:  Awake, alert, oriented to name, place and time. Cranial nerves II-XII are grossly intact. Motor is 5 out of 5 bilaterally. Cerebellar finger to nose, heel to shin intact. Sensory is intact. Babinski down going, Romberg negative, and gait is normal.        Lab and Imaging Review        Recent Labs      12/08/17   1331  12/08/17   1332   WBC  16.8*   --    HGB  8.4*   --    MCV  83.1   --    PLT  317   --    INR  1.0   --    NA  132*   --    K  4.2   --    CL  95*   --    CO2  22   --    BUN  24*   --    CREATININE  1.09*  1.05   GLUCOSE  222*   --    CALCIUM  8.2*   --    PROT  7.4   --    LABALBU  3.3*   --    AST  22   --    ALT  14   --    ALKPHOS  87   --    BILITOT  0.51   --    LIPASE  18   --           Recent Labs      12/08/17   1331   INR  1.0   PROTIME  10.6            DATA:  Xr Chest Standard (2 Vw)     Result Date: 12/8/2017  EXAMINATION: TWO VIEWS OF THE CHEST 12/8/2017 12:14 pm COMPARISON: Chest x-ray dated 12/03/2017 HISTORY: ORDERING SYSTEM PROVIDED HISTORY: currently being treated for pneumonia TECHNOLOGIST PROVIDED HISTORY: Reason for exam:->currently being treated for pneumonia FINDINGS: Cardiomediastinal silhouette is stable. Left chest defibrillator device is unchanged. No focal airspace consolidation, pneumothorax, or pleural effusion. No free air beneath the diaphragm.   No evidence of acute osseous abnormality.      No acute intrathoracic process.      Xr Chest The visualized paranasal sinuses and mastoid air cells demonstrate no acute abnormality. SOFT TISSUES/SKULL:  No acute abnormality of the visualized skull or soft tissues.      No acute intracranial abnormality. Scattered subcortical and periventricular white matter hypodensities are most compatible with chronic small vessel disease.      Ct Head Wo Contrast     Result Date: 11/29/2017  EXAMINATION: CT OF THE HEAD WITHOUT CONTRAST  11/29/2017 4:43 pm TECHNIQUE: CT of the head was performed without the administration of intravenous contrast. Dose modulation, iterative reconstruction, and/or weight based adjustment of the mA/kV was utilized to reduce the radiation dose to as low as reasonably achievable. COMPARISON: August 16, 2017 HISTORY: ORDERING SYSTEM PROVIDED HISTORY: vertigo, headache FINDINGS: BRAIN/VENTRICLES: There is no acute intracranial hemorrhage, mass effect or midline shift. No abnormal extra-axial fluid collection. The gray-white differentiation is maintained without evidence of an acute infarct. No evidence of hydrocephalus. There are nonspecific areas of hypoattenuation within the periventricular and subcortical white matter, which likely represent chronic microvascular ischemic change. Bilateral basal ganglia calcifications. ORBITS: The visualized portion of the orbits demonstrate no acute abnormality. SINUSES: The visualized paranasal sinuses and mastoid air cells demonstrate no acute abnormality. Minimal polypoid mucosal thickening is again noted and inferior left maxillary sinus.  SOFT TISSUES/SKULL: No acute abnormality of the visualized skull or soft tissues.      Stable noncontrast examination of the brain without CT evidence of acute intracranial abnormality and chronic findings as described.      Xr Chest Portable     Result Date: 11/29/2017  EXAMINATION: SINGLE VIEW OF THE CHEST 11/29/2017 3:43 pm COMPARISON: 11/14/2017 HISTORY: ORDERING SYSTEM PROVIDED HISTORY: [facility] TECHNOLOGIST PROVIDED HISTORY: Reason for exam:->RIJ placement FINDINGS: Interval placement right jugular central venous catheter with its tip projecting in the expected location of the right atrium. No definite pneumothorax is seen. Sternal wires and clips from prior heart surgery. Left subclavian defibrillator re- demonstrated. Stable mild cardiomegaly. Lungs show no focal infiltrates or large pleural effusions. Monitor leads overlie the chest      Right jugular central venous catheter tip projects over the expected location of the right atrium without convincing evidence of pneumothorax.      Ct Chest Pulmonary Embolism W Contrast     Result Date: 11/14/2017  EXAMINATION: CTA OF THE CHEST 11/14/2017 2:07 pm TECHNIQUE: CTA of the chest was performed after the administration of intravenous contrast.  Multiplanar reformatted images are provided for review. MIP images are provided for review. Dose modulation, iterative reconstruction, and/or weight based adjustment of the mA/kV was utilized to reduce the radiation dose to as low as reasonably achievable. 75 mL intravenous Optiray 350 was used. COMPARISON: None. HISTORY: ORDERING SYSTEM PROVIDED HISTORY: r/o pe FINDINGS: Pulmonary Arteries: Pulmonary arteries are adequately opacified for evaluation. No evidence of intraluminal filling defect to suggest pulmonary embolism. Main pulmonary artery is normal in caliber. Mediastinum: No evidence of mediastinal lymphadenopathy. The heart and pericardium demonstrate no acute abnormality. Heart is mildly enlarged. There is extensive calcification of the native coronary arteries with evidence of previous CABG. Cardiac pacemaker device is noted. There is no acute abnormality of the thoracic aorta. There is a tiny hiatal hernia. Lungs/pleura: The lungs are without acute process. No focal consolidation or pulmonary edema. No evidence of pleural effusion or pneumothorax.  Upper Abdomen: Limited images of the upper doppler responses. doppler responses. Limited visualization of the lower   Limited visualization of the lower  thigh and calf veins. thigh and calf veins. Allergies   - Allergy:Penicillin(Drug). - Allergy:*Unlisted(Drug). Comments:levofloxacin Patient Status: In Patient. Technical Quality:Limited visualization. Limitation reason:Pt. could not position legs appropriately; pain. Velocities are measured in cm/s ; Diameters are measured in mm Right Lower Extremities DVT Study Measurements Right 2D Measurements +------------------------------------+----------+---------------+----------+ ! Location                            ! Visualized! Compressibility! Thrombosis! +------------------------------------+----------+---------------+----------+ ! Common Femoral                      !Yes       ! Yes            ! None      ! +------------------------------------+----------+---------------+----------+ ! Prox Femoral                        !Yes       ! Yes            ! None      ! +------------------------------------+----------+---------------+----------+ ! Mid Femoral                         !Yes       ! Yes            ! None      ! +------------------------------------+----------+---------------+----------+ ! Dist Femoral                        !Partial   !Yes            !          ! +------------------------------------+----------+---------------+----------+ ! Popliteal                           !Yes       ! Yes            ! None      ! +------------------------------------+----------+---------------+----------+ ! Sapheno Femoral Junction            ! Yes       ! Yes            ! None      ! +------------------------------------+----------+---------------+----------+ ! PTV                                 ! Partial   !Yes            !          ! +------------------------------------+----------+---------------+----------+ ! Peroneal                            !No        !               !          ! Measurements +-----------------------------+------+------+------------------------------+ ! Location                     ! Signal!Reflux! Reflux (sec)                  ! +-----------------------------+------+------+------------------------------+ ! Common Femoral               !Phasic! No    !                              ! +-----------------------------+------+------+------------------------------+ ! Prox Femoral                 !Phasic! No    !                              ! +-----------------------------+------+------+------------------------------+ ! Popliteal                    !Phasic! No    !                              ! +-----------------------------+------+------+------------------------------+  Conclusions   Summary   Technically limited visualization. No evidence of superficial or deep venous thrombosis in both lower  extremities.    Signature   ----------------------------------------------------------------  Electronically signed by Dian Montano(Sonographer) on  12/05/2017 08:39 AM  ----------------------------------------------------------------   ----------------------------------------------------------------  Electronically signed by Alessandro Ruiz(Interpreting physician)  on 12/05/2017 09:25 PM  ----------------------------------------------------------------       Vl Dup Lower Extremity Venous Bilateral     Result Date: 11/14/2017    [facility]  Vascular Lower Extremities DVT Study Procedure   Patient Name  Debbi العراقي Date of Study         11/14/2017                D   Date of Birth 1955  Gender                Female   Age           65 year(s)  Race                  Black   Room Number   2850   Corporate ID  [id number]  #   Patient Jennifer  [de-identified]  #   MR #          [id number]     Sonographer           Raquel Lara   Accession #   [id number]   Interpreting          [address] [place] [address]                            Physician   Referring                 Referring Physician   Sonnie Fern DE [facility] Khushboo Arriaga, *  Nurse  Practitioner  Procedure Type of Study:   Veins: Lower Extremities DVT Study, Venous Scan Lower Bilateral.  Indications for Study:R/O DVT. Risk Factors History +---------+----+-----------------------------------------------------------+ ! Diagnosis! Date! Comments                                                   ! +---------+----+-----------------------------------------------------------+ ! Other    ! !H/O CVA; PM                                                ! +---------+----+-----------------------------------------------------------+ Findings:   Right                                Left  The common femoral, femoral,         The common femoral, femoral,  popliteal, tibials, and saphenous    popliteal, tibials and saphenous  veins are compressible with normal   veins are compressible with normal  doppler responses. doppler responses. Right great saphenous vein           Left great saphenous vein harvested. harvested. Limited visualization of the  Limited visualization of the lower   posterior tibial and peroneal veins. thigh and calf veins. Allergies   - Allergy:Penicillin(Drug). Patient Status: In Patient. Technical Quality:Limited visualization. Velocities are measured in cm/s ; Diameters are measured in mm Right Lower Extremities DVT Study Measurements Right 2D Measurements +------------------------------------+----------+---------------+----------+ ! Location                            ! Visualized! Compressibility! Thrombosis! +------------------------------------+----------+---------------+----------+ ! Common Femoral                      !Yes       ! Yes            ! None      ! +------------------------------------+----------+---------------+----------+ ! Prox Femoral                        !Yes       ! Yes            ! None      ! +------------------------------------+----------+---------------+----------+ ! Mid Femoral                         !Yes !                              ! +-----------------------------+------+------+------------------------------+ ! Prox Femoral                 !Phasic!      !                              ! +-----------------------------+------+------+------------------------------+ ! Popliteal                    !Phasic!      !                              ! +-----------------------------+------+------+------------------------------+ Left Lower Extremities DVT Study Measurements Left 2D Measurements +------------------------------------+----------+---------------+----------+ ! Location                            ! Visualized! Compressibility! Thrombosis! +------------------------------------+----------+---------------+----------+ ! Common Femoral                      !Yes       ! Yes            ! None      ! +------------------------------------+----------+---------------+----------+ ! Prox Femoral                        !Yes       ! Yes            ! None      ! +------------------------------------+----------+---------------+----------+ ! Mid Femoral                         !Yes       ! Yes            ! None      ! +------------------------------------+----------+---------------+----------+ ! Dist Femoral                        !Yes       ! Yes            ! None      ! +------------------------------------+----------+---------------+----------+ ! Popliteal                           !Yes       ! Yes            ! None      ! +------------------------------------+----------+---------------+----------+ ! Sapheno Femoral Junction            ! Yes       ! Yes            ! None      ! +------------------------------------+----------+---------------+----------+ ! PTV                                 ! Yes       ! Yes            ! None      ! +------------------------------------+----------+---------------+----------+ ! Peroneal                            !Partial   !Yes            ! None      ! +------------------------------------+----------+---------------+----------+ Reyes,Arthur(Interpreting  physician) on 11/14/2017 07:43 PM  ----------------------------------------------------------------       Cta Neck W Contrast     Addendum Date: 11/30/2017    ADDENDUM: 3D images were also submitted as part of the exam.      Result Date: 11/30/2017  EXAMINATION: CTA OF THE HEAD WITH CONTRAST; CTA OF THE NECK  11/29/2017 4:38 pm: TECHNIQUE: CTA of the head/brain was performed with the administration of intravenous contrast. Multiplanar reformatted images are provided for review. MIP images are provided for review. Dose modulation, iterative reconstruction, and/or weight based adjustment of the mA/kV was utilized to reduce the radiation dose to as low as reasonably achievable.; CTA of the neck was performed with the administration of intravenous contrast. Multiplanar reformatted images are provided for review. MIP images are provided for review. Stenosis of the internal carotid arteries measured using NASCET criteria. Dose modulation, iterative reconstruction, and/or weight based adjustment of the mA/kV was utilized to reduce the radiation dose to as low as reasonably achievable. COMPARISON: None. HISTORY: ORDERING SYSTEM PROVIDED HISTORY: vertigo, presyncope, headache; ORDERING SYSTEM PROVIDED HISTORY: vertigo, headache, syncope FINDINGS: CTA neck: The lung apices are clear. A 6 mm hyperenhancing nodule is present within the right thyroid lobe with additional smaller enhancing nodules present in the left thyroid lobe. There is 50% stenosis of the left subclavian artery due to calcified and noncalcified plaque. There is a severe stenosis at the origin of the right vertebral artery due to calcified plaque. The right vertebral artery is dominant. There is a severe focal stenosis of the distal V4 segment of the right vertebral artery due to calcified plaque with severe stenosis of the V4 segment of the left vertebral artery also seen due to calcified plaque. Both carotids are tortuous. laine and Duke 3-tiered system for managing ITNs: J Am Katia Radiol. 2015 Feb;12(2): 143-50      Cta Head W Contrast     Addendum Date: 11/30/2017    ADDENDUM: 3D images were also submitted as part of the exam.      Result Date: 11/30/2017  EXAMINATION: CTA OF THE HEAD WITH CONTRAST; CTA OF THE NECK  11/29/2017 4:38 pm: TECHNIQUE: CTA of the head/brain was performed with the administration of intravenous contrast. Multiplanar reformatted images are provided for review. MIP images are provided for review. Dose modulation, iterative reconstruction, and/or weight based adjustment of the mA/kV was utilized to reduce the radiation dose to as low as reasonably achievable.; CTA of the neck was performed with the administration of intravenous contrast. Multiplanar reformatted images are provided for review. MIP images are provided for review. Stenosis of the internal carotid arteries measured using NASCET criteria. Dose modulation, iterative reconstruction, and/or weight based adjustment of the mA/kV was utilized to reduce the radiation dose to as low as reasonably achievable. COMPARISON: None. HISTORY: ORDERING SYSTEM PROVIDED HISTORY: vertigo, presyncope, headache; ORDERING SYSTEM PROVIDED HISTORY: vertigo, headache, syncope FINDINGS: CTA neck: The lung apices are clear. A 6 mm hyperenhancing nodule is present within the right thyroid lobe with additional smaller enhancing nodules present in the left thyroid lobe. There is 50% stenosis of the left subclavian artery due to calcified and noncalcified plaque. There is a severe stenosis at the origin of the right vertebral artery due to calcified plaque. The right vertebral artery is dominant. There is a severe focal stenosis of the distal V4 segment of the right vertebral artery due to calcified plaque with severe stenosis of the V4 segment of the left vertebral artery also seen due to calcified plaque. Both carotids are tortuous.   Calcified and noncalcified plaque is present within both ICAs but no significant stenosis of the cervical ICAs is noted by NASCET criteria. There is mild to moderate stenosis of the carotid siphons. There are degenerative changes of the spine. CTA head: The anterior and middle cerebral arteries demonstrate normal arborization patterns. Bilateral posterior communicating arteries are present. The basilar artery and posterior cerebral arteries are normal in course and caliber to the extent visualized. No aneurysm or vascular malformation is identified.      Severe stenosis at the origin of the right vertebral artery and severe stenoses of both distal vertebral arteries. Mild-to-moderate stenoses of the carotid siphons. Incidentally noted enhancing thyroid nodules. RECOMMENDATIONS: Managing Incidental Thyroid Nodule Detected at CT or MRI or US 1. Further evaluation by thyroid Ultrasound recommended for these incidental nodules: Patient Age 25 years or less - Any nodule. Patient Age 21-27 years old - Nodule 1 cm in size or greater Patient Age 28 years or more - Nodule 1.5 cm in size or greater 2. Follow up thyroid ultrasound also recommend in these scenarios -Solitary nodule with high risk imaging features (locally invasive nodule or suspicious lymph nodes) -Any nodule in a heterogeneous enlarged thyroid gland 3. NO further imaging is recommended in the following scenarios -No f/u imaging is recommended for ITNs not meeting the above criteria. -No US or f/u recommended for ITNs without high risk features in pts. with limited life expectancy or significant co-morbidities, unless clinically warranted.  Note: These recommendations do not apply to pts. w/ increased risk for thyroid cancer or pts. with symptomatic thyroid disease. ________________________________________________________________ Recommendations for f/u of Incidental Thyroid Nodules (ITN) found on CT, MR, NM and Extrathyroidal US are based upon the ACR white paper and [facility] 3-tiered system for with Tc99m)      1. Artifact limited examination as described above. Within these limitations, there is apparent remote infarction within the inferior wall and moderate-sized partially reversible perfusion defect within the anterior wall, concerning for ischemia. 2. Dyskinesis of the apex. Severe hypokinesis of the septum, inferoseptal wall, and inferior wall. Calculated ejection fraction of 36%. 3. Risk stratification: High Notes concerning risk stratification: Risk stratification incorporates both clinical history and some testing results. Final risk determination is the responsibility of the ordering provider as other patient information and test results may increase or decrease the risk assessment reported for this examination. Risk stratification criteria are adapted from \"Noninvasive Risk Stratification\" criteria from [employer]. Al, ACC/AATS/AHA/ASE/ASNC/SCAI/SCCT/STS 2017 Appropriate Use Criteria For Coronary Revascularization in Patients With Stable Ischemic Heart Disease [facility] Volume 69, Issue 17, May 2017 High risk (>3% annual death or MI) 1. Severe resting LV dysfunction (LVEF <35%) not readily explained by non coronary causes 2. Resting perfusion abnormalities greater than 10% of the myocardium in patients without prior history or evidence of MI3. Stress-induced perfusion abnormalities encumbering greater than or equal to 10% myocardium or stress segmental scores indicating multiple vascular territories with abnormalities 4. Stress-induced LV dilatation (TID ratio greater than 1.19 for exercise and greater than 1.39 for regadenoson) Intermediate risk (1% to 3% annual death or MI) 1. Mild/moderate resting LV dysfunction (LVEF 35% to 49%) not readily explained by non coronary causes. 2. Resting perfusion abnormalities in 5%-9.9% of the myocardium in patients without a history or prior evidence of MI 3.  Stress-induced perfusion abnormality encumbering 5%-9.9% of the myocardium or stress segmental scores indicating 1 vascular territory with abnormalities but without LV dilation 4. Small wall motion abnormality involving 1-2 segments and only 1 coronary bed. Low Risk (Less than 1% annual death or MI) 1. Normal or small myocardial perfusion defect at rest or with stress encumbering less than 5% of the myocardium.            Cardiac cath 12/4/2017  Findings:     Left main: Normal     LAD: 100% mid stenosis     LIMA-LAD: patent graft     LCX: 100% proximal stenosis.     SVG-OM3 patent graft and supplying OM1 in a retrograde fashion.      RCA: diffuse irregularities 40-50% with patent prior stents.     The LV gram was performed in the GAITAN 30 position. LVEF: 30%. LV Wall Motion: global hypokinesis        Conclusions:  1. Multivessel CAD. 2. Patent LIMA-LAD and SVG-OM3.  3. Patent prior RCA stents with non-obstructive disease  4. Moderately reduced LV systolic function        ASSESSMENT/PLAN:     1. NSTEMI  2. Hypotension which was resolved after getting IV fluids  #3 possible recurrent urinary tract infection or continuation of the previously treated urinary tract infection. #4 rule out sepsis  #5 coronary artery disease with recent cardiac cath showing multi-vessel coronary artery disease with recommendations of medical management  #6 diabetes mellitus which is insulin-dependent  #7 hyperlipidemia  #08 COPD currently controlled  Chronic combined systolic and diastolic CHF in the setting of CAD - s/p AICD.   B VA severe stenosis      -  Heparin drip as recommended by cardiology  Follow d-dimer and may need CT pulmonary angiogram if significantly positive  Trend troponins and monitor EKGs  Follow CK-MB to differentiate new nSTEMI from previously uptrending troponins especially in the absence of chest pain   Rocephin IV  - Follow urine exam and culture results and adjust antibiotics.     Tatiana Plaza MD  Internal Medicine, PGY1 Resident  [facility] \[facility]\""

## 2017-12-09 NOTE — PROGRESS NOTES
Assessment this am completed as charted. Patient was intermittently cooperative with assessment but was verbally aggressive the entire assessment. Patient did not want to answer assessment questions and told RN \"to stop asking so many questions\", patient was informed that asking the questions is part of my job to take care of her while in the hospital, she stated \" just keep being mouthy and I will fire you\". Patient was threatening RN during assessment, patient was asked to straighten out arm to look for an IV site she stated \"I will straighten it out right into your nose\" patient also stated that \"my aunt works here and she will be coming her for you\". Patient requested milk to drink, patient was informed that she is NPO due to possible cardiac testing (night shift had also made her aware). Patient stated \"I am 65years old, you are not going to starve me to death, I am able to eat and drink\".

## 2017-12-09 NOTE — CARE COORDINATION
Case Management Initial Discharge Plan  Robyn Bennett,         Readmission Risk              Readmission Risk:        26.75       Age 72 or Greater:  0    Admitted from SNF or Requires Paid or Family Care:  0    Currently has CHF,COPD,ARF,CRI,or is on dialysis:  4    Takes more than 5 Prescription Medications:  4    Takes Digoxin,Insulin,Anticoagulants,Narcotics or ASA/Plavix:  [address] in Past 12 Months:  10    On Disability:  3    Patient Considers own Health:  3.75            Met with:patient to discuss discharge plans. Information verified: address, contacts, phone number, , insurance Yes  PCP: Sarahy Leonard MD  Date of last visit: 1 month ago    Insurance Provider: Medicare, [address] [place] [address]    Discharge Planning  Current Residence:  Private Residence  Living Arrangements:  Alone   Home has 1 stories/0 stairs to climb  Support Systems:  Home Care Staff  Current Services PTA:  Home Care Supplier: [address]  Patient able to perform ADL's:Independent  DME used to aid ambulation prior to admission: cane/during admission    Potential Assistance Needed:  [address]: [facility]  Potential Assistance Purchasing Medications:  No  Does patient want to participate in local refill/ meds to beds program?  Yes    Patient agreeable to home care: Yes  Freedom of choice provided:  Yes current with [address]    Type of Home Care Services:  Aide Services, OT, PT, Skilled Therapy  Patient expects to be discharged to:  home    Prior SNF/Rehab Placement and Facility: no  Agreeable to SNF/Rehab: No  [place] of choice provided: n/a   Evaluation: n/a    Expected Discharge date:  12/10/17  Follow Up Appointment: Best Day/ Time:      Transportation provider: Ambulance  Transportation arrangements needed for discharge: Yes    Discharge Plan: home with continued services through [address], [facility] faxed, needs ambulance transportation home.   meds to beds        Electronically signed by [facility]

## 2017-12-10 PROBLEM — R78.81 MRSA BACTEREMIA: Status: ACTIVE | Noted: 2017-12-10

## 2017-12-10 PROBLEM — Z22.322 MRSA (METHICILLIN RESISTANT STAPH AUREUS) CULTURE POSITIVE: Status: ACTIVE | Noted: 2017-12-10

## 2017-12-10 PROBLEM — B95.62 MRSA BACTEREMIA: Status: ACTIVE | Noted: 2017-12-10

## 2017-12-10 LAB
ABSOLUTE EOS #: <0.03 K/UL (ref 0–0.44)
ABSOLUTE IMMATURE GRANULOCYTE: 0.1 K/UL (ref 0–0.3)
ABSOLUTE LYMPH #: 1.15 K/UL (ref 1.1–3.7)
ABSOLUTE MONO #: 0.33 K/UL (ref 0.1–1.2)
ANION GAP SERPL CALCULATED.3IONS-SCNC: 14 MMOL/L (ref 9–17)
BASOPHILS # BLD: 0 % (ref 0–2)
BASOPHILS ABSOLUTE: <0.03 K/UL (ref 0–0.2)
BUN BLDV-MCNC: 20 MG/DL (ref 8–23)
BUN/CREAT BLD: ABNORMAL (ref 9–20)
CALCIUM SERPL-MCNC: 7.3 MG/DL (ref 8.6–10.4)
CHLORIDE BLD-SCNC: 101 MMOL/L (ref 98–107)
CO2: 18 MMOL/L (ref 20–31)
CREAT SERPL-MCNC: 0.83 MG/DL (ref 0.5–0.9)
CULTURE: NORMAL
DIFFERENTIAL TYPE: ABNORMAL
EOSINOPHILS RELATIVE PERCENT: 0 % (ref 1–4)
GFR AFRICAN AMERICAN: >60 ML/MIN
GFR NON-AFRICAN AMERICAN: >60 ML/MIN
GFR SERPL CREATININE-BSD FRML MDRD: ABNORMAL ML/MIN/{1.73_M2}
GFR SERPL CREATININE-BSD FRML MDRD: ABNORMAL ML/MIN/{1.73_M2}
GLUCOSE BLD-MCNC: 182 MG/DL (ref 65–105)
GLUCOSE BLD-MCNC: 200 MG/DL (ref 65–105)
GLUCOSE BLD-MCNC: 227 MG/DL (ref 70–99)
GLUCOSE BLD-MCNC: 347 MG/DL (ref 65–105)
GLUCOSE BLD-MCNC: 380 MG/DL (ref 65–105)
HCT VFR BLD CALC: 23.7 % (ref 36.3–47.1)
HEMOGLOBIN: 7.4 G/DL (ref 11.9–15.1)
IMMATURE GRANULOCYTES: 1 %
LYMPHOCYTES # BLD: 10 % (ref 24–43)
Lab: NORMAL
Lab: NORMAL
MCH RBC QN AUTO: 25.2 PG (ref 25.2–33.5)
MCHC RBC AUTO-ENTMCNC: 31.2 G/DL (ref 28.4–34.8)
MCV RBC AUTO: 80.6 FL (ref 82.6–102.9)
MONOCYTES # BLD: 3 % (ref 3–12)
PDW BLD-RTO: 18 % (ref 11.8–14.4)
PLATELET # BLD: 264 K/UL (ref 138–453)
PLATELET ESTIMATE: ABNORMAL
PMV BLD AUTO: 9.6 FL (ref 8.1–13.5)
POTASSIUM SERPL-SCNC: 4.5 MMOL/L (ref 3.7–5.3)
RBC # BLD: 2.94 M/UL (ref 3.95–5.11)
RBC # BLD: ABNORMAL 10*6/UL
SEG NEUTROPHILS: 86 % (ref 36–65)
SEGMENTED NEUTROPHILS ABSOLUTE COUNT: 9.96 K/UL (ref 1.5–8.1)
SODIUM BLD-SCNC: 133 MMOL/L (ref 135–144)
SPECIMEN DESCRIPTION: NORMAL
SPECIMEN DESCRIPTION: NORMAL
STATUS: NORMAL
STATUS: NORMAL
WBC # BLD: 11.6 K/UL (ref 3.5–11.3)
WBC # BLD: ABNORMAL 10*3/UL

## 2017-12-10 PROCEDURE — 87040 BLOOD CULTURE FOR BACTERIA: CPT

## 2017-12-10 PROCEDURE — 6360000002 HC RX W HCPCS: Performed by: STUDENT IN AN ORGANIZED HEALTH CARE EDUCATION/TRAINING PROGRAM

## 2017-12-10 PROCEDURE — 99233 SBSQ HOSP IP/OBS HIGH 50: CPT | Performed by: INTERNAL MEDICINE

## 2017-12-10 PROCEDURE — 94762 N-INVAS EAR/PLS OXIMTRY CONT: CPT

## 2017-12-10 PROCEDURE — 85025 COMPLETE CBC W/AUTO DIFF WBC: CPT

## 2017-12-10 PROCEDURE — 80048 BASIC METABOLIC PNL TOTAL CA: CPT

## 2017-12-10 PROCEDURE — 36415 COLL VENOUS BLD VENIPUNCTURE: CPT

## 2017-12-10 PROCEDURE — 99222 1ST HOSP IP/OBS MODERATE 55: CPT | Performed by: INTERNAL MEDICINE

## 2017-12-10 PROCEDURE — 2060000000 HC ICU INTERMEDIATE R&B

## 2017-12-10 PROCEDURE — 6370000000 HC RX 637 (ALT 250 FOR IP): Performed by: STUDENT IN AN ORGANIZED HEALTH CARE EDUCATION/TRAINING PROGRAM

## 2017-12-10 PROCEDURE — 6360000002 HC RX W HCPCS: Performed by: INTERNAL MEDICINE

## 2017-12-10 PROCEDURE — 82947 ASSAY GLUCOSE BLOOD QUANT: CPT

## 2017-12-10 PROCEDURE — 6370000000 HC RX 637 (ALT 250 FOR IP): Performed by: PSYCHIATRY & NEUROLOGY

## 2017-12-10 PROCEDURE — 2580000003 HC RX 258: Performed by: STUDENT IN AN ORGANIZED HEALTH CARE EDUCATION/TRAINING PROGRAM

## 2017-12-10 PROCEDURE — 6370000000 HC RX 637 (ALT 250 FOR IP)

## 2017-12-10 RX ORDER — BENZONATATE 100 MG/1
100 CAPSULE ORAL 3 TIMES DAILY PRN
Status: DISCONTINUED | OUTPATIENT
Start: 2017-12-10 | End: 2017-12-17 | Stop reason: HOSPADM

## 2017-12-10 RX ORDER — LORAZEPAM 1 MG/1
TABLET ORAL
Status: COMPLETED
Start: 2017-12-10 | End: 2017-12-10

## 2017-12-10 RX ADMIN — MECLIZINE HCL 12.5 MG: 12.5 TABLET ORAL at 21:00

## 2017-12-10 RX ADMIN — OLANZAPINE 10 MG: 10 TABLET, FILM COATED ORAL at 20:59

## 2017-12-10 RX ADMIN — ATORVASTATIN CALCIUM 80 MG: 80 TABLET, FILM COATED ORAL at 20:59

## 2017-12-10 RX ADMIN — INSULIN LISPRO 6 UNITS: 100 INJECTION, SOLUTION INTRAVENOUS; SUBCUTANEOUS at 21:10

## 2017-12-10 RX ADMIN — HYDROCORTISONE SODIUM SUCCINATE 100 MG: 100 INJECTION, POWDER, FOR SOLUTION INTRAMUSCULAR; INTRAVENOUS at 08:55

## 2017-12-10 RX ADMIN — METOPROLOL TARTRATE 25 MG: 25 TABLET ORAL at 20:58

## 2017-12-10 RX ADMIN — LORAZEPAM 0.5 MG: 1 TABLET ORAL at 21:06

## 2017-12-10 RX ADMIN — HEPARIN SODIUM 5000 UNITS: 5000 INJECTION, SOLUTION INTRAVENOUS; SUBCUTANEOUS at 06:35

## 2017-12-10 RX ADMIN — HEPARIN SODIUM 5000 UNITS: 5000 INJECTION, SOLUTION INTRAVENOUS; SUBCUTANEOUS at 21:11

## 2017-12-10 RX ADMIN — INSULIN LISPRO 6 UNITS: 100 INJECTION, SOLUTION INTRAVENOUS; SUBCUTANEOUS at 08:55

## 2017-12-10 RX ADMIN — HEPARIN SODIUM 5000 UNITS: 5000 INJECTION, SOLUTION INTRAVENOUS; SUBCUTANEOUS at 14:19

## 2017-12-10 RX ADMIN — LORAZEPAM 0.5 MG: 0.5 TABLET ORAL at 08:55

## 2017-12-10 RX ADMIN — INSULIN LISPRO 15 UNITS: 100 INJECTION, SOLUTION INTRAVENOUS; SUBCUTANEOUS at 16:41

## 2017-12-10 RX ADMIN — ASPIRIN 81 MG: 81 TABLET, CHEWABLE ORAL at 08:54

## 2017-12-10 RX ADMIN — OXCARBAZEPINE 300 MG: 300 TABLET, FILM COATED ORAL at 08:54

## 2017-12-10 RX ADMIN — BENZONATATE 100 MG: 100 CAPSULE ORAL at 14:18

## 2017-12-10 RX ADMIN — VANCOMYCIN HYDROCHLORIDE 1250 MG: 10 INJECTION, POWDER, LYOPHILIZED, FOR SOLUTION INTRAVENOUS at 09:04

## 2017-12-10 RX ADMIN — LORAZEPAM 0.5 MG: 0.5 TABLET ORAL at 21:06

## 2017-12-10 RX ADMIN — ACETAMINOPHEN 650 MG: 325 TABLET ORAL at 08:55

## 2017-12-10 RX ADMIN — CLOPIDOGREL 75 MG: 75 TABLET, FILM COATED ORAL at 08:54

## 2017-12-10 RX ADMIN — VANCOMYCIN HYDROCHLORIDE 1250 MG: 10 INJECTION, POWDER, LYOPHILIZED, FOR SOLUTION INTRAVENOUS at 21:00

## 2017-12-10 RX ADMIN — BENZONATATE 100 MG: 100 CAPSULE ORAL at 20:58

## 2017-12-10 RX ADMIN — HYDROCORTISONE SODIUM SUCCINATE 100 MG: 100 INJECTION, POWDER, FOR SOLUTION INTRAMUSCULAR; INTRAVENOUS at 00:00

## 2017-12-10 RX ADMIN — OXCARBAZEPINE 300 MG: 300 TABLET, FILM COATED ORAL at 21:00

## 2017-12-10 RX ADMIN — INSULIN GLARGINE 30 UNITS: 100 INJECTION, SOLUTION SUBCUTANEOUS at 21:11

## 2017-12-10 RX ADMIN — INSULIN LISPRO 15 UNITS: 100 INJECTION, SOLUTION INTRAVENOUS; SUBCUTANEOUS at 11:43

## 2017-12-10 RX ADMIN — METOPROLOL TARTRATE 25 MG: 25 TABLET ORAL at 14:18

## 2017-12-10 RX ADMIN — FAMOTIDINE 20 MG: 20 TABLET, FILM COATED ORAL at 08:54

## 2017-12-10 ASSESSMENT — ENCOUNTER SYMPTOMS
EYES NEGATIVE: 1
BACK PAIN: 1
RESPIRATORY NEGATIVE: 1
GASTROINTESTINAL NEGATIVE: 1
ALLERGIC/IMMUNOLOGIC NEGATIVE: 1

## 2017-12-10 NOTE — PROGRESS NOTES
Dr Jocelyn Ching rounded, aware of current status and VS, discussed with patient that she has an infection in the blood and will require antibiotics for some time. Aware that blood sugars are running high, diet changed to have a carb restriction - also on IV solumedrol.

## 2017-12-10 NOTE — CONSULTS
[facility] - Initial Consult Note  Today's Date and Time: 12/10/2017, 11:54 AM  admission date 12/8/2017  Impression :   Current:  · NSTEMI  · Hypotension  · MRSA sepsis  · Old silent bacteriuria, no pyuria  · Leukocytosis - improved  · CAD many vessel disease - recent card cath right groin   · DM  · Severe systolic CHF - AICD right ventricle  · Bilateral vertebral basilar artery severe stenosis    Other:  · Old CVA - left sided severe paresis    Recommendations   · vanco iv for mRSA bacteremia  · Second blood cx pend clusters  · Will need NATHAN look for vegetation on the AICD  · AICD site is tender, but not red - watch changes  · Repeat blood culture till negative  · follow creat under vanco  · I doubt any UTI, she has silent bacteruria, no need to treat the E coli    Chief complaint/reason for consultation:   MRSA sepsis    History of Present Illness:   Initial history:  Kings Patel is a 65y.o.-year-old  female who was initially admitted on 12/8/2017. Patient seen at the request of Calvin Shankar. Known to have a basilar art stenosis bilat w black out sepsis, planned for surgery, started having those spells again at home today and was hypotensive, febrile in ER  blood culture showed MRSA and the other clusters pend. She has e coli bacteriuria and no dysuria. , same as last many months. No fever at home but a new back pain from her fall. Very poor historian, unable to better detail her past history    Past CVA w left side paresis, sits in a wheelchair and has an aid at home. Recent card cath showed multivessel disease and is on medical management. troponins were elevated here, and she is started on heparin    Has noticed she has a left chest AICD site pain wo redness but with induration, started recently but no redness or drainage.       Summary of relevant labs:  Labs:    Micro:  Blood cx MRSA  Urine cx E coli    Imaging:  Echo 11/30/17 no vegetations CTA chest, no pneumonia and could not assess for P emboli  CT head neg  I have personally reviewed the past medical history, past surgical history, medications, social history, and family history, and I have updated the database accordingly. Past Medical History:     Past Medical History:   Diagnosis Date    Arthritis     Asthma     CAD (coronary artery disease)     [facility]estive heart failure) [facility]     Clinical trial participant at discharge 03/13/2017    [employer]     [facility]ch[facility]     Diabetes mellitus ([facility]     Hepatitis C     Hyperlipidemia     Hypertension     MRSA (methicillin resistant staph aureus) culture positive 12/10/2017    blood    Pneumonia     Unspecified cerebral artery occlusion with cerebral infarction        Past Surgical  History:     Past Surgical History:   Procedure Laterality Date    CARDIAC CATHETERIZATION  11/27/2017    CARDIAC PACEMAKER PLACEMENT      placed 1 month ago at [address]  5/6/2016    cardiac cath-1xBMS prox. RCA; 1xBMS prox.  LAD    FOOT SURGERY      cyst removed from L foot    TONSILLECTOMY         Medications:      vancomycin (VANCOCIN) intermittent dosing (placeholder)   Other RX Placeholder    vancomycin  15 mg/kg Intravenous Q12H    metoprolol tartrate  25 mg Oral BID    heparin (porcine)  5,000 Units Subcutaneous 3 times per day    OLANZapine  10 mg Oral Nightly    LORazepam  0.5 mg Oral BID    cefTRIAXone (ROCEPHIN) IV  1 g Intravenous Q24H    lidocaine 1 % injection  5 mL Intradermal Once    sodium chloride flush  10 mL Intravenous 2 times per day    aspirin  81 mg Oral Daily    atorvastatin  80 mg Oral Nightly    clopidogrel  75 mg Oral Daily    famotidine  20 mg Oral Daily    insulin glargine  30 Units Subcutaneous Nightly    OXcarbazepine  300 mg Oral BID    sodium chloride flush  10 mL Intravenous 2 times per day    insulin lispro  0-18 Units Subcutaneous TID WC    insulin lispro  0-9 Units Subcutaneous Nightly       Social History:     Social History     Social History    Marital status: Single     Spouse name: N/A    Number of children: N/A    Years of education: N/A     Occupational History    Not on file. Social History Main Topics    Smoking status: Current Every Day Smoker     Packs/day: 1.00     Types: Cigarettes    Smokeless tobacco: Never Used    Alcohol use Yes      Comment: 1 bottle per month wine    Drug use: No    Sexual activity: Yes     Partners: Male     Other Topics Concern    Not on file     Social History Narrative    No narrative on file       Family History:     Family History   Problem Relation Age of Onset    Family history unknown: Yes        Allergies:   Levofloxacin and Pcn [penicillins]     Review of Systems:     Review of Systems   HENT:        Vision blurred on off, only lately   Eyes: Negative. Respiratory: Negative. Cardiovascular:        Left chest wall pain at the AICD site   Gastrointestinal: Negative. Endocrine: Negative. Genitourinary: Negative. Musculoskeletal: Positive for back pain (after the recent fall). Skin: Negative. Allergic/Immunologic: Negative. Neurological: Positive for weakness (left side). Hematological: Negative. Psychiatric/Behavioral: Negative. Physical Examination :   Patient Vitals for the past 8 hrs:   BP Temp Temp src Pulse Resp SpO2   12/10/17 0710 124/71 98 °F (36.7 °C) Axillary 104 22 100 %   12/10/17 0500 105/61 - - 93 24 -   12/10/17 0400 (!) 101/57 97.9 °F (36.6 °C) Oral 94 22 100 %       Physical Exam   Constitutional: She is oriented to person, place, and time and well-developed, well-nourished, and in no distress. No distress. HENT:   Head: Normocephalic and atraumatic. Would not open her mouth for exam, would not say why, but tongue wo thrush   Eyes: Conjunctivae and EOM are normal.   Neck: No tracheal deviation present.    Cardiovascular: Normal rate and normal heart sounds. Exam reveals no friction rub. No murmur heard. Pulmonary/Chest: Breath sounds normal. No respiratory distress. She has no wheezes. Abdominal: Bowel sounds are normal. She exhibits no distension. There is no tenderness. Right groin  Area wo signs of inflammation   Musculoskeletal: She exhibits deformity. Left arm and leg severe paresis   Neurological: She is alert and oriented to person, place, and time. Skin: Skin is warm and dry. She is not diaphoretic. No erythema. Psychiatric: Affect normal.         Medical Decision Making:   I have independently reviewed/ordered the following labs:    CBC with Differential: Recent Labs      12/09/17   0600  12/10/17   0535   WBC  16.8*  11.6*   HGB  7.4*  7.4*   HCT  23.8*  23.7*   PLT  298  264   LYMPHOPCT  7*  10*   MONOPCT  6  3     BMP: Recent Labs      12/09/17   0600  12/10/17   0535   NA  134*  133*   K  4.3  4.5   CL  98  101   CO2  22  18*   BUN  22  20   CREATININE  1.17*  0.83     Hepatic Function Panel: Recent Labs      12/08/17   1331   PROT  7.4   LABALBU  3.3*   BILITOT  0.51   ALKPHOS  87   ALT  14   AST  22     No results for input(s): RPR in the last 72 hours. No results for input(s): HIV in the last 72 hours. No results for input(s): BC in the last 72 hours. Lab Results   Component Value Date    MUCUS NOT REPORTED 12/08/2017    RBC 2.94 12/10/2017    TRICHOMONAS NOT REPORTED 12/08/2017    WBC 11.6 12/10/2017    YEAST NOT REPORTED 12/08/2017    TURBIDITY CLOUDY 12/08/2017     Lab Results   Component Value Date    CREATININE 0.83 12/10/2017    GLUCOSE 227 12/10/2017       Detailed results:  Echo 11/30/17  Left ventricle is normal in size, increased left ventricular wall thickness,  global left ventricular systolic function is moderately reduced, estimated  ejection fraction is 35%. Grade I (mild) left ventricular diastolic dysfunction with elevated filling  pressures.   Pacing lead seen in the right

## 2017-12-10 NOTE — PROGRESS NOTES
0600  12/10/17   0535   WBC  16.8*  11.6*         Intake/Output Summary (Last 24 hours) at 12/10/17 0825  Last data filed at 12/09/17 1700   Gross per 24 hour   Intake 2127 ml   Output 0 ml   Net 2127 ml       Culture Date      Source                       Results  12/8                    blood                           MRSA; coagulase negative staph  12/8                    urine                            E. coli    Ht Readings from Last 1 Encounters:   12/09/17 5' 1\" (1.549 m)        Wt Readings from Last 1 Encounters:   12/09/17 170 lb 10.2 oz (77.4 kg)         Body mass index is 32.24 kg/m². Estimated Creatinine Clearance: 67 mL/min (based on SCr of 0.83 mg/dL). Assessment/Plan:  Will initiate vancomycin 1250 mg IV every 12 hours. Timing of trough level will be determined based on culture results, renal function, and clinical response. Thank you for the consult. Will continue to follow.     Justyn SchneiderD, BCPS 12/10/2017 8:28 AM

## 2017-12-10 NOTE — PROGRESS NOTES
Dr Kade Ross rounded, aware of current status, assessed patient and stated that she is doing better. No new orders received.

## 2017-12-10 NOTE — PROGRESS NOTES
IM RESIDENT PROGRESS NOTE        Patient:  Zuri Mora  YOB: 1955    MRN: [id number]     Acct: [de-identified]     Admit date: 12/8/2017    Subjective:   Patient seen and evaluated at bedside. Charts reviewed. Patient now complying with meds and lab draws and eating drinking fine this am.  Psych eval done. No new complaints in the morning. No acute events overnight. MRSA positive 2/2 blood cultures. Started on Vanc and NATHAN ordered. ID consult. Patient seen and evaluated at the bedside. And charts reviewed. Patient refuses to comply with medications or IV fluids. Her IV line has been infiltrated. RN attempting to get another one. She hasn't had any labs drawn due to no IV line. No new complaints in the morning. Afebrile, TMax= 100.5  Hemodynamically stable.   BP = 92/71 , HR= 121        Diet:  DIET CARDIAC; Carb Control: 4 carbs/meal (approximate 1800 kcals/day)      Medications:Current Inpatient    Scheduled Meds:   vancomycin (VANCOCIN) intermittent dosing (placeholder)   Other RX Placeholder    vancomycin  15 mg/kg Intravenous Q12H    metoprolol tartrate  25 mg Oral BID    heparin (porcine)  5,000 Units Subcutaneous 3 times per day    OLANZapine  10 mg Oral Nightly    LORazepam  0.5 mg Oral BID    lidocaine 1 % injection  5 mL Intradermal Once    sodium chloride flush  10 mL Intravenous 2 times per day    aspirin  81 mg Oral Daily    atorvastatin  80 mg Oral Nightly    clopidogrel  75 mg Oral Daily    famotidine  20 mg Oral Daily    insulin glargine  30 Units Subcutaneous Nightly    OXcarbazepine  300 mg Oral BID    sodium chloride flush  10 mL Intravenous 2 times per day    insulin lispro  0-18 Units Subcutaneous TID WC    insulin lispro  0-9 Units Subcutaneous Nightly     Continuous Infusions:   sodium chloride 100 mL/hr at 12/09/17 1106    dextrose       PRN Meds:glucose, dextrose, glucagon hours.  Phosphorus:No results for input(s): PHOS in the last 72 hours. BNP:No results for input(s): BNP in the last 72 hours. Glucose:  Recent Labs      12/09/17   1610  12/09/17   2109  12/10/17   0640   POCGLU  109*  182*  200*     HgbA1C: No results for input(s): LABA1C in the last 72 hours. INR:   Recent Labs      12/08/17   1331   INR  1.0     Hepatic:   Recent Labs      12/08/17   1331   ALKPHOS  87   ALT  14   AST  22   PROT  7.4   BILITOT  0.51   LABALBU  3.3*     Amylase and Lipase:No results for input(s): LACTA, AMYLASE in the last 72 hours. Lactic Acid: No results for input(s): LACTA in the last 72 hours. CARDIAC ENZYMES:  Recent Labs      12/08/17   1331  12/08/17   1336   CKMB  2.1   --    TROPONINI   --   3.27*     BNP: No results for input(s): BNP in the last 72 hours. Lipids: No results for input(s): CHOL, TRIG, HDL, LDLCALC in the last 72 hours. Invalid input(s): LDL  ABGs: No results found for: PH, PCO2, PO2, HCO3, O2SAT  Thyroid:   Lab Results   Component Value Date    TSH 0.98 09/19/2017      Urinalysis:   Color, UA   Date Value Ref Range Status   12/08/2017 DARK YELLOW (A) YEL Final     pH, UA   Date Value Ref Range Status   12/08/2017 5.0 5.0 - 8.0 Final     Specific Gravity, UA   Date Value Ref Range Status   12/08/2017 1.018 1.005 - 1.030 Final     Protein, UA   Date Value Ref Range Status   12/08/2017 1+ (A) NEG Final     RBC, UA   Date Value Ref Range Status   12/08/2017 0 TO 2 0 - 4 /HPF Final     Comment:     Reference range defined for non-centrifuged specimen.      Bacteria, UA   Date Value Ref Range Status   12/08/2017 MANY (A) NONE Final     Comment:     [facility] [address] [facility] [address], [place] [phone]     Nitrite, Urine   Date Value Ref Range Status   12/08/2017 POSITIVE (A) NEG Final     WBC, UA   Date Value Ref Range Status   12/08/2017 TOO NUMEROUS TO COUNT 0 - 5 /HPF Final     Leukocyte Esterase, Urine   Date Value Ref Range Status   12/08/2017 MODERATE (A) NEG Final     Yeast, UA   Date Value Ref Range Status   12/08/2017 NOT REPORTED NONE Final     Glucose, Ur   Date Value Ref Range Status   12/08/2017 NEGATIVE NEG Final     Bilirubin Urine   Date Value Ref Range Status   12/08/2017 NEGATIVE NEG Final           Assessment:  Principal Problem:    Sepsis due to Escherichia coli [facility])  Active Problems:    S/P implantation of automatic cardioverter/defibrillator (AICD) 3/13/1- Dr. Vani Spence hypertension    Diabetes mellitus type 2, controlled ([facility]    History of CVA (cerebrovascular accident)    Noncompliance    DHARMESH (acute kidney injury) ([facility]    ST elevation myocardial infarction (STEMI) ([facility]    Leukocytosis    Vertebral artery disease ([email or web address]    Acute cystitis without hematuria    Chronic respiratory failure (HCC)    Severe sepsis ([facility])    Chronic combined systolic and diastolic congestive heart failure ([facility])    Adrenal insufficiency ([facility])    Sepsis due to methicillin resistant Staphylococcus aureus (MRSA) ([facility])    MRSA (methicillin resistant staph aureus) culture positive    MRSA bacteremia      Plan:  UTI on last admission, now in Sepsis due to E. Coli secondary to medical non-compliance. Cont Asa, Plavix and Lipitor  Cardio signed off  Heparin drip dced  CT chest PE dced  Creatinine WNL  Will monitor BMP. IVF @ 100. Will continue. Psych consulted - schizophrenia. Haldol 5 PRN BID, Ativan scheduled 0.5 BID, Zyprexa 10. Social Work consulted - [facility] to make sure patient is taking her meds. Dced Rocephin  Dced lovenox. MRSA bacteremia   MRSA positive 2/2 blood cultures. Started on Vanc and NATHAN ordered. ID consulted - Rocephin Dced  Repeat Blood Cx till negative. NATHAN for vegetation on AICD.   Monitor Cr, BMP.        DVT: SC heparin  Diet: Cardiac          Romi Blackman MD  PGY-1, Internal Medicine  [facility]    Attending Physician Statement  I have discussed the care of Lilly Bello Stone, including pertinent history and exam findings with the resident. I have reviewed the key elements of all parts of the encounter with the resident. I have seen and examined the patient with the resident and the key elements of all parts of the encounter have been performed by me.     Principal Problem:    Sepsis due to Escherichia coli [facility])  Active Problems:    Essential hypertension    Diabetes mellitus type 2, controlled ([facility]    History of CVA (cerebrovascular accident)    Noncompliance    DHARMESH (acute kidney injury) ([facility]    ST elevation myocardial infarction (STEMI) ([facility]    Leukocytosis    S/P implantation of automatic cardioverter/defibrillator (AICD) 3/13/1- Dr. Orlando Perera    Vertebral artery disease [facility])    Acute cystitis without hematuria    Chronic respiratory failure ([facility]    Severe sepsis ([facility]    Chronic combined systolic and diastolic congestive heart failure (HCC)    Adrenal insufficiency ([facility]    Sepsis due to methicillin resistant Staphylococcus aureus (MRSA) ([facility]    MRSA (methicillin resistant staph aureus) culture positive    MRSA bacteremia    Assessment/Plan  MRSA sepsis  Start vanco   ID consult    ml /hr   moniter vitals       Inge Reynolds MD  Attending and Faculty Internal Medicine  [address]  Internal Medicine [address] [place] [address], S..   12/10/17

## 2017-12-10 NOTE — PROGRESS NOTES
Dr Jemma mcallister per perfect serve regarding patients request to help with her cough - awaiting response

## 2017-12-11 ENCOUNTER — APPOINTMENT (OUTPATIENT)
Dept: CARDIAC CATH/INVASIVE PROCEDURES | Age: 62
DRG: 260 | End: 2017-12-11
Payer: MEDICARE

## 2017-12-11 LAB
ABSOLUTE EOS #: 0 K/UL (ref 0–0.4)
ABSOLUTE IMMATURE GRANULOCYTE: 0 K/UL (ref 0–0.3)
ABSOLUTE LYMPH #: 3.96 K/UL (ref 1–4.8)
ABSOLUTE MONO #: 1.26 K/UL (ref 0.1–0.8)
ANION GAP SERPL CALCULATED.3IONS-SCNC: 14 MMOL/L (ref 9–17)
BASOPHILS # BLD: 0 % (ref 0–2)
BASOPHILS ABSOLUTE: 0 K/UL (ref 0–0.2)
BUN BLDV-MCNC: 25 MG/DL (ref 8–23)
BUN/CREAT BLD: ABNORMAL (ref 9–20)
CALCIUM SERPL-MCNC: 7.5 MG/DL (ref 8.6–10.4)
CHLORIDE BLD-SCNC: 107 MMOL/L (ref 98–107)
CO2: 19 MMOL/L (ref 20–31)
CREAT SERPL-MCNC: 0.87 MG/DL (ref 0.5–0.9)
DIFFERENTIAL TYPE: ABNORMAL
EOSINOPHILS RELATIVE PERCENT: 0 % (ref 1–4)
GFR AFRICAN AMERICAN: >60 ML/MIN
GFR NON-AFRICAN AMERICAN: >60 ML/MIN
GFR SERPL CREATININE-BSD FRML MDRD: ABNORMAL ML/MIN/{1.73_M2}
GFR SERPL CREATININE-BSD FRML MDRD: ABNORMAL ML/MIN/{1.73_M2}
GLUCOSE BLD-MCNC: 104 MG/DL (ref 65–105)
GLUCOSE BLD-MCNC: 121 MG/DL (ref 70–99)
GLUCOSE BLD-MCNC: 122 MG/DL (ref 65–105)
GLUCOSE BLD-MCNC: 127 MG/DL (ref 65–105)
GLUCOSE BLD-MCNC: 389 MG/DL (ref 65–105)
GLUCOSE BLD-MCNC: 440 MG/DL (ref 65–105)
GLUCOSE BLD-MCNC: 55 MG/DL (ref 65–105)
GLUCOSE BLD-MCNC: 60 MG/DL (ref 65–105)
GLUCOSE BLD-MCNC: 68 MG/DL (ref 65–105)
HCT VFR BLD CALC: 22.5 % (ref 36.3–47.1)
HEMOGLOBIN: 7.3 G/DL (ref 11.9–15.1)
IMMATURE GRANULOCYTES: 0 %
LV EF: 38 %
LVEF MODALITY: NORMAL
LYMPHOCYTES # BLD: 22 % (ref 24–44)
MCH RBC QN AUTO: 25 PG (ref 25.2–33.5)
MCHC RBC AUTO-ENTMCNC: 32.4 G/DL (ref 28.4–34.8)
MCV RBC AUTO: 77.1 FL (ref 82.6–102.9)
MONOCYTES # BLD: 7 % (ref 1–7)
MORPHOLOGY: ABNORMAL
PDW BLD-RTO: 18 % (ref 11.8–14.4)
PLATELET # BLD: ABNORMAL K/UL (ref 138–453)
PLATELET ESTIMATE: ABNORMAL
PLATELET, FLUORESCENCE: 271 K/UL (ref 138–453)
PLATELET, IMMATURE FRACTION: 2 % (ref 1.1–10.3)
PMV BLD AUTO: ABNORMAL FL (ref 8.1–13.5)
POTASSIUM SERPL-SCNC: 4.7 MMOL/L (ref 3.7–5.3)
RBC # BLD: 2.92 M/UL (ref 3.95–5.11)
RBC # BLD: ABNORMAL 10*6/UL
SEG NEUTROPHILS: 71 % (ref 36–66)
SEGMENTED NEUTROPHILS ABSOLUTE COUNT: 12.78 K/UL (ref 1.8–7.7)
SODIUM BLD-SCNC: 140 MMOL/L (ref 135–144)
VANCOMYCIN TROUGH DATE LAST DOSE: ABNORMAL
VANCOMYCIN TROUGH DOSE AMOUNT: ABNORMAL
VANCOMYCIN TROUGH TIME LAST DOSE: ABNORMAL
VANCOMYCIN TROUGH: 23.5 UG/ML (ref 10–20)
WBC # BLD: 18 K/UL (ref 3.5–11.3)
WBC # BLD: ABNORMAL 10*3/UL

## 2017-12-11 PROCEDURE — 36415 COLL VENOUS BLD VENIPUNCTURE: CPT

## 2017-12-11 PROCEDURE — 87205 SMEAR GRAM STAIN: CPT

## 2017-12-11 PROCEDURE — 93325 DOPPLER ECHO COLOR FLOW MAPG: CPT

## 2017-12-11 PROCEDURE — 2060000000 HC ICU INTERMEDIATE R&B

## 2017-12-11 PROCEDURE — 6370000000 HC RX 637 (ALT 250 FOR IP): Performed by: STUDENT IN AN ORGANIZED HEALTH CARE EDUCATION/TRAINING PROGRAM

## 2017-12-11 PROCEDURE — 87149 DNA/RNA DIRECT PROBE: CPT

## 2017-12-11 PROCEDURE — 99233 SBSQ HOSP IP/OBS HIGH 50: CPT | Performed by: INTERNAL MEDICINE

## 2017-12-11 PROCEDURE — 6360000002 HC RX W HCPCS

## 2017-12-11 PROCEDURE — 6370000000 HC RX 637 (ALT 250 FOR IP): Performed by: INTERNAL MEDICINE

## 2017-12-11 PROCEDURE — 6360000002 HC RX W HCPCS: Performed by: STUDENT IN AN ORGANIZED HEALTH CARE EDUCATION/TRAINING PROGRAM

## 2017-12-11 PROCEDURE — 86403 PARTICLE AGGLUT ANTBDY SCRN: CPT

## 2017-12-11 PROCEDURE — 85025 COMPLETE CBC W/AUTO DIFF WBC: CPT

## 2017-12-11 PROCEDURE — 93312 ECHO TRANSESOPHAGEAL: CPT

## 2017-12-11 PROCEDURE — 80202 ASSAY OF VANCOMYCIN: CPT

## 2017-12-11 PROCEDURE — 80048 BASIC METABOLIC PNL TOTAL CA: CPT

## 2017-12-11 PROCEDURE — 94762 N-INVAS EAR/PLS OXIMTRY CONT: CPT

## 2017-12-11 PROCEDURE — 2580000003 HC RX 258: Performed by: STUDENT IN AN ORGANIZED HEALTH CARE EDUCATION/TRAINING PROGRAM

## 2017-12-11 PROCEDURE — 82947 ASSAY GLUCOSE BLOOD QUANT: CPT

## 2017-12-11 PROCEDURE — 2580000003 HC RX 258: Performed by: INTERNAL MEDICINE

## 2017-12-11 PROCEDURE — 6370000000 HC RX 637 (ALT 250 FOR IP): Performed by: PSYCHIATRY & NEUROLOGY

## 2017-12-11 RX ORDER — SODIUM CHLORIDE 0.9 % (FLUSH) 0.9 %
10 SYRINGE (ML) INJECTION EVERY 12 HOURS SCHEDULED
Status: DISCONTINUED | OUTPATIENT
Start: 2017-12-11 | End: 2017-12-17 | Stop reason: HOSPADM

## 2017-12-11 RX ORDER — METOPROLOL TARTRATE 50 MG/1
50 TABLET, FILM COATED ORAL 2 TIMES DAILY
Status: DISCONTINUED | OUTPATIENT
Start: 2017-12-11 | End: 2017-12-17 | Stop reason: HOSPADM

## 2017-12-11 RX ORDER — SODIUM CHLORIDE 0.9 % (FLUSH) 0.9 %
10 SYRINGE (ML) INJECTION PRN
Status: DISCONTINUED | OUTPATIENT
Start: 2017-12-11 | End: 2017-12-17 | Stop reason: HOSPADM

## 2017-12-11 RX ADMIN — HEPARIN SODIUM 5000 UNITS: 5000 INJECTION, SOLUTION INTRAVENOUS; SUBCUTANEOUS at 22:22

## 2017-12-11 RX ADMIN — OXCARBAZEPINE 300 MG: 300 TABLET, FILM COATED ORAL at 09:29

## 2017-12-11 RX ADMIN — FAMOTIDINE 20 MG: 20 TABLET, FILM COATED ORAL at 09:29

## 2017-12-11 RX ADMIN — HEPARIN SODIUM 5000 UNITS: 5000 INJECTION, SOLUTION INTRAVENOUS; SUBCUTANEOUS at 14:34

## 2017-12-11 RX ADMIN — HEPARIN SODIUM 5000 UNITS: 5000 INJECTION, SOLUTION INTRAVENOUS; SUBCUTANEOUS at 07:05

## 2017-12-11 RX ADMIN — DEXTROSE MONOHYDRATE 12.5 G: 500 INJECTION PARENTERAL at 20:52

## 2017-12-11 RX ADMIN — VANCOMYCIN HYDROCHLORIDE 1250 MG: 10 INJECTION, POWDER, LYOPHILIZED, FOR SOLUTION INTRAVENOUS at 09:29

## 2017-12-11 RX ADMIN — METOPROLOL TARTRATE 25 MG: 25 TABLET ORAL at 09:28

## 2017-12-11 RX ADMIN — CLOPIDOGREL 75 MG: 75 TABLET, FILM COATED ORAL at 09:29

## 2017-12-11 RX ADMIN — SODIUM CHLORIDE: 9 INJECTION, SOLUTION INTRAVENOUS at 22:12

## 2017-12-11 RX ADMIN — ASPIRIN 81 MG: 81 TABLET, CHEWABLE ORAL at 09:29

## 2017-12-11 ASSESSMENT — ENCOUNTER SYMPTOMS
EYES NEGATIVE: 1
ALLERGIC/IMMUNOLOGIC NEGATIVE: 1
GASTROINTESTINAL NEGATIVE: 1
RESPIRATORY NEGATIVE: 1

## 2017-12-11 NOTE — PROCEDURES
[facility]  NATHAN procedure Note         Today's Date: 12/11/2017  Primary/Ordering Cardiologist:   Indication: Infective endocarditis    Operators:  Primary: Dr. Laura Lindsey (attending physician)  Assistant: Abbie Shimpan (cardiology fellow)    Patient seen and examined. History and Physical reviewed. Labs reviewed. Pre Procedure INR: 1    After informed consent was obtained with explanation of the risks and benefits, the patient was brought to Cath lab. All sedation was administered via the Cardiology department. The oropharynx was pre anesthetisized with cetacaine spray. A single intubation effort was required. NATHAN:    Structures:    LA: Dilated and large in size  KHADAR: No clot or thrombus identified. RA: Dilated with large echogenicity suggestive of large thrombus VS vegetation  RV: Normal in size and systolic function. LV: Normal in size and reduced systolic function, estimated EF 30%, global hypokinesia  LV apex: No LV apical thrombus identified. Valves:    Mitral Valve: Structurally normal. mild regurgitation is identified. Aortic Valve: The aortic valve is trileaflet and opens adequately. none regurgitiation is identified. Tricuspid valve: Structurally normal. severe regurgitation is identified. A large 1.1 * 2.3 cm vegetation is seen close to the SVC, most likely on the ICD lead in the right atrium    Summary:     1. A NATHAN was performed without complications. 2. Possible Vegetation VS thrombus is seen on the ICD Lead. 3. ID to determine the plan of treatment for that large vegetation. 4. Cardiology will follow the patient. There were no complications encountered.       Omid Dunne MD  Cardiology Fellow       I have reviewed the case with resident / fellow  I have examined the patient personally  Agree with treatment plan, correction innotes was made as appropriate, and discussed final arrangement based on  my evaluation and exam  Risk and benefit of procedure explained     Procedure was performed by me, with all aspect of the procedure being done using standard protocol.     Nabila Valle MD  [facility]

## 2017-12-11 NOTE — PROGRESS NOTES
Charlaine Kussmaul, MD paged regarding lab unable to draw blood cultures due to poor access. Not further orders at this time. Will continue to monitor.

## 2017-12-11 NOTE — PROGRESS NOTES
[facility] - Initial Consult Note  Today's Date and Time: 12/11/2017, 9:21 AM  admission date 12/8/2017  Impression :   Current:  · NSTEMI  · Hypotension  · MSSA sepsis  · Old silent bacteriuria, no pyuria  · Leukocytosis - improved  · CAD many vessel disease - recent card cath right groin   · DM  · Severe systolic CHF - AICD right ventricle  · Bilateral vertebral basilar artery severe stenosis     Other:  · Old CVA - left sided severe paresis     Recommendations   · Apparently, the organism in the blood was looking like MRSA, yet final culture. It looked sensitive and it is actually an MSSA. Discussed with micro-lab. · The patient is allergic to penicillin, we will continue the vancomycin for now. · Aim for a trough of 14-17  · Repeat blood cultures are pending still negative so far  · Will need NATHAN look for vegetation on the AICD  · AICD site is tender, but not red - watch changes  · Watch creatinine under Vanco  · Leukocytosis. Up to 18 again. No fever associated. · I doubt any UTI, she has silent bacteruria, no need to treat the E coli  · thoracic back pain x 1 week, tender area, ? OM, get a b scan look for OM ( cant have MRI)     Chief complaint/reason for consultation:   MRSA sepsis     History of Present Illness:   Initial history:  Ignacia White is a 65y.o.-year-old  female who was initially admitted on 12/8/2017. Patient seen at the request of Corinne Busby.     Known to have a basilar art stenosis bilat w black out sepsis, planned for surgery, started having those spells again at home today and was hypotensive, febrile in ER  blood culture showed MRSA and the other clusters pend. She has e coli bacteriuria and no dysuria. , same as last many months. No fever at home but a new back pain from her fall.   Very poor historian, unable to better detail her past history     Past CVA w left side paresis, sits in a wheelchair and has an aid at Other RX Placeholder    vancomycin  15 mg/kg Intravenous Q12H    metoprolol tartrate  25 mg Oral BID    heparin (porcine)  5,000 Units Subcutaneous 3 times per day    OLANZapine  10 mg Oral Nightly    lidocaine 1 % injection  5 mL Intradermal Once    sodium chloride flush  10 mL Intravenous 2 times per day    aspirin  81 mg Oral Daily    atorvastatin  80 mg Oral Nightly    clopidogrel  75 mg Oral Daily    famotidine  20 mg Oral Daily    insulin glargine  30 Units Subcutaneous Nightly    OXcarbazepine  300 mg Oral BID    sodium chloride flush  10 mL Intravenous 2 times per day    insulin lispro  0-18 Units Subcutaneous TID WC    insulin lispro  0-9 Units Subcutaneous Nightly       Social History:     Social History     Social History    Marital status: Single     Spouse name: N/A    Number of children: N/A    Years of education: N/A     Occupational History    Not on file. Social History Main Topics    Smoking status: Current Every Day Smoker     Packs/day: 1.00     Types: Cigarettes    Smokeless tobacco: Never Used    Alcohol use Yes      Comment: 1 bottle per month wine    Drug use: No    Sexual activity: Yes     Partners: Male     Other Topics Concern    Not on file     Social History Narrative    No narrative on file       Family History:     Family History   Problem Relation Age of Onset    Family history unknown: Yes        Allergies:   Levofloxacin and Pcn [penicillins]     Review of Systems:     Review of Systems   Constitutional: Negative for activity change and appetite change. HENT: Negative. Eyes: Negative. Respiratory: Negative. Cardiovascular: Negative. Gastrointestinal: Negative. Endocrine: Negative. Genitourinary: Negative. Musculoskeletal:        Back pain   Skin: Negative. Allergic/Immunologic: Negative. Neurological: Positive for weakness. Dizziness: left side weak. Hematological: Negative. Psychiatric/Behavioral: Negative. Physical Examination :   Patient Vitals for the past 8 hrs:   BP Temp Temp src Pulse Resp SpO2   12/11/17 0640 98/77 98 °F (36.7 °C) Oral 102 24 (!) 45 %   12/11/17 0406 (!) 143/90 98.2 °F (36.8 °C) Oral 95 24 100 %       Physical Exam   Constitutional: She is oriented to person, place, and time and well-developed, well-nourished, and in no distress. No distress. HENT:   Head: Normocephalic and atraumatic. Eyes: Conjunctivae and EOM are normal. No scleral icterus. Neck: Normal range of motion. Neck supple. No tracheal deviation present. Cardiovascular: Normal rate, regular rhythm and normal heart sounds. No murmur heard. Pulmonary/Chest: Breath sounds normal. No respiratory distress. She has no wheezes. Abdominal: Soft. Bowel sounds are normal. She exhibits no distension. There is no tenderness. Genitourinary: No vaginal discharge found. Musculoskeletal:   Left side weak, arm worse than the leg   Neurological: She is alert and oriented to person, place, and time. Skin: Skin is warm and dry. No erythema. Psychiatric: Affect normal.         Medical Decision Making:   I have independently reviewed/ordered the following labs:    CBC with Differential: Recent Labs      12/10/17   0535  12/11/17   0712   WBC  11.6*  18.0*   HGB  7.4*  7.3*   HCT  23.7*  22.5*   PLT  264  See Reflexed IPF Result   LYMPHOPCT  10*  22*   MONOPCT  3  7     BMP: Recent Labs      12/10/17   0535  12/11/17   0712   NA  133*  140   K  4.5  4.7   CL  101  107   CO2  18*  19*   BUN  20  25*   CREATININE  0.83  0.87     Hepatic Function Panel: Recent Labs      12/08/17   1331   PROT  7.4   LABALBU  3.3*   BILITOT  0.51   ALKPHOS  87   ALT  14   AST  22     No results for input(s): RPR in the last 72 hours. No results for input(s): HIV in the last 72 hours. No results for input(s): BC in the last 72 hours.   Lab Results   Component Value Date    MUCUS NOT REPORTED 12/08/2017    RBC 2.92 12/11/2017    TRICHOMONAS NOT REPORTED 12/08/2017    WBC 18.0 12/11/2017    YEAST NOT REPORTED 12/08/2017    TURBIDITY CLOUDY 12/08/2017     Lab Results   Component Value Date    CREATININE 0.87 12/11/2017    GLUCOSE 121 12/11/2017       Detailed results: Thank you for allowing us to participate in the care of this patient. Please call with questions.     Pepper Thorne MD  Office: [phone]

## 2017-12-11 NOTE — PROGRESS NOTES
Smoking Cessation - topics covered   []  Health Risks  []  Benefits of Quitting   []  Smoking Cessation  []  Patient has no history of tobacco use  []  Patient is former smoker. []  No need for tobacco cessation education. []  Booklet given  [x]  Patient verbalizes understanding. [x]  Patient denies need for tobacco cessation education.   Ania Sales  9:06 AM

## 2017-12-11 NOTE — PROGRESS NOTES
IM RESIDENT PROGRESS NOTE        Patient:  Gail Gale  YOB: 1955    MRN: [id number]     Acct: [de-identified]     Admit date: 12/8/2017    Subjective:   Patient seen and evaluated at the bedside. And charts reviewed. No acute events over night. No new complaints in the morning. Afebrile. Hemodynamically stable. BP = 120s/90s , HR= 10  UO= good. BG controlled. Increased WBC secondary to steroids.         Diet:  Diet NPO Effective Now      Medications:Current Inpatient    Scheduled Meds:   metoprolol tartrate  50 mg Oral BID    vancomycin  15 mg/kg Intravenous Q12H    heparin (porcine)  5,000 Units Subcutaneous 3 times per day    OLANZapine  10 mg Oral Nightly    lidocaine 1 % injection  5 mL Intradermal Once    sodium chloride flush  10 mL Intravenous 2 times per day    aspirin  81 mg Oral Daily    atorvastatin  80 mg Oral Nightly    clopidogrel  75 mg Oral Daily    famotidine  20 mg Oral Daily    insulin glargine  30 Units Subcutaneous Nightly    OXcarbazepine  300 mg Oral BID    sodium chloride flush  10 mL Intravenous 2 times per day    insulin lispro  0-18 Units Subcutaneous TID WC    insulin lispro  0-9 Units Subcutaneous Nightly     Continuous Infusions:   sodium chloride 50 mL/hr (12/11/17 1131)    dextrose       PRN Meds:benzonatate, glucose, dextrose, glucagon (rDNA), dextrose, haloperidol lactate, sodium chloride flush, meclizine, sodium chloride flush, acetaminophen, magnesium hydroxide, ondansetron, potassium chloride **OR** potassium chloride **OR** potassium chloride, magnesium sulfate, acetaminophen    Objective:    Physical Exam:  Vitals: /70   Pulse 87   Temp 97.9 °F (36.6 °C) (Oral)   Resp 20   Ht 5' 1\" (1.549 m)   Wt 170 lb 10.2 oz (77.4 kg)   LMP  (LMP Unknown)   SpO2 95%   BMI 32.24 kg/m²   24 hour intake/output:    Intake/Output Summary (Last 24 hours) at 12/11/17 1317  Last data filed at 12/11/17 0630   Gross per 24 hour   Intake             2334 ml   Output                0 ml   Net             2334 ml     Last 3 weights: Wt Readings from Last 3 Encounters:   12/09/17 170 lb 10.2 oz (77.4 kg)   12/03/17 179 lb 1 oz (81.2 kg)   11/29/17 172 lb (78 kg)       Physical Examination:   General appearance: Alert. No acute distress. HEENT: Head: Normal, normocephalic, atraumatic. Neck: Supple, no carotid bruit, no JVD, trachea midline  Lungs: Clear to auscultation bilaterally  Heart: Regular rhythm with tachycardia, S1, S2 normal.  Abdomen: Soft, non-tender; bowel sounds normal  Extremities: No cyanosis or edema  Neurologic: No focal neurologic deficits  Mental status: Alert, oriented. Uncooperative  Labs:-    CBC:   Recent Labs      12/09/17   0600  12/10/17   0535  12/11/17   0712   WBC  16.8*  11.6*  18.0*   HGB  7.4*  7.4*  7.3*   PLT  298  264  See Reflexed IPF Result     BMP:    Recent Labs      12/09/17   0600  12/10/17   0535  12/11/17   0712   NA  134*  133*  140   K  4.3  4.5  4.7   CL  98  101  107   CO2  22  18*  19*   BUN  22  20  25*   CREATININE  1.17*  0.83  0.87   GLUCOSE  254*  227*  121*     Calcium:  Recent Labs      12/11/17   0712   CALCIUM  7.5*     Ionized Calcium:No results for input(s): IONCA in the last 72 hours. Magnesium:No results for input(s): MG in the last 72 hours. Phosphorus:No results for input(s): PHOS in the last 72 hours. BNP:No results for input(s): BNP in the last 72 hours. Glucose:  Recent Labs      12/10/17   2057  12/11/17   0641  12/11/17   1122   POCGLU  347*  122*  104     HgbA1C: No results for input(s): LABA1C in the last 72 hours. INR:   Recent Labs      12/08/17   1331   INR  1.0     Hepatic:   Recent Labs      12/08/17   1331   ALKPHOS  87   ALT  14   AST  22   PROT  7.4   BILITOT  0.51   LABALBU  3.3*     Amylase and Lipase:No results for input(s): LACTA, AMYLASE in the last 72 hours.   Lactic Acid: No results for input(s): LACTA in the last 72 hours. CARDIAC ENZYMES:  Recent Labs      12/08/17   1331  12/08/17   1336   CKMB  2.1   --    TROPONINI   --   3.27*     BNP: No results for input(s): BNP in the last 72 hours. Lipids: No results for input(s): CHOL, TRIG, HDL, LDLCALC in the last 72 hours. Invalid input(s): LDL  ABGs: No results found for: PH, PCO2, PO2, HCO3, O2SAT  Thyroid:   Lab Results   Component Value Date    TSH 0.98 09/19/2017      Urinalysis:   Color, UA   Date Value Ref Range Status   12/08/2017 DARK YELLOW (A) YEL Final     pH, UA   Date Value Ref Range Status   12/08/2017 5.0 5.0 - 8.0 Final     Specific Gravity, UA   Date Value Ref Range Status   12/08/2017 1.018 1.005 - 1.030 Final     Protein, UA   Date Value Ref Range Status   12/08/2017 1+ (A) NEG Final     RBC, UA   Date Value Ref Range Status   12/08/2017 0 TO 2 0 - 4 /HPF Final     Comment:     Reference range defined for non-centrifuged specimen.      Bacteria, UA   Date Value Ref Range Status   12/08/2017 MANY (A) NONE Final     Comment:     [employer] [address] [facility], [address] [place] [address] [phone]     Nitrite, Urine   Date Value Ref Range Status   12/08/2017 POSITIVE (A) NEG Final     WBC, UA   Date Value Ref Range Status   12/08/2017 TOO NUMEROUS TO COUNT 0 - 5 /HPF Final     Leukocyte Esterase, Urine   Date Value Ref Range Status   12/08/2017 MODERATE (A) NEG Final     Yeast, UA   Date Value Ref Range Status   12/08/2017 NOT REPORTED NONE Final     Glucose, Ur   Date Value Ref Range Status   12/08/2017 NEGATIVE NEG Final     Bilirubin Urine   Date Value Ref Range Status   12/08/2017 NEGATIVE NEG Final           Assessment:  Principal Problem:    Sepsis due to Escherichia coli [facility])  Active Problems:    S/P implantation of automatic cardioverter/defibrillator (AICD) 3/13/1- Dr. Kirsty Dickey    Essential hypertension    Diabetes mellitus type 2, controlled ([facility]    History of CVA (cerebrovascular accident)    Noncompliance    DHARMESH (acute kidney injury) infarction (STEMI) ([facility]    Leukocytosis    S/P implantation of automatic cardioverter/defibrillator (AICD) 3/13/1- Dr. Momo Fischer    Vertebral artery disease [facility])    Acute cystitis without hematuria    Chronic respiratory failure (HCC)    Sepsis ([facility]    Chronic combined systolic and diastolic congestive heart failure (HCC)    Adrenal insufficiency (HCC)    Sepsis due to methicillin susceptible Staphylococcus aureus (HCC)    MRSA (methicillin resistant staph aureus) culture positive    MRSA bacteremia    NSTEMI (non-ST elevated myocardial infarction) ([facility]    Asymptomatic bacteriuria    Assessment/Plan  Patient is alert and oriented. Blood pressure is improving. She has MRSA but pressure positive 2 x 2. On vancomycin. She has AICD also.   NATHAN today    Sen Carroll MD  Attending and Faculty Internal Medicine  [address] [place] [address]  Internal Medicine [address] [place] [address], [place]   12/11/17

## 2017-12-11 NOTE — PROGRESS NOTES
PT is a difficult IV access for even the VAT. We will provide PIV's as needed but very little ability to have a \"backup\" PIV.  BESS Jarrell aware

## 2017-12-12 ENCOUNTER — APPOINTMENT (OUTPATIENT)
Dept: NUCLEAR MEDICINE | Age: 62
DRG: 260 | End: 2017-12-12
Payer: MEDICARE

## 2017-12-12 ENCOUNTER — TELEPHONE (OUTPATIENT)
Dept: FAMILY MEDICINE CLINIC | Age: 62
End: 2017-12-12

## 2017-12-12 PROBLEM — A41.01 SEPSIS DUE TO METHICILLIN SUSCEPTIBLE STAPHYLOCOCCUS AUREUS (HCC): Status: ACTIVE | Noted: 2017-12-09

## 2017-12-12 LAB
ABSOLUTE EOS #: 0.44 K/UL (ref 0–0.4)
ABSOLUTE IMMATURE GRANULOCYTE: 0 K/UL (ref 0–0.3)
ABSOLUTE LYMPH #: 3.21 K/UL (ref 1–4.8)
ABSOLUTE MONO #: 1.31 K/UL (ref 0.1–0.8)
ANION GAP SERPL CALCULATED.3IONS-SCNC: 13 MMOL/L (ref 9–17)
BASOPHILS # BLD: 0 % (ref 0–2)
BASOPHILS ABSOLUTE: 0 K/UL (ref 0–0.2)
BUN BLDV-MCNC: 21 MG/DL (ref 8–23)
BUN/CREAT BLD: ABNORMAL (ref 9–20)
CALCIUM SERPL-MCNC: 7.5 MG/DL (ref 8.6–10.4)
CHLORIDE BLD-SCNC: 106 MMOL/L (ref 98–107)
CO2: 20 MMOL/L (ref 20–31)
CREAT SERPL-MCNC: 0.78 MG/DL (ref 0.5–0.9)
CULTURE: ABNORMAL
DIFFERENTIAL TYPE: ABNORMAL
EKG ATRIAL RATE: 84 BPM
EKG P AXIS: 32 DEGREES
EKG P-R INTERVAL: 158 MS
EKG Q-T INTERVAL: 370 MS
EKG QRS DURATION: 86 MS
EKG QTC CALCULATION (BAZETT): 437 MS
EKG R AXIS: -24 DEGREES
EKG T AXIS: 126 DEGREES
EKG VENTRICULAR RATE: 84 BPM
EOSINOPHILS RELATIVE PERCENT: 3 % (ref 1–4)
GFR AFRICAN AMERICAN: >60 ML/MIN
GFR NON-AFRICAN AMERICAN: >60 ML/MIN
GFR SERPL CREATININE-BSD FRML MDRD: ABNORMAL ML/MIN/{1.73_M2}
GFR SERPL CREATININE-BSD FRML MDRD: ABNORMAL ML/MIN/{1.73_M2}
GLUCOSE BLD-MCNC: 100 MG/DL (ref 65–105)
GLUCOSE BLD-MCNC: 142 MG/DL (ref 65–105)
GLUCOSE BLD-MCNC: 159 MG/DL (ref 65–105)
GLUCOSE BLD-MCNC: 86 MG/DL (ref 70–99)
GLUCOSE BLD-MCNC: 95 MG/DL (ref 65–105)
HCT VFR BLD CALC: 22.6 % (ref 36.3–47.1)
HEMOGLOBIN: 7.4 G/DL (ref 11.9–15.1)
IMMATURE GRANULOCYTES: 0 %
LYMPHOCYTES # BLD: 22 % (ref 24–44)
Lab: ABNORMAL
MCH RBC QN AUTO: 25.3 PG (ref 25.2–33.5)
MCHC RBC AUTO-ENTMCNC: 32.7 G/DL (ref 28.4–34.8)
MCV RBC AUTO: 77.1 FL (ref 82.6–102.9)
MONOCYTES # BLD: 9 % (ref 1–7)
MORPHOLOGY: ABNORMAL
ORGANISM: ABNORMAL
ORGANISM: ABNORMAL
PDW BLD-RTO: 18.4 % (ref 11.8–14.4)
PLATELET # BLD: 301 K/UL (ref 138–453)
PLATELET ESTIMATE: ABNORMAL
PMV BLD AUTO: 10.1 FL (ref 8.1–13.5)
POTASSIUM SERPL-SCNC: 4.5 MMOL/L (ref 3.7–5.3)
RBC # BLD: 2.93 M/UL (ref 3.95–5.11)
RBC # BLD: ABNORMAL 10*6/UL
SEG NEUTROPHILS: 66 % (ref 36–66)
SEGMENTED NEUTROPHILS ABSOLUTE COUNT: 9.64 K/UL (ref 1.8–7.7)
SODIUM BLD-SCNC: 139 MMOL/L (ref 135–144)
SPECIMEN DESCRIPTION: ABNORMAL
STATUS: ABNORMAL
WBC # BLD: 14.6 K/UL (ref 3.5–11.3)
WBC # BLD: ABNORMAL 10*3/UL

## 2017-12-12 PROCEDURE — G8988 SELF CARE GOAL STATUS: HCPCS

## 2017-12-12 PROCEDURE — 97162 PT EVAL MOD COMPLEX 30 MIN: CPT

## 2017-12-12 PROCEDURE — 99233 SBSQ HOSP IP/OBS HIGH 50: CPT | Performed by: INTERNAL MEDICINE

## 2017-12-12 PROCEDURE — 6370000000 HC RX 637 (ALT 250 FOR IP): Performed by: INTERNAL MEDICINE

## 2017-12-12 PROCEDURE — 6360000002 HC RX W HCPCS: Performed by: STUDENT IN AN ORGANIZED HEALTH CARE EDUCATION/TRAINING PROGRAM

## 2017-12-12 PROCEDURE — 78315 BONE IMAGING 3 PHASE: CPT

## 2017-12-12 PROCEDURE — 3430000000 HC RX DIAGNOSTIC RADIOPHARMACEUTICAL: Performed by: STUDENT IN AN ORGANIZED HEALTH CARE EDUCATION/TRAINING PROGRAM

## 2017-12-12 PROCEDURE — 2060000000 HC ICU INTERMEDIATE R&B

## 2017-12-12 PROCEDURE — 97530 THERAPEUTIC ACTIVITIES: CPT

## 2017-12-12 PROCEDURE — 80048 BASIC METABOLIC PNL TOTAL CA: CPT

## 2017-12-12 PROCEDURE — 6370000000 HC RX 637 (ALT 250 FOR IP): Performed by: PSYCHIATRY & NEUROLOGY

## 2017-12-12 PROCEDURE — G8979 MOBILITY GOAL STATUS: HCPCS

## 2017-12-12 PROCEDURE — A9503 TC99M MEDRONATE: HCPCS | Performed by: STUDENT IN AN ORGANIZED HEALTH CARE EDUCATION/TRAINING PROGRAM

## 2017-12-12 PROCEDURE — G8978 MOBILITY CURRENT STATUS: HCPCS

## 2017-12-12 PROCEDURE — 6360000002 HC RX W HCPCS: Performed by: INTERNAL MEDICINE

## 2017-12-12 PROCEDURE — 2580000003 HC RX 258: Performed by: STUDENT IN AN ORGANIZED HEALTH CARE EDUCATION/TRAINING PROGRAM

## 2017-12-12 PROCEDURE — 85025 COMPLETE CBC W/AUTO DIFF WBC: CPT

## 2017-12-12 PROCEDURE — 94762 N-INVAS EAR/PLS OXIMTRY CONT: CPT

## 2017-12-12 PROCEDURE — 6370000000 HC RX 637 (ALT 250 FOR IP): Performed by: STUDENT IN AN ORGANIZED HEALTH CARE EDUCATION/TRAINING PROGRAM

## 2017-12-12 PROCEDURE — 36415 COLL VENOUS BLD VENIPUNCTURE: CPT

## 2017-12-12 PROCEDURE — G8987 SELF CARE CURRENT STATUS: HCPCS

## 2017-12-12 PROCEDURE — 82947 ASSAY GLUCOSE BLOOD QUANT: CPT

## 2017-12-12 PROCEDURE — 97166 OT EVAL MOD COMPLEX 45 MIN: CPT

## 2017-12-12 PROCEDURE — 97535 SELF CARE MNGMENT TRAINING: CPT

## 2017-12-12 RX ORDER — TC 99M MEDRONATE 20 MG/10ML
27 INJECTION, POWDER, LYOPHILIZED, FOR SOLUTION INTRAVENOUS
Status: COMPLETED | OUTPATIENT
Start: 2017-12-12 | End: 2017-12-12

## 2017-12-12 RX ORDER — DOPAMINE HYDROCHLORIDE 160 MG/100ML
5 INJECTION, SOLUTION INTRAVENOUS CONTINUOUS
Status: DISCONTINUED | OUTPATIENT
Start: 2017-12-12 | End: 2017-12-12

## 2017-12-12 RX ORDER — SODIUM CHLORIDE 9 MG/ML
INJECTION, SOLUTION INTRAVENOUS CONTINUOUS
Status: DISCONTINUED | OUTPATIENT
Start: 2017-12-12 | End: 2017-12-15

## 2017-12-12 RX ADMIN — Medication 27 MILLICURIE: at 08:45

## 2017-12-12 RX ADMIN — FAMOTIDINE 20 MG: 20 TABLET, FILM COATED ORAL at 09:52

## 2017-12-12 RX ADMIN — METOPROLOL TARTRATE 50 MG: 50 TABLET, FILM COATED ORAL at 20:46

## 2017-12-12 RX ADMIN — HEPARIN SODIUM 5000 UNITS: 5000 INJECTION, SOLUTION INTRAVENOUS; SUBCUTANEOUS at 06:51

## 2017-12-12 RX ADMIN — MECLIZINE HCL 12.5 MG: 12.5 TABLET ORAL at 11:27

## 2017-12-12 RX ADMIN — ASPIRIN 81 MG: 81 TABLET, CHEWABLE ORAL at 09:54

## 2017-12-12 RX ADMIN — ATORVASTATIN CALCIUM 80 MG: 80 TABLET, FILM COATED ORAL at 20:46

## 2017-12-12 RX ADMIN — METOPROLOL TARTRATE 50 MG: 50 TABLET, FILM COATED ORAL at 09:54

## 2017-12-12 RX ADMIN — MECLIZINE HCL 12.5 MG: 12.5 TABLET ORAL at 20:47

## 2017-12-12 RX ADMIN — OLANZAPINE 10 MG: 10 TABLET, FILM COATED ORAL at 20:46

## 2017-12-12 RX ADMIN — OXCARBAZEPINE 300 MG: 300 TABLET, FILM COATED ORAL at 09:54

## 2017-12-12 RX ADMIN — OXCARBAZEPINE 300 MG: 300 TABLET, FILM COATED ORAL at 20:46

## 2017-12-12 RX ADMIN — CLOPIDOGREL 75 MG: 75 TABLET, FILM COATED ORAL at 09:54

## 2017-12-12 RX ADMIN — INSULIN GLARGINE 30 UNITS: 100 INJECTION, SOLUTION SUBCUTANEOUS at 20:46

## 2017-12-12 RX ADMIN — ENOXAPARIN SODIUM 40 MG: 40 INJECTION SUBCUTANEOUS at 10:03

## 2017-12-12 RX ADMIN — VANCOMYCIN HYDROCHLORIDE 1250 MG: 1 INJECTION, POWDER, LYOPHILIZED, FOR SOLUTION INTRAVENOUS at 10:03

## 2017-12-12 ASSESSMENT — PAIN SCALES - GENERAL
PAINLEVEL_OUTOF10: 10
PAINLEVEL_OUTOF10: 10
PAINLEVEL_OUTOF10: 0

## 2017-12-12 ASSESSMENT — PAIN DESCRIPTION - PAIN TYPE
TYPE: ACUTE PAIN
TYPE: ACUTE PAIN

## 2017-12-12 ASSESSMENT — ENCOUNTER SYMPTOMS
NAUSEA: 0
WHEEZING: 0
SHORTNESS OF BREATH: 0
EYE DISCHARGE: 0
SORE THROAT: 0
DIARRHEA: 0
ALLERGIC/IMMUNOLOGIC NEGATIVE: 1
GASTROINTESTINAL NEGATIVE: 1
VOMITING: 0
EYE ITCHING: 0
EYES NEGATIVE: 1
COUGH: 1

## 2017-12-12 ASSESSMENT — PAIN DESCRIPTION - LOCATION
LOCATION: ABDOMEN
LOCATION: ABDOMEN

## 2017-12-12 NOTE — PLAN OF CARE
Problem: Cardiac Output - Decreased:  Goal: Hemodynamic stability will improve  Hemodynamic stability will improve   Outcome: Ongoing  Vitals signs monitored every 4 hours and remain stable    Problem: Falls - Risk of:  Goal: Will remain free from falls  Will remain free from falls   Outcome: Ongoing  Bed lock and in lowest position, side rails up x 2, room free from clutter, call light within reach, bed alarm on and patient remains free from falls    Problem: Pain:  Goal: Control of acute pain  Control of acute pain   Outcome: Ongoing  Patient denies pain when

## 2017-12-12 NOTE — PROGRESS NOTES
[place] [facility]   Progress Note                 Date:   12/12/2017  Patient name:  Azra Michaud  Date of admission:  12/8/2017 11:37 AM  MRN:   [id number]  YOB: 1955  PCP:    Debby Elliott MD    Reason for Admission: sepsis     Subjective:       Clinical Changes / Abnormalities:   Patient stable hemodynamically, afebrile over last 24 hours, white cell count improving, denies any chest pain or dyspnea, had NATHAN yesterday showing suspicious vegetation on ICD lead      Medications:   Scheduled Meds:    enoxaparin  40 mg Subcutaneous Daily    [START ON 12/13/2017] vancomycin  750 mg Intravenous Q12H    metoprolol tartrate  50 mg Oral BID    sodium chloride flush  10 mL Intravenous 2 times per day    vancomycin (VANCOCIN) intermittent dosing (placeholder)   Other RX Placeholder    OLANZapine  10 mg Oral Nightly    lidocaine 1 % injection  5 mL Intradermal Once    sodium chloride flush  10 mL Intravenous 2 times per day    aspirin  81 mg Oral Daily    atorvastatin  80 mg Oral Nightly    clopidogrel  75 mg Oral Daily    famotidine  20 mg Oral Daily    insulin glargine  30 Units Subcutaneous Nightly    OXcarbazepine  300 mg Oral BID    sodium chloride flush  10 mL Intravenous 2 times per day    insulin lispro  0-18 Units Subcutaneous TID WC    insulin lispro  0-9 Units Subcutaneous Nightly     Continuous Infusions:    sodium chloride 50 mL/hr at 12/12/17 1127    dextrose       CBC:   Recent Labs      12/10/17   0535  12/11/17   0712  12/12/17   0646   WBC  11.6*  18.0*  14.6*   HGB  7.4*  7.3*  7.4*   PLT  264  See Reflexed IPF Result  301     BMP:    Recent Labs      12/10/17   0535  12/11/17   0712  12/12/17   0646   NA  133*  140  139   K  4.5  4.7  4.5   CL  101  107  106   CO2  18*  19*  20   BUN  20  25*  21   CREATININE  0.83  0.87  0.78   GLUCOSE  227*  121*  86     H    Objective:   Vitals: /71   Pulse 86   Temp 97.9 °F (36.6 °C) (Oral)   Resp 26   Ht 5' 1\" (1.549 m)   Wt 182 lb 1.6 oz (82.6 kg)   LMP  (LMP Unknown)   SpO2 99%   BMI 34.41 kg/m²      General appearance: awake and alert. No acute distress. HEENT: Head: Normal, normocephalic, atraumatic. Neck: Supple, no carotid bruit, no JVD  Lungs: Clear to auscultation bilaterally  Heart: Regular rhythm , S1, S2 normal. ICD site non tender and no erythema or discharge  Abdomen: Soft, non-tender; bowel sounds normal  Extremities: No cyanosis or edema  Neurologic: No focal neurologic deficits  Mental status: Alert, oriented. Motor and sensory not done      EKG:  Sinus tachycardia and nonspecific ST-T changes no significant change from the previous, inferior infarct, LAE     ECHO 11/30/17: EF 35%, grade I DD.      CATH 12/04/17:Multi-vessel Coronary Artery Disease.   Patent grafts to LAD and OM.   Patent prior RCA stents with non-obstructive disease.   Moderately impaired ventricular function--EF 30%     STRESS 11/15/17: Apparent remote infarction within the inferior wall and moderate-sized partially reversible perfusion defect within the anterior wall, concerning for ischemia. Dyskinesis of the apex. Severe hypokinesis of the septum, inferoseptal wall and inferior wall. EF 36%.      ICD 3/13/17: [employer] device placed by Dr. Yordy Max for ICM.    CABG 9/1/16: LIMA-LAD, SVG-OM3.     NATHAN 12/11/2017  A large 1.1 x 2.3 cm vegetation is seen close to the SVC, most likely on the ICD lead in the right atrium  Possible Vegetation VS thrombus         Assessment:   1. Sepsis with MSSA bacteremia   2. 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 Elevated troponin, likely secondary to type 2 NSTEMI from above   4. UTI  5. CAD status post CABG in 2016 with LIMA to LAD and SVG to OM with subsequent PCI of OM 2 in November 2017  6. Sinus tachycardia, resolved   7. Ischemic cardiomyopathy with ICD in situ  8. Chronic vertigo and dizziness with frequent falls  9.  History of ischemic CVA with residual left hemiparesis  10. Hx of Noncompliance with treatment and medications      Plan / Recommendations:   NATHAN results reviewed, will follow up on ID recommendations regarding lead extraction and ICD explantation for endocarditis in the setting of staph bacteremia  Continue antibiotics and supportive care   Discussed with patient      Attending Cardiologist Addendum: I have reviewed and performed the history, physical, subjective, objective, assessment, and plan with the resident/fellow and agree with the note. I performed the history and physical personally. I have made changes to the note above as needed. Pt with MSSA bacteremia and possible vegetation in the ICD lead, will work with ID for optimal management plan, will likely need device extraction. Thank you for allowing me to participate in the care of this patient, please do not hesitate to call if you have any questions.     Minnie Altamirano MD MSc. [facility]  [phone]                    Electronically signed on 12/12/17 at 3:07 PM by:    Abimbola Varela MD   Fellow, [address]

## 2017-12-12 NOTE — PROGRESS NOTES
Occupational Therapy   Occupational Therapy Initial Assessment  Date: 2017   Patient Name: David Deal  MRN: [id number]     : 1955     HISTORY OF PRESENT ILLNESS:       The patient is a pleasant 65 y.o. female with significant past medical history of CAD presented with dizziness and loss of consciousness. Patient was recently admitted for similar symptoms when she had fallen down and had lost consciousness. She was treated for an STEMI. Heparin drip at that time and had gotten a cardiac cath which had shown multi-vessel coronary artery disease with recommendations of being treated by medical management. She also has bilateral vertebral arteries stenosis and Endovascular surgery is following her for that. Currently they don't have any plans for any surgical therapy for the same. Today she was just sitting when she started to feel dizzy, some doubtful spinning sensation and then she lost consciousness. She contacted EMS who brought her here. In the ER she was also found to be hypotensive and she decided fluid boluses to which she responded. Her troponins was found to be elevated. She is on heparin drip as recommended by cardiology. However she does not have any significant EKG signs of ischemia and does not have any chest pain at this time.     Patient Diagnosis(es): The primary encounter diagnosis was NSTEMI (non-ST elevated myocardial infarction) ([facility] Diagnoses of Sepsis, due to unspecified organism [facility]) and Urinary tract infection without hematuria, site unspecified were also pertinent to this visit. has a past medical history of Arthritis; Asthma; CAD (coronary artery disease); CHF (congestive heart failure) ([facility] Clinical trial participant at discharge; COPD (chronic obstructive pulmonary disease) ([facility] Diabetes mellitus ([facility] Hepatitis C; Hyperlipidemia;  Hypertension; MRSA (methicillin resistant staph aureus) culture positive; Pneumonia; and Unspecified cerebral artery occlusion with cerebral infarction. has a past surgical history that includes Tonsillectomy; Foot surgery; Cardiac surgery (5/6/2016); Cardiac pacemaker placement; and Cardiac catheterization (11/27/2017).     Restrictions  Restrictions/Precautions  Restrictions/Precautions: Fall Risk, Isolation  Required Braces or Orthoses?: No  Position Activity Restriction  Other position/activity restrictions: Bedrest with bathroom privilages     Subjective   General  Patient assessed for rehabilitation services?: Yes  Family / Caregiver Present: No  Pain Assessment  Patient Currently in Pain: Yes  Pain Assessment: 0-10  Pain Level: 10  Pain Type: Acute pain  Pain Location: Abdomen     Social/Functional History  Social/Functional History  Lives With: Alone  Type of Home: Apartment  Home Layout: One level  Home Access: Elevator, Level entry, Stairs to enter with rails  Entrance Stairs - Number of Steps: 1 small step  Bathroom Toilet: Standard  Bathroom Equipment: Shower chair  Bathroom Accessibility: Accessible  Home Equipment: Wheelchair-manual, Quad cane, Oxygen (4L all the time )  Receives Help From: Family, Home health (aide every other day)  ADL Assistance: Needs assistance  Homemaking Assistance: Needs assistance  Homemaking Responsibilities: No  Ambulation Assistance: Independent  Transfer Assistance: Independent  Active : No  Patient's  Info: Brother in law drives     Objective   Vision: Within Functional Limits  Hearing: Within functional limits    Orientation  Overall Orientation Status: Within Functional Limits  Observation/Palpation  Posture: Fair  Observation: L UE contracted from previous CVA  Balance  Sitting Balance: Contact guard assistance  Standing Balance: Contact guard assistance  Standing Balance  Sit to stand: Contact guard assistance  Stand to sit: Contact guard assistance  Functional Mobility  Functional - Mobility Device: Cane (quad cane)  Assist Level: Contact guard assistance  ADL  Feeding:

## 2017-12-12 NOTE — PROGRESS NOTES
Pharmacy Vancomycin Consult     Vancomycin Day: 3  Current Dosin Q 24    Temp max:  97.7    Recent Labs      17   0712  17   0646   BUN  25*  21       Recent Labs      17   0712  17   0646   CREATININE  0.87  0.78       Recent Labs      17   0712  17   0646   WBC  18.0*  14.6*         Intake/Output Summary (Last 24 hours) at 17 1054  Last data filed at 17 0600   Gross per 24 hour   Intake 1643 ml   Output 0 ml   Net 1643 ml       Culture Date      Source                       Results  17               Repeat blood               Vancomycin Day 3- GRAM POSITIVE COCCI IN CLUSTERS, GRAM POSITIVE RODS,                                                                        STAPHYLOCOCCUS SPECIES    Assessment/Plan    Vancomycin regimen of 1250 mg Q 12 hours lead to a supratherapeutic trough of 23.5 ug/ml on 17. Due to the trough level, dose was held and a conservative plan of 1250 mg Q24 was initiated. There appears to be no issues with renal function and suspect that the Q 24hour regimen will lead to a subtherapeutic trough. In light of this information, will hold the next scheduled dose and restart a less conservative regimen. Will aim for a trough ~13ug/ml and resume therapy with 750 mg Q12 hours to begin the morning of 17.     Thank you,     Abhay Lara, PharmD   2017 11:43 AM

## 2017-12-12 NOTE — PROGRESS NOTES
Dr. Massey Foil with ID notified Vancomycin trough 23.5, states to refer to pharmacy to hold tonight's dose.

## 2017-12-12 NOTE — PROGRESS NOTES
Physical Therapy    Facility/Department: [facility] CAR 1  Initial Assessment    NAME: Inés Ramirez  : 1955  MRN: [id number]    Copied from emergency medicine note filed 17 at 5:29pm:  Chief Complaint   Patient presents with    Fatigue       Pt to ED per EMS with c/o generalized weakness, pt called 911 3 times other 2 times for falls, pt does not like questions being asked, will not state why she fell, poor historian, aggitated easily, denies pain, ambulates with a cane       Date of Service: 2017    Patient Diagnosis(es): The primary encounter diagnosis was NSTEMI (non-ST elevated myocardial infarction) ([facility] Diagnoses of Sepsis, due to unspecified organism [facility]) and Urinary tract infection without hematuria, site unspecified were also pertinent to this visit. has a past medical history of Arthritis; Asthma; CAD (coronary artery disease); [facility]congest[facility]) [facility] Clinical trial participant at discharge; COPD (chronic obstructive pulmonary disease) ([facility] Diabetes mellitus ([facility] Hepatitis C; Hyperlipidemia; Hypertension; MRSA (methicillin resistant staph aureus) culture positive; Pneumonia; and Unspecified cerebral artery occlusion with cerebral infarction. has a past surgical history that includes Tonsillectomy; Foot surgery; Cardiac surgery (2016); Cardiac pacemaker placement; and Cardiac catheterization (2017). Restrictions  Restrictions/Precautions  Restrictions/Precautions: Fall Risk, Isolation  Required Braces or Orthoses?: No  Position Activity Restriction  Other position/activity restrictions: Bedrest with bathroom privilages   Vision/Hearing        Subjective  General  Patient assessed for rehabilitation services?: Yes  Response To Previous Treatment: Not applicable  Family / Caregiver Present: No  Follows Commands: Within Functional Limits  General Comment  Comments: OT co-eval  Subjective  Subjective: RN and pt agreeable to PT.  Pt alert in bed upon arrival.  Pain Screening  Patient Currently in Pain: Yes  Pain Assessment  Pain Assessment: 0-10  Pain Level: 10  Pain Type: Acute pain  Pain Location: Abdomen  Pain Intervention(s): Ambulation/Increased activity; Emotional support  Response to Pain Intervention: Patient Satisfied  Vital Signs  Patient Currently in Pain: Yes  Pre Treatment Pain Screening  Intervention List: Patient able to continue with treatment    Orientation  Orientation  Overall Orientation Status: Within Normal Limits    Social/Functional History  Social/Functional History  Lives With: Alone  Type of Home: Apartment  Home Layout: One level  Home Access: Elevator, Level entry, Stairs to enter with rails  Entrance Stairs - Number of Steps: 1 small step  Bathroom Toilet: Standard  Bathroom Equipment: Shower chair  Bathroom Accessibility: Accessible  Home Equipment: Wheelchair-manual, Quad cane, Oxygen (4L all the time )  Receives Help From: Family, Home health (aide every other day)  ADL Assistance: Needs assistance  Homemaking Assistance: Needs assistance  Homemaking Responsibilities: No  Ambulation Assistance: Independent  Transfer Assistance: Independent  Active : No  Patient's  Info: Brother in law drives  Objective          AROM RLE (degrees)  RLE AROM: WFL  AROM LLE (degrees)  LLE AROM : WFL  AROM RUE (degrees)  RUE AROM : WFL  AROM LUE (degrees)  LUE AROM : WFL  Strength RLE  Strength RLE: WFL  Strength LLE  Strength LLE: WFL  Strength RUE  Strength RUE: WFL  Strength LUE  Strength LUE: WFL     Sensation  Overall Sensation Status: WFL  Bed mobility  Supine to Sit: Minimal assistance  Sit to Supine:  (left in chair)  Scooting: Contact guard assistance  Transfers  Sit to Stand: Contact guard assistance  Stand to sit: Contact guard assistance  Ambulation  Ambulation?: Yes  Ambulation 1  Surface: level tile  Device: [employer] (pt had her own)  Assistance: Contact guard assistance  Quality of Gait: increased trunk sidebending, grossly steady without any LOB  Distance: 4' to chair  Comments: P declined further ambulation. Balance  Posture: Good  Sitting - Static: Good  Sitting - Dynamic: Good  Standing - Static: Good;-  Standing - Dynamic: Fair;+  Comments: SBQC        Assessment   Assessment: amb 4' to chair [facility] CGA. Pt limited by fatigue/ endurance. Prognosis: Good  Decision Making: Medium Complexity  Patient Education: PT POC  REQUIRES PT FOLLOW UP: Yes  Activity Tolerance  Activity Tolerance: Patient limited by fatigue  Activity Tolerance: Pt declined further ambulation     Plan   Plan  Times per week: 5x/wk  Current Treatment Recommendations: Strengthening, Balance Training, Gait Training, Endurance Training, Transfer Training, Functional Mobility Training  Safety Devices  Type of devices: Call light within reach, Left in chair, Chair alarm in place, Patient at risk for falls, Gait belt, Nurse notified  Restraints  Initially in place: No    G-Code  PT G-Codes  Functional Assessment Tool Used: [place] tool  Score: 13  Functional Limitation: Mobility: Walking and moving around  Mobility: Walking and Moving Around Current Status (): At least 40 percent but less than 60 percent impaired, limited or restricted  Mobility: Walking and Moving Around Goal Status ():  At least 20 percent but less than 40 percent impaired, limited or restricted    Goals  Short term goals  Time Frame for Short term goals: 14 visits  Short term goal 1: Pt will be I with bed mobility  Short term goal 2: Pt will be I with transfers  Short term goal 3: Pt will be Sara with amb 20' SBQC       Therapy Time   Individual Concurrent Group Co-treatment   Time In 1035         Time Out 1109         Minutes [address] [facility], PT

## 2017-12-12 NOTE — PROGRESS NOTES
side paresis, sits in a wheelchair and has an aid at home. Recent card cath showed multivessel disease and is on medical management. troponins were elevated here, and she is started on heparin     Has noticed she has a left chest AICD site pain wo redness but with induration, started recently but no redness or drainage. Interval changes 12/12/17   Afebrile  Some dizziness while standing  No nausea/vomiting/diarrhea  No SOB or chest pain  Blood cultures reviewed- some growing staph hominis, staph epidermidis, and MRSA. However while talking to the lab yesterday- supposedly MSSA was noted. NATHAN done yesterday showing 1.1 x 2.3 cm vegetation vs thrombus on ICD lead  Bone scan pending        Summary of relevant labs:  Labs:  Vanco trough 12/11/17: 23.5  WBC 18.0 --> 14.6    Micro:  Blood cx 12/8/17 - MRSA, coag neg staph  12/8/17 - staph hominis, staph epidermidis  12/10/17 - ngtd  12/11/17- coag neg staph and gram positive rods  blood culture SCN 12/8/17  Urine cx E coli     Imaging:  Echo 11/30/17 no vegetations   NATHAN 12/11/17  A large 1.1 * 2.3 cm vegetation is seen close to the SVC, most likely on the ICD lead in the right atrium   Summary:    1. A NATHAN was performed without complications. 2. Possible Vegetation VS thrombus is seen on the ICD Lead. 3. ID to determine the plan of treatment for that large vegetation. 4. Cardiology will follow the patient. CTA chest, no pneumonia and could not assess for P emboli  CT head neg  Bone scan pending    I have personally reviewed the past medical history, past surgical history, medications, social history, and family history, and I have updated the database accordingly.   Past Medical History:     Past Medical History:   Diagnosis Date    Arthritis     Asthma     CAD (coronary artery disease)     [facility]congestive heart failure) ([facility])     Clinical trial participant at discharge 03/13/2017    [employer]     [facility] obstructive [facility]) [facility]     Diabetes mellitus ([facility]     Hepatitis C     Hyperlipidemia     Hypertension     MRSA (methicillin resistant staph aureus) culture positive 12/10/2017    blood    Pneumonia     Unspecified cerebral artery occlusion with cerebral infarction        Past Surgical  History:     Past Surgical History:   Procedure Laterality Date    CARDIAC CATHETERIZATION  11/27/2017    CARDIAC PACEMAKER PLACEMENT      placed 1 month ago at st [address]  5/6/2016    cardiac cath-1xBMS prox. RCA; 1xBMS prox. LAD    FOOT SURGERY      cyst removed from L foot    TONSILLECTOMY         Medications:      enoxaparin  40 mg Subcutaneous Daily    [START ON 12/13/2017] vancomycin  750 mg Intravenous Q12H    metoprolol tartrate  50 mg Oral BID    sodium chloride flush  10 mL Intravenous 2 times per day    vancomycin (VANCOCIN) intermittent dosing (placeholder)   Other RX Placeholder    OLANZapine  10 mg Oral Nightly    lidocaine 1 % injection  5 mL Intradermal Once    sodium chloride flush  10 mL Intravenous 2 times per day    aspirin  81 mg Oral Daily    atorvastatin  80 mg Oral Nightly    clopidogrel  75 mg Oral Daily    famotidine  20 mg Oral Daily    insulin glargine  30 Units Subcutaneous Nightly    OXcarbazepine  300 mg Oral BID    sodium chloride flush  10 mL Intravenous 2 times per day    insulin lispro  0-18 Units Subcutaneous TID WC    insulin lispro  0-9 Units Subcutaneous Nightly       Social History:     Social History     Social History    Marital status: Single     Spouse name: N/A    Number of children: N/A    Years of education: N/A     Occupational History    Not on file.      Social History Main Topics    Smoking status: Current Every Day Smoker     Packs/day: 1.00     Types: Cigarettes    Smokeless tobacco: Never Used    Alcohol use Yes      Comment: 1 bottle per month wine    Drug use: No    Sexual activity: Yes     Partners: Male     Other Topics Concern   

## 2017-12-12 NOTE — PROGRESS NOTES
IM RESIDENT PROGRESS NOTE        Patient:  Zuri Mora  YOB: 1955    MRN: [id number]     Acct: [de-identified]     Admit date: 12/8/2017    Subjective:   Patient seen and examined at bedside. And charts reviewed. No acute events overnight, No ne complains in the am.  Afebrile overnight. NATHAN below. PX=654/92  HR=92  RR=21      NATHAN  1. A NATHAN was performed without complications. 2. Possible Vegetation VS thrombus is seen on the ICD Lead. 3. ID to determine the plan of treatment for that large vegetation. 4. Cardiology will follow the patient.         Diet:  DIET CARB CONTROL; Carb Control: 4 carbs/meal (approximate 1800 kcals/day)      Medications:Current Inpatient    Scheduled Meds:   enoxaparin  40 mg Subcutaneous Daily    [START ON 12/13/2017] vancomycin  750 mg Intravenous Q12H    metoprolol tartrate  50 mg Oral BID    sodium chloride flush  10 mL Intravenous 2 times per day    vancomycin (VANCOCIN) intermittent dosing (placeholder)   Other RX Placeholder    OLANZapine  10 mg Oral Nightly    lidocaine 1 % injection  5 mL Intradermal Once    sodium chloride flush  10 mL Intravenous 2 times per day    aspirin  81 mg Oral Daily    atorvastatin  80 mg Oral Nightly    clopidogrel  75 mg Oral Daily    famotidine  20 mg Oral Daily    insulin glargine  30 Units Subcutaneous Nightly    OXcarbazepine  300 mg Oral BID    sodium chloride flush  10 mL Intravenous 2 times per day    insulin lispro  0-18 Units Subcutaneous TID WC    insulin lispro  0-9 Units Subcutaneous Nightly     Continuous Infusions:   sodium chloride 50 mL/hr at 12/12/17 1127    dextrose       PRN Meds:sodium chloride flush, benzonatate, glucose, dextrose, glucagon (rDNA), dextrose, haloperidol lactate, sodium chloride flush, meclizine, sodium chloride flush, acetaminophen, magnesium hydroxide, ondansetron, potassium chloride **OR** potassium chloride **OR** potassium chloride, magnesium sulfate, acetaminophen    Objective:    Physical Exam:  Vitals: /71   Pulse 86   Temp 97.9 °F (36.6 °C) (Oral)   Resp 26   Ht 5' 1\" (1.549 m)   Wt 182 lb 1.6 oz (82.6 kg)   LMP  (LMP Unknown)   SpO2 99%   BMI 34.41 kg/m²   24 hour intake/output:    Intake/Output Summary (Last 24 hours) at 12/12/17 1211  Last data filed at 12/12/17 1118   Gross per 24 hour   Intake             2003 ml   Output                0 ml   Net             2003 ml     Last 3 weights: Wt Readings from Last 3 Encounters:   12/12/17 182 lb 1.6 oz (82.6 kg)   12/03/17 179 lb 1 oz (81.2 kg)   11/29/17 172 lb (78 kg)       Physical Examination:   General appearance: Alert. No acute distress. HEENT: Head: Normal, normocephalic, atraumatic. Neck: Supple, no carotid bruit, no JVD, trachea midline  Lungs: Clear to auscultation bilaterally  Heart: Regular rhythm with tachycardia, S1, S2 normal.  Abdomen: Soft, non-tender; bowel sounds normal  Extremities: No cyanosis or edema  Neurologic: No focal neurologic deficits  Mental status: Alert, oriented. Uncooperative  Labs:-    CBC:   Recent Labs      12/10/17   0535  12/11/17   0712  12/12/17   0646   WBC  11.6*  18.0*  14.6*   HGB  7.4*  7.3*  7.4*   PLT  264  See Reflexed IPF Result  301     BMP:    Recent Labs      12/10/17   0535  12/11/17   0712  12/12/17   0646   NA  133*  140  139   K  4.5  4.7  4.5   CL  101  107  106   CO2  18*  19*  20   BUN  20  25*  21   CREATININE  0.83  0.87  0.78   GLUCOSE  227*  121*  86     Calcium:  Recent Labs      12/12/17   0646   CALCIUM  7.5*     Ionized Calcium:No results for input(s): IONCA in the last 72 hours. Magnesium:No results for input(s): MG in the last 72 hours. Phosphorus:No results for input(s): PHOS in the last 72 hours. BNP:No results for input(s): BNP in the last 72 hours.   Glucose:  Recent Labs      12/11/17   2046  12/11/17   2116  12/12/17   0645   POCGLU  55*  127*  159*     HgbA1C: Assessment:  Principal Problem:    Sepsis due to Escherichia coli [facility])  Active Problems:    S/P implantation of automatic cardioverter/defibrillator (AICD) 3/13/1- Dr. Kirsty Dickey    Essential hypertension    Diabetes mellitus type 2, controlled ([place] [address].)    History of CVA (cerebrovascular accident)    Noncompliance    DHARMESH (acute kidney injury) ([facility]    ST elevation myocardial infarction (STEMI) ([facility]    Leukocytosis    Vertebral artery disease ([facility]    Acute cystitis without hematuria    Chronic respiratory failure (HCC)    Sepsis ([facility]    Chronic combined systolic and diastolic congestive heart failure (HCC)    Adrenal insufficiency (HCC)    Sepsis due to methicillin susceptible Staphylococcus aureus (HCC)    MRSA (methicillin resistant staph aureus) culture positive    MRSA bacteremia    NSTEMI (non-ST elevated myocardial infarction) ([place] [address]    Asymptomatic bacteriuria      Plan:  UTI on last admission, now in Sepsis due to E. Coli secondary to medical non-compliance. Cont Asa, Plavix and Lipitor  Creatinine WNL  Will monitor BMP. IVF stopped. Psych consulted - schizophrenia. Haldol 5 PRN BID, Ativan scheduled 0.5 BID, Zyprexa 10. Social Work consulted - need for [facility] home care or outreach program to make sure patient is taking her meds. Dced UTI Rx per ID. Increase Metoprolol to 50 BID      MSSA bacteremia   ID consulted  Blood cultures look like MSSA. Cont Vanc and NATHAN ordered. Repeat Blood Cx till negative. NATHAN for vegetation on AICD. Monitor Cr, BMP. Cardio consulted for NATHAN - vegetation vs thrombus found. - await ID plan. Patient may need the device removed and then Abx given. DVT: SC heparin  Diet: Cardiac          Sonu Rooney MD  PGY-1, Internal Medicine  [address] [facility]    Attending Physician Statement  I have discussed the care of Kaylene Curry, including pertinent history and exam findings with the resident.  I have reviewed the key elements of all parts of the

## 2017-12-13 ENCOUNTER — APPOINTMENT (OUTPATIENT)
Dept: GENERAL RADIOLOGY | Age: 62
DRG: 260 | End: 2017-12-13
Payer: MEDICARE

## 2017-12-13 ENCOUNTER — APPOINTMENT (OUTPATIENT)
Dept: CARDIAC CATH/INVASIVE PROCEDURES | Age: 62
DRG: 260 | End: 2017-12-13
Payer: MEDICARE

## 2017-12-13 PROBLEM — T82.7XXA AICD LEAD INFECTION (HCC): Status: ACTIVE | Noted: 2017-12-13

## 2017-12-13 LAB
CULTURE: ABNORMAL
CULTURE: NEGATIVE
GLUCOSE BLD-MCNC: 119 MG/DL (ref 65–105)
GLUCOSE BLD-MCNC: 124 MG/DL (ref 65–105)
GLUCOSE BLD-MCNC: 160 MG/DL (ref 65–105)
Lab: ABNORMAL
ORGANISM: ABNORMAL
ORGANISM: ABNORMAL
SPECIMEN DESCRIPTION: ABNORMAL
STATUS: ABNORMAL

## 2017-12-13 PROCEDURE — 97535 SELF CARE MNGMENT TRAINING: CPT

## 2017-12-13 PROCEDURE — 6370000000 HC RX 637 (ALT 250 FOR IP): Performed by: STUDENT IN AN ORGANIZED HEALTH CARE EDUCATION/TRAINING PROGRAM

## 2017-12-13 PROCEDURE — 76937 US GUIDE VASCULAR ACCESS: CPT

## 2017-12-13 PROCEDURE — 82947 ASSAY GLUCOSE BLOOD QUANT: CPT

## 2017-12-13 PROCEDURE — 2580000003 HC RX 258: Performed by: INTERNAL MEDICINE

## 2017-12-13 PROCEDURE — 99233 SBSQ HOSP IP/OBS HIGH 50: CPT | Performed by: INTERNAL MEDICINE

## 2017-12-13 PROCEDURE — 2060000000 HC ICU INTERMEDIATE R&B

## 2017-12-13 PROCEDURE — 02PA3MZ REMOVAL OF CARDIAC LEAD FROM HEART, PERCUTANEOUS APPROACH: ICD-10-PCS | Performed by: INTERNAL MEDICINE

## 2017-12-13 PROCEDURE — 6360000002 HC RX W HCPCS: Performed by: INTERNAL MEDICINE

## 2017-12-13 PROCEDURE — 2580000003 HC RX 258: Performed by: STUDENT IN AN ORGANIZED HEALTH CARE EDUCATION/TRAINING PROGRAM

## 2017-12-13 PROCEDURE — C1894 INTRO/SHEATH, NON-LASER: HCPCS

## 2017-12-13 PROCEDURE — 6360000002 HC RX W HCPCS

## 2017-12-13 PROCEDURE — 6370000000 HC RX 637 (ALT 250 FOR IP): Performed by: INTERNAL MEDICINE

## 2017-12-13 PROCEDURE — 2580000003 HC RX 258: Performed by: EMERGENCY MEDICINE

## 2017-12-13 PROCEDURE — 2720000010 HC SURG SUPPLY STERILE

## 2017-12-13 PROCEDURE — 2780000010 HC IMPLANT OTHER

## 2017-12-13 PROCEDURE — 0JPT0PZ REMOVAL OF CARDIAC RHYTHM RELATED DEVICE FROM TRUNK SUBCUTANEOUS TISSUE AND FASCIA, OPEN APPROACH: ICD-10-PCS | Performed by: INTERNAL MEDICINE

## 2017-12-13 PROCEDURE — 71010 XR CHEST PORTABLE: CPT

## 2017-12-13 RX ORDER — SODIUM CHLORIDE 0.9 % (FLUSH) 0.9 %
10 SYRINGE (ML) INJECTION EVERY 12 HOURS SCHEDULED
Status: DISCONTINUED | OUTPATIENT
Start: 2017-12-13 | End: 2017-12-17 | Stop reason: HOSPADM

## 2017-12-13 RX ORDER — ACETAMINOPHEN 325 MG/1
650 TABLET ORAL EVERY 4 HOURS PRN
Status: DISCONTINUED | OUTPATIENT
Start: 2017-12-13 | End: 2017-12-17 | Stop reason: HOSPADM

## 2017-12-13 RX ORDER — SODIUM CHLORIDE 0.9 % (FLUSH) 0.9 %
10 SYRINGE (ML) INJECTION PRN
Status: DISCONTINUED | OUTPATIENT
Start: 2017-12-13 | End: 2017-12-17 | Stop reason: HOSPADM

## 2017-12-13 RX ADMIN — ASPIRIN 81 MG: 81 TABLET, CHEWABLE ORAL at 08:39

## 2017-12-13 RX ADMIN — BENZONATATE 100 MG: 100 CAPSULE ORAL at 08:39

## 2017-12-13 RX ADMIN — OXCARBAZEPINE 300 MG: 300 TABLET, FILM COATED ORAL at 08:39

## 2017-12-13 RX ADMIN — CLOPIDOGREL 75 MG: 75 TABLET, FILM COATED ORAL at 08:38

## 2017-12-13 RX ADMIN — METOPROLOL TARTRATE 50 MG: 50 TABLET, FILM COATED ORAL at 08:39

## 2017-12-13 RX ADMIN — FAMOTIDINE 20 MG: 20 TABLET, FILM COATED ORAL at 08:39

## 2017-12-13 RX ADMIN — SODIUM CHLORIDE, PRESERVATIVE FREE 10 ML: 5 INJECTION INTRAVENOUS at 08:40

## 2017-12-13 RX ADMIN — SODIUM CHLORIDE, PRESERVATIVE FREE 10 ML: 5 INJECTION INTRAVENOUS at 09:00

## 2017-12-13 RX ADMIN — MECLIZINE HCL 12.5 MG: 12.5 TABLET ORAL at 01:31

## 2017-12-13 RX ADMIN — SODIUM CHLORIDE: 9 INJECTION, SOLUTION INTRAVENOUS at 16:00

## 2017-12-13 RX ADMIN — MECLIZINE HCL 12.5 MG: 12.5 TABLET ORAL at 08:38

## 2017-12-13 RX ADMIN — VANCOMYCIN HYDROCHLORIDE 750 MG: 10 INJECTION, POWDER, LYOPHILIZED, FOR SOLUTION INTRAVENOUS at 19:03

## 2017-12-13 RX ADMIN — VANCOMYCIN HYDROCHLORIDE 750 MG: 10 INJECTION, POWDER, LYOPHILIZED, FOR SOLUTION INTRAVENOUS at 06:41

## 2017-12-13 ASSESSMENT — ENCOUNTER SYMPTOMS
EYES NEGATIVE: 1
VOMITING: 0
NAUSEA: 0
RESPIRATORY NEGATIVE: 1
SORE THROAT: 0
COUGH: 0
EYE DISCHARGE: 0
SHORTNESS OF BREATH: 0
GASTROINTESTINAL NEGATIVE: 1
COUGH: 1
EYE ITCHING: 0
BACK PAIN: 1
DIARRHEA: 0
WHEEZING: 0
ALLERGIC/IMMUNOLOGIC NEGATIVE: 1

## 2017-12-13 NOTE — PROGRESS NOTES
[facility] - Progress Note  Today's Date and Time: 12/13/2017, 11:07 AM  admission date 12/8/2017  Impression :   Current:    · MSSA / Staph coag neg AICD endocarditis and sepsis  · Old silent bacteriuria, no pyuria  · Leukocytosis - improving  · Thrombus vs vegetation seen on ICD lead  · NSTEMI  · Hypotension  · CAD many vessel disease - recent card cath right groin   · DM2  · Severe systolic CHF - AICD right ventricle  · Bilateral vertebral basilar artery severe stenosis     Other:  · Old CVA - left sided severe paresis     Recommendations   · Apparently, the organism in the blood was looking like MRSA, yet final culture. It looked sensitive and it is actually an MSSA. Other blood culture w 2 SCN strains. ?? Contaminants. .  · Repeat blood cultures are coag neg staph and gram positive rods  · The patient is allergic to penicillin, we will continue the vancomycin for now. · Aim for a trough of 14-17   · NATHAN shows large vegetation vs thrombus on ICD lead  · ICD device extraction possibly today per cardiology  · Doubt any UTI, she has silent bacteruria, no need to treat the E coli  · Bone Scan negative for OM     Chief complaint/reason for consultation:   MRSA sepsis     History of Present Illness:   Initial history:  John Paul Mederos is a 65y.o.-year-old  female who was initially admitted on 12/8/2017. Patient seen at the request of Mika Mckenzie.     Known to have a basilar art stenosis bilat w black out sepsis, planned for surgery, started having those spells again at home today and was hypotensive, febrile in ER  blood culture showed MRSA and the other clusters pend. She has e coli bacteriuria and no dysuria. , same as last many months. No fever at home but a new back pain from her fall. Very poor historian, unable to better detail her past history     Past CVA w left side paresis, sits in a wheelchair and has an aid at home.   Recent card cath showed multivessel disease and is on medical management. troponins were elevated here, and she is started on heparin     Has noticed she has a left chest AICD site pain wo redness but with induration, started recently but no redness or drainage. Interval changes 12/13/17   Afebrile, Vitals hemodynamically stable  No new labs  Patient NPO today for possible device extraction  No nausea/vomiting/diarrhea  Bone scan negative for OM      Summary of relevant labs:  Labs:  Vanco trough 12/11/17: 23.5  WBC 18.0 --> 14.6    Micro:  Blood cx 12/8/17 - MRSA, coag neg staph  12/8/17 - staph hominis, staph epidermidis  12/10/17 - ngtd  12/11/17- coag neg staph and gram positive rods  Blood culture SCN 12/8/17  Urine cx - E coli     Imaging:  Echo 11/30/17 no vegetations   NATHAN 12/11/17  A large 1.1 * 2.3 cm vegetation is seen close to the SVC, most likely on the ICD lead in the right atrium   Summary:    1. A NATHAN was performed without complications. 2. Possible Vegetation VS thrombus is seen on the ICD Lead. 3. ID to determine the plan of treatment for that large vegetation. 4. Cardiology will follow the patient. CTA chest, no pneumonia and could not assess for P emboli  CT head neg  Bone scan pending    I have personally reviewed the past medical history, past surgical history, medications, social history, and family history, and I have updated the database accordingly.   Past Medical History:     Past Medical History:   Diagnosis Date    Arthritis     Asthma     CAD (coronary artery disease)     [facility]congestive [facility] failure) [facility]     Clinical trial participant at discharge 03/13/2017    [employer] PSR     [facility]) [facility]     Diabetes mellitus [facility]     Hepatitis C     Hyperlipidemia     Hypertension     MRSA (methicillin resistant staph aureus) culture positive 12/10/2017    blood    Pneumonia     Unspecified cerebral artery occlusion with cerebral infarction        Past Surgical  History:     Past Surgical History:   Procedure Laterality Date    CARDIAC CATHETERIZATION  11/27/2017    CARDIAC PACEMAKER PLACEMENT      placed 1 month ago at st [address]  5/6/2016    cardiac cath-1xBMS prox. RCA; 1xBMS prox. LAD    FOOT SURGERY      cyst removed from L foot    TONSILLECTOMY         Medications:      enoxaparin  40 mg Subcutaneous Daily    vancomycin  750 mg Intravenous Q12H    sodium bicarbonate  100 mEq Intravenous Once    metoprolol tartrate  50 mg Oral BID    sodium chloride flush  10 mL Intravenous 2 times per day    vancomycin (VANCOCIN) intermittent dosing (placeholder)   Other RX Placeholder    OLANZapine  10 mg Oral Nightly    lidocaine 1 % injection  5 mL Intradermal Once    sodium chloride flush  10 mL Intravenous 2 times per day    aspirin  81 mg Oral Daily    atorvastatin  80 mg Oral Nightly    clopidogrel  75 mg Oral Daily    famotidine  20 mg Oral Daily    insulin glargine  30 Units Subcutaneous Nightly    OXcarbazepine  300 mg Oral BID    sodium chloride flush  10 mL Intravenous 2 times per day    insulin lispro  0-18 Units Subcutaneous TID WC    insulin lispro  0-9 Units Subcutaneous Nightly       Social History:     Social History     Social History    Marital status: Single     Spouse name: N/A    Number of children: N/A    Years of education: N/A     Occupational History    Not on file. Social History Main Topics    Smoking status: Current Every Day Smoker     Packs/day: 1.00     Types: Cigarettes    Smokeless tobacco: Never Used    Alcohol use Yes      Comment: 1 bottle per month wine    Drug use: No    Sexual activity: Yes     Partners: Male     Other Topics Concern    Not on file     Social History Narrative    No narrative on file       Family History:     Family History   Problem Relation Age of Onset    Family history unknown:  Yes        Allergies:   Levofloxacin and Pcn [penicillins]     Review of Systems:     Review of Systems   Constitutional: Negative for activity change and appetite change. HENT: Negative. Negative for sore throat. Eyes: Negative. Negative for discharge and itching. Respiratory: Positive for cough (nonproductive). Negative for shortness of breath and wheezing. Cardiovascular: Negative. Gastrointestinal: Negative. Negative for diarrhea, nausea and vomiting. Endocrine: Negative. Genitourinary: Negative. Musculoskeletal: Positive for back pain (had fall 5 days ago). Skin: Negative. Allergic/Immunologic: Negative. Neurological: Positive for weakness. Dizziness: left side weak. Hematological: Negative. Psychiatric/Behavioral: Negative. Physical Examination :     Patient Vitals for the past 8 hrs:   BP Temp Temp src Pulse Resp SpO2   12/13/17 0638 122/84 98 °F (36.7 °C) Axillary 90 25 100 %       Physical Exam   Constitutional: She is oriented to person, place, and time and well-developed, well-nourished, and in no distress. No distress. HENT:   Head: Normocephalic and atraumatic. Mouth/Throat: No oropharyngeal exudate. Eyes: Conjunctivae and EOM are normal. Pupils are equal, round, and reactive to light. No scleral icterus. Neck: Normal range of motion. Neck supple. No JVD present. Cardiovascular: Normal rate, regular rhythm and normal heart sounds. Exam reveals no friction rub. No murmur heard. Pulmonary/Chest: Effort normal and breath sounds normal. No respiratory distress. She has no wheezes. She has no rales. Abdominal: Soft. Bowel sounds are normal. She exhibits no distension. There is no tenderness. Genitourinary: No vaginal discharge found. Musculoskeletal: She exhibits no edema or tenderness. Left side weak, arm worse than the leg   Neurological: She is alert and oriented to person, place, and time. Skin: Skin is warm and dry. No erythema.    Psychiatric: Affect normal.         Medical Decision Making:   I have

## 2017-12-13 NOTE — PROGRESS NOTES
Oriented x 3. In No acute distress. HEENT: Head: Normal, normocephalic, atraumatic. Neck: Supple, no carotid bruit, no JVD  Lungs: Clear to auscultation bilaterally, no wheezing or dyspnea. Heart: Regular rhythm , S1, S2 normal. ICD site non tender and no erythema or discharge  Abdomen: Soft, non-tender; bowel sounds normal  Extremities: No cyanosis or edema  Neurologic: left hemiparesis from previous CVA   Mental status: Alert, oriented. Motor and sensory not done      EKG:  Sinus tachycardia and nonspecific ST-T changes no significant change from the previous, inferior infarct, LAE     ECHO 11/30/17: EF 35%, grade I DD.      CATH 12/04/17:Multi-vessel Coronary Artery Disease.   Patent grafts to LAD and OM.   Patent prior RCA stents with non-obstructive disease.   Moderately impaired ventricular function--EF 30%     STRESS 11/15/17: Apparent remote infarction within the inferior wall and moderate-sized partially reversible perfusion defect within the anterior wall, concerning for ischemia. Dyskinesis of the apex. Severe hypokinesis of the septum, inferoseptal wall and inferior wall. EF 36%.      ICD 3/13/17: Medtronic device placed by Dr. Yuriy Mujica for ICM.    CABG 9/1/16: LIMA-LAD, SVG-OM3.     NATHAN 12/11/2017  A large 1.1 x 2.3 cm vegetation is seen close to the SVC, most likely on the ICD lead in the right atrium  Possible Vegetation VS thrombus         Assessment:   1. Sepsis with MSSA bacteremia   2. 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 Elevated troponin, likely secondary to type 2 NSTEMI from above   4. CAD status post CABG in 2016 with LIMA to LAD and SVG to OM with subsequent PCI of OM 2 in November 2017  5. Sinus tachycardia, resolved   6. Ischemic cardiomyopathy with ICD in situ  7. Chronic vertigo and dizziness with frequent falls  8. History of ischemic CVA with residual left hemiparesis  9.  Hx of Noncompliance with treatment and medications      Plan / Recommendations:   ID

## 2017-12-13 NOTE — PROGRESS NOTES
[facility] - Initial Consult Note  Today's Date and Time: 12/13/2017, 11:12 AM  admission date 12/8/2017  Impression :   Current:    · ? MSSA / Staph coag neg AICD endocarditis and sepsis  · Old silent bacteriuria, no pyuria  · Leukocytosis - improving  · Thrombus vs vegetation seen on ICD lead  · NSTEMI  · Hypotension  · CAD many vessel disease - recent card cath right groin   · DM2  · Severe systolic CHF - AICD right ventricle  · Bilateral vertebral basilar artery severe stenosis     Other:  · Old CVA - left sided severe paresis     Recommendations   · Apparently, the organism in the blood was looking like MRSA, yet final culture. It looked sensitive and it is actually an MSSA. Other blood culture w 2 SCN strains. ?? Contaminants. .  · The patient is allergic to penicillin, we will continue the vancomycin for now. · Watch creatinine, and for a trough  14-17- dose adjusted 12/13/2017  · NATHAN shows large vegetation vs thrombus on ICD lead  · AICD will need to come out  · We'll plan 4 weeks of IV antibiotics for lead endocarditis  · Repeat blood cultures today to confirm negativity   · Leukocytosis, improving  · I doubt any UTI, she has silent bacteruria, no need to treat the E coli  · Bone Scan negative for thoracic spine osteomyelitis     Chief complaint/reason for consultation:   MRSA sepsis     History of Present Illness:   Initial history:  Landy Da Silva is a 65y.o.-year-old  female who was initially admitted on 12/8/2017. Patient seen at the request of Staci Casanova.     Known to have a basilar art stenosis bilat w black out sepsis, planned for surgery, started having those spells again at home today and was hypotensive, febrile in ER  blood culture showed MRSA and the other clusters pend. She has e coli bacteriuria and no dysuria. , same as last many months. No fever at home but a new back pain from her fall.   Very poor historian, unable to better detail education: N/A     Occupational History    Not on file. Social History Main Topics    Smoking status: Current Every Day Smoker     Packs/day: 1.00     Types: Cigarettes    Smokeless tobacco: Never Used    Alcohol use Yes      Comment: 1 bottle per month wine    Drug use: No    Sexual activity: Yes     Partners: Male     Other Topics Concern    Not on file     Social History Narrative    No narrative on file       Family History:     Family History   Problem Relation Age of Onset    Family history unknown: Yes        Allergies:   Levofloxacin and Pcn [penicillins]     Review of Systems:     Review of Systems   Constitutional: Negative. Negative for activity change and appetite change. HENT: Negative. Negative for sore throat. Eyes: Negative. Negative for discharge and itching. Respiratory: Negative. Negative for cough, shortness of breath and wheezing. Cardiovascular: Negative. Gastrointestinal: Negative. Negative for diarrhea, nausea and vomiting. Endocrine: Negative. Genitourinary: Negative. Musculoskeletal: Negative. Back pain   Skin: Negative. Allergic/Immunologic: Negative. Neurological: Negative for weakness. Dizziness: left side weak. Hematological: Negative. Psychiatric/Behavioral: Negative. Physical Examination :     Patient Vitals for the past 8 hrs:   BP Temp Temp src Pulse Resp SpO2   12/13/17 0638 122/84 98 °F (36.7 °C) Axillary 90 25 100 %       Physical Exam   Constitutional: She is oriented to person, place, and time and well-developed, well-nourished, and in no distress. No distress. HENT:   Head: Normocephalic and atraumatic. Eyes: Conjunctivae and EOM are normal. No scleral icterus. Neck: Normal range of motion. Neck supple. No JVD present. No tracheal deviation present. No thyromegaly present. Cardiovascular: Normal rate, regular rhythm and normal heart sounds. Exam reveals no friction rub.     No murmur performed by me. I agree with the assessment, plan and orders as documented by the resident.     Keli Zhang, Infectious Diseases

## 2017-12-13 NOTE — PLAN OF CARE
Problem: Falls - Risk of:  Goal: Will remain free from falls  Will remain free from falls   Outcome: Ongoing  Pt remains free from falls, wearing non-skid foot wear, bed/chair alarm on, pt educated to use call light when wanting to get up. Pt re-educated, after pt gets up from bed/chair. Pt room door open to hallway.

## 2017-12-13 NOTE — TELEPHONE ENCOUNTER
Met with patient bedside at [facility].   Patient stated she was hospitalized due to \"infection around my heart\". Patient stated that she collapsed in her kitchen again. Patient left a message for this  to contact her regarding housing. Patient is requesting assistance with locating new housing. Patient has been living in her new apartment at [facility] for approximately a month. Patient was unable to give a reason for wanting to abandon her apartment. Asked patient if a decline in natural supports, loneliness, etc.  was the reason she wanted to leave. Patient stated \"no\" and went on to say that she preferred being alone. Discussed other options for housing such as group home, to which the patient declined (although she mentioned that her family made a similar suggestion). Discussed with patient the importance of community integration. Patient mentioned that she missed going to [employer] and since her health has declined, she hasn't been able to go. Discussed the benefits of [employer] to which she declined. While continue to encourage the patient to explore community activities. [employer] will continue to follow up with patient. Patient requested for  to follow up with a senior apartment through .

## 2017-12-13 NOTE — PROGRESS NOTES
Staphylococcus aureus ([facility]  Active Problems:    S/P implantation of automatic cardioverter/defibrillator (AICD) 3/13/1- Dr. Orlando Perera    Essential hypertension    Diabetes mellitus type 2, controlled ([place] [address]    History of CVA (cerebrovascular accident)    Noncompliance    DHARMESH (acute kidney injury) ([facility]    ST elevation myocardial infarction (STEMI) ([facility]    Leukocytosis    Vertebral artery disease ([place] [address]    Acute cystitis without hematuria    Chronic respiratory failure (HCC)    Sepsis (HCC)    Chronic combined systolic and diastolic congestive heart failure ([facility])    Adrenal insufficiency ([facility])    Sepsis due to methicillin susceptible Staphylococcus aureus ([facility])    MRSA (methicillin resistant staph aureus) culture positive    MRSA bacteremia    NSTEMI (non-ST elevated myocardial infarction) ([place] [address]    Bacteriuria    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 Coli secondary to medical non-compliance. Cont Asa, Plavix and Lipitor  Creatinine WNL  Will monitor BMP. IVF stopped. Psych consulted - schizophrenia. Haldol 5 PRN BID, Ativan scheduled 0.5 BID, Zyprexa 10. Social Work consulted - need for [facility] home care or outreach program to make sure patient is taking her meds. Dced UTI Rx per ID. Increase Metoprolol to 50 BID      MSSA bacteremia, AICD vegetation. ID consulted  Blood cultures look like MSSA. Cont Vanc and NATHAN ordered. Repeat Blood Cx till negative. NATHAN for vegetation on AICD. Monitor Cr, BMP. Cardio consulted for NATHAN - vegetation vs thrombus found. - hardware needs to be removed - cardiology on board. Will continue with current management and plan and cont Abx.       DVT: SC heparin  Diet: Cardiac        Marita Poe MD  PGY-1, Internal Medicine  [facility]    Attending Physician Statement  I have discussed the care of Laura Sebastian, including pertinent history and exam findings with the resident. I have reviewed the key elements of all parts of the encounter with the resident. I have seen and examined the patient with the resident and the key elements of all parts of the encounter have been performed by me.     Principal Problem:    Sepsis due to methicillin susceptible Staphylococcus aureus ([facility]  Active Problems:    Essential hypertension    Diabetes mellitus type 2, controlled ([facility]    History of CVA (cerebrovascular accident)    Noncompliance    DHARMESH (acute kidney injury) ([facility]    ST elevation myocardial infarction (STEMI) ([facility]    Leukocytosis    S/P implantation of automatic cardioverter/defibrillator (AICD) 3/13/1- Dr. Cleo Grayson    Vertebral artery disease 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   12/13/17

## 2017-12-13 NOTE — PROGRESS NOTES
Occupational Therapy  Facility/Department: [facility]  Daily Treatment Note  NAME: Meseret San  : 1955  MRN: [id number]    Date of Service: 2017    Patient Diagnosis(es): The primary encounter diagnosis was NSTEMI (non-ST elevated myocardial infarction) ([facility] Diagnoses of Sepsis, due to unspecified organism [facility]) and Urinary tract infection without hematuria, site unspecified were also pertinent to this visit. has a past medical history of Arthritis; Asthma; CAD (coronary artery disease); CHF (congestive heart failure) ([facility] Clinical trial participant at discharge; COPD (chronic obstructive pulmonary disease) ([facility] Diabetes mellitus ([facility] Hepatitis C; Hyperlipidemia; Hypertension; MRSA (methicillin resistant staph aureus) culture positive; Pneumonia; and Unspecified cerebral artery occlusion with cerebral infarction. has a past surgical history that includes Tonsillectomy; Foot surgery; Cardiac surgery (2016); Cardiac pacemaker placement; and Cardiac catheterization (2017). Restrictions  Restrictions/Precautions  Restrictions/Precautions: Fall Risk, Isolation  Required Braces or Orthoses?: No  Position Activity Restriction  Other position/activity restrictions: Bedrest with bathroom privilages      Subjective   General  Patient assessed for rehabilitation services?: Yes  Family / Caregiver Present: No  Pain Assessment  Patient Currently in Pain: No  Vital Signs  Patient Currently in Pain: No     Orientation  Orientation  Overall Orientation Status: Within Functional Limits     Objective    Balance  Sitting Balance: Contact guard assistance  Standing Balance: Contact guard assistance (use of quad cane)  Standing Balance  Sit to stand: Contact guard assistance  Stand to sit: Contact guard assistance  Comment: Chair alarm alarming, on arrival pt up attempting to return to bed IND. Pt reports call light not in reach and \"I wanted to get back into bed\".  Poor safety awareness, pt will  Short term goal 1: demo MI in UE ADL activities   Short term goal 2: demo MI in simple gorrming activities  Short term goal 3: demo good safety awareness during functional/ ADL activities   Short term goal 4: demo increased activity tolerance of 20+ min to assist with ADL/ functional activites   Patient Goals   Patient goals : to go home     Therapy Time   Individual Concurrent Group Co-treatment   Time In 1042         Time Out 1052         Minutes 10          See above for LOF. RN reports patient is medically stable for therapy treatment this date. Chart reviewed prior to treatment and patient is agreeable for therapy. All lines intact and patient positioned comfortably at end of treatment. All patient needs addressed prior to ending therapy session.        Lillie Yañez, OTR/L

## 2017-12-14 LAB
ABSOLUTE EOS #: 0.41 K/UL (ref 0–0.4)
ABSOLUTE IMMATURE GRANULOCYTE: 0.69 K/UL (ref 0–0.3)
ABSOLUTE LYMPH #: 1.78 K/UL (ref 1–4.8)
ABSOLUTE MONO #: 0.69 K/UL (ref 0.1–0.8)
ANION GAP SERPL CALCULATED.3IONS-SCNC: 10 MMOL/L (ref 9–17)
BASOPHILS # BLD: 1 % (ref 0–2)
BASOPHILS ABSOLUTE: 0.14 K/UL (ref 0–0.2)
BUN BLDV-MCNC: 15 MG/DL (ref 8–23)
BUN/CREAT BLD: ABNORMAL (ref 9–20)
CALCIUM SERPL-MCNC: 7.8 MG/DL (ref 8.6–10.4)
CHLORIDE BLD-SCNC: 105 MMOL/L (ref 98–107)
CO2: 22 MMOL/L (ref 20–31)
CREAT SERPL-MCNC: 0.81 MG/DL (ref 0.5–0.9)
CULTURE: ABNORMAL
DIFFERENTIAL TYPE: ABNORMAL
EOSINOPHILS RELATIVE PERCENT: 3 % (ref 1–4)
GFR AFRICAN AMERICAN: >60 ML/MIN
GFR NON-AFRICAN AMERICAN: >60 ML/MIN
GFR SERPL CREATININE-BSD FRML MDRD: ABNORMAL ML/MIN/{1.73_M2}
GFR SERPL CREATININE-BSD FRML MDRD: ABNORMAL ML/MIN/{1.73_M2}
GLUCOSE BLD-MCNC: 126 MG/DL (ref 65–105)
GLUCOSE BLD-MCNC: 144 MG/DL (ref 70–99)
GLUCOSE BLD-MCNC: 191 MG/DL (ref 65–105)
GLUCOSE BLD-MCNC: 192 MG/DL (ref 65–105)
GLUCOSE BLD-MCNC: 216 MG/DL (ref 65–105)
HCT VFR BLD CALC: 24.8 % (ref 36.3–47.1)
HEMOGLOBIN: 7.3 G/DL (ref 11.9–15.1)
IMMATURE GRANULOCYTES: 5 %
LYMPHOCYTES # BLD: 13 % (ref 24–44)
Lab: ABNORMAL
MCH RBC QN AUTO: 24.9 PG (ref 25.2–33.5)
MCHC RBC AUTO-ENTMCNC: 29.4 G/DL (ref 28.4–34.8)
MCV RBC AUTO: 84.6 FL (ref 82.6–102.9)
MONOCYTES # BLD: 5 % (ref 1–7)
MORPHOLOGY: ABNORMAL
PDW BLD-RTO: 18.9 % (ref 11.8–14.4)
PLATELET # BLD: 317 K/UL (ref 138–453)
PLATELET ESTIMATE: ABNORMAL
PMV BLD AUTO: 10.1 FL (ref 8.1–13.5)
POTASSIUM SERPL-SCNC: 4.7 MMOL/L (ref 3.7–5.3)
RBC # BLD: 2.93 M/UL (ref 3.95–5.11)
RBC # BLD: ABNORMAL 10*6/UL
SEG NEUTROPHILS: 73 % (ref 36–66)
SEGMENTED NEUTROPHILS ABSOLUTE COUNT: 9.99 K/UL (ref 1.8–7.7)
SODIUM BLD-SCNC: 137 MMOL/L (ref 135–144)
SPECIMEN DESCRIPTION: ABNORMAL
STATUS: ABNORMAL
VANCOMYCIN TROUGH DATE LAST DOSE: NORMAL
VANCOMYCIN TROUGH DOSE AMOUNT: NORMAL
VANCOMYCIN TROUGH TIME LAST DOSE: NORMAL
VANCOMYCIN TROUGH: 15.8 UG/ML (ref 10–20)
WBC # BLD: 13.7 K/UL (ref 3.5–11.3)
WBC # BLD: ABNORMAL 10*3/UL

## 2017-12-14 PROCEDURE — 2580000003 HC RX 258: Performed by: INTERNAL MEDICINE

## 2017-12-14 PROCEDURE — 99231 SBSQ HOSP IP/OBS SF/LOW 25: CPT | Performed by: INTERNAL MEDICINE

## 2017-12-14 PROCEDURE — 36415 COLL VENOUS BLD VENIPUNCTURE: CPT

## 2017-12-14 PROCEDURE — 85025 COMPLETE CBC W/AUTO DIFF WBC: CPT

## 2017-12-14 PROCEDURE — 2060000000 HC ICU INTERMEDIATE R&B

## 2017-12-14 PROCEDURE — 99233 SBSQ HOSP IP/OBS HIGH 50: CPT | Performed by: INTERNAL MEDICINE

## 2017-12-14 PROCEDURE — 6360000002 HC RX W HCPCS: Performed by: INTERNAL MEDICINE

## 2017-12-14 PROCEDURE — 6370000000 HC RX 637 (ALT 250 FOR IP): Performed by: STUDENT IN AN ORGANIZED HEALTH CARE EDUCATION/TRAINING PROGRAM

## 2017-12-14 PROCEDURE — 87040 BLOOD CULTURE FOR BACTERIA: CPT

## 2017-12-14 PROCEDURE — 80202 ASSAY OF VANCOMYCIN: CPT

## 2017-12-14 PROCEDURE — 6370000000 HC RX 637 (ALT 250 FOR IP): Performed by: PSYCHIATRY & NEUROLOGY

## 2017-12-14 PROCEDURE — 82947 ASSAY GLUCOSE BLOOD QUANT: CPT

## 2017-12-14 PROCEDURE — 33244 REMOVE ELCTRD TRANSVENOUSLY: CPT | Performed by: INTERNAL MEDICINE

## 2017-12-14 PROCEDURE — 33241 REMOVE PULSE GENERATOR: CPT | Performed by: INTERNAL MEDICINE

## 2017-12-14 PROCEDURE — 6370000000 HC RX 637 (ALT 250 FOR IP): Performed by: INTERNAL MEDICINE

## 2017-12-14 PROCEDURE — 80048 BASIC METABOLIC PNL TOTAL CA: CPT

## 2017-12-14 RX ORDER — DOCUSATE SODIUM 100 MG/1
100 CAPSULE, LIQUID FILLED ORAL DAILY
Status: DISCONTINUED | OUTPATIENT
Start: 2017-12-14 | End: 2017-12-17 | Stop reason: HOSPADM

## 2017-12-14 RX ADMIN — ASPIRIN 81 MG: 81 TABLET, CHEWABLE ORAL at 09:50

## 2017-12-14 RX ADMIN — INSULIN LISPRO 3 UNITS: 100 INJECTION, SOLUTION INTRAVENOUS; SUBCUTANEOUS at 12:48

## 2017-12-14 RX ADMIN — METOPROLOL TARTRATE 50 MG: 50 TABLET, FILM COATED ORAL at 22:08

## 2017-12-14 RX ADMIN — INSULIN LISPRO 3 UNITS: 100 INJECTION, SOLUTION INTRAVENOUS; SUBCUTANEOUS at 09:50

## 2017-12-14 RX ADMIN — INSULIN LISPRO 6 UNITS: 100 INJECTION, SOLUTION INTRAVENOUS; SUBCUTANEOUS at 18:23

## 2017-12-14 RX ADMIN — CLOPIDOGREL 75 MG: 75 TABLET, FILM COATED ORAL at 09:50

## 2017-12-14 RX ADMIN — FAMOTIDINE 20 MG: 20 TABLET, FILM COATED ORAL at 09:50

## 2017-12-14 RX ADMIN — VANCOMYCIN HYDROCHLORIDE 750 MG: 10 INJECTION, POWDER, LYOPHILIZED, FOR SOLUTION INTRAVENOUS at 22:09

## 2017-12-14 RX ADMIN — OXCARBAZEPINE 300 MG: 300 TABLET, FILM COATED ORAL at 22:07

## 2017-12-14 RX ADMIN — OXCARBAZEPINE 300 MG: 300 TABLET, FILM COATED ORAL at 09:50

## 2017-12-14 RX ADMIN — Medication 10 ML: at 22:08

## 2017-12-14 RX ADMIN — ENOXAPARIN SODIUM 40 MG: 40 INJECTION SUBCUTANEOUS at 09:49

## 2017-12-14 RX ADMIN — DOCUSATE SODIUM 100 MG: 100 CAPSULE ORAL at 12:48

## 2017-12-14 RX ADMIN — VANCOMYCIN HYDROCHLORIDE 750 MG: 10 INJECTION, POWDER, LYOPHILIZED, FOR SOLUTION INTRAVENOUS at 07:45

## 2017-12-14 RX ADMIN — ATORVASTATIN CALCIUM 80 MG: 80 TABLET, FILM COATED ORAL at 22:08

## 2017-12-14 RX ADMIN — METOPROLOL TARTRATE 50 MG: 50 TABLET, FILM COATED ORAL at 09:50

## 2017-12-14 RX ADMIN — INSULIN GLARGINE 30 UNITS: 100 INJECTION, SOLUTION SUBCUTANEOUS at 22:08

## 2017-12-14 RX ADMIN — OLANZAPINE 10 MG: 10 TABLET, FILM COATED ORAL at 22:08

## 2017-12-14 ASSESSMENT — ENCOUNTER SYMPTOMS
DIARRHEA: 0
EYE ITCHING: 0
WHEEZING: 0
SHORTNESS OF BREATH: 0
EYES NEGATIVE: 1
BACK PAIN: 1
NAUSEA: 0
SORE THROAT: 0
GASTROINTESTINAL NEGATIVE: 1
COUGH: 1
ALLERGIC/IMMUNOLOGIC NEGATIVE: 1
EYE DISCHARGE: 0
VOMITING: 0

## 2017-12-14 NOTE — PROGRESS NOTES
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 ?? Contaminants. .  · Repeat blood cultures are coag neg staph and gram positive rods  · The patient is allergic to penicillin, we will continue the vancomycin for now. · Aim for a trough of 14-17   · Repeat blood cultures- if negative, will need PICC line  · Plan for 4 weeks of IV antibiotics for lead endocarditis  · Get another vanco trough  · Silent bacteruria, no need to treat the E coli  · Bone Scan negative for OM     Chief complaint/reason for consultation:   MRSA sepsis     History of Present Illness:   Initial history:  Jonathan Kessler is a 65y.o.-year-old  female who was initially admitted on 12/8/2017. Patient seen at the request of Genesis Potter.     Known to have a basilar art stenosis bilat w black out sepsis, planned for surgery, started having those spells again at home today and was hypotensive, febrile in ER  blood culture showed MRSA and the other clusters pend. (LAB corrected this as MSSA)  She has e coli bacteriuria and no dysuria. , same as last many months. No fever at home but a new back pain from her fall. Very poor historian, unable to better detail her past history     Past CVA w left side paresis, sits in a wheelchair and has an aid at home.   Recent card cath showed multivessel disease and is on with cerebral infarction        Past Surgical  History:     Past Surgical History:   Procedure Laterality Date    CARDIAC CATHETERIZATION  11/27/2017    CARDIAC PACEMAKER PLACEMENT      placed 1 month ago at st [address]  5/6/2016    cardiac cath-1xBMS prox. RCA; 1xBMS prox. LAD    FOOT SURGERY      cyst removed from L foot    TONSILLECTOMY         Medications:      bacitracin in 0.9 % sodium chloride irrigation  50,000 Units Topical Once    sodium chloride flush  10 mL Intravenous 2 times per day    enoxaparin  40 mg Subcutaneous Daily    vancomycin  750 mg Intravenous Q12H    sodium bicarbonate  100 mEq Intravenous Once    metoprolol tartrate  50 mg Oral BID    sodium chloride flush  10 mL Intravenous 2 times per day    vancomycin (VANCOCIN) intermittent dosing (placeholder)   Other RX Placeholder    OLANZapine  10 mg Oral Nightly    lidocaine 1 % injection  5 mL Intradermal Once    sodium chloride flush  10 mL Intravenous 2 times per day    aspirin  81 mg Oral Daily    atorvastatin  80 mg Oral Nightly    clopidogrel  75 mg Oral Daily    famotidine  20 mg Oral Daily    insulin glargine  30 Units Subcutaneous Nightly    OXcarbazepine  300 mg Oral BID    sodium chloride flush  10 mL Intravenous 2 times per day    insulin lispro  0-18 Units Subcutaneous TID WC    insulin lispro  0-9 Units Subcutaneous Nightly       Social History:     Social History     Social History    Marital status: Single     Spouse name: N/A    Number of children: N/A    Years of education: N/A     Occupational History    Not on file.      Social History Main Topics    Smoking status: Current Every Day Smoker     Packs/day: 1.00     Types: Cigarettes    Smokeless tobacco: Never Used    Alcohol use Yes      Comment: 1 bottle per month wine    Drug use: No    Sexual activity: Yes     Partners: Male     Other Topics Concern    Not on file     Social History Narrative    No narrative on file Family History:     Family History   Problem Relation Age of Onset    Family history unknown: Yes        Allergies:   Levofloxacin and Pcn [penicillins]     Review of Systems:     Review of Systems   Constitutional: Negative for activity change and appetite change. HENT: Negative. Negative for sore throat. Eyes: Negative. Negative for discharge and itching. Respiratory: Positive for cough (nonproductive). Negative for shortness of breath and wheezing. Cardiovascular: Negative. Gastrointestinal: Negative. Negative for diarrhea, nausea and vomiting. Endocrine: Negative. Genitourinary: Negative. Musculoskeletal: Positive for back pain (had fall 5 days ago). Skin: Negative. Allergic/Immunologic: Negative. Neurological: Positive for weakness. Dizziness: left side weak. Hematological: Negative. Psychiatric/Behavioral: Negative. Physical Examination :     Patient Vitals for the past 8 hrs:   BP Temp Temp src Pulse Resp SpO2   12/14/17 0949 133/68 - - 104 - -   12/14/17 0756 137/79 98.2 °F (36.8 °C) Oral 98 20 100 %   12/14/17 0355 128/77 98.4 °F (36.9 °C) Oral 100 18 100 %       Physical Exam   Constitutional: She is oriented to person, place, and time and well-developed, well-nourished, and in no distress. No distress. HENT:   Head: Normocephalic and atraumatic. Mouth/Throat: No oropharyngeal exudate. Eyes: Conjunctivae and EOM are normal. Pupils are equal, round, and reactive to light. No scleral icterus. Neck: Normal range of motion. Neck supple. No JVD present. Cardiovascular: Normal rate, regular rhythm and normal heart sounds. Exam reveals no friction rub. No murmur heard. Pulmonary/Chest: Effort normal and breath sounds normal. No respiratory distress. She has no wheezes. She has no rales. Abdominal: Soft. Bowel sounds are normal. She exhibits no distension. There is no tenderness. Genitourinary: No vaginal discharge found.    Musculoskeletal: She

## 2017-12-14 NOTE — CARE COORDINATION
Pt still plans to go home with [address]. Notified by Zahra with [facility], they are not in network to provide antibiotics.    Case referred to [facility] care, faxed info to them, spoke with Pino Hoang about referral

## 2017-12-14 NOTE — PROGRESS NOTES
[facility]   Progress Note                 Date:   12/14/2017  Patient name:  Vicki Hinton  Date of admission:  12/8/2017 11:37 AM  MRN:   [id number]  YOB: 1955  PCP:    Lydia Kong MD    Reason for Admission: sepsis     Subjective:       Clinical Changes / Abnormalities:   Patient seen and examined in room. Denies CP or SOB. Left chest s/p lead extraction from AICD site CDI. No new cardiac concerns.     Medications:   Scheduled Meds:    docusate sodium  100 mg Oral Daily    bacitracin in 0.9 % sodium chloride irrigation  50,000 Units Topical Once    sodium chloride flush  10 mL Intravenous 2 times per day    enoxaparin  40 mg Subcutaneous Daily    vancomycin  750 mg Intravenous Q12H    sodium bicarbonate  100 mEq Intravenous Once    metoprolol tartrate  50 mg Oral BID    sodium chloride flush  10 mL Intravenous 2 times per day    vancomycin (VANCOCIN) intermittent dosing (placeholder)   Other RX Placeholder    OLANZapine  10 mg Oral Nightly    lidocaine 1 % injection  5 mL Intradermal Once    sodium chloride flush  10 mL Intravenous 2 times per day    aspirin  81 mg Oral Daily    atorvastatin  80 mg Oral Nightly    clopidogrel  75 mg Oral Daily    famotidine  20 mg Oral Daily    insulin glargine  30 Units Subcutaneous Nightly    OXcarbazepine  300 mg Oral BID    sodium chloride flush  10 mL Intravenous 2 times per day    insulin lispro  0-18 Units Subcutaneous TID WC    insulin lispro  0-9 Units Subcutaneous Nightly     Continuous Infusions:    sodium chloride 50 mL/hr at 12/13/17 1600    dextrose       CBC:   Recent Labs      12/12/17   0646  12/14/17   0507   WBC  14.6*  13.7*   HGB  7.4*  7.3*   PLT  301  317     BMP:    Recent Labs      12/12/17   0646  12/14/17   0409   NA  139  137   K  4.5  4.7   CL  106  105   CO2  20  22   BUN  21  15   CREATININE  0.78  0.81   GLUCOSE  86  144*     H    Objective:   Vitals: /68   Pulse 104   Temp 98.2 °F (36.8 °C) (Oral)   Resp 20   Ht 5' 1\" (1.549 m)   Wt 182 lb 1.6 oz (82.6 kg)   LMP  (LMP Unknown)   SpO2 100%   BMI 34.41 kg/m²      General appearance: awake and alert. Oriented x 3. In No acute distress. HEENT: Head: Normal, normocephalic, atraumatic. Neck: Supple, no carotid bruit, no JVD  Lungs: Clear to auscultation bilaterally, no wheezing or dyspnea. Heart: Regular rhythm , S1, S2 normal. ICD site non tender and no erythema or discharge  Abdomen: Soft, non-tender; bowel sounds normal  Extremities: No cyanosis or edema  Neurologic: left hemiparesis from previous CVA   Mental status: Alert, oriented. Motor and sensory not done      EKG:  Sinus tachycardia and nonspecific ST-T changes no significant change from the previous, inferior infarct, LAE     ECHO 11/30/17: EF 35%, grade I DD.      CATH 12/04/17:Multi-vessel Coronary Artery Disease.   Patent grafts to LAD and OM.   Patent prior RCA stents with non-obstructive disease.   Moderately impaired ventricular function--EF 30%     STRESS 11/15/17: Apparent remote infarction within the inferior wall and moderate-sized partially reversible perfusion defect within the anterior wall, concerning for ischemia. Dyskinesis of the apex. Severe hypokinesis of the septum, inferoseptal wall and inferior wall. EF 36%.      ICD 3/13/17: [employer] device placed by Dr. Barbara Granda for ICM.    CABG 9/1/16: LIMA-LAD, SVG-OM3.     NATHAN 12/11/2017  A large 1.1 x 2.3 cm vegetation is seen close to the SVC, most likely on the ICD lead in the right atrium  Possible Vegetation VS thrombus     Indication: Infection, bacteemia, and large vegetation on aAICD lead close to SVC   Laser Lead Extraction:  The old generator was removed, and the previously implanted leads were seperated.  And a stylet was advanced in each one of them for support, then laser extraction techniques was started.     RV Lead: Extracted using Laser cathetre 14     Impression:  Successful Laser Leads extraction      Assessment:   1. Sepsis with MSSA bacteremia   2. 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 Elevated troponin, likely secondary to type 2 NSTEMI from above   4. CAD status post CABG in 2016 with LIMA to LAD and SVG to OM with subsequent PCI of OM 2 in November 2017  5. Sinus tachycardia, resolved   6. Ischemic cardiomyopathy with ICD in situ  7. Chronic vertigo and dizziness with frequent falls  8. History of ischemic CVA with residual left hemiparesis  9. Hx of Noncompliance with treatment and medications      Plan / Recommendations:   S/P laser lead extraction. No complications. Continue antibiotic therapy per ID  OK to discharge from cardiology standpoint when stable. F/U in office for wound check & clinician visit.     Michael Poe CNP, 12/14/2017 1:08 PM

## 2017-12-14 NOTE — PROGRESS NOTES
IM RESIDENT PROGRESS NOTE        Patient:  Meseret San  YOB: 1955    MRN: [id number]     Acct: [de-identified]     Admit date: 12/8/2017    Subjective:   Patient seen and evaluated at bedside. And charts reviewed. No acute events overnight. No new complains in the am.  Afebrile. Hemodynamically stable  Has her AICD removed. 12/13/17 Blood Cx is positibe     Patient seen and evaluated at the bedside. And charts reviewed. No acute events over night. Afebrile. C/O thoracic back pain, bone scan negative for OM. Hemodynamically stable. BP = 122/84 , HR= 90  UO= good       NATHAN  1. A NATHAN was performed without complications. 2. Possible Vegetation VS thrombus is seen on the ICD Lead. 3. ID to determine the plan of treatment for that large vegetation. 4. Cardiology will follow the patient.         Diet:  DIET GENERAL; Carb Control: 4 carbs/meal (approximate 1800 kcals/day)      Medications:Current Inpatient    Scheduled Meds:   bacitracin in 0.9 % sodium chloride irrigation  50,000 Units Topical Once    sodium chloride flush  10 mL Intravenous 2 times per day    enoxaparin  40 mg Subcutaneous Daily    vancomycin  750 mg Intravenous Q12H    sodium bicarbonate  100 mEq Intravenous Once    metoprolol tartrate  50 mg Oral BID    sodium chloride flush  10 mL Intravenous 2 times per day    vancomycin (VANCOCIN) intermittent dosing (placeholder)   Other RX Placeholder    OLANZapine  10 mg Oral Nightly    lidocaine 1 % injection  5 mL Intradermal Once    sodium chloride flush  10 mL Intravenous 2 times per day    aspirin  81 mg Oral Daily    atorvastatin  80 mg Oral Nightly    clopidogrel  75 mg Oral Daily    famotidine  20 mg Oral Daily    insulin glargine  30 Units Subcutaneous Nightly    OXcarbazepine  300 mg Oral BID    sodium chloride flush  10 mL Intravenous 2 times per day    insulin lispro  0-18 Units results for input(s): IONCA in the last 72 hours. Magnesium:No results for input(s): MG in the last 72 hours. Phosphorus:No results for input(s): PHOS in the last 72 hours. BNP:No results for input(s): BNP in the last 72 hours. Glucose:  Recent Labs      12/13/17   1159  12/13/17   2131  12/14/17   0758   POCGLU  124*  160*  192*     HgbA1C: No results for input(s): LABA1C in the last 72 hours. INR:   No results for input(s): INR in the last 72 hours. Hepatic:   No results for input(s): ALKPHOS, ALT, AST, PROT, BILITOT, BILIDIR, LABALBU in the last 72 hours. Amylase and Lipase:No results for input(s): LACTA, AMYLASE in the last 72 hours. Lactic Acid: No results for input(s): LACTA in the last 72 hours. CARDIAC ENZYMES:  No results for input(s): CKTOTAL, CKMB, CKMBINDEX, TROPONINI in the last 72 hours. BNP: No results for input(s): BNP in the last 72 hours. Lipids: No results for input(s): CHOL, TRIG, HDL, LDLCALC in the last 72 hours. Invalid input(s): LDL  ABGs: No results found for: PH, PCO2, PO2, HCO3, O2SAT  Thyroid:   Lab Results   Component Value Date    TSH 0.98 09/19/2017      Urinalysis:   Color, UA   Date Value Ref Range Status   12/08/2017 DARK YELLOW (A) YEL Final     pH, UA   Date Value Ref Range Status   12/08/2017 5.0 5.0 - 8.0 Final     Specific Gravity, UA   Date Value Ref Range Status   12/08/2017 1.018 1.005 - 1.030 Final     Protein, UA   Date Value Ref Range Status   12/08/2017 1+ (A) NEG Final     RBC, UA   Date Value Ref Range Status   12/08/2017 0 TO 2 0 - 4 /HPF Final     Comment:     Reference range defined for non-centrifuged specimen.      Bacteria, UA   Date Value Ref Range Status   12/08/2017 MANY (A) NONE Final     Comment:     [address] [address] [phone]     Nitrite, Urine   Date Value Ref Range Status   12/08/2017 POSITIVE (A) NEG Final     WBC, UA   Date Value Ref Range Status   12/08/2017 TOO NUMEROUS TO COUNT 0 - 5 /HPF Final Leukocyte Esterase, Urine   Date Value Ref Range Status   12/08/2017 MODERATE (A) NEG Final     Yeast, UA   Date Value Ref Range Status   12/08/2017 NOT REPORTED NONE Final     Glucose, Ur   Date Value Ref Range Status   12/08/2017 NEGATIVE NEG Final     Bilirubin Urine   Date Value Ref Range Status   12/08/2017 NEGATIVE NEG Final           Assessment:  Principal Problem:    Sepsis due to methicillin susceptible Staphylococcus aureus ([facility]  Active Problems:    S/P implantation of automatic cardioverter/defibrillator (AICD) 3/13/1- Dr. Jaye Cheng hypertension    Diabetes mellitus type 2, controlled ([facility]    History of CVA (cerebrovascular accident)    Noncompliance    DHARMESH (acute kidney injury) ([facility]    ST elevation myocardial infarction (STEMI) ([facility]    Leukocytosis    Vertebral artery disease ([facility]    Acute cystitis without hematuria    Chronic respiratory failure (HCC)    Sepsis ([facility]    Chronic combined systolic and diastolic congestive heart failure (HCC)    Adrenal insufficiency (HCC)    Sepsis due to methicillin susceptible Staphylococcus aureus (HCC)    MRSA (methicillin resistant staph aureus) culture positive    MRSA bacteremia    NSTEMI (non-ST elevated myocardial infarction) ([facility]    Bacteriuria    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 Coli secondary to medical non-compliance. Cont Asa, Plavix and Lipitor  Creatinine WNL  Will monitor BMP. IVF stopped. Psych consulted - schizophrenia. Haldol 5 PRN BID, Ativan scheduled 0.5 BID, Zyprexa 10. Social Work consulted - [facility] to make sure patient is taking her meds. Increase Metoprolol to 50 BID      MSSA bacteremia, AICD vegetation. ID consulted  Blood cultures look like MSSA. Cont Vanc  Repeat Blood Cx till negative. NATHAN for vegetation on AICD. Monitor Cr, BMP.     Cardio consulted for NATHAN - vegetation - hardware/ AICD removed - cardiology on board  Patient may need a Life Vest - Awaiting cardiology recs  12/13 Blood Cx still positive. Cont Vanc x 4 wks. PICC line once the Blood Cx are negative  SNF placement. DVT: SC heparin  Diet: Cardiac          Wilber Godoy MD  PGY-1, Internal Medicine  [address] [facility]    Attending Physician Statement GE  I have discussed the care of Emanuel Rockwell, including pertinent history and exam findings with the resident. I have reviewed the key elements of all parts of the encounter with the resident.      I could not see this patient today, case discussed with team  Will need antibiotics for 4 weeks-DC planning  Will check with cardiology regarding life Vest, now that the  AICD lead has been extracted      Gene Theodore MD

## 2017-12-14 NOTE — PROGRESS NOTES
[facility]  Occupational Therapy Not Seen Note    Patient not available for Occupational Therapy due to:    [] Testing:    [] Hemodialysis    [] Blood Transfusion in Progress    []Refusal by Patient:    [] Surgery/Procedure:    [] Strict Bedrest    [] Sedation    [] Spine Precautions     [] Pt being transferred to palliative care at this time. Spoke with pt/family and OT services to be defered. [] Pt independent with functional mobility and functional tasks. Pt with no OT acute care needs at this time, will defer OT eval.    [x] Other: attempt this afternoon but pt declined OT tx. Pt had blanket over face and refused to remove the cover. Max encouragement given to participate in therapy but poor return.   Next Scheduled Treatment: 12/15/17    Signature: Baldev CAMPBELL

## 2017-12-14 NOTE — FLOWSHEET NOTE
DATE: 2017    NAME: Meseret San  MRN: [id number]   : 1955    Patient not seen this date for Physical Therapy due to:  [] Blood transfusion in progress  [] Hemodialysis  [x]  Patient Declined: pt stating she just had surgery last night and is too tired, extensive encouragement given and pt continued to refuse  [] Spine Precautions   [] Strict Bedrest  [] Surgery/ Procedure  [] Testing      [] Other        [] PT being discontinued at this time. Patient independent. No further needs. [] PT being discontinued at this time as the patient has been transferred to palliative care. No further needs.     Raj Lambert, PTA

## 2017-12-15 PROBLEM — F20.9 SCHIZOPHRENIA (HCC): Status: ACTIVE | Noted: 2017-12-15

## 2017-12-15 LAB
ABSOLUTE EOS #: 0.68 K/UL (ref 0–0.4)
ABSOLUTE IMMATURE GRANULOCYTE: 0 K/UL (ref 0–0.3)
ABSOLUTE LYMPH #: 3.11 K/UL (ref 1–4.8)
ABSOLUTE MONO #: 1.35 K/UL (ref 0.1–0.8)
ANION GAP SERPL CALCULATED.3IONS-SCNC: 14 MMOL/L (ref 9–17)
BASOPHILS # BLD: 0 % (ref 0–2)
BASOPHILS ABSOLUTE: 0 K/UL (ref 0–0.2)
BLOOD BANK SPECIMEN: NORMAL
BUN BLDV-MCNC: 8 MG/DL (ref 8–23)
BUN/CREAT BLD: ABNORMAL (ref 9–20)
CALCIUM SERPL-MCNC: 7.6 MG/DL (ref 8.6–10.4)
CHLORIDE BLD-SCNC: 104 MMOL/L (ref 98–107)
CO2: 22 MMOL/L (ref 20–31)
CREAT SERPL-MCNC: 0.74 MG/DL (ref 0.5–0.9)
DIFFERENTIAL TYPE: ABNORMAL
EOSINOPHILS RELATIVE PERCENT: 5 % (ref 1–4)
GFR AFRICAN AMERICAN: >60 ML/MIN
GFR NON-AFRICAN AMERICAN: >60 ML/MIN
GFR SERPL CREATININE-BSD FRML MDRD: ABNORMAL ML/MIN/{1.73_M2}
GFR SERPL CREATININE-BSD FRML MDRD: ABNORMAL ML/MIN/{1.73_M2}
GLUCOSE BLD-MCNC: 146 MG/DL (ref 70–99)
GLUCOSE BLD-MCNC: 157 MG/DL (ref 65–105)
GLUCOSE BLD-MCNC: 184 MG/DL (ref 65–105)
GLUCOSE BLD-MCNC: 216 MG/DL (ref 65–105)
GLUCOSE BLD-MCNC: 91 MG/DL (ref 65–105)
HCT VFR BLD CALC: 22.1 % (ref 36.3–47.1)
HCT VFR BLD CALC: 24.8 % (ref 36.3–47.1)
HEMOGLOBIN: 6.9 G/DL (ref 11.9–15.1)
HEMOGLOBIN: 7.3 G/DL (ref 11.9–15.1)
IMMATURE GRANULOCYTES: 0 %
LYMPHOCYTES # BLD: 23 % (ref 24–44)
MCH RBC QN AUTO: 25.8 PG (ref 25.2–33.5)
MCHC RBC AUTO-ENTMCNC: 31.2 G/DL (ref 28.4–34.8)
MCV RBC AUTO: 82.8 FL (ref 82.6–102.9)
MONOCYTES # BLD: 10 % (ref 1–7)
MORPHOLOGY: ABNORMAL
PDW BLD-RTO: 19.7 % (ref 11.8–14.4)
PLATELET # BLD: 313 K/UL (ref 138–453)
PLATELET ESTIMATE: ABNORMAL
PMV BLD AUTO: 10 FL (ref 8.1–13.5)
POTASSIUM SERPL-SCNC: 4.2 MMOL/L (ref 3.7–5.3)
RBC # BLD: 2.67 M/UL (ref 3.95–5.11)
RBC # BLD: ABNORMAL 10*6/UL
SEG NEUTROPHILS: 62 % (ref 36–66)
SEGMENTED NEUTROPHILS ABSOLUTE COUNT: 8.36 K/UL (ref 1.8–7.7)
SODIUM BLD-SCNC: 140 MMOL/L (ref 135–144)
WBC # BLD: 13.5 K/UL (ref 3.5–11.3)
WBC # BLD: ABNORMAL 10*3/UL

## 2017-12-15 PROCEDURE — 6360000002 HC RX W HCPCS: Performed by: INTERNAL MEDICINE

## 2017-12-15 PROCEDURE — 82947 ASSAY GLUCOSE BLOOD QUANT: CPT

## 2017-12-15 PROCEDURE — 85014 HEMATOCRIT: CPT

## 2017-12-15 PROCEDURE — 76937 US GUIDE VASCULAR ACCESS: CPT

## 2017-12-15 PROCEDURE — 6370000000 HC RX 637 (ALT 250 FOR IP): Performed by: STUDENT IN AN ORGANIZED HEALTH CARE EDUCATION/TRAINING PROGRAM

## 2017-12-15 PROCEDURE — 80048 BASIC METABOLIC PNL TOTAL CA: CPT

## 2017-12-15 PROCEDURE — 6370000000 HC RX 637 (ALT 250 FOR IP): Performed by: INTERNAL MEDICINE

## 2017-12-15 PROCEDURE — 99233 SBSQ HOSP IP/OBS HIGH 50: CPT | Performed by: INTERNAL MEDICINE

## 2017-12-15 PROCEDURE — 36569 INSJ PICC 5 YR+ W/O IMAGING: CPT

## 2017-12-15 PROCEDURE — 2060000000 HC ICU INTERMEDIATE R&B

## 2017-12-15 PROCEDURE — 02HV33Z INSERTION OF INFUSION DEVICE INTO SUPERIOR VENA CAVA, PERCUTANEOUS APPROACH: ICD-10-PCS | Performed by: EMERGENCY MEDICINE

## 2017-12-15 PROCEDURE — 85025 COMPLETE CBC W/AUTO DIFF WBC: CPT

## 2017-12-15 PROCEDURE — 85018 HEMOGLOBIN: CPT

## 2017-12-15 PROCEDURE — 2580000003 HC RX 258: Performed by: INTERNAL MEDICINE

## 2017-12-15 PROCEDURE — 36415 COLL VENOUS BLD VENIPUNCTURE: CPT

## 2017-12-15 PROCEDURE — 97116 GAIT TRAINING THERAPY: CPT

## 2017-12-15 PROCEDURE — 86850 RBC ANTIBODY SCREEN: CPT

## 2017-12-15 PROCEDURE — 6370000000 HC RX 637 (ALT 250 FOR IP): Performed by: PSYCHIATRY & NEUROLOGY

## 2017-12-15 PROCEDURE — 86900 BLOOD TYPING SEROLOGIC ABO: CPT

## 2017-12-15 PROCEDURE — 86901 BLOOD TYPING SEROLOGIC RH(D): CPT

## 2017-12-15 PROCEDURE — C1751 CATH, INF, PER/CENT/MIDLINE: HCPCS

## 2017-12-15 PROCEDURE — 86920 COMPATIBILITY TEST SPIN: CPT

## 2017-12-15 RX ORDER — MECLIZINE HCL 12.5 MG/1
12.5 TABLET ORAL 3 TIMES DAILY PRN
Qty: 30 TABLET | Refills: 3 | Status: ON HOLD | OUTPATIENT
Start: 2017-12-15 | End: 2017-12-18

## 2017-12-15 RX ORDER — CLOPIDOGREL BISULFATE 75 MG/1
75 TABLET ORAL DAILY
Qty: 30 TABLET | Refills: 3 | Status: ON HOLD | OUTPATIENT
Start: 2017-12-16 | End: 2017-12-18

## 2017-12-15 RX ORDER — LIDOCAINE HYDROCHLORIDE 10 MG/ML
5 INJECTION, SOLUTION INFILTRATION; PERINEURAL ONCE
Status: DISCONTINUED | OUTPATIENT
Start: 2017-12-15 | End: 2017-12-17 | Stop reason: HOSPADM

## 2017-12-15 RX ORDER — ATORVASTATIN CALCIUM 80 MG/1
80 TABLET, FILM COATED ORAL NIGHTLY
Qty: 30 TABLET | Refills: 3 | Status: ON HOLD | OUTPATIENT
Start: 2017-12-15 | End: 2017-12-18

## 2017-12-15 RX ORDER — METOPROLOL TARTRATE 50 MG/1
50 TABLET, FILM COATED ORAL 2 TIMES DAILY
Qty: 60 TABLET | Refills: 3 | Status: ON HOLD | OUTPATIENT
Start: 2017-12-15 | End: 2017-12-18

## 2017-12-15 RX ORDER — SODIUM CHLORIDE 0.9 % (FLUSH) 0.9 %
10 SYRINGE (ML) INJECTION PRN
Status: DISCONTINUED | OUTPATIENT
Start: 2017-12-15 | End: 2017-12-17 | Stop reason: HOSPADM

## 2017-12-15 RX ORDER — INSULIN GLARGINE 100 [IU]/ML
30 INJECTION, SOLUTION SUBCUTANEOUS NIGHTLY
Qty: 1 VIAL | Refills: 3 | Status: ON HOLD | OUTPATIENT
Start: 2017-12-15 | End: 2017-12-18

## 2017-12-15 RX ORDER — SODIUM CHLORIDE 0.9 % (FLUSH) 0.9 %
10 SYRINGE (ML) INJECTION EVERY 12 HOURS SCHEDULED
Status: DISCONTINUED | OUTPATIENT
Start: 2017-12-15 | End: 2017-12-17 | Stop reason: HOSPADM

## 2017-12-15 RX ADMIN — INSULIN LISPRO 3 UNITS: 100 INJECTION, SOLUTION INTRAVENOUS; SUBCUTANEOUS at 13:31

## 2017-12-15 RX ADMIN — METOPROLOL TARTRATE 50 MG: 50 TABLET, FILM COATED ORAL at 09:45

## 2017-12-15 RX ADMIN — FAMOTIDINE 20 MG: 20 TABLET, FILM COATED ORAL at 09:45

## 2017-12-15 RX ADMIN — CLOPIDOGREL 75 MG: 75 TABLET, FILM COATED ORAL at 09:45

## 2017-12-15 RX ADMIN — ATORVASTATIN CALCIUM 80 MG: 80 TABLET, FILM COATED ORAL at 20:36

## 2017-12-15 RX ADMIN — ASPIRIN 81 MG: 81 TABLET, CHEWABLE ORAL at 09:45

## 2017-12-15 RX ADMIN — INSULIN LISPRO 2 UNITS: 100 INJECTION, SOLUTION INTRAVENOUS; SUBCUTANEOUS at 20:37

## 2017-12-15 RX ADMIN — DOCUSATE SODIUM 100 MG: 100 CAPSULE ORAL at 09:45

## 2017-12-15 RX ADMIN — OXCARBAZEPINE 300 MG: 300 TABLET, FILM COATED ORAL at 09:45

## 2017-12-15 RX ADMIN — VANCOMYCIN HYDROCHLORIDE 750 MG: 10 INJECTION, POWDER, LYOPHILIZED, FOR SOLUTION INTRAVENOUS at 09:46

## 2017-12-15 RX ADMIN — OXCARBAZEPINE 300 MG: 300 TABLET, FILM COATED ORAL at 20:36

## 2017-12-15 RX ADMIN — ENOXAPARIN SODIUM 40 MG: 40 INJECTION SUBCUTANEOUS at 09:45

## 2017-12-15 RX ADMIN — INSULIN GLARGINE 30 UNITS: 100 INJECTION, SOLUTION SUBCUTANEOUS at 20:37

## 2017-12-15 RX ADMIN — VANCOMYCIN HYDROCHLORIDE 750 MG: 10 INJECTION, POWDER, LYOPHILIZED, FOR SOLUTION INTRAVENOUS at 15:26

## 2017-12-15 RX ADMIN — INSULIN LISPRO 3 UNITS: 100 INJECTION, SOLUTION INTRAVENOUS; SUBCUTANEOUS at 17:28

## 2017-12-15 RX ADMIN — METOPROLOL TARTRATE 50 MG: 50 TABLET, FILM COATED ORAL at 20:36

## 2017-12-15 RX ADMIN — OLANZAPINE 10 MG: 10 TABLET, FILM COATED ORAL at 20:36

## 2017-12-15 RX ADMIN — Medication 10 ML: at 20:37

## 2017-12-15 ASSESSMENT — ENCOUNTER SYMPTOMS
EYE ITCHING: 0
DIARRHEA: 0
ALLERGIC/IMMUNOLOGIC NEGATIVE: 1
GASTROINTESTINAL NEGATIVE: 1
NAUSEA: 0
VOMITING: 0
SHORTNESS OF BREATH: 0
SORE THROAT: 0
WHEEZING: 0
EYE DISCHARGE: 0
EYES NEGATIVE: 1

## 2017-12-15 NOTE — PROGRESS NOTES
management. troponins were elevated here, and she is started on heparin     Has noticed she has a left chest AICD site pain wo redness but with induration, started recently but no redness or drainage. Interval changes 12/15/17   Afebrile, bp stable  No issues overnight  Repeat blood cultures yesterday showing no growth so far  Okay for picc today  No fever/s chills  No new complaints  Wbc 13.5, stable    Summary of relevant labs:  Labs:  Vanco trough 12/11/17: 23.5  repeat vanco trough 15  WBC 18.0 --> 14.6 -- 13.7    Micro:  Blood cx 12/8/17 - MSSA, coag neg staph  12/8/17 - staph hominis, staph epidermidis  12/10/17 - ngtd  12/11/17- coag neg staph and gram positive rods  12/14 - ngtd  Blood culture SCN 12/8/17  Urine cx - E coli     Imaging:  Echo 11/30/17 no vegetations   NATHAN 12/11/17  A large 1.1 * 2.3 cm vegetation is seen close to the SVC, most likely on the ICD lead in the right atrium   Summary:    1. A NATHAN was performed without complications. 2. Possible Vegetation VS thrombus is seen on the ICD Lead. 3. ID to determine the plan of treatment for that large vegetation. 4. Cardiology will follow the patient. CTA chest, no pneumonia and could not assess for P emboli  CT head neg  Bone scan pending    I have personally reviewed the past medical history, past surgical history, medications, social history, and family history, and I have updated the database accordingly.   Past Medical History:     Past Medical History:   Diagnosis Date    Arthritis     Asthma     CAD (coronary artery disease)     [facility]congest[facility]     Clinical trial participant at discharge 03/13/2017    [employer] PSR     [facility] obstructive [facility] disease) [facility]     Diabetes mellitus ([facility]     Hepatitis C     Hyperlipidemia     Hypertension     MRSA (methicillin resistant staph aureus) culture positive 12/10/2017    blood    Pneumonia     Unspecified cerebral artery occlusion with cerebral infarction        Past Surgical  History:     Past Surgical History:   Procedure Laterality Date    CARDIAC CATHETERIZATION  11/27/2017    CARDIAC PACEMAKER PLACEMENT      placed 1 month ago at st [address]  5/6/2016    cardiac cath-1xBMS prox. RCA; 1xBMS prox. LAD    FOOT SURGERY      cyst removed from L foot    TONSILLECTOMY         Medications:      lidocaine 1 % injection  5 mL Intradermal Once    sodium chloride flush  10 mL Intravenous 2 times per day    docusate sodium  100 mg Oral Daily    bacitracin in 0.9 % sodium chloride irrigation  50,000 Units Topical Once    sodium chloride flush  10 mL Intravenous 2 times per day    enoxaparin  40 mg Subcutaneous Daily    vancomycin  750 mg Intravenous Q12H    sodium bicarbonate  100 mEq Intravenous Once    metoprolol tartrate  50 mg Oral BID    sodium chloride flush  10 mL Intravenous 2 times per day    vancomycin (VANCOCIN) intermittent dosing (placeholder)   Other RX Placeholder    OLANZapine  10 mg Oral Nightly    lidocaine 1 % injection  5 mL Intradermal Once    sodium chloride flush  10 mL Intravenous 2 times per day    aspirin  81 mg Oral Daily    atorvastatin  80 mg Oral Nightly    clopidogrel  75 mg Oral Daily    famotidine  20 mg Oral Daily    insulin glargine  30 Units Subcutaneous Nightly    OXcarbazepine  300 mg Oral BID    sodium chloride flush  10 mL Intravenous 2 times per day    insulin lispro  0-18 Units Subcutaneous TID WC    insulin lispro  0-9 Units Subcutaneous Nightly       Social History:     Social History     Social History    Marital status: Single     Spouse name: N/A    Number of children: N/A    Years of education: N/A     Occupational History    Not on file. Social History Main Topics    Smoking status: Current Every Day Smoker     Packs/day: 1.00     Types: Cigarettes    Smokeless tobacco: Never Used    Alcohol use Yes      Comment: 1 bottle per month wine    Drug use:  No  Sexual activity: Yes     Partners: Male     Other Topics Concern    Not on file     Social History Narrative    No narrative on file       Family History:     Family History   Problem Relation Age of Onset    Family history unknown: Yes        Allergies:   Levofloxacin and Pcn [penicillins]     Review of Systems:     Review of Systems   Constitutional: Negative for activity change and appetite change. HENT: Negative. Negative for sore throat. Eyes: Negative. Negative for discharge and itching. Respiratory: Negative for shortness of breath and wheezing. Cardiovascular: Negative. Gastrointestinal: Negative. Negative for diarrhea, nausea and vomiting. Endocrine: Negative. Genitourinary: Negative. Musculoskeletal: Back pain: had fall 5 days ago. Skin: Negative. Allergic/Immunologic: Negative. Neurological: Positive for weakness. Dizziness: left side weak. Hematological: Negative. Psychiatric/Behavioral: Negative. Physical Examination :     Patient Vitals for the past 8 hrs:   BP Temp Temp src Pulse Resp SpO2 Weight   12/15/17 0945 - - - 97 - - -   12/15/17 0810 (!) 143/68 98.6 °F (37 °C) Oral 97 23 100 % -   12/15/17 0601 - - - - - - 186 lb 3.2 oz (84.5 kg)       Physical Exam   Constitutional: She is oriented to person, place, and time and well-developed, well-nourished, and in no distress. HENT:   Head: Normocephalic and atraumatic. Mouth/Throat: No oropharyngeal exudate. Eyes: Conjunctivae and EOM are normal. Pupils are equal, round, and reactive to light. No scleral icterus. Neck: Normal range of motion. Neck supple. No JVD present. Cardiovascular: Normal rate, regular rhythm and normal heart sounds. Exam reveals no friction rub. No murmur heard. Pulmonary/Chest: Effort normal and breath sounds normal. No respiratory distress. She has no wheezes. She has no rales. She exhibits tenderness (mild tenderness near ICD site). Abdominal: Soft.  Bowel sounds are normal. She exhibits no distension. There is no tenderness. Genitourinary: No vaginal discharge found. Musculoskeletal: She exhibits no edema or tenderness. Left side weak, arm worse than the leg   Neurological: She is alert and oriented to person, place, and time. Skin: Skin is warm and dry. No erythema. ICD lead extracted, dressing in place- mild tenderness around area   Psychiatric: Affect normal.         Medical Decision Making:   I have independently reviewed/ordered the following labs:    CBC with Differential:   Recent Labs      12/14/17   0507  12/15/17   0948   WBC  13.7*  13.5*   HGB  7.3*  6.9*   HCT  24.8*  22.1*   PLT  317  313   LYMPHOPCT  13*  23*   MONOPCT  5  10*     BMP:   Recent Labs      12/14/17   0409  12/15/17   0948   NA  137  140   K  4.7  4.2   CL  105  104   CO2  22  22   BUN  15  8   CREATININE  0.81  0.74     Hepatic Function Panel: No results for input(s): PROT, LABALBU, BILIDIR, IBILI, BILITOT, ALKPHOS, ALT, AST in the last 72 hours. No results for input(s): RPR in the last 72 hours. No results for input(s): HIV in the last 72 hours. No results for input(s): BC in the last 72 hours. Lab Results   Component Value Date    MUCUS NOT REPORTED 12/08/2017    RBC 2.67 12/15/2017    TRICHOMONAS NOT REPORTED 12/08/2017    WBC 13.5 12/15/2017    YEAST NOT REPORTED 12/08/2017    TURBIDITY CLOUDY 12/08/2017     Lab Results   Component Value Date    CREATININE 0.74 12/15/2017    GLUCOSE 146 12/15/2017       Detailed results: Thank you for allowing us to participate in the care of this patient. Please call with questions. Sanjeev Amor MD  Office: [phone]    I have discussed the care of the patient, including pertinent history and exam findings,  with the resident. I have seen and examined the patient and the key elements of all parts of the encounter have been performed by me. I agree with the assessment, plan and orders as documented by the resident.     Keli Leatha, Infectious Diseases

## 2017-12-15 NOTE — PROGRESS NOTES
TSH 0.98 09/19/2017      Urinalysis:   Color, UA   Date Value Ref Range Status   12/08/2017 DARK YELLOW (A) YEL Final     pH, UA   Date Value Ref Range Status   12/08/2017 5.0 5.0 - 8.0 Final     Specific Gravity, UA   Date Value Ref Range Status   12/08/2017 1.018 1.005 - 1.030 Final     Protein, UA   Date Value Ref Range Status   12/08/2017 1+ (A) NEG Final     RBC, UA   Date Value Ref Range Status   12/08/2017 0 TO 2 0 - 4 /HPF Final     Comment:     Reference range defined for non-centrifuged specimen.      Bacteria, UA   Date Value Ref Range Status   12/08/2017 MANY (A) NONE Final     Comment:     [address] [place] [address] Drive [address] [facility], [address] [place] [address] [phone]     Nitrite, Urine   Date Value Ref Range Status   12/08/2017 POSITIVE (A) NEG Final     WBC, UA   Date Value Ref Range Status   12/08/2017 TOO NUMEROUS TO COUNT 0 - 5 /HPF Final     Leukocyte Esterase, Urine   Date Value Ref Range Status   12/08/2017 MODERATE (A) NEG Final     Yeast, UA   Date Value Ref Range Status   12/08/2017 NOT REPORTED NONE Final     Glucose, Ur   Date Value Ref Range Status   12/08/2017 NEGATIVE NEG Final     Bilirubin Urine   Date Value Ref Range Status   12/08/2017 NEGATIVE NEG Final           Assessment:  Principal Problem:    Sepsis due to methicillin susceptible Staphylococcus aureus (HCC)  Active Problems:    S/P implantation of automatic cardioverter/defibrillator (AICD) 3/13/1- Dr. 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   ID consulted  Blood cultures look like MSSA. Cont Vanc 750 mg bid. vanc trough 15.8. Repeat Blood Cx negative for 16 hours. Monitor Cr, BMP pending today. Cardiology okay to discharge and to see pt in the office. Cont Vanc x 4 wks. PICC line today. Consent obtained from the pt after explaining the procedure and its complications. SNF placement. Schizophrenia:   Psych consulted - schizophrenia. Haldol 5 PRN BID, Ativan scheduled 0.5 BID, Zyprexa 10mg nightly. Social Work consulted - need for [facility] home care or outreach program to make sure patient is taking her meds. DVT: SC heparin  Diet: Cardiac    Discharge plan: home with home care. Grazyna Todd MD,   PGY-3 Internal Medicine Resident. [facility].  12/15/2017  9:44 AM    Attending Physician Statement  I have discussed the care of Lovely Morrissey, including pertinent history and exam findings with the resident. I have reviewed the key elements of all parts of the encounter with the resident. I have seen and examined the patient with the resident and the key elements of all parts of the encounter have been performed by me.      MSSA bacteremia, AICD vegetation status post lead extraction  Currently on vancomycin, she needs it for 4 weeks  PICC line placement  DC planning

## 2017-12-15 NOTE — CARE COORDINATION
[address] is not able to provide nursing two times a day as pt has no one in the home that can learn to administer the antibiotics. Pt is adamant she not go to [facility]. She asked I call other agencies to see if someone can do it. Contacted [facility], explained situation and they state with medicare and [employer], they can provide two visits per day. Faxed face sheet and H&P to them. Will contact when discharged. Spoke with Fiona Lott at [facility], her , told her probable discharge this w/e. She asks AVS be faxed to her at [phone].   She states pt uses transport through [employer] with [place] [phone]

## 2017-12-15 NOTE — CARE COORDINATION
Pt ready for discharge today. Notified Venice Shahid at [facility] to get antibiotics delivered to home, she states they will coordinate with [employer], notified of discharge today, and needing start of care in am for iv antibiotic.   FAxed inge and home care order, h&P

## 2017-12-15 NOTE — PROGRESS NOTES
11/07/2014    Candidal intertrigo 06/16/2013    Cerebral infarction ([facility] 06/15/2013    Essential hypertension 06/15/2013    Diabetes mellitus type 2, controlled ([facility] [address] 06/15/2013       Past Medical History:   Diagnosis Date    Arthritis     Asthma     CAD (coronary artery disease)     [facility]est[facility]     Clinical trial participant at discharge 03/13/2017    [employer] PSR     COPD (chronic obstructive pulmonary disease) ([facility] [address] [facility]     Diabetes mellitus ([facility] [address] [facility]     Hepatitis C     Hyperlipidemia     Hypertension     MRSA (methicillin resistant staph aureus) culture positive 12/10/2017    blood    Pneumonia     Unspecified cerebral artery occlusion with cerebral infarction      Past Surgical History:   Procedure Laterality Date    CARDIAC CATHETERIZATION  11/27/2017    CARDIAC PACEMAKER PLACEMENT      placed 1 month ago at st [address] [facility]  5/6/2016    cardiac cath-1xBMS prox. RCA; 1xBMS prox. LAD    FOOT SURGERY      cyst removed from L foot    TONSILLECTOMY         Restrictions  Restrictions/Precautions  Restrictions/Precautions: Fall Risk, Isolation, ROM Restrictions, General Precautions, Cardiac  Required Braces or Orthoses?: No  Position Activity Restriction  Other position/activity restrictions: AICD, up with assist.  Subjective   General  Chart Reviewed: Yes  Response To Previous Treatment: Patient with no complaints from previous session.   Family / Caregiver Present: No  Subjective  Subjective: alert in bed  General Comment  Comments: RN present  Pain Screening  Patient Currently in Pain: Yes  Vital Signs  Patient Currently in Pain: Yes  Orientation  Orientation  Overall Orientation Status: Within Normal Limits  Objective   Bed mobility  Rolling to Right: Minimal assistance;Contact guard assistance  Supine to Sit: Contact guard assistance;Minimal assistance  Scooting: Stand by assistance  Transfers  Sit to Stand: Contact guard assistance;Stand by

## 2017-12-15 NOTE — PLAN OF CARE
Problem: Falls - Risk of  Goal: Absence of falls  Outcome: Ongoing  Patient continues to remain free from injury  Bed remains in lowest position

## 2017-12-15 NOTE — CARE COORDINATION
Spoke with Meryle Loupe at [facility], iv vanco is covered 100%. Script obtained and faxed. Contact [facility] when discharged to prepare  and deliver antibiotic.

## 2017-12-16 VITALS
DIASTOLIC BLOOD PRESSURE: 67 MMHG | HEART RATE: 99 BPM | TEMPERATURE: 98 F | BODY MASS INDEX: 35.16 KG/M2 | RESPIRATION RATE: 24 BRPM | HEIGHT: 61 IN | OXYGEN SATURATION: 100 % | SYSTOLIC BLOOD PRESSURE: 142 MMHG | WEIGHT: 186.2 LBS

## 2017-12-16 LAB
ABSOLUTE EOS #: 0.48 K/UL (ref 0–0.44)
ABSOLUTE IMMATURE GRANULOCYTE: 0.43 K/UL (ref 0–0.3)
ABSOLUTE LYMPH #: 3.33 K/UL (ref 1.1–3.7)
ABSOLUTE MONO #: 1.29 K/UL (ref 0.1–1.2)
ANION GAP SERPL CALCULATED.3IONS-SCNC: 13 MMOL/L (ref 9–17)
BASOPHILS # BLD: 0 % (ref 0–2)
BASOPHILS ABSOLUTE: 0.04 K/UL (ref 0–0.2)
BUN BLDV-MCNC: 7 MG/DL (ref 8–23)
BUN/CREAT BLD: ABNORMAL (ref 9–20)
CALCIUM SERPL-MCNC: 7.9 MG/DL (ref 8.6–10.4)
CHLORIDE BLD-SCNC: 102 MMOL/L (ref 98–107)
CO2: 22 MMOL/L (ref 20–31)
CREAT SERPL-MCNC: 0.75 MG/DL (ref 0.5–0.9)
DIFFERENTIAL TYPE: ABNORMAL
EOSINOPHILS RELATIVE PERCENT: 4 % (ref 1–4)
GFR AFRICAN AMERICAN: >60 ML/MIN
GFR NON-AFRICAN AMERICAN: >60 ML/MIN
GFR SERPL CREATININE-BSD FRML MDRD: ABNORMAL ML/MIN/{1.73_M2}
GFR SERPL CREATININE-BSD FRML MDRD: ABNORMAL ML/MIN/{1.73_M2}
GLUCOSE BLD-MCNC: 112 MG/DL (ref 65–105)
GLUCOSE BLD-MCNC: 117 MG/DL (ref 70–99)
GLUCOSE BLD-MCNC: 152 MG/DL (ref 65–105)
GLUCOSE BLD-MCNC: 198 MG/DL (ref 65–105)
HCT VFR BLD CALC: 22.3 % (ref 36.3–47.1)
HEMOGLOBIN: 7.1 G/DL (ref 11.9–15.1)
IMMATURE GRANULOCYTES: 3 %
LYMPHOCYTES # BLD: 24 % (ref 24–43)
MCH RBC QN AUTO: 25 PG (ref 25.2–33.5)
MCHC RBC AUTO-ENTMCNC: 31.8 G/DL (ref 28.4–34.8)
MCV RBC AUTO: 78.5 FL (ref 82.6–102.9)
MONOCYTES # BLD: 10 % (ref 3–12)
PDW BLD-RTO: 19.9 % (ref 11.8–14.4)
PLATELET # BLD: ABNORMAL K/UL (ref 138–453)
PLATELET ESTIMATE: ABNORMAL
PLATELET, FLUORESCENCE: 282 K/UL (ref 138–453)
PLATELET, IMMATURE FRACTION: 2.2 % (ref 1.1–10.3)
PMV BLD AUTO: ABNORMAL FL (ref 8.1–13.5)
POTASSIUM SERPL-SCNC: 5.7 MMOL/L (ref 3.7–5.3)
POTASSIUM SERPL-SCNC: 5.9 MMOL/L (ref 3.7–5.3)
RBC # BLD: 2.84 M/UL (ref 3.95–5.11)
RBC # BLD: ABNORMAL 10*6/UL
SEG NEUTROPHILS: 59 % (ref 36–65)
SEGMENTED NEUTROPHILS ABSOLUTE COUNT: 8.05 K/UL (ref 1.5–8.1)
SODIUM BLD-SCNC: 137 MMOL/L (ref 135–144)
WBC # BLD: 13.6 K/UL (ref 3.5–11.3)
WBC # BLD: ABNORMAL 10*3/UL

## 2017-12-16 PROCEDURE — 2580000003 HC RX 258: Performed by: STUDENT IN AN ORGANIZED HEALTH CARE EDUCATION/TRAINING PROGRAM

## 2017-12-16 PROCEDURE — 2580000003 HC RX 258: Performed by: INTERNAL MEDICINE

## 2017-12-16 PROCEDURE — 6370000000 HC RX 637 (ALT 250 FOR IP): Performed by: INTERNAL MEDICINE

## 2017-12-16 PROCEDURE — 2060000000 HC ICU INTERMEDIATE R&B

## 2017-12-16 PROCEDURE — 2580000003 HC RX 258: Performed by: EMERGENCY MEDICINE

## 2017-12-16 PROCEDURE — 99233 SBSQ HOSP IP/OBS HIGH 50: CPT | Performed by: INTERNAL MEDICINE

## 2017-12-16 PROCEDURE — 6370000000 HC RX 637 (ALT 250 FOR IP): Performed by: STUDENT IN AN ORGANIZED HEALTH CARE EDUCATION/TRAINING PROGRAM

## 2017-12-16 PROCEDURE — 85025 COMPLETE CBC W/AUTO DIFF WBC: CPT

## 2017-12-16 PROCEDURE — 80048 BASIC METABOLIC PNL TOTAL CA: CPT

## 2017-12-16 PROCEDURE — 84132 ASSAY OF SERUM POTASSIUM: CPT

## 2017-12-16 PROCEDURE — 36415 COLL VENOUS BLD VENIPUNCTURE: CPT

## 2017-12-16 PROCEDURE — 99232 SBSQ HOSP IP/OBS MODERATE 35: CPT | Performed by: INTERNAL MEDICINE

## 2017-12-16 PROCEDURE — 6360000002 HC RX W HCPCS: Performed by: INTERNAL MEDICINE

## 2017-12-16 PROCEDURE — 94762 N-INVAS EAR/PLS OXIMTRY CONT: CPT

## 2017-12-16 PROCEDURE — 82947 ASSAY GLUCOSE BLOOD QUANT: CPT

## 2017-12-16 RX ORDER — DEXTROSE MONOHYDRATE 25 G/50ML
25 INJECTION, SOLUTION INTRAVENOUS ONCE
Status: COMPLETED | OUTPATIENT
Start: 2017-12-16 | End: 2017-12-16

## 2017-12-16 RX ADMIN — SODIUM CHLORIDE, PRESERVATIVE FREE 10 ML: 5 INJECTION INTRAVENOUS at 21:00

## 2017-12-16 RX ADMIN — INSULIN LISPRO 3 UNITS: 100 INJECTION, SOLUTION INTRAVENOUS; SUBCUTANEOUS at 17:18

## 2017-12-16 RX ADMIN — OXCARBAZEPINE 300 MG: 300 TABLET, FILM COATED ORAL at 08:35

## 2017-12-16 RX ADMIN — DOCUSATE SODIUM 100 MG: 100 CAPSULE ORAL at 08:35

## 2017-12-16 RX ADMIN — INSULIN LISPRO 3 UNITS: 100 INJECTION, SOLUTION INTRAVENOUS; SUBCUTANEOUS at 12:28

## 2017-12-16 RX ADMIN — ENOXAPARIN SODIUM 40 MG: 40 INJECTION SUBCUTANEOUS at 08:35

## 2017-12-16 RX ADMIN — FAMOTIDINE 20 MG: 20 TABLET, FILM COATED ORAL at 08:34

## 2017-12-16 RX ADMIN — CLOPIDOGREL 75 MG: 75 TABLET, FILM COATED ORAL at 08:35

## 2017-12-16 RX ADMIN — INSULIN HUMAN 10 UNITS: 100 INJECTION, SOLUTION PARENTERAL at 20:44

## 2017-12-16 RX ADMIN — ASPIRIN 81 MG: 81 TABLET, CHEWABLE ORAL at 08:34

## 2017-12-16 RX ADMIN — DEXTROSE MONOHYDRATE 25 G: 25 INJECTION, SOLUTION INTRAVENOUS at 20:45

## 2017-12-16 RX ADMIN — VANCOMYCIN HYDROCHLORIDE 750 MG: 10 INJECTION, POWDER, LYOPHILIZED, FOR SOLUTION INTRAVENOUS at 15:57

## 2017-12-16 RX ADMIN — Medication 10 ML: at 08:36

## 2017-12-16 RX ADMIN — METOPROLOL TARTRATE 50 MG: 50 TABLET, FILM COATED ORAL at 08:34

## 2017-12-16 RX ADMIN — VANCOMYCIN HYDROCHLORIDE 750 MG: 10 INJECTION, POWDER, LYOPHILIZED, FOR SOLUTION INTRAVENOUS at 02:54

## 2017-12-16 ASSESSMENT — ENCOUNTER SYMPTOMS
SORE THROAT: 0
DIARRHEA: 0
ALLERGIC/IMMUNOLOGIC NEGATIVE: 1
EYE DISCHARGE: 0
WHEEZING: 0
NAUSEA: 0
EYE ITCHING: 0
EYES NEGATIVE: 1
GASTROINTESTINAL NEGATIVE: 1
VOMITING: 0
SHORTNESS OF BREATH: 0
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
## 2017-12-16 NOTE — PROGRESS NOTES
[facility] - Progress Note  Today's Date and Time: 12/16/2017, 8:55 AM  admission date 12/8/2017  Impression :   Current:    · MSSA / Staph coag neg AICD endocarditis and sepsis  · ICD extraction 12/13/17  · Old silent bacteriuria, no pyuria  · Leukocytosis - improving  · Thrombus vs vegetation seen on ICD lead  · NSTEMI  · Hypotension  · CAD many vessel disease - recent card cath right groin   · DM2  · Severe systolic CHF - AICD right ventricle  · Bilateral vertebral basilar artery severe stenosis     Other:  · Old CVA - left sided severe paresis     Recommendations   · Final cultures showing MSSA  · Other blood culture w 2 SCN strains. ?? Contaminants. .  · Repeat blood cultures are coag neg staph and gram positive rods  · Repeat blood cultures negative thus far  · S/p PICC line on 12/15/17  · Aim for a trough of 14-17   · Repeat blood cultures no growth thus far  · Plan for 4 weeks of IV vancomycin for lead endocarditis  · The patient is allergic to penicillin  · Silent bacteruria, no need to treat the E coli  · Bone Scan negative for OM  · Office f/up in 5 weeks with Dr Leann Díaz for infection. Please call [phone] for appointment  ·      Chief complaint/reason for consultation:   MRSA sepsis     History of Present Illness:   Initial history:  Nahum Thomas is a 65y.o.-year-old  female who was initially admitted on 12/8/2017. Patient seen at the request of Mariam Morales.     Known to have a basilar art stenosis bilat w black out sepsis, planned for surgery, started having those spells again at home today and was hypotensive, febrile in ER  blood culture showed MRSA and the other clusters pend. (LAB corrected this as MSSA)  She has e coli bacteriuria and no dysuria. , same as last many months. No fever at home but a new back pain from her fall.   Very poor historian, unable to better detail her past history     Past CVA w left side paresis, sits in a wheelchair and has an aid at home. Recent card cath showed multivessel disease and is on medical management. troponins were elevated here, and she is started on heparin     Has noticed she has a left chest AICD site pain wo redness but with induration, started recently but no redness or drainage. Interval changes 12/16/17   Afebrile, vitals stable  No new complaints  Did not go home yesterday bc awaiting life vest  PICC line placed yesterday  No fevers/chills, n/v/d, sob/cp    Summary of relevant labs:  Labs:  Vanco trough 12/11/17: 23.5  repeat vanco trough 15  WBC 18.0 --> 14.6 -- 13.7    Micro:  Blood cx 12/8/17 - MSSA, coag neg staph  12/8/17 - staph hominis, staph epidermidis  12/10/17 - ngtd  12/11/17- coag neg staph and gram positive rods  12/14 - ngtd  Blood culture SCN 12/8/17  Urine cx - E coli     Imaging:  Echo 11/30/17 no vegetations   NATHAN 12/11/17  A large 1.1 * 2.3 cm vegetation is seen close to the SVC, most likely on the ICD lead in the right atrium   Summary:    1. A NATHAN was performed without complications. 2. Possible Vegetation VS thrombus is seen on the ICD Lead. 3. ID to determine the plan of treatment for that large vegetation. 4. Cardiology will follow the patient. CTA chest, no pneumonia and could not assess for P emboli  CT head neg  Bone scan pending    I have personally reviewed the past medical history, past surgical history, medications, social history, and family history, and I have updated the database accordingly.   Past Medical History:     Past Medical History:   Diagnosis Date    Arthritis     Asthma     CAD (coronary artery disease)     [facility]est[facility]     Clinical trial participant at discharge 03/13/2017    [employer] PSR     COPD (chronic obstructive [facility] disease) ([facility]     Diabetes mellitus ([facility]     Hepatitis C     Hyperlipidemia     Hypertension     MRSA (methicillin resistant staph aureus) culture positive 12/10/2017  Alcohol use Yes      Comment: 1 bottle per month wine    Drug use: No    Sexual activity: Yes     Partners: Male     Other Topics Concern    Not on file     Social History Narrative    No narrative on file       Family History:     Family History   Problem Relation Age of Onset    Family history unknown: Yes        Allergies:   Levofloxacin and Pcn [penicillins]     Review of Systems:     Review of Systems   Constitutional: Negative for activity change and appetite change. HENT: Negative. Negative for sore throat. Eyes: Negative. Negative for discharge and itching. Respiratory: Negative for shortness of breath and wheezing. Cardiovascular: Negative. Gastrointestinal: Negative. Negative for diarrhea, nausea and vomiting. Endocrine: Negative. Genitourinary: Negative. Musculoskeletal: Back pain: had fall 5 days ago. Skin: Negative. Allergic/Immunologic: Negative. Neurological: Positive for weakness. Dizziness: left side weak. Hematological: Negative. Psychiatric/Behavioral: Negative. Physical Examination :     No data found. Physical Exam   Constitutional: She is oriented to person, place, and time and well-developed, well-nourished, and in no distress. HENT:   Head: Normocephalic and atraumatic. Mouth/Throat: No oropharyngeal exudate. Eyes: Conjunctivae and EOM are normal. Pupils are equal, round, and reactive to light. No scleral icterus. Neck: Normal range of motion. Neck supple. No JVD present. Cardiovascular: Normal rate, regular rhythm and normal heart sounds. Exam reveals no friction rub. No murmur heard. Pulmonary/Chest: Effort normal and breath sounds normal. No respiratory distress. She has no wheezes. She has no rales. She exhibits tenderness (mild tenderness near ICD site). Abdominal: Soft. Bowel sounds are normal. She exhibits no distension. There is no tenderness. Genitourinary: No vaginal discharge found.    Musculoskeletal: She exhibits no edema or tenderness. Left side weak, arm worse than the leg   Neurological: She is alert and oriented to person, place, and time. Skin: Skin is warm and dry. No erythema. ICD lead extracted, dressing in place- mild tenderness around area   Psychiatric: Affect normal.         Medical Decision Making:   I have independently reviewed/ordered the following labs:    CBC with Differential:   Recent Labs      12/14/17   0507  12/15/17   0948  12/15/17   1143   WBC  13.7*  13.5*   --    HGB  7.3*  6.9*  7.3*   HCT  24.8*  22.1*  24.8*   PLT  317  313   --    LYMPHOPCT  13*  23*   --    MONOPCT  5  10*   --      BMP:   Recent Labs      12/14/17   0409  12/15/17   0948   NA  137  140   K  4.7  4.2   CL  105  104   CO2  22  22   BUN  15  8   CREATININE  0.81  0.74     Hepatic Function Panel: No results for input(s): PROT, LABALBU, BILIDIR, IBILI, BILITOT, ALKPHOS, ALT, AST in the last 72 hours. No results for input(s): RPR in the last 72 hours. No results for input(s): HIV in the last 72 hours. No results for input(s): BC in the last 72 hours. Lab Results   Component Value Date    MUCUS NOT REPORTED 12/08/2017    RBC 2.67 12/15/2017    TRICHOMONAS NOT REPORTED 12/08/2017    WBC 13.5 12/15/2017    YEAST NOT REPORTED 12/08/2017    TURBIDITY CLOUDY 12/08/2017     Lab Results   Component Value Date    CREATININE 0.74 12/15/2017    GLUCOSE 146 12/15/2017       Detailed results: Thank you for allowing us to participate in the care of this patient. Please call with questions.     Hussein Padilla MD  Office: [phone]

## 2017-12-16 NOTE — PROGRESS NOTES
insulin lispro  0-9 Units Subcutaneous Nightly     Continuous Infusions:   dextrose       PRN Meds:sodium chloride flush, sodium chloride flush, acetaminophen, sodium chloride flush, benzonatate, glucose, dextrose, glucagon (rDNA), dextrose, haloperidol lactate, sodium chloride flush, meclizine, sodium chloride flush, acetaminophen, magnesium hydroxide, ondansetron, potassium chloride **OR** potassium chloride **OR** potassium chloride, magnesium sulfate, acetaminophen    Objective:    Physical Exam:  Vitals: /63   Pulse 99   Temp 98.8 °F (37.1 °C) (Oral)   Resp 24   Ht 5' 1\" (1.549 m)   Wt 186 lb 3.2 oz (84.5 kg)   LMP  (LMP Unknown)   SpO2 100%   BMI 35.18 kg/m²   24 hour intake/output:    Intake/Output Summary (Last 24 hours) at 12/16/17 0737  Last data filed at 12/15/17 1805   Gross per 24 hour   Intake              799 ml   Output                0 ml   Net              799 ml     Last 3 weights: Wt Readings from Last 3 Encounters:   12/15/17 186 lb 3.2 oz (84.5 kg)   12/03/17 179 lb 1 oz (81.2 kg)   11/29/17 172 lb (78 kg)       Physical Examination:   General appearance: Alert. No acute distress. HEENT: Head: Normal, normocephalic, atraumatic. Neck: Supple, no carotid bruit, no JVD, trachea midline  Lungs: Clear to auscultation bilaterally  Heart: Regular rhythm with tachycardia, S1, S2 normal.  Abdomen: Soft, non-tender; bowel sounds normal  Extremities: No cyanosis or edema  Neurologic: No focal neurologic deficits  Mental status: Alert, oriented.   Labs:-    CBC:   Recent Labs      12/14/17   0507  12/15/17   0948  12/15/17   1143   WBC  13.7*  13.5*   --    HGB  7.3*  6.9*  7.3*   PLT  317  313   --      BMP:    Recent Labs      12/14/17   0409  12/15/17   0948   NA  137  140   K  4.7  4.2   CL  105  104   CO2  22  22   BUN  15  8   CREATININE  0.81  0.74   GLUCOSE  144*  146*     Calcium:  Recent Labs      12/15/17   0948   CALCIUM  7.6*     Glucose:  Recent Labs      12/15/17   1118

## 2017-12-16 NOTE — PROGRESS NOTES
CREATININE  0.81  0.74  0.75   GLUCOSE  144*  146*  117*     H    Objective:   Vitals: /63   Pulse 99   Temp 98.8 °F (37.1 °C) (Oral)   Resp 24   Ht 5' 1\" (1.549 m)   Wt 186 lb 3.2 oz (84.5 kg)   LMP  (LMP Unknown)   SpO2 100%   BMI 35.18 kg/m²      General appearance: awake and alert. Oriented x 3. In No acute distress. HEENT: Head: Normal, normocephalic, atraumatic. Neck: Supple, no carotid bruit, no JVD  Lungs: Clear to auscultation bilaterally, no wheezing or dyspnea. Heart: Regular rhythm , S1, S2 normal. ICD site non tender and no erythema or discharge  Abdomen: Soft, non-tender; bowel sounds normal  Extremities: No cyanosis or edema  Neurologic: left hemiparesis from previous CVA   Mental status: Alert, oriented. Motor and sensory not done      EKG:  Sinus tachycardia and nonspecific ST-T changes no significant change from the previous, inferior infarct, LAE     ECHO 11/30/17: EF 35%, grade I DD.      CATH 12/04/17:Multi-vessel Coronary Artery Disease.   Patent grafts to LAD and OM.   Patent prior RCA stents with non-obstructive disease.   Moderately impaired ventricular function--EF 30%     STRESS 11/15/17: Apparent remote infarction within the inferior wall and moderate-sized partially reversible perfusion defect within the anterior wall, concerning for ischemia. Dyskinesis of the apex. Severe hypokinesis of the septum, inferoseptal wall and inferior wall. EF 36%.      ICD 3/13/17: [employer] device placed by Dr. Hilario Washburn for ICM.    CABG 9/1/16: LIMA-LAD, SVG-OM3.     NATHAN 12/11/2017  A large 1.1 x 2.3 cm vegetation is seen close to the SVC, most likely on the ICD lead in the right atrium  Possible Vegetation VS thrombus     Indication: Infection, bacteemia, and large vegetation on aAICD lead close to SVC   Laser Lead Extraction:  The old generator was removed, and the previously implanted leads were seperated.  And a stylet was advanced in each one of them for support, then laser extraction techniques was started.     RV Lead: Extracted using Laser cathetre 14     Impression:  Successful Laser Leads extraction      Assessment:   1. Sepsis with MSSA bacteremia   2. 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 Elevated troponin, likely secondary to type 2 NSTEMI from above   4. CAD status post CABG in 2016 with LIMA to LAD and SVG to OM with subsequent PCI of OM 2 in November 2017  5. Sinus tachycardia, resolved   6. Ischemic cardiomyopathy with ICD in situ  7. Chronic vertigo and dizziness with frequent falls  8. History of ischemic CVA with residual left hemiparesis  9. Hx of Noncompliance with treatment and medications      Plan / Recommendations:   S/P laser lead extraction. No complications.   Continue antibiotic therapy per ID  OK to discharge from cardiology with a life vest    DERECK TAMEZ CNP, 12/16/2017 11:36 AM

## 2017-12-17 ENCOUNTER — APPOINTMENT (OUTPATIENT)
Dept: GENERAL RADIOLOGY | Age: 62
DRG: 280 | End: 2017-12-17
Payer: MEDICARE

## 2017-12-17 ENCOUNTER — HOSPITAL ENCOUNTER (INPATIENT)
Age: 62
LOS: 1 days | Discharge: HOME HEALTH | DRG: 280 | End: 2017-12-18
Attending: EMERGENCY MEDICINE | Admitting: INTERNAL MEDICINE
Payer: MEDICARE

## 2017-12-17 DIAGNOSIS — M25.552 PAIN OF LEFT HIP JOINT: ICD-10-CM

## 2017-12-17 DIAGNOSIS — R06.02 SHORTNESS OF BREATH: ICD-10-CM

## 2017-12-17 DIAGNOSIS — I21.4 NSTEMI (NON-ST ELEVATED MYOCARDIAL INFARCTION) (HCC): ICD-10-CM

## 2017-12-17 DIAGNOSIS — I50.43 ACUTE ON CHRONIC COMBINED SYSTOLIC AND DIASTOLIC CHF (CONGESTIVE HEART FAILURE) (HCC): Primary | ICD-10-CM

## 2017-12-17 DIAGNOSIS — R79.89 ELEVATED BRAIN NATRIURETIC PEPTIDE (BNP) LEVEL: ICD-10-CM

## 2017-12-17 DIAGNOSIS — D72.829 LEUKOCYTOSIS, UNSPECIFIED TYPE: ICD-10-CM

## 2017-12-17 LAB
ABSOLUTE EOS #: 0.39 K/UL (ref 0–0.44)
ABSOLUTE IMMATURE GRANULOCYTE: 0.14 K/UL (ref 0–0.3)
ABSOLUTE LYMPH #: 2.07 K/UL (ref 1.1–3.7)
ABSOLUTE MONO #: 0.81 K/UL (ref 0.1–1.2)
ANION GAP SERPL CALCULATED.3IONS-SCNC: 12 MMOL/L (ref 9–17)
BASOPHILS # BLD: 0 % (ref 0–2)
BASOPHILS ABSOLUTE: 0.03 K/UL (ref 0–0.2)
BNP INTERPRETATION: ABNORMAL
BUN BLDV-MCNC: 8 MG/DL (ref 8–23)
BUN/CREAT BLD: ABNORMAL (ref 9–20)
CALCIUM IONIZED: 1.22 MMOL/L (ref 1.13–1.33)
CALCIUM SERPL-MCNC: 8 MG/DL (ref 8.6–10.4)
CHLORIDE BLD-SCNC: 103 MMOL/L (ref 98–107)
CO2: 24 MMOL/L (ref 20–31)
CREAT SERPL-MCNC: 0.71 MG/DL (ref 0.5–0.9)
CULTURE: NORMAL
CULTURE: NORMAL
DIFFERENTIAL TYPE: ABNORMAL
EOSINOPHILS RELATIVE PERCENT: 3 % (ref 1–4)
FERRITIN: 172 UG/L (ref 13–150)
GFR AFRICAN AMERICAN: >60 ML/MIN
GFR NON-AFRICAN AMERICAN: >60 ML/MIN
GFR SERPL CREATININE-BSD FRML MDRD: ABNORMAL ML/MIN/{1.73_M2}
GFR SERPL CREATININE-BSD FRML MDRD: ABNORMAL ML/MIN/{1.73_M2}
GLUCOSE BLD-MCNC: 153 MG/DL (ref 70–99)
HCT VFR BLD CALC: 22.6 % (ref 36.3–47.1)
HCT VFR BLD CALC: 23.9 % (ref 36.3–47.1)
HEMOGLOBIN: 6.7 G/DL (ref 11.9–15.1)
HEMOGLOBIN: 7.5 G/DL (ref 11.9–15.1)
IMMATURE GRANULOCYTES: 1 %
IRON SATURATION: 13 % (ref 20–55)
IRON: 34 UG/DL (ref 37–145)
LYMPHOCYTES # BLD: 18 % (ref 24–43)
Lab: NORMAL
MAGNESIUM: 1.8 MG/DL (ref 1.6–2.6)
MCH RBC QN AUTO: 24.6 PG (ref 25.2–33.5)
MCHC RBC AUTO-ENTMCNC: 29.6 G/DL (ref 28.4–34.8)
MCV RBC AUTO: 83.1 FL (ref 82.6–102.9)
MONOCYTES # BLD: 7 % (ref 3–12)
PDW BLD-RTO: 19.9 % (ref 11.8–14.4)
PLATELET # BLD: 327 K/UL (ref 138–453)
PLATELET ESTIMATE: ABNORMAL
PMV BLD AUTO: 10 FL (ref 8.1–13.5)
POC TROPONIN I: 0.15 NG/ML (ref 0–0.1)
POC TROPONIN INTERP: ABNORMAL
POTASSIUM SERPL-SCNC: 4 MMOL/L (ref 3.7–5.3)
PRO-BNP: ABNORMAL PG/ML
RBC # BLD: 2.72 M/UL (ref 3.95–5.11)
RBC # BLD: ABNORMAL 10*6/UL
SEG NEUTROPHILS: 71 % (ref 36–65)
SEGMENTED NEUTROPHILS ABSOLUTE COUNT: 8.31 K/UL (ref 1.5–8.1)
SODIUM BLD-SCNC: 139 MMOL/L (ref 135–144)
SPECIMEN DESCRIPTION: NORMAL
STATUS: NORMAL
TOTAL IRON BINDING CAPACITY: 268 UG/DL (ref 250–450)
TROPONIN INTERP: ABNORMAL
TROPONIN INTERP: ABNORMAL
TROPONIN T: 0.34 NG/ML
TROPONIN T: 0.35 NG/ML
UNSATURATED IRON BINDING CAPACITY: 234 UG/DL (ref 112–347)
WBC # BLD: 11.8 K/UL (ref 3.5–11.3)
WBC # BLD: ABNORMAL 10*3/UL

## 2017-12-17 PROCEDURE — 83540 ASSAY OF IRON: CPT

## 2017-12-17 PROCEDURE — 84484 ASSAY OF TROPONIN QUANT: CPT

## 2017-12-17 PROCEDURE — 2060000000 HC ICU INTERMEDIATE R&B

## 2017-12-17 PROCEDURE — 96374 THER/PROPH/DIAG INJ IV PUSH: CPT

## 2017-12-17 PROCEDURE — 6370000000 HC RX 637 (ALT 250 FOR IP): Performed by: STUDENT IN AN ORGANIZED HEALTH CARE EDUCATION/TRAINING PROGRAM

## 2017-12-17 PROCEDURE — 99285 EMERGENCY DEPT VISIT HI MDM: CPT

## 2017-12-17 PROCEDURE — 6370000000 HC RX 637 (ALT 250 FOR IP): Performed by: EMERGENCY MEDICINE

## 2017-12-17 PROCEDURE — 71020 XR CHEST STANDARD TWO VW: CPT

## 2017-12-17 PROCEDURE — 82330 ASSAY OF CALCIUM: CPT

## 2017-12-17 PROCEDURE — 83735 ASSAY OF MAGNESIUM: CPT

## 2017-12-17 PROCEDURE — 85025 COMPLETE CBC W/AUTO DIFF WBC: CPT

## 2017-12-17 PROCEDURE — 85014 HEMATOCRIT: CPT

## 2017-12-17 PROCEDURE — 72170 X-RAY EXAM OF PELVIS: CPT

## 2017-12-17 PROCEDURE — 93005 ELECTROCARDIOGRAM TRACING: CPT

## 2017-12-17 PROCEDURE — 85018 HEMOGLOBIN: CPT

## 2017-12-17 PROCEDURE — 36415 COLL VENOUS BLD VENIPUNCTURE: CPT

## 2017-12-17 PROCEDURE — 83550 IRON BINDING TEST: CPT

## 2017-12-17 PROCEDURE — 83880 ASSAY OF NATRIURETIC PEPTIDE: CPT

## 2017-12-17 PROCEDURE — 6360000002 HC RX W HCPCS: Performed by: STUDENT IN AN ORGANIZED HEALTH CARE EDUCATION/TRAINING PROGRAM

## 2017-12-17 PROCEDURE — 2580000003 HC RX 258: Performed by: STUDENT IN AN ORGANIZED HEALTH CARE EDUCATION/TRAINING PROGRAM

## 2017-12-17 PROCEDURE — 80048 BASIC METABOLIC PNL TOTAL CA: CPT

## 2017-12-17 PROCEDURE — 82728 ASSAY OF FERRITIN: CPT

## 2017-12-17 RX ORDER — ACETAMINOPHEN 325 MG/1
650 TABLET ORAL EVERY 4 HOURS PRN
Status: DISCONTINUED | OUTPATIENT
Start: 2017-12-17 | End: 2017-12-18 | Stop reason: HOSPADM

## 2017-12-17 RX ORDER — FUROSEMIDE 10 MG/ML
40 INJECTION INTRAMUSCULAR; INTRAVENOUS ONCE
Status: CANCELLED | OUTPATIENT
Start: 2017-12-17 | End: 2017-12-17

## 2017-12-17 RX ORDER — COLCHICINE 0.6 MG/1
0.6 TABLET ORAL DAILY
Status: DISCONTINUED | OUTPATIENT
Start: 2017-12-17 | End: 2017-12-18 | Stop reason: HOSPADM

## 2017-12-17 RX ORDER — INSULIN GLARGINE 100 [IU]/ML
30 INJECTION, SOLUTION SUBCUTANEOUS NIGHTLY
Status: DISCONTINUED | OUTPATIENT
Start: 2017-12-17 | End: 2017-12-18 | Stop reason: HOSPADM

## 2017-12-17 RX ORDER — SODIUM CHLORIDE 0.9 % (FLUSH) 0.9 %
10 SYRINGE (ML) INJECTION PRN
Status: DISCONTINUED | OUTPATIENT
Start: 2017-12-17 | End: 2017-12-18 | Stop reason: HOSPADM

## 2017-12-17 RX ORDER — OLANZAPINE 10 MG/1
10 TABLET ORAL NIGHTLY
Status: DISCONTINUED | OUTPATIENT
Start: 2017-12-17 | End: 2017-12-18 | Stop reason: HOSPADM

## 2017-12-17 RX ORDER — POTASSIUM CHLORIDE 7.45 MG/ML
10 INJECTION INTRAVENOUS PRN
Status: DISCONTINUED | OUTPATIENT
Start: 2017-12-17 | End: 2017-12-18 | Stop reason: HOSPADM

## 2017-12-17 RX ORDER — OXCARBAZEPINE 300 MG/1
300 TABLET, FILM COATED ORAL 2 TIMES DAILY
Status: DISCONTINUED | OUTPATIENT
Start: 2017-12-17 | End: 2017-12-18 | Stop reason: HOSPADM

## 2017-12-17 RX ORDER — METOPROLOL TARTRATE 50 MG/1
50 TABLET, FILM COATED ORAL 2 TIMES DAILY
Status: DISCONTINUED | OUTPATIENT
Start: 2017-12-17 | End: 2017-12-18 | Stop reason: HOSPADM

## 2017-12-17 RX ORDER — POTASSIUM CHLORIDE 20MEQ/15ML
40 LIQUID (ML) ORAL PRN
Status: DISCONTINUED | OUTPATIENT
Start: 2017-12-17 | End: 2017-12-18 | Stop reason: HOSPADM

## 2017-12-17 RX ORDER — 0.9 % SODIUM CHLORIDE 0.9 %
250 INTRAVENOUS SOLUTION INTRAVENOUS ONCE
Status: DISCONTINUED | OUTPATIENT
Start: 2017-12-17 | End: 2017-12-18 | Stop reason: HOSPADM

## 2017-12-17 RX ORDER — DEXTROSE MONOHYDRATE 50 MG/ML
100 INJECTION, SOLUTION INTRAVENOUS PRN
Status: DISCONTINUED | OUTPATIENT
Start: 2017-12-17 | End: 2017-12-18 | Stop reason: HOSPADM

## 2017-12-17 RX ORDER — ASPIRIN 81 MG/1
81 TABLET, CHEWABLE ORAL DAILY
Status: DISCONTINUED | OUTPATIENT
Start: 2017-12-18 | End: 2017-12-18 | Stop reason: HOSPADM

## 2017-12-17 RX ORDER — NICOTINE POLACRILEX 4 MG
15 LOZENGE BUCCAL PRN
Status: DISCONTINUED | OUTPATIENT
Start: 2017-12-17 | End: 2017-12-18 | Stop reason: HOSPADM

## 2017-12-17 RX ORDER — ONDANSETRON 2 MG/ML
4 INJECTION INTRAMUSCULAR; INTRAVENOUS EVERY 6 HOURS PRN
Status: DISCONTINUED | OUTPATIENT
Start: 2017-12-17 | End: 2017-12-18 | Stop reason: HOSPADM

## 2017-12-17 RX ORDER — IPRATROPIUM BROMIDE AND ALBUTEROL SULFATE 2.5; .5 MG/3ML; MG/3ML
1 SOLUTION RESPIRATORY (INHALATION) EVERY 4 HOURS PRN
Status: DISCONTINUED | OUTPATIENT
Start: 2017-12-17 | End: 2017-12-18 | Stop reason: HOSPADM

## 2017-12-17 RX ORDER — DEXTROSE MONOHYDRATE 25 G/50ML
12.5 INJECTION, SOLUTION INTRAVENOUS PRN
Status: DISCONTINUED | OUTPATIENT
Start: 2017-12-17 | End: 2017-12-18 | Stop reason: HOSPADM

## 2017-12-17 RX ORDER — ASPIRIN 81 MG/1
243 TABLET, CHEWABLE ORAL DAILY
Status: DISCONTINUED | OUTPATIENT
Start: 2017-12-17 | End: 2017-12-18

## 2017-12-17 RX ORDER — FUROSEMIDE 10 MG/ML
40 INJECTION INTRAMUSCULAR; INTRAVENOUS ONCE
Status: COMPLETED | OUTPATIENT
Start: 2017-12-17 | End: 2017-12-17

## 2017-12-17 RX ORDER — CLOPIDOGREL BISULFATE 75 MG/1
75 TABLET ORAL DAILY
Status: DISCONTINUED | OUTPATIENT
Start: 2017-12-18 | End: 2017-12-18 | Stop reason: HOSPADM

## 2017-12-17 RX ORDER — ESOMEPRAZOLE SODIUM 40 MG/5ML
40 INJECTION INTRAVENOUS 2 TIMES DAILY
Status: DISCONTINUED | OUTPATIENT
Start: 2017-12-17 | End: 2017-12-18

## 2017-12-17 RX ORDER — MECLIZINE HCL 12.5 MG/1
12.5 TABLET ORAL 3 TIMES DAILY PRN
Status: DISCONTINUED | OUTPATIENT
Start: 2017-12-17 | End: 2017-12-18 | Stop reason: HOSPADM

## 2017-12-17 RX ORDER — LISINOPRIL 5 MG/1
5 TABLET ORAL DAILY
Status: DISCONTINUED | OUTPATIENT
Start: 2017-12-17 | End: 2017-12-18 | Stop reason: HOSPADM

## 2017-12-17 RX ORDER — ATORVASTATIN CALCIUM 80 MG/1
80 TABLET, FILM COATED ORAL NIGHTLY
Status: DISCONTINUED | OUTPATIENT
Start: 2017-12-17 | End: 2017-12-18 | Stop reason: HOSPADM

## 2017-12-17 RX ORDER — POTASSIUM CHLORIDE 20 MEQ/1
40 TABLET, EXTENDED RELEASE ORAL PRN
Status: DISCONTINUED | OUTPATIENT
Start: 2017-12-17 | End: 2017-12-18 | Stop reason: HOSPADM

## 2017-12-17 RX ORDER — FAMOTIDINE 20 MG/1
20 TABLET, FILM COATED ORAL DAILY
Status: DISCONTINUED | OUTPATIENT
Start: 2017-12-17 | End: 2017-12-18 | Stop reason: HOSPADM

## 2017-12-17 RX ORDER — SODIUM CHLORIDE 0.9 % (FLUSH) 0.9 %
10 SYRINGE (ML) INJECTION EVERY 12 HOURS SCHEDULED
Status: DISCONTINUED | OUTPATIENT
Start: 2017-12-17 | End: 2017-12-18 | Stop reason: HOSPADM

## 2017-12-17 RX ADMIN — VANCOMYCIN HYDROCHLORIDE 750 MG: 1 INJECTION, POWDER, LYOPHILIZED, FOR SOLUTION INTRAVENOUS at 21:09

## 2017-12-17 RX ADMIN — Medication 10 ML: at 21:10

## 2017-12-17 RX ADMIN — METOPROLOL TARTRATE 50 MG: 50 TABLET, FILM COATED ORAL at 21:11

## 2017-12-17 RX ADMIN — OXCARBAZEPINE 300 MG: 300 TABLET, FILM COATED ORAL at 21:08

## 2017-12-17 RX ADMIN — LISINOPRIL 5 MG: 5 TABLET ORAL at 21:08

## 2017-12-17 RX ADMIN — ATORVASTATIN CALCIUM 80 MG: 80 TABLET, FILM COATED ORAL at 21:07

## 2017-12-17 RX ADMIN — COLCHICINE 0.6 MG: 0.6 TABLET, FILM COATED ORAL at 21:08

## 2017-12-17 RX ADMIN — OLANZAPINE 10 MG: 10 TABLET, FILM COATED ORAL at 21:08

## 2017-12-17 RX ADMIN — FUROSEMIDE 40 MG: 10 INJECTION, SOLUTION INTRAVENOUS at 16:46

## 2017-12-17 RX ADMIN — ASPIRIN 81 MG 243 MG: 81 TABLET ORAL at 10:51

## 2017-12-17 ASSESSMENT — ENCOUNTER SYMPTOMS
NAUSEA: 0
SPUTUM PRODUCTION: 0
EYES NEGATIVE: 1
ORTHOPNEA: 1
COUGH: 1
HEARTBURN: 0
HEMOPTYSIS: 0
SHORTNESS OF BREATH: 1

## 2017-12-17 ASSESSMENT — PAIN SCALES - GENERAL
PAINLEVEL_OUTOF10: 10
PAINLEVEL_OUTOF10: 10

## 2017-12-17 NOTE — ED NOTES
Report called to car 3 and to room soon     [facility]  12/17/17 0038

## 2017-12-17 NOTE — ED PROVIDER NOTES
[facility]  ED  Emergency Department Encounter  Emergency Medicine Resident     Pt Name: Nahum Thomas  MRN: [id number]  [place] 1955  Date of evaluation: 12/17/17  PCP:  Israel Martinez MD    [address] [place] [address]       Chief Complaint   Patient presents with    Respiratory Distress     times 4 days wears o2 at home. Cough of sputum       HISTORY OF PRESENT ILLNESS  (Location/Symptom, Timing/Onset, Context/Setting, Quality, Duration, Modifying Factors, Severity.)      Nahum Thomas is a 65 y.o. female who presents with just left ama from sepsis mssa bacteremia, infective endocarditis with possible vegetation on pacemaker/AICD which was removed and given the LifeVest, ischemic cardiomyopathy requiring icd, systolic chf presents with some shortness of breath over the last 4 days. Patient wears home oxygen at home she is complaining of a productive sputum. She denies any fevers or chills any chest pain. No other aggravating or relieving factors. source of breath on exertion slight orthopnea. Patient also complains of some left hip pain which she fell prior to her last hospitalization she's been ambulating on her pain sharp intermittent worse with movement no other aggravating or relieving factors. Her symptoms are moderate in severity constant nature    Denies any surgeries, travel, trauma, hormone use, birth control, hemoptysis, history of any DVTs or pulmonary embolisms, she had prophylactic heparin and her last hospitalization stay      PAST MEDICAL / SURGICAL / SOCIAL / FAMILY HISTORY      has a past medical history of Arthritis; Asthma; CAD (coronary artery disease); CHF (congestive heart failure) ([facility] Clinical trial participant at discharge; COPD (chronic obstructive pulmonary disease) ([facility] [address] Diabetes mellitus ([facility] [address] Hepatitis C; Hyperlipidemia;  Hypertension; MRSA (methicillin resistant staph aureus) culture positive; Pneumonia; and Unspecified cerebral artery occlusion with UNIT/ML injection vial Inject 30 Units into the skin nightly 12/15/17  Yes Carissa Vega MD   aspirin 81 MG chewable tablet Take 1 tablet by mouth daily 12/5/17  Yes Jose Be MD   nitroGLYCERIN (NITROSTAT) 0.4 MG SL tablet Place 1 tablet under the tongue every 5 minutes as needed for Chest pain up to max of 3 total doses. If no relief after 1 dose, call 911. 12/5/17  Yes Jose Be MD   lisinopril (PRINIVIL;ZESTRIL) 5 MG tablet Take 1 tablet by mouth daily 12/2/17  Yes Nader Barajas MD   zinc oxide 20 % ointment Apply topically as needed. 11/28/17  Yes Inocencio Lai MD   ipratropium-albuterol (DUONEB) 0.5-2.5 (3) MG/3ML SOLN nebulizer solution Inhale 3 mLs into the lungs every 4 hours as needed for Shortness of Breath 10/26/17  Yes Lydia Pierre MD   senna-docusate (PERICOLACE) 8.6-50 MG per tablet Take 1 tablet by mouth daily   Yes Historical Provider, MD   OLANZapine (ZYPREXA) 10 MG tablet Take 1 tablet by mouth nightly 11/30/16  Yes Benny Telles MD   colchicine (COLCRYS) 0.6 MG tablet Take 1 tablet by mouth daily 7/15/16  Yes Ayaan Goldberg,    famotidine (PEPCID) 20 MG tablet Take 20 mg by mouth daily. Yes Historical Provider, MD   OXcarbazepine (TRILEPTAL) 300 MG tablet Take 300 mg by mouth 2 times daily Per pharmacy she is to be taking 300 mg twice daily but patient states that she only takes once daily.    Yes Historical Provider, MD       REVIEW OF SYSTEMS    (2-9 systems for level 4, 10 or more for level 5)        ROS:  Constitutional: Denied fever, weight loss  Eyes: Denied change in vision, eye pain  ENT: Denied sinus problems, congestion/obstruction  Respiratory: positiveshortness of breath, negativechest pain upon inspiration  CV: Denied edema, chest pain, palpitations  GI: Denies nausea, vomiting, constipation, diarrhea, change in stools   : Denied Change in urination, hematuria,   MS: Denied muscle stiffness, arthritis  Pscyh:Denies depression and EKG  EKG Interpretation    Interpreted by emergency department physician     Rhythm: sinus tachycardia  Rate: 100-110  Axis: normal  Ectopy: none  Conduction: normal  ST Segments: nonspecific changes  T Waves: non specific changes  Q Waves: nonspecific     Clinical Impression: non-specific EKG    Cuca Patel      All EKG's are interpreted by the Emergency Department Physician who either signs or Co-signs this chart in the absence of a cardiologist.    EMERGENCY DEPARTMENT COURSE:  Placed a cardiac workup including aspirin, BMP, BNP, calcium mag troponins 2 sets, aspirin 243 mg given that she takes aspirin daily, chest x-ray, pelvis x-ray telemetry pulse ox placed on patient which I verified then admit to medical service for noted above  3 min of smoking cessation given to patient    Patient's meets inpatient criteria for an NSTEMI, CHF exacerbation with radiographic findings on chest x-ray and clinical findings including JVD, elevated BNP, dyspnea on exertion, anemia <7 which would qualify for a transfusion which is acute on chronic  PROCEDURES:  None    CONSULTS:  IP CONSULT TO INTERNAL MEDICINE  IP CONSULT TO CARDIOLOGY    CRITICAL CARE:  None    FINAL IMPRESSION      1. Acute on chronic combined systolic and diastolic CHF (congestive heart failure) ([address]    2. Shortness of breath    3. NSTEMI (non-ST elevated myocardial infarction) ([address]    4. Leukocytosis, unspecified type    5. Elevated brain natriuretic peptide (BNP) level    6.  Pain of left hip joint          DISPOSITION / PLAN     DISPOSITION Admitted    PATIENT REFERRED TO:  Yee Ovalles [address] [place] [address]  [phone]            DISCHARGE MEDICATIONS:  New Prescriptions    No medications on file       Cuca Patel MD  Emergency Medicine Resident    (Please note that portions of this note were completed with a voice recognition program.  Efforts were made to edit the dictations but occasionally words are mis-transcribed.)                 Janeen Guan MD  Resident  12/17/17 4138

## 2017-12-17 NOTE — PROGRESS NOTES
[facility] P[facility] Dr[facility] Alesha Neri regarding patient wanting to leave [place], was given orders to still administer Dextrose and Insulin for the patients high potassium before she leaves.

## 2017-12-17 NOTE — PROGRESS NOTES
Short Call progress note    The patient is a pleasant 65 y.o. female with significant past medical history of coronary artery disease, congestive heart failure, infected AICD lead which has been recently removed presented with one-day history of shortness of breath. Patient was recently in the hospital for the above complaints she left AMA yesterday night. As soon as she reached home she started to feel shortness of breath again. She woke up in the morning she was significantly short of breath and could not breathe on her own unit on after using home oxygen which she has at home. Rosa Fung His last admission she was found to have sepsis with MSSA, infective endocarditis, vegetations an ICD lead. She was found to have elevated troponins and was thought to be possibly secondary to type II MI or NSTEMI. .  She also has history of ischemic stroke with residual hemiparesis. Patient gettting IV vancomycin 4 weeks for MSSA endocarditis via PICC line. Active Problems:    Shortness of breath    Agree with A/P of admitting resident  #1 congestive heart failure, systolic precipitated blood anemia  #2 symptomatic anemia  #3 MSSA bacteremia and lead endocarditis which has been removed.   Patient is on vancomycin  #4 type II MI versus at non-ST relation MI  #5 multivessel coronary artery disease  #6 diabetes mellitus  #7 bilateral vertebral basilar arteries severe stenosis  #8 old CVA with left-sided paresis     Transfuse PRBCs to keep hemoglobin above 8  Transfuse and give Lasix in between to prevent volume overload  Continue Lasix 20 mg IV twice a day  Oxygen inhalation to keep oxygen saturation about 90  Lovenox for DVT prophylaxis  Cardiology consultation for elevated troponins  Continue vancomycin  Continue aspirin and Plavix and atorvastatin  GI consult for anemia

## 2017-12-17 NOTE — CARE COORDINATION
Call to [facility], spoke with [facility], made aware of situation.   Will look up info in epic and arrange a visit

## 2017-12-17 NOTE — PROGRESS NOTES
Got page from RN that patient wants to leave AMA even without getting treatment for hyperkalemia, also informed 10:11 pm that home care will not be available for her by agency tomorrow but a day after. She will not be getting vancomycin dose tomorrow, infusion center is closed tomorrow. Issues/risks of leaving without setting things up discussed with patient. Seems understood, want to leave AMA. Will notify primary in the morning. Hiral Short MD  PGY-1, Internal Medicine resident  [facility].

## 2017-12-17 NOTE — PROGRESS NOTES
Spoke with Rafa at [place] with [facility] to set up transport for patient this evening home. Transportation set up with oxygen with [facility]. Should arrive within the next 30 minutes for transport.

## 2017-12-17 NOTE — ED NOTES
Resting on stretcher and appears to be sleeping with even resp,  Patient has taken off b/p cuff and pulse ox     Eleanor Noland RN  12/17/17 8699

## 2017-12-17 NOTE — ED NOTES
Received into room 28 via ems with c/o an fall 4 days ago with c/o currently of neck pain sob and lt leg pain. Pain at an 9.   Cough noted     Maged Mace RN  12/17/17 0071

## 2017-12-17 NOTE — ED PROVIDER NOTES
[address] [facility]     Emergency Department     Faculty Attestation    I performed a history and physical examination of the patient and discussed management with the resident. I reviewed the residents note and agree with the documented findings and plan of care. Any areas of disagreement are noted on the chart. I was personally present for the key portions of any procedures. I have documented in the chart those procedures where I was not present during the key portions. I have reviewed the emergency nurses triage note. I agree with the chief complaint, past medical history, past surgical history, allergies, medications, social and family history as documented unless otherwise noted below. For Physician Assistant/ Nurse Practitioner cases/documentation I have personally evaluated this patient and have completed at least one if not all key elements of the E/M (history, physical exam, and MDM). Additional findings are as noted. Patient presents with increased shortness of breath. Patient left hospital against medical advice yesterday after being admitted for an infected pacemaker lead. Patient had the pacemaker/AICD removed and was given a life vest.  Patient says that she left because she is only 65years old and she kept spitting all this time in the hospital.  She says that she came back in today because she has been short of breath. Patient lives alone at her own home. She denies fever, chest pain, abdominal pain. She says she does have some left hip pain from a fall that she had had prior to her previous hospital stay. Will get chest x-ray, x-ray of the hip, labs and plan to admit patient.     EKG Interpretation    Interpreted by emergency department physician    Rhythm: sinus tachycardia  Rate: 100-110  Axis: normal  Ectopy: none  Conduction: normal  ST Segments: nonspecific changes  T Waves: non specific changes  Q Waves: nonspecific    Clinical Impression: non-specific EKG    Windy Primrose Zhen Guidry MD  Attending Emergency  Physician             Mara Rosales MD  12/17/17 1037

## 2017-12-17 NOTE — H&P
[address] [place] [address]     Department of Internal Medicine - Staff Internal Medicine Service          ADMISSION NOTE/HISTORY AND PHYSICAL EXAMINATION       CHIEF COMPLAINT:  SOB    HISTORY OBTAIN FROM:  Patient     HISTORY OF PRESENT ILLNESS:      The patient is a pleasant 65 y.o. female with significant past medical history of coronary artery disease, congestive heart failure, infected AICD lead which has been recently removed presented with one-day history of shortness of breath. Patient was recently in the hospital for the above complaints, she left AMA yesterday night. As soon as she reached home she started to feel shortness of breath again. She woke up in the morning she was significantly short of breath and could not breathe on her own. Her last admission she was found to have sepsis with MSSA, infective endocarditis, vegetations an ICD lead. She was found to have elevated troponins and was thought to be possibly secondary to type II MI or NSTEMI. .  She also has history of ischemic stroke with residual hemiparesis. Patient was planned for IV vancomycin for 4 weeks      PAST MEDICAL HISTORY:        Diagnosis Date    Arthritis     Asthma     CAD (coronary artery disease)     [facility]estive [facility] failure) [facility]     Clinical trial participant at discharge 03/13/2017    [employer]     COPD [facility]ch[facility] obstructive [facility] disease) [facility]     Diabetes mellitus ([facility]     Hepatitis C     Hyperlipidemia     Hypertension     MRSA (methicillin resistant staph aureus) culture positive 12/10/2017    blood    Pneumonia     Unspecified cerebral artery occlusion with cerebral infarction        PAST SURGICAL HISTORY:        Procedure Laterality Date    CARDIAC CATHETERIZATION  11/27/2017    CARDIAC PACEMAKER PLACEMENT      placed 1 month ago at st [address]  5/6/2016    cardiac cath-1xBMS prox. RCA; 1xBMS prox.  LAD    FOOT SURGERY      cyst removed from L foot    TONSILLECTOMY         MEDICATION PRIOR TO ADMISSION:  Not in a hospital admission. Allergies:  Levofloxacin and Pcn [penicillins]    SOCIAL HISTORY:   TOBACCO:   reports that she has been smoking Cigarettes. She has been smoking about 1.00 pack per day. She has never used smokeless tobacco.  ETOH:   reports that she drinks alcohol. DRUGS:   reports that she does not use drugs. FAMILY HISTORY:       Problem Relation Age of Onset    Family history unknown: Yes       REVIEW OF SYSTEMS:  Review of Systems   Constitutional: Negative for chills and fever. HENT: Negative for hearing loss and tinnitus. Eyes: Negative. Respiratory: Positive for cough and shortness of breath. Negative for hemoptysis and sputum production. Cardiovascular: Positive for orthopnea, leg swelling and PND. Negative for chest pain and claudication. Gastrointestinal: Negative for heartburn and nausea. Genitourinary: Negative for dysuria and urgency. Musculoskeletal: Negative for myalgias and neck pain. Skin: Negative for itching and rash. Neurological: Negative for dizziness and headaches. Endo/Heme/Allergies: Negative for environmental allergies. Does not bruise/bleed easily. Psychiatric/Behavioral: Negative for depression and suicidal ideas. PHYSICAL EXAM:  /84   Pulse 103   Temp 98.6 °F (37 °C)   Resp 13   Ht 5' 1\" (1.549 m)   Wt 173 lb (78.5 kg)   LMP  (LMP Unknown)   SpO2 95%   BMI 32.69 kg/m²   CONSTITUTIONAL:  awake, alert, cooperative, no apparent distress, and appears stated age  ENT:  Normocephalic, without obvious abnormality, atraumatic, sinuses nontender on palpation, external ears without lesions, oral pharynx with moist mucus membranes, tonsils without erythema or exudates, gums normal and good dentition. NECK:  Supple, symmetrical, trachea midline, no adenopathy, thyroid symmetric,Elevated jugular venous pressures. LUNGS:  Increased work of breathing.   Using accessory muscles of exam:->currently being treated for pneumonia FINDINGS: Cardiomediastinal silhouette is stable. Left chest defibrillator device is unchanged. No focal airspace consolidation, pneumothorax, or pleural effusion. No free air beneath the diaphragm. No evidence of acute osseous abnormality. No acute intrathoracic process. Xr Chest Standard (2 Vw)    Result Date: 12/3/2017  EXAMINATION: TWO VIEWS OF THE CHEST 12/3/2017 12:24 pm COMPARISON: Chest November 29, 2017. HISTORY: ORDERING SYSTEM PROVIDED HISTORY: SOB TECHNOLOGIST PROVIDED HISTORY: Reason for exam:->SOB Acuity: Unknown Type of Exam: Unknown FINDINGS: Defibrillator device is in place. Right IJ line has now been removed. Sternotomy wires are present. Heart appears normal in size. No focal lung consolidation, pneumothorax, pleural effusion or free air. Osseous structures are grossly intact. No acute cardiopulmonary process. Xr Pelvis (1-2 Views)    Result Date: 12/17/2017  EXAMINATION: SINGLE VIEW OF THE PELVIS 12/17/2017 10:59 am COMPARISON: Acute abdominal series dated 10/22/2017. HISTORY: ORDERING SYSTEM PROVIDED HISTORY: fall TECHNOLOGIST PROVIDED HISTORY: Reason for exam:->fall FINDINGS: Visibility of the pelvic bones is limited by overlying soft tissue attenuation. There is no definite evidence for acute fracture involving the pelvic bones. No significant diastasis at the pubic symphysis or sacroiliac joints. The proximal femurs are grossly intact. No dislocation at the hips. 1.  Limited assessment due to overlying soft tissue attenuation. 2.  No evidence for acute pelvic fracture. If there is persistent clinical concern, consider CT or MRI.      Ct Head Wo Contrast    Result Date: 12/3/2017  EXAMINATION: CT OF THE HEAD WITHOUT CONTRAST  12/3/2017 12:17 pm TECHNIQUE: CT of the head was performed without the administration of intravenous contrast. Dose modulation, iterative reconstruction, and/or weight based adjustment of the mA/kV was utilized to reduce the radiation dose to as low as reasonably achievable. COMPARISON: None. HISTORY: ORDERING SYSTEM PROVIDED HISTORY: vertigo, vomiting, on plavix TECHNOLOGIST PROVIDED HISTORY: Has a \"code stroke\" or \"stroke alert\" been called? ->No FINDINGS: BRAIN/VENTRICLES: There is no acute intracranial hemorrhage, mass effect or midline shift. No abnormal extra-axial fluid collection. The gray-white differentiation is maintained without evidence of an acute infarct. There is no evidence of hydrocephalus. ORBITS: The visualized portion of the orbits demonstrate no acute abnormality. SINUSES: The visualized paranasal sinuses and mastoid air cells demonstrate no acute abnormality. SOFT TISSUES/SKULL:  No acute abnormality of the visualized skull or soft tissues. No acute intracranial abnormality. Scattered subcortical and periventricular white matter hypodensities are most compatible with chronic small vessel disease. Ct Head Wo Contrast    Result Date: 11/29/2017  EXAMINATION: CT OF THE HEAD WITHOUT CONTRAST  11/29/2017 4:43 pm TECHNIQUE: CT of the head was performed without the administration of intravenous contrast. Dose modulation, iterative reconstruction, and/or weight based adjustment of the mA/kV was utilized to reduce the radiation dose to as low as reasonably achievable. COMPARISON: August 16, 2017 HISTORY: ORDERING SYSTEM PROVIDED HISTORY: vertigo, headache FINDINGS: BRAIN/VENTRICLES: There is no acute intracranial hemorrhage, mass effect or midline shift. No abnormal extra-axial fluid collection. The gray-white differentiation is maintained without evidence of an acute infarct. No evidence of hydrocephalus. There are nonspecific areas of hypoattenuation within the periventricular and subcortical white matter, which likely represent chronic microvascular ischemic change. Bilateral basal ganglia calcifications.  ORBITS: The visualized portion of the orbits demonstrate no acute abnormality. SINUSES: The visualized paranasal sinuses and mastoid air cells demonstrate no acute abnormality. Minimal polypoid mucosal thickening is again noted and inferior left maxillary sinus. SOFT TISSUES/SKULL: No acute abnormality of the visualized skull or soft tissues. Stable noncontrast examination of the brain without CT evidence of acute intracranial abnormality and chronic findings as described. Nm Bone Scan 3 Phase    Result Date: 12/12/2017  EXAMINATION: THREE PHASE BONE SCAN 12/12/2017 1:52 pm TECHNIQUE: The patient was injected intravenously with 27 mCi of 99 m Tc MDP. Initial blood flow and pool images of the chest and abdomen were acquired. After 3 hours, delayed bone images were acquired. COMPARISON: CT of the chest from 12/08/2017 reviewed. HISTORY: ORDERING SYSTEM PROVIDED HISTORY: OSTEOMYELITIS TECHNOLOGIST PROVIDED HISTORY: Ordering Physician Provided Reason for Exam: History of CVA ,  new back pain from her fall. T spine pain x  1 week PTA before the fall FINDINGS: There is symmetric activity in the flow and blood pool images. Delayed images show increased activity in the sternum and left anterior 8th rib (subtle healing fracture noted on CT). Minimal increased activity also noted at the knees and ankles, more on the right, likely degenerative. Negative three phase bone scan for osteomyelitis; some increased activity sternum, likely related to prior CABG. Healing fracture left 8th rib. Xr Chest Portable    Result Date: 12/13/2017  EXAMINATION: SINGLE VIEW OF THE CHEST 12/13/2017 8:04 pm COMPARISON: None. HISTORY: ORDERING SYSTEM PROVIDED HISTORY: Pacemaker removal and rule out pneumothorax FINDINGS: Interval removal of left-sided pacemaker with placement of surgical staples. No pneumothorax, consolidation or sizable effusion. Postsurgical changes of CABG are re- demonstrated.   Heart size appears upper limits normal.     Interval pacemaker removal without evidence of +---------+----+-----------------------------------------------------------+ ! Other    ! !H/O CVA; PM                                                ! +---------+----+-----------------------------------------------------------+   - The patient's risk factor(s) include: untreated diabetes mellitus and     treated arterial hypertension.   - The patient has a current/recent (within 1 year) tobacco history. - The patient's last creatinine was 0.8 mg/dl. Findings:   Right                                Left  The common femoral, femoral,         The common femoral, femoral,  popliteal, tibials, and saphenous    popliteal, tibials and saphenous  veins are compressible with normal   veins are compressible with normal  doppler responses. doppler responses. Limited visualization of the lower   Limited visualization of the lower  thigh and calf veins. thigh and calf veins. Allergies   - Allergy:Penicillin(Drug). - Allergy:*Unlisted(Drug). Comments:levofloxacin Patient Status: In Patient. Technical Quality:Limited visualization. Limitation reason:Pt. could not position legs appropriately; pain. Velocities are measured in cm/s ; Diameters are measured in mm Right Lower Extremities DVT Study Measurements Right 2D Measurements +------------------------------------+----------+---------------+----------+ ! Location                            ! Visualized! Compressibility! Thrombosis! +------------------------------------+----------+---------------+----------+ ! Common Femoral                      !Yes       ! Yes            ! None      ! +------------------------------------+----------+---------------+----------+ ! Prox Femoral                        !Yes       ! Yes            ! None      ! +------------------------------------+----------+---------------+----------+ ! Mid Femoral                         !Yes       ! Yes            ! None      ! +------------------------------------+----------+---------------+----------+ ! Dist Femoral                        !Partial   !Yes            !          ! +------------------------------------+----------+---------------+----------+ ! Popliteal                           !Yes       ! Yes            ! None      ! +------------------------------------+----------+---------------+----------+ ! Sapheno Femoral Junction            ! Yes       ! Yes            ! None      ! +------------------------------------+----------+---------------+----------+ ! PTV                                 ! Partial   !Yes            !          ! +------------------------------------+----------+---------------+----------+ ! Peroneal                            !No        !               !          ! +------------------------------------+----------+---------------+----------+ ! Gastroc                             ! Yes       ! Yes            ! None      ! +------------------------------------+----------+---------------+----------+ ! GSV Thigh                           ! Yes       ! Yes            ! None      ! +------------------------------------+----------+---------------+----------+ ! GSV Knee                            ! Yes       ! Yes            ! None      ! +------------------------------------+----------+---------------+----------+ ! GSV Ankle                           ! Yes       ! Yes            ! None      ! +------------------------------------+----------+---------------+----------+ ! SSV                                 ! Yes       ! Yes            ! None      ! +------------------------------------+----------+---------------+----------+ Right Doppler Measurements +-----------------------------+------+------+------------------------------+ ! Location                     ! Signal!Reflux! Reflux (sec)                  ! +-----------------------------+------+------+------------------------------+ ! Common Femoral               !Phasic! No    ! !Yes       !Yes            ! None      ! +------------------------------------+----------+---------------+----------+ ! GSV Thigh                           ! Yes       ! Yes            ! None      ! +------------------------------------+----------+---------------+----------+ ! GSV Knee                            ! Yes       ! Yes            ! None      ! +------------------------------------+----------+---------------+----------+ ! GSV Ankle                           ! Yes       ! Yes            ! None      ! +------------------------------------+----------+---------------+----------+ ! SSV                                 ! Yes       ! Yes            ! None      ! +------------------------------------+----------+---------------+----------+ Left Doppler Measurements +-----------------------------+------+------+------------------------------+ ! Location                     ! Signal!Reflux! Reflux (sec)                  ! +-----------------------------+------+------+------------------------------+ ! Common Femoral               !Phasic! No    !                              ! +-----------------------------+------+------+------------------------------+ ! Prox Femoral                 !Phasic! No    !                              ! +-----------------------------+------+------+------------------------------+ ! Popliteal                    !Phasic! No    !                              ! +-----------------------------+------+------+------------------------------+  Conclusions   Summary   Technically limited visualization. No evidence of superficial or deep venous thrombosis in both lower  extremities.    Signature   ----------------------------------------------------------------  Electronically signed by Radha MontanoSonographer) on  12/05/2017 08:39 AM  ----------------------------------------------------------------   ----------------------------------------------------------------  Electronically signed by Zhen Abernathy plaque is present within both ICAs but no significant stenosis of the cervical ICAs is noted by NASCET criteria. There is mild to moderate stenosis of the carotid siphons. There are degenerative changes of the spine. CTA head: The anterior and middle cerebral arteries demonstrate normal arborization patterns. Bilateral posterior communicating arteries are present. The basilar artery and posterior cerebral arteries are normal in course and caliber to the extent visualized. No aneurysm or vascular malformation is identified. Severe stenosis at the origin of the right vertebral artery and severe stenoses of both distal vertebral arteries. Mild-to-moderate stenoses of the carotid siphons. Incidentally noted enhancing thyroid nodules. RECOMMENDATIONS: Managing Incidental Thyroid Nodule Detected at CT or MRI or US 1. Further evaluation by thyroid Ultrasound recommended for these incidental nodules: Patient Age 25 years or less - Any nodule. Patient Age 21-27 years old - Nodule 1 cm in size or greater Patient Age 28 years or more - Nodule 1.5 cm in size or greater 2. Follow up thyroid ultrasound also recommend in these scenarios -Solitary nodule with high risk imaging features (locally invasive nodule or suspicious lymph nodes) -Any nodule in a heterogeneous enlarged thyroid gland 3. NO further imaging is recommended in the following scenarios -No f/u imaging is recommended for ITNs not meeting the above criteria. -No US or f/u recommended for ITNs without high risk features in pts. with limited life expectancy or significant co-morbidities, unless clinically warranted.  Note: These recommendations do not apply to pts. w/ increased risk for thyroid cancer or pts. with symptomatic thyroid disease. ________________________________________________________________ Recommendations for f/u of Incidental Thyroid Nodules (ITN) found on CT, MR, NM and Extrathyroidal US are based upon the ACR white paper and Miller 3-tiered

## 2017-12-17 NOTE — PROGRESS NOTES
Writer spoke with [facility] to inform them that this patient was being discharged this evening so they can set up her IV antibiotic therapy for Sunday. Spoke with Zahra the Pharmacist on call with the company. She stated that she would not be able to get an infusion nurse/set up for her treatment until lady tomorrow. She advised that the patient stay in the hospital overnight into the morning so she can receive her morning antibiotic admin. Writer then spoke with pt and explained this all into detail however pt still insisting on leaving tonight. Lamar Tai RN spoke with Dr. Kathy Fierro who also stated he would not discharge the pt based on this and advised pt to stay for treatment but pt still refusing to leave. At this time, pt requesting to leave AMA. Dr. Christian Knee of this.

## 2017-12-18 VITALS
OXYGEN SATURATION: 100 % | TEMPERATURE: 99.6 F | DIASTOLIC BLOOD PRESSURE: 99 MMHG | BODY MASS INDEX: 32.66 KG/M2 | WEIGHT: 173 LBS | RESPIRATION RATE: 15 BRPM | HEIGHT: 61 IN | HEART RATE: 88 BPM | SYSTOLIC BLOOD PRESSURE: 117 MMHG

## 2017-12-18 PROBLEM — I50.43 ACUTE ON CHRONIC COMBINED SYSTOLIC AND DIASTOLIC CONGESTIVE HEART FAILURE (HCC): Status: ACTIVE | Noted: 2017-12-09

## 2017-12-18 LAB
ABSOLUTE EOS #: 0.37 K/UL (ref 0–0.44)
ABSOLUTE IMMATURE GRANULOCYTE: 0.09 K/UL (ref 0–0.3)
ABSOLUTE LYMPH #: 2.03 K/UL (ref 1.1–3.7)
ABSOLUTE MONO #: 0.63 K/UL (ref 0.1–1.2)
ANION GAP SERPL CALCULATED.3IONS-SCNC: 15 MMOL/L (ref 9–17)
BASOPHILS # BLD: 0 % (ref 0–2)
BASOPHILS ABSOLUTE: 0.03 K/UL (ref 0–0.2)
BUN BLDV-MCNC: 8 MG/DL (ref 8–23)
BUN/CREAT BLD: ABNORMAL (ref 9–20)
CALCIUM SERPL-MCNC: 7.8 MG/DL (ref 8.6–10.4)
CHLORIDE BLD-SCNC: 96 MMOL/L (ref 98–107)
CO2: 27 MMOL/L (ref 20–31)
CREAT SERPL-MCNC: 0.73 MG/DL (ref 0.5–0.9)
DIFFERENTIAL TYPE: ABNORMAL
EKG ATRIAL RATE: 105 BPM
EKG P AXIS: 45 DEGREES
EKG P-R INTERVAL: 156 MS
EKG Q-T INTERVAL: 344 MS
EKG QRS DURATION: 84 MS
EKG QTC CALCULATION (BAZETT): 454 MS
EKG R AXIS: 8 DEGREES
EKG T AXIS: 125 DEGREES
EKG VENTRICULAR RATE: 105 BPM
EOSINOPHILS RELATIVE PERCENT: 3 % (ref 1–4)
GFR AFRICAN AMERICAN: >60 ML/MIN
GFR NON-AFRICAN AMERICAN: >60 ML/MIN
GFR SERPL CREATININE-BSD FRML MDRD: ABNORMAL ML/MIN/{1.73_M2}
GFR SERPL CREATININE-BSD FRML MDRD: ABNORMAL ML/MIN/{1.73_M2}
GLUCOSE BLD-MCNC: 108 MG/DL (ref 65–105)
GLUCOSE BLD-MCNC: 201 MG/DL (ref 65–105)
GLUCOSE BLD-MCNC: 205 MG/DL (ref 65–105)
GLUCOSE BLD-MCNC: 245 MG/DL (ref 70–99)
HCT VFR BLD CALC: 24.9 % (ref 36.3–47.1)
HEMOGLOBIN: 7.3 G/DL (ref 11.9–15.1)
IMMATURE GRANULOCYTES: 1 %
LYMPHOCYTES # BLD: 18 % (ref 24–43)
MCH RBC QN AUTO: 25 PG (ref 25.2–33.5)
MCHC RBC AUTO-ENTMCNC: 29.3 G/DL (ref 28.4–34.8)
MCV RBC AUTO: 85.3 FL (ref 82.6–102.9)
MONOCYTES # BLD: 6 % (ref 3–12)
PDW BLD-RTO: 20 % (ref 11.8–14.4)
PLATELET # BLD: 316 K/UL (ref 138–453)
PLATELET ESTIMATE: ABNORMAL
PMV BLD AUTO: 9.6 FL (ref 8.1–13.5)
POTASSIUM SERPL-SCNC: 4 MMOL/L (ref 3.7–5.3)
RBC # BLD: 2.92 M/UL (ref 3.95–5.11)
RBC # BLD: ABNORMAL 10*6/UL
SEG NEUTROPHILS: 72 % (ref 36–65)
SEGMENTED NEUTROPHILS ABSOLUTE COUNT: 7.93 K/UL (ref 1.5–8.1)
SODIUM BLD-SCNC: 138 MMOL/L (ref 135–144)
WBC # BLD: 11.1 K/UL (ref 3.5–11.3)
WBC # BLD: ABNORMAL 10*3/UL

## 2017-12-18 PROCEDURE — 6360000002 HC RX W HCPCS: Performed by: INTERNAL MEDICINE

## 2017-12-18 PROCEDURE — 6370000000 HC RX 637 (ALT 250 FOR IP): Performed by: INTERNAL MEDICINE

## 2017-12-18 PROCEDURE — 2580000003 HC RX 258: Performed by: STUDENT IN AN ORGANIZED HEALTH CARE EDUCATION/TRAINING PROGRAM

## 2017-12-18 PROCEDURE — 36415 COLL VENOUS BLD VENIPUNCTURE: CPT

## 2017-12-18 PROCEDURE — 6370000000 HC RX 637 (ALT 250 FOR IP): Performed by: STUDENT IN AN ORGANIZED HEALTH CARE EDUCATION/TRAINING PROGRAM

## 2017-12-18 PROCEDURE — 6360000002 HC RX W HCPCS: Performed by: STUDENT IN AN ORGANIZED HEALTH CARE EDUCATION/TRAINING PROGRAM

## 2017-12-18 PROCEDURE — 85025 COMPLETE CBC W/AUTO DIFF WBC: CPT

## 2017-12-18 PROCEDURE — 80048 BASIC METABOLIC PNL TOTAL CA: CPT

## 2017-12-18 PROCEDURE — 97116 GAIT TRAINING THERAPY: CPT

## 2017-12-18 PROCEDURE — 99222 1ST HOSP IP/OBS MODERATE 55: CPT | Performed by: INTERNAL MEDICINE

## 2017-12-18 PROCEDURE — 82947 ASSAY GLUCOSE BLOOD QUANT: CPT

## 2017-12-18 PROCEDURE — 93005 ELECTROCARDIOGRAM TRACING: CPT

## 2017-12-18 RX ORDER — OXCARBAZEPINE 300 MG/1
300 TABLET, FILM COATED ORAL 2 TIMES DAILY
Qty: 60 TABLET | Refills: 3 | Status: SHIPPED | OUTPATIENT
Start: 2017-12-18

## 2017-12-18 RX ORDER — METOPROLOL TARTRATE 50 MG/1
50 TABLET, FILM COATED ORAL 2 TIMES DAILY
Qty: 60 TABLET | Refills: 3 | Status: ON HOLD | OUTPATIENT
Start: 2017-12-18 | End: 2018-01-18

## 2017-12-18 RX ORDER — ATORVASTATIN CALCIUM 80 MG/1
80 TABLET, FILM COATED ORAL NIGHTLY
Qty: 30 TABLET | Refills: 3 | Status: SHIPPED | OUTPATIENT
Start: 2017-12-18

## 2017-12-18 RX ORDER — AMOXICILLIN 250 MG
1 CAPSULE ORAL DAILY
Qty: 30 TABLET | Refills: 1 | Status: SHIPPED | OUTPATIENT
Start: 2017-12-18 | End: 2018-03-05 | Stop reason: SDUPTHER

## 2017-12-18 RX ORDER — IPRATROPIUM BROMIDE AND ALBUTEROL SULFATE 2.5; .5 MG/3ML; MG/3ML
1 SOLUTION RESPIRATORY (INHALATION) EVERY 4 HOURS PRN
Qty: 360 ML | Refills: 2 | Status: SHIPPED | OUTPATIENT
Start: 2017-12-18

## 2017-12-18 RX ORDER — CLOPIDOGREL BISULFATE 75 MG/1
75 TABLET ORAL DAILY
Qty: 30 TABLET | Refills: 3 | Status: SHIPPED | OUTPATIENT
Start: 2017-12-18

## 2017-12-18 RX ORDER — OLANZAPINE 10 MG/1
10 TABLET ORAL NIGHTLY
Qty: 30 TABLET | Refills: 3 | Status: SHIPPED | OUTPATIENT
Start: 2017-12-18

## 2017-12-18 RX ORDER — FUROSEMIDE 10 MG/ML
40 INJECTION INTRAMUSCULAR; INTRAVENOUS ONCE
Status: COMPLETED | OUTPATIENT
Start: 2017-12-18 | End: 2017-12-18

## 2017-12-18 RX ORDER — ASPIRIN 81 MG/1
81 TABLET, CHEWABLE ORAL DAILY
Qty: 30 TABLET | Refills: 3 | Status: SHIPPED | OUTPATIENT
Start: 2017-12-18

## 2017-12-18 RX ORDER — MECLIZINE HCL 12.5 MG/1
12.5 TABLET ORAL 3 TIMES DAILY PRN
Qty: 30 TABLET | Refills: 3 | Status: SHIPPED | OUTPATIENT
Start: 2017-12-18 | End: 2017-12-28

## 2017-12-18 RX ORDER — INSULIN GLARGINE 100 [IU]/ML
30 INJECTION, SOLUTION SUBCUTANEOUS NIGHTLY
Qty: 1 VIAL | Refills: 3 | Status: SHIPPED | OUTPATIENT
Start: 2017-12-18

## 2017-12-18 RX ORDER — COLCHICINE 0.6 MG/1
0.6 TABLET ORAL DAILY
Qty: 30 TABLET | Refills: 3 | Status: SHIPPED | OUTPATIENT
Start: 2017-12-18

## 2017-12-18 RX ORDER — LISINOPRIL 5 MG/1
5 TABLET ORAL DAILY
Qty: 30 TABLET | Refills: 3 | Status: ON HOLD | OUTPATIENT
Start: 2017-12-18 | End: 2018-05-17 | Stop reason: HOSPADM

## 2017-12-18 RX ORDER — NITROGLYCERIN 0.4 MG/1
0.4 TABLET SUBLINGUAL EVERY 5 MIN PRN
Qty: 25 TABLET | Refills: 3 | Status: SHIPPED | OUTPATIENT
Start: 2017-12-18

## 2017-12-18 RX ORDER — FAMOTIDINE 20 MG/1
20 TABLET, FILM COATED ORAL DAILY
Qty: 60 TABLET | Refills: 3 | Status: SHIPPED | OUTPATIENT
Start: 2017-12-18 | End: 2018-04-07

## 2017-12-18 RX ORDER — FUROSEMIDE 20 MG/1
20 TABLET ORAL DAILY
Status: DISCONTINUED | OUTPATIENT
Start: 2017-12-18 | End: 2017-12-18 | Stop reason: HOSPADM

## 2017-12-18 RX ORDER — FUROSEMIDE 20 MG/1
20 TABLET ORAL DAILY
Qty: 30 TABLET | Refills: 3 | Status: ON HOLD | OUTPATIENT
Start: 2017-12-19 | End: 2017-12-26

## 2017-12-18 RX ADMIN — FAMOTIDINE 20 MG: 20 TABLET, FILM COATED ORAL at 08:57

## 2017-12-18 RX ADMIN — OXCARBAZEPINE 300 MG: 300 TABLET, FILM COATED ORAL at 08:57

## 2017-12-18 RX ADMIN — ASPIRIN 81 MG: 81 TABLET, CHEWABLE ORAL at 08:56

## 2017-12-18 RX ADMIN — Medication 10 ML: at 08:57

## 2017-12-18 RX ADMIN — CLOPIDOGREL 75 MG: 75 TABLET, FILM COATED ORAL at 08:57

## 2017-12-18 RX ADMIN — FUROSEMIDE 40 MG: 10 INJECTION, SOLUTION INTRAMUSCULAR; INTRAVENOUS at 09:09

## 2017-12-18 RX ADMIN — METOPROLOL TARTRATE 50 MG: 50 TABLET, FILM COATED ORAL at 08:57

## 2017-12-18 RX ADMIN — VANCOMYCIN HYDROCHLORIDE 750 MG: 1 INJECTION, POWDER, LYOPHILIZED, FOR SOLUTION INTRAVENOUS at 14:43

## 2017-12-18 RX ADMIN — FUROSEMIDE 20 MG: 20 TABLET ORAL at 14:45

## 2017-12-18 RX ADMIN — LISINOPRIL 5 MG: 5 TABLET ORAL at 08:56

## 2017-12-18 ASSESSMENT — PAIN SCALES - GENERAL: PAINLEVEL_OUTOF10: 5

## 2017-12-18 ASSESSMENT — PAIN DESCRIPTION - DESCRIPTORS: DESCRIPTORS: HEADACHE

## 2017-12-18 ASSESSMENT — PAIN DESCRIPTION - LOCATION: LOCATION: HEAD

## 2017-12-18 ASSESSMENT — PAIN DESCRIPTION - PAIN TYPE: TYPE: ACUTE PAIN

## 2017-12-18 NOTE — FLOWSHEET NOTE
Pt refused temp and pulse ox at this time. Nurse/writer negotiated with pt to get partial vitals and blood sugar.

## 2017-12-18 NOTE — PROGRESS NOTES
History:   has a past surgical history that includes Tonsillectomy; Foot surgery; Cardiac surgery (5/6/2016); Cardiac pacemaker placement; and Cardiac catheterization (11/27/2017). Home Medications:    Prior to Admission medications    Medication Sig Start Date End Date Taking? Authorizing Provider   vancomycin (VANCOCIN) infusion Infuse 750 mg intravenously every 12 hours Compound per protocol. Aim for trough 14-17 12/15/17 1/14/18 Yes Anthony Aragon MD   meclizine (ANTIVERT) 12.5 MG tablet Take 1 tablet by mouth 3 times daily as needed for Dizziness 12/15/17 12/25/17 Yes Carissa Vega MD   atorvastatin (LIPITOR) 80 MG tablet Take 1 tablet by mouth nightly 12/15/17  Yes Carissa Vega MD   metoprolol tartrate (LOPRESSOR) 50 MG tablet Take 1 tablet by mouth 2 times daily 12/15/17  Yes Carissa Vega MD   clopidogrel (PLAVIX) 75 MG tablet Take 1 tablet by mouth daily 12/16/17  Yes Carissa Vega MD   insulin lispro (HUMALOG) 100 UNIT/ML injection vial Inject 0-18 Units into the skin 3 times daily (with meals) 12/15/17  Yes Carissa Vega MD   insulin lispro (HUMALOG) 100 UNIT/ML injection vial Inject 0-9 Units into the skin nightly 12/15/17  Yes Carissa Vega MD   insulin glargine (LANTUS) 100 UNIT/ML injection vial Inject 30 Units into the skin nightly 12/15/17  Yes Carissa Vega MD   aspirin 81 MG chewable tablet Take 1 tablet by mouth daily 12/5/17  Yes Jose Be MD   nitroGLYCERIN (NITROSTAT) 0.4 MG SL tablet Place 1 tablet under the tongue every 5 minutes as needed for Chest pain up to max of 3 total doses. If no relief after 1 dose, call 911. 12/5/17  Yes Jose Be MD   lisinopril (PRINIVIL;ZESTRIL) 5 MG tablet Take 1 tablet by mouth daily 12/2/17  Yes Nader Barajas MD   zinc oxide 20 % ointment Apply topically as needed.  11/28/17  Yes Inocencio Lai MD   ipratropium-albuterol (DUONEB) 0.5-2.5 (3) MG/3ML SOLN nebulizer solution Inhale 3 mLs into the lungs every 4 hours as needed for Shortness of Breath 10/26/17  Yes Joselin Briscoe MD   senna-docusate (PERICOLACE) 8.6-50 MG per tablet Take 1 tablet by mouth daily   Yes Historical Provider, MD   OLANZapine (ZYPREXA) 10 MG tablet Take 1 tablet by mouth nightly 11/30/16  Yes Conrado Hooper MD   colchicine (COLCRYS) 0.6 MG tablet Take 1 tablet by mouth daily 7/15/16  Yes Ayaan Goldberg DO   famotidine (PEPCID) 20 MG tablet Take 20 mg by mouth daily. Yes Historical Provider, MD   OXcarbazepine (TRILEPTAL) 300 MG tablet Take 300 mg by mouth 2 times daily Per pharmacy she is to be taking 300 mg twice daily but patient states that she only takes once daily.    Yes Historical Provider, MD      Current Facility-Administered Medications: furosemide (LASIX) injection 40 mg, 40 mg, Intravenous, Once  aspirin chewable tablet 81 mg, 81 mg, Oral, Daily  atorvastatin (LIPITOR) tablet 80 mg, 80 mg, Oral, Nightly  clopidogrel (PLAVIX) tablet 75 mg, 75 mg, Oral, Daily  colchicine (COLCRYS) tablet 0.6 mg, 0.6 mg, Oral, Daily  famotidine (PEPCID) tablet 20 mg, 20 mg, Oral, Daily  insulin glargine (LANTUS) injection vial 30 Units, 30 Units, Subcutaneous, Nightly  ipratropium-albuterol (DUONEB) nebulizer solution 3 mL, 1 vial, Inhalation, Q4H PRN  lisinopril (PRINIVIL;ZESTRIL) tablet 5 mg, 5 mg, Oral, Daily  meclizine (ANTIVERT) tablet 12.5 mg, 12.5 mg, Oral, TID PRN  metoprolol tartrate (LOPRESSOR) tablet 50 mg, 50 mg, Oral, BID  OLANZapine (ZYPREXA) tablet 10 mg, 10 mg, Oral, Nightly  OXcarbazepine (TRILEPTAL) tablet 300 mg, 300 mg, Oral, BID  sodium chloride flush 0.9 % injection 10 mL, 10 mL, Intravenous, 2 times per day  sodium chloride flush 0.9 % injection 10 mL, 10 mL, Intravenous, PRN  acetaminophen (TYLENOL) tablet 650 mg, 650 mg, Oral, Q4H PRN  magnesium hydroxide (MILK OF MAGNESIA) 400 MG/5ML suspension 30 mL, 30 mL, Oral, Daily PRN  ondansetron (ZOFRAN) injection 4 mg, 4 mg, Intravenous, Q6H PRN  potassium chloride (KLOR-CON M) extended release tablet 40 mEq, 40 mEq, Oral, PRN **OR** potassium chloride 20 MEQ/15ML (10%) oral solution 40 mEq, 40 mEq, Oral, PRN **OR** potassium chloride 10 mEq/100 mL IVPB (Peripheral Line), 10 mEq, Intravenous, PRN  0.9 % sodium chloride bolus, 250 mL, Intravenous, Once  vancomycin (VANCOCIN) intermittent dosing (placeholder), , Other, RX Placeholder  vancomycin (VANCOCIN) 750 mg in dextrose 5 % 250 mL IVPB, 750 mg, Intravenous, Q12H  glucose (GLUTOSE) 40 % oral gel 15 g, 15 g, Oral, PRN  dextrose 50 % solution 12.5 g, 12.5 g, Intravenous, PRN  glucagon (rDNA) injection 1 mg, 1 mg, Intramuscular, PRN  dextrose 5 % solution, 100 mL/hr, Intravenous, PRN    Allergies:  Levofloxacin and Pcn [penicillins]    Social History:   reports that she has been smoking Cigarettes. She has been smoking about 1.00 pack per day. She has never used smokeless tobacco. She reports that she drinks alcohol. She reports that she does not use drugs. Family History: Family history is unknown by patient. No h/o sudden cardiac death. No for premature CAD    REVIEW OF SYSTEMS:    · Constitutional: there has been no unanticipated weight loss. There's been No change in energy level, No change in activity level. · Eyes: No visual changes or diplopia. No scleral icterus. · ENT: No Headaches  · Cardiovascular: dyspnea- resolved,Orthopnea,PND  · Respiratory: Cough  · Gastrointestinal: No abdominal pain. No change in bowel or bladder habits. · Genitourinary: No dysuria, trouble voiding, or hematuria. · Musculoskeletal:  No gait disturbance, No weakness or joint complaints. · Integumentary: No rash or pruritis. · Neurological: No headache, diplopia, change in muscle strength, numbness or tingling. No change in gait, balance, coordination, mood, affect, memory, mentation, behavior. · Psychiatric: No anxiety, or depression. · Endocrine: No temperature intolerance.  No excessive thirst, fluid intake, or idiopathic gout of left knee    Late effects of CVA (cerebrovascular accident)    Inability to perform activities of daily living    S/P implantation of automatic cardioverter/defibrillator (AICD) 3/13/1- Dr. 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 Hilario Washburn.      NATHAN 12/11/17: EF 35-40%, BA dilatation. Large mobile echo density is seen in the RA on the ICD lead, high suggestive of a vegetation. Severe TR.      ECHO 11/30/17: EF 35%, grade I DD.      CATH 12/04/17:Multi-vessel Coronary Artery Disease.   Patent grafts to LAD and OM.   Patent prior RCA stents with non-obstructive disease.   Moderately impaired ventricular function--EF 30%     STRESS 11/15/17: Apparent remote infarction within the inferior wall and moderate-sized partially reversible perfusion defect within the anterior wall, concerning for ischemia. Dyskinesis of the apex. Severe hypokinesis of the septum, inferoseptal wall and inferior wall. EF 36%.      ICD 3/13/17: Medtronic device placed by Dr. Hilario Washburn for ICM.    CABG 9/1/16: LIMA-LAD, SVG-OM3.       Other recent acute issues:  1. CAD, s/p STEMI on 05/06/2016.  Coronary angiography showed LMCA mild disease, LAD 99% proximal stenosis, reduced to 0% using a 2.75 x 23 mm BMS, 80% stenosis proximal RCA, reduced to 0% using 2.75 x 23 BMS, 100% very distal PDA occlusion, small in caliber, LCx mild disease and OM3 70% stenosis with JOHNNA III flow. 2. Hypertension   3. Ischemic cardiomyopathy. 4. CKD. 5. Cardiac cath 08/15/16: Severe MV CAD. EF 35%   6. CABG 09/01/16: LIMA-LAD, SVG-OM3.   7. AICD implant 03/13/17   8. Hx of CVA with residual left sided weakness. IMPRESSION & PLAN:    1. Type 2 MI from other medical conditions. Has been trending down since cardiac cath a few weeks ago. No indication for another cath. 2. Cardiomyopathy- put on 20 PO lasix daily upon discharge. Keeps coming back fluid overloaded. 3. Hx of ICD extraction for infection- still on ABx  4. Hx of MV CAD s/p CABG and stents  5. Hx of COPD  6. Combined systolic and diastolic heart failure. 7. Continue medical management      Hx of Combined systolic and diastolic heart failure  Continue Lopressor,lisinopril. Start furosemide 20 mg pO    Will discuss with rounding attending Dr. David Mckeon for final recommendations. Adry Almaguer MD  PGY-1, Internal medicine resident  [facility], [place], [place]  12/18/2017, 8:48 AM    Attending Cardiologist Addendum: I have reviewed and performed the history, physical, subjective, objective, assessment, and plan with the resident/fellow and agree with the note. I performed the history and physical personally. I have made changes to the note above as needed. Thank you for allowing me to participate in the care of this patient, please do not hesitate to call if you have any questions. Rashida Lara, [id number] [facility]  [facility] [address]  [phone]

## 2017-12-18 NOTE — PROGRESS NOTES
hyperglycemia 01/24/2015    Diverticulitis of colon 11/07/2014    Urinary tract infection without hematuria 11/07/2014    Candidal intertrigo 06/16/2013    Cerebral infarction [facility] 06/15/2013    Essential hypertension 06/15/2013    Diabetes mellitus type 2, controlled ([facility] 06/15/2013       Past Medical History:   Diagnosis Date    Arthritis     Asthma     CAD (coronary artery disease)     [facility]est[facility] heart failure) [facility]     Clinical trial participant at discharge 03/13/2017    Medtr[employer] PSR     COPD (chronic obstructive pulmonary disease) ([facility]     Diabetes mellitus ([facility]     Hepatitis C     Hyperlipidemia     Hypertension     MRSA (methicillin resistant staph aureus) culture positive 12/10/2017    blood    Pneumonia     Unspecified cerebral artery occlusion with cerebral infarction      Past Surgical History:   Procedure Laterality Date    CARDIAC CATHETERIZATION  11/27/2017    CARDIAC PACEMAKER PLACEMENT      placed 1 month ago at [address]  5/6/2016    cardiac cath-1xBMS prox. RCA; 1xBMS prox. LAD    FOOT SURGERY      cyst removed from L foot    TONSILLECTOMY         Restrictions  Restrictions/Precautions  Restrictions/Precautions: Cardiac, General Precautions, Fall Risk  Required Braces or Orthoses?: No  Position Activity Restriction  Other position/activity restrictions: Life Vest, up with assist.  Subjective   General  Chart Reviewed: Yes  Response To Previous Treatment: Patient with no complaints from previous session. Family / Caregiver Present: No  Subjective  Subjective: alert, in BR  General Comment  Comments: I'm too tired to walk any futher.   Pain Screening  Patient Currently in Pain: No  Vital Signs  Patient Currently in Pain: No  Orientation  Orientation  Overall Orientation Status: Within Normal Limits  Objective   Bed mobility  Bridging: Supervision  Sit to Supine: Minimal assistance (assist to LE's)  Scooting: Stand by

## 2017-12-18 NOTE — PROGRESS NOTES
Internal Medicine Team      Daily Progress Note    Patient:  Zuri Mora  YOB: 1955  MRN: [id number]     Acct: [de-identified]     Admit date: 12/17/2017    Subjective:   Pt seen and Chart reviewed. No acute problems overnight. Vital signs are stable. Patient is able to lie flat without shortness of breath. Patient is on home oxygen levels of 3-4 L. Denies any chest pain/discomfort, angina, or difficulty breathing. Tolerating diet well. No nausea, vomiting, diarrhea or abdominal pain. Objective:   BP (!) 117/99   Pulse 88   Temp 99.6 °F (37.6 °C) (Oral)   Resp 15   Ht 5' 1\" (1.549 m)   Wt 173 lb (78.5 kg)   LMP  (LMP Unknown)   SpO2 100%   BMI 32.69 kg/m²       CONSTITUTIONAL: Alert, awake, oriented to time, place and person, in no apparent distress, sating at  room air. RESP:  CTA bilaterally, no wheeze, rales or rhonchi  CVS:  RRR, no murmurs, gallops, or rubs.   ABDOMEN: soft, non distended, non tender, bowel sounds present, no masses, no organomegaly   EXTREMITIES: No pedal edema, no calf swelling or tenderness,  Distal pulses intact    No intake or output data in the 24 hours ending 12/18/17 1327      Medications:      furosemide  20 mg Oral Daily    aspirin  81 mg Oral Daily    atorvastatin  80 mg Oral Nightly    clopidogrel  75 mg Oral Daily    colchicine  0.6 mg Oral Daily    famotidine  20 mg Oral Daily    insulin glargine  30 Units Subcutaneous Nightly    lisinopril  5 mg Oral Daily    metoprolol tartrate  50 mg Oral BID    OLANZapine  10 mg Oral Nightly    OXcarbazepine  300 mg Oral BID    sodium chloride flush  10 mL Intravenous 2 times per day    sodium chloride  250 mL Intravenous Once    vancomycin (VANCOCIN) intermittent dosing (placeholder)   Other RX Placeholder    vancomycin  750 mg Intravenous Q12H       Diagnostic Labs and Imaging    CBC:   Recent Labs      12/16/17   0827  12/17/17   1038  12/17/17   1831   WBC  13.6*  11.8*   --    RBC  2.84* work on home care. Plan to discharge patient home with home care once arrangements have been made.     [address] [place], [place]

## 2017-12-18 NOTE — DISCHARGE INSTR - DIET
popped popcorn    Other grains:     ½ cup of cooked white rice or pasta    ½ of an English muffin    1 small flour or corn tortilla    1 mini bagel    Dairy Foods: Choose fat-free or low-fat dairy foods. 1½ ounces of hard cheese (mozzarella, Swiss, cheddar)     1 cup (8 ounces) of low-fat or fat-free milk or yogurt    1 cup of low-fat frozen yogurt or pudding  Meat and other protein sources: Choose lean meats and poultry. Bake, broil, and grill meat instead of frying it. Include a variety of seafood in place of some meat and poultry each week. Eat a variety of protein foods. ½ ounce of nuts (12 almonds, 24 pistachios, 7 walnut halves) or 1 tablespoon of peanut butter (1 ounce)    ¼ cup of soy tofu or tempeh (1 ounce)    1 egg    ¼ cup of cooked dried beans, peas, or lentils (1 ounce)    1 small chicken breast or 1 small trout (about 3 ounces)    1 salmon steak (4 to 6 ounces)    1 small lean hamburger (2 to 3 ounces)  Fats: Limit saturated fats, trans fats, and cholesterol. These unhealthy fats are found in shortening, butter, stick margarine, and animal fat. Choose healthy fats such as polyunsaturated and monounsaturated fats:    1 tablespoon of canola, olive, corn, sunflower, or soybean oil    1 tablespoon of soft margarine    1 teaspoon of mayonnaise    2 tablespoons of salad dressing    ½ of an avocado  What foods should I limit? Try to limit the following types of foods in each group whenever possible. They are high in total fat, unhealthy fats, sugar, and calories.     Breads and starches:     Cookies, donuts, croissants, store-bought muffins, or other high-fat breads      Fruit in pastries, ice cream, or rich desserts    Fried vegetables such as [employer] fries    Vegetables made with cream sauces or topped with cheese    Dairy foods:     Cream and regular hard cheeses    Regular and premium ice cream    Whole milk, half and half, and regular eggnog    Meat and other protein sources:     Meats, seafood, or poultry that are fried or that are served with high-fat gravies and sauces    High-fat meats such as sausages, davis, lunch meats, and organ meats    Fats:     Butter, stick margarine, lard, and shortening    Visible fat on any meat, fish, or poultry  What other guidelines should I follow? Choose and prepare foods and drinks with less salt and added sugars. Use the nutrition information on food labels to help you make healthy choices. The percent daily value listed on the food label tells you whether a food is low or high in certain nutrients. A percent daily value of 5% or less means that the food is low in a nutrient. A percent daily value of 20% or more means that the food is high in a nutrient. Get 2 hours and 30 minutes or more of physical activity each week, such as brisk walking. Get 1 hour and 15 minutes of physical activity each week if the activity requires a higher level of effort, such as running. Spread physical activity throughout the week. Talk to your caregiver about the best exercise plan for you. Limit the amount of alcohol you drink. Alcohol can damage your brain, heart, and liver. It can increase your risk of a stroke and high blood pressure. Women should limit alcohol to 1 drink a day. Men should limit alcohol to 2 drinks a day. A drink of alcohol is 12 ounces of beer, 5 ounces of wine, or 1½ ounces of liquor.

## 2017-12-19 LAB
ABO/RH: NORMAL
ANTIBODY SCREEN: NEGATIVE
ARM BAND NUMBER: NORMAL
BLD PROD TYP BPU: NORMAL
CROSSMATCH RESULT: NORMAL
DISPENSE STATUS BLOOD BANK: NORMAL
EKG ATRIAL RATE: 86 BPM
EKG P AXIS: 48 DEGREES
EKG P-R INTERVAL: 158 MS
EKG Q-T INTERVAL: 358 MS
EKG QRS DURATION: 82 MS
EKG QTC CALCULATION (BAZETT): 428 MS
EKG R AXIS: 1 DEGREES
EKG T AXIS: 110 DEGREES
EKG VENTRICULAR RATE: 86 BPM
EXPIRATION DATE: NORMAL
TRANSFUSION STATUS: NORMAL
UNIT DIVISION: 0
UNIT NUMBER: NORMAL

## 2017-12-19 NOTE — DISCHARGE SUMMARY
[address] [place] [address]     Department of Internal Medicine - Staff Internal Medicine Service    INPATIENT DISCHARGE SUMMARY      PATIENT IDENTIFICATION:  NAME:  Inés Ramirez   :   1955  MRN:    [id number]     Acct:    [de-identified]   Admit Date:  2017  Discharge date:  2017  5:59 PM   Attending Provider: No att. providers found                                     REASON FOR HOSPITALIZATION:   Inés Ramirez is a 65 y.o. female who presented with SOB. DIAGNOSES:  Principal Problem:    Acute on chronic combined systolic and diastolic congestive heart failure (HCC)  Active Problems:    Orthostatic hypotension    Uncontrolled diabetes mellitus type 2 without complications (HCC)    Chronic respiratory failure (HCC)    Sepsis due to methicillin susceptible Staphylococcus aureus (HCC)    Aicd lead infection d/p icd removal     Schizophrenia (HCC)    Shortness of breath    Acute on chronic combined systolic and diastolic CHF (congestive heart failure) ([facility]      TREATMENT:  Brief Inpatient Course: patient was admitted with sob on exertion. She left AMA a day before when she was treated for MSSA sepsis sec to AICD vegetation with IV vencomycin and removal of ICD. Prior to discharge, pt left AMA before arrangements were made for home care. She was found to have pulmonary edema. She was treated with IV lasix. Her symptoms improved and she was switched to oral lasix.      Consults:   none    Procedures:  None     Any Hospital Acquired Infections: none    PATIENT'S DISCHARGE CONDITION:  Stable     PATIENT/FAMILY INSTRUCTIONS:   Discharge Medication List as of 2017  5:45 PM      START taking these medications    Details   furosemide (LASIX) 20 MG tablet Take 1 tablet by mouth daily, Disp-30 tablet, R-3Print         CONTINUE these medications which have CHANGED    Details   aspirin 81 MG chewable tablet Take 1 tablet by mouth daily, Disp-30 tablet, R-3Print      ipratropium-albuterol (DUONEB) 0.5-2.5 (3) MG/3ML SOLN nebulizer solution Inhale 3 mLs into the lungs every 4 hours as needed for Shortness of Breath, Disp-360 mL, R-2Print      nitroGLYCERIN (NITROSTAT) 0.4 MG SL tablet Place 1 tablet under the tongue every 5 minutes as needed for Chest pain up to max of 3 total doses. If no relief after 1 dose, call 911., Disp-25 tablet, R-3Print      OXcarbazepine (TRILEPTAL) 300 MG tablet Take 1 tablet by mouth 2 times daily Per pharmacy she is to be taking 300 mg twice daily but patient states that she only takes once daily. , Disp-60 tablet, R-3Print      insulin glargine (LANTUS) 100 UNIT/ML injection vial Inject 30 Units into the skin nightly, Disp-1 vial, R-3Print      meclizine (ANTIVERT) 12.5 MG tablet Take 1 tablet by mouth 3 times daily as needed for Dizziness, Disp-30 tablet, R-3Print      atorvastatin (LIPITOR) 80 MG tablet Take 1 tablet by mouth nightly, Disp-30 tablet, R-3Print      lisinopril (PRINIVIL;ZESTRIL) 5 MG tablet Take 1 tablet by mouth daily, Disp-30 tablet, R-3Print      OLANZapine (ZYPREXA) 10 MG tablet Take 1 tablet by mouth nightly, Disp-30 tablet, R-3Print      metoprolol tartrate (LOPRESSOR) 50 MG tablet Take 1 tablet by mouth 2 times daily, Disp-60 tablet, R-3Print      colchicine (COLCRYS) 0.6 MG tablet Take 1 tablet by mouth daily, Disp-30 tablet, R-3Print      clopidogrel (PLAVIX) 75 MG tablet Take 1 tablet by mouth daily, Disp-30 tablet, R-3Print      vancomycin (VANCOCIN) infusion Infuse 750 mg intravenously every 12 hours Compound per protocol.  Aim for trough 14-17, Disp-74197 mg, R-0Print      famotidine (PEPCID) 20 MG tablet Take 1 tablet by mouth daily, Disp-60 tablet, R-3Print      senna-docusate (PERICOLACE) 8.6-50 MG per tablet Take 1 tablet by mouth daily, Disp-30 tablet, R-1Print         STOP taking these medications       insulin lispro (HUMALOG) 100 UNIT/ML injection vial Comments:   Reason for Stopping:         insulin lispro (HUMALOG) 100 UNIT/ML injection vial

## 2017-12-19 NOTE — PROCEDURES
History/labs/allergies reviewed  Placed by Surya KELLOGG  Consent signed and obtained by physician  Time out performed using two identifiers  Catheter type double  lumen picc  Product type solo2 w 3cg  Lot # [id number]  Expiration date 10/31/18  Catheter size 5  English  Trimmed at 41  Total length 42  External catheter length 3 cm  Location Rbv   Number of attempts 1  Estimated blood loss 2 ml  Pre procedure cardiac Rhythm NS per bedside telemetry and/or 3CG Tracing. Placement verified by- CXR and/or 3cg Max P wave noted by amplitude changes of the P wave, positive blood return, flushes easily  Special equipment used- ultrasound, micro-introducer technique and 3cg technology if indicated  Catheter secured with statlock  Dressing applied- Tegaderm CHG  Lidocaine administered intradermally conc. 1% 2 mL  PICC line education:   x ] Discussed with patient/Family or POA prior to signing Informed Procedural Consent. Risks and Benefits along with reason for procedure were discussed and teaching was reinforced with an education handout on PICC insertion. [facility] FAQ Catheter Associated Blood Stream Infections and [address] [employer] REV. 7/13 Nursing and [employer] Booklet left at bedside or in chart. Patient (Family or POA) acknowledged understanding of information taught and agreed to procedure. [  ] Was not discussed with patient/family or POA due to pts medical status at time of procedure. pts family or POA not available to discuss PICC education.  [facility] FAQ Catheter Associated Blood Stream Infections and [address] [employer] REV. 7/13 Nursing and [employer] Booklet left at bedside or in chart

## 2017-12-20 LAB
CULTURE: NORMAL
Lab: NORMAL
Lab: NORMAL
SPECIMEN DESCRIPTION: NORMAL
SPECIMEN DESCRIPTION: NORMAL
STATUS: NORMAL
STATUS: NORMAL

## 2017-12-22 ENCOUNTER — APPOINTMENT (OUTPATIENT)
Dept: GENERAL RADIOLOGY | Age: 62
DRG: 291 | End: 2017-12-22
Payer: MEDICARE

## 2017-12-22 ENCOUNTER — HOSPITAL ENCOUNTER (INPATIENT)
Age: 62
LOS: 4 days | Discharge: HOME HEALTH | DRG: 291 | End: 2017-12-26
Attending: EMERGENCY MEDICINE | Admitting: INTERNAL MEDICINE
Payer: MEDICARE
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
## 2017-12-22 DIAGNOSIS — I50.22 CHRONIC SYSTOLIC CONGESTIVE HEART FAILURE (HCC): ICD-10-CM

## 2017-12-22 DIAGNOSIS — E83.51 HYPOCALCEMIA: ICD-10-CM

## 2017-12-22 DIAGNOSIS — B95.8 ENDOCARDITIS DUE TO STAPHYLOCOCCUS: ICD-10-CM

## 2017-12-22 DIAGNOSIS — I33.0 ENDOCARDITIS DUE TO STAPHYLOCOCCUS: ICD-10-CM

## 2017-12-22 DIAGNOSIS — J18.9 HOSPITAL-ACQUIRED PNEUMONIA: Primary | ICD-10-CM

## 2017-12-22 DIAGNOSIS — D50.8 OTHER IRON DEFICIENCY ANEMIA: ICD-10-CM

## 2017-12-22 PROBLEM — I20.0 UNSTABLE ANGINA (HCC): Status: RESOLVED | Noted: 2017-11-22 | Resolved: 2017-12-22

## 2017-12-22 PROBLEM — J20.2 ACUTE BRONCHITIS DUE TO STREPTOCOCCUS: Status: ACTIVE | Noted: 2017-12-22

## 2017-12-22 PROBLEM — T82.7XXA AICD LEAD INFECTION (HCC): Status: RESOLVED | Noted: 2017-12-13 | Resolved: 2017-12-22

## 2017-12-22 PROBLEM — A41.01 SEPSIS DUE TO METHICILLIN SUSCEPTIBLE STAPHYLOCOCCUS AUREUS (HCC): Status: RESOLVED | Noted: 2017-12-09 | Resolved: 2017-12-22

## 2017-12-22 PROBLEM — K57.92 ACUTE DIVERTICULITIS: Status: RESOLVED | Noted: 2017-10-23 | Resolved: 2017-12-22

## 2017-12-22 PROBLEM — I50.43 ACUTE ON CHRONIC COMBINED SYSTOLIC AND DIASTOLIC CONGESTIVE HEART FAILURE (HCC): Status: RESOLVED | Noted: 2017-12-09 | Resolved: 2017-12-22

## 2017-12-22 PROBLEM — R06.02 SHORTNESS OF BREATH: Status: RESOLVED | Noted: 2017-12-17 | Resolved: 2017-12-22

## 2017-12-22 PROBLEM — E27.40 ADRENAL INSUFFICIENCY (HCC): Status: RESOLVED | Noted: 2017-12-09 | Resolved: 2017-12-22

## 2017-12-22 PROBLEM — J96.10 CHRONIC RESPIRATORY FAILURE (HCC): Status: RESOLVED | Noted: 2017-12-07 | Resolved: 2017-12-22

## 2017-12-22 PROBLEM — N30.00 ACUTE CYSTITIS WITHOUT HEMATURIA: Status: RESOLVED | Noted: 2017-12-05 | Resolved: 2017-12-22

## 2017-12-22 PROBLEM — I95.1 ORTHOSTATIC HYPOTENSION: Status: RESOLVED | Noted: 2017-12-03 | Resolved: 2017-12-22

## 2017-12-22 PROBLEM — R42 VERTIGO: Status: RESOLVED | Noted: 2017-12-03 | Resolved: 2017-12-22

## 2017-12-22 LAB
ABSOLUTE EOS #: 0.4 K/UL (ref 0–0.44)
ABSOLUTE IMMATURE GRANULOCYTE: 0.07 K/UL (ref 0–0.3)
ABSOLUTE LYMPH #: 2.53 K/UL (ref 1.1–3.7)
ABSOLUTE MONO #: 0.98 K/UL (ref 0.1–1.2)
ANION GAP SERPL CALCULATED.3IONS-SCNC: 13 MMOL/L (ref 9–17)
BASOPHILS # BLD: 1 % (ref 0–2)
BASOPHILS ABSOLUTE: 0.07 K/UL (ref 0–0.2)
BNP INTERPRETATION: ABNORMAL
BUN BLDV-MCNC: 12 MG/DL (ref 8–23)
BUN/CREAT BLD: ABNORMAL (ref 9–20)
C-REACTIVE PROTEIN: 24.7 MG/L (ref 0–5)
CALCIUM SERPL-MCNC: 8 MG/DL (ref 8.6–10.4)
CHLORIDE BLD-SCNC: 102 MMOL/L (ref 98–107)
CO2: 23 MMOL/L (ref 20–31)
CREAT SERPL-MCNC: 0.91 MG/DL (ref 0.5–0.9)
DIFFERENTIAL TYPE: ABNORMAL
EKG ATRIAL RATE: 107 BPM
EKG P AXIS: 20 DEGREES
EKG P-R INTERVAL: 146 MS
EKG Q-T INTERVAL: 312 MS
EKG QRS DURATION: 74 MS
EKG QTC CALCULATION (BAZETT): 416 MS
EKG R AXIS: -15 DEGREES
EKG T AXIS: 100 DEGREES
EKG VENTRICULAR RATE: 107 BPM
EOSINOPHILS RELATIVE PERCENT: 3 % (ref 1–4)
GFR AFRICAN AMERICAN: >60 ML/MIN
GFR NON-AFRICAN AMERICAN: >60 ML/MIN
GFR SERPL CREATININE-BSD FRML MDRD: ABNORMAL ML/MIN/{1.73_M2}
GFR SERPL CREATININE-BSD FRML MDRD: ABNORMAL ML/MIN/{1.73_M2}
GLUCOSE BLD-MCNC: 121 MG/DL (ref 70–99)
GLUCOSE BLD-MCNC: 132 MG/DL (ref 65–105)
GLUCOSE BLD-MCNC: 157 MG/DL (ref 65–105)
GLUCOSE BLD-MCNC: 188 MG/DL (ref 65–105)
HCT VFR BLD CALC: 25.4 % (ref 36.3–47.1)
HEMOGLOBIN: 7.4 G/DL (ref 11.9–15.1)
IMMATURE GRANULOCYTES: 1 %
LACTIC ACID, WHOLE BLOOD: 0.7 MMOL/L (ref 0.7–2.1)
LACTIC ACID, WHOLE BLOOD: 1.8 MMOL/L (ref 0.7–2.1)
LACTIC ACID: NORMAL MMOL/L
LACTIC ACID: NORMAL MMOL/L
LYMPHOCYTES # BLD: 19 % (ref 24–43)
MCH RBC QN AUTO: 25.3 PG (ref 25.2–33.5)
MCHC RBC AUTO-ENTMCNC: 29.1 G/DL (ref 28.4–34.8)
MCV RBC AUTO: 87 FL (ref 82.6–102.9)
MONOCYTES # BLD: 7 % (ref 3–12)
PDW BLD-RTO: 19.9 % (ref 11.8–14.4)
PLATELET # BLD: 363 K/UL (ref 138–453)
PLATELET ESTIMATE: ABNORMAL
PMV BLD AUTO: 9.6 FL (ref 8.1–13.5)
POC TROPONIN I: 0.04 NG/ML (ref 0–0.1)
POC TROPONIN I: 0.05 NG/ML (ref 0–0.1)
POC TROPONIN INTERP: NORMAL
POC TROPONIN INTERP: NORMAL
POTASSIUM SERPL-SCNC: 4.6 MMOL/L (ref 3.7–5.3)
PRO-BNP: ABNORMAL PG/ML
RBC # BLD: 2.92 M/UL (ref 3.95–5.11)
RBC # BLD: ABNORMAL 10*6/UL
SEG NEUTROPHILS: 70 % (ref 36–65)
SEGMENTED NEUTROPHILS ABSOLUTE COUNT: 9.25 K/UL (ref 1.5–8.1)
SODIUM BLD-SCNC: 138 MMOL/L (ref 135–144)
VANCOMYCIN TROUGH DATE LAST DOSE: NORMAL
VANCOMYCIN TROUGH DOSE AMOUNT: NORMAL
VANCOMYCIN TROUGH TIME LAST DOSE: NORMAL
VANCOMYCIN TROUGH: 19.8 UG/ML (ref 10–20)
WBC # BLD: 13.3 K/UL (ref 3.5–11.3)
WBC # BLD: ABNORMAL 10*3/UL

## 2017-12-22 PROCEDURE — 87040 BLOOD CULTURE FOR BACTERIA: CPT

## 2017-12-22 PROCEDURE — 6360000002 HC RX W HCPCS: Performed by: STUDENT IN AN ORGANIZED HEALTH CARE EDUCATION/TRAINING PROGRAM

## 2017-12-22 PROCEDURE — 6370000000 HC RX 637 (ALT 250 FOR IP): Performed by: STUDENT IN AN ORGANIZED HEALTH CARE EDUCATION/TRAINING PROGRAM

## 2017-12-22 PROCEDURE — 94640 AIRWAY INHALATION TREATMENT: CPT

## 2017-12-22 PROCEDURE — 99285 EMERGENCY DEPT VISIT HI MDM: CPT

## 2017-12-22 PROCEDURE — 83880 ASSAY OF NATRIURETIC PEPTIDE: CPT

## 2017-12-22 PROCEDURE — 86140 C-REACTIVE PROTEIN: CPT

## 2017-12-22 PROCEDURE — 1200000000 HC SEMI PRIVATE

## 2017-12-22 PROCEDURE — 84484 ASSAY OF TROPONIN QUANT: CPT

## 2017-12-22 PROCEDURE — 94664 DEMO&/EVAL PT USE INHALER: CPT

## 2017-12-22 PROCEDURE — 93005 ELECTROCARDIOGRAM TRACING: CPT

## 2017-12-22 PROCEDURE — 80048 BASIC METABOLIC PNL TOTAL CA: CPT

## 2017-12-22 PROCEDURE — 36415 COLL VENOUS BLD VENIPUNCTURE: CPT

## 2017-12-22 PROCEDURE — 83605 ASSAY OF LACTIC ACID: CPT

## 2017-12-22 PROCEDURE — 2580000003 HC RX 258: Performed by: STUDENT IN AN ORGANIZED HEALTH CARE EDUCATION/TRAINING PROGRAM

## 2017-12-22 PROCEDURE — 99223 1ST HOSP IP/OBS HIGH 75: CPT | Performed by: INTERNAL MEDICINE

## 2017-12-22 PROCEDURE — 6360000002 HC RX W HCPCS: Performed by: EMERGENCY MEDICINE

## 2017-12-22 PROCEDURE — 2500000003 HC RX 250 WO HCPCS: Performed by: EMERGENCY MEDICINE

## 2017-12-22 PROCEDURE — 80202 ASSAY OF VANCOMYCIN: CPT

## 2017-12-22 PROCEDURE — 82947 ASSAY GLUCOSE BLOOD QUANT: CPT

## 2017-12-22 PROCEDURE — 2580000003 HC RX 258: Performed by: EMERGENCY MEDICINE

## 2017-12-22 PROCEDURE — 71020 XR CHEST STANDARD TWO VW: CPT

## 2017-12-22 PROCEDURE — 85025 COMPLETE CBC W/AUTO DIFF WBC: CPT

## 2017-12-22 PROCEDURE — 96374 THER/PROPH/DIAG INJ IV PUSH: CPT

## 2017-12-22 RX ORDER — CLOPIDOGREL BISULFATE 75 MG/1
75 TABLET ORAL DAILY
Status: DISCONTINUED | OUTPATIENT
Start: 2017-12-22 | End: 2017-12-26 | Stop reason: HOSPADM

## 2017-12-22 RX ORDER — AZITHROMYCIN 250 MG/1
250 TABLET, FILM COATED ORAL DAILY
Status: COMPLETED | OUTPATIENT
Start: 2017-12-23 | End: 2017-12-25

## 2017-12-22 RX ORDER — FUROSEMIDE 20 MG/1
20 TABLET ORAL DAILY
Status: DISCONTINUED | OUTPATIENT
Start: 2017-12-22 | End: 2017-12-22

## 2017-12-22 RX ORDER — DEXTROSE MONOHYDRATE 25 G/50ML
12.5 INJECTION, SOLUTION INTRAVENOUS PRN
Status: DISCONTINUED | OUTPATIENT
Start: 2017-12-22 | End: 2017-12-26 | Stop reason: HOSPADM

## 2017-12-22 RX ORDER — SODIUM CHLORIDE 0.9 % (FLUSH) 0.9 %
10 SYRINGE (ML) INJECTION PRN
Status: DISCONTINUED | OUTPATIENT
Start: 2017-12-22 | End: 2017-12-26 | Stop reason: HOSPADM

## 2017-12-22 RX ORDER — SENNA AND DOCUSATE SODIUM 50; 8.6 MG/1; MG/1
1 TABLET, FILM COATED ORAL DAILY
Status: DISCONTINUED | OUTPATIENT
Start: 2017-12-22 | End: 2017-12-26 | Stop reason: HOSPADM

## 2017-12-22 RX ORDER — METOPROLOL TARTRATE 50 MG/1
50 TABLET, FILM COATED ORAL 2 TIMES DAILY
Status: DISCONTINUED | OUTPATIENT
Start: 2017-12-22 | End: 2017-12-26 | Stop reason: HOSPADM

## 2017-12-22 RX ORDER — COLCHICINE 0.6 MG/1
0.6 TABLET ORAL DAILY
Status: DISCONTINUED | OUTPATIENT
Start: 2017-12-22 | End: 2017-12-26 | Stop reason: HOSPADM

## 2017-12-22 RX ORDER — ONDANSETRON 2 MG/ML
4 INJECTION INTRAMUSCULAR; INTRAVENOUS EVERY 6 HOURS PRN
Status: DISCONTINUED | OUTPATIENT
Start: 2017-12-22 | End: 2017-12-26 | Stop reason: HOSPADM

## 2017-12-22 RX ORDER — NITROGLYCERIN 0.4 MG/1
0.4 TABLET SUBLINGUAL EVERY 5 MIN PRN
Status: DISCONTINUED | OUTPATIENT
Start: 2017-12-22 | End: 2017-12-26 | Stop reason: HOSPADM

## 2017-12-22 RX ORDER — DEXTROSE MONOHYDRATE 50 MG/ML
100 INJECTION, SOLUTION INTRAVENOUS PRN
Status: DISCONTINUED | OUTPATIENT
Start: 2017-12-22 | End: 2017-12-26 | Stop reason: HOSPADM

## 2017-12-22 RX ORDER — FUROSEMIDE 10 MG/ML
40 INJECTION INTRAMUSCULAR; INTRAVENOUS 2 TIMES DAILY
Status: DISCONTINUED | OUTPATIENT
Start: 2017-12-22 | End: 2017-12-26 | Stop reason: HOSPADM

## 2017-12-22 RX ORDER — MECLIZINE HCL 12.5 MG/1
12.5 TABLET ORAL 3 TIMES DAILY PRN
Status: DISCONTINUED | OUTPATIENT
Start: 2017-12-22 | End: 2017-12-26 | Stop reason: HOSPADM

## 2017-12-22 RX ORDER — SODIUM CHLORIDE 0.9 % (FLUSH) 0.9 %
10 SYRINGE (ML) INJECTION EVERY 12 HOURS SCHEDULED
Status: DISCONTINUED | OUTPATIENT
Start: 2017-12-22 | End: 2017-12-26 | Stop reason: HOSPADM

## 2017-12-22 RX ORDER — OXCARBAZEPINE 300 MG/1
300 TABLET, FILM COATED ORAL 2 TIMES DAILY
Status: DISCONTINUED | OUTPATIENT
Start: 2017-12-22 | End: 2017-12-26 | Stop reason: HOSPADM

## 2017-12-22 RX ORDER — INSULIN GLARGINE 100 [IU]/ML
30 INJECTION, SOLUTION SUBCUTANEOUS NIGHTLY
Status: DISCONTINUED | OUTPATIENT
Start: 2017-12-22 | End: 2017-12-26 | Stop reason: HOSPADM

## 2017-12-22 RX ORDER — LISINOPRIL 5 MG/1
5 TABLET ORAL DAILY
Status: DISCONTINUED | OUTPATIENT
Start: 2017-12-22 | End: 2017-12-26 | Stop reason: HOSPADM

## 2017-12-22 RX ORDER — LEVOFLOXACIN 5 MG/ML
750 INJECTION, SOLUTION INTRAVENOUS EVERY 24 HOURS
Status: DISCONTINUED | OUTPATIENT
Start: 2017-12-22 | End: 2017-12-22

## 2017-12-22 RX ORDER — FAMOTIDINE 20 MG/1
20 TABLET, FILM COATED ORAL DAILY
Status: DISCONTINUED | OUTPATIENT
Start: 2017-12-22 | End: 2017-12-26 | Stop reason: HOSPADM

## 2017-12-22 RX ORDER — IPRATROPIUM BROMIDE AND ALBUTEROL SULFATE 2.5; .5 MG/3ML; MG/3ML
1 SOLUTION RESPIRATORY (INHALATION) EVERY 4 HOURS PRN
Status: DISCONTINUED | OUTPATIENT
Start: 2017-12-22 | End: 2017-12-26 | Stop reason: HOSPADM

## 2017-12-22 RX ORDER — ACETAMINOPHEN 325 MG/1
650 TABLET ORAL EVERY 4 HOURS PRN
Status: DISCONTINUED | OUTPATIENT
Start: 2017-12-22 | End: 2017-12-26 | Stop reason: HOSPADM

## 2017-12-22 RX ORDER — NICOTINE POLACRILEX 4 MG
15 LOZENGE BUCCAL PRN
Status: DISCONTINUED | OUTPATIENT
Start: 2017-12-22 | End: 2017-12-26 | Stop reason: HOSPADM

## 2017-12-22 RX ORDER — OLANZAPINE 10 MG/1
10 TABLET ORAL NIGHTLY
Status: DISCONTINUED | OUTPATIENT
Start: 2017-12-22 | End: 2017-12-26 | Stop reason: HOSPADM

## 2017-12-22 RX ORDER — ASPIRIN 81 MG/1
81 TABLET, CHEWABLE ORAL DAILY
Status: DISCONTINUED | OUTPATIENT
Start: 2017-12-22 | End: 2017-12-26 | Stop reason: HOSPADM

## 2017-12-22 RX ORDER — ATORVASTATIN CALCIUM 80 MG/1
80 TABLET, FILM COATED ORAL NIGHTLY
Status: DISCONTINUED | OUTPATIENT
Start: 2017-12-22 | End: 2017-12-26 | Stop reason: HOSPADM

## 2017-12-22 RX ORDER — FUROSEMIDE 10 MG/ML
20 INJECTION INTRAMUSCULAR; INTRAVENOUS DAILY
Status: DISCONTINUED | OUTPATIENT
Start: 2017-12-22 | End: 2017-12-22

## 2017-12-22 RX ADMIN — ENOXAPARIN SODIUM 40 MG: 40 INJECTION SUBCUTANEOUS at 12:48

## 2017-12-22 RX ADMIN — DOCUSATE SODIUM -SENNOSIDES 1 TABLET: 50; 8.6 TABLET, COATED ORAL at 12:42

## 2017-12-22 RX ADMIN — ATORVASTATIN CALCIUM 80 MG: 80 TABLET, FILM COATED ORAL at 22:27

## 2017-12-22 RX ADMIN — FUROSEMIDE 20 MG: 10 INJECTION, SOLUTION INTRAVENOUS at 12:39

## 2017-12-22 RX ADMIN — INSULIN GLARGINE 30 UNITS: 100 INJECTION, SOLUTION SUBCUTANEOUS at 22:27

## 2017-12-22 RX ADMIN — Medication 10 ML: at 13:50

## 2017-12-22 RX ADMIN — FAMOTIDINE 20 MG: 20 TABLET, FILM COATED ORAL at 12:39

## 2017-12-22 RX ADMIN — METOPROLOL TARTRATE 50 MG: 50 TABLET, FILM COATED ORAL at 22:27

## 2017-12-22 RX ADMIN — ASPIRIN 81 MG: 81 TABLET, CHEWABLE ORAL at 12:39

## 2017-12-22 RX ADMIN — Medication 10 ML: at 22:31

## 2017-12-22 RX ADMIN — CEFEPIME HYDROCHLORIDE 2 G: 2 INJECTION, POWDER, FOR SOLUTION INTRAVENOUS at 10:06

## 2017-12-22 RX ADMIN — OLANZAPINE 10 MG: 10 TABLET, FILM COATED ORAL at 22:27

## 2017-12-22 RX ADMIN — FUROSEMIDE 40 MG: 10 INJECTION, SOLUTION INTRAMUSCULAR; INTRAVENOUS at 18:11

## 2017-12-22 RX ADMIN — COLCHICINE 0.6 MG: 0.6 TABLET, FILM COATED ORAL at 12:39

## 2017-12-22 RX ADMIN — OXCARBAZEPINE 300 MG: 300 TABLET, FILM COATED ORAL at 22:27

## 2017-12-22 RX ADMIN — LISINOPRIL 5 MG: 5 TABLET ORAL at 12:39

## 2017-12-22 RX ADMIN — METOPROLOL TARTRATE 50 MG: 50 TABLET, FILM COATED ORAL at 12:41

## 2017-12-22 RX ADMIN — AZITHROMYCIN MONOHYDRATE 500 MG: 500 INJECTION, POWDER, LYOPHILIZED, FOR SOLUTION INTRAVENOUS at 10:06

## 2017-12-22 RX ADMIN — CLOPIDOGREL 75 MG: 75 TABLET, FILM COATED ORAL at 12:39

## 2017-12-22 RX ADMIN — IPRATROPIUM BROMIDE AND ALBUTEROL SULFATE 3 ML: .5; 3 SOLUTION RESPIRATORY (INHALATION) at 21:07

## 2017-12-22 RX ADMIN — IPRATROPIUM BROMIDE AND ALBUTEROL SULFATE 3 ML: .5; 3 SOLUTION RESPIRATORY (INHALATION) at 13:57

## 2017-12-22 RX ADMIN — OXCARBAZEPINE 300 MG: 300 TABLET, FILM COATED ORAL at 12:41

## 2017-12-22 RX ADMIN — INSULIN LISPRO 3 UNITS: 100 INJECTION, SOLUTION INTRAVENOUS; SUBCUTANEOUS at 12:48

## 2017-12-22 ASSESSMENT — PAIN SCALES - GENERAL
PAINLEVEL_OUTOF10: 0
PAINLEVEL_OUTOF10: 0

## 2017-12-22 ASSESSMENT — ENCOUNTER SYMPTOMS
COLOR CHANGE: 0
BLURRED VISION: 0
VOMITING: 0
HEARTBURN: 0
COUGH: 1
ABDOMINAL PAIN: 0
SHORTNESS OF BREATH: 1
HEMOPTYSIS: 0
SPUTUM PRODUCTION: 0
NAUSEA: 0
COUGH: 0
WHEEZING: 0
DOUBLE VISION: 0
RHINORRHEA: 0
ORTHOPNEA: 0

## 2017-12-22 NOTE — ED PROVIDER NOTES
[facility] [facility]     Emergency Department     Faculty Attestation    I performed a history and physical examination of the patient and discussed management with the resident. I reviewed the residents note and agree with the documented findings and plan of care. Any areas of disagreement are noted on the chart. I was personally present for the key portions of any procedures. I have documented in the chart those procedures where I was not present during the key portions. I have reviewed the emergency nurses triage note. I agree with the chief complaint, past medical history, past surgical history, allergies, medications, social and family history as documented unless otherwise noted below. For Physician Assistant/ Nurse Practitioner cases/documentation I have personally evaluated this patient and have completed at least one if not all key elements of the E/M (history, physical exam, and MDM). Additional findings are as noted. Shortness of breath, history of CHF. Denies chest pain. No fevers no vomiting. Recent admission for non-STEMI. On exam well-appearing watching TV. Lungs diminished in the bases no respiratory distress. Heart normal.  Equal pulses. We'll check labs EKGchest x-ray, then reevaluate need for admission    EKG interpretation: Sinus rhythm 107. Normal intervals. Minimal left axis deviation. No acute ST or T changes. similar to 12/18/17 no changes from prior.   No acute findings normal EKG for patient    Critical Care     none    Zahra Jacob MD, Rajeev Bynum  Attending Emergency  Physician             Zahra Jacob MD  12/22/17 1960

## 2017-12-22 NOTE — PROGRESS NOTES
Pharmacy Note  Vancomycin Consult    Edenilson Garcia is a 65 y.o. female started on Vancomycin for empiric therapy; consult received from Dr. Regi Quinn to manage therapy. Also receiving the following antibiotics: Cefepime and Azithromycin.     Patient Active Problem List   Diagnosis    Cerebral infarction [facility])    Essential hypertension    Diabetes mellitus type 2, controlled ([facility]    Candidal intertrigo    Diverticulitis of colon    Urinary tract infection without hematuria    Controlled type 2 diabetes mellitus without complication, without long-term current use of insulin ([address]    Noncompliance with diabetes treatment    History of CVA (cerebrovascular accident)    Left-sided weakness    Postural dizziness with presyncope    Acute hyperglycemia    Near syncope    Noncompliance    Encounter for counseling    Hepatitis C    ST elevation myocardial infarction involving left anterior descending (LAD) coronary artery ([facility]    DHARMESH (acute kidney injury) ([facility]    Leukocytosis    Acute idiopathic gout of left knee    Late effects of CVA (cerebrovascular accident)    Inability to perform activities of daily living    S/P implantation of automatic cardioverter/defibrillator (AICD) 3/13/1- Dr. 

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
## 2017-12-22 NOTE — CARE COORDINATION
Case Management Initial Discharge Plan  Cindy Liang,         Readmission Risk              Readmission Risk:        16.75       Age 72 or Greater:  0    Admitted from SNF or Requires Paid or Family Care:  0    Currently has CHF,COPD,ARF,CRI,or is on dialysis:  4    Takes more than 5 Prescription Medications:  4    Takes Digoxin,Insulin,Anticoagulants,Narcotics or ASA/Plavix:  [address] in Past 12 Months:  0    On Disability:  3    Patient Considers own Health:  3.75            Met with:patient to discuss discharge plans. Information verified: address, contacts, phone number, , insurance Yes  PCP: David Hinson MD  Date of last visit: 1 month    Insurance Provider: Cheyanne Rendon    Discharge Planning  Current Residence:  Private Residence  Living Arrangements:  Alone   Home has 1 stories/ none stairs to climb  Support Systems:  Home Care Staff  Current Services PTA:  Durable Medical Equipment, Oxygen Therapy, Home Care Supplier: health care [facility]  Patient able to perform ADL's:Independent  DME used to aid ambulation prior to admission: cane /during admission cane    Potential Assistance Needed:       Pharmacy: [facility]   Potential Assistance Purchasing Medications:  No  Does patient want to participate in local refill/ meds to beds program?  No    Patient agreeable to home care: Yes  Freedom of choice provided:  No, pt established with  [facility]      Type of Home Care Services:  Aide Services, PT, OT, Nursing Services  Patient expects to be discharged to:  home with home care    Prior SNF/Rehab Placement and Facility: no  Agreeable to SNF/Rehab: No  [place] of choice provided: n/a   Evaluation: n/a    Expected Discharge date:      Follow Up Appointment: Best Day/ Time:      Transportation provider: pt will need ambulance home  Transportation arrangements needed for discharge: Yes    Discharge Plan: [facility] with [facility]        Electronically signed by Onel Zhong RN on

## 2017-12-22 NOTE — ED NOTES
Marital status: Single     Spouse name: N/A    Number of children: N/A    Years of education: N/A     Occupational History    Not on file. Social History Main Topics    Smoking status: Current Every Day Smoker     Packs/day: 1.00     Types: Cigarettes    Smokeless tobacco: Never Used    Alcohol use Yes      Comment: 1 bottle per month wine    Drug use: No    Sexual activity: Yes     Partners: Male     Other Topics Concern    Not on file     Social History Narrative    No narrative on file       Family History   Problem Relation Age of Onset    Family history unknown: Yes       Allergies:  Levofloxacin and Pcn [penicillins]    Home Medications:  Prior to Admission medications    Medication Sig Start Date End Date Taking? Authorizing Provider   aspirin 81 MG chewable tablet Take 1 tablet by mouth daily 12/18/17  Yes Lamberto Pruett MD   ipratropium-albuterol (DUONEB) 0.5-2.5 (3) MG/3ML SOLN nebulizer solution Inhale 3 mLs into the lungs every 4 hours as needed for Shortness of Breath 12/18/17  Yes Jose Be MD   nitroGLYCERIN (NITROSTAT) 0.4 MG SL tablet Place 1 tablet under the tongue every 5 minutes as needed for Chest pain up to max of 3 total doses. If no relief after 1 dose, call 911. 12/18/17  Yes Jose Be MD   OXcarbazepine (TRILEPTAL) 300 MG tablet Take 1 tablet by mouth 2 times daily Per pharmacy she is to be taking 300 mg twice daily but patient states that she only takes once daily.  12/18/17  Yes Jose Be MD   insulin glargine (LANTUS) 100 UNIT/ML injection vial Inject 30 Units into the skin nightly 12/18/17  Yes Jose Be MD   meclizine (ANTIVERT) 12.5 MG tablet Take 1 tablet by mouth 3 times daily as needed for Dizziness 12/18/17 12/28/17 Yes Jose Be MD   atorvastatin (LIPITOR) 80 MG tablet Take 1 tablet by mouth nightly 12/18/17  Yes Jose Be MD   lisinopril (PRINIVIL;ZESTRIL) 5 MG tablet Take 1 tablet by mouth daily 12/18/17 LABS:  Results for orders placed or performed during the hospital encounter of 12/22/17   CBC WITH AUTO DIFFERENTIAL   Result Value Ref Range    WBC 13.3 (H) 3.5 - 11.3 k/uL    RBC 2.92 (L) 3.95 - 5.11 m/uL    Hemoglobin 7.4 (L) 11.9 - 15.1 g/dL    Hematocrit 25.4 (L) 36.3 - 47.1 %    MCV 87.0 82.6 - 102.9 fL    MCH 25.3 25.2 - 33.5 pg    MCHC 29.1 28.4 - 34.8 g/dL    RDW 19.9 (H) 11.8 - 14.4 %    Platelets 542 666 - 084 k/uL    MPV 9.6 8.1 - 13.5 fL    Differential Type NOT REPORTED     WBC Morphology NOT REPORTED     RBC Morphology ANISOCYTOSIS PRESENT     Platelet Estimate NOT REPORTED     Seg Neutrophils 70 (H) 36 - 65 %    Lymphocytes 19 (L) 24 - 43 %    Monocytes 7 3 - 12 %    Eosinophils % 3 1 - 4 %    Basophils 1 0 - 2 %    Immature Granulocytes 1 (H) 0 %    Segs Absolute 9.25 (H) 1.50 - 8.10 k/uL    Absolute Lymph # 2.53 1.10 - 3.70 k/uL    Absolute Mono # 0.98 0.10 - 1.20 k/uL    Absolute Eos # 0.40 0.00 - 0.44 k/uL    Basophils # 0.07 0.00 - 0.20 k/uL    Absolute Immature Granulocyte 0.07 0.00 - 0.30 k/uL   BASIC METABOLIC PANEL   Result Value Ref Range    Glucose 121 (H) 70 - 99 mg/dL    BUN 12 8 - 23 mg/dL    CREATININE 0.91 (H) 0.50 - 0.90 mg/dL    Bun/Cre Ratio NOT REPORTED 9 - 20    Calcium 8.0 (L) 8.6 - 10.4 mg/dL    Sodium 138 135 - 144 mmol/L    Potassium 4.6 3.7 - 5.3 mmol/L    Chloride 102 98 - 107 mmol/L    CO2 23 20 - 31 mmol/L    Anion Gap 13 9 - 17 mmol/L    GFR Non-African American >60 >60 mL/min    GFR African American >60 >60 mL/min    GFR Comment          GFR Staging NOT REPORTED    Brain Natriuretic Peptide   Result Value Ref Range    Pro-BNP 14,074 (H) <300 pg/mL    BNP Interpretation         POCT troponin   Result Value Ref Range    POC Troponin I 0.05 0.00 - 0.10 ng/mL    POC Troponin Interp       The Troponin-I (POC) results cannot be compared to the Troponin-T results.        RADIOLOGY:  Xr Chest Standard (2 Vw)    Result Date: 12/22/2017  EXAMINATION: TWO VIEWS OF THE CHEST 12/22/2017 7:42 am COMPARISON: December 17, 2017 HISTORY: ORDERING SYSTEM PROVIDED HISTORY: SOB, RLL crackles, h/o CHF TECHNOLOGIST PROVIDED HISTORY: Reason for exam:->SOB, RLL crackles, h/o CHF Acute/subsequent encounter FINDINGS: Overlying monitoring devices limit evaluation. Skin staples again overlie the left chest. Right PICC is unchanged. Increased right pleural effusion and right lung opacities. Layering left pleural effusion is suspected. Prior sternotomy. Cardiomegaly. Mild pulmonary edema. Increased right lung opacities, possibly pneumonia. Increased left pleural effusion. EKG  EKG Interpretation    Interpreted by emergency department physician    Rhythm: normal sinus   Rate: tachycardia  Axis: LAD  Ectopy: none  Conduction: normal  ST Segments: no acute change  T Waves: non specific changes  Q Waves: multiple     Clinical Impression: Nonspecific EKG, No significant changes when compared to EKG from 12/17/2017    Jessika Mariee      All EKG's are interpreted by the Emergency Department Physician who either signs or Co-signs this chart in the absence of a cardiologist.    EMERGENCY DEPARTMENT COURSE:  Patient presents emergency department for evaluation of shortness of breath. On initial evaluation, patient had poor respiratory effort bilaterally with inspiratory crackles noted in the right lower lobe. She was satting well on room air. She is placed on 2 L nasal cannula for comfort. She was mildly tachycardic. We'll obtain cardiac workup including CBC, BMP, BNP, troponins ×2, and EKG, and reassess. BMP was relatively unchanged from her previous. Patient was noted to have a mild leukocytosis of 13,000 with associated chest x-ray findings of increased right lung opacity suggestive of pneumonia. Given the patient was recently admitted to the hospital multiple different times, will treat as hospital-acquired pneumonia and give vancomycin, cefepime, and azithromycin.   Patient was noted to have
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
## 2017-12-22 NOTE — H&P
[address] [place] [address]     Department of Internal Medicine - Staff Internal Medicine Service          ADMISSION NOTE/HISTORY AND PHYSICAL EXAMINATION       CHIEF COMPLAINT:  SOB     HISTORY OBTAIN FROM:  Patient     HISTORY OF PRESENT ILLNESS:      The patient is a pleasant 65 y.o. female with significant past medical history of coronary artery disease, congestive heart failure, infected AICD lead which has been recently removed presented with one-day history of shortness of breath. Patient was recently in the hospital for the above complaints . patient was recently in the hospital for congestive heart failure and was treated with diuretics and discharged on same. Patient has been coughing for 1 day and has been having chills. She is having dry cough. A chest x-ray was done in the ER which showed worsening right lower lobe infiltrates and has also been having right lower zone crepitations on examination. Patient has been started on cefepime, azithromycin. Patient has already been on vancomycin at home and this is continued. The same. She was actually supposed to stop vancomycin today for the AICD lead infection. Her PREVIOUS admission she was found to have sepsis with MSSA, infective endocarditis, vegetations an ICD lead. She was found to have elevated troponins and was thought to be possibly secondary to type II MI or NSTEMI. .  She also has history of ischemic stroke with residual hemiparesis.   Patient was planned for IV vancomycin for 4 weeks      PAST MEDICAL HISTORY:        Diagnosis Date    Arthritis     Asthma     CAD (coronary artery disease)     [facility]congestive heart failure) ([facility])     Clinical trial participant at discharge 03/13/2017    [employer]     COPD (chronic obstructive pulmonary disease) ([facility]     Diabetes mellitus ([facility]     Hepatitis C     Hyperlipidemia     Hypertension     MRSA (methicillin resistant staph aureus) culture positive 12/10/2017    blood adenopathy, thyroid symmetric, not enlarged and no tenderness, skin normal  LUNGS:  Patient has increased work of breathing. Currently is saturating above 95% on 2 L of oxygen. She has a right lower zone crepitations on examination and also has left lower lung crepitations on examination. However the right lower zone crepitations are worse and much more spread out than the left lower zone crepitations. CARDIOVASCULAR:  Normal apical impulse, regular rate and rhythm, normal S1 and S2, no S3 or S4, and no murmur noted  ABDOMEN:  No scars, normal bowel sounds, soft, non-distended, non-tender, no masses palpated, no hepatosplenomegally  NEUROLOGIC:  Awake, alert, oriented to name, place and time. Cranial nerves II-XII are grossly intact. Lab and Imaging Review   Recent Labs      12/22/17   0754   WBC  13.3*   HGB  7.4*   MCV  87.0   PLT  363   NA  138   K  4.6   CL  102   CO2  23   BUN  12   CREATININE  0.91*   GLUCOSE  121*   CALCIUM  8.0*     No results for input(s): INR, PROTIME in the last 72 hours. DATA:  Xr Chest Standard (2 Vw)  Result Date: 12/22/2017  Increased right lung opacities, possibly pneumonia. Increased left pleural effusion. ASSESSMENT/PLAN:      HCAP  #1 congestive heart failure, systolic    #2 symptomatic anemia  #3 MSSA bacteremia and lead endocarditis which has been removed. Patient is on vancomycin  #5 multivessel coronary artery disease  #6 diabetes mellitus  #7 bilateral vertebral basilar arteries severe stenosis  #8 old CVA with left-sided paresis       - cefepime, azithromycin and vancomycin. Patient has already received cefepime and azithromycin in ER and this will continue to same on the floor. Patient has already been on vancomycin at home for AICD lead infection.  -We will target the vancomycin trough between 14-17 as recommended previously by infectious diseases.      Lasix 20 mg IV daily  Oxygen inhalation to keep oxygen saturation about 90  Lovenox for DVT

## 2017-12-22 NOTE — PROGRESS NOTES
Pharmacy Vancomycin Consult     Vancomycin Day: 1 of inpatient dosing  Current Dosin mg IV q12h (from outpatient; current inpatient regimen is 1250 mg IV q24h)    Temp max:  afebrile    Recent Labs      17   0754   BUN  12       Recent Labs      17   0754   CREATININE  0.91*       Recent Labs      17   0754   WBC  13.3*       No intake or output data in the 24 hours ending 17 1239    Culture Date      Source                       Results  N/A    Ht Readings from Last 1 Encounters:   17 5' (1.524 m)        Wt Readings from Last 1 Encounters:   17 175 lb (79.4 kg)         Body mass index is 34.18 kg/m². Estimated Creatinine Clearance: 61 mL/min (based on SCr of 0.91 mg/dL). Trough: 19.8    Assessment/Plan:    Patient's last dose of outpatient Vancomycin 750 mg IV q12h was last evening at 2130 per RN who verified dose with the patient. Based on that level and the increase in SCr since last admission, will continue with currently ordered inpatient regimen of 1250 mg IV q24h. Timing of next trough will be based on clincal improvement and changes in renal function. Justin Arceo, Pharm. D.  2017 12:41 PM

## 2017-12-22 NOTE — PROGRESS NOTES
Called to pt room by RN to evaluate beeping from [address].  Adjusted strap and beeping stopped briefly. Then device beeped battery low. Contacted Mannie Laura ([employer] rep) at [phone] and he stated that they would deliver new device by tomorrow.

## 2017-12-23 LAB
ABSOLUTE EOS #: 0.48 K/UL (ref 0–0.44)
ABSOLUTE IMMATURE GRANULOCYTE: 0.03 K/UL (ref 0–0.3)
ABSOLUTE LYMPH #: 2.47 K/UL (ref 1.1–3.7)
ABSOLUTE MONO #: 0.74 K/UL (ref 0.1–1.2)
ANION GAP SERPL CALCULATED.3IONS-SCNC: 12 MMOL/L (ref 9–17)
BASOPHILS # BLD: 1 % (ref 0–2)
BASOPHILS ABSOLUTE: 0.05 K/UL (ref 0–0.2)
BUN BLDV-MCNC: 11 MG/DL (ref 8–23)
BUN/CREAT BLD: ABNORMAL (ref 9–20)
CALCIUM SERPL-MCNC: 7.9 MG/DL (ref 8.6–10.4)
CHLORIDE BLD-SCNC: 101 MMOL/L (ref 98–107)
CO2: 26 MMOL/L (ref 20–31)
CREAT SERPL-MCNC: 0.85 MG/DL (ref 0.5–0.9)
DIFFERENTIAL TYPE: ABNORMAL
EOSINOPHILS RELATIVE PERCENT: 6 % (ref 1–4)
GFR AFRICAN AMERICAN: >60 ML/MIN
GFR NON-AFRICAN AMERICAN: >60 ML/MIN
GFR SERPL CREATININE-BSD FRML MDRD: ABNORMAL ML/MIN/{1.73_M2}
GFR SERPL CREATININE-BSD FRML MDRD: ABNORMAL ML/MIN/{1.73_M2}
GLUCOSE BLD-MCNC: 123 MG/DL (ref 65–105)
GLUCOSE BLD-MCNC: 148 MG/DL (ref 65–105)
GLUCOSE BLD-MCNC: 59 MG/DL (ref 70–99)
GLUCOSE BLD-MCNC: 64 MG/DL (ref 65–105)
GLUCOSE BLD-MCNC: 99 MG/DL (ref 65–105)
HCT VFR BLD CALC: 24.3 % (ref 36.3–47.1)
HEMOGLOBIN: 7.1 G/DL (ref 11.9–15.1)
IMMATURE GRANULOCYTES: 0 %
LYMPHOCYTES # BLD: 29 % (ref 24–43)
MCH RBC QN AUTO: 24.5 PG (ref 25.2–33.5)
MCHC RBC AUTO-ENTMCNC: 28.8 G/DL (ref 28.4–34.8)
MCV RBC AUTO: 85 FL (ref 82.6–102.9)
MONOCYTES # BLD: 9 % (ref 3–12)
PDW BLD-RTO: 19.7 % (ref 11.8–14.4)
PLATELET # BLD: 327 K/UL (ref 138–453)
PLATELET ESTIMATE: ABNORMAL
PMV BLD AUTO: 9.8 FL (ref 8.1–13.5)
POTASSIUM SERPL-SCNC: 4.1 MMOL/L (ref 3.7–5.3)
RBC # BLD: 2.86 M/UL (ref 3.95–5.11)
RBC # BLD: ABNORMAL 10*6/UL
SEG NEUTROPHILS: 56 % (ref 36–65)
SEGMENTED NEUTROPHILS ABSOLUTE COUNT: 4.86 K/UL (ref 1.5–8.1)
SODIUM BLD-SCNC: 139 MMOL/L (ref 135–144)
WBC # BLD: 8.6 K/UL (ref 3.5–11.3)
WBC # BLD: ABNORMAL 10*3/UL

## 2017-12-23 PROCEDURE — 1200000000 HC SEMI PRIVATE

## 2017-12-23 PROCEDURE — 6370000000 HC RX 637 (ALT 250 FOR IP): Performed by: STUDENT IN AN ORGANIZED HEALTH CARE EDUCATION/TRAINING PROGRAM

## 2017-12-23 PROCEDURE — 36415 COLL VENOUS BLD VENIPUNCTURE: CPT

## 2017-12-23 PROCEDURE — 85025 COMPLETE CBC W/AUTO DIFF WBC: CPT

## 2017-12-23 PROCEDURE — 80048 BASIC METABOLIC PNL TOTAL CA: CPT

## 2017-12-23 PROCEDURE — 99233 SBSQ HOSP IP/OBS HIGH 50: CPT | Performed by: INTERNAL MEDICINE

## 2017-12-23 PROCEDURE — 6360000002 HC RX W HCPCS: Performed by: STUDENT IN AN ORGANIZED HEALTH CARE EDUCATION/TRAINING PROGRAM

## 2017-12-23 PROCEDURE — 82947 ASSAY GLUCOSE BLOOD QUANT: CPT

## 2017-12-23 PROCEDURE — 2580000003 HC RX 258: Performed by: STUDENT IN AN ORGANIZED HEALTH CARE EDUCATION/TRAINING PROGRAM

## 2017-12-23 PROCEDURE — 6370000000 HC RX 637 (ALT 250 FOR IP): Performed by: INTERNAL MEDICINE

## 2017-12-23 PROCEDURE — 97530 THERAPEUTIC ACTIVITIES: CPT

## 2017-12-23 PROCEDURE — 97162 PT EVAL MOD COMPLEX 30 MIN: CPT

## 2017-12-23 PROCEDURE — 94762 N-INVAS EAR/PLS OXIMTRY CONT: CPT

## 2017-12-23 PROCEDURE — G8979 MOBILITY GOAL STATUS: HCPCS

## 2017-12-23 PROCEDURE — G8978 MOBILITY CURRENT STATUS: HCPCS

## 2017-12-23 RX ADMIN — ENOXAPARIN SODIUM 40 MG: 40 INJECTION SUBCUTANEOUS at 08:56

## 2017-12-23 RX ADMIN — OXCARBAZEPINE 300 MG: 300 TABLET, FILM COATED ORAL at 08:56

## 2017-12-23 RX ADMIN — COLCHICINE 0.6 MG: 0.6 TABLET, FILM COATED ORAL at 08:56

## 2017-12-23 RX ADMIN — DOCUSATE SODIUM -SENNOSIDES 1 TABLET: 50; 8.6 TABLET, COATED ORAL at 08:56

## 2017-12-23 RX ADMIN — OLANZAPINE 10 MG: 10 TABLET, FILM COATED ORAL at 19:50

## 2017-12-23 RX ADMIN — Medication 10 ML: at 08:56

## 2017-12-23 RX ADMIN — AZITHROMYCIN 250 MG: 250 TABLET, FILM COATED ORAL at 08:56

## 2017-12-23 RX ADMIN — CLOPIDOGREL 75 MG: 75 TABLET, FILM COATED ORAL at 08:56

## 2017-12-23 RX ADMIN — INSULIN GLARGINE 30 UNITS: 100 INJECTION, SOLUTION SUBCUTANEOUS at 19:53

## 2017-12-23 RX ADMIN — VANCOMYCIN HYDROCHLORIDE 1250 MG: 1 INJECTION, POWDER, LYOPHILIZED, FOR SOLUTION INTRAVENOUS at 16:04

## 2017-12-23 RX ADMIN — ASPIRIN 81 MG: 81 TABLET, CHEWABLE ORAL at 08:56

## 2017-12-23 RX ADMIN — Medication 10 ML: at 19:53

## 2017-12-23 RX ADMIN — OXCARBAZEPINE 300 MG: 300 TABLET, FILM COATED ORAL at 19:50

## 2017-12-23 RX ADMIN — METOPROLOL TARTRATE 50 MG: 50 TABLET, FILM COATED ORAL at 19:50

## 2017-12-23 RX ADMIN — FAMOTIDINE 20 MG: 20 TABLET, FILM COATED ORAL at 08:56

## 2017-12-23 RX ADMIN — ATORVASTATIN CALCIUM 80 MG: 80 TABLET, FILM COATED ORAL at 19:50

## 2017-12-23 ASSESSMENT — ENCOUNTER SYMPTOMS
SHORTNESS OF BREATH: 1
COUGH: 1

## 2017-12-23 ASSESSMENT — PAIN SCALES - GENERAL: PAINLEVEL_OUTOF10: 7

## 2017-12-23 NOTE — PROGRESS NOTES
Physical Therapy    Facility/Department: [facility]  Initial Assessment    NAME: Edenilson Garcia  : 1955  MRN: [id number]    Date of Service: 2017  The patient is a pleasant 65 y.o. female with significant past medical history of coronary artery disease, congestive heart failure, infected AICD lead which has been recently removed presented with one-day history of shortness of breath.  Patient was recently in the hospital for the above complaints .   patient was recently in the hospital for congestive heart failure and was treated with diuretics and discharged on same. Patient has been coughing for 1 day and has been having chills. She is having dry cough. A chest x-ray was done in the ER which showed worsening right lower lobe infiltrates and has also been having right lower zone crepitations on examination. Patient has been started on cefepime, azithromycin. Patient has already been on vancomycin at home and this is continued. The same. She was actually supposed to stop vancomycin today for the AICD lead infection.       Her PREVIOUS admission she was found to have sepsis with MSSA, infective endocarditis, vegetations an ICD lead.  She was found to have elevated troponins and was thought to be possibly secondary to type II MI or NSTEMI. Wing Moran also has history of ischemic stroke with residual hemiparesis.  Patient was planned for IV vancomycin for 4 weeks    Patient Diagnosis(es): The primary encounter diagnosis was Hospital-acquired pneumonia. Diagnoses of Other iron deficiency anemia, Chronic systolic congestive heart failure ([address] and Hypocalcemia were also pertinent to this visit. has a past medical history of Arthritis; Asthma; CAD (coronary artery disease); CHF (congestive heart failure) ([address] Clinical trial participant at discharge; COPD (chronic obstructive pulmonary disease) ([address] Diabetes mellitus ([address] Hepatitis C; Hyperlipidemia;  Hypertension; MRSA (methicillin resistant staph posture  Distance: 8 ft to chair. Comments: Pt adamantly refused gait belt and this PT to assist her. Stairs/Curb  Stairs?: No  Assessment   Body structures, Functions, Activity limitations: Decreased functional mobility ; Decreased balance;Decreased safe awareness  Prognosis: Fair  Decision Making: Medium Complexity  Patient Education: PT POC  REQUIRES PT FOLLOW UP: Yes  Activity Tolerance  Activity Tolerance: Treatment limited secondary to agitation     Plan   Plan  Times per week: 5x/week  Current Treatment Recommendations: Balance Training, Endurance Training, Functional Mobility Training, Gait Training, Transfer Training, Safety Education & Training  Safety Devices  Type of devices: Left in chair, Call light within reach, Chair alarm in place, Nurse notified    G-Code  PT G-Codes  Functional Assessment Tool Used: [place] Tool  Score: 15  Functional Limitation: Mobility: Walking and moving around  Mobility: Walking and Moving Around Current Status (): At least 40 percent but less than 60 percent impaired, limited or restricted  Mobility: Walking and Moving Around Goal Status (): At least 1 percent but less than 20 percent impaired, limited or restricted    Goals  Short term goals  Time Frame for Short term goals: 14 visits  Short term goal 1: Independent bed mobility  Short term goal 2: Independent transfers  Short term goal 3:  Independent ambulation with SBQC 25 ft  Patient Goals   Patient goals : I want a shower       Therapy Time   Individual Concurrent Group Co-treatment   Time In 1320         Time Out 1347         Minutes 27         Timed Code Treatment Minutes: [address] AN[address], PT

## 2017-12-23 NOTE — DISCHARGE SUMMARY
her blood Cx became negative. However patient left AMA despite having hyperkalemia and refusing any treatment for it, even after informing her that she may not be able to get the dose of Vancomycin she needs, the next day. Consults:   cardiology and psychiatry    Procedures:  NATHAN, AICD lead extraction    Any Hospital Acquired Infections: none    PATIENT'S DISCHARGE CONDITION:      PATIENT/FAMILY INSTRUCTIONS:   Discharge Medication List as of 12/16/2017  9:22 PM      START taking these medications    Details   vancomycin (VANCOCIN) infusion Infuse 750 mg intravenously every 12 hours Compound per protocol. Aim for trough 14-17, Disp-69234 mg, R-0Print      metoprolol tartrate (LOPRESSOR) 50 MG tablet Take 1 tablet by mouth 2 times daily, Disp-60 tablet, R-3Normal      !! insulin lispro (HUMALOG) 100 UNIT/ML injection vial Inject 0-18 Units into the skin 3 times daily (with meals), Disp-1 vial, R-3Normal      !! insulin lispro (HUMALOG) 100 UNIT/ML injection vial Inject 0-9 Units into the skin nightly, Disp-1 vial, R-3Normal       !! - Potential duplicate medications found. Please discuss with provider. CONTINUE these medications which have CHANGED    Details   meclizine (ANTIVERT) 12.5 MG tablet Take 1 tablet by mouth 3 times daily as needed for Dizziness, Disp-30 tablet, R-3Normal      atorvastatin (LIPITOR) 80 MG tablet Take 1 tablet by mouth nightly, Disp-30 tablet, R-3Normal      clopidogrel (PLAVIX) 75 MG tablet Take 1 tablet by mouth daily, Disp-30 tablet, R-3Normal      insulin glargine (LANTUS) 100 UNIT/ML injection vial Inject 30 Units into the skin nightly, Disp-1 vial, R-3Normal         CONTINUE these medications which have NOT CHANGED    Details   aspirin 81 MG chewable tablet Take 1 tablet by mouth daily, Disp-30 tablet, R-3Normal      nitroGLYCERIN (NITROSTAT) 0.4 MG SL tablet Place 1 tablet under the tongue every 5 minutes as needed for Chest pain up to max of 3 total doses.  If no relief after 1 dose, call 911., Disp-25 tablet, R-3Normal      lisinopril (PRINIVIL;ZESTRIL) 5 MG tablet Take 1 tablet by mouth daily, Disp-30 tablet, R-3Normal      zinc oxide 20 % ointment Apply topically as needed. , Disp-1 Tube, R-0, Normal      ipratropium-albuterol (DUONEB) 0.5-2.5 (3) MG/3ML SOLN nebulizer solution Inhale 3 mLs into the lungs every 4 hours as needed for Shortness of Breath, Disp-360 mL, R-2Normal      senna-docusate (PERICOLACE) 8.6-50 MG per tablet Take 1 tablet by mouth dailyHistorical Med      OLANZapine (ZYPREXA) 10 MG tablet Take 1 tablet by mouth nightly, Disp-30 tablet, R-0      colchicine (COLCRYS) 0.6 MG tablet Take 1 tablet by mouth daily, Disp-30 tablet, R-3      famotidine (PEPCID) 20 MG tablet Take 20 mg by mouth daily. OXcarbazepine (TRILEPTAL) 300 MG tablet Take 300 mg by mouth 2 times daily Per pharmacy she is to be taking 300 mg twice daily but patient states that she only takes once daily. Historical Med         STOP taking these medications       sulfamethoxazole-trimethoprim (BACTRIM DS) 800-160 MG per tablet Comments:   Reason for Stopping:         carvedilol (COREG) 3.125 MG tablet Comments:   Reason for Stopping:             Activity: activity as tolerated    Diet: cardiac diet    Disposition: home with home care    Follow-up:  in the next few days with MD Romi Chris MD  PGY-1, Internal Medicine  [address] [facility]

## 2017-12-23 NOTE — CONSULTS
Psychiatry Consultation  12/23/2017  6:00 PM    Reason for Consult:  \"Confusion\"    CC:  Angry and irritable  History:    Reason for Admission and Hospital Course: HPI: Patient is a 65 y.o. female who is being treated for multiple physical problems including COPD, pneumonia and MRSA. She carries a diagnosis of bipolar mood disorder and is on olanzapine and Trileptal. While I am concerned that she is on olanzapine being a diabetic she does not seem to have any side effects from this medication. I have been consulted for \"confusion\" but looking at all the notes I do not see anyone documenting what exactly this confusion was. Patient was irritable and angry and said that she was angry because every time she comes into the hospital they asked for a psych consult. She says that her psychiatric problem is stable and the doctor should focus more on her physical issues. she emphatically denies suicidal ideations  Review of Systems   Constitutional: Positive for chills, diaphoresis, fever and malaise/fatigue. Negative for weight loss. Respiratory: Positive for cough and shortness of breath. Neurological: Negative for focal weakness, seizures and loss of consciousness. Psychiatric/Behavioral: Negative for depression, hallucinations, substance abuse and suicidal ideas. The patient is not nervous/anxious. All other systems reviewed and are negative.        Past Medical and Psychiatric History:        Diagnosis Date    Arthritis     Asthma     CAD (coronary artery disease)     [facility]     Clinical trial participant at discharge 03/13/2017    [employer]     [facility]ch[facility]     Diabetes mellitus ([facility]     Hepatitis C     Hyperlipidemia     Hypertension     MRSA (methicillin resistant staph aureus) culture positive 12/10/2017    blood    Pneumonia     Unspecified cerebral artery occlusion with cerebral infarction       H&P dated 12/22/2017

## 2017-12-23 NOTE — PLAN OF CARE
Problem: Falls - Risk of  Goal: Absence of falls  Outcome: Met This Shift      Problem: Risk for Impaired Skin Integrity  Goal: Tissue integrity - skin and mucous membranes  Structural intactness and normal physiological function of skin and  mucous membranes.    Outcome: Ongoing

## 2017-12-24 ENCOUNTER — APPOINTMENT (OUTPATIENT)
Dept: GENERAL RADIOLOGY | Age: 62
DRG: 291 | End: 2017-12-24
Payer: MEDICARE

## 2017-12-24 LAB
GLUCOSE BLD-MCNC: 115 MG/DL (ref 65–105)
GLUCOSE BLD-MCNC: 157 MG/DL (ref 65–105)

## 2017-12-24 PROCEDURE — 99233 SBSQ HOSP IP/OBS HIGH 50: CPT | Performed by: INTERNAL MEDICINE

## 2017-12-24 PROCEDURE — 94762 N-INVAS EAR/PLS OXIMTRY CONT: CPT

## 2017-12-24 PROCEDURE — 6370000000 HC RX 637 (ALT 250 FOR IP): Performed by: INTERNAL MEDICINE

## 2017-12-24 PROCEDURE — 6360000002 HC RX W HCPCS: Performed by: STUDENT IN AN ORGANIZED HEALTH CARE EDUCATION/TRAINING PROGRAM

## 2017-12-24 PROCEDURE — 6370000000 HC RX 637 (ALT 250 FOR IP): Performed by: STUDENT IN AN ORGANIZED HEALTH CARE EDUCATION/TRAINING PROGRAM

## 2017-12-24 PROCEDURE — 71020 XR CHEST STANDARD TWO VW: CPT

## 2017-12-24 PROCEDURE — 1200000000 HC SEMI PRIVATE

## 2017-12-24 PROCEDURE — 2580000003 HC RX 258: Performed by: STUDENT IN AN ORGANIZED HEALTH CARE EDUCATION/TRAINING PROGRAM

## 2017-12-24 PROCEDURE — 82947 ASSAY GLUCOSE BLOOD QUANT: CPT

## 2017-12-24 RX ADMIN — METOPROLOL TARTRATE 50 MG: 50 TABLET, FILM COATED ORAL at 19:38

## 2017-12-24 RX ADMIN — OXCARBAZEPINE 300 MG: 300 TABLET, FILM COATED ORAL at 08:37

## 2017-12-24 RX ADMIN — ASPIRIN 81 MG: 81 TABLET, CHEWABLE ORAL at 08:37

## 2017-12-24 RX ADMIN — ATORVASTATIN CALCIUM 80 MG: 80 TABLET, FILM COATED ORAL at 19:38

## 2017-12-24 RX ADMIN — Medication 10 ML: at 19:40

## 2017-12-24 RX ADMIN — Medication 10 ML: at 08:38

## 2017-12-24 RX ADMIN — CLOPIDOGREL 75 MG: 75 TABLET, FILM COATED ORAL at 08:37

## 2017-12-24 RX ADMIN — FUROSEMIDE 40 MG: 10 INJECTION, SOLUTION INTRAMUSCULAR; INTRAVENOUS at 18:19

## 2017-12-24 RX ADMIN — LISINOPRIL 5 MG: 5 TABLET ORAL at 08:37

## 2017-12-24 RX ADMIN — AZITHROMYCIN 250 MG: 250 TABLET, FILM COATED ORAL at 08:38

## 2017-12-24 RX ADMIN — MECLIZINE HCL 12.5 MG: 12.5 TABLET ORAL at 03:40

## 2017-12-24 RX ADMIN — OXCARBAZEPINE 300 MG: 300 TABLET, FILM COATED ORAL at 19:38

## 2017-12-24 RX ADMIN — ENOXAPARIN SODIUM 40 MG: 40 INJECTION SUBCUTANEOUS at 08:37

## 2017-12-24 RX ADMIN — INSULIN GLARGINE 30 UNITS: 100 INJECTION, SOLUTION SUBCUTANEOUS at 19:42

## 2017-12-24 RX ADMIN — COLCHICINE 0.6 MG: 0.6 TABLET, FILM COATED ORAL at 08:37

## 2017-12-24 RX ADMIN — OLANZAPINE 10 MG: 10 TABLET, FILM COATED ORAL at 19:38

## 2017-12-24 RX ADMIN — METOPROLOL TARTRATE 50 MG: 50 TABLET, FILM COATED ORAL at 08:37

## 2017-12-24 RX ADMIN — VANCOMYCIN HYDROCHLORIDE 1250 MG: 1 INJECTION, POWDER, LYOPHILIZED, FOR SOLUTION INTRAVENOUS at 13:45

## 2017-12-24 RX ADMIN — DOCUSATE SODIUM -SENNOSIDES 1 TABLET: 50; 8.6 TABLET, COATED ORAL at 08:37

## 2017-12-24 RX ADMIN — FUROSEMIDE 40 MG: 10 INJECTION, SOLUTION INTRAMUSCULAR; INTRAVENOUS at 08:38

## 2017-12-24 ASSESSMENT — PAIN SCALES - GENERAL: PAINLEVEL_OUTOF10: 5

## 2017-12-25 LAB
ANION GAP SERPL CALCULATED.3IONS-SCNC: 12 MMOL/L (ref 9–17)
BLOOD BANK SPECIMEN: NORMAL
BUN BLDV-MCNC: 14 MG/DL (ref 8–23)
BUN/CREAT BLD: ABNORMAL (ref 9–20)
CALCIUM SERPL-MCNC: 8 MG/DL (ref 8.6–10.4)
CHLORIDE BLD-SCNC: 99 MMOL/L (ref 98–107)
CO2: 27 MMOL/L (ref 20–31)
CREAT SERPL-MCNC: 0.78 MG/DL (ref 0.5–0.9)
GFR AFRICAN AMERICAN: >60 ML/MIN
GFR NON-AFRICAN AMERICAN: >60 ML/MIN
GFR SERPL CREATININE-BSD FRML MDRD: ABNORMAL ML/MIN/{1.73_M2}
GFR SERPL CREATININE-BSD FRML MDRD: ABNORMAL ML/MIN/{1.73_M2}
GLUCOSE BLD-MCNC: 164 MG/DL (ref 65–105)
GLUCOSE BLD-MCNC: 71 MG/DL (ref 70–99)
GLUCOSE BLD-MCNC: 82 MG/DL (ref 65–105)
HCT VFR BLD CALC: 23.4 % (ref 36.3–47.1)
HCT VFR BLD CALC: 29.3 % (ref 36.3–47.1)
HEMOGLOBIN: 7.1 G/DL (ref 11.9–15.1)
HEMOGLOBIN: 8.7 G/DL (ref 11.9–15.1)
MCH RBC QN AUTO: 24.9 PG (ref 25.2–33.5)
MCHC RBC AUTO-ENTMCNC: 29.9 G/DL (ref 28.4–34.8)
MCV RBC AUTO: 83.3 FL (ref 82.6–102.9)
PDW BLD-RTO: 19.1 % (ref 11.8–14.4)
PLATELET # BLD: 360 K/UL (ref 138–453)
PMV BLD AUTO: 9.7 FL (ref 8.1–13.5)
POTASSIUM SERPL-SCNC: 4.1 MMOL/L (ref 3.7–5.3)
RBC # BLD: 2.81 M/UL (ref 3.95–5.11)
SODIUM BLD-SCNC: 138 MMOL/L (ref 135–144)
WBC # BLD: 8.6 K/UL (ref 3.5–11.3)

## 2017-12-25 PROCEDURE — 86850 RBC ANTIBODY SCREEN: CPT

## 2017-12-25 PROCEDURE — 6360000002 HC RX W HCPCS: Performed by: STUDENT IN AN ORGANIZED HEALTH CARE EDUCATION/TRAINING PROGRAM

## 2017-12-25 PROCEDURE — 86920 COMPATIBILITY TEST SPIN: CPT

## 2017-12-25 PROCEDURE — 6370000000 HC RX 637 (ALT 250 FOR IP): Performed by: STUDENT IN AN ORGANIZED HEALTH CARE EDUCATION/TRAINING PROGRAM

## 2017-12-25 PROCEDURE — 94640 AIRWAY INHALATION TREATMENT: CPT

## 2017-12-25 PROCEDURE — 1200000000 HC SEMI PRIVATE

## 2017-12-25 PROCEDURE — 94762 N-INVAS EAR/PLS OXIMTRY CONT: CPT

## 2017-12-25 PROCEDURE — 80048 BASIC METABOLIC PNL TOTAL CA: CPT

## 2017-12-25 PROCEDURE — 85027 COMPLETE CBC AUTOMATED: CPT

## 2017-12-25 PROCEDURE — 86901 BLOOD TYPING SEROLOGIC RH(D): CPT

## 2017-12-25 PROCEDURE — 86900 BLOOD TYPING SEROLOGIC ABO: CPT

## 2017-12-25 PROCEDURE — 36415 COLL VENOUS BLD VENIPUNCTURE: CPT

## 2017-12-25 PROCEDURE — 2580000003 HC RX 258: Performed by: STUDENT IN AN ORGANIZED HEALTH CARE EDUCATION/TRAINING PROGRAM

## 2017-12-25 PROCEDURE — 99233 SBSQ HOSP IP/OBS HIGH 50: CPT | Performed by: INTERNAL MEDICINE

## 2017-12-25 PROCEDURE — 6370000000 HC RX 637 (ALT 250 FOR IP): Performed by: INTERNAL MEDICINE

## 2017-12-25 PROCEDURE — P9016 RBC LEUKOCYTES REDUCED: HCPCS

## 2017-12-25 PROCEDURE — 85018 HEMOGLOBIN: CPT

## 2017-12-25 PROCEDURE — 82947 ASSAY GLUCOSE BLOOD QUANT: CPT

## 2017-12-25 PROCEDURE — 85014 HEMATOCRIT: CPT

## 2017-12-25 PROCEDURE — 36430 TRANSFUSION BLD/BLD COMPNT: CPT

## 2017-12-25 RX ORDER — FUROSEMIDE 10 MG/ML
40 INJECTION INTRAMUSCULAR; INTRAVENOUS ONCE
Status: COMPLETED | OUTPATIENT
Start: 2017-12-25 | End: 2017-12-25

## 2017-12-25 RX ADMIN — ALTEPLASE 2 MG: 2.2 INJECTION, POWDER, LYOPHILIZED, FOR SOLUTION INTRAVENOUS at 14:20

## 2017-12-25 RX ADMIN — DOCUSATE SODIUM -SENNOSIDES 1 TABLET: 50; 8.6 TABLET, COATED ORAL at 09:50

## 2017-12-25 RX ADMIN — OXCARBAZEPINE 300 MG: 300 TABLET, FILM COATED ORAL at 09:48

## 2017-12-25 RX ADMIN — MECLIZINE HCL 12.5 MG: 12.5 TABLET ORAL at 04:32

## 2017-12-25 RX ADMIN — LISINOPRIL 5 MG: 5 TABLET ORAL at 09:48

## 2017-12-25 RX ADMIN — COLCHICINE 0.6 MG: 0.6 TABLET, FILM COATED ORAL at 09:48

## 2017-12-25 RX ADMIN — FUROSEMIDE 40 MG: 10 INJECTION, SOLUTION INTRAVENOUS at 12:01

## 2017-12-25 RX ADMIN — ASPIRIN 81 MG: 81 TABLET, CHEWABLE ORAL at 09:48

## 2017-12-25 RX ADMIN — WATER 2.2 ML: 1 INJECTION INTRAMUSCULAR; INTRAVENOUS; SUBCUTANEOUS at 14:20

## 2017-12-25 RX ADMIN — FAMOTIDINE 20 MG: 20 TABLET, FILM COATED ORAL at 09:48

## 2017-12-25 RX ADMIN — Medication 10 ML: at 09:50

## 2017-12-25 RX ADMIN — FUROSEMIDE 40 MG: 10 INJECTION, SOLUTION INTRAMUSCULAR; INTRAVENOUS at 09:49

## 2017-12-25 RX ADMIN — FUROSEMIDE 40 MG: 10 INJECTION, SOLUTION INTRAMUSCULAR; INTRAVENOUS at 19:47

## 2017-12-25 RX ADMIN — INSULIN LISPRO 3 UNITS: 100 INJECTION, SOLUTION INTRAVENOUS; SUBCUTANEOUS at 14:15

## 2017-12-25 RX ADMIN — AZITHROMYCIN 250 MG: 250 TABLET, FILM COATED ORAL at 09:48

## 2017-12-25 RX ADMIN — VANCOMYCIN HYDROCHLORIDE 1250 MG: 1 INJECTION, POWDER, LYOPHILIZED, FOR SOLUTION INTRAVENOUS at 12:00

## 2017-12-25 RX ADMIN — METOPROLOL TARTRATE 50 MG: 50 TABLET, FILM COATED ORAL at 09:48

## 2017-12-25 RX ADMIN — ENOXAPARIN SODIUM 40 MG: 40 INJECTION SUBCUTANEOUS at 09:49

## 2017-12-25 RX ADMIN — IPRATROPIUM BROMIDE AND ALBUTEROL SULFATE 3 ML: .5; 3 SOLUTION RESPIRATORY (INHALATION) at 11:32

## 2017-12-25 RX ADMIN — CLOPIDOGREL 75 MG: 75 TABLET, FILM COATED ORAL at 09:48

## 2017-12-25 ASSESSMENT — PAIN SCALES - GENERAL: PAINLEVEL_OUTOF10: 0

## 2017-12-25 NOTE — PROGRESS NOTES
[address] [place] [address]     Department of Internal Medicine - Staff Internal Medicine Service     DAILY PROGRESS NOTE  TEAM       Patient:  Aubree Velasquez  YOB: 1955  MRN: [id number]     Acct: [de-identified]     Admit date: 12/22/2017  Admitting Diagnosis: Acute on chronic combined systolic and diastolic CHF (congestive heart failure) ([facility]    Subjective:   Patient seen and examined at bedside. Charts reviewed. She complained of somebody taking her at night,when to see her, she just had some blood from the abrasion she has under her breasts. No acute issues this a.m., no new complaints in the morning. Afebrile. Breathing much better. Chest x-ray improved. Hemodynamically stable.       Objective:   /67   Pulse 84   Temp 98.4 °F (36.9 °C) (Oral)   Resp 14   Ht 5' (1.524 m)   Wt 181 lb 4.8 oz (82.2 kg)   LMP  (LMP Unknown)   SpO2 98%   BMI 35.41 kg/m²       Medications:      vancomycin  1,250 mg Intravenous Q24H    vancomycin (VANCOCIN) intermittent dosing (placeholder)   Other RX Placeholder    aspirin  81 mg Oral Daily    atorvastatin  80 mg Oral Nightly    clopidogrel  75 mg Oral Daily    colchicine  0.6 mg Oral Daily    famotidine  20 mg Oral Daily    insulin glargine  30 Units Subcutaneous Nightly    lisinopril  5 mg Oral Daily    metoprolol tartrate  50 mg Oral BID    OLANZapine  10 mg Oral Nightly    OXcarbazepine  300 mg Oral BID    senna-docusate  1 tablet Oral Daily    sodium chloride flush  10 mL Intravenous 2 times per day    enoxaparin  40 mg Subcutaneous Daily    insulin lispro  0-18 Units Subcutaneous TID WC    insulin lispro  0-9 Units Subcutaneous Nightly    furosemide  40 mg Intravenous BID       Diagnostic Labs and Imaging    CBC:   Recent Labs      12/23/17   0831  12/25/17   0904   WBC  8.6  8.6   RBC  2.86*  2.81*   HGB  7.1*  7.1*   HCT  24.3*  23.4*   MCV  85.0  83.3   RDW  19.7*  19.1*   PLT  327  360     BMP:   Recent Labs

## 2017-12-26 VITALS
OXYGEN SATURATION: 97 % | HEART RATE: 91 BPM | RESPIRATION RATE: 17 BRPM | WEIGHT: 181.3 LBS | BODY MASS INDEX: 35.6 KG/M2 | SYSTOLIC BLOOD PRESSURE: 141 MMHG | TEMPERATURE: 97.5 F | HEIGHT: 60 IN | DIASTOLIC BLOOD PRESSURE: 58 MMHG

## 2017-12-26 DIAGNOSIS — I33.0 ENDOCARDITIS DUE TO STAPHYLOCOCCUS: ICD-10-CM

## 2017-12-26 DIAGNOSIS — B95.8 ENDOCARDITIS DUE TO STAPHYLOCOCCUS: ICD-10-CM

## 2017-12-26 LAB
ABO/RH: NORMAL
ANION GAP SERPL CALCULATED.3IONS-SCNC: 15 MMOL/L (ref 9–17)
ANTIBODY SCREEN: NEGATIVE
ARM BAND NUMBER: NORMAL
BLD PROD TYP BPU: NORMAL
BUN BLDV-MCNC: 14 MG/DL (ref 8–23)
BUN/CREAT BLD: ABNORMAL (ref 9–20)
CALCIUM SERPL-MCNC: 8.4 MG/DL (ref 8.6–10.4)
CHLORIDE BLD-SCNC: 98 MMOL/L (ref 98–107)
CO2: 28 MMOL/L (ref 20–31)
CREAT SERPL-MCNC: 0.82 MG/DL (ref 0.5–0.9)
CROSSMATCH RESULT: NORMAL
DISPENSE STATUS BLOOD BANK: NORMAL
EXPIRATION DATE: NORMAL
GFR AFRICAN AMERICAN: >60 ML/MIN
GFR NON-AFRICAN AMERICAN: >60 ML/MIN
GFR SERPL CREATININE-BSD FRML MDRD: ABNORMAL ML/MIN/{1.73_M2}
GFR SERPL CREATININE-BSD FRML MDRD: ABNORMAL ML/MIN/{1.73_M2}
GLUCOSE BLD-MCNC: 178 MG/DL (ref 65–105)
GLUCOSE BLD-MCNC: 59 MG/DL (ref 65–105)
GLUCOSE BLD-MCNC: 70 MG/DL (ref 70–99)
POTASSIUM SERPL-SCNC: 4.1 MMOL/L (ref 3.7–5.3)
SODIUM BLD-SCNC: 141 MMOL/L (ref 135–144)
TRANSFUSION STATUS: NORMAL
UNIT DIVISION: 0
UNIT NUMBER: NORMAL
VANCOMYCIN TROUGH DATE LAST DOSE: NORMAL
VANCOMYCIN TROUGH DOSE AMOUNT: NORMAL
VANCOMYCIN TROUGH TIME LAST DOSE: NORMAL
VANCOMYCIN TROUGH: 15.7 UG/ML (ref 10–20)

## 2017-12-26 PROCEDURE — 2580000003 HC RX 258: Performed by: STUDENT IN AN ORGANIZED HEALTH CARE EDUCATION/TRAINING PROGRAM

## 2017-12-26 PROCEDURE — 80202 ASSAY OF VANCOMYCIN: CPT

## 2017-12-26 PROCEDURE — 94762 N-INVAS EAR/PLS OXIMTRY CONT: CPT

## 2017-12-26 PROCEDURE — 6360000002 HC RX W HCPCS: Performed by: STUDENT IN AN ORGANIZED HEALTH CARE EDUCATION/TRAINING PROGRAM

## 2017-12-26 PROCEDURE — 6370000000 HC RX 637 (ALT 250 FOR IP): Performed by: STUDENT IN AN ORGANIZED HEALTH CARE EDUCATION/TRAINING PROGRAM

## 2017-12-26 PROCEDURE — 80048 BASIC METABOLIC PNL TOTAL CA: CPT

## 2017-12-26 PROCEDURE — 82947 ASSAY GLUCOSE BLOOD QUANT: CPT

## 2017-12-26 PROCEDURE — 36415 COLL VENOUS BLD VENIPUNCTURE: CPT

## 2017-12-26 PROCEDURE — 99238 HOSP IP/OBS DSCHRG MGMT 30/<: CPT | Performed by: INTERNAL MEDICINE

## 2017-12-26 RX ORDER — FUROSEMIDE 20 MG/1
40 TABLET ORAL 2 TIMES DAILY
Qty: 30 TABLET | Refills: 3 | Status: ON HOLD | OUTPATIENT
Start: 2017-12-26 | End: 2018-05-17 | Stop reason: HOSPADM

## 2017-12-26 RX ADMIN — Medication 10 ML: at 14:59

## 2017-12-26 RX ADMIN — FUROSEMIDE 40 MG: 10 INJECTION, SOLUTION INTRAMUSCULAR; INTRAVENOUS at 08:43

## 2017-12-26 RX ADMIN — ENOXAPARIN SODIUM 40 MG: 40 INJECTION SUBCUTANEOUS at 08:43

## 2017-12-26 RX ADMIN — OXCARBAZEPINE 300 MG: 300 TABLET, FILM COATED ORAL at 08:42

## 2017-12-26 RX ADMIN — FAMOTIDINE 20 MG: 20 TABLET, FILM COATED ORAL at 08:41

## 2017-12-26 RX ADMIN — DOCUSATE SODIUM -SENNOSIDES 1 TABLET: 50; 8.6 TABLET, COATED ORAL at 08:42

## 2017-12-26 RX ADMIN — LISINOPRIL 5 MG: 5 TABLET ORAL at 08:42

## 2017-12-26 RX ADMIN — VANCOMYCIN HYDROCHLORIDE 1250 MG: 1 INJECTION, POWDER, LYOPHILIZED, FOR SOLUTION INTRAVENOUS at 13:15

## 2017-12-26 RX ADMIN — COLCHICINE 0.6 MG: 0.6 TABLET, FILM COATED ORAL at 08:42

## 2017-12-26 RX ADMIN — CLOPIDOGREL 75 MG: 75 TABLET, FILM COATED ORAL at 08:42

## 2017-12-26 RX ADMIN — INSULIN LISPRO 3 UNITS: 100 INJECTION, SOLUTION INTRAVENOUS; SUBCUTANEOUS at 12:35

## 2017-12-26 RX ADMIN — METOPROLOL TARTRATE 50 MG: 50 TABLET, FILM COATED ORAL at 08:41

## 2017-12-26 RX ADMIN — Medication 10 ML: at 08:43

## 2017-12-26 RX ADMIN — ASPIRIN 81 MG: 81 TABLET, CHEWABLE ORAL at 08:42

## 2017-12-26 ASSESSMENT — PAIN SCALES - GENERAL: PAINLEVEL_OUTOF10: 10

## 2017-12-26 NOTE — PROGRESS NOTES
Pt called 911 from room claiming that she had a 3week old child at home alone and we were keeping her here against her will. She also stated that if anything happened to the child she would sami. She is refusing care and has threatened to have the nursing staff fired.

## 2017-12-26 NOTE — CARE COORDINATION
Discharge planning-Called [address] to inform them of patients discharge. Faxed H&P, LATOYA, home care order, and discharge instructions to [facility]. Called Bruna Ruiz with [facility] to inform her of patients discharge-faxed H&P, script for vanco, and LATOYA to [facility].

## 2017-12-26 NOTE — CARE COORDINATION
Discharge planning-patient requesting ride home by Ambulance.  Called [facility]-set up transportation for 3PM.

## 2017-12-26 NOTE — CARE COORDINATION
Discharge [facility] Case Management Department  Written by: Unique Acosta RN    Patient Name: Phong Dears  Attending Provider: No att. providers found  Admit Date: 2017  7:10 AM  MRN: [id number]  Account: [de-identified]                     : 1955  Discharge Date: 2017      Disposition: home    Unique Acosta RN

## 2017-12-26 NOTE — PROGRESS NOTES
Smoking Cessation - topics covered   []  Health Risks  []  Benefits of Quitting   []  Smoking Cessation  []  Patient has no history of tobacco use  []  Patient is former smoker. Patient quit in   []  No need for tobacco cessation education. []  Booklet given  [x]  Patient verbalizes understanding. [x]  Patient denies need for tobacco cessation education.   Alysia Archer  1:12 PM

## 2017-12-27 NOTE — DISCHARGE SUMMARY
which have NOT CHANGED    Details   aspirin 81 MG chewable tablet Take 1 tablet by mouth daily, Disp-30 tablet, R-3Print      ipratropium-albuterol (DUONEB) 0.5-2.5 (3) MG/3ML SOLN nebulizer solution Inhale 3 mLs into the lungs every 4 hours as needed for Shortness of Breath, Disp-360 mL, R-2Print      nitroGLYCERIN (NITROSTAT) 0.4 MG SL tablet Place 1 tablet under the tongue every 5 minutes as needed for Chest pain up to max of 3 total doses. If no relief after 1 dose, call 911., Disp-25 tablet, R-3Print      OXcarbazepine (TRILEPTAL) 300 MG tablet Take 1 tablet by mouth 2 times daily Per pharmacy she is to be taking 300 mg twice daily but patient states that she only takes once daily. , Disp-60 tablet, R-3Print      insulin glargine (LANTUS) 100 UNIT/ML injection vial Inject 30 Units into the skin nightly, Disp-1 vial, R-3Print      meclizine (ANTIVERT) 12.5 MG tablet Take 1 tablet by mouth 3 times daily as needed for Dizziness, Disp-30 tablet, R-3Print      atorvastatin (LIPITOR) 80 MG tablet Take 1 tablet by mouth nightly, Disp-30 tablet, R-3Print      lisinopril (PRINIVIL;ZESTRIL) 5 MG tablet Take 1 tablet by mouth daily, Disp-30 tablet, R-3Print      OLANZapine (ZYPREXA) 10 MG tablet Take 1 tablet by mouth nightly, Disp-30 tablet, R-3Print      metoprolol tartrate (LOPRESSOR) 50 MG tablet Take 1 tablet by mouth 2 times daily, Disp-60 tablet, R-3Print      colchicine (COLCRYS) 0.6 MG tablet Take 1 tablet by mouth daily, Disp-30 tablet, R-3Print      clopidogrel (PLAVIX) 75 MG tablet Take 1 tablet by mouth daily, Disp-30 tablet, R-3Print      famotidine (PEPCID) 20 MG tablet Take 1 tablet by mouth daily, Disp-60 tablet, R-3Print      senna-docusate (PERICOLACE) 8.6-50 MG per tablet Take 1 tablet by mouth daily, Disp-30 tablet, R-1Print           Activity: activity as tolerated    Diet: regular diet    Disposition: home with  Home care.      Follow-up:  in 2 weeks with Sylvia Prakash MD,         Ely Ohara Trace Victor MD,   PGY-3 Internal Medicine Resident.   [facility].  12/27/2017  9:58 AM

## 2017-12-30 ENCOUNTER — APPOINTMENT (OUTPATIENT)
Dept: GENERAL RADIOLOGY | Age: 62
DRG: 291 | End: 2017-12-30
Payer: MEDICARE

## 2017-12-30 ENCOUNTER — APPOINTMENT (OUTPATIENT)
Dept: CT IMAGING | Age: 62
DRG: 291 | End: 2017-12-30
Payer: MEDICARE

## 2017-12-30 ENCOUNTER — HOSPITAL ENCOUNTER (INPATIENT)
Age: 62
LOS: 2 days | Discharge: HOME HEALTH | DRG: 291 | End: 2018-01-02
Attending: EMERGENCY MEDICINE | Admitting: INTERNAL MEDICINE
Payer: MEDICARE

## 2017-12-30 DIAGNOSIS — I33.0 ENDOCARDITIS DUE TO STAPHYLOCOCCUS: ICD-10-CM

## 2017-12-30 DIAGNOSIS — R07.9 CHEST PAIN, UNSPECIFIED TYPE: Primary | ICD-10-CM

## 2017-12-30 DIAGNOSIS — J18.9 PNEUMONIA DUE TO ORGANISM: ICD-10-CM

## 2017-12-30 DIAGNOSIS — B95.8 ENDOCARDITIS DUE TO STAPHYLOCOCCUS: ICD-10-CM

## 2017-12-30 LAB
ABSOLUTE EOS #: 0.48 K/UL (ref 0–0.44)
ABSOLUTE IMMATURE GRANULOCYTE: 0.04 K/UL (ref 0–0.3)
ABSOLUTE LYMPH #: 2.21 K/UL (ref 1.1–3.7)
ABSOLUTE MONO #: 0.87 K/UL (ref 0.1–1.2)
BASOPHILS # BLD: 1 % (ref 0–2)
BASOPHILS ABSOLUTE: 0.06 K/UL (ref 0–0.2)
DIFFERENTIAL TYPE: ABNORMAL
EKG ATRIAL RATE: 120 BPM
EKG P AXIS: 37 DEGREES
EKG P-R INTERVAL: 148 MS
EKG Q-T INTERVAL: 342 MS
EKG QRS DURATION: 68 MS
EKG QTC CALCULATION (BAZETT): 483 MS
EKG R AXIS: -16 DEGREES
EKG T AXIS: 72 DEGREES
EKG VENTRICULAR RATE: 120 BPM
EOSINOPHILS RELATIVE PERCENT: 5 % (ref 1–4)
HCT VFR BLD CALC: 30.6 % (ref 36.3–47.1)
HEMOGLOBIN: 9 G/DL (ref 11.9–15.1)
IMMATURE GRANULOCYTES: 0 %
LYMPHOCYTES # BLD: 22 % (ref 24–43)
MCH RBC QN AUTO: 25.4 PG (ref 25.2–33.5)
MCHC RBC AUTO-ENTMCNC: 29.4 G/DL (ref 28.4–34.8)
MCV RBC AUTO: 86.4 FL (ref 82.6–102.9)
MONOCYTES # BLD: 9 % (ref 3–12)
PDW BLD-RTO: 18.1 % (ref 11.8–14.4)
PLATELET # BLD: 304 K/UL (ref 138–453)
PLATELET ESTIMATE: ABNORMAL
PMV BLD AUTO: 9.8 FL (ref 8.1–13.5)
POC TROPONIN I: 0.02 NG/ML (ref 0–0.1)
POC TROPONIN INTERP: NORMAL
RBC # BLD: 3.54 M/UL (ref 3.95–5.11)
RBC # BLD: ABNORMAL 10*6/UL
SEG NEUTROPHILS: 64 % (ref 36–65)
SEGMENTED NEUTROPHILS ABSOLUTE COUNT: 6.56 K/UL (ref 1.5–8.1)
WBC # BLD: 10.2 K/UL (ref 3.5–11.3)
WBC # BLD: ABNORMAL 10*3/UL

## 2017-12-30 PROCEDURE — 84295 ASSAY OF SERUM SODIUM: CPT

## 2017-12-30 PROCEDURE — 93005 ELECTROCARDIOGRAM TRACING: CPT

## 2017-12-30 PROCEDURE — 85025 COMPLETE CBC W/AUTO DIFF WBC: CPT

## 2017-12-30 PROCEDURE — 83880 ASSAY OF NATRIURETIC PEPTIDE: CPT

## 2017-12-30 PROCEDURE — 83605 ASSAY OF LACTIC ACID: CPT

## 2017-12-30 PROCEDURE — 99285 EMERGENCY DEPT VISIT HI MDM: CPT

## 2017-12-30 PROCEDURE — 85014 HEMATOCRIT: CPT

## 2017-12-30 PROCEDURE — 82565 ASSAY OF CREATININE: CPT

## 2017-12-30 PROCEDURE — 82803 BLOOD GASES ANY COMBINATION: CPT

## 2017-12-30 PROCEDURE — 82330 ASSAY OF CALCIUM: CPT

## 2017-12-30 PROCEDURE — 82435 ASSAY OF BLOOD CHLORIDE: CPT

## 2017-12-30 PROCEDURE — 82947 ASSAY GLUCOSE BLOOD QUANT: CPT

## 2017-12-30 PROCEDURE — 6370000000 HC RX 637 (ALT 250 FOR IP): Performed by: EMERGENCY MEDICINE

## 2017-12-30 PROCEDURE — 84484 ASSAY OF TROPONIN QUANT: CPT

## 2017-12-30 PROCEDURE — 84132 ASSAY OF SERUM POTASSIUM: CPT

## 2017-12-30 PROCEDURE — 71010 XR CHEST PORTABLE: CPT

## 2017-12-30 PROCEDURE — 80048 BASIC METABOLIC PNL TOTAL CA: CPT

## 2017-12-30 RX ORDER — IPRATROPIUM BROMIDE AND ALBUTEROL SULFATE 2.5; .5 MG/3ML; MG/3ML
1 SOLUTION RESPIRATORY (INHALATION)
Status: DISCONTINUED | OUTPATIENT
Start: 2017-12-30 | End: 2017-12-31

## 2017-12-30 RX ORDER — ALBUTEROL SULFATE 2.5 MG/3ML
5 SOLUTION RESPIRATORY (INHALATION)
Status: DISCONTINUED | OUTPATIENT
Start: 2017-12-30 | End: 2017-12-31

## 2017-12-30 RX ORDER — ALBUTEROL SULFATE 90 UG/1
2 AEROSOL, METERED RESPIRATORY (INHALATION)
Status: DISCONTINUED | OUTPATIENT
Start: 2017-12-30 | End: 2017-12-31

## 2017-12-30 RX ORDER — NITROGLYCERIN 0.4 MG/1
0.4 TABLET SUBLINGUAL EVERY 5 MIN PRN
Status: DISCONTINUED | OUTPATIENT
Start: 2017-12-30 | End: 2018-01-03 | Stop reason: HOSPADM

## 2017-12-30 RX ORDER — ASPIRIN 81 MG/1
324 TABLET, CHEWABLE ORAL ONCE
Status: COMPLETED | OUTPATIENT
Start: 2017-12-30 | End: 2017-12-30

## 2017-12-30 RX ADMIN — ASPIRIN 81 MG 324 MG: 81 TABLET ORAL at 22:57

## 2017-12-30 ASSESSMENT — PAIN SCALES - GENERAL: PAINLEVEL_OUTOF10: 10

## 2017-12-30 ASSESSMENT — PAIN DESCRIPTION - LOCATION: LOCATION: CHEST

## 2017-12-30 ASSESSMENT — ENCOUNTER SYMPTOMS
SHORTNESS OF BREATH: 1
ABDOMINAL PAIN: 0
VOMITING: 0
DIARRHEA: 0
SORE THROAT: 0
BACK PAIN: 0
COUGH: 1
CHEST TIGHTNESS: 1

## 2017-12-30 ASSESSMENT — HEART SCORE: ECG: 0

## 2017-12-30 ASSESSMENT — PAIN DESCRIPTION - PAIN TYPE: TYPE: ACUTE PAIN

## 2017-12-31 ENCOUNTER — APPOINTMENT (OUTPATIENT)
Dept: CT IMAGING | Age: 62
DRG: 291 | End: 2017-12-31
Payer: MEDICARE

## 2017-12-31 PROBLEM — J18.9 PNEUMONIA: Status: ACTIVE | Noted: 2017-12-31

## 2017-12-31 LAB
ALLEN TEST: ABNORMAL
ANION GAP SERPL CALCULATED.3IONS-SCNC: 12 MMOL/L (ref 9–17)
ANION GAP: 8 MMOL/L (ref 7–16)
BNP INTERPRETATION: ABNORMAL
BUN BLDV-MCNC: 21 MG/DL (ref 8–23)
BUN/CREAT BLD: ABNORMAL (ref 9–20)
CALCIUM SERPL-MCNC: 8.6 MG/DL (ref 8.6–10.4)
CHLORIDE BLD-SCNC: 99 MMOL/L (ref 98–107)
CO2: 25 MMOL/L (ref 20–31)
CREAT SERPL-MCNC: 1.07 MG/DL (ref 0.5–0.9)
FIO2: ABNORMAL
GFR AFRICAN AMERICAN: >60 ML/MIN
GFR NON-AFRICAN AMERICAN: 49 ML/MIN
GFR NON-AFRICAN AMERICAN: 52 ML/MIN
GFR SERPL CREATININE-BSD FRML MDRD: 60 ML/MIN
GFR SERPL CREATININE-BSD FRML MDRD: ABNORMAL ML/MIN/{1.73_M2}
GLUCOSE BLD-MCNC: 137 MG/DL (ref 65–105)
GLUCOSE BLD-MCNC: 160 MG/DL (ref 65–105)
GLUCOSE BLD-MCNC: 175 MG/DL (ref 70–99)
GLUCOSE BLD-MCNC: 177 MG/DL (ref 74–100)
GLUCOSE BLD-MCNC: 203 MG/DL (ref 65–105)
GLUCOSE BLD-MCNC: 237 MG/DL (ref 65–105)
HCO3 VENOUS: 25.8 MMOL/L (ref 22–29)
LACTIC ACID, WHOLE BLOOD: 1.9 MMOL/L (ref 0.7–2.1)
LACTIC ACID, WHOLE BLOOD: 2.1 MMOL/L (ref 0.7–2.1)
MODE: ABNORMAL
NEGATIVE BASE EXCESS, VEN: ABNORMAL (ref 0–2)
O2 DEVICE/FLOW/%: ABNORMAL
O2 SAT, VEN: 45 % (ref 60–85)
PATIENT TEMP: ABNORMAL
PCO2, VEN: 47.2 MM HG (ref 41–51)
PH VENOUS: 7.35 (ref 7.32–7.43)
PO2, VEN: 26.3 MM HG (ref 30–50)
POC CHLORIDE: 107 MMOL/L (ref 98–107)
POC CREATININE: 1.13 MG/DL (ref 0.51–1.19)
POC HEMATOCRIT: 30 % (ref 36–46)
POC HEMOGLOBIN: 10.4 G/DL (ref 12–16)
POC IONIZED CALCIUM: 1 MMOL/L (ref 1.15–1.33)
POC LACTIC ACID: 1.44 MMOL/L (ref 0.56–1.39)
POC PCO2 TEMP: ABNORMAL MM HG
POC PH TEMP: ABNORMAL
POC PO2 TEMP: ABNORMAL MM HG
POC POTASSIUM: 4.1 MMOL/L (ref 3.5–4.5)
POC SODIUM: 141 MMOL/L (ref 138–146)
POC TROPONIN I: 0.01 NG/ML (ref 0–0.1)
POC TROPONIN INTERP: NORMAL
POSITIVE BASE EXCESS, VEN: 0 (ref 0–3)
POTASSIUM SERPL-SCNC: 4.3 MMOL/L (ref 3.7–5.3)
PRO-BNP: ABNORMAL PG/ML
SAMPLE SITE: ABNORMAL
SODIUM BLD-SCNC: 136 MMOL/L (ref 135–144)
TOTAL CO2, VENOUS: 27 MMOL/L (ref 23–30)

## 2017-12-31 PROCEDURE — 94640 AIRWAY INHALATION TREATMENT: CPT

## 2017-12-31 PROCEDURE — 94664 DEMO&/EVAL PT USE INHALER: CPT

## 2017-12-31 PROCEDURE — 71260 CT THORAX DX C+: CPT

## 2017-12-31 PROCEDURE — 6370000000 HC RX 637 (ALT 250 FOR IP): Performed by: INTERNAL MEDICINE

## 2017-12-31 PROCEDURE — 6360000004 HC RX CONTRAST MEDICATION: Performed by: EMERGENCY MEDICINE

## 2017-12-31 PROCEDURE — 99223 1ST HOSP IP/OBS HIGH 75: CPT | Performed by: INTERNAL MEDICINE

## 2017-12-31 PROCEDURE — 94762 N-INVAS EAR/PLS OXIMTRY CONT: CPT

## 2017-12-31 PROCEDURE — 36415 COLL VENOUS BLD VENIPUNCTURE: CPT

## 2017-12-31 PROCEDURE — 6370000000 HC RX 637 (ALT 250 FOR IP): Performed by: NURSE PRACTITIONER

## 2017-12-31 PROCEDURE — 2580000003 HC RX 258: Performed by: NURSE PRACTITIONER

## 2017-12-31 PROCEDURE — 6360000002 HC RX W HCPCS: Performed by: NURSE PRACTITIONER

## 2017-12-31 PROCEDURE — 2060000000 HC ICU INTERMEDIATE R&B

## 2017-12-31 PROCEDURE — 6370000000 HC RX 637 (ALT 250 FOR IP): Performed by: EMERGENCY MEDICINE

## 2017-12-31 PROCEDURE — 83605 ASSAY OF LACTIC ACID: CPT

## 2017-12-31 PROCEDURE — 96374 THER/PROPH/DIAG INJ IV PUSH: CPT

## 2017-12-31 PROCEDURE — 84484 ASSAY OF TROPONIN QUANT: CPT

## 2017-12-31 PROCEDURE — 82947 ASSAY GLUCOSE BLOOD QUANT: CPT

## 2017-12-31 PROCEDURE — 87040 BLOOD CULTURE FOR BACTERIA: CPT

## 2017-12-31 PROCEDURE — 6360000002 HC RX W HCPCS: Performed by: EMERGENCY MEDICINE

## 2017-12-31 PROCEDURE — 2580000003 HC RX 258: Performed by: EMERGENCY MEDICINE

## 2017-12-31 RX ORDER — IPRATROPIUM BROMIDE AND ALBUTEROL SULFATE 2.5; .5 MG/3ML; MG/3ML
1 SOLUTION RESPIRATORY (INHALATION) 4 TIMES DAILY
Status: DISCONTINUED | OUTPATIENT
Start: 2017-12-31 | End: 2018-01-02

## 2017-12-31 RX ORDER — SODIUM CHLORIDE 0.9 % (FLUSH) 0.9 %
10 SYRINGE (ML) INJECTION EVERY 12 HOURS SCHEDULED
Status: DISCONTINUED | OUTPATIENT
Start: 2017-12-31 | End: 2018-01-03 | Stop reason: HOSPADM

## 2017-12-31 RX ORDER — ALBUTEROL SULFATE 2.5 MG/3ML
2.5 SOLUTION RESPIRATORY (INHALATION)
Status: DISCONTINUED | OUTPATIENT
Start: 2017-12-31 | End: 2017-12-31

## 2017-12-31 RX ORDER — ONDANSETRON 2 MG/ML
4 INJECTION INTRAMUSCULAR; INTRAVENOUS EVERY 6 HOURS PRN
Status: DISCONTINUED | OUTPATIENT
Start: 2017-12-31 | End: 2018-01-03 | Stop reason: HOSPADM

## 2017-12-31 RX ORDER — SENNA AND DOCUSATE SODIUM 50; 8.6 MG/1; MG/1
1 TABLET, FILM COATED ORAL DAILY
Status: DISCONTINUED | OUTPATIENT
Start: 2017-12-31 | End: 2018-01-03 | Stop reason: HOSPADM

## 2017-12-31 RX ORDER — NITROGLYCERIN 0.4 MG/1
0.4 TABLET SUBLINGUAL EVERY 5 MIN PRN
Status: DISCONTINUED | OUTPATIENT
Start: 2017-12-31 | End: 2017-12-31 | Stop reason: SDUPTHER

## 2017-12-31 RX ORDER — LISINOPRIL 5 MG/1
5 TABLET ORAL DAILY
Status: DISCONTINUED | OUTPATIENT
Start: 2017-12-31 | End: 2018-01-03 | Stop reason: HOSPADM

## 2017-12-31 RX ORDER — FUROSEMIDE 40 MG/1
40 TABLET ORAL 2 TIMES DAILY
Status: DISCONTINUED | OUTPATIENT
Start: 2017-12-31 | End: 2017-12-31

## 2017-12-31 RX ORDER — FUROSEMIDE 10 MG/ML
20 INJECTION INTRAMUSCULAR; INTRAVENOUS 2 TIMES DAILY
Status: DISCONTINUED | OUTPATIENT
Start: 2017-12-31 | End: 2018-01-03 | Stop reason: HOSPADM

## 2017-12-31 RX ORDER — BISACODYL 10 MG
10 SUPPOSITORY, RECTAL RECTAL DAILY PRN
Status: DISCONTINUED | OUTPATIENT
Start: 2017-12-31 | End: 2018-01-03 | Stop reason: HOSPADM

## 2017-12-31 RX ORDER — SODIUM CHLORIDE 9 MG/ML
INJECTION, SOLUTION INTRAVENOUS CONTINUOUS
Status: DISCONTINUED | OUTPATIENT
Start: 2017-12-31 | End: 2017-12-31

## 2017-12-31 RX ORDER — NICOTINE 21 MG/24HR
1 PATCH, TRANSDERMAL 24 HOURS TRANSDERMAL DAILY PRN
Status: DISCONTINUED | OUTPATIENT
Start: 2017-12-31 | End: 2018-01-03 | Stop reason: HOSPADM

## 2017-12-31 RX ORDER — DEXTROSE MONOHYDRATE 25 G/50ML
12.5 INJECTION, SOLUTION INTRAVENOUS PRN
Status: DISCONTINUED | OUTPATIENT
Start: 2017-12-31 | End: 2018-01-03 | Stop reason: HOSPADM

## 2017-12-31 RX ORDER — HYDROCODONE BITARTRATE AND ACETAMINOPHEN 5; 325 MG/1; MG/1
1 TABLET ORAL EVERY 4 HOURS PRN
Status: DISCONTINUED | OUTPATIENT
Start: 2017-12-31 | End: 2018-01-03 | Stop reason: HOSPADM

## 2017-12-31 RX ORDER — NICOTINE POLACRILEX 4 MG
15 LOZENGE BUCCAL PRN
Status: DISCONTINUED | OUTPATIENT
Start: 2017-12-31 | End: 2018-01-03 | Stop reason: HOSPADM

## 2017-12-31 RX ORDER — INSULIN GLARGINE 100 [IU]/ML
30 INJECTION, SOLUTION SUBCUTANEOUS NIGHTLY
Status: DISCONTINUED | OUTPATIENT
Start: 2017-12-31 | End: 2018-01-03 | Stop reason: HOSPADM

## 2017-12-31 RX ORDER — METOPROLOL TARTRATE 50 MG/1
50 TABLET, FILM COATED ORAL 2 TIMES DAILY
Status: DISCONTINUED | OUTPATIENT
Start: 2017-12-31 | End: 2018-01-03 | Stop reason: HOSPADM

## 2017-12-31 RX ORDER — OXCARBAZEPINE 300 MG/1
300 TABLET, FILM COATED ORAL 2 TIMES DAILY
Status: DISCONTINUED | OUTPATIENT
Start: 2017-12-31 | End: 2018-01-03 | Stop reason: HOSPADM

## 2017-12-31 RX ORDER — SODIUM CHLORIDE 0.9 % (FLUSH) 0.9 %
10 SYRINGE (ML) INJECTION PRN
Status: DISCONTINUED | OUTPATIENT
Start: 2017-12-31 | End: 2018-01-03 | Stop reason: HOSPADM

## 2017-12-31 RX ORDER — 0.9 % SODIUM CHLORIDE 0.9 %
1000 INTRAVENOUS SOLUTION INTRAVENOUS ONCE
Status: COMPLETED | OUTPATIENT
Start: 2017-12-31 | End: 2017-12-31

## 2017-12-31 RX ORDER — ACETAMINOPHEN 325 MG/1
650 TABLET ORAL EVERY 4 HOURS PRN
Status: DISCONTINUED | OUTPATIENT
Start: 2017-12-31 | End: 2018-01-03 | Stop reason: HOSPADM

## 2017-12-31 RX ORDER — FAMOTIDINE 20 MG/1
20 TABLET, FILM COATED ORAL DAILY
Status: DISCONTINUED | OUTPATIENT
Start: 2017-12-31 | End: 2018-01-03 | Stop reason: HOSPADM

## 2017-12-31 RX ORDER — COLCHICINE 0.6 MG/1
0.6 TABLET ORAL DAILY
Status: DISCONTINUED | OUTPATIENT
Start: 2017-12-31 | End: 2018-01-03 | Stop reason: HOSPADM

## 2017-12-31 RX ORDER — ASPIRIN 81 MG/1
81 TABLET, CHEWABLE ORAL DAILY
Status: DISCONTINUED | OUTPATIENT
Start: 2017-12-31 | End: 2018-01-03 | Stop reason: HOSPADM

## 2017-12-31 RX ORDER — HYDROCODONE BITARTRATE AND ACETAMINOPHEN 5; 325 MG/1; MG/1
2 TABLET ORAL EVERY 4 HOURS PRN
Status: DISCONTINUED | OUTPATIENT
Start: 2017-12-31 | End: 2018-01-03 | Stop reason: HOSPADM

## 2017-12-31 RX ORDER — CLOPIDOGREL BISULFATE 75 MG/1
75 TABLET ORAL DAILY
Status: DISCONTINUED | OUTPATIENT
Start: 2017-12-31 | End: 2018-01-03 | Stop reason: HOSPADM

## 2017-12-31 RX ORDER — ATORVASTATIN CALCIUM 80 MG/1
80 TABLET, FILM COATED ORAL NIGHTLY
Status: DISCONTINUED | OUTPATIENT
Start: 2017-12-31 | End: 2018-01-03 | Stop reason: HOSPADM

## 2017-12-31 RX ORDER — ALBUTEROL SULFATE 2.5 MG/3ML
2.5 SOLUTION RESPIRATORY (INHALATION) EVERY 6 HOURS PRN
Status: DISCONTINUED | OUTPATIENT
Start: 2017-12-31 | End: 2018-01-03 | Stop reason: HOSPADM

## 2017-12-31 RX ORDER — IPRATROPIUM BROMIDE AND ALBUTEROL SULFATE 2.5; .5 MG/3ML; MG/3ML
1 SOLUTION RESPIRATORY (INHALATION)
Status: DISCONTINUED | OUTPATIENT
Start: 2017-12-31 | End: 2017-12-31

## 2017-12-31 RX ORDER — OLANZAPINE 10 MG/1
10 TABLET ORAL NIGHTLY
Status: DISCONTINUED | OUTPATIENT
Start: 2017-12-31 | End: 2018-01-03 | Stop reason: HOSPADM

## 2017-12-31 RX ORDER — DEXTROSE MONOHYDRATE 50 MG/ML
100 INJECTION, SOLUTION INTRAVENOUS PRN
Status: DISCONTINUED | OUTPATIENT
Start: 2017-12-31 | End: 2018-01-03 | Stop reason: HOSPADM

## 2017-12-31 RX ADMIN — METOPROLOL TARTRATE 50 MG: 50 TABLET, FILM COATED ORAL at 20:32

## 2017-12-31 RX ADMIN — FUROSEMIDE 40 MG: 40 TABLET ORAL at 08:29

## 2017-12-31 RX ADMIN — IPRATROPIUM BROMIDE AND ALBUTEROL SULFATE 1 AMPULE: .5; 3 SOLUTION RESPIRATORY (INHALATION) at 12:25

## 2017-12-31 RX ADMIN — ALBUTEROL SULFATE 2.5 MG: 2.5 SOLUTION RESPIRATORY (INHALATION) at 04:54

## 2017-12-31 RX ADMIN — INSULIN GLARGINE 30 UNITS: 100 INJECTION, SOLUTION SUBCUTANEOUS at 20:41

## 2017-12-31 RX ADMIN — OXCARBAZEPINE 300 MG: 300 TABLET, FILM COATED ORAL at 08:30

## 2017-12-31 RX ADMIN — ASPIRIN 81 MG: 81 TABLET, CHEWABLE ORAL at 08:29

## 2017-12-31 RX ADMIN — Medication 10 ML: at 08:31

## 2017-12-31 RX ADMIN — SODIUM CHLORIDE: 9 INJECTION, SOLUTION INTRAVENOUS at 06:05

## 2017-12-31 RX ADMIN — INSULIN LISPRO 1 UNITS: 100 INJECTION, SOLUTION INTRAVENOUS; SUBCUTANEOUS at 08:30

## 2017-12-31 RX ADMIN — IPRATROPIUM BROMIDE AND ALBUTEROL SULFATE 1 AMPULE: .5; 3 SOLUTION RESPIRATORY (INHALATION) at 08:21

## 2017-12-31 RX ADMIN — LISINOPRIL 5 MG: 5 TABLET ORAL at 08:32

## 2017-12-31 RX ADMIN — ATORVASTATIN CALCIUM 80 MG: 80 TABLET, FILM COATED ORAL at 20:32

## 2017-12-31 RX ADMIN — ENOXAPARIN SODIUM 40 MG: 40 INJECTION SUBCUTANEOUS at 08:30

## 2017-12-31 RX ADMIN — METOPROLOL TARTRATE 50 MG: 50 TABLET, FILM COATED ORAL at 08:32

## 2017-12-31 RX ADMIN — Medication 10 ML: at 20:32

## 2017-12-31 RX ADMIN — OLANZAPINE 10 MG: 10 TABLET, FILM COATED ORAL at 20:32

## 2017-12-31 RX ADMIN — CEFEPIME HYDROCHLORIDE 2 G: 2 INJECTION, POWDER, FOR SOLUTION INTRAVENOUS at 15:12

## 2017-12-31 RX ADMIN — FAMOTIDINE 20 MG: 20 TABLET, FILM COATED ORAL at 08:29

## 2017-12-31 RX ADMIN — VANCOMYCIN HYDROCHLORIDE 1250 MG: 1 INJECTION, POWDER, LYOPHILIZED, FOR SOLUTION INTRAVENOUS at 06:05

## 2017-12-31 RX ADMIN — IOVERSOL 75 ML: 741 INJECTION INTRA-ARTERIAL; INTRAVENOUS at 00:53

## 2017-12-31 RX ADMIN — IPRATROPIUM BROMIDE AND ALBUTEROL SULFATE 1 AMPULE: .5; 3 SOLUTION RESPIRATORY (INHALATION) at 15:57

## 2017-12-31 RX ADMIN — INSULIN LISPRO 3 UNITS: 100 INJECTION, SOLUTION INTRAVENOUS; SUBCUTANEOUS at 20:41

## 2017-12-31 RX ADMIN — WATER 2 G: 1 INJECTION INTRAMUSCULAR; INTRAVENOUS; SUBCUTANEOUS at 03:11

## 2017-12-31 RX ADMIN — INSULIN LISPRO 2 UNITS: 100 INJECTION, SOLUTION INTRAVENOUS; SUBCUTANEOUS at 11:44

## 2017-12-31 RX ADMIN — IPRATROPIUM BROMIDE AND ALBUTEROL SULFATE 1 AMPULE: .5; 3 SOLUTION RESPIRATORY (INHALATION) at 20:13

## 2017-12-31 RX ADMIN — ALBUTEROL SULFATE 5 MG: 5 SOLUTION RESPIRATORY (INHALATION) at 00:14

## 2017-12-31 RX ADMIN — OXCARBAZEPINE 300 MG: 300 TABLET, FILM COATED ORAL at 20:32

## 2017-12-31 RX ADMIN — IPRATROPIUM BROMIDE 0.5 MG: 0.5 SOLUTION RESPIRATORY (INHALATION) at 00:13

## 2017-12-31 RX ADMIN — SODIUM CHLORIDE 1000 ML: 9 INJECTION, SOLUTION INTRAVENOUS at 00:43

## 2017-12-31 RX ADMIN — CLOPIDOGREL 75 MG: 75 TABLET, FILM COATED ORAL at 08:29

## 2017-12-31 RX ADMIN — COLCHICINE 0.6 MG: 0.6 TABLET, FILM COATED ORAL at 08:29

## 2017-12-31 ASSESSMENT — PAIN SCALES - GENERAL
PAINLEVEL_OUTOF10: 2
PAINLEVEL_OUTOF10: 4
PAINLEVEL_OUTOF10: 5

## 2017-12-31 ASSESSMENT — PAIN DESCRIPTION - PAIN TYPE: TYPE: ACUTE PAIN

## 2017-12-31 ASSESSMENT — PAIN DESCRIPTION - LOCATION: LOCATION: CHEST

## 2017-12-31 NOTE — CONSULTS
Comment: 1 bottle per month wine    Drug use: No    Sexual activity: Yes     Partners: Male     Other Topics Concern    Not on file     Social History Narrative    No narrative on file       Family History:     Family History   Problem Relation Age of Onset    Family history unknown: Yes        Allergies:   Levofloxacin and Pcn [penicillins]     Review of Systems:   Constitutional: No fevers or chills. No systemic complaints  Head: No headaches  Eyes: No double vision or blurry vision. ENT: No sore throat or runny nose. . No hearing loss, tinnitus or vertigo. Cardiovascular: Chest pain, no palpitations. shortness of breath. KWAN  Lung: shortness of breath, no cough. No sputum production  Abdomen: No nausea, vomiting, diarrhea, or abdominal pain. Genitourinary: No increased urinary frequency, or dysuria. No hematuria. No suprapubic or CVA pain  Musculoskeletal: No muscle aches or pains. No joint effusions, swelling or deformities  Hematologic: No bleeding or bruising. Neurologic: No headache, weakness, numbness, or tingling. Physical Examination :   Patient Vitals for the past 8 hrs:   BP Temp Temp src Pulse Resp SpO2   12/31/17 1635 (!) 141/76 98.1 °F (36.7 °C) Oral 102 18 95 %   12/31/17 1559 - - - - - 98 %   12/31/17 1131 92/67 98 °F (36.7 °C) Oral 91 18 92 %     General Appearance: Awake, alert, and in no apparent distress  Head:  Normocephalic, no trauma  Eyes: Pupils equal, round, reactive, to light and accommodation; extraocular movements intact; sclera anicteric; conjunctivae pink. No embolic phenomena. ENT: Oropharynx clear, without erythema, exudate, or thrush. No tenderness of sinuses. Mouth/throat: mucosa pink and moist. No lesions. Dentition in good repair. Neck:Supple, without lymphadenopathy. Thyroid normal, No bruits. Pulmonary/Chest: Coarse with wheezes, rales, scattered rhonchi. No dullness to percussion. Cardiovascular: Regular rate and rhythm without murmurs, rubs, or gallops. Abdomen: Soft, non tender. Bowel sounds normal. No organomegaly  All four Extremities: No cyanosis, clubbing. Has 2+ edema. .  Neurologic: No gross sensory or motor deficits. Skin: Warm and dry with good turgor. Signs of peripheral arterial insufficiency. Medical Decision Making:   I have independently reviewed/ordered the following labs:    Last Blood culture with growth. Likely contaminant. 12/14/2017  9:52 AM - Willie, [facility] Incoming Lab Results From [facility]     Component Results     Component Collected Lab   Specimen Description 12/11/2017  9:19  [address]   . BLOOD    Special Requests 12/11/2017  9:19  [address]   RT HAND 1ML    Culture  (Abnormal) 12/11/2017  9:19  [address]   POSITIVE BLOOD CULTURE, RN NOTIFIED: Peter Ernandez AT 0155 ON 12/12/17     Culture  (Abnormal) 12/11/2017  9:19  [address]    (A)   DIRECT Marga Sanchez STAIN FROM BOTTLE: GRAM POSITIVE COCCI IN CLUSTERS AND GRAM POSITIVE    Culture  (Abnormal) 12/11/2017  9:19  [address]    RODS     Culture  (Abnormal) 12/11/2017  9:19  [address]    (A)   ID by PNAFISH: STAPHYLOCOCCUS SPECIES, COAGULASE NEGATIVE (FOR THE GRAM POSITIVE    Culture  (Abnormal) 12/11/2017  9:19  [facility])     Culture  (Abnormal) 12/11/2017  9:19  [address]    (A)   STAPHYLOCOCCUS SPECIES, COAGULASE NEGATIVE A single positive blood culture of    Culture  (Abnormal) 12/11/2017  9:19  [address]    (A)    coagulase negative staphylococci, micrococci, diphtheroids or Bacillus species    Culture  (Abnormal) 12/11/2017  9:19  [address]    should be interpreted with caution and viewed as likely a skin contaminant.      Culture  (Abnormal) 12/11/2017  9:19 AM [address] A single positive blood culture of coagulase negative     Culture

## 2017-12-31 NOTE — ED NOTES
Pt brought in by EMS with difficulty breathing and chest pain. Pt has a PICC line in the upper right arm she states is because she has an infection in her heart. Pt on arrival was very demanding, coughing, crying out she cannot breathe between orders. Pt demanded a diaper in addition to being very short in answering questions. Pt reports she has had pneumonia a week ago in addition to have had quadruple bypass surgery. Pt placed on cardiac monitor, EKG performed. Awaiting evaluation and orders. Xray at bedside to confirm placement of PICC for use. Will continue to monitor.         Elvia Carroll RN  12/30/17 [id number]

## 2017-12-31 NOTE — ED NOTES
Pt refusing to wear bp cuff, pt states she would like to speak with security because people keep calling her on her personal phone     Myles Hernandez RN  12/31/17 0010

## 2017-12-31 NOTE — CARE COORDINATION
Case Management Initial Discharge Plan  Benigno Nunez,         Readmission Risk              Readmission Risk:        28.75       Age 72 or Greater:  0    Admitted from SNF or Requires Paid or Family Care:  2    Currently has CHF,COPD,ARF,CRI,or is on dialysis:  4    Takes more than 5 Prescription Medications:  4    Takes Digoxin,Insulin,Anticoagulants,Narcotics or ASA/Plavix:  [address] in Past 12 Months:  10    On Disability:  3    Patient Considers own Health:  3.75            Met with:patient to discuss discharge plans. Information verified: address, contacts, phone number, , insurance Yes  PCP: John Garcia MD  Date of last visit: recent    Insurance Provider: Medicare     Discharge Planning  Current Residence:  Private Residence  Living Arrangements:  NELA Worrell has one stories/no stairs to climb  Support Systems:  None  Current Services PTA:  Durable Medical Equipment, Oxygen Therapy Supplier: [facility]  Patient able to perform ADL's:Assisted  DME used to aid ambulation prior to admission: cane/during admission,cane    Potential Assistance Needed:  Other (Comment) (home care)    Pharmacy: [facility]    Potential Assistance Purchasing Medications:  No  Does patient want to participate in local refill/ meds to beds program?  No    Patient agreeable to home care: Yes  Freedom of choice provided:  yes      Type of Home Care Services:  Nursing Services  Patient expects to be discharged to:  home with home care    Prior SNF/Rehab Placement and Facility: n/a  Agreeable to SNF/Rehab: No  [place] of choice provided: n/a   Evaluation: no    Expected Discharge date:      Follow Up Appointment: Best Day/ Time: Monday AM    Transportation provider: needs transport set up  Transportation arrangements needed for discharge: Yes    Discharge Plan: She continues to decline SNF, has had frequent admissions, plans home with [facility] home care continues with IV antibiotics through [facility]   She gets

## 2017-12-31 NOTE — PROGRESS NOTES
clopidogrel (PLAVIX) 75 MG tablet Take 1 tablet by mouth daily 12/18/17   Telma Prater MD   famotidine (PEPCID) 20 MG tablet Take 1 tablet by mouth daily 12/18/17   Telma Prater MD   senna-docusate (Sarah Cables) 8.6-50 MG per tablet Take 1 tablet by mouth daily 12/18/17   Telma Prater MD   .  Recent Surgical History: None = 0     Assessment   Received pt on 4LNC, awake, c/o shortness of breath. Pt states she smokes 1pk/day, has COPD, asthma, and emphysema, uses 4LNC at home. Also states she cannot give herself breathing tx's at home because she cannot use her left hand.      RR 20  Breath Sounds: Clear apices, dim bases      · Bronchodilator assessment at level  3  ·   ·   · [x]    Bronchodilator Assessment  BRONCHODILATOR ASSESSMENT SCORE  Score 0 1 2 3 4 5   Breath Sounds   []  Patient Baseline [x]  No Wheeze good aeration []  Faint, scattered wheezing, good aeration []  Expiratory Wheezing and or moderately diminished []  Insp/Exp wheeze and/or very diminished []  Insp/Exp and/ or marked distress   Respiratory Rate   []  Patient Baseline []  Less than 20 []  Less than 20 [x]  20-25 []  Greater than 25 []  Greater than 25   Peak flow % of Pred or PB [x]  NA   []  Greater than 90%  []  81-90% []  71-80% []  Less than or equal to 70%  or unable to perform []  Unable due to Respiratory Distress   Dyspnea re []  Patient Baseline []  No SOB []  No SOB [x]  SOB on exertion []  SOB min activity []  At rest/acute   e FEV% Predicted       [x]  NA []  Above 69%  []  Unable []  Above 60-69%  []  Unable []  Above 50-59%  []  Unable []  Above 35-49%  []  Unable []  Less than 35%  []  Unable

## 2017-12-31 NOTE — ED PROVIDER NOTES
Physical Exam   Constitutional: She is oriented to person, place, and time. She appears well-developed and well-nourished. No distress. Patient slightly uncomfortable appearing, sitting up in bed   HENT:   Head: Normocephalic and atraumatic. Mouth/Throat: Oropharynx is clear and moist. No oropharyngeal exudate. Eyes: EOM are normal. Pupils are equal, round, and reactive to light. Right eye exhibits no discharge. Left eye exhibits no discharge. Neck: Normal range of motion. Neck supple. No JVD present. Cardiovascular: Regular rhythm, normal heart sounds and intact distal pulses. Exam reveals no gallop and no friction rub. No murmur heard. tachycardic   Pulmonary/Chest: Effort normal. No respiratory distress. She has wheezes. She has no rales. She exhibits no tenderness. Diminished breath sounds bilaterally   Abdominal: Soft. Bowel sounds are normal. She exhibits no distension. There is no tenderness. There is no rebound. Musculoskeletal: Normal range of motion. She exhibits edema (non-pitting edema to bilateral calves). She exhibits no tenderness or deformity. Lymphadenopathy:     She has no cervical adenopathy. Neurological: She is alert and oriented to person, place, and time. She displays normal reflexes. No cranial nerve deficit. Skin: Skin is warm and dry. No rash noted. She is not diaphoretic. Psychiatric: Her behavior is normal.   Nursing note and vitals reviewed.       DIFFERENTIAL  DIAGNOSIS     PLAN (LABS / IMAGING / EKG):  Orders Placed This Encounter   Procedures    XR CHEST PORTABLE    CT Chest Pulmonary Embolism W Contrast    CBC Auto Differential    Basic Metabolic Panel    Brain Natriuretic Peptide    Hemoglobin and hematocrit, blood    SODIUM (POC)    POTASSIUM (POC)    CHLORIDE (POC)    CALCIUM, IONIC (POC)    Telemetry monitoring    Inpatient consult to Hospitalist    Pulse oximetry, continuous    Initiate ED Aerosol Protocol    Respiratory care Lymphocytes 22 (L) 24 - 43 %    Monocytes 9 3 - 12 %    Eosinophils % 5 (H) 1 - 4 %    Basophils 1 0 - 2 %    Immature Granulocytes 0 0 %    Segs Absolute 6.56 1.50 - 8.10 k/uL    Absolute Lymph # 2.21 1.10 - 3.70 k/uL    Absolute Mono # 0.87 0.10 - 1.20 k/uL    Absolute Eos # 0.48 (H) 0.00 - 0.44 k/uL    Basophils # 0.06 0.00 - 0.20 k/uL    Absolute Immature Granulocyte 0.04 0.00 - 0.30 k/uL   Basic Metabolic Panel   Result Value Ref Range    Glucose 175 (H) 70 - 99 mg/dL    BUN 21 8 - 23 mg/dL    CREATININE 1.07 (H) 0.50 - 0.90 mg/dL    Bun/Cre Ratio NOT REPORTED 9 - 20    Calcium 8.6 8.6 - 10.4 mg/dL    Sodium 136 135 - 144 mmol/L    Potassium 4.3 3.7 - 5.3 mmol/L    Chloride 99 98 - 107 mmol/L    CO2 25 20 - 31 mmol/L    Anion Gap 12 9 - 17 mmol/L    GFR Non-African American 52 (L) >60 mL/min    GFR African American >60 >60 mL/min    GFR Comment          GFR Staging NOT REPORTED    Brain Natriuretic Peptide   Result Value Ref Range    Pro-BNP 10,659 (H) <300 pg/mL    BNP Interpretation         Hemoglobin and hematocrit, blood   Result Value Ref Range    POC Hemoglobin 10.4 (L) 12.0 - 16.0 g/dL    POC Hematocrit 30 (L) 36 - 46 %   SODIUM (POC)   Result Value Ref Range    POC Sodium 141 138 - 146 mmol/L   POTASSIUM (POC)   Result Value Ref Range    POC Potassium 4.1 3.5 - 4.5 mmol/L   CHLORIDE (POC)   Result Value Ref Range    POC Chloride 107 98 - 107 mmol/L   CALCIUM, IONIC (POC)   Result Value Ref Range    POC Ionized Calcium 1.00 (L) 1.15 - 1.33 mmol/L   POCT troponin   Result Value Ref Range    POC Troponin I 0.02 0.00 - 0.10 ng/mL    POC Troponin Interp       The Troponin-I (POC) results cannot be compared to the Troponin-T results.    Venous Blood Gas, POC   Result Value Ref Range    pH, Frederic 7.346 7.320 - 7.430    pCO2, Frederic 47.2 41.0 - 51.0 mm Hg    pO2, Frederic 26.3 (L) 30.0 - 50.0 mm Hg    HCO3, Venous 25.8 22.0 - 29.0 mmol/L    Total CO2, Venous 27 23.0 - 30.0 mmol/L    Negative Base Excess, Frederic NOT REPORTED

## 2017-12-31 NOTE — H&P
blood    Pneumonia     Unspecified cerebral artery occlusion with cerebral infarction         Past Surgical History:     Past Surgical History:   Procedure Laterality Date    CARDIAC CATHETERIZATION  11/27/2017    CARDIAC PACEMAKER PLACEMENT      placed 1 month ago at [facility] [address]  5/6/2016    cardiac cath-1xBMS prox. RCA; 1xBMS prox. LAD    FOOT SURGERY      cyst removed from L foot    HC  PICC [address] DOUBLE  12/19/2017         HYSTERECTOMY      TONSILLECTOMY          Medications Prior to Admission:     Prior to Admission medications    Medication Sig Start Date End Date Taking? Authorizing Provider   furosemide (LASIX) 20 MG tablet Take 2 tablets by mouth 2 times daily 12/26/17   Shira Diallo MD   vancomycin (VANCOCIN) infusion Infuse 1,250 mg intravenously every 24 hours for 12 days Compound per protocol. Aim for trough 14-17 12/26/17 1/7/18  Shira Diallo MD   aspirin 81 MG chewable tablet Take 1 tablet by mouth daily 12/18/17   Shira Diallo MD   ipratropium-albuterol (DUONEB) 0.5-2.5 (3) MG/3ML SOLN nebulizer solution Inhale 3 mLs into the lungs every 4 hours as needed for Shortness of Breath 12/18/17   Jose Be MD   nitroGLYCERIN (NITROSTAT) 0.4 MG SL tablet Place 1 tablet under the tongue every 5 minutes as needed for Chest pain up to max of 3 total doses. If no relief after 1 dose, call 911. 12/18/17   Shira Diallo MD   OXcarbazepine (TRILEPTAL) 300 MG tablet Take 1 tablet by mouth 2 times daily Per pharmacy she is to be taking 300 mg twice daily but patient states that she only takes once daily.  12/18/17   Jose Be MD   insulin glargine (LANTUS) 100 UNIT/ML injection vial Inject 30 Units into the skin nightly 12/18/17   Jose Be MD   atorvastatin (LIPITOR) 80 MG tablet Take 1 tablet by mouth nightly 12/18/17   Jose Be MD   lisinopril (PRINIVIL;ZESTRIL) 5 MG tablet Take 1 tablet by mouth daily 12/18/17 (cerebrovascular accident) [I69.90] 07/14/2016    Inability to perform activities of daily living [R53.81]     History of CVA (cerebrovascular accident) [Z86.73] 01/24/2015    Essential hypertension [I10] 06/15/2013       Plan:     Patient status Admit as inpatient in the  Progressive Unit/Step down    1. Resume home medicines  2. Change diuretics to IV  3. Continue vancomycin until stop date of January 13, 2018 and continue cefepime which has been started in the ER  4. DVT prophylaxis  5. Diabetes controlled with home dose of Lantus and high intensity insulin sliding scale  6. Consult infectious disease  7. Monitor labs in morning    Consultations:   IP CONSULT TO HOSPITALIST  IP CONSULT TO INFECTIOUS DISEASES     Patient is admitted as inpatient status because of co-morbidities listed above, severity of signs and symptoms as outlined, requirement for current medical therapies and most importantly because of direct risk to patient if care not provided in a hospital setting.     [address] MD  12/31/2017  4:35 PM    Copy sent to Dr. John Garcia MD

## 2018-01-01 ENCOUNTER — APPOINTMENT (OUTPATIENT)
Dept: GENERAL RADIOLOGY | Age: 63
DRG: 291 | End: 2018-01-01
Payer: MEDICARE

## 2018-01-01 PROBLEM — E88.09 HYPOALBUMINEMIA: Status: ACTIVE | Noted: 2018-01-01

## 2018-01-01 LAB
ALBUMIN SERPL-MCNC: 3 G/DL (ref 3.5–5.2)
ALBUMIN/GLOBULIN RATIO: 0.9 (ref 1–2.5)
ALP BLD-CCNC: 86 U/L (ref 35–104)
ALT SERPL-CCNC: 9 U/L (ref 5–33)
ANION GAP SERPL CALCULATED.3IONS-SCNC: 15 MMOL/L (ref 9–17)
AST SERPL-CCNC: 11 U/L
BILIRUB SERPL-MCNC: 0.42 MG/DL (ref 0.3–1.2)
BUN BLDV-MCNC: 17 MG/DL (ref 8–23)
BUN/CREAT BLD: ABNORMAL (ref 9–20)
CALCIUM SERPL-MCNC: 8.1 MG/DL (ref 8.6–10.4)
CHLORIDE BLD-SCNC: 103 MMOL/L (ref 98–107)
CO2: 23 MMOL/L (ref 20–31)
CREAT SERPL-MCNC: 0.77 MG/DL (ref 0.5–0.9)
GFR AFRICAN AMERICAN: >60 ML/MIN
GFR NON-AFRICAN AMERICAN: >60 ML/MIN
GFR SERPL CREATININE-BSD FRML MDRD: ABNORMAL ML/MIN/{1.73_M2}
GFR SERPL CREATININE-BSD FRML MDRD: ABNORMAL ML/MIN/{1.73_M2}
GLUCOSE BLD-MCNC: 112 MG/DL (ref 65–105)
GLUCOSE BLD-MCNC: 208 MG/DL (ref 65–105)
GLUCOSE BLD-MCNC: 215 MG/DL (ref 65–105)
GLUCOSE BLD-MCNC: 62 MG/DL (ref 70–99)
GLUCOSE BLD-MCNC: 96 MG/DL (ref 65–105)
HCT VFR BLD CALC: 30 % (ref 36.3–47.1)
HEMOGLOBIN: 8.9 G/DL (ref 11.9–15.1)
LACTIC ACID, WHOLE BLOOD: 1.5 MMOL/L (ref 0.7–2.1)
LACTIC ACID, WHOLE BLOOD: 2.8 MMOL/L (ref 0.7–2.1)
MCH RBC QN AUTO: 25.5 PG (ref 25.2–33.5)
MCHC RBC AUTO-ENTMCNC: 29.7 G/DL (ref 28.4–34.8)
MCV RBC AUTO: 86 FL (ref 82.6–102.9)
PDW BLD-RTO: 18.1 % (ref 11.8–14.4)
PLATELET # BLD: 246 K/UL (ref 138–453)
PMV BLD AUTO: 10.1 FL (ref 8.1–13.5)
POTASSIUM SERPL-SCNC: 3.9 MMOL/L (ref 3.7–5.3)
RBC # BLD: 3.49 M/UL (ref 3.95–5.11)
SODIUM BLD-SCNC: 141 MMOL/L (ref 135–144)
TOTAL PROTEIN: 6.4 G/DL (ref 6.4–8.3)
WBC # BLD: 7.1 K/UL (ref 3.5–11.3)

## 2018-01-01 PROCEDURE — 6360000002 HC RX W HCPCS: Performed by: NURSE PRACTITIONER

## 2018-01-01 PROCEDURE — 2580000003 HC RX 258: Performed by: INTERNAL MEDICINE

## 2018-01-01 PROCEDURE — 97535 SELF CARE MNGMENT TRAINING: CPT

## 2018-01-01 PROCEDURE — G8978 MOBILITY CURRENT STATUS: HCPCS

## 2018-01-01 PROCEDURE — 36415 COLL VENOUS BLD VENIPUNCTURE: CPT

## 2018-01-01 PROCEDURE — 94762 N-INVAS EAR/PLS OXIMTRY CONT: CPT

## 2018-01-01 PROCEDURE — 6360000002 HC RX W HCPCS: Performed by: INTERNAL MEDICINE

## 2018-01-01 PROCEDURE — 6370000000 HC RX 637 (ALT 250 FOR IP): Performed by: EMERGENCY MEDICINE

## 2018-01-01 PROCEDURE — 2060000000 HC ICU INTERMEDIATE R&B

## 2018-01-01 PROCEDURE — G8979 MOBILITY GOAL STATUS: HCPCS

## 2018-01-01 PROCEDURE — 85027 COMPLETE CBC AUTOMATED: CPT

## 2018-01-01 PROCEDURE — G8988 SELF CARE GOAL STATUS: HCPCS

## 2018-01-01 PROCEDURE — 6370000000 HC RX 637 (ALT 250 FOR IP): Performed by: NURSE PRACTITIONER

## 2018-01-01 PROCEDURE — 2500000003 HC RX 250 WO HCPCS: Performed by: NURSE PRACTITIONER

## 2018-01-01 PROCEDURE — 71046 X-RAY EXAM CHEST 2 VIEWS: CPT

## 2018-01-01 PROCEDURE — 97167 OT EVAL HIGH COMPLEX 60 MIN: CPT

## 2018-01-01 PROCEDURE — 94640 AIRWAY INHALATION TREATMENT: CPT

## 2018-01-01 PROCEDURE — G8987 SELF CARE CURRENT STATUS: HCPCS

## 2018-01-01 PROCEDURE — 82947 ASSAY GLUCOSE BLOOD QUANT: CPT

## 2018-01-01 PROCEDURE — 97162 PT EVAL MOD COMPLEX 30 MIN: CPT

## 2018-01-01 PROCEDURE — 99232 SBSQ HOSP IP/OBS MODERATE 35: CPT | Performed by: INTERNAL MEDICINE

## 2018-01-01 PROCEDURE — 6370000000 HC RX 637 (ALT 250 FOR IP): Performed by: INTERNAL MEDICINE

## 2018-01-01 PROCEDURE — 2580000003 HC RX 258: Performed by: NURSE PRACTITIONER

## 2018-01-01 PROCEDURE — 80053 COMPREHEN METABOLIC PANEL: CPT

## 2018-01-01 PROCEDURE — 83605 ASSAY OF LACTIC ACID: CPT

## 2018-01-01 RX ADMIN — VANCOMYCIN HYDROCHLORIDE 1250 MG: 1 INJECTION, POWDER, LYOPHILIZED, FOR SOLUTION INTRAVENOUS at 05:58

## 2018-01-01 RX ADMIN — Medication 10 ML: at 22:23

## 2018-01-01 RX ADMIN — OXCARBAZEPINE 300 MG: 300 TABLET, FILM COATED ORAL at 22:22

## 2018-01-01 RX ADMIN — Medication 10 ML: at 03:12

## 2018-01-01 RX ADMIN — FAMOTIDINE 20 MG: 20 TABLET, FILM COATED ORAL at 09:06

## 2018-01-01 RX ADMIN — ASPIRIN 81 MG: 81 TABLET, CHEWABLE ORAL at 09:06

## 2018-01-01 RX ADMIN — Medication 10 ML: at 14:51

## 2018-01-01 RX ADMIN — Medication 10 ML: at 17:54

## 2018-01-01 RX ADMIN — FUROSEMIDE 20 MG: 10 INJECTION, SOLUTION INTRAVENOUS at 17:54

## 2018-01-01 RX ADMIN — METOPROLOL TARTRATE 50 MG: 50 TABLET, FILM COATED ORAL at 22:22

## 2018-01-01 RX ADMIN — INSULIN LISPRO 3 UNITS: 100 INJECTION, SOLUTION INTRAVENOUS; SUBCUTANEOUS at 22:24

## 2018-01-01 RX ADMIN — Medication 10 ML: at 09:10

## 2018-01-01 RX ADMIN — IPRATROPIUM BROMIDE AND ALBUTEROL SULFATE 1 AMPULE: .5; 3 SOLUTION RESPIRATORY (INHALATION) at 12:35

## 2018-01-01 RX ADMIN — ENOXAPARIN SODIUM 40 MG: 40 INJECTION SUBCUTANEOUS at 09:06

## 2018-01-01 RX ADMIN — OLANZAPINE 10 MG: 10 TABLET, FILM COATED ORAL at 22:22

## 2018-01-01 RX ADMIN — DOCUSATE SODIUM -SENNOSIDES 1 TABLET: 50; 8.6 TABLET, COATED ORAL at 09:06

## 2018-01-01 RX ADMIN — CEFEPIME HYDROCHLORIDE 2 G: 2 INJECTION, POWDER, FOR SOLUTION INTRAVENOUS at 03:11

## 2018-01-01 RX ADMIN — ATORVASTATIN CALCIUM 80 MG: 80 TABLET, FILM COATED ORAL at 22:22

## 2018-01-01 RX ADMIN — OXCARBAZEPINE 300 MG: 300 TABLET, FILM COATED ORAL at 09:07

## 2018-01-01 RX ADMIN — Medication 10 ML: at 09:13

## 2018-01-01 RX ADMIN — INSULIN GLARGINE 30 UNITS: 100 INJECTION, SOLUTION SUBCUTANEOUS at 22:23

## 2018-01-01 RX ADMIN — METOPROLOL TARTRATE 50 MG: 50 TABLET, FILM COATED ORAL at 09:09

## 2018-01-01 RX ADMIN — IPRATROPIUM BROMIDE AND ALBUTEROL SULFATE 1 AMPULE: .5; 3 SOLUTION RESPIRATORY (INHALATION) at 20:01

## 2018-01-01 RX ADMIN — CLOPIDOGREL 75 MG: 75 TABLET, FILM COATED ORAL at 09:06

## 2018-01-01 RX ADMIN — FUROSEMIDE 20 MG: 10 INJECTION, SOLUTION INTRAVENOUS at 09:11

## 2018-01-01 RX ADMIN — CEFEPIME HYDROCHLORIDE 2 G: 2 INJECTION, POWDER, FOR SOLUTION INTRAVENOUS at 14:51

## 2018-01-01 RX ADMIN — CEFTRIAXONE SODIUM 1 G: 1 INJECTION, POWDER, FOR SOLUTION INTRAMUSCULAR; INTRAVENOUS at 22:34

## 2018-01-01 RX ADMIN — INSULIN LISPRO 6 UNITS: 100 INJECTION, SOLUTION INTRAVENOUS; SUBCUTANEOUS at 12:16

## 2018-01-01 RX ADMIN — COLCHICINE 0.6 MG: 0.6 TABLET, FILM COATED ORAL at 09:06

## 2018-01-01 RX ADMIN — LISINOPRIL 5 MG: 5 TABLET ORAL at 09:07

## 2018-01-01 RX ADMIN — IPRATROPIUM BROMIDE AND ALBUTEROL SULFATE 1 AMPULE: .5; 3 SOLUTION RESPIRATORY (INHALATION) at 16:59

## 2018-01-01 ASSESSMENT — PAIN SCALES - GENERAL
PAINLEVEL_OUTOF10: 0
PAINLEVEL_OUTOF10: 10
PAINLEVEL_OUTOF10: 0
PAINLEVEL_OUTOF10: 10
PAINLEVEL_OUTOF10: 0
PAINLEVEL_OUTOF10: 0

## 2018-01-01 ASSESSMENT — PAIN DESCRIPTION - PAIN TYPE
TYPE: ACUTE PAIN
TYPE: ACUTE PAIN

## 2018-01-01 ASSESSMENT — ENCOUNTER SYMPTOMS
WHEEZING: 0
GASTROINTESTINAL NEGATIVE: 1
ALLERGIC/IMMUNOLOGIC NEGATIVE: 1
NAUSEA: 0
COUGH: 1
ABDOMINAL PAIN: 0
VOMITING: 0
SHORTNESS OF BREATH: 1
EYES NEGATIVE: 1
SPUTUM PRODUCTION: 0
HEMOPTYSIS: 0
RESPIRATORY NEGATIVE: 1

## 2018-01-01 ASSESSMENT — PAIN DESCRIPTION - LOCATION
LOCATION: ABDOMEN
LOCATION: ABDOMEN

## 2018-01-01 NOTE — PROGRESS NOTES
Physical Therapy    Facility/Department: [facility]  Initial Assessment    NAME: Mario Rico  : 1955  MRN: [id number]      Copied from emergency medicine note filed 17 at 3:37am:  Chief Complaint   Patient presents with    Chest Pain       COPD, asthma, CHF    Respiratory Distress         Date of Service: 2018    Patient Diagnosis(es): The primary encounter diagnosis was Chest pain, unspecified type. A diagnosis of Pneumonia due to organism was also pertinent to this visit. has a past medical history of Arthritis; Asthma; CAD (coronary artery disease); CHF (congestive heart failure) ([facility] Clinical trial participant at discharge; COPD (chronic obstructive pulmonary disease) ([facility] Diabetes mellitus ([facility] Hepatitis C; Hyperlipidemia; Hypertension; MRSA (methicillin resistant staph aureus) culture positive; Pneumonia; and Unspecified cerebral artery occlusion with cerebral infarction. has a past surgical history that includes Tonsillectomy; Foot surgery; Cardiac surgery (2016); Cardiac pacemaker placement; Cardiac catheterization (2017);   picc powerpicc double (2017); and Hysterectomy. Restrictions  Restrictions/Precautions  Restrictions/Precautions: Fall Risk, General Precautions  Position Activity Restriction  Other position/activity restrictions: Up as tolerated  Vision/Hearing        Subjective  General  Patient assessed for rehabilitation services?: Yes  Response To Previous Treatment: Not applicable  Family / Caregiver Present: No  Follows Commands: Within Functional Limits  General Comment  Comments: OT co-eval  Subjective  Subjective: RN and pt agreeable to PT. Pt alert in chair upon arrival  Pain Screening  Patient Currently in Pain: Yes  Pain Assessment  Pain Assessment: 0-10  Pain Level: 10  Pain Type: Acute pain  Pain Location: Abdomen  Pain Intervention(s): Ambulation/Increased activity; Emotional support;RN notified  Response to Pain Intervention: Patient structures, Functions, Activity limitations: Decreased functional mobility   Assessment: amb 4' CGA SBQC. Pt notes pain limiting and declines further activity. Prognosis: Good  Decision Making: Medium Complexity  Patient Education: PT POC  REQUIRES PT FOLLOW UP: Yes  Activity Tolerance  Activity Tolerance: Patient limited by pain  Activity Tolerance: Pt declined any further activity  PT Equipment Recommendations  Other: TBD     Plan   Plan  Times per week: 5  Current Treatment Recommendations: Strengthening, Transfer Training, Endurance Training, Balance Training, Gait Training, Functional Mobility Training  Safety Devices  Type of devices: Call light within reach, Left in bed, Nurse notified, Patient at risk for falls  Restraints  Initially in place: No    G-Code  PT G-Codes  Functional Assessment Tool Used: [place] tool  Score: 14  Functional Limitation: Mobility: Walking and moving around  Mobility: Walking and Moving Around Current Status (): At least 40 percent but less than 60 percent impaired, limited or restricted  Mobility: Walking and Moving Around Goal Status ():  At least 20 percent but less than 40 percent impaired, limited or restricted    Goals  Short term goals  Time Frame for Short term goals: 14 visits  Short term goal 1: Pt will be I with bedm obility  Short term goal 2: Pt will be I with transfers  Short term goal 3: Pt will be Luz Maria with amb 200' SBQC       Therapy Time   Individual Concurrent Group Co-treatment   Time In 0956         Time Out 1009         Minutes Fanny 103, PT

## 2018-01-01 NOTE — PROGRESS NOTES
Pt noted to have red, excoriated skin in between abdominal folds and pannus. Offered multiple times to put powder, lotion, or get order for silvadene cloth to put in between folds, pt adamantly refused stating that she is allergic to all of it and only wants a wet washcloth. Writer again educated on the importance of clayton-care & protecting her skin, pt again adamantly refused any preventative dressings.

## 2018-01-01 NOTE — PROGRESS NOTES
sit: Contact guard assistance  Comment: Pt with reports of dizziness/weakness. No LOB observed  Functional Mobility  Functional - Mobility Device: Cane  Activity: Other (from chair to bed)  Assist Level: Minimal assistance  ADL  Feeding: Minimal assistance  Grooming: Moderate assistance  UE Bathing: Maximum assistance  LE Bathing: Moderate assistance;Maximum assistance  UE Dressing: Moderate assistance;Maximum assistance  LE Dressing: Moderate assistance;Maximum assistance  Toileting: Maximum assistance  Tone RUE  RUE Tone: Normotonic  Tone LUE  LUE Tone:  (appears grossly contracted - Pt refused testing)  Coordination  Movements Are Fluid And Coordinated: No  Coordination and Movement description: Fine motor impairments;Gross motor impairments; Left UE     Bed mobility  Sit to Supine: Moderate assistance  Comment: Pt is chair upon arrival. Pt retired to bed following activity. Transfers  Sit to stand: Minimal assistance  Stand to sit: Contact guard assistance     Cognition  Overall Cognitive Status: Exceptions  Following Commands: Inconsistently follows commands  Attention Span: Appears intact  Memory: Appears intact  Safety Judgement: Decreased awareness of need for safety  Insights: Decreased awareness of deficits  Initiation: Requires cues for some  Cognition Comment: Pt with flat affect, reporting that god told Pt that today would be \"last day on earth\". Sensation  Overall Sensation Status: Impaired (Pt reports numbness/tingling in BLE)        LUE PROM (degrees)  LUE PROM: Exceptions  LUE General PROM: Pt reports entire UE immobile following CVA - refused contact for testing. Left Hand PROM (degrees)  Left Hand PROM: Exceptions  Left Hand General PROM: Pt reports entire UE immobile following CVA - refused contact for testing.   RUE AROM (degrees)  RUE AROM : WFL  Right Hand AROM (degrees)  Right Hand AROM: WFL  LUE Strength  Gross LUE Strength: Exceptions to [facility]  L Hand Grasp: N/A  L Hand Release: N/A  LUE Strength Comment: Pt reports entire UE immobile following CVA - refused contact for testing. RUE Strength  Gross RUE Strength: WFL  R Hand Grasp: 4+/5     Assessment   Performance deficits / Impairments: Decreased functional mobility ; Decreased ADL status; Decreased ROM; Decreased strength;Decreased safe awareness;Decreased cognition;Decreased endurance;Decreased sensation;Decreased balance;Decreased high-level IADLs;Decreased fine motor control;Decreased coordination  Assessment: Pt in recliner upon arrival of OT and PT for session. Pt scoot to edge, sit-stand, functional mobility to EOB, sitting balance EOB, sit-supine, with assist level listed above. Pt reports that god states this was Pt's \"last day on earth\". Pt provided active listening and positive affirmation with fair return and slightly improved facial affect. RN informed and will request spiritual care for Pt. Pt supine in bed with call light in reach and RN informed upon exit. Treatment Diagnosis: Acute on chronic CHF  Prognosis: Fair;Good  Decision Making: High Complexity  Patient Education: OT POC, transitionf of care, safety for mobility, sedentary risks with fair return. Barriers to Learning: cognition/mood  REQUIRES OT FOLLOW UP: Yes  Activity Tolerance  Activity Tolerance: Treatment limited secondary to decreased cognition;Patient limited by pain; Patient limited by fatigue  Safety Devices  Safety Devices in place: Yes  Type of devices: Nurse notified; Left in bed;Patient at risk for falls;Call light within reach           Plan   Plan  Times per week: 3-5x    G-Code  OT G-codes  Functional Assessment Tool Used: [employer]  Score: 13/24  Functional Limitation: Self care  Self Care Current Status (): At least 60 percent but less than 80 percent impaired, limited or restricted  Self Care Goal Status ():  At least 40 percent but less than 60 percent impaired, limited or restricted    AM-PAC Score  AM-PAC Inpatient Daily Activity Raw Score: 13  AM-PAC Inpatient ADL T-Scale Score : 32.03  ADL Inpatient CMS 0-100% Score: 63.03  ADL Inpatient CMS G-Code Modifier : CL    Goals  Short term goals  Time Frame for Short term goals: Pt will, by discharge.    Short term goal 1: demo UB self-care with MIN-MOD A  Short term goal 2: demo LB self-care with MIN-MOD A  Short term goal 3: demo activity tolerance >10min for seated ADL/IADL tasks  Short term goal 4: demo functional mobility/ADL tranfers with AE/AD PRN and CGA  Short term goal 5: demo use of >3 tech for pain management/relaxation with MOD VC  Patient Goals   Patient goals : comfort       Therapy Time   Individual Concurrent Group Co-treatment   Time In 0957         Time Out 1009         Minutes 12 +5min chart review            YUM[employer]KO [employer], OTR/L

## 2018-01-01 NOTE — PLAN OF CARE
Problem: RESPIRATORY  Intervention: Respiratory assessment  markel vazquez[facility]. Pneumonia [J18.9] . Vitals:    01/01/18 1206   BP:    Pulse: 84   Resp: 18   Temp:    SpO2:    . Patients home meds are   Prior to Admission medications    Medication Sig Start Date End Date Taking? Authorizing Provider   furosemide (LASIX) 20 MG tablet Take 2 tablets by mouth 2 times daily 12/26/17   Abner Heath MD   vancomycin (VANCOCIN) infusion Infuse 1,250 mg intravenously every 24 hours for 12 days Compound per protocol. Aim for trough 14-17 12/26/17 1/7/18  Abner Heath MD   aspirin 81 MG chewable tablet Take 1 tablet by mouth daily 12/18/17   Abner Heath MD   ipratropium-albuterol (DUONEB) 0.5-2.5 (3) MG/3ML SOLN nebulizer solution Inhale 3 mLs into the lungs every 4 hours as needed for Shortness of Breath 12/18/17   Jose Be MD   nitroGLYCERIN (NITROSTAT) 0.4 MG SL tablet Place 1 tablet under the tongue every 5 minutes as needed for Chest pain up to max of 3 total doses. If no relief after 1 dose, call 911. 12/18/17   Abner Heath MD   OXcarbazepine (TRILEPTAL) 300 MG tablet Take 1 tablet by mouth 2 times daily Per pharmacy she is to be taking 300 mg twice daily but patient states that she only takes once daily.  12/18/17   Jose Be MD   insulin glargine (LANTUS) 100 UNIT/ML injection vial Inject 30 Units into the skin nightly 12/18/17   Jose Be MD   atorvastatin (LIPITOR) 80 MG tablet Take 1 tablet by mouth nightly 12/18/17   Jose Be MD   lisinopril (PRINIVIL;ZESTRIL) 5 MG tablet Take 1 tablet by mouth daily 12/18/17   Jose Be MD   OLANZapine (ZYPREXA) 10 MG tablet Take 1 tablet by mouth nightly 12/18/17   Jose Be MD   metoprolol tartrate (LOPRESSOR) 50 MG tablet Take 1 tablet by mouth 2 times daily 12/18/17   Abner Heath MD   colchicine (COLCRYS) 0.6 MG tablet Take 1 tablet by mouth daily 12/18/17 Fernanda Hunter, MD   clopidogrel (PLAVIX) 75 MG tablet Take 1 tablet by mouth daily 12/18/17   Fernanda Hunter, MD   famotidine (PEPCID) 20 MG tablet Take 1 tablet by mouth daily 12/18/17   Fernanda Hunter, MD   senna-docusate (Roe Grow) 8.6-50 MG per tablet Take 1 tablet by mouth daily 12/18/17   Fernanda Hunter MD   .  Recent Surgical History: None = 0     Assessment   Pt takes home treatments with Duoneb Q4PRN(unable to do since stroke)  Pt unable to perform spirometry  RR 18  Breath Sounds: clear /decreased bases      · Bronchodilator assessment at level  3  · Hyperinflation assessment at level   · Secretion Management assessment at level    ·   · [x]    Bronchodilator Assessment  BRONCHODILATOR ASSESSMENT SCORE  Score 0 1 2 3 4 5   Breath Sounds   []  Patient Baseline []  No Wheeze good aeration []  Faint, scattered wheezing, good aeration [x]  Expiratory Wheezing and or moderately diminished []  Insp/Exp wheeze and/or very diminished []  Insp/Exp and/ or marked distress   Respiratory Rate   []  Patient Baseline [x]  Less than 20 [x]  Less than 20 []  20-25 []  Greater than 25 []  Greater than 25   Peak flow % of Pred or PB [x]  NA   []  Greater than 90%  []  81-90% []  71-80% []  Less than or equal to 70%  or unable to perform []  Unable due to Respiratory Distress   Dyspnea re []  Patient Baseline []  No SOB []  No SOB [x]  SOB on exertion []  SOB min activity []  At rest/acute   e FEV% Predicted       []  NA []  Above 69%  [x]  Unable []  Above 60-69%  [x]  Unable []  Above 50-59%  [x]  Unable []  Above 35-49%  [x]  Unable []  Less than 35%  [x]  Unable                 []  Hyperinflation Assessment  Score 1 2 3   CXR and Breath Sounds   []  Clear []  No atelectasis  Basilar aeration []  Atelectasis or absent basilar breath sounds   Incentive Spirometry Volume  (Per IBW)   []  Greater than or equal to 15ml/Kg []  less than 15ml/Kg []  less than 15ml/Kg   Surgery within last 2 weeks []  None or

## 2018-01-01 NOTE — PLAN OF CARE
Problem: RESPIRATORY  Intervention: Respiratory assessment  PRN FOR BRONCHOSPASM/BRONCHOCONSTRICTION     [x]         IMPROVE AERATION/BREATH SOUNDS  [x]   ADMINISTER BRONCHODILATOR THERAPY AS APPROPRIATE  [x]   ASSESS BREATH SOUNDS  [x]   IMPLEMENT AEROSOL/MDI PROTOCOL  [x]   PATIENT EDUCATION AS NEEDED    PROVIDE ADEQUATE OXYGENATION WITH ACCEPTABLE SP02/ABG'S    [x]  IDENTIFY APPROPRIATE OXYGEN THERAPY  [x]   MONITOR SP02/ABG'S AS NEEDED   [x]   PATIENT EDUCATION AS NEEDED

## 2018-01-01 NOTE — PLAN OF CARE
[address] [place] [address]       Second Visit Note  For more detailed information please refer to the progress note of the day      1/1/2018    6:31 PM    Name:   Ed Alvarez  MRN:     [id number]     Jovitalyside:      [de-identified]   Room:   [address] [place] [address] Day:  1  Admit Date:  12/30/2017 10:13 PM    PCP:   Francisco Billings MD  Code Status:  Full Code    Patient was seen  Up to chair  Less dyspneic  No cyanosis  Reports that she is on home oxygen at 4 L/m    CURRENT MEDS:     aspirin chewable tablet 81 mg Daily   atorvastatin (LIPITOR) tablet 80 mg Nightly   clopidogrel (PLAVIX) tablet 75 mg Daily   colchicine (COLCRYS) tablet 0.6 mg Daily   famotidine (PEPCID) tablet 20 mg Daily   insulin glargine (LANTUS) injection vial 30 Units Nightly   lisinopril (PRINIVIL;ZESTRIL) tablet 5 mg Daily   metoprolol tartrate (LOPRESSOR) tablet 50 mg BID   OLANZapine (ZYPREXA) tablet 10 mg Nightly   OXcarbazepine (TRILEPTAL) tablet 300 mg BID   sennosides-docusate sodium (SENOKOT-S) 8.6-50 MG tablet 1 tablet Daily   sodium chloride flush 0.9 % injection 10 mL 2 times per day   sodium chloride flush 0.9 % injection 10 mL PRN   acetaminophen (TYLENOL) tablet 650 mg Q4H PRN   HYDROcodone-acetaminophen (NORCO) 5-325 MG per tablet 1 tablet Q4H PRN   Or    HYDROcodone-acetaminophen (NORCO) 5-325 MG per tablet 2 tablet Q4H PRN   magnesium hydroxide (MILK OF MAGNESIA) 400 MG/5ML suspension 30 mL Daily PRN   bisacodyl (DULCOLAX) suppository 10 mg Daily PRN   ondansetron (ZOFRAN) injection 4 mg Q6H PRN   nicotine (NICODERM CQ) 21 MG/24HR 1 patch Daily PRN   enoxaparin (LOVENOX) injection 40 mg Daily   ceFEPIme (MAXIPIME) 2 g in sterile water 20 mL IV syringe Q12H   glucose (GLUTOSE) 40 % oral gel 15 g PRN   dextrose 50 % solution 12.5 g PRN   glucagon (rDNA) injection 1 mg PRN   dextrose 5 % solution PRN   vancomycin (VANCOCIN) 1,250 mg in dextrose 5 % 250 mL IVPB Q24H   ipratropium-albuterol (DUONEB) nebulizer solution 1 ampule 4x daily albuterol (PROVENTIL) nebulizer solution 2.5 mg Q6H PRN   insulin lispro (HUMALOG) injection vial 0-18 Units TID WC   insulin lispro (HUMALOG) injection vial 0-9 Units Nightly   furosemide (LASIX) injection 20 mg BID   nitroGLYCERIN (NITROSTAT) SL tablet 0.4 mg Q5 Min PRN       VITALS:  BP (!) 129/95   Pulse 94   Temp 98.1 °F (36.7 °C) (Oral)   Resp 26   Ht 5' 1\" (1.549 m)   Wt 181 lb (82.1 kg)   LMP  (LMP Unknown)   SpO2 100%   BMI 34.20 kg/m²     LABS:   Hematology:  Recent Labs      12/30/17 2328 01/01/18   0552   WBC  10.2  7.1   HGB  9.0*  8.9*   HCT  30.6*  30.0*   PLT  304  246     Chemistry:  Recent Labs      12/30/17 2328 12/30/17   2342  01/01/18   0552   NA  136   --   141   K  4.3   --   3.9   CL  99   --   103   CO2  25   --   23   GLUCOSE  175*   --   62*   BUN  21   --   17   CREATININE  1.07*  1.13  0.77   CALCIUM  8.6   --   8.1*     Recent Labs      12/30/17 2327 12/31/17 0220  01/01/18   0552   PROT   --    --   6.4   LABALBU   --    --   3.0*   AST   --    --   11   ALT   --    --   9   ALKPHOS   --    --   86   BILITOT   --    --   0.42   TROPONINI  0.02  0.01   --        PROBLEMS:  Principal Problem:    Acute on chronic combined systolic and diastolic CHF (congestive heart failure) (HCC)  Active Problems:    Essential hypertension    Controlled type 2 diabetes mellitus without complication, without long-term current use of insulin (HCC)    History of CVA (cerebrovascular accident)    Late effects of CVA (cerebrovascular accident)    Inability to perform activities of daily living    S/P implantation of automatic cardioverter/defibrillator (AICD) 3/13/1- Dr. 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

## 2018-01-02 ENCOUNTER — TELEPHONE (OUTPATIENT)
Dept: FAMILY MEDICINE CLINIC | Age: 63
End: 2018-01-02

## 2018-01-02 VITALS
SYSTOLIC BLOOD PRESSURE: 108 MMHG | WEIGHT: 182.76 LBS | HEART RATE: 92 BPM | RESPIRATION RATE: 20 BRPM | BODY MASS INDEX: 34.51 KG/M2 | OXYGEN SATURATION: 100 % | TEMPERATURE: 98.1 F | DIASTOLIC BLOOD PRESSURE: 83 MMHG | HEIGHT: 61 IN

## 2018-01-02 PROBLEM — Z72.0 TOBACCO USE: Status: ACTIVE | Noted: 2018-01-02

## 2018-01-02 LAB
GLUCOSE BLD-MCNC: 124 MG/DL (ref 65–105)
GLUCOSE BLD-MCNC: 196 MG/DL (ref 65–105)
GLUCOSE BLD-MCNC: 92 MG/DL (ref 65–105)
LACTIC ACID, WHOLE BLOOD: 1.4 MMOL/L (ref 0.7–2.1)

## 2018-01-02 PROCEDURE — 36415 COLL VENOUS BLD VENIPUNCTURE: CPT

## 2018-01-02 PROCEDURE — 6360000002 HC RX W HCPCS: Performed by: INTERNAL MEDICINE

## 2018-01-02 PROCEDURE — 99239 HOSP IP/OBS DSCHRG MGMT >30: CPT | Performed by: INTERNAL MEDICINE

## 2018-01-02 PROCEDURE — 83605 ASSAY OF LACTIC ACID: CPT

## 2018-01-02 PROCEDURE — 82947 ASSAY GLUCOSE BLOOD QUANT: CPT

## 2018-01-02 PROCEDURE — 6370000000 HC RX 637 (ALT 250 FOR IP): Performed by: EMERGENCY MEDICINE

## 2018-01-02 PROCEDURE — 94762 N-INVAS EAR/PLS OXIMTRY CONT: CPT

## 2018-01-02 PROCEDURE — 6360000002 HC RX W HCPCS: Performed by: NURSE PRACTITIONER

## 2018-01-02 PROCEDURE — 2580000003 HC RX 258: Performed by: INTERNAL MEDICINE

## 2018-01-02 PROCEDURE — 94640 AIRWAY INHALATION TREATMENT: CPT

## 2018-01-02 PROCEDURE — 6370000000 HC RX 637 (ALT 250 FOR IP): Performed by: NURSE PRACTITIONER

## 2018-01-02 PROCEDURE — 6370000000 HC RX 637 (ALT 250 FOR IP): Performed by: INTERNAL MEDICINE

## 2018-01-02 PROCEDURE — 2580000003 HC RX 258: Performed by: NURSE PRACTITIONER

## 2018-01-02 PROCEDURE — 97535 SELF CARE MNGMENT TRAINING: CPT

## 2018-01-02 RX ORDER — NYSTATIN 100000 U/G
OINTMENT TOPICAL 2 TIMES DAILY
Status: DISCONTINUED | OUTPATIENT
Start: 2018-01-02 | End: 2018-01-03 | Stop reason: HOSPADM

## 2018-01-02 RX ORDER — NYSTATIN 100000 U/G
OINTMENT TOPICAL
Qty: 1 TUBE | Refills: 3 | OUTPATIENT
Start: 2018-01-02

## 2018-01-02 RX ADMIN — METOPROLOL TARTRATE 50 MG: 50 TABLET, FILM COATED ORAL at 09:04

## 2018-01-02 RX ADMIN — COLCHICINE 0.6 MG: 0.6 TABLET, FILM COATED ORAL at 09:03

## 2018-01-02 RX ADMIN — Medication 10 ML: at 09:19

## 2018-01-02 RX ADMIN — LISINOPRIL 5 MG: 5 TABLET ORAL at 09:08

## 2018-01-02 RX ADMIN — VANCOMYCIN HYDROCHLORIDE 1250 MG: 1 INJECTION, POWDER, LYOPHILIZED, FOR SOLUTION INTRAVENOUS at 05:02

## 2018-01-02 RX ADMIN — OXCARBAZEPINE 300 MG: 300 TABLET, FILM COATED ORAL at 09:20

## 2018-01-02 RX ADMIN — ASPIRIN 81 MG: 81 TABLET, CHEWABLE ORAL at 09:04

## 2018-01-02 RX ADMIN — FUROSEMIDE 20 MG: 10 INJECTION, SOLUTION INTRAVENOUS at 09:18

## 2018-01-02 RX ADMIN — DOCUSATE SODIUM -SENNOSIDES 1 TABLET: 50; 8.6 TABLET, COATED ORAL at 09:04

## 2018-01-02 RX ADMIN — IPRATROPIUM BROMIDE AND ALBUTEROL SULFATE 1 AMPULE: .5; 3 SOLUTION RESPIRATORY (INHALATION) at 12:32

## 2018-01-02 RX ADMIN — ENOXAPARIN SODIUM 40 MG: 40 INJECTION SUBCUTANEOUS at 09:03

## 2018-01-02 RX ADMIN — INSULIN LISPRO 3 UNITS: 100 INJECTION, SOLUTION INTRAVENOUS; SUBCUTANEOUS at 18:10

## 2018-01-02 RX ADMIN — CLOPIDOGREL 75 MG: 75 TABLET, FILM COATED ORAL at 09:03

## 2018-01-02 RX ADMIN — FAMOTIDINE 20 MG: 20 TABLET, FILM COATED ORAL at 09:04

## 2018-01-02 ASSESSMENT — ENCOUNTER SYMPTOMS
HEMOPTYSIS: 0
NAUSEA: 0
VOMITING: 0
ABDOMINAL PAIN: 0
WHEEZING: 0
COUGH: 1
SHORTNESS OF BREATH: 1
SPUTUM PRODUCTION: 0

## 2018-01-02 ASSESSMENT — PAIN SCALES - GENERAL
PAINLEVEL_OUTOF10: 1
PAINLEVEL_OUTOF10: 0
PAINLEVEL_OUTOF10: 1

## 2018-01-02 NOTE — PROGRESS NOTES
stand: Moderate assistance  Stand to sit: Contact guard assistance  Comment: pt has personal quad cane at hospital and used. pt did have a stroke in the past that resulted in R side deficits     Transfers  Stand Step Transfers: Contact guard assistance (w/ quad cane)  Sit to stand: Moderate assistance  Stand to sit: Contact guard assistance  Attendance  Participation: Active participation    Assessment   Pt is not safe to return home on this date and would benefit from continued therapy services to address Performance deficits / Impairments: Decreased functional mobility ; Decreased ADL status; Decreased ROM; Decreased strength;Decreased safe awareness;Decreased cognition;Decreased endurance;Decreased sensation;Decreased balance;Decreased high-level IADLs;Decreased fine motor control;Decreased coordination  Assessment: pt was on 02 n/c 4 L during the session and was on when writer exited the room  Treatment Diagnosis: Acute on chronic CHF  Prognosis: Good  Patient Education: OT POC, transition of care, transfer safety, sedentary risks, fall prevention tips, DME/AE, importance of not smoking w/ 02 on, EC/WS, importance of increased act and benefits to therapy-fair return  REQUIRES OT FOLLOW UP: Yes  Activity Tolerance  Activity Tolerance: Patient Tolerated treatment well  Safety Devices  Safety Devices in place: Yes  Type of devices: All fall risk precautions in place;Call light within reach; Chair alarm in place; Left in chair;Nurse notified         Plan   Plan  Times per week: 3-5x    Goals  Short term goals  Time Frame for Short term goals: Pt will, by discharge.    Short term goal 1: demo UB self-care with MIN-MOD A  Short term goal 2: demo LB self-care with MIN-MOD A  Short term goal 3: demo activity tolerance >10min for seated ADL/IADL tasks  Short term goal 4: demo functional mobility/ADL tranfers with AE/AD PRN and CGA  Short term goal 5: demo use of >3 tech for pain management/relaxation with MOD VC  Patient Goals Patient goals : comfort       Therapy Time   Individual Concurrent Group Co-treatment   Time In  8:40-8:59- self feeding and returned at 10:24-11:00 to assist pt in completing a shower/ADLs         Time Out           Minutes                   [address]/L

## 2018-01-02 NOTE — PROGRESS NOTES
Physical Therapy  DATE: 2018    NAME: Mario Rico  MRN: [id number]   : 1955    Patient not seen this date for Physical Therapy due to:  [] Blood transfusion in progress  [] Hemodialysis  [x]  Patient Declined---Pt refused stating she is going home today and is only interested in showering before she leaves  [] Spine Precautions   [] Strict Bedrest  [] Surgery/ Procedure  [] Testing      [] Other        [] PT being discontinued at this time. Patient independent. No further needs. [] PT being discontinued at this time as the patient has been transferred to palliative care. No further needs.     Mirna Vang, PTA

## 2018-01-02 NOTE — PROGRESS NOTES
[facility] Wound Ostomy  Nurse  Consult Note       NAME:  Ed Alvarez  MEDICAL RECORD NUMBER:  [id number]  AGE: 65 y.o. GENDER: female  : 1955  TODAY'S DATE:  2018    Subjective   Reason for WOCN Evaluation and Assessment:   Consulted for groin redness. Patient with intertriginous dermatitis and skin fold moisture. Does not appear to be of fungal nature. Suggested using Interdry to separate skin folds but patent refused citing she is allergic. Suggested using dry cotton cloth, ie wash cloth or pillow cases, and patient declined, citing she is allergic. Discussed with Pamela Gilman.   Noted physician order for Nystatin cream.

## 2018-01-02 NOTE — PROGRESS NOTES
50 MG tablet Take 1 tablet by mouth 2 times daily 12/18/17   Cindy Montgomery MD   colchicine (COLCRYS) 0.6 MG tablet Take 1 tablet by mouth daily 12/18/17   Cindy Montgomery MD   clopidogrel (PLAVIX) 75 MG tablet Take 1 tablet by mouth daily 12/18/17   Cindy Montgomery MD   famotidine (PEPCID) 20 MG tablet Take 1 tablet by mouth daily 12/18/17   Cindy Montgomery MD   senna-docusate (Kiet Settles) 8.6-50 MG per tablet Take 1 tablet by mouth daily 12/18/17   Cindy Montgomery MD   .  Recent Surgical History: None = 0     Assessment     Peak Flow (asthma only)    Predicted:   Personal Best:   PEF   % Predicted   Peak Flow :     FEV1/FVC    FEV1 Predicted       FEV1     FEV1 % Predicted   FVC   IS volume   IBW     RR 16  Breath Sounds: clear, diminished bases      · Bronchodilator assessment at level  1  · Hyperinflation assessment at level   · Secretion Management assessment at level    ·   · []    Bronchodilator Assessment  BRONCHODILATOR ASSESSMENT SCORE  Score 0 1 2 3 4 5   Breath Sounds   []  Patient Baseline [x]  No Wheeze good aeration []  Faint, scattered wheezing, good aeration []  Expiratory Wheezing and or moderately diminished []  Insp/Exp wheeze and/or very diminished []  Insp/Exp and/ or marked distress   Respiratory Rate   []  Patient Baseline [x]  Less than 20 []  Less than 20 []  20-25 []  Greater than 25 []  Greater than 25   Peak flow % of Pred or PB [x]  NA   []  Greater than 90%  []  81-90% []  71-80% []  Less than or equal to 70%  or unable to perform []  Unable due to Respiratory Distress   Dyspnea re []  Patient Baseline [x]  No SOB []  No SOB []  SOB on exertion []  SOB min activity []  At rest/acute   e FEV% Predicted       [x]  NA []  Above 69%  []  Unable []  Above 60-69%  []  Unable []  Above 50-59%  []  Unable []  Above 35-49%  []  Unable []  Less than 35%  []  Unable                 []  Hyperinflation Assessment  Score 1 2 3   CXR and Breath Sounds   []  Clear []  No atelectasis  Basilar aeration []  Atelectasis or absent basilar breath sounds   Incentive Spirometry Volume  (Per IBW)   []  Greater than or equal to 15ml/Kg []  less than 15ml/Kg []  less than 15ml/Kg   Surgery within last 2 weeks []  None or general   []  Abdominal or thoracic surgery  []  Abdominal or thoracic   Chronic Pulmonary Historyre []  No []  Yes []  Yes     []  Secretion Management Assessment  Score 1 2 3   Bilateral Breath Sounds   []  Occasional Rhonchi []  Scattered Rhonchi []  Course Rhonchi and/or poor aeration   Sputum    []  Small amount of thin secretions []  Moderate amount of viscous secretions []  Copius, Viscious Yellow/ Secretions   CXR as reported by physician []  clear  []  Unavailable []  Infiltrates and/or consolidation  []  Unavailable []  Mucus Plugging and or lobar consolidation  []  Unavailable   Cough []  Strong, productive cough []  Weak productive cough []  No cough or weak non-productive cough   RAYRAY SORIANO THOMAS  12:35 PM                            FEMALE                                  MALE                            FEV1 Predicted Normal Values                        FEV1 Predicted Normal Values          Age                                     Height in Feet and Inches       Age                                     Height in Feet and Inches       4' 11\" 5' 1\" 5' 3\" 5' 5\" 5' 7\" 5' 9\" 5' 11\" 6' 1\"  4' 11\" 5' 1\" 5' 3\" 5' 5\" 5' 7\" 5' 9\" 5' 11\" 6' 1\"   42 - 45 2.49 2.66 2.84 3.03 3.22 3.42 3.62 3.83 42 - 45 2.82 3.03 3.26 3.49 3.72 3.96 4.22 4.47   46 - 49 2.40 2.57 2.76 2.94 3.14 3.33 3.54 3.75 46 - 49 2.70 2.92 3.14 3.37 3.61 3.85 4.10 4.36   50 - 53 2.31 2.48 2.66 2.85 3.04 3.24 3.45 3.66 50 - 53 2.58 2.80 3.02 3.25 3.49 3.73 3.98 4.24   54 - 57 2.21 2.38 2.57 2.75 2.95 3.14 3.35 3.56 54 - 57 2.46 2.67 2.89 3.12 3.36 3.60 3.85 4.11   58 - 61 2.10 2.28 2.46 2.65 2.84 3.04 3.24 3.45 58 - 61 2.32 2.54 2.76 2.99 3.23 3.47 3.72 3.98   62 - 65 1.99 2.17 2.35 2.54 2.73 2.93 3.13 3.34 62

## 2018-01-02 NOTE — PLAN OF CARE
now on vancomycin and ceftriaxone  Vancomycin through 1/15 - Rx provided  Rocephin through 1/5 - Rx provided  Monitor for underlying pneumonia  Optimize cardio-pulmonary function  Will monitor and control Blood Pressure  Will monitor and control Diabetes  DVT prophylaxis  Respiratory Therapy and Bronchodilators prn   Nutritional supplementation  Social Service consult for discharge planning   Home antibiotic therapy - has been in place  Smoking cessation / education   Discharge planning - 39 min+  Will discharge when arrangements complete and ok with other services.   Follow-up with PCP in one week, Francisco Billings MD  She will follow-up with  for cardiology  Notify PCP of discharge   Med rec done  LATOYA done  Home care orders placed    IP CONSULT TO HOSPITALIST  IP CONSULT TO INFECTIOUS DISEASES  IP CONSULT TO [address] TO [address] [place] [address],[address], DO  1/2/2018  4:52 PM

## 2018-01-02 NOTE — PROGRESS NOTES
[facility] [address] [address] [phone]    CULTURE NO GROWTH 2 DAYS 12/31/2017 05:56 AM    CULTURE  12/31/2017 05:56 AM     [address] [phone]       Lab Results   Component Value Date    FIO2 NOT REPORTED 12/30/2017       Radiology:  Chest x-ray:  Impression:        Resolving mild vascular congestion.  Trace pleural effusions. CTA:  Impression:        No pulmonary emboli. Patchy airspace opacities, most predominantly right lower lobe, could  represent pneumonia. Small right and trace left pleural effusions. Results for Ann Leigh (MRN [id number]) as of 1/1/2018 16:31   Ref.  Range 6/15/2013 06:43 1/24/2015 06:34 5/6/2016 13:05 10/22/2017 18:49   Hemoglobin A1C Latest Ref Range: 4.0 - 6.0 % 13.2 (H) 13.4 (H) 12.2 (H) 6.8 (H)       Assessment:        Primary Problem  Principal Problem:    Acute on chronic combined systolic and diastolic CHF (congestive heart failure) ([facility])  Active Problems:    Essential hypertension    Controlled type 2 diabetes mellitus without complication, without long-term current use of insulin ([facility])    History of CVA (cerebrovascular accident)    Late effects of CVA (cerebrovascular accident)    Inability to perform activities of daily living    S/P implantation of automatic cardioverter/defibrillator (AICD) 3/13/1- Dr. 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)    Chest pain    Hypoalbuminemia    COPD (chronic obstructive pulmonary disease) ([facility]    Tobacco use      Plan:        Antibiotics per C&S / Infectious Disease - now on vancomycin and ceftriaxone  Vancomycin through 1/15  Rocephin through 1/5  Monitor for underlying pneumonia  Optimize cardio-pulmonary function  Will monitor and control Blood Pressure  Will monitor and control Diabetes  DVT

## 2018-01-02 NOTE — PROGRESS NOTES
unknown: Yes        Allergies:   Levofloxacin and Pcn [penicillins]     Review of Systems:     Review of Systems   Constitutional: Negative. HENT: Negative. Eyes: Negative. Respiratory: Negative. Cardiovascular: Negative. Gastrointestinal: Negative. Endocrine: Negative. Genitourinary: Negative. Musculoskeletal: Negative. Skin: Negative. Allergic/Immunologic: Negative. Neurological: Negative. Hematological: Negative. Psychiatric/Behavioral: Negative. Physical Examination :   Patient Vitals for the past 8 hrs:   BP Temp Temp src Pulse Resp SpO2   01/01/18 1650 (!) 129/95 98.1 °F (36.7 °C) Oral 94 26 -   01/01/18 1600 129/71 99 °F (37.2 °C) Oral 95 20 100 %       Physical Exam   Constitutional: She is oriented to person, place, and time and well-developed, well-nourished, and in no distress. No distress. HENT:   Head: Normocephalic and atraumatic. Eyes: Conjunctivae are normal. Right eye exhibits no discharge. Left eye exhibits no discharge. No scleral icterus. Neck: Neck supple. No tracheal deviation present. Cardiovascular: Normal rate and normal heart sounds. Pulmonary/Chest: Effort normal and breath sounds normal. No respiratory distress. Left chest wall AICD site healed   Abdominal: Soft. Bowel sounds are normal. She exhibits no distension. Musculoskeletal: Normal range of motion. She exhibits no edema or deformity. Neurological: She is alert and oriented to person, place, and time. No cranial nerve deficit. Skin: Skin is warm and dry. She is not diaphoretic. No erythema.    Psychiatric: Affect and judgment normal.         Medical Decision Making:   I have independently reviewed/ordered the following labs:    CBC with Differential: Recent Labs      12/30/17 2328 01/01/18   0552   WBC  10.2  7.1   HGB  9.0*  8.9*   HCT  30.6*  30.0*   PLT  304  246   LYMPHOPCT  22*   --    MONOPCT  9   --      BMP: Recent Labs      12/30/17 2328 12/30/17   2342

## 2018-01-03 ENCOUNTER — TELEPHONE (OUTPATIENT)
Dept: PULMONOLOGY | Age: 63
End: 2018-01-03

## 2018-01-03 PROBLEM — G81.94 LEFT HEMIPLEGIA (HCC): Status: ACTIVE | Noted: 2018-01-03

## 2018-01-03 NOTE — ED PROVIDER NOTES
Tonsillectomy; Foot surgery; Cardiac surgery (5/6/2016); Cardiac pacemaker placement; Cardiac catheterization (11/27/2017); and hc  picc powerpicc double (12/19/2017).    Social History   Social History            Social History    Marital status: Single       Spouse name: N/A    Number of children: N/A    Years of education: N/A          Occupational History    Not on file.             Social History Main Topics    Smoking status: Current Every Day Smoker       Packs/day: 1.00       Types: Cigarettes    Smokeless tobacco: Never Used    Alcohol use Yes         Comment: 1 bottle per month wine    Drug use: No    Sexual activity: Yes       Partners: Male           Other Topics Concern    Not on file          Social History Narrative    No narrative on file            Family History         Family History   Problem Relation Age of Onset    Family history unknown: Yes            Allergies:  Levofloxacin and Pcn [penicillins]     Home Medications:  Home Medications           Prior to Admission medications    Medication Sig Start Date End Date Taking? Authorizing Provider   aspirin 81 MG chewable tablet Take 1 tablet by mouth daily 12/18/17   Yes Ren Hatch MD   ipratropium-albuterol (DUONEB) 0.5-2.5 (3) MG/3ML SOLN nebulizer solution Inhale 3 mLs into the lungs every 4 hours as needed for Shortness of Breath 12/18/17   Yes Jose Be MD   nitroGLYCERIN (NITROSTAT) 0.4 MG SL tablet Place 1 tablet under the tongue every 5 minutes as needed for Chest pain up to max of 3 total doses. If no relief after 1 dose, call 911. 12/18/17   Yes Jose Be MD   OXcarbazepine (TRILEPTAL) 300 MG tablet Take 1 tablet by mouth 2 times daily Per pharmacy she is to be taking 300 mg twice daily but patient states that she only takes once daily.  12/18/17   Yes Jose Be MD   insulin glargine (LANTUS) 100 UNIT/ML injection vial Inject 30 Units into the skin nightly 12/18/17   Yes Ren Hatch ceFEPIme (MAXIPIME) 2 g in sterile water 20 mL IV syringe    azithromycin (ZITHROMAX) 500 mg in dextrose 5 % 250 mL IVPB    vancomycin (VANCOCIN) intermittent dosing (placeholder)    vancomycin (VANCOCIN) 1,250 mg in dextrose 5 % 250 mL IVPB            DDX:  CHF exacerbation, COPD exacerbation, STEMI, STEMI, PE, pneumonia, pneumothorax, ACS  DIAGNOSTIC RESULTS / EMERGENCY DEPARTMENT COURSE / MDM      LABS:         Results for orders placed or performed during the hospital encounter of 12/22/17   CBC WITH AUTO DIFFERENTIAL   Result Value Ref Range     WBC 13.3 (H) 3.5 - 11.3 k/uL     RBC 2.92 (L) 3.95 - 5.11 m/uL     Hemoglobin 7.4 (L) 11.9 - 15.1 g/dL     Hematocrit 25.4 (L) 36.3 - 47.1 %     MCV 87.0 82.6 - 102.9 fL     MCH 25.3 25.2 - 33.5 pg     MCHC 29.1 28.4 - 34.8 g/dL     RDW 19.9 (H) 11.8 - 14.4 %     Platelets 803 106 - 883 k/uL     MPV 9.6 8.1 - 13.5 fL     Differential Type NOT REPORTED       WBC Morphology NOT REPORTED       RBC Morphology ANISOCYTOSIS PRESENT       Platelet Estimate NOT REPORTED       Seg Neutrophils 70 (H) 36 - 65 %     Lymphocytes 19 (L) 24 - 43 %     Monocytes 7 3 - 12 %     Eosinophils % 3 1 - 4 %     Basophils 1 0 - 2 %     Immature Granulocytes 1 (H) 0 %     Segs Absolute 9.25 (H) 1.50 - 8.10 k/uL     Absolute Lymph # 2.53 1.10 - 3.70 k/uL     Absolute Mono # 0.98 0.10 - 1.20 k/uL     Absolute Eos # 0.40 0.00 - 0.44 k/uL     Basophils # 0.07 0.00 - 0.20 k/uL     Absolute Immature Granulocyte 0.07 0.00 - 0.30 k/uL   BASIC METABOLIC PANEL   Result Value Ref Range     Glucose 121 (H) 70 - 99 mg/dL     BUN 12 8 - 23 mg/dL     CREATININE 0.91 (H) 0.50 - 0.90 mg/dL     Bun/Cre Ratio NOT REPORTED 9 - 20     Calcium 8.0 (L) 8.6 - 10.4 mg/dL     Sodium 138 135 - 144 mmol/L     Potassium 4.6 3.7 - 5.3 mmol/L     Chloride 102 98 - 107 mmol/L     CO2 23 20 - 31 mmol/L     Anion Gap 13 9 - 17 mmol/L     GFR Non-African American >60 >60 mL/min     GFR African American >60 >60 mL/min     GFR

## 2018-01-03 NOTE — DISCHARGE SUMMARY
[facility]  Discharge Summary     Patient ID: Angeli Duval  :  1955   MRN: [id number]     ACCOUNT:  [de-identified]   Patient's PCP: Onelia Abreu MD  Admit Date: 2017   Discharge Date: 1/3/2018    Discharge Physician: Francisco Del Rio DO     The patient was seen and examined on day of discharge and this discharge summary is in conjunction with any daily progress note from day of discharge.     Active Discharge Diagnoses:     Primary Problem  Acute on chronic combined systolic and diastolic CHF (congestive heart failure) [facility])      [place] Problems    Diagnosis Date Noted    S/P implantation of automatic cardioverter/defibrillator (AICD) 3/13/1- Dr. Kourtney Farah [Z95.810] 2017     Priority: High    Left hemiplegia ([facility] - following CVA in the setting of CHF exacerbation [G81.94] 2018    Tobacco use [Z72.0] 2018    Hypoalbuminemia [E88.09] 2018    COPD (chronic obstructive pulmonary disease) ([facility] [J44.9]     Pneumonia of right lower lobe due to infectious organism [facility]) [J18.1] 2017    Chest pain [R07.9]     Acute on chronic combined systolic and diastolic CHF (congestive heart failure) ([facility] [I50.43]     Schizophrenia ([facility] [F20.9] 12/15/2017    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 [I33.0, B95.8]     Uncontrolled type 2 diabetes mellitus with hyperglycemia ([address] [E11.65]     Late effects of CVA (cerebrovascular accident) - left hemiparesis [I69.90] 2016    Inability to perform activities of daily living [R53.81]     History of CVA (cerebrovascular accident) [Z86.73] 2015    Controlled type 2 diabetes mellitus without complication, without long-term current use of insulin ([facility] [E11.9] 2015    Essential hypertension [I10] 06/15/2013         Hospital Course:     Brief History:  80-year-old female was admitted to the hospital with respiratory distress  Clinically there were concerns of CHF / COPD study due to the poor quality of the study. Main pulmonary artery is normal in size. Mediastinum: No evidence of mediastinal lymphadenopathy. The heart and pericardium demonstrate no acute abnormality. There is no acute abnormality of the thoracic aorta. Coronary arteries are calcified. Heart is enlarged without pericardial effusion. Lungs/pleura: Mild congestive changes are present in the lungs without focal consolidation or extrapleural air. Upper Abdomen: Limited images of the upper abdomen are unremarkable. Artifact from positioning is present through the upper abdomen limiting assessment. Small hiatal hernia is evident. Aorta is calcified. Soft Tissues/Bones: Prior median sternotomy is evident. No suspicious lytic or blastic lesions are present. Study generally nondiagnostic for pulmonary emboli due to poor contrast bolus in the pulmonary arteries. Additionally, there is streak artifact throughout the pulmonary artery area due to hand position. Vascular congestive changes are present in the lungs without effusion. RECOMMENDATIONS: Radionuclide pulmonary ventilation perfusion imaging may be employed as deemed clinically necessary.      Vl Dup Lower Extremity Venous Bilateral    Result Date: 12/5/2017    [facility]  Vascular Lower Extremities DVT Study Procedure   Patient Name   Gui Chou Date of Study           12/05/2017                 D   Date of Birth  1955  Gender                  Female   Age            65 year(s)  Race                    Black   Room Number    1027        Height:                 61 inch, 154.94 cm   Corporate ID # [id number]  Weight:                 174 pounds, 78.9 kg   Patient Acct # [de-identified]   BSA:        1.78 m^2    BMI:       32.88 kg/m^2   MR #           [id number]     [facility]   Accession #    [id number]   Interpreting Physician  Alessandro Ruiz   Referring                  Referring Physician     [address] [place] [address]  Nurse +------------------------------------+----------+---------------+----------+ ! Peroneal                            !No        !               !          ! +------------------------------------+----------+---------------+----------+ ! Gastroc                             ! Yes       ! Yes            ! None      ! +------------------------------------+----------+---------------+----------+ ! GSV Thigh                           ! Yes       ! Yes            ! None      ! +------------------------------------+----------+---------------+----------+ ! GSV Knee                            ! Yes       ! Yes            ! None      ! +------------------------------------+----------+---------------+----------+ ! GSV Ankle                           ! Yes       ! Yes            ! None      ! +------------------------------------+----------+---------------+----------+ ! SSV                                 ! Yes       ! Yes            ! None      ! +------------------------------------+----------+---------------+----------+ Left Doppler Measurements +-----------------------------+------+------+------------------------------+ ! Location                     ! Signal!Reflux! Reflux (sec)                  ! +-----------------------------+------+------+------------------------------+ ! Common Femoral               !Phasic! No    !                              ! +-----------------------------+------+------+------------------------------+ ! Prox Femoral                 !Phasic! No    !                              ! +-----------------------------+------+------+------------------------------+ ! Popliteal                    !Phasic! No    !                              ! +-----------------------------+------+------+------------------------------+  Conclusions   Summary   Technically limited visualization. No evidence of superficial or deep venous thrombosis in both lower  extremities.    Signature   ---------------------------------------------------------------- tablet  Commonly known as:  PEPCID  Take 1 tablet by mouth daily     furosemide 20 MG tablet  Commonly known as:  LASIX  Take 2 tablets by mouth 2 times daily     insulin glargine 100 UNIT/ML injection vial  Commonly known as:  LANTUS  Inject 30 Units into the skin nightly     ipratropium-albuterol 0.5-2.5 (3) MG/3ML Soln nebulizer solution  Commonly known as:  DUONEB  Inhale 3 mLs into the lungs every 4 hours as needed for Shortness of Breath     lisinopril 5 MG tablet  Commonly known as:  PRINIVIL;ZESTRIL  Take 1 tablet by mouth daily     metoprolol tartrate 50 MG tablet  Commonly known as:  LOPRESSOR  Take 1 tablet by mouth 2 times daily     nitroGLYCERIN 0.4 MG SL tablet  Commonly known as:  NITROSTAT  Place 1 tablet under the tongue every 5 minutes as needed for Chest pain up to max of 3 total doses. If no relief after 1 dose, call 911. OLANZapine 10 MG tablet  Commonly known as:  ZYPREXA  Take 1 tablet by mouth nightly     OXcarbazepine 300 MG tablet  Commonly known as:  TRILEPTAL  Take 1 tablet by mouth 2 times daily Per pharmacy she is to be taking 300 mg twice daily but patient states that she only takes once daily. senna-docusate 8.6-50 MG per tablet  Commonly known as:  PERICOLACE  Take 1 tablet by mouth daily           Where to Get Your Medications      These medications were sent to [facility] [address] [place], [place] [address]vd.  [address], [address] [address]    Phone:  [phone]   · cefTRIAXone 1-3.74 GM-% IVPB     You can get these medications from any pharmacy    Bring a paper prescription for each of these medications  · vancomycin infusion         Time Spent on discharge is  36 mins in patient examination, evaluation, counseling, medication reconciliation, discharge plan and follow up.     Electronically signed by   Rosa Elena Romo DO  1/3/2018  7:10 AM      Thank you Dr. Angel Pryor MD for the opportunity to be involved in this patient's care.

## 2018-01-03 NOTE — FLOWSHEET NOTE
PICC line right arm in place. Instructions given.   DCD home per [facility] ambulance

## 2018-01-03 NOTE — TELEPHONE ENCOUNTER
Met with patient at [place]. Patient stated she called 911 from her home 12/29 due to shortness of breath. Patient admitted to being fearful regarding her health and prognosis but remains prayerful. Additionally, she stated that her concerns regarding her mother's health has caused her to not make self care a priority. Discussed at home medication administration. Patient admitted to not taking her medication as prescribed. Patient would like work with her PCP, as well as other medical staff, to improve medication compliance. Discussed housing. Informed patient that she has an appointment with [facility] in the near future. Patient declined appointment. Although patient has only been in her new apartment for approximately 2-3 months, she wishes to move. The reason she stated is due to apartment staff spraying insecticide in apartments for prevention of infestations.  strongly discouraged patient to move at this time and assisted patient with problem solving to assure she is comfortable and safe in her apartment.  to follow up with patient next week.

## 2018-01-05 ENCOUNTER — CARE COORDINATION (OUTPATIENT)
Dept: CARE COORDINATION | Age: 63
End: 2018-01-05

## 2018-01-07 ENCOUNTER — TELEPHONE (OUTPATIENT)
Dept: FAMILY MEDICINE CLINIC | Age: 63
End: 2018-01-07

## 2018-01-07 ENCOUNTER — APPOINTMENT (OUTPATIENT)
Dept: GENERAL RADIOLOGY | Age: 63
DRG: 291 | End: 2018-01-07
Payer: MEDICARE

## 2018-01-07 ENCOUNTER — HOSPITAL ENCOUNTER (INPATIENT)
Age: 63
LOS: 3 days | Discharge: HOME HEALTH | DRG: 291 | End: 2018-01-10
Attending: EMERGENCY MEDICINE | Admitting: FAMILY MEDICINE
Payer: MEDICARE

## 2018-01-07 DIAGNOSIS — I50.23 ACUTE ON CHRONIC SYSTOLIC CONGESTIVE HEART FAILURE (HCC): Primary | ICD-10-CM

## 2018-01-07 PROBLEM — I50.42 CHF (CONGESTIVE HEART FAILURE), NYHA CLASS II, CHRONIC, COMBINED (HCC): Status: ACTIVE | Noted: 2018-01-07

## 2018-01-07 LAB
ABSOLUTE EOS #: 0.2 K/UL (ref 0–0.44)
ABSOLUTE IMMATURE GRANULOCYTE: 0.03 K/UL (ref 0–0.3)
ABSOLUTE LYMPH #: 2.43 K/UL (ref 1.1–3.7)
ABSOLUTE MONO #: 1.02 K/UL (ref 0.1–1.2)
ANION GAP SERPL CALCULATED.3IONS-SCNC: 14 MMOL/L (ref 9–17)
BASOPHILS # BLD: 1 % (ref 0–2)
BASOPHILS ABSOLUTE: 0.07 K/UL (ref 0–0.2)
BNP INTERPRETATION: ABNORMAL
BUN BLDV-MCNC: 25 MG/DL (ref 8–23)
BUN/CREAT BLD: ABNORMAL (ref 9–20)
CALCIUM SERPL-MCNC: 8 MG/DL (ref 8.6–10.4)
CHLORIDE BLD-SCNC: 102 MMOL/L (ref 98–107)
CO2: 21 MMOL/L (ref 20–31)
CREAT SERPL-MCNC: 0.87 MG/DL (ref 0.5–0.9)
DIFFERENTIAL TYPE: ABNORMAL
EOSINOPHILS RELATIVE PERCENT: 2 % (ref 1–4)
GFR AFRICAN AMERICAN: >60 ML/MIN
GFR NON-AFRICAN AMERICAN: >60 ML/MIN
GFR SERPL CREATININE-BSD FRML MDRD: ABNORMAL ML/MIN/{1.73_M2}
GFR SERPL CREATININE-BSD FRML MDRD: ABNORMAL ML/MIN/{1.73_M2}
GLUCOSE BLD-MCNC: 137 MG/DL (ref 70–99)
HCT VFR BLD CALC: 28.3 % (ref 36.3–47.1)
HEMOGLOBIN: 8.5 G/DL (ref 11.9–15.1)
IMMATURE GRANULOCYTES: 0 %
LYMPHOCYTES # BLD: 25 % (ref 24–43)
MCH RBC QN AUTO: 25.4 PG (ref 25.2–33.5)
MCHC RBC AUTO-ENTMCNC: 30 G/DL (ref 28.4–34.8)
MCV RBC AUTO: 84.7 FL (ref 82.6–102.9)
MONOCYTES # BLD: 10 % (ref 3–12)
PDW BLD-RTO: 17.9 % (ref 11.8–14.4)
PLATELET # BLD: 347 K/UL (ref 138–453)
PLATELET ESTIMATE: ABNORMAL
PMV BLD AUTO: 9.7 FL (ref 8.1–13.5)
POC TROPONIN I: 0.01 NG/ML (ref 0–0.1)
POC TROPONIN INTERP: NORMAL
POTASSIUM SERPL-SCNC: 4.3 MMOL/L (ref 3.7–5.3)
PRO-BNP: ABNORMAL PG/ML
RBC # BLD: 3.34 M/UL (ref 3.95–5.11)
RBC # BLD: ABNORMAL 10*6/UL
SEG NEUTROPHILS: 62 % (ref 36–65)
SEGMENTED NEUTROPHILS ABSOLUTE COUNT: 6.07 K/UL (ref 1.5–8.1)
SODIUM BLD-SCNC: 137 MMOL/L (ref 135–144)
WBC # BLD: 9.8 K/UL (ref 3.5–11.3)
WBC # BLD: ABNORMAL 10*3/UL

## 2018-01-07 PROCEDURE — 96374 THER/PROPH/DIAG INJ IV PUSH: CPT

## 2018-01-07 PROCEDURE — 6360000002 HC RX W HCPCS: Performed by: EMERGENCY MEDICINE

## 2018-01-07 PROCEDURE — 1200000000 HC SEMI PRIVATE

## 2018-01-07 PROCEDURE — 71046 X-RAY EXAM CHEST 2 VIEWS: CPT

## 2018-01-07 PROCEDURE — 80048 BASIC METABOLIC PNL TOTAL CA: CPT

## 2018-01-07 PROCEDURE — 99285 EMERGENCY DEPT VISIT HI MDM: CPT

## 2018-01-07 PROCEDURE — 83880 ASSAY OF NATRIURETIC PEPTIDE: CPT

## 2018-01-07 PROCEDURE — 85025 COMPLETE CBC W/AUTO DIFF WBC: CPT

## 2018-01-07 PROCEDURE — 93005 ELECTROCARDIOGRAM TRACING: CPT

## 2018-01-07 PROCEDURE — 84484 ASSAY OF TROPONIN QUANT: CPT

## 2018-01-07 RX ORDER — NITROGLYCERIN 0.4 MG/1
0.4 TABLET SUBLINGUAL EVERY 5 MIN PRN
Status: DISCONTINUED | OUTPATIENT
Start: 2018-01-07 | End: 2018-01-08

## 2018-01-07 RX ORDER — FUROSEMIDE 10 MG/ML
40 INJECTION INTRAMUSCULAR; INTRAVENOUS ONCE
Status: COMPLETED | OUTPATIENT
Start: 2018-01-07 | End: 2018-01-07

## 2018-01-07 RX ADMIN — FUROSEMIDE 40 MG: 10 INJECTION, SOLUTION INTRAMUSCULAR; INTRAVENOUS at 22:03

## 2018-01-08 ENCOUNTER — APPOINTMENT (OUTPATIENT)
Dept: GENERAL RADIOLOGY | Age: 63
DRG: 291 | End: 2018-01-08
Payer: MEDICARE

## 2018-01-08 LAB
ANION GAP SERPL CALCULATED.3IONS-SCNC: 19 MMOL/L (ref 9–17)
BNP INTERPRETATION: ABNORMAL
BUN BLDV-MCNC: 22 MG/DL (ref 8–23)
BUN/CREAT BLD: ABNORMAL (ref 9–20)
CALCIUM SERPL-MCNC: 8.2 MG/DL (ref 8.6–10.4)
CHLORIDE BLD-SCNC: 103 MMOL/L (ref 98–107)
CHOLESTEROL/HDL RATIO: 2.3
CHOLESTEROL: 109 MG/DL
CO2: 21 MMOL/L (ref 20–31)
CREAT SERPL-MCNC: 0.94 MG/DL (ref 0.5–0.9)
EKG ATRIAL RATE: 81 BPM
EKG P AXIS: 36 DEGREES
EKG P-R INTERVAL: 166 MS
EKG Q-T INTERVAL: 428 MS
EKG QRS DURATION: 86 MS
EKG QTC CALCULATION (BAZETT): 497 MS
EKG R AXIS: -10 DEGREES
EKG T AXIS: 72 DEGREES
EKG VENTRICULAR RATE: 81 BPM
GFR AFRICAN AMERICAN: >60 ML/MIN
GFR NON-AFRICAN AMERICAN: >60 ML/MIN
GFR SERPL CREATININE-BSD FRML MDRD: ABNORMAL ML/MIN/{1.73_M2}
GFR SERPL CREATININE-BSD FRML MDRD: ABNORMAL ML/MIN/{1.73_M2}
GLUCOSE BLD-MCNC: 109 MG/DL (ref 70–99)
GLUCOSE BLD-MCNC: 117 MG/DL (ref 65–105)
GLUCOSE BLD-MCNC: 164 MG/DL (ref 65–105)
GLUCOSE BLD-MCNC: 88 MG/DL (ref 65–105)
GLUCOSE BLD-MCNC: 89 MG/DL (ref 65–105)
GLUCOSE BLD-MCNC: 99 MG/DL (ref 65–105)
HCT VFR BLD CALC: 30.9 % (ref 36.3–47.1)
HDLC SERPL-MCNC: 47 MG/DL
HEMOGLOBIN: 9.6 G/DL (ref 11.9–15.1)
LDL CHOLESTEROL: 39 MG/DL (ref 0–130)
MAGNESIUM: 1.8 MG/DL (ref 1.6–2.6)
MCH RBC QN AUTO: 25.5 PG (ref 25.2–33.5)
MCHC RBC AUTO-ENTMCNC: 31.1 G/DL (ref 28.4–34.8)
MCV RBC AUTO: 82.2 FL (ref 82.6–102.9)
PDW BLD-RTO: 18.1 % (ref 11.8–14.4)
PLATELET # BLD: 345 K/UL (ref 138–453)
PMV BLD AUTO: 10 FL (ref 8.1–13.5)
POTASSIUM SERPL-SCNC: 5 MMOL/L (ref 3.7–5.3)
PRO-BNP: 7758 PG/ML
RBC # BLD: 3.76 M/UL (ref 3.95–5.11)
SODIUM BLD-SCNC: 143 MMOL/L (ref 135–144)
TRIGL SERPL-MCNC: 116 MG/DL
TSH SERPL DL<=0.05 MIU/L-ACNC: 0.75 MIU/L (ref 0.3–5)
VANCOMYCIN TROUGH DATE LAST DOSE: NORMAL
VANCOMYCIN TROUGH DOSE AMOUNT: NORMAL
VANCOMYCIN TROUGH TIME LAST DOSE: NORMAL
VANCOMYCIN TROUGH: 15.6 UG/ML (ref 10–20)
VLDLC SERPL CALC-MCNC: NORMAL MG/DL (ref 1–30)
WBC # BLD: 10.3 K/UL (ref 3.5–11.3)

## 2018-01-08 PROCEDURE — 2580000003 HC RX 258: Performed by: NURSE PRACTITIONER

## 2018-01-08 PROCEDURE — 94762 N-INVAS EAR/PLS OXIMTRY CONT: CPT

## 2018-01-08 PROCEDURE — 84443 ASSAY THYROID STIM HORMONE: CPT

## 2018-01-08 PROCEDURE — 82947 ASSAY GLUCOSE BLOOD QUANT: CPT

## 2018-01-08 PROCEDURE — 93005 ELECTROCARDIOGRAM TRACING: CPT

## 2018-01-08 PROCEDURE — 85027 COMPLETE CBC AUTOMATED: CPT

## 2018-01-08 PROCEDURE — 6360000002 HC RX W HCPCS: Performed by: NURSE PRACTITIONER

## 2018-01-08 PROCEDURE — 80061 LIPID PANEL: CPT

## 2018-01-08 PROCEDURE — 1200000000 HC SEMI PRIVATE

## 2018-01-08 PROCEDURE — 80202 ASSAY OF VANCOMYCIN: CPT

## 2018-01-08 PROCEDURE — 36415 COLL VENOUS BLD VENIPUNCTURE: CPT

## 2018-01-08 PROCEDURE — 83735 ASSAY OF MAGNESIUM: CPT

## 2018-01-08 PROCEDURE — 80048 BASIC METABOLIC PNL TOTAL CA: CPT

## 2018-01-08 PROCEDURE — 71046 X-RAY EXAM CHEST 2 VIEWS: CPT

## 2018-01-08 PROCEDURE — 6370000000 HC RX 637 (ALT 250 FOR IP): Performed by: NURSE PRACTITIONER

## 2018-01-08 PROCEDURE — 99222 1ST HOSP IP/OBS MODERATE 55: CPT | Performed by: FAMILY MEDICINE

## 2018-01-08 PROCEDURE — 83880 ASSAY OF NATRIURETIC PEPTIDE: CPT

## 2018-01-08 RX ORDER — FAMOTIDINE 20 MG/1
20 TABLET, FILM COATED ORAL DAILY
Status: DISCONTINUED | OUTPATIENT
Start: 2018-01-08 | End: 2018-01-10 | Stop reason: HOSPADM

## 2018-01-08 RX ORDER — OLANZAPINE 10 MG/1
10 TABLET ORAL NIGHTLY
Status: DISCONTINUED | OUTPATIENT
Start: 2018-01-08 | End: 2018-01-10 | Stop reason: HOSPADM

## 2018-01-08 RX ORDER — CLOPIDOGREL BISULFATE 75 MG/1
75 TABLET ORAL DAILY
Status: DISCONTINUED | OUTPATIENT
Start: 2018-01-08 | End: 2018-01-10 | Stop reason: HOSPADM

## 2018-01-08 RX ORDER — ATORVASTATIN CALCIUM 80 MG/1
80 TABLET, FILM COATED ORAL NIGHTLY
Status: DISCONTINUED | OUTPATIENT
Start: 2018-01-08 | End: 2018-01-10 | Stop reason: HOSPADM

## 2018-01-08 RX ORDER — ACETAMINOPHEN 325 MG/1
650 TABLET ORAL EVERY 4 HOURS PRN
Status: DISCONTINUED | OUTPATIENT
Start: 2018-01-08 | End: 2018-01-10 | Stop reason: HOSPADM

## 2018-01-08 RX ORDER — INSULIN GLARGINE 100 [IU]/ML
30 INJECTION, SOLUTION SUBCUTANEOUS NIGHTLY
Status: DISCONTINUED | OUTPATIENT
Start: 2018-01-08 | End: 2018-01-10 | Stop reason: HOSPADM

## 2018-01-08 RX ORDER — DEXTROSE MONOHYDRATE 50 MG/ML
100 INJECTION, SOLUTION INTRAVENOUS PRN
Status: DISCONTINUED | OUTPATIENT
Start: 2018-01-08 | End: 2018-01-10 | Stop reason: HOSPADM

## 2018-01-08 RX ORDER — LISINOPRIL 5 MG/1
5 TABLET ORAL DAILY
Status: DISCONTINUED | OUTPATIENT
Start: 2018-01-08 | End: 2018-01-10 | Stop reason: HOSPADM

## 2018-01-08 RX ORDER — BISACODYL 10 MG
10 SUPPOSITORY, RECTAL RECTAL DAILY PRN
Status: DISCONTINUED | OUTPATIENT
Start: 2018-01-08 | End: 2018-01-10 | Stop reason: HOSPADM

## 2018-01-08 RX ORDER — ONDANSETRON 2 MG/ML
4 INJECTION INTRAMUSCULAR; INTRAVENOUS EVERY 6 HOURS PRN
Status: DISCONTINUED | OUTPATIENT
Start: 2018-01-08 | End: 2018-01-10 | Stop reason: HOSPADM

## 2018-01-08 RX ORDER — COLCHICINE 0.6 MG/1
0.6 TABLET ORAL DAILY
Status: DISCONTINUED | OUTPATIENT
Start: 2018-01-08 | End: 2018-01-10 | Stop reason: HOSPADM

## 2018-01-08 RX ORDER — SODIUM CHLORIDE 0.9 % (FLUSH) 0.9 %
10 SYRINGE (ML) INJECTION EVERY 12 HOURS SCHEDULED
Status: DISCONTINUED | OUTPATIENT
Start: 2018-01-08 | End: 2018-01-10 | Stop reason: HOSPADM

## 2018-01-08 RX ORDER — SODIUM CHLORIDE 0.9 % (FLUSH) 0.9 %
10 SYRINGE (ML) INJECTION PRN
Status: DISCONTINUED | OUTPATIENT
Start: 2018-01-08 | End: 2018-01-10 | Stop reason: HOSPADM

## 2018-01-08 RX ORDER — FUROSEMIDE 10 MG/ML
40 INJECTION INTRAMUSCULAR; INTRAVENOUS 2 TIMES DAILY
Status: DISCONTINUED | OUTPATIENT
Start: 2018-01-08 | End: 2018-01-10

## 2018-01-08 RX ORDER — OXCARBAZEPINE 300 MG/1
300 TABLET, FILM COATED ORAL 2 TIMES DAILY
Status: DISCONTINUED | OUTPATIENT
Start: 2018-01-08 | End: 2018-01-10 | Stop reason: HOSPADM

## 2018-01-08 RX ORDER — SPIRONOLACTONE 25 MG/1
25 TABLET ORAL DAILY
Status: DISCONTINUED | OUTPATIENT
Start: 2018-01-08 | End: 2018-01-10 | Stop reason: HOSPADM

## 2018-01-08 RX ORDER — DOCUSATE SODIUM 100 MG/1
100 CAPSULE, LIQUID FILLED ORAL 2 TIMES DAILY
Status: DISCONTINUED | OUTPATIENT
Start: 2018-01-08 | End: 2018-01-10 | Stop reason: HOSPADM

## 2018-01-08 RX ORDER — DEXTROSE MONOHYDRATE 25 G/50ML
12.5 INJECTION, SOLUTION INTRAVENOUS PRN
Status: DISCONTINUED | OUTPATIENT
Start: 2018-01-08 | End: 2018-01-10 | Stop reason: HOSPADM

## 2018-01-08 RX ORDER — METOPROLOL TARTRATE 50 MG/1
50 TABLET, FILM COATED ORAL 2 TIMES DAILY
Status: DISCONTINUED | OUTPATIENT
Start: 2018-01-08 | End: 2018-01-10 | Stop reason: HOSPADM

## 2018-01-08 RX ORDER — ASPIRIN 81 MG/1
81 TABLET, CHEWABLE ORAL DAILY
Status: DISCONTINUED | OUTPATIENT
Start: 2018-01-08 | End: 2018-01-10 | Stop reason: HOSPADM

## 2018-01-08 RX ORDER — NICOTINE POLACRILEX 4 MG
15 LOZENGE BUCCAL PRN
Status: DISCONTINUED | OUTPATIENT
Start: 2018-01-08 | End: 2018-01-10 | Stop reason: HOSPADM

## 2018-01-08 RX ORDER — NITROGLYCERIN 0.4 MG/1
0.4 TABLET SUBLINGUAL EVERY 5 MIN PRN
Status: DISCONTINUED | OUTPATIENT
Start: 2018-01-08 | End: 2018-01-10 | Stop reason: HOSPADM

## 2018-01-08 RX ADMIN — ENOXAPARIN SODIUM 40 MG: 40 INJECTION SUBCUTANEOUS at 08:54

## 2018-01-08 RX ADMIN — FUROSEMIDE 40 MG: 10 INJECTION, SOLUTION INTRAMUSCULAR; INTRAVENOUS at 08:54

## 2018-01-08 RX ADMIN — METOPROLOL TARTRATE 50 MG: 50 TABLET, FILM COATED ORAL at 22:05

## 2018-01-08 RX ADMIN — INSULIN GLARGINE 30 UNITS: 100 INJECTION, SOLUTION SUBCUTANEOUS at 02:33

## 2018-01-08 RX ADMIN — Medication 10 ML: at 08:54

## 2018-01-08 RX ADMIN — OLANZAPINE 10 MG: 10 TABLET, FILM COATED ORAL at 01:34

## 2018-01-08 RX ADMIN — FAMOTIDINE 20 MG: 20 TABLET, FILM COATED ORAL at 08:55

## 2018-01-08 RX ADMIN — METOPROLOL TARTRATE 50 MG: 50 TABLET, FILM COATED ORAL at 01:34

## 2018-01-08 RX ADMIN — SPIRONOLACTONE 25 MG: 25 TABLET ORAL at 08:53

## 2018-01-08 RX ADMIN — OXCARBAZEPINE 300 MG: 300 TABLET, FILM COATED ORAL at 01:35

## 2018-01-08 RX ADMIN — LISINOPRIL 5 MG: 5 TABLET ORAL at 08:53

## 2018-01-08 RX ADMIN — DOCUSATE SODIUM 100 MG: 100 CAPSULE, LIQUID FILLED ORAL at 01:37

## 2018-01-08 RX ADMIN — DOCUSATE SODIUM 100 MG: 100 CAPSULE, LIQUID FILLED ORAL at 08:53

## 2018-01-08 RX ADMIN — METOPROLOL TARTRATE 50 MG: 50 TABLET, FILM COATED ORAL at 08:54

## 2018-01-08 RX ADMIN — OXCARBAZEPINE 300 MG: 300 TABLET, FILM COATED ORAL at 08:55

## 2018-01-08 RX ADMIN — ASPIRIN 81 MG: 81 TABLET, CHEWABLE ORAL at 08:53

## 2018-01-08 RX ADMIN — COLCHICINE 0.6 MG: 0.6 TABLET, FILM COATED ORAL at 08:55

## 2018-01-08 RX ADMIN — VANCOMYCIN HYDROCHLORIDE 1250 MG: 1 INJECTION, POWDER, LYOPHILIZED, FOR SOLUTION INTRAVENOUS at 17:56

## 2018-01-08 RX ADMIN — OXCARBAZEPINE 300 MG: 300 TABLET, FILM COATED ORAL at 22:05

## 2018-01-08 RX ADMIN — DOCUSATE SODIUM 100 MG: 100 CAPSULE, LIQUID FILLED ORAL at 22:05

## 2018-01-08 RX ADMIN — CLOPIDOGREL 75 MG: 75 TABLET, FILM COATED ORAL at 08:53

## 2018-01-08 RX ADMIN — FUROSEMIDE 40 MG: 10 INJECTION, SOLUTION INTRAMUSCULAR; INTRAVENOUS at 22:05

## 2018-01-08 RX ADMIN — Medication 10 ML: at 22:06

## 2018-01-08 RX ADMIN — ATORVASTATIN CALCIUM 80 MG: 80 TABLET, FILM COATED ORAL at 01:34

## 2018-01-08 RX ADMIN — INSULIN GLARGINE 30 UNITS: 100 INJECTION, SOLUTION SUBCUTANEOUS at 22:04

## 2018-01-08 RX ADMIN — FUROSEMIDE 40 MG: 10 INJECTION, SOLUTION INTRAMUSCULAR; INTRAVENOUS at 01:35

## 2018-01-08 RX ADMIN — ATORVASTATIN CALCIUM 80 MG: 80 TABLET, FILM COATED ORAL at 22:05

## 2018-01-08 RX ADMIN — OLANZAPINE 10 MG: 10 TABLET, FILM COATED ORAL at 22:05

## 2018-01-08 ASSESSMENT — PAIN SCALES - GENERAL
PAINLEVEL_OUTOF10: 6
PAINLEVEL_OUTOF10: 0
PAINLEVEL_OUTOF10: 0

## 2018-01-08 ASSESSMENT — ENCOUNTER SYMPTOMS
STRIDOR: 0
DIARRHEA: 0
RHINORRHEA: 0
SINUS PAIN: 0
VOMITING: 0
ABDOMINAL PAIN: 0
SHORTNESS OF BREATH: 1
PHOTOPHOBIA: 0
NAUSEA: 0
COUGH: 0
CONSTIPATION: 0
WHEEZING: 0
SINUS PRESSURE: 0

## 2018-01-08 NOTE — ED PROVIDER NOTES
Conjunctivae and EOM are normal. Pupils are equal, round, and reactive to light. Neck: Normal range of motion. Neck supple. Cardiovascular: Normal rate, regular rhythm and normal heart sounds. Pulmonary/Chest: Tachypnea noted. No respiratory distress. She has decreased breath sounds. Abdominal: Soft. She exhibits no distension. There is no tenderness. There is no rebound and no guarding. Neurological: She is alert and oriented to person, place, and time.        DIFFERENTIAL  DIAGNOSIS     PLAN (LABS / IMAGING / EKG):  Orders Placed This Encounter   Procedures    XR CHEST STANDARD (2 VW)    CBC WITH AUTO DIFFERENTIAL    BASIC METABOLIC PANEL    Brain Natriuretic Peptide    Inpatient consult to Hospitalist    POCT troponin    POCT troponin    EKG 12 Lead    PATIENT STATUS (FROM ED OR OR/PROCEDURAL) Inpatient       MEDICATIONS ORDERED:  Orders Placed This Encounter   Medications    furosemide (LASIX) injection 40 mg    nitroGLYCERIN (NITROSTAT) SL tablet 0.4 mg         DIAGNOSTIC RESULTS / EMERGENCY DEPARTMENT COURSE / MDM     LABS:  Results for orders placed or performed during the hospital encounter of 01/07/18   CBC WITH AUTO DIFFERENTIAL   Result Value Ref Range    WBC 9.8 3.5 - 11.3 k/uL    RBC 3.34 (L) 3.95 - 5.11 m/uL    Hemoglobin 8.5 (L) 11.9 - 15.1 g/dL    Hematocrit 28.3 (L) 36.3 - 47.1 %    MCV 84.7 82.6 - 102.9 fL    MCH 25.4 25.2 - 33.5 pg    MCHC 30.0 28.4 - 34.8 g/dL    RDW 17.9 (H) 11.8 - 14.4 %    Platelets 082 614 - 576 k/uL    MPV 9.7 8.1 - 13.5 fL    Differential Type NOT REPORTED     Seg Neutrophils 62 36 - 65 %    Lymphocytes 25 24 - 43 %    Monocytes 10 3 - 12 %    Eosinophils % 2 1 - 4 %    Basophils 1 0 - 2 %    Immature Granulocytes 0 0 %    Segs Absolute 6.07 1.50 - 8.10 k/uL    Absolute Lymph # 2.43 1.10 - 3.70 k/uL    Absolute Mono # 1.02 0.10 - 1.20 k/uL    Absolute Eos # 0.20 0.00 - 0.44 k/uL    Basophils # 0.07 0.00 - 0.20 k/uL    Absolute Immature Granulocyte 0.03 who has agreed to accept the patient for admission. PROCEDURES:  None    CONSULTS:  IP CONSULT TO HOSPITALIST  IP CONSULT TO HEART FAILURE NURSE/COORDINATOR  IP CONSULT TO DIETITIAN  IP CONSULT TO CARDIOLOGY  PHARMACY TO DOSE VANCOMYCIN    CRITICAL CARE:  None    FINAL IMPRESSION      1.  Acute on chronic systolic congestive heart failure ([facility]          DISPOSITION / PLAN     DISPOSITION Admitted 01/07/2018 11:35:29 PM      PATIENT REFERRED TO:  Yee Delgado [address] [place] [address]  [phone]            DISCHARGE MEDICATIONS:  New Prescriptions    No medications on file       Lamont Marie MD  Emergency Medicine Resident    (Please note that portions of this note were completed with a voice recognition program.  Efforts were made to edit the dictations but occasionally words are mis-transcribed.)       Lamont Marie MD  Resident  01/08/18 1257

## 2018-01-08 NOTE — CARE COORDINATION
medication bottles in the home but refused to review medications with nursing during the home vs.  She became agitated when attempts were made to review medication schedule and stated \"  I know what medications I have and am taking the medications I want to take. \"     Daryl King RN from [facility] care reports Ms Julien Alexandre has repeatedly refused to allow nurses to review her  meds during home vs and Ayla Chavarria reports Wan Parsons displays increasing agitation when she is asked to discuss her med schedule. She has refused medication blister packaging and has refused to utilize medication box for med set up. Does the patient have a list of all the medications that were prescribed to be taken after discharge? yes   Does the patient have all the medications that were prescribed? Unable to assess-  See previous note   Is the patient taking all the prescribed medications? No  Wan Parsons refuses to take psychotropic meds and has not allowed nursing to assess medications wih he   Does the patient understand what all the medications are taken for? Unable to assess   See previous notes. ( Update medication list and refresh medication smartlinks below)        All Active Meds in Chart - (keep all current active meds, add hospital meds)  Current Outpatient Prescriptions   Medication Sig Dispense Refill    vancomycin (VANCOCIN) infusion Infuse 1,250 mg intravenously every 24 hours for 12 days Compound per protocol.  Aim for trough 14-17  Till 1/15/18  Creat 2 x per week  outpt pharmacy to dose 13738 mg 0    furosemide (LASIX) 20 MG tablet Take 2 tablets by mouth 2 times daily 30 tablet 3    aspirin 81 MG chewable tablet Take 1 tablet by mouth daily 30 tablet 3    ipratropium-albuterol (DUONEB) 0.5-2.5 (3) MG/3ML SOLN nebulizer solution Inhale 3 mLs into the lungs every 4 hours as needed for Shortness of Breath 360 mL 2    nitroGLYCERIN (NITROSTAT) 0.4 MG SL tablet Place 1 tablet under the tongue every 5 minutes as needed

## 2018-01-08 NOTE — H&P
[address]    HISTORY AND PHYSICAL EXAMINATION            Date:   1/8/2018  Patient name:  Josue Burns  Date of admission:  1/7/2018  8:49 PM  MRN:   [id number]  Account:  [de-identified]  YOB: 1955  PCP:    Chelsie Loya MD  Room:   2024/2024-01  Code Status:    Full Code    Chief Complaint:     Chief Complaint   Patient presents with    Shortness of Breath       History Obtained From:     patient, electronic medical record    History of Present Illness: The patient is a 65 y.o. Non-/non  female who presents with Shortness of Breath   and she is admitted to the hospital for the management of  CHF exacerbation . 64 yo f presented with sob x1 day. Patient states she has not been taking her medications since being discharged on 1/2/2018. Patient states she never got her medications. Patient was admitted from 12/20/17 to 1/2/18 for acute on chronic chf exacerbation. CXR with pulm edema. probnp [id number] on admission. Pt started on lasix 40 mg iv bid. Cardio consulted. Patient on vanco due to MSSA ICD endocarditis. Had ICD extraction on 12/13/2017. ID was on board last admission and recommended pt to have vanco til 1/15/18. Trough aim for 14-17.      Past Medical History:     Past Medical History:   Diagnosis Date    Arthritis     Asthma     CAD (coronary artery disease)     [facility]congestive heart failure) ([facility]     Clinical trial participant at discharge 03/13/2017    [employer] PSR     COPD (chronic obstructive pulmonary disease) ([facility]     Diabetes mellitus ([facility]     Hepatitis C     Hyperlipidemia     Hypertension     MRSA (methicillin resistant staph aureus) culture positive 12/10/2017    blood    Pneumonia     Unspecified cerebral artery occlusion with cerebral infarction         Past Surgical History:     Past Surgical History:   Procedure Laterality Date    CARDIAC CATHETERIZATION  11/27/2017 Temp 98.1 °F (36.7 °C) (Oral)   Resp 14   Ht 5' 1\" (1.549 m)   Wt 175 lb 12.8 oz (79.7 kg)   LMP  (LMP Unknown)   SpO2 100%   BMI 33.22 kg/m²   Temp (24hrs), Av.1 °F (36.7 °C), Min:97.4 °F (36.3 °C), Max:98.8 °F (37.1 °C)    Recent Labs      18   0125   POCGLU  99       Intake/Output Summary (Last 24 hours) at 18 1009  Last data filed at 18 0339   Gross per 24 hour   Intake              420 ml   Output              400 ml   Net               20 ml       General Appearance:  alert, well appearing, and in no acute distress  Mental status: oriented to person, place, and time with normal affect  Head:  normocephalic, atraumatic. Eye: no icterus, redness, pupils equal and reactive, extraocular eye movements intact, conjunctiva clear  Ear: normal external ear, no discharge, hearing intact  Nose:  no drainage noted  Mouth: mucous membranes moist  Neck: supple, no carotid bruits, thyroid not palpable  Lungs: decreased breath sounds on left lung base. Cardiovascular: normal rate, regular rhythm, no murmur, gallop, rub. Abdomen: Soft, nontender, nondistended, normal bowel sounds, no hepatomegaly or splenomegaly  Neurologic: There are no new focal motor or sensory deficits, normal speech. Left hand contracture    Skin: No gross lesions, rashes, bruising or bleeding on exposed skin area  Extremities:  peripheral pulses palpable, no pedal edema or calf pain with palpation        Investigations:      Laboratory Testing:  Recent Results (from the past 24 hour(s))   POCT troponin    Collection Time: 18  9:32 PM   Result Value Ref Range    POC Troponin I 0.01 0.00 - 0.10 ng/mL    POC Troponin Interp       The Troponin-I (POC) results cannot be compared to the Troponin-T results.    CBC WITH AUTO DIFFERENTIAL    Collection Time: 18  9:49 PM   Result Value Ref Range    WBC 9.8 3.5 - 11.3 k/uL    RBC 3.34 (L) 3.95 - 5.11 m/uL    Hemoglobin 8.5 (L) 11.9 - 15.1 g/dL    Hematocrit 28.3 (L) 36.3 - intact. No dislocation at the hips. 1.  Limited assessment due to overlying soft tissue attenuation. 2.  No evidence for acute pelvic fracture. If there is persistent clinical concern, consider CT or MRI. Nm Bone Scan 3 Phase    Result Date: 12/12/2017  EXAMINATION: THREE PHASE BONE SCAN 12/12/2017 1:52 pm TECHNIQUE: The patient was injected intravenously with 27 mCi of 99 m Tc MDP. Initial blood flow and pool images of the chest and abdomen were acquired. After 3 hours, delayed bone images were acquired. COMPARISON: CT of the chest from 12/08/2017 reviewed. HISTORY: ORDERING SYSTEM PROVIDED HISTORY: OSTEOMYELITIS TECHNOLOGIST PROVIDED HISTORY: Ordering Physician Provided Reason for Exam: History of CVA ,  new back pain from her fall. T spine pain x  1 week PTA before the fall FINDINGS: There is symmetric activity in the flow and blood pool images. Delayed images show increased activity in the sternum and left anterior 8th rib (subtle healing fracture noted on CT). Minimal increased activity also noted at the knees and ankles, more on the right, likely degenerative. Negative three phase bone scan for osteomyelitis; some increased activity sternum, likely related to prior CABG. Healing fracture left 8th rib. Xr Chest Portable    Result Date: 12/30/2017  EXAMINATION: SINGLE VIEW OF THE CHEST 12/30/2017 10:26 pm COMPARISON: 12/24/2017 HISTORY: ORDERING SYSTEM PROVIDED HISTORY: chest pain TECHNOLOGIST PROVIDED HISTORY: Reason for exam:->chest pain FINDINGS: Right arm PICC line remains in place, tip at cavoatrial junction. Median sternotomy wires and CABG clips again noted. Left chest skin staples removed in the interval.  Stable cardiomegaly. Pulmonary vascular congestion. No focal consolidation. No large pleural effusion. No pneumothorax. Mild pulmonary vascular congestion.      Xr Chest Portable    Result Date: 12/13/2017  EXAMINATION: SINGLE VIEW OF THE CHEST 12/13/2017 8:04 pm Primary Problem  <principal problem not specified>    Active Hospital Problems    Diagnosis Date Noted    CHF (congestive heart failure), NYHA class II, chronic, combined ([facility] [I50.42] 01/07/2018       Plan:     Patient status Admit as inpatient in the  Med/Surge    1. CHF exacerbation, combined, EF 73%, Grade 1 diastolic dysfunction 2/2 noncompliance    - cardio consulted  - lasix 40 mg iv bid   - monitor I&O, fluid restriction     2. H/o MSSA ICD endocarditis   - continue vanco til 1/15  - pharmacy to dose vancomycin     3. DM 2: continue home meds. lantus 30 units hs. Consultations:   IP CONSULT TO HOSPITALIST  IP CONSULT TO HEART FAILURE NURSE/COORDINATOR  IP CONSULT TO DIETITIAN  IP CONSULT TO CARDIOLOGY  PHARMACY TO DOSE VANCOMYCIN     Patient is admitted as inpatient status because of co-morbidities listed above, severity of signs and symptoms as outlined, requirement for current medical therapies and most importantly because of direct risk to patient if care not provided in a hospital setting.     Westley Meehan MD  1/8/2018  10:09 AM

## 2018-01-08 NOTE — PLAN OF CARE
Problem: Falls - Risk of  Goal: Absence of falls  Outcome: Ongoing  Patient remains free of falls, bed is low and locked, call light within reach, slipper socks are on, floor is free of clutter. Hourly rounding provided by RN. Bed alarm is on Telesitter in use, patient instructed to use call light if she needs to ambulate/transfer. Room door is open. Fall risk sign at doorway, fall risk sticker is on ID band. Problem: Risk for Impaired Skin Integrity  Goal: Tissue integrity - skin and mucous membranes  Structural intactness and normal physiological function of skin and  mucous membranes. Outcome: Ongoing  Tissue integrity remains intact. Patient turns self in bed, patient continent of urine and stool. Skin is free of redness/pressure areas. Continuing to monitor.

## 2018-01-08 NOTE — PROGRESS NOTES
Echocardiogram completed 11/30/2017. Per note on echo, cancel if done within 6 mos. Copy of report faxed to floor.

## 2018-01-08 NOTE — PROGRESS NOTES
RN received phone call from [facility] dispatch. Patient called \"[phone]\" because a women is at her house with her  and she wants them to leave. Security called so they can some and speak with patient.    Electronically signed by Juan J Heart RN on 1/8/2018 at 5:42 PM

## 2018-01-08 NOTE — PROGRESS NOTES
Smoking Cessation - topics covered   []  Health Risks  []  Benefits of Quitting   []  Smoking Cessation  []  Patient has no history of tobacco use  []  Patient is former smoker. []  No need for tobacco cessation education. []  Booklet given  [x]  Patient verbalizes understanding. [x]  Patient denies need for tobacco cessation education.   Celeste Rajan  9:19 AM

## 2018-01-08 NOTE — CARE COORDINATION
Case Management Initial Discharge Plan  Roverto Poag,         Readmission Risk              Readmission Risk:        28.75       Age 72 or Greater:  0    Admitted from SNF or Requires Paid or Family Care:  2    Currently has CHF,COPD,ARF,CRI,or is on dialysis:  4    Takes more than 5 Prescription Medications:  4    Takes Digoxin,Insulin,Anticoagulants,Narcotics or ASA/Plavix:  [address] in Past 12 Months:  10    On Disability:  3    Patient Considers own Health:  3.75            Met with:patient to discuss discharge plans.    Information verified: address, contacts, phone number, , insurance Yes  PCP: Dary Whitfield MD  Date of last visit: oct have appt tomorrow    Insurance Provider: medicare/aetna    Discharge Planning  Current Residence:  Private Residence  Living Arrangements:  Alone   Home has 1 story  Support Systems:  None, Family Members  Current Services PTA:  Oxygen Therapy, Home Infusion Supplier:[facility]  Patient able to perform ADL's:Assisted  DME used to aid ambulation prior to admission: cane    Potential Assistance Needed:  Resume     Pharmacy: [facility]   Potential Assistance Purchasing Medications:  No  Does patient want to participate in local refill/ meds to beds program?  No    Patient agreeable to home care: Yes  Freedom of choice provided:  yes      Type of Home Care Services:  Nursing Services  Patient expects to be discharged to:  home with homecare      Agreeable to SNF/Rehab: No  [place] of choice provided: n/a   Evaluation: no    Expected Discharge date:  18  Follow Up Appointment: Best Day/ Time: Monday AM    Transportation provider: yris [phone]   Transportation arrangements needed for discharge: Yes    Discharge Plan: home with [facility]         Electronically signed by Manish Guzman RN on 18 at 10:56 AM

## 2018-01-08 NOTE — CONSULTS
show Apparent remote infarction within the inferior wall and moderate-sized partially reversible perfusion defect within the anterior wall, concerning for ischemia. Dyskinesis of the apex. Severe hypokinesis of the septum, inferoseptal wall and inferior wall. EF 36%. .    Labs:     CBC:   Recent Labs      01/07/18 2149   WBC  9.8   HGB  8.5*   HCT  28.3*   PLT  347     BMP:   Recent Labs      01/07/18 2149   NA  137   K  4.3   CO2  21   BUN  25*   CREATININE  0.87   LABGLOM  >60   GLUCOSE  137*     BNP: No results for input(s): BNP in the last 72 hours. PT/INR: No results for input(s): PROTIME, INR in the last 72 hours. APTT:No results for input(s): APTT in the last 72 hours. CARDIAC ENZYMES:  Recent Labs      01/07/18 2132   TROPONINI  0.01     FASTING LIPID PANEL:  Lab Results   Component Value Date    HDL 53 11/30/2017    TRIG 193 11/30/2017     LIVER PROFILE:No results for input(s): AST, ALT, LABALBU in the last 72 hours.     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

## 2018-01-09 LAB
ANION GAP SERPL CALCULATED.3IONS-SCNC: 15 MMOL/L (ref 9–17)
BUN BLDV-MCNC: 18 MG/DL (ref 8–23)
BUN/CREAT BLD: ABNORMAL (ref 9–20)
CALCIUM SERPL-MCNC: 8.4 MG/DL (ref 8.6–10.4)
CHLORIDE BLD-SCNC: 100 MMOL/L (ref 98–107)
CO2: 27 MMOL/L (ref 20–31)
CREAT SERPL-MCNC: 0.86 MG/DL (ref 0.5–0.9)
EKG ATRIAL RATE: 98 BPM
EKG P AXIS: 40 DEGREES
EKG P-R INTERVAL: 160 MS
EKG Q-T INTERVAL: 364 MS
EKG QRS DURATION: 76 MS
EKG QTC CALCULATION (BAZETT): 464 MS
EKG R AXIS: -46 DEGREES
EKG T AXIS: 46 DEGREES
EKG VENTRICULAR RATE: 98 BPM
GFR AFRICAN AMERICAN: >60 ML/MIN
GFR NON-AFRICAN AMERICAN: >60 ML/MIN
GFR SERPL CREATININE-BSD FRML MDRD: ABNORMAL ML/MIN/{1.73_M2}
GFR SERPL CREATININE-BSD FRML MDRD: ABNORMAL ML/MIN/{1.73_M2}
GLUCOSE BLD-MCNC: 106 MG/DL (ref 65–105)
GLUCOSE BLD-MCNC: 137 MG/DL (ref 65–105)
GLUCOSE BLD-MCNC: 40 MG/DL (ref 70–99)
GLUCOSE BLD-MCNC: 67 MG/DL (ref 65–105)
POTASSIUM SERPL-SCNC: 3.7 MMOL/L (ref 3.7–5.3)
SODIUM BLD-SCNC: 142 MMOL/L (ref 135–144)

## 2018-01-09 PROCEDURE — 2580000003 HC RX 258: Performed by: NURSE PRACTITIONER

## 2018-01-09 PROCEDURE — 99232 SBSQ HOSP IP/OBS MODERATE 35: CPT | Performed by: FAMILY MEDICINE

## 2018-01-09 PROCEDURE — 36415 COLL VENOUS BLD VENIPUNCTURE: CPT

## 2018-01-09 PROCEDURE — 6370000000 HC RX 637 (ALT 250 FOR IP): Performed by: NURSE PRACTITIONER

## 2018-01-09 PROCEDURE — 80048 BASIC METABOLIC PNL TOTAL CA: CPT

## 2018-01-09 PROCEDURE — 6360000002 HC RX W HCPCS: Performed by: NURSE PRACTITIONER

## 2018-01-09 PROCEDURE — 1200000000 HC SEMI PRIVATE

## 2018-01-09 PROCEDURE — 82947 ASSAY GLUCOSE BLOOD QUANT: CPT

## 2018-01-09 RX ADMIN — OXCARBAZEPINE 300 MG: 300 TABLET, FILM COATED ORAL at 10:03

## 2018-01-09 RX ADMIN — FUROSEMIDE 40 MG: 10 INJECTION, SOLUTION INTRAMUSCULAR; INTRAVENOUS at 22:53

## 2018-01-09 RX ADMIN — SPIRONOLACTONE 25 MG: 25 TABLET ORAL at 10:03

## 2018-01-09 RX ADMIN — Medication 10 ML: at 10:09

## 2018-01-09 RX ADMIN — CLOPIDOGREL 75 MG: 75 TABLET, FILM COATED ORAL at 10:03

## 2018-01-09 RX ADMIN — FAMOTIDINE 20 MG: 20 TABLET, FILM COATED ORAL at 10:03

## 2018-01-09 RX ADMIN — METOPROLOL TARTRATE 50 MG: 50 TABLET, FILM COATED ORAL at 10:02

## 2018-01-09 RX ADMIN — ASPIRIN 81 MG: 81 TABLET, CHEWABLE ORAL at 10:07

## 2018-01-09 RX ADMIN — VANCOMYCIN HYDROCHLORIDE 1250 MG: 1 INJECTION, POWDER, LYOPHILIZED, FOR SOLUTION INTRAVENOUS at 19:26

## 2018-01-09 RX ADMIN — COLCHICINE 0.6 MG: 0.6 TABLET, FILM COATED ORAL at 10:03

## 2018-01-09 RX ADMIN — ENOXAPARIN SODIUM 40 MG: 40 INJECTION SUBCUTANEOUS at 10:02

## 2018-01-09 RX ADMIN — DOCUSATE SODIUM 100 MG: 100 CAPSULE, LIQUID FILLED ORAL at 10:03

## 2018-01-09 RX ADMIN — LISINOPRIL 5 MG: 5 TABLET ORAL at 10:03

## 2018-01-09 RX ADMIN — FUROSEMIDE 40 MG: 10 INJECTION, SOLUTION INTRAMUSCULAR; INTRAVENOUS at 10:04

## 2018-01-09 ASSESSMENT — PAIN SCALES - GENERAL
PAINLEVEL_OUTOF10: 0
PAINLEVEL_OUTOF10: 0

## 2018-01-09 NOTE — PROGRESS NOTES
[facility]   Progress Note                   Date:   1/9/2018  Patient name: Gareth Quintana  Date of admission:  1/7/2018  8:49 PM  MRN:   [id number]  YOB: 1955  PCP: Abdullahi Rico MD    Reason for Admission: CHF (congestive heart failure), NYHA class II, chronic, combined ([facility] [I50.42]    Subjective:       Clinical Changes / Abnormalities: Patient seen & examined in room with Rn. States she still \"cannot breath. \" Denies Cp, dizziness, or palpitations. +580ml since admission    ROS    Medications:   Scheduled Meds:   aspirin  81 mg Oral Daily    atorvastatin  80 mg Oral Nightly    clopidogrel  75 mg Oral Daily    colchicine  0.6 mg Oral Daily    famotidine  20 mg Oral Daily    insulin glargine  30 Units Subcutaneous Nightly    lisinopril  5 mg Oral Daily    metoprolol tartrate  50 mg Oral BID    OLANZapine  10 mg Oral Nightly    OXcarbazepine  300 mg Oral BID    sodium chloride flush  10 mL Intravenous 2 times per day    docusate sodium  100 mg Oral BID    enoxaparin  40 mg Subcutaneous Daily    furosemide  40 mg Intravenous BID    spironolactone  25 mg Oral Daily    vancomycin (VANCOCIN) intermittent dosing (placeholder)   Other RX Placeholder    vancomycin  1,250 mg Intravenous Q24H     Continuous Infusions:   dextrose       CBC:   Recent Labs      01/07/18   2149 01/08/18   1059   WBC  9.8  10.3   HGB  8.5*  9.6*   PLT  347  345     BMP:    Recent Labs      01/07/18   2149  01/08/18   1059  01/09/18   0740   NA  137  143  142   K  4.3  5.0  3.7   CL  102  103  100   CO2  21  21  27   BUN  25*  22  18   CREATININE  0.87  0.94*  0.86   GLUCOSE  137*  109*  40*     Hepatic: No results for input(s): AST, ALT, ALB, BILITOT, ALKPHOS in the last 72 hours. Troponin:   Recent Labs      01/07/18   2132   TROPONINI  0.01     BNP: No results for input(s): BNP in the last 72 hours.   Lipids:   Recent Labs      01/08/18   1059   CHOL  109   HDL  47     INR: No results for input(s): INR in the last 72 hours. Objective:   Vitals: /66   Pulse 92   Temp 97.2 °F (36.2 °C) (Oral)   Resp 18   Ht 5' 1\" (1.549 m)   Wt 170 lb 8 oz (77.3 kg)   LMP  (LMP Unknown)   SpO2 100%   BMI 32.22 kg/m²   General appearance: alert and cooperative with exam  HEENT: Head: Normocephalic, no lesions, without obvious abnormality. Neck: no JVD, trachea midline, no adenopathy  Lungs: diminished throughout. Rales noted right mid and lower lobes. On NC oxygen without distress. Heart: Regular rate and rhythm, s1/s2 auscultated, no murmurs  Abdomen: soft, non-tender, bowel sounds active  Extremities: +1 b/l LE edema  Neurologic: not done    ICD EXTRACTION 12/13/17: Laser lead extraction done by Dr. General Olivares.      NATHAN 12/11/17: EF 35-40%, BA dilatation. Large mobile echo density is seen in the RA on the ICD lead, high suggestive of a vegetation. Severe TR.      CATH 12/4/17:Multi-vessel Coronary Artery Disease. Patent grafts to LAD and OM. Patent prior RCA stents with non-obstructive disease. Moderately impaired ventricular function--EF 30%     ECHO 11/30/17: EF 35%, grade I DD.       STRESS 11/15/17: Apparent remote infarction within the inferior wall and moderate-sized partially reversible perfusion defect within the anterior wall, concerning for ischemia. Dyskinesis of the apex. Severe hypokinesis of the septum, inferoseptal wall and inferior wall. EF 36%.      ICD 3/13/17: Medtronic device placed by Dr. General Olivares for ICM.    CABG 9/1/16: LIMA-LAD, SVG-OM3. Assessment / Acute Cardiac Problems:   1. Acute on Chronic systolic CHF  2. Medication non-compliance  3.  AICD lead infection s/p extraction 12/2017    Patient Active Problem List:     Cerebral infarction [facility])     Essential hypertension     Diabetes mellitus type 2, controlled ([address]     Diverticulitis of colon     Controlled type 2 diabetes mellitus without complication, without long-term current use of insulin ([address]     History

## 2018-01-09 NOTE — PROGRESS NOTES
[facility]    Progress Note    1/9/2018    5:56 PM    Name:   Ed Alvarez  MRN:     [id number]     [place]:      [de-identified]   Room:   2024/2024-01  IP Day:  2  Admit Date:  1/7/2018  8:49 PM    PCP:   Francisco Billings MD  Code Status:  Full Code    Subjective:     C/C:   Chief Complaint   Patient presents with    Shortness of Breath     Interval History Status:  Patient states sob has not changed. Pro bnp improved. 40520 -> 8449. Urine output not accurate as pt using briefs. Brief History:     Patient admitted for chf exacerbation 2/2 noncompliance. On lasix 40 mg iv bid. Review of Systems:     Constitutional:  negative for chills, fevers, sweats  Respiratory:  + dyspnea on exertion. negative for cough, hemoptysis, wheezing  Cardiovascular:  negative for chest pain, chest pressure/discomfort, lower extremity edema, palpitations  Gastrointestinal:  negative for abdominal pain, constipation, diarrhea, nausea, vomiting  Neurological:  negative for dizziness, headache    Medications: Allergies:     Allergies   Allergen Reactions    Levofloxacin Hives    Pcn [Penicillins] Hives       Current Meds:   Scheduled Meds:    aspirin  81 mg Oral Daily    atorvastatin  80 mg Oral Nightly    clopidogrel  75 mg Oral Daily    colchicine  0.6 mg Oral Daily    famotidine  20 mg Oral Daily    insulin glargine  30 Units Subcutaneous Nightly    lisinopril  5 mg Oral Daily    metoprolol tartrate  50 mg Oral BID    OLANZapine  10 mg Oral Nightly    OXcarbazepine  300 mg Oral BID    sodium chloride flush  10 mL Intravenous 2 times per day    docusate sodium  100 mg Oral BID    enoxaparin  40 mg Subcutaneous Daily    furosemide  40 mg Intravenous BID    spironolactone  25 mg Oral Daily    vancomycin (VANCOCIN) intermittent dosing (placeholder)   Other RX Placeholder    vancomycin  1,250 mg Intravenous Q24H     Continuous Infusions:    favored     Xr Chest Standard (2 Vw)    Result Date: 12/22/2017  EXAMINATION: TWO VIEWS OF THE CHEST 12/22/2017 7:42 am COMPARISON: December 17, 2017 HISTORY: ORDERING SYSTEM PROVIDED HISTORY: SOB, RLL crackles, h/o CHF TECHNOLOGIST PROVIDED HISTORY: Reason for exam:->SOB, RLL crackles, h/o CHF Acute/subsequent encounter FINDINGS: Overlying monitoring devices limit evaluation. Skin staples again overlie the left chest. Right PICC is unchanged. Increased right pleural effusion and right lung opacities. Layering left pleural effusion is suspected. Prior sternotomy. Cardiomegaly. Mild pulmonary edema. Increased right lung opacities, possibly pneumonia. Increased left pleural effusion. Xr Chest Standard (2 Vw)    Result Date: 12/17/2017  EXAMINATION: TWO VIEWS OF THE CHEST, 12/17/2017 10:59 am COMPARISON: December 13, 2017 HISTORY: ORDERING SYSTEM PROVIDED HISTORY: shortness of breat TECHNOLOGIST PROVIDED HISTORY: Reason for exam:->shortness of breat Acute / initial encounter FINDINGS: Overlying leads obscure detail. Right PICC has been placed with the tip at the cavoatrial junction. Prior sternotomy. Cardiomegaly. Moderate pulmonary edema. Increased bibasilar lung opacities are nonspecific. Moderate pulmonary edema. Bibasilar lung opacities may be related to pulmonary edema, atelectasis, with pneumonia not excluded. Xr Pelvis (1-2 Views)    Result Date: 12/17/2017  EXAMINATION: SINGLE VIEW OF THE PELVIS 12/17/2017 10:59 am COMPARISON: Acute abdominal series dated 10/22/2017. HISTORY: ORDERING SYSTEM PROVIDED HISTORY: fall TECHNOLOGIST PROVIDED HISTORY: Reason for exam:->fall FINDINGS: Visibility of the pelvic bones is limited by overlying soft tissue attenuation. There is no definite evidence for acute fracture involving the pelvic bones. No significant diastasis at the pubic symphysis or sacroiliac joints. The proximal femurs are grossly intact. No dislocation at the hips.      1.  Limited distress  Mental Status:  oriented to person, place and time and normal affect  Lungs:  clear to auscultation bilaterally, normal effort  Heart:  regular rate and rhythm, no murmur  Abdomen:  soft, nontender, nondistended, normal bowel sounds, no masses, hepatomegaly, splenomegaly  Extremities:  + pedal edema bilaterally, no redness, tenderness in the calves  Skin:  no gross lesions, rashes, induration    Assessment:        Primary Problem  Acute on chronic combined systolic and diastolic heart failure [facility])    Active Hospital Problems    Diagnosis Date Noted    CHF (congestive heart failure), NYHA class II, chronic, combined ([facility] [address] [I50.42] 01/07/2018    Acute on chronic combined systolic and diastolic heart failure ([facility] [I50.43]        Plan:      1. CHF exacerbation, combined, EF 49%, Grade 1 diastolic dysfunction 2/2 noncompliance    - cardio consulted  - lasix 40 mg iv bid   - fluid restriction     2. H/o MSSA ICD endocarditis , s/p extraction  - continue vanco til 1/15  - pharmacy to dose vancomycin      3. DM 2: continue home meds. lantus 30 units hs.      Delmer Owusu MD  1/9/2018  5:56 PM

## 2018-01-09 NOTE — PROGRESS NOTES
Patient refused to allow PICC dressing change, dressing dated 1/6/18. Unit Manager Shayna Sanchez made aware of situation will attempt to convince patient to allow dressing change at later time.

## 2018-01-09 NOTE — PROGRESS NOTES
Patient had complaint that she couldn't breath, Sat 98% /66  Respirations 18 lungs sounds diminished bilaterally in the bases. Perfect serve LESLY Biggs NP with primary team to update. Bipap ordered patient refused. Patient refused bipap, ate pudding and drank a orange juice. Patient is currently in bed eyes closed appears relaxed. Respirations non labored.

## 2018-01-10 VITALS
HEART RATE: 84 BPM | SYSTOLIC BLOOD PRESSURE: 125 MMHG | WEIGHT: 170.5 LBS | TEMPERATURE: 98.6 F | RESPIRATION RATE: 16 BRPM | HEIGHT: 61 IN | DIASTOLIC BLOOD PRESSURE: 67 MMHG | OXYGEN SATURATION: 96 % | BODY MASS INDEX: 32.19 KG/M2

## 2018-01-10 LAB
ANION GAP SERPL CALCULATED.3IONS-SCNC: 12 MMOL/L (ref 9–17)
BNP INTERPRETATION: ABNORMAL
BUN BLDV-MCNC: 18 MG/DL (ref 8–23)
BUN/CREAT BLD: ABNORMAL (ref 9–20)
CALCIUM SERPL-MCNC: 8.3 MG/DL (ref 8.6–10.4)
CHLORIDE BLD-SCNC: 96 MMOL/L (ref 98–107)
CO2: 29 MMOL/L (ref 20–31)
CREAT SERPL-MCNC: 0.76 MG/DL (ref 0.5–0.9)
GFR AFRICAN AMERICAN: >60 ML/MIN
GFR NON-AFRICAN AMERICAN: >60 ML/MIN
GFR SERPL CREATININE-BSD FRML MDRD: ABNORMAL ML/MIN/{1.73_M2}
GFR SERPL CREATININE-BSD FRML MDRD: ABNORMAL ML/MIN/{1.73_M2}
GLUCOSE BLD-MCNC: 136 MG/DL (ref 70–99)
GLUCOSE BLD-MCNC: 155 MG/DL (ref 65–105)
GLUCOSE BLD-MCNC: 216 MG/DL (ref 65–105)
HCT VFR BLD CALC: 28.9 % (ref 36.3–47.1)
HEMOGLOBIN: 8.8 G/DL (ref 11.9–15.1)
MCH RBC QN AUTO: 25.7 PG (ref 25.2–33.5)
MCHC RBC AUTO-ENTMCNC: 30.4 G/DL (ref 28.4–34.8)
MCV RBC AUTO: 84.5 FL (ref 82.6–102.9)
PDW BLD-RTO: 18 % (ref 11.8–14.4)
PLATELET # BLD: 384 K/UL (ref 138–453)
PMV BLD AUTO: 10 FL (ref 8.1–13.5)
POTASSIUM SERPL-SCNC: 4 MMOL/L (ref 3.7–5.3)
PRO-BNP: 4596 PG/ML
RBC # BLD: 3.42 M/UL (ref 3.95–5.11)
SODIUM BLD-SCNC: 137 MMOL/L (ref 135–144)
WBC # BLD: 8.6 K/UL (ref 3.5–11.3)

## 2018-01-10 PROCEDURE — 6370000000 HC RX 637 (ALT 250 FOR IP): Performed by: NURSE PRACTITIONER

## 2018-01-10 PROCEDURE — 99239 HOSP IP/OBS DSCHRG MGMT >30: CPT | Performed by: FAMILY MEDICINE

## 2018-01-10 PROCEDURE — 83880 ASSAY OF NATRIURETIC PEPTIDE: CPT

## 2018-01-10 PROCEDURE — 85027 COMPLETE CBC AUTOMATED: CPT

## 2018-01-10 PROCEDURE — 82947 ASSAY GLUCOSE BLOOD QUANT: CPT

## 2018-01-10 PROCEDURE — 2580000003 HC RX 258: Performed by: NURSE PRACTITIONER

## 2018-01-10 PROCEDURE — 6360000002 HC RX W HCPCS: Performed by: NURSE PRACTITIONER

## 2018-01-10 PROCEDURE — 80048 BASIC METABOLIC PNL TOTAL CA: CPT

## 2018-01-10 PROCEDURE — 36415 COLL VENOUS BLD VENIPUNCTURE: CPT

## 2018-01-10 RX ORDER — FUROSEMIDE 40 MG/1
40 TABLET ORAL 2 TIMES DAILY
Status: DISCONTINUED | OUTPATIENT
Start: 2018-01-10 | End: 2018-01-10 | Stop reason: HOSPADM

## 2018-01-10 RX ORDER — SPIRONOLACTONE 25 MG/1
25 TABLET ORAL DAILY
Qty: 30 TABLET | Refills: 6 | Status: ON HOLD | OUTPATIENT
Start: 2018-01-11 | End: 2018-05-17 | Stop reason: HOSPADM

## 2018-01-10 RX ADMIN — COLCHICINE 0.6 MG: 0.6 TABLET, FILM COATED ORAL at 09:06

## 2018-01-10 RX ADMIN — ASPIRIN 81 MG: 81 TABLET, CHEWABLE ORAL at 09:06

## 2018-01-10 RX ADMIN — FAMOTIDINE 20 MG: 20 TABLET, FILM COATED ORAL at 09:06

## 2018-01-10 RX ADMIN — VANCOMYCIN HYDROCHLORIDE 1250 MG: 1 INJECTION, POWDER, LYOPHILIZED, FOR SOLUTION INTRAVENOUS at 14:48

## 2018-01-10 RX ADMIN — METOPROLOL TARTRATE 50 MG: 50 TABLET, FILM COATED ORAL at 09:06

## 2018-01-10 RX ADMIN — SPIRONOLACTONE 25 MG: 25 TABLET ORAL at 09:06

## 2018-01-10 RX ADMIN — OXCARBAZEPINE 300 MG: 300 TABLET, FILM COATED ORAL at 09:07

## 2018-01-10 RX ADMIN — LISINOPRIL 5 MG: 5 TABLET ORAL at 09:06

## 2018-01-10 RX ADMIN — FUROSEMIDE 40 MG: 10 INJECTION, SOLUTION INTRAMUSCULAR; INTRAVENOUS at 09:06

## 2018-01-10 RX ADMIN — CLOPIDOGREL 75 MG: 75 TABLET, FILM COATED ORAL at 09:06

## 2018-01-10 RX ADMIN — Medication 10 ML: at 09:07

## 2018-01-10 ASSESSMENT — PAIN SCALES - GENERAL: PAINLEVEL_OUTOF10: 0

## 2018-01-10 NOTE — PROGRESS NOTES
Discharge instructions reviewed with patient, all questions answered. Patient waiting on ride.    Electronically signed by Aquiles Culver RN on 1/10/2018 at 1:42 PM

## 2018-01-10 NOTE — PROGRESS NOTES
[address] [facility]    Progress Note    1/10/2018    8:06 AM    Name:   Fransisca Styles  MRN:     [id number]     [place]:      [de-identified]   Room:   2024/2024-01   Day:  3  Admit Date:  1/7/2018  8:49 PM    PCP:   Keli Diane MD  Code Status:  Full Code    Subjective:     C/C:   Chief Complaint   Patient presents with    Shortness of Breath     Interval History Status:     Patient states her breathing is better and is at baseline. Pt states she wants to go home today. Urine output not accurate as pt using briefs. Brief History:     Patient admitted for chf exacerbation 2/2 noncompliance. On lasix 40 mg iv bid. Cardio consulted       Review of Systems:     Constitutional:  negative for chills, fevers, sweats  Respiratory:  + dyspnea on exertion (improved). negative for cough, hemoptysis, wheezing  Cardiovascular:  negative for chest pain, chest pressure/discomfort, lower extremity edema, palpitations  Gastrointestinal:  negative for abdominal pain, constipation, diarrhea, nausea, vomiting  Neurological:  negative for dizziness, headache    Medications: Allergies:     Allergies   Allergen Reactions    Levofloxacin Hives    Pcn [Penicillins] Hives       Current Meds:   Scheduled Meds:    aspirin  81 mg Oral Daily    atorvastatin  80 mg Oral Nightly    clopidogrel  75 mg Oral Daily    colchicine  0.6 mg Oral Daily    famotidine  20 mg Oral Daily    insulin glargine  30 Units Subcutaneous Nightly    lisinopril  5 mg Oral Daily    metoprolol tartrate  50 mg Oral BID    OLANZapine  10 mg Oral Nightly    OXcarbazepine  300 mg Oral BID    sodium chloride flush  10 mL Intravenous 2 times per day    docusate sodium  100 mg Oral BID    enoxaparin  40 mg Subcutaneous Daily    furosemide  40 mg Intravenous BID    spironolactone  25 mg Oral Daily    vancomycin (VANCOCIN) intermittent dosing (placeholder)   Other RX Placeholder    ([phone]       Lab Results   Component Value Date    FIO2 NOT REPORTED 12/30/2017       Radiology:    Xr Chest Standard (2 Vw)    Result Date: 1/8/2018  EXAMINATION: TWO VIEWS OF THE CHEST 1/8/2018 8:18 am COMPARISON: 01/07/2018. HISTORY: ORDERING SYSTEM PROVIDED HISTORY: CHF TECHNOLOGIST PROVIDED HISTORY: Reason for exam:->CHF Follow-up examination. FINDINGS: There is a right PICC line. Median sternotomy wires are noted from CABG. The cardiac silhouette is enlarged, stable. Mediastinal contours are stable. There is worsening pulmonary edema. There is development of bilateral pleural effusions with blunting of the posterior costophrenic angles. There is associated atelectasis in the lung bases. 1. Worsening pulmonary edema with development of small bilateral pleural effusions as well as increased bibasilar atelectasis. Xr Chest Standard (2 Vw)    Result Date: 1/7/2018  EXAMINATION: TWO VIEWS OF THE CHEST 1/7/2018 9:14 pm COMPARISON: January 1 HISTORY: ORDERING SYSTEM PROVIDED HISTORY: SOB TECHNOLOGIST PROVIDED HISTORY: Reason for exam:->SOB FINDINGS: Right upper extremity PICC line is again noted. Sternotomy wires and mediastinal clips are again noted. Heart size is stable. Ground-glass density is noted in both lungs. There is no pneumothorax. Small effusions are noted bilateral     Mild diffuse pulmonary edema     Xr Chest Standard (2 Vw)    Result Date: 1/1/2018  EXAMINATION: TWO VIEWS OF THE CHEST 1/1/2018 8:11 am COMPARISON: 12/30/2017 HISTORY: ORDERING SYSTEM PROVIDED HISTORY: Pneumonia TECHNOLOGIST PROVIDED HISTORY: Reason for exam:->Pneumonia FINDINGS: Right PICC line unchanged. Status post CABG. Cardiomegaly. Blunting of costophrenic angles representing trace pleural effusion left greater than right. Mild vascular congestion showing improvement. Resolving mild vascular congestion. Trace pleural effusions.      Xr Chest Standard (2 Vw)    Result Date: 12/24/2017  EXAMINATION: TWO VIEWS OF THE CHEST 12/24/2017 8:57 am COMPARISON: December 22nd HISTORY: ORDERING SYSTEM PROVIDED HISTORY: follow up TECHNOLOGIST PROVIDED HISTORY: Reason for exam:->follow up FINDINGS: Sternotomy wires and mediastinal clips are noted. Staples project over the left upper outer lung zone and left axillary region. Right upper extremity PICC line is noted. Diffuse airspace disease is improved. There is no pneumothorax. Diffuse bilateral airspace disease. This is improved from the prior. Pulmonary edema is favored     Xr Chest Standard (2 Vw)    Result Date: 12/22/2017  EXAMINATION: TWO VIEWS OF THE CHEST 12/22/2017 7:42 am COMPARISON: December 17, 2017 HISTORY: ORDERING SYSTEM PROVIDED HISTORY: SOB, RLL crackles, h/o CHF TECHNOLOGIST PROVIDED HISTORY: Reason for exam:->SOB, RLL crackles, h/o CHF Acute/subsequent encounter FINDINGS: Overlying monitoring devices limit evaluation. Skin staples again overlie the left chest. Right PICC is unchanged. Increased right pleural effusion and right lung opacities. Layering left pleural effusion is suspected. Prior sternotomy. Cardiomegaly. Mild pulmonary edema. Increased right lung opacities, possibly pneumonia. Increased left pleural effusion. Xr Chest Standard (2 Vw)    Result Date: 12/17/2017  EXAMINATION: TWO VIEWS OF THE CHEST, 12/17/2017 10:59 am COMPARISON: December 13, 2017 HISTORY: ORDERING SYSTEM PROVIDED HISTORY: shortness of breat TECHNOLOGIST PROVIDED HISTORY: Reason for exam:->shortness of breat Acute / initial encounter FINDINGS: Overlying leads obscure detail. Right PICC has been placed with the tip at the cavoatrial junction. Prior sternotomy. Cardiomegaly. Moderate pulmonary edema. Increased bibasilar lung opacities are nonspecific. Moderate pulmonary edema. Bibasilar lung opacities may be related to pulmonary edema, atelectasis, with pneumonia not excluded.      Xr Pelvis (1-2 Views)    Result Date: 12/17/2017  EXAMINATION: SINGLE VIEW

## 2018-01-10 NOTE — PROGRESS NOTES
[facility]   Progress Note                   Date:   1/10/2018  Patient name: Lety Palafox  Date of admission:  1/7/2018  8:49 PM  MRN:   [id number]  YOB: 1955  PCP: Mario Fang MD    Reason for Admission: CHF (congestive heart failure), NYHA class II, chronic, combined ([facility] [I50.42]    Subjective:       Clinical Changes / Abnormalities: Patient seen & examined in room. States she is feeling better today and asking when she can go home. States she was \"up more yesterday and I am about where I always am.\" No new concers/issues. +1.2L since admission per records but inaccurate d/t voiding in brief. ROS    Medications:   Scheduled Meds:   aspirin  81 mg Oral Daily    atorvastatin  80 mg Oral Nightly    clopidogrel  75 mg Oral Daily    colchicine  0.6 mg Oral Daily    famotidine  20 mg Oral Daily    insulin glargine  30 Units Subcutaneous Nightly    lisinopril  5 mg Oral Daily    metoprolol tartrate  50 mg Oral BID    OLANZapine  10 mg Oral Nightly    OXcarbazepine  300 mg Oral BID    sodium chloride flush  10 mL Intravenous 2 times per day    docusate sodium  100 mg Oral BID    enoxaparin  40 mg Subcutaneous Daily    furosemide  40 mg Intravenous BID    spironolactone  25 mg Oral Daily    vancomycin (VANCOCIN) intermittent dosing (placeholder)   Other RX Placeholder    vancomycin  1,250 mg Intravenous Q24H     Continuous Infusions:   dextrose       CBC:   Recent Labs      01/07/18   2149  01/08/18   1059  01/10/18   0617   WBC  9.8  10.3  8.6   HGB  8.5*  9.6*  8.8*   PLT  347  345  384     BMP:    Recent Labs      01/08/18   1059  01/09/18   0740  01/10/18   0617   NA  143  142  137   K  5.0  3.7  4.0   CL  103  100  96*   CO2  21  27  29   BUN  22  18  18   CREATININE  0.94*  0.86  0.76   GLUCOSE  109*  40*  136*     Hepatic: No results for input(s): AST, ALT, ALB, BILITOT, ALKPHOS in the last 72 hours.   Troponin:   Recent Labs      01/07/18   2132 TROPONINI  0.01     BNP: No results for input(s): BNP in the last 72 hours. Lipids:   Recent Labs      01/08/18   1059   CHOL  109   HDL  47     INR: No results for input(s): INR in the last 72 hours. Objective:   Vitals: /67   Pulse 96   Temp 97.8 °F (36.6 °C) (Oral)   Resp 16   Ht 5' 1\" (1.549 m)   Wt 170 lb 8 oz (77.3 kg)   LMP  (LMP Unknown)   SpO2 92%   BMI 32.22 kg/m²   General appearance: alert and cooperative with exam  HEENT: Head: Normocephalic, no lesions, without obvious abnormality. Neck: no JVD, trachea midline, no adenopathy  Lungs: diminished throughout. Very fine rales RLL. On RA oxygen without distress. Heart: Regular rate and rhythm, s1/s2 auscultated, no murmurs  Abdomen: soft, non-tender, bowel sounds active  Extremities: No edema  Neurologic: not done    ICD EXTRACTION 12/13/17: Laser lead extraction done by Dr. Johnie Overton.      NATHAN 12/11/17: EF 35-40%, BA dilatation. Large mobile echo density is seen in the RA on the ICD lead, high suggestive of a vegetation. Severe TR.      CATH 12/4/17:Multi-vessel Coronary Artery Disease. Patent grafts to LAD and OM. Patent prior RCA stents with non-obstructive disease. Moderately impaired ventricular function--EF 30%     ECHO 11/30/17: EF 35%, grade I DD.       STRESS 11/15/17: Apparent remote infarction within the inferior wall and moderate-sized partially reversible perfusion defect within the anterior wall, concerning for ischemia. Dyskinesis of the apex. Severe hypokinesis of the septum, inferoseptal wall and inferior wall. EF 36%.      ICD 3/13/17: [employer] device placed by Dr. Johnie Overton for ICM.    CABG 9/1/16: LIMA-LAD, SVG-OM3. Assessment / Acute Cardiac Problems:   1. Acute on Chronic systolic CHF  2. Medication non-compliance  3.  AICD lead infection s/p extraction 12/2017    Patient Active Problem List:     Cerebral infarction [facility])     Essential hypertension     Diabetes mellitus type 2, controlled ([address]     Diverticulitis

## 2018-01-10 NOTE — PROGRESS NOTES
Patient discharged home with all belongings via ambulance  Electronically signed by Kate Mar RN on 1/10/2018 at 5:03 PM

## 2018-01-10 NOTE — CARE COORDINATION
Discharge [address] [facility] Case Management Department  Written by: Inocencia Drew RN    Patient Name: Lety Palafox  Attending Provider: Westley Meehan MD  Admit Date: 2018  8:49 PM  MRN: [id number]  Account: [de-identified]                     : 1955  Discharge Date: 1/10      Disposition: home with [facility] hc and csi for continued iv atb therapy, called [facility] and vonnie with csi notified of dc,  ps dr Siri Soulier for iv atb orders,  Bessie Cover with csi given script and lab results,    Inocencia Drew RN

## 2018-01-11 NOTE — DISCHARGE SUMMARY
Manisha Montenegro [facility]    Discharge Summary     Patient ID: Vamshi Mckinney  :  1955   MRN: [id number]     ACCOUNT:  [de-identified]   Patient's PCP: Vivek Coon MD  Admit Date: 2018   Discharge Date: 1/10/2018    Length of Stay: 3  Code Status:  Prior  Admitting Physician: Richmond Goodwin MD  Discharge Physician: Richmond Goodwin MD     Active Discharge Diagnoses:     Primary Problem  Acute on chronic combined systolic and diastolic heart failure [facility] Problems    Diagnosis Date Noted    CHF (congestive heart failure), NYHA class II, chronic, combined ([facility] [I50.42] 2018    Acute on chronic combined systolic and diastolic heart failure ([facility] [I50.43]        Admission Condition:  poor     Discharged Condition: fair    Hospital Stay:     Hospital Course:  Vamshi Mckinney is a 65 y.o. female who was admitted for the management of   Acute on chronic combined systolic and diastolic heart failure ([facility] , presented to ER withShortness of Breath    64 yo f with h/o combined CHF admitted for acute on chronic heart failure. Cardio consulted. Patient started on iv lasix 40 bid. Patient reported that she was not taking her meds since being discharged 3-4 days ago. Patient's sob improved on diuresis and was discharged. Patient was on vancomycin at home due to ICD endocarditis, s/p extraction on . Significant therapeutic interventions:   CHF exacerbation, combined   - Cardio consult  - Iv lasix  - Compliance to medication discussed. Subacute ICD endocarditis - continued vanco during admission. Continue vanc till 1/15     Significant Diagnostic Studies:     Radiology:  Xr Chest Standard (2 Vw)    Result Date: 2018  EXAMINATION: TWO VIEWS OF THE CHEST 2018 8:18 am COMPARISON: 2018.  HISTORY: ORDERING SYSTEM PROVIDED HISTORY: CHF TECHNOLOGIST PROVIDED HISTORY: Reason for exam:->CHF extremity PICC line is noted. Diffuse airspace disease is improved. There is no pneumothorax. Diffuse bilateral airspace disease. This is improved from the prior. Pulmonary edema is favored     Xr Chest Standard (2 Vw)    Result Date: 12/22/2017  EXAMINATION: TWO VIEWS OF THE CHEST 12/22/2017 7:42 am COMPARISON: December 17, 2017 HISTORY: ORDERING SYSTEM PROVIDED HISTORY: SOB, RLL crackles, h/o CHF TECHNOLOGIST PROVIDED HISTORY: Reason for exam:->SOB, RLL crackles, h/o CHF Acute/subsequent encounter FINDINGS: Overlying monitoring devices limit evaluation. Skin staples again overlie the left chest. Right PICC is unchanged. Increased right pleural effusion and right lung opacities. Layering left pleural effusion is suspected. Prior sternotomy. Cardiomegaly. Mild pulmonary edema. Increased right lung opacities, possibly pneumonia. Increased left pleural effusion. Xr Chest Standard (2 Vw)    Result Date: 12/17/2017  EXAMINATION: TWO VIEWS OF THE CHEST, 12/17/2017 10:59 am COMPARISON: December 13, 2017 HISTORY: ORDERING SYSTEM PROVIDED HISTORY: shortness of breat TECHNOLOGIST PROVIDED HISTORY: Reason for exam:->shortness of breat Acute / initial encounter FINDINGS: Overlying leads obscure detail. Right PICC has been placed with the tip at the cavoatrial junction. Prior sternotomy. Cardiomegaly. Moderate pulmonary edema. Increased bibasilar lung opacities are nonspecific. Moderate pulmonary edema. Bibasilar lung opacities may be related to pulmonary edema, atelectasis, with pneumonia not excluded. Xr Pelvis (1-2 Views)    Result Date: 12/17/2017  EXAMINATION: SINGLE VIEW OF THE PELVIS 12/17/2017 10:59 am COMPARISON: Acute abdominal series dated 10/22/2017. HISTORY: ORDERING SYSTEM PROVIDED HISTORY: fall TECHNOLOGIST PROVIDED HISTORY: Reason for exam:->fall FINDINGS: Visibility of the pelvic bones is limited by overlying soft tissue attenuation.   There is no definite evidence for acute fracture predominantly right lower lobe, could represent pneumonia. Small right and trace left pleural effusions. Consultations:    Consults:     Final Specialist Recommendations/Findings:   IP CONSULT TO HOSPITALIST  IP CONSULT TO HEART FAILURE NURSE/COORDINATOR  IP CONSULT TO DIETITIAN  IP CONSULT TO CARDIOLOGY  PHARMACY TO DOSE VANCOMYCIN  IP CONSULT TO HOME CARE NEEDS      The patient was seen and examined on day of discharge and this discharge summary is in conjunction with any daily progress note from day of discharge.     Discharge plan:     Disposition: Home with [facility]    Physician Follow Up:     Charlsie Kehr, MD  [address] [facility] [id number]  [address] [place] [address] [facility]  [address] [place] [address]  [phone]  On 2/9/2018  at 1:30pm with Nurse Practitioner      Diet: cardiac diet    Activity: As tolerated    Instructions to Patient:   Follow up with PCP and cardiology   Medication compliance    Discharge Medications:      Medication List      START taking these medications    spironolactone 25 MG tablet  Commonly known as:  ALDACTONE  Take 1 tablet by mouth daily  Start taking on:  1/11/2018        CONTINUE taking these medications    aspirin 81 MG chewable tablet  Take 1 tablet by mouth daily     atorvastatin 80 MG tablet  Commonly known as:  LIPITOR  Take 1 tablet by mouth nightly     clopidogrel 75 MG tablet  Commonly known as:  PLAVIX  Take 1 tablet by mouth daily     colchicine 0.6 MG tablet  Commonly known as:  COLCRYS  Take 1 tablet by mouth daily     famotidine 20 MG tablet  Commonly known as:  PEPCID  Take 1 tablet by mouth daily     furosemide 20 MG tablet  Commonly known as:  LASIX  Take 2 tablets by mouth 2 times daily     insulin glargine 100 UNIT/ML injection vial  Commonly known as:  LANTUS  Inject 30 Units into the skin nightly     ipratropium-albuterol 0.5-2.5 (3) MG/3ML Soln nebulizer solution  Commonly known as: DUONEB  Inhale 3 mLs into the lungs every 4 hours as needed for Shortness of Breath     lisinopril 5 MG tablet  Commonly known as:  PRINIVIL;ZESTRIL  Take 1 tablet by mouth daily     metoprolol tartrate 50 MG tablet  Commonly known as:  LOPRESSOR  Take 1 tablet by mouth 2 times daily     nitroGLYCERIN 0.4 MG SL tablet  Commonly known as:  NITROSTAT  Place 1 tablet under the tongue every 5 minutes as needed for Chest pain up to max of 3 total doses. If no relief after 1 dose, call 911. OLANZapine 10 MG tablet  Commonly known as:  ZYPREXA  Take 1 tablet by mouth nightly     OXcarbazepine 300 MG tablet  Commonly known as:  TRILEPTAL  Take 1 tablet by mouth 2 times daily Per pharmacy she is to be taking 300 mg twice daily but patient states that she only takes once daily. senna-docusate 8.6-50 MG per tablet  Commonly known as:  PERICOLACE  Take 1 tablet by mouth daily     vancomycin infusion  Commonly known as:  VANCOCIN  Infuse 1,250 mg intravenously every 24 hours for 5 days Compound per protocol. Aim for trough 14-17 Till 1/15/18 Creat 2 x per week outpt pharmacy to dose           Where to Get Your Medications      These medications were sent to [facility], [facility]  [address] [facility], [address] [facility] [address]    Phone:  [phone]   · spironolactone 25 MG tablet     You can get these medications from any pharmacy    Bring a paper prescription for each of these medications  · vancomycin infusion         Time Spent on discharge is  39 mins in patient examination, evaluation, counseling as well as medication reconciliation, prescriptions for required medications, discharge plan and follow up. Electronically signed by   Aron Huang MD  1/10/2018  8:59 PM      Thank you Dr. Aurea Curry MD for the opportunity to be involved in this patient's care.

## 2018-01-15 ENCOUNTER — APPOINTMENT (OUTPATIENT)
Dept: GENERAL RADIOLOGY | Age: 63
DRG: 193 | End: 2018-01-15
Payer: MEDICARE

## 2018-01-15 ENCOUNTER — TELEPHONE (OUTPATIENT)
Dept: INFECTIOUS DISEASES | Age: 63
End: 2018-01-15

## 2018-01-15 ENCOUNTER — HOSPITAL ENCOUNTER (EMERGENCY)
Age: 63
Discharge: HOME | DRG: 193 | End: 2018-01-15
Attending: EMERGENCY MEDICINE
Payer: MEDICARE

## 2018-01-15 VITALS
DIASTOLIC BLOOD PRESSURE: 98 MMHG | TEMPERATURE: 97.2 F | HEART RATE: 112 BPM | SYSTOLIC BLOOD PRESSURE: 168 MMHG | RESPIRATION RATE: 22 BRPM | OXYGEN SATURATION: 99 %
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
## 2018-01-15 PROCEDURE — 72100 X-RAY EXAM L-S SPINE 2/3 VWS: CPT

## 2018-01-15 PROCEDURE — 72070 X-RAY EXAM THORAC SPINE 2VWS: CPT

## 2018-01-15 PROCEDURE — 71045 X-RAY EXAM CHEST 1 VIEW: CPT

## 2018-01-15 PROCEDURE — 99283 EMERGENCY DEPT VISIT LOW MDM: CPT

## 2018-01-15 PROCEDURE — 72040 X-RAY EXAM NECK SPINE 2-3 VW: CPT

## 2018-01-15 RX ORDER — ACETAMINOPHEN 325 MG/1
650 TABLET ORAL ONCE
Status: DISCONTINUED | OUTPATIENT
Start: 2018-01-15 | End: 2018-01-15 | Stop reason: HOSPADM

## 2018-01-15 RX ORDER — ACETAMINOPHEN 325 MG/1
650 TABLET ORAL EVERY 6 HOURS PRN
Qty: 60 TABLET | Refills: 0 | Status: ON HOLD | OUTPATIENT
Start: 2018-01-15 | End: 2018-05-17 | Stop reason: HOSPADM

## 2018-01-15 RX ORDER — ALBUTEROL SULFATE 90 UG/1
2 AEROSOL, METERED RESPIRATORY (INHALATION)
Status: DISCONTINUED | OUTPATIENT
Start: 2018-01-15 | End: 2018-01-15 | Stop reason: HOSPADM

## 2018-01-15 ASSESSMENT — ENCOUNTER SYMPTOMS
BLOOD IN STOOL: 0
CHEST TIGHTNESS: 0
BACK PAIN: 1
RHINORRHEA: 0
ABDOMINAL PAIN: 0
VOMITING: 0
DIARRHEA: 0
EYE DISCHARGE: 0
COUGH: 1
SHORTNESS OF BREATH: 1
NAUSEA: 0
SORE THROAT: 0
EYE REDNESS: 0

## 2018-01-15 NOTE — ED NOTES
Pt took a fall from cough. Pt states LOC. And then she says she didn't. Pt Hx of being a complicated pt. Pt states back pain from fall, but then pt states she always has back pain. Pt has no trauma. Pt refuses to lay flat. Refuses c collar. Pt A&0x4. Pt coughing constantly. Pt being treated for pneumonia at home. Pt states cough is cronic. Pt states smoke over 1 PPD and refuses to quit. Pt states cough is never going away. Pt has no SOB.    Steevn Norman RN  01/15/18 [facility] Jane Vernon RN  01/15/18 [id number]
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
## 2018-01-15 NOTE — ED PROVIDER NOTES
[facility]  ED  Emergency Department Encounter  Emergency Medicine Resident     Pt Name: Curtis Zapata  MRN: [id number]  [place] 1955  Date of evaluation: 1/15/18  PCP:  Leroy Garza MD    [address] [place] [address]       Chief Complaint   Patient presents with   [facility] Fall     pt states she fell off couch. pt states maybe LOC. HISTORY OF PRESENT ILLNESS  (Location/Symptom, Timing/Onset, Context/Setting, Quality, Duration, Modifying Factors, Severity.)      Curtis Zapata is a 65 y.o. female who presents with Complaints of back pain after falling from seated position while on the couch at home earlier today. Patient was ambulatory on scene with EMS prior to arrival, and is taken off her cervical collar multiple times. Patient does have a history of COPD with a recent admission for CHF exacerbation, and was started on Lasix and discharged a few days ago. Patient has some decreased air movement and is coughing, but denies any fevers or chills, nausea or vomiting abdominal pain or any chest pain. Patient also denies any numbness or tingling in her upper or lower extremities and no loss of bowel or bladder. PAST MEDICAL / SURGICAL / SOCIAL / FAMILY HISTORY      has a past medical history of Arthritis; Asthma; CAD (coronary artery disease); CHF (congestive heart failure) ([facility] Clinical trial participant at discharge; COPD (chronic obstructive pulmonary disease) ([facility] Diabetes mellitus ([facility] Hepatitis C; Hyperlipidemia; Hypertension; MRSA (methicillin resistant staph aureus) culture positive; Pneumonia; and Unspecified cerebral artery occlusion with cerebral infarction. has a past surgical history that includes Tonsillectomy; Foot surgery;   picc powerpicc double (12/19/2017); Hysterectomy; Cardiac surgery (5/6/2016); Cardiac pacemaker placement; and Cardiac catheterization (11/27/2017).     Social History     Social History    Marital status: Single     Spouse name: N/A    Number of PHYSICAL EXAM   (up to 7 for level 4, 8 or more for level 5)      INITIAL VITALS:   BP (!) 168/98   Pulse 112   Temp 97.2 °F (36.2 °C) (Oral)   Resp 22   LMP  (LMP Unknown)   SpO2 99%     Physical Exam   Constitutional: She is oriented to person, place, and time. No distress. Awake, alert, in no acute distress, coughing, asking for juice   HENT:   Head: Normocephalic and atraumatic. Eyes: Conjunctivae and EOM are normal. Pupils are equal, round, and reactive to light. Neck: Neck supple. No JVD present. No tracheal deviation present. No abrasions or contusions or step-offs noted on exam, mild midline cervical tenderness, cervical collar in place   Cardiovascular: Regular rhythm, normal heart sounds and intact distal pulses. Tachycardia and hypertension   Pulmonary/Chest: Effort normal. No stridor. No respiratory distress. She has no wheezes. She has no rales. She exhibits no tenderness. Decreased air movement, mild coarse breath sounds diffusely bilaterally   Abdominal: Soft. Bowel sounds are normal. She exhibits no distension. There is no tenderness. There is no rebound and no guarding. Musculoskeletal: Normal range of motion. She exhibits no edema. Neurological: She is alert and oriented to person, place, and time. No cranial nerve deficit. No focal neuro deficits noted   Skin: Skin is warm and dry. No rash noted. Psychiatric: She has a normal mood and affect.        DIFFERENTIAL  DIAGNOSIS     PLAN (LABS / IMAGING / EKG):  Orders Placed This Encounter   Procedures    XR CHEST PORTABLE    XR LUMBAR SPINE (2-3 VIEWS)    XR THORACIC SPINE (2 VIEWS)    XR CERVICAL SPINE (2-3 VIEWS)    Respiratory Care Evaluation and Treat    Initiate ED Aerosol Protocol    HHN Treatment    MDI Treatment       MEDICATIONS ORDERED:  Orders Placed This Encounter   Medications    acetaminophen (TYLENOL) tablet 650 mg    albuterol (PROVENTIL) nebulizer solution 5 mg    albuterol sulfate  (90 Right upper extremity PICC line is again noted. Sternotomy wires and mediastinal clips are again noted. Heart size is stable. Ground-glass density is noted in both lungs. There is no pneumothorax. Small effusions are noted bilateral     Mild diffuse pulmonary edema     Xr Chest Standard (2 Vw)    Result Date: 1/1/2018  EXAMINATION: TWO VIEWS OF THE CHEST 1/1/2018 8:11 am COMPARISON: 12/30/2017 HISTORY: ORDERING SYSTEM PROVIDED HISTORY: Pneumonia TECHNOLOGIST PROVIDED HISTORY: Reason for exam:->Pneumonia FINDINGS: Right PICC line unchanged. Status post CABG. Cardiomegaly. Blunting of costophrenic angles representing trace pleural effusion left greater than right. Mild vascular congestion showing improvement. Resolving mild vascular congestion. Trace pleural effusions. Xr Chest Standard (2 Vw)    Result Date: 12/24/2017  EXAMINATION: TWO VIEWS OF THE CHEST 12/24/2017 8:57 am COMPARISON: December 22nd HISTORY: ORDERING SYSTEM PROVIDED HISTORY: follow up TECHNOLOGIST PROVIDED HISTORY: Reason for exam:->follow up FINDINGS: Sternotomy wires and mediastinal clips are noted. Staples project over the left upper outer lung zone and left axillary region. Right upper extremity PICC line is noted. Diffuse airspace disease is improved. There is no pneumothorax. Diffuse bilateral airspace disease. This is improved from the prior. Pulmonary edema is favored     Xr Chest Standard (2 Vw)    Result Date: 12/22/2017  EXAMINATION: TWO VIEWS OF THE CHEST 12/22/2017 7:42 am COMPARISON: December 17, 2017 HISTORY: ORDERING SYSTEM PROVIDED HISTORY: SOB, RLL crackles, h/o CHF TECHNOLOGIST PROVIDED HISTORY: Reason for exam:->SOB, RLL crackles, h/o CHF Acute/subsequent encounter FINDINGS: Overlying monitoring devices limit evaluation. Skin staples again overlie the left chest. Right PICC is unchanged. Increased right pleural effusion and right lung opacities. Layering left pleural effusion is suspected.  Prior

## 2018-01-15 NOTE — ED PROVIDER NOTES
[address]     Emergency Department     Faculty Note/ Attestation      Pt Name: Leonila Nielson                                       MRN: [id number]  [place] 1955  Date of evaluation: 1/15/2018    Patients PCP:    Timothy Cook MD      Attestation  I performed a history and physical examination of the patient and discussed management with the resident. I reviewed the residents note and agree with the documented findings and plan of care. Any areas of disagreement are noted on the chart. I was personally present for the key portions of any procedures. I have documented in the chart those procedures where I was not present during the key portions. I have reviewed the emergency nurses triage note. I agree with the chief complaint, past medical history, past surgical history, allergies, medications, social and family history as documented unless otherwise noted below. For Physician Assistant/ Nurse Practitioner cases/documentation I have personally evaluated this patient and have completed at least one if not all key elements of the E/M (history, physical exam, and MDM). Additional findings are as noted. Initial Screens:             Vitals:    Vitals:    01/15/18 0930 01/15/18 0931   BP: (!) 168/98    Pulse: 112    Resp: 22    Temp:  97.2 °F (36.2 °C)   TempSrc: Oral Oral   SpO2: 99%        CHIEF COMPLAINT       Chief Complaint   Patient presents with    Fall     pt states she fell off couch. pt states maybe LOC. DIAGNOSTIC RESULTS             RADIOLOGY:   XR LUMBAR SPINE (2-3 VIEWS)   Final Result   Cervical spine:      1. Limited assessment of the cervical spine as the odontoid is obscured and   the inferior C5 level and below are obscured. 2. Moderate degenerative changes within the cervical spine. Slight reversal   of the expected cervical lordosis. 3. Limited examination of the cervical spine.   If there is clinical concern   regarding trauma to the

## 2018-01-15 NOTE — PROGRESS NOTES
Pt evaluated for aerosol tx. Pt refused to take it at this time. Pt stated it would not help, she's had this cough for 30 years.

## 2018-01-16 ENCOUNTER — HOSPITAL ENCOUNTER (INPATIENT)
Age: 63
LOS: 2 days | Discharge: HOME | DRG: 193 | End: 2018-01-18
Attending: EMERGENCY MEDICINE | Admitting: INTERNAL MEDICINE
Payer: MEDICARE

## 2018-01-16 ENCOUNTER — APPOINTMENT (OUTPATIENT)
Dept: GENERAL RADIOLOGY | Age: 63
DRG: 193 | End: 2018-01-16
Payer: MEDICARE

## 2018-01-16 DIAGNOSIS — D50.9 IRON DEFICIENCY ANEMIA, UNSPECIFIED IRON DEFICIENCY ANEMIA TYPE: ICD-10-CM

## 2018-01-16 DIAGNOSIS — I50.9 ACUTE ON CHRONIC CONGESTIVE HEART FAILURE, UNSPECIFIED CONGESTIVE HEART FAILURE TYPE: ICD-10-CM

## 2018-01-16 DIAGNOSIS — I50.22 CHRONIC SYSTOLIC CHF (CONGESTIVE HEART FAILURE) (HCC): ICD-10-CM

## 2018-01-16 DIAGNOSIS — J11.00 INFLUENZAL PNEUMONIA: Primary | ICD-10-CM

## 2018-01-16 DIAGNOSIS — E16.2 HYPOGLYCEMIA: ICD-10-CM

## 2018-01-16 PROBLEM — J10.1 INFLUENZA A: Status: ACTIVE | Noted: 2018-01-16

## 2018-01-16 LAB
-: ABNORMAL
ABSOLUTE EOS #: 0 K/UL (ref 0–0.4)
ABSOLUTE IMMATURE GRANULOCYTE: 0.08 K/UL (ref 0–0.3)
ABSOLUTE LYMPH #: 0.67 K/UL (ref 1–4.8)
ABSOLUTE MONO #: 0.67 K/UL (ref 0.1–0.8)
ALBUMIN SERPL-MCNC: 3.8 G/DL (ref 3.5–5.2)
ALBUMIN/GLOBULIN RATIO: 1.1 (ref 1–2.5)
ALP BLD-CCNC: 83 U/L (ref 35–104)
ALT SERPL-CCNC: 14 U/L (ref 5–33)
AMORPHOUS: ABNORMAL
ANION GAP SERPL CALCULATED.3IONS-SCNC: 15 MMOL/L (ref 9–17)
AST SERPL-CCNC: 34 U/L
BACTERIA: ABNORMAL
BASOPHILS # BLD: 1 % (ref 0–2)
BASOPHILS ABSOLUTE: 0.08 K/UL (ref 0–0.2)
BILIRUB SERPL-MCNC: 0.41 MG/DL (ref 0.3–1.2)
BILIRUBIN URINE: NEGATIVE
BNP INTERPRETATION: ABNORMAL
BUN BLDV-MCNC: 17 MG/DL (ref 8–23)
BUN/CREAT BLD: ABNORMAL (ref 9–20)
CALCIUM SERPL-MCNC: 8.7 MG/DL (ref 8.6–10.4)
CASTS UA: ABNORMAL /LPF (ref 0–8)
CHLORIDE BLD-SCNC: 98 MMOL/L (ref 98–107)
CO2: 22 MMOL/L (ref 20–31)
COLOR: YELLOW
CREAT SERPL-MCNC: 0.87 MG/DL (ref 0.5–0.9)
CRYSTALS, UA: ABNORMAL /HPF
DIFFERENTIAL TYPE: ABNORMAL
DIRECT EXAM: ABNORMAL
EOSINOPHILS RELATIVE PERCENT: 0 % (ref 1–4)
EPITHELIAL CELLS UA: ABNORMAL /HPF (ref 0–5)
GFR AFRICAN AMERICAN: >60 ML/MIN
GFR NON-AFRICAN AMERICAN: >60 ML/MIN
GFR SERPL CREATININE-BSD FRML MDRD: ABNORMAL ML/MIN/{1.73_M2}
GFR SERPL CREATININE-BSD FRML MDRD: ABNORMAL ML/MIN/{1.73_M2}
GLUCOSE BLD-MCNC: 48 MG/DL (ref 70–99)
GLUCOSE URINE: NEGATIVE
HCT VFR BLD CALC: 29.7 % (ref 36.3–47.1)
HEMOGLOBIN: 9 G/DL (ref 11.9–15.1)
IMMATURE GRANULOCYTES: 1 %
INR BLD: 1.1
KETONES, URINE: ABNORMAL
LACTIC ACID, WHOLE BLOOD: 1.2 MMOL/L (ref 0.7–2.1)
LACTIC ACID: NORMAL MMOL/L
LEUKOCYTE ESTERASE, URINE: NEGATIVE
LYMPHOCYTES # BLD: 8 % (ref 24–44)
Lab: ABNORMAL
MCH RBC QN AUTO: 25.2 PG (ref 25.2–33.5)
MCHC RBC AUTO-ENTMCNC: 30.3 G/DL (ref 28.4–34.8)
MCV RBC AUTO: 83.2 FL (ref 82.6–102.9)
MONOCYTES # BLD: 8 % (ref 1–7)
MORPHOLOGY: ABNORMAL
MUCUS: ABNORMAL
NITRITE, URINE: NEGATIVE
NRBC AUTOMATED: 0 PER 100 WBC
OTHER OBSERVATIONS UA: ABNORMAL
PARTIAL THROMBOPLASTIN TIME: 19.9 SEC (ref 21.3–31.3)
PDW BLD-RTO: 17.2 % (ref 11.8–14.4)
PH UA: 5 (ref 5–8)
PLATELET # BLD: 288 K/UL (ref 138–453)
PLATELET ESTIMATE: ABNORMAL
PMV BLD AUTO: 9.6 FL (ref 8.1–13.5)
POTASSIUM SERPL-SCNC: 3.9 MMOL/L (ref 3.7–5.3)
PRO-BNP: ABNORMAL PG/ML
PROTEIN UA: ABNORMAL
PROTHROMBIN TIME: 11.9 SEC (ref 9.4–12.6)
RBC # BLD: 3.57 M/UL (ref 3.95–5.11)
RBC # BLD: ABNORMAL 10*6/UL
RBC UA: ABNORMAL /HPF (ref 0–4)
RENAL EPITHELIAL, UA: ABNORMAL /HPF
SEG NEUTROPHILS: 82 % (ref 36–66)
SEGMENTED NEUTROPHILS ABSOLUTE COUNT: 6.9 K/UL (ref 1.8–7.7)
SODIUM BLD-SCNC: 135 MMOL/L (ref 135–144)
SPECIFIC GRAVITY UA: 1.03 (ref 1–1.03)
SPECIMEN DESCRIPTION: ABNORMAL
STATUS: ABNORMAL
TOTAL PROTEIN: 7.4 G/DL (ref 6.4–8.3)
TRICHOMONAS: ABNORMAL
TROPONIN INTERP: ABNORMAL
TROPONIN T: 0.05 NG/ML
TURBIDITY: ABNORMAL
URINE HGB: ABNORMAL
UROBILINOGEN, URINE: NORMAL
WBC # BLD: 8.4 K/UL (ref 3.5–11.3)
WBC # BLD: ABNORMAL 10*3/UL
WBC UA: ABNORMAL /HPF (ref 0–5)
YEAST: ABNORMAL

## 2018-01-16 PROCEDURE — 2580000003 HC RX 258: Performed by: EMERGENCY MEDICINE

## 2018-01-16 PROCEDURE — 84484 ASSAY OF TROPONIN QUANT: CPT

## 2018-01-16 PROCEDURE — 80053 COMPREHEN METABOLIC PANEL: CPT

## 2018-01-16 PROCEDURE — 87040 BLOOD CULTURE FOR BACTERIA: CPT

## 2018-01-16 PROCEDURE — 81001 URINALYSIS AUTO W/SCOPE: CPT

## 2018-01-16 PROCEDURE — 02HV33Z INSERTION OF INFUSION DEVICE INTO SUPERIOR VENA CAVA, PERCUTANEOUS APPROACH: ICD-10-PCS | Performed by: INTERNAL MEDICINE

## 2018-01-16 PROCEDURE — 94660 CPAP INITIATION&MGMT: CPT

## 2018-01-16 PROCEDURE — 99285 EMERGENCY DEPT VISIT HI MDM: CPT

## 2018-01-16 PROCEDURE — 36556 INSERT NON-TUNNEL CV CATH: CPT

## 2018-01-16 PROCEDURE — P9612 CATHETERIZE FOR URINE SPEC: HCPCS

## 2018-01-16 PROCEDURE — 93005 ELECTROCARDIOGRAM TRACING: CPT

## 2018-01-16 PROCEDURE — 85025 COMPLETE CBC W/AUTO DIFF WBC: CPT

## 2018-01-16 PROCEDURE — 83880 ASSAY OF NATRIURETIC PEPTIDE: CPT

## 2018-01-16 PROCEDURE — 2500000003 HC RX 250 WO HCPCS: Performed by: EMERGENCY MEDICINE

## 2018-01-16 PROCEDURE — 85730 THROMBOPLASTIN TIME PARTIAL: CPT

## 2018-01-16 PROCEDURE — 71045 X-RAY EXAM CHEST 1 VIEW: CPT

## 2018-01-16 PROCEDURE — 87804 INFLUENZA ASSAY W/OPTIC: CPT

## 2018-01-16 PROCEDURE — 83605 ASSAY OF LACTIC ACID: CPT

## 2018-01-16 PROCEDURE — 87086 URINE CULTURE/COLONY COUNT: CPT

## 2018-01-16 PROCEDURE — 96375 TX/PRO/DX INJ NEW DRUG ADDON: CPT

## 2018-01-16 PROCEDURE — 85610 PROTHROMBIN TIME: CPT

## 2018-01-16 PROCEDURE — 96374 THER/PROPH/DIAG INJ IV PUSH: CPT

## 2018-01-16 PROCEDURE — 6360000002 HC RX W HCPCS: Performed by: EMERGENCY MEDICINE

## 2018-01-16 PROCEDURE — 2060000000 HC ICU INTERMEDIATE R&B

## 2018-01-16 PROCEDURE — 6370000000 HC RX 637 (ALT 250 FOR IP): Performed by: EMERGENCY MEDICINE

## 2018-01-16 RX ORDER — ONDANSETRON 2 MG/ML
4 INJECTION INTRAMUSCULAR; INTRAVENOUS ONCE
Status: COMPLETED | OUTPATIENT
Start: 2018-01-16 | End: 2018-01-16

## 2018-01-16 RX ORDER — PROMETHAZINE HYDROCHLORIDE 25 MG/ML
12.5 INJECTION, SOLUTION INTRAMUSCULAR; INTRAVENOUS ONCE
Status: DISCONTINUED | OUTPATIENT
Start: 2018-01-16 | End: 2018-01-19 | Stop reason: HOSPADM

## 2018-01-16 RX ORDER — NITROGLYCERIN 0.4 MG/1
0.4 TABLET SUBLINGUAL EVERY 5 MIN PRN
Status: DISCONTINUED | OUTPATIENT
Start: 2018-01-16 | End: 2018-01-17

## 2018-01-16 RX ORDER — NITROGLYCERIN 20 MG/100ML
5 INJECTION INTRAVENOUS CONTINUOUS
Status: DISCONTINUED | OUTPATIENT
Start: 2018-01-16 | End: 2018-01-17

## 2018-01-16 RX ORDER — 0.9 % SODIUM CHLORIDE 0.9 %
500 INTRAVENOUS SOLUTION INTRAVENOUS ONCE
Status: COMPLETED | OUTPATIENT
Start: 2018-01-16 | End: 2018-01-16

## 2018-01-16 RX ORDER — OSELTAMIVIR PHOSPHATE 75 MG/1
75 CAPSULE ORAL ONCE
Status: COMPLETED | OUTPATIENT
Start: 2018-01-16 | End: 2018-01-16

## 2018-01-16 RX ORDER — FUROSEMIDE 10 MG/ML
40 INJECTION INTRAMUSCULAR; INTRAVENOUS ONCE
Status: DISCONTINUED | OUTPATIENT
Start: 2018-01-16 | End: 2018-01-16

## 2018-01-16 RX ORDER — FUROSEMIDE 10 MG/ML
20 INJECTION INTRAMUSCULAR; INTRAVENOUS ONCE
Status: COMPLETED | OUTPATIENT
Start: 2018-01-16 | End: 2018-01-16

## 2018-01-16 RX ORDER — DEXTROSE MONOHYDRATE 25 G/50ML
12.5 INJECTION, SOLUTION INTRAVENOUS ONCE
Status: COMPLETED | OUTPATIENT
Start: 2018-01-16 | End: 2018-01-16

## 2018-01-16 RX ORDER — 0.9 % SODIUM CHLORIDE 0.9 %
500 INTRAVENOUS SOLUTION INTRAVENOUS ONCE
Status: DISCONTINUED | OUTPATIENT
Start: 2018-01-16 | End: 2018-01-17

## 2018-01-16 RX ADMIN — FUROSEMIDE 20 MG: 10 INJECTION, SOLUTION INTRAMUSCULAR; INTRAVENOUS at 20:27

## 2018-01-16 RX ADMIN — ONDANSETRON 4 MG: 2 INJECTION INTRAMUSCULAR; INTRAVENOUS at 19:33

## 2018-01-16 RX ADMIN — OSELTAMIVIR PHOSPHATE 75 MG: 75 CAPSULE ORAL at 20:27

## 2018-01-16 RX ADMIN — NITROGLYCERIN 5 MCG/MIN: 20 INJECTION INTRAVENOUS at 20:32

## 2018-01-16 RX ADMIN — DEXTROSE MONOHYDRATE 12.5 G: 25 INJECTION, SOLUTION INTRAVENOUS at 20:29

## 2018-01-16 RX ADMIN — SODIUM CHLORIDE 500 ML: 0.9 INJECTION, SOLUTION INTRAVENOUS at 20:16

## 2018-01-16 ASSESSMENT — ENCOUNTER SYMPTOMS
ABDOMINAL PAIN: 0
WHEEZING: 0
SHORTNESS OF BREATH: 1
VOMITING: 0
RHINORRHEA: 0
COLOR CHANGE: 0
COUGH: 1
NAUSEA: 0

## 2018-01-16 ASSESSMENT — PAIN DESCRIPTION - PAIN TYPE: TYPE: ACUTE PAIN

## 2018-01-16 ASSESSMENT — PAIN DESCRIPTION - LOCATION: LOCATION: ABDOMEN

## 2018-01-16 ASSESSMENT — PAIN DESCRIPTION - DESCRIPTORS: DESCRIPTORS: ACHING

## 2018-01-16 ASSESSMENT — PAIN SCALES - GENERAL: PAINLEVEL_OUTOF10: 8

## 2018-01-16 NOTE — ED PROVIDER NOTES
[facility] ED  Emergency Department Encounter  Emergency Medicine Resident     Pt Name: Joselin Parkinson  MRN: [id number]  [place] 1955  Date of evaluation: 1/16/18  PCP:  Kemar Ramey MD    [address] [place] [address]       Chief Complaint   Patient presents with    Shortness of Breath    Illness       HISTORY OF PRESENT ILLNESS  (Location/Symptom, Timing/Onset, Context/Setting, Quality, Duration, Modifying Factors, Severity.)      Joselin Parkinson is a 65 y.o. female who presents with Complaints of generalized illness, arthralgias, myalgias, cough, shortness of breath. Patient said her symptoms been ongoing for the past 2 days. She says her cough is nonproductive. She denies any fevers. She denies any chest pain, abdominal pain, nausea, vomiting, or diarrhea. She says she is been recently admitted to the hospital for CHF exacerbation. She says she's been taking all her medications as prescribed. PAST MEDICAL / SURGICAL / SOCIAL / FAMILY HISTORY      has a past medical history of Arthritis; Asthma; CAD (coronary artery disease); CHF (congestive heart failure) ([facility] Clinical trial participant at discharge; COPD (chronic obstructive pulmonary disease) ([facility] Diabetes mellitus ([facility] Hepatitis C; Hyperlipidemia; Hypertension; MRSA (methicillin resistant staph aureus) culture positive; Pneumonia; and Unspecified cerebral artery occlusion with cerebral infarction. has a past surgical history that includes Tonsillectomy; Foot surgery; hc  picc powerpicc double (12/19/2017); Hysterectomy; Cardiac surgery (5/6/2016); Cardiac pacemaker placement; and Cardiac catheterization (11/27/2017). Social History     Social History    Marital status: Single     Spouse name: N/A    Number of children: N/A    Years of education: N/A     Occupational History    Not on file.      Social History Main Topics    Smoking status: Current Every Day Smoker     Packs/day: 1.00     Types: Cigarettes    Smokeless tobacco: Never Used    Alcohol use Yes      Comment: 1 bottle per month wine    Drug use: No    Sexual activity: Yes     Partners: Male     Other Topics Concern    Not on file     Social History Narrative    No narrative on file       Family History   Problem Relation Age of Onset    Family history unknown: Yes       Allergies:  Levofloxacin and Pcn [penicillins]    Home Medications:  Prior to Admission medications    Medication Sig Start Date End Date Taking? Authorizing Provider   acetaminophen (TYLENOL) 325 MG tablet Take 2 tablets by mouth every 6 hours as needed for Pain 1/15/18   Alma Ferguson DO   spironolactone (ALDACTONE) 25 MG tablet Take 1 tablet by mouth daily 1/11/18   ROSALIO Solano   furosemide (LASIX) 20 MG tablet Take 2 tablets by mouth 2 times daily 12/26/17   Ashutosh Flores MD   aspirin 81 MG chewable tablet Take 1 tablet by mouth daily 12/18/17   Ashutosh Flores MD   ipratropium-albuterol (DUONEB) 0.5-2.5 (3) MG/3ML SOLN nebulizer solution Inhale 3 mLs into the lungs every 4 hours as needed for Shortness of Breath 12/18/17   Jose Be MD   nitroGLYCERIN (NITROSTAT) 0.4 MG SL tablet Place 1 tablet under the tongue every 5 minutes as needed for Chest pain up to max of 3 total doses. If no relief after 1 dose, call 911. 12/18/17   Ashutosh Flores MD   OXcarbazepine (TRILEPTAL) 300 MG tablet Take 1 tablet by mouth 2 times daily Per pharmacy she is to be taking 300 mg twice daily but patient states that she only takes once daily.  12/18/17   Jose Be MD   insulin glargine (LANTUS) 100 UNIT/ML injection vial Inject 30 Units into the skin nightly 12/18/17   Jose Be MD   atorvastatin (LIPITOR) 80 MG tablet Take 1 tablet by mouth nightly 12/18/17   Ashutosh Flores MD   lisinopril (PRINIVIL;ZESTRIL) 5 MG tablet Take 1 tablet by mouth daily 12/18/17   Jose Be MD   OLANZapine (ZYPREXA) 10 MG tablet Take 1 tablet by mouth nightly

## 2018-01-17 ENCOUNTER — APPOINTMENT (OUTPATIENT)
Dept: GENERAL RADIOLOGY | Age: 63
DRG: 193 | End: 2018-01-17
Payer: MEDICARE

## 2018-01-17 PROBLEM — J09.X1 INFLUENZA A WITH PNEUMONIA: Status: ACTIVE | Noted: 2018-01-17

## 2018-01-17 PROBLEM — J10.1 INFLUENZA A: Status: RESOLVED | Noted: 2018-01-16 | Resolved: 2018-01-17

## 2018-01-17 PROBLEM — J96.21 ACUTE ON CHRONIC RESPIRATORY FAILURE WITH HYPOXIA (HCC): Status: ACTIVE | Noted: 2018-01-17

## 2018-01-17 PROBLEM — Z72.0 TOBACCO USE: Status: ACTIVE | Noted: 2018-01-17

## 2018-01-17 LAB
ANION GAP SERPL CALCULATED.3IONS-SCNC: 16 MMOL/L (ref 9–17)
BNP INTERPRETATION: ABNORMAL
BUN BLDV-MCNC: 20 MG/DL (ref 8–23)
BUN/CREAT BLD: ABNORMAL (ref 9–20)
CALCIUM SERPL-MCNC: 8.2 MG/DL (ref 8.6–10.4)
CHLORIDE BLD-SCNC: 98 MMOL/L (ref 98–107)
CO2: 21 MMOL/L (ref 20–31)
CREAT SERPL-MCNC: 1.15 MG/DL (ref 0.5–0.9)
CULTURE: NO GROWTH
CULTURE: NORMAL
ESTIMATED AVERAGE GLUCOSE: 131 MG/DL
GFR AFRICAN AMERICAN: 58 ML/MIN
GFR NON-AFRICAN AMERICAN: 48 ML/MIN
GFR SERPL CREATININE-BSD FRML MDRD: ABNORMAL ML/MIN/{1.73_M2}
GFR SERPL CREATININE-BSD FRML MDRD: ABNORMAL ML/MIN/{1.73_M2}
GLUCOSE BLD-MCNC: 104 MG/DL (ref 65–105)
GLUCOSE BLD-MCNC: 115 MG/DL (ref 70–99)
GLUCOSE BLD-MCNC: 173 MG/DL (ref 65–105)
GLUCOSE BLD-MCNC: 174 MG/DL (ref 65–105)
GLUCOSE BLD-MCNC: 84 MG/DL (ref 65–105)
GLUCOSE BLD-MCNC: 93 MG/DL (ref 65–105)
HBA1C MFR BLD: 6.2 % (ref 4–6)
HCT VFR BLD CALC: 29.9 % (ref 36.3–47.1)
HEMOGLOBIN: 8.7 G/DL (ref 11.9–15.1)
Lab: NORMAL
MAGNESIUM: 2 MG/DL (ref 1.6–2.6)
MCH RBC QN AUTO: 24.8 PG (ref 25.2–33.5)
MCHC RBC AUTO-ENTMCNC: 29.1 G/DL (ref 28.4–34.8)
MCV RBC AUTO: 85.2 FL (ref 82.6–102.9)
NRBC AUTOMATED: 0 PER 100 WBC
PARTIAL THROMBOPLASTIN TIME: 103.6 SEC (ref 21.3–31.3)
PDW BLD-RTO: 17.6 % (ref 11.8–14.4)
PLATELET # BLD: 219 K/UL (ref 138–453)
PMV BLD AUTO: 9.8 FL (ref 8.1–13.5)
POTASSIUM SERPL-SCNC: 4 MMOL/L (ref 3.7–5.3)
PRO-BNP: ABNORMAL PG/ML
RBC # BLD: 3.51 M/UL (ref 3.95–5.11)
SODIUM BLD-SCNC: 135 MMOL/L (ref 135–144)
SPECIMEN DESCRIPTION: NORMAL
STATUS: NORMAL
TROPONIN INTERP: ABNORMAL
TROPONIN INTERP: ABNORMAL
TROPONIN T: 0.08 NG/ML
TROPONIN T: 0.1 NG/ML
WBC # BLD: 5.9 K/UL (ref 3.5–11.3)

## 2018-01-17 PROCEDURE — 83880 ASSAY OF NATRIURETIC PEPTIDE: CPT

## 2018-01-17 PROCEDURE — 82947 ASSAY GLUCOSE BLOOD QUANT: CPT

## 2018-01-17 PROCEDURE — 85027 COMPLETE CBC AUTOMATED: CPT

## 2018-01-17 PROCEDURE — 83036 HEMOGLOBIN GLYCOSYLATED A1C: CPT

## 2018-01-17 PROCEDURE — 36415 COLL VENOUS BLD VENIPUNCTURE: CPT

## 2018-01-17 PROCEDURE — 2580000003 HC RX 258: Performed by: NURSE PRACTITIONER

## 2018-01-17 PROCEDURE — 84484 ASSAY OF TROPONIN QUANT: CPT

## 2018-01-17 PROCEDURE — 6360000002 HC RX W HCPCS: Performed by: NURSE PRACTITIONER

## 2018-01-17 PROCEDURE — 6370000000 HC RX 637 (ALT 250 FOR IP): Performed by: NURSE PRACTITIONER

## 2018-01-17 PROCEDURE — 99223 1ST HOSP IP/OBS HIGH 75: CPT | Performed by: INTERNAL MEDICINE

## 2018-01-17 PROCEDURE — 2060000000 HC ICU INTERMEDIATE R&B

## 2018-01-17 PROCEDURE — 6370000000 HC RX 637 (ALT 250 FOR IP): Performed by: INTERNAL MEDICINE

## 2018-01-17 PROCEDURE — 2500000003 HC RX 250 WO HCPCS: Performed by: NURSE PRACTITIONER

## 2018-01-17 PROCEDURE — 80048 BASIC METABOLIC PNL TOTAL CA: CPT

## 2018-01-17 PROCEDURE — 71045 X-RAY EXAM CHEST 1 VIEW: CPT

## 2018-01-17 PROCEDURE — 83735 ASSAY OF MAGNESIUM: CPT

## 2018-01-17 PROCEDURE — 85730 THROMBOPLASTIN TIME PARTIAL: CPT

## 2018-01-17 PROCEDURE — 76937 US GUIDE VASCULAR ACCESS: CPT

## 2018-01-17 PROCEDURE — 6360000002 HC RX W HCPCS: Performed by: INTERNAL MEDICINE

## 2018-01-17 PROCEDURE — 94762 N-INVAS EAR/PLS OXIMTRY CONT: CPT

## 2018-01-17 RX ORDER — OLANZAPINE 10 MG/1
10 TABLET ORAL NIGHTLY
Status: DISCONTINUED | OUTPATIENT
Start: 2018-01-17 | End: 2018-01-19 | Stop reason: HOSPADM

## 2018-01-17 RX ORDER — DEXTROSE MONOHYDRATE 50 MG/ML
100 INJECTION, SOLUTION INTRAVENOUS PRN
Status: DISCONTINUED | OUTPATIENT
Start: 2018-01-17 | End: 2018-01-19 | Stop reason: HOSPADM

## 2018-01-17 RX ORDER — SENNA AND DOCUSATE SODIUM 50; 8.6 MG/1; MG/1
1 TABLET, FILM COATED ORAL DAILY
Status: DISCONTINUED | OUTPATIENT
Start: 2018-01-17 | End: 2018-01-19 | Stop reason: HOSPADM

## 2018-01-17 RX ORDER — NITROGLYCERIN 20 MG/100ML
5 INJECTION INTRAVENOUS CONTINUOUS
Status: DISCONTINUED | OUTPATIENT
Start: 2018-01-17 | End: 2018-01-17

## 2018-01-17 RX ORDER — ACETAMINOPHEN 325 MG/1
650 TABLET ORAL EVERY 4 HOURS PRN
Status: DISCONTINUED | OUTPATIENT
Start: 2018-01-17 | End: 2018-01-17 | Stop reason: SDUPTHER

## 2018-01-17 RX ORDER — SODIUM CHLORIDE 0.9 % (FLUSH) 0.9 %
10 SYRINGE (ML) INJECTION EVERY 12 HOURS SCHEDULED
Status: DISCONTINUED | OUTPATIENT
Start: 2018-01-17 | End: 2018-01-19 | Stop reason: HOSPADM

## 2018-01-17 RX ORDER — HEPARIN SODIUM 10000 [USP'U]/100ML
12 INJECTION, SOLUTION INTRAVENOUS CONTINUOUS
Status: DISCONTINUED | OUTPATIENT
Start: 2018-01-17 | End: 2018-01-17

## 2018-01-17 RX ORDER — SODIUM CHLORIDE 0.9 % (FLUSH) 0.9 %
10 SYRINGE (ML) INJECTION PRN
Status: DISCONTINUED | OUTPATIENT
Start: 2018-01-17 | End: 2018-01-19 | Stop reason: HOSPADM

## 2018-01-17 RX ORDER — ALBUTEROL SULFATE 2.5 MG/3ML
2.5 SOLUTION RESPIRATORY (INHALATION)
Status: DISCONTINUED | OUTPATIENT
Start: 2018-01-17 | End: 2018-01-17

## 2018-01-17 RX ORDER — INSULIN GLARGINE 100 [IU]/ML
30 INJECTION, SOLUTION SUBCUTANEOUS NIGHTLY
Status: DISCONTINUED | OUTPATIENT
Start: 2018-01-17 | End: 2018-01-19 | Stop reason: HOSPADM

## 2018-01-17 RX ORDER — NICOTINE POLACRILEX 4 MG
15 LOZENGE BUCCAL PRN
Status: DISCONTINUED | OUTPATIENT
Start: 2018-01-17 | End: 2018-01-19 | Stop reason: HOSPADM

## 2018-01-17 RX ORDER — SPIRONOLACTONE 25 MG/1
25 TABLET ORAL DAILY
Status: DISCONTINUED | OUTPATIENT
Start: 2018-01-17 | End: 2018-01-19 | Stop reason: HOSPADM

## 2018-01-17 RX ORDER — OXCARBAZEPINE 300 MG/1
300 TABLET, FILM COATED ORAL 2 TIMES DAILY
Status: DISCONTINUED | OUTPATIENT
Start: 2018-01-17 | End: 2018-01-19 | Stop reason: HOSPADM

## 2018-01-17 RX ORDER — IPRATROPIUM BROMIDE AND ALBUTEROL SULFATE 2.5; .5 MG/3ML; MG/3ML
1 SOLUTION RESPIRATORY (INHALATION) EVERY 6 HOURS PRN
Status: DISCONTINUED | OUTPATIENT
Start: 2018-01-17 | End: 2018-01-19 | Stop reason: HOSPADM

## 2018-01-17 RX ORDER — BISACODYL 10 MG
10 SUPPOSITORY, RECTAL RECTAL DAILY PRN
Status: DISCONTINUED | OUTPATIENT
Start: 2018-01-17 | End: 2018-01-19 | Stop reason: HOSPADM

## 2018-01-17 RX ORDER — HEPARIN SODIUM 1000 [USP'U]/ML
4000 INJECTION, SOLUTION INTRAVENOUS; SUBCUTANEOUS ONCE
Status: DISCONTINUED | OUTPATIENT
Start: 2018-01-17 | End: 2018-01-17

## 2018-01-17 RX ORDER — METOPROLOL TARTRATE 50 MG/1
50 TABLET, FILM COATED ORAL 2 TIMES DAILY
Status: DISCONTINUED | OUTPATIENT
Start: 2018-01-17 | End: 2018-01-17

## 2018-01-17 RX ORDER — LISINOPRIL 5 MG/1
5 TABLET ORAL DAILY
Status: DISCONTINUED | OUTPATIENT
Start: 2018-01-17 | End: 2018-01-19 | Stop reason: HOSPADM

## 2018-01-17 RX ORDER — LIDOCAINE HYDROCHLORIDE 40 MG/ML
4 INJECTION, SOLUTION RETROBULBAR; TOPICAL
Status: DISCONTINUED | OUTPATIENT
Start: 2018-01-17 | End: 2018-01-19 | Stop reason: HOSPADM

## 2018-01-17 RX ORDER — DEXTROSE MONOHYDRATE 25 G/50ML
12.5 INJECTION, SOLUTION INTRAVENOUS PRN
Status: DISCONTINUED | OUTPATIENT
Start: 2018-01-17 | End: 2018-01-19 | Stop reason: HOSPADM

## 2018-01-17 RX ORDER — OSELTAMIVIR PHOSPHATE 75 MG/1
75 CAPSULE ORAL 2 TIMES DAILY
Status: DISCONTINUED | OUTPATIENT
Start: 2018-01-17 | End: 2018-01-19 | Stop reason: HOSPADM

## 2018-01-17 RX ORDER — COLCHICINE 0.6 MG/1
0.6 TABLET ORAL DAILY
Status: DISCONTINUED | OUTPATIENT
Start: 2018-01-17 | End: 2018-01-19 | Stop reason: HOSPADM

## 2018-01-17 RX ORDER — CLOPIDOGREL BISULFATE 75 MG/1
75 TABLET ORAL DAILY
Status: DISCONTINUED | OUTPATIENT
Start: 2018-01-17 | End: 2018-01-19 | Stop reason: HOSPADM

## 2018-01-17 RX ORDER — HEPARIN SODIUM 1000 [USP'U]/ML
4000 INJECTION, SOLUTION INTRAVENOUS; SUBCUTANEOUS PRN
Status: DISCONTINUED | OUTPATIENT
Start: 2018-01-17 | End: 2018-01-18

## 2018-01-17 RX ORDER — ACETYLCYSTEINE 200 MG/ML
600 SOLUTION ORAL; RESPIRATORY (INHALATION)
Status: DISCONTINUED | OUTPATIENT
Start: 2018-01-17 | End: 2018-01-19 | Stop reason: HOSPADM

## 2018-01-17 RX ORDER — ONDANSETRON 2 MG/ML
4 INJECTION INTRAMUSCULAR; INTRAVENOUS EVERY 6 HOURS PRN
Status: DISCONTINUED | OUTPATIENT
Start: 2018-01-17 | End: 2018-01-19 | Stop reason: HOSPADM

## 2018-01-17 RX ORDER — FUROSEMIDE 10 MG/ML
40 INJECTION INTRAMUSCULAR; INTRAVENOUS 2 TIMES DAILY
Status: DISCONTINUED | OUTPATIENT
Start: 2018-01-17 | End: 2018-01-17

## 2018-01-17 RX ORDER — ATORVASTATIN CALCIUM 80 MG/1
80 TABLET, FILM COATED ORAL NIGHTLY
Status: DISCONTINUED | OUTPATIENT
Start: 2018-01-17 | End: 2018-01-19 | Stop reason: HOSPADM

## 2018-01-17 RX ORDER — ASPIRIN 81 MG/1
81 TABLET, CHEWABLE ORAL DAILY
Status: DISCONTINUED | OUTPATIENT
Start: 2018-01-17 | End: 2018-01-19 | Stop reason: HOSPADM

## 2018-01-17 RX ORDER — ACETAMINOPHEN 325 MG/1
650 TABLET ORAL EVERY 4 HOURS PRN
Status: DISCONTINUED | OUTPATIENT
Start: 2018-01-17 | End: 2018-01-19 | Stop reason: HOSPADM

## 2018-01-17 RX ORDER — HEPARIN SODIUM 1000 [USP'U]/ML
2000 INJECTION, SOLUTION INTRAVENOUS; SUBCUTANEOUS PRN
Status: DISCONTINUED | OUTPATIENT
Start: 2018-01-17 | End: 2018-01-18

## 2018-01-17 RX ORDER — FUROSEMIDE 20 MG/1
20 TABLET ORAL 2 TIMES DAILY
Status: DISCONTINUED | OUTPATIENT
Start: 2018-01-17 | End: 2018-01-19 | Stop reason: HOSPADM

## 2018-01-17 RX ORDER — FAMOTIDINE 20 MG/1
20 TABLET, FILM COATED ORAL DAILY
Status: DISCONTINUED | OUTPATIENT
Start: 2018-01-17 | End: 2018-01-19 | Stop reason: HOSPADM

## 2018-01-17 RX ADMIN — NITROGLYCERIN 5 MCG/MIN: 20 INJECTION INTRAVENOUS at 02:40

## 2018-01-17 RX ADMIN — COLCHICINE 0.6 MG: 0.6 TABLET, FILM COATED ORAL at 09:23

## 2018-01-17 RX ADMIN — ASPIRIN 81 MG: 81 TABLET, CHEWABLE ORAL at 09:23

## 2018-01-17 RX ADMIN — OXCARBAZEPINE 300 MG: 300 TABLET, FILM COATED ORAL at 21:37

## 2018-01-17 RX ADMIN — SPIRONOLACTONE 25 MG: 25 TABLET ORAL at 09:23

## 2018-01-17 RX ADMIN — OXCARBAZEPINE 300 MG: 300 TABLET, FILM COATED ORAL at 09:22

## 2018-01-17 RX ADMIN — METOPROLOL TARTRATE 50 MG: 50 TABLET, FILM COATED ORAL at 02:43

## 2018-01-17 RX ADMIN — OSELTAMIVIR PHOSPHATE 75 MG: 75 CAPSULE ORAL at 09:24

## 2018-01-17 RX ADMIN — FUROSEMIDE 40 MG: 10 INJECTION, SOLUTION INTRAMUSCULAR; INTRAVENOUS at 09:24

## 2018-01-17 RX ADMIN — OLANZAPINE 10 MG: 10 TABLET, FILM COATED ORAL at 21:37

## 2018-01-17 RX ADMIN — Medication 10 ML: at 22:08

## 2018-01-17 RX ADMIN — LISINOPRIL 5 MG: 5 TABLET ORAL at 09:23

## 2018-01-17 RX ADMIN — FUROSEMIDE 40 MG: 10 INJECTION, SOLUTION INTRAMUSCULAR; INTRAVENOUS at 02:43

## 2018-01-17 RX ADMIN — FAMOTIDINE 20 MG: 20 TABLET, FILM COATED ORAL at 09:23

## 2018-01-17 RX ADMIN — HEPARIN SODIUM AND DEXTROSE 12 UNITS/KG/HR: 10000; 5 INJECTION INTRAVENOUS at 05:56

## 2018-01-17 RX ADMIN — HEPARIN SODIUM 4000 UNITS: 1000 INJECTION, SOLUTION INTRAVENOUS; SUBCUTANEOUS at 05:55

## 2018-01-17 RX ADMIN — INSULIN GLARGINE 30 UNITS: 100 INJECTION, SOLUTION SUBCUTANEOUS at 21:39

## 2018-01-17 RX ADMIN — ATORVASTATIN CALCIUM 80 MG: 80 TABLET, FILM COATED ORAL at 21:38

## 2018-01-17 RX ADMIN — INSULIN LISPRO 1 UNITS: 100 INJECTION, SOLUTION INTRAVENOUS; SUBCUTANEOUS at 21:39

## 2018-01-17 RX ADMIN — FUROSEMIDE 20 MG: 20 TABLET ORAL at 17:41

## 2018-01-17 RX ADMIN — CLOPIDOGREL 75 MG: 75 TABLET, FILM COATED ORAL at 09:23

## 2018-01-17 RX ADMIN — OSELTAMIVIR PHOSPHATE 75 MG: 75 CAPSULE ORAL at 21:38

## 2018-01-17 ASSESSMENT — PAIN SCALES - GENERAL
PAINLEVEL_OUTOF10: 4
PAINLEVEL_OUTOF10: 0

## 2018-01-17 NOTE — ED PROVIDER NOTES
[facility] [facility]  Emergency Department  Emergency Medicine Resident Sign-out     Care of Shilpi Peralta was assumed from Dr. Hannah Rodriguez and is being seen for Shortness of Breath and Illness  . The patient's initial evaluation and plan have been discussed with the prior provider who initially evaluated the patient. EMERGENCY DEPARTMENT COURSE / MEDICAL DECISION MAKING:       MEDICATIONS GIVEN:  Orders Placed This Encounter   Medications    0.9 % sodium chloride bolus    oseltamivir (TAMIFLU) capsule 75 mg    ondansetron (ZOFRAN) injection 4 mg    0.9 % sodium chloride bolus    nitroGLYCERIN (NITROSTAT) SL tablet 0.4 mg    nitroGLYCERIN 50 mg in dextrose 5% 250 mL infusion    DISCONTD: furosemide (LASIX) injection 40 mg    furosemide (LASIX) injection 20 mg    dextrose 50 % solution 12.5 g       LABS / RADIOLOGY:     Labs Reviewed   RAPID INFLUENZA A/B ANTIGENS - Abnormal; Notable for the following:        Result Value    Direct Exam POSITIVE for Influenza A Antigen (*)     All other components within normal limits   CBC WITH AUTO DIFFERENTIAL - Abnormal; Notable for the following:     RBC 3.57 (*)     Hemoglobin 9.0 (*)     Hematocrit 29.7 (*)     RDW 17.2 (*)     All other components within normal limits   COMPREHENSIVE METABOLIC PANEL - Abnormal; Notable for the following:     Glucose 48 (*)     AST 34 (*)     All other components within normal limits   APTT - Abnormal; Notable for the following:     PTT 19.9 (*)     All other components within normal limits   BRAIN NATRIURETIC PEPTIDE - Abnormal; Notable for the following:     Pro-BNP 22,167 (*)     All other components within normal limits   CULTURE BLOOD #1   CULTURE BLOOD #1   URINE CULTURE   PROTIME-INR   LACTIC ACID, PLASMA   URINALYSIS WITH MICROSCOPIC   TROPONIN   POCT TROPONIN       Xr Chest Standard (2 Vw)    Result Date: 1/8/2018  EXAMINATION: TWO VIEWS OF THE CHEST 1/8/2018 8:18 am COMPARISON: 01/07/2018.  HISTORY: ORDERING SYSTEM the left upper outer lung zone and left axillary region. Right upper extremity PICC line is noted. Diffuse airspace disease is improved. There is no pneumothorax. Diffuse bilateral airspace disease. This is improved from the prior. Pulmonary edema is favored     Xr Chest Standard (2 Vw)    Result Date: 12/22/2017  EXAMINATION: TWO VIEWS OF THE CHEST 12/22/2017 7:42 am COMPARISON: December 17, 2017 HISTORY: ORDERING SYSTEM PROVIDED HISTORY: SOB, RLL crackles, h/o CHF TECHNOLOGIST PROVIDED HISTORY: Reason for exam:->SOB, RLL crackles, h/o CHF Acute/subsequent encounter FINDINGS: Overlying monitoring devices limit evaluation. Skin staples again overlie the left chest. Right PICC is unchanged. Increased right pleural effusion and right lung opacities. Layering left pleural effusion is suspected. Prior sternotomy. Cardiomegaly. Mild pulmonary edema. Increased right lung opacities, possibly pneumonia. Increased left pleural effusion. Xr Cervical Spine (2-3 Views)    Result Date: 1/15/2018  EXAMINATION: 2 VIEWS OF THE CERVICAL SPINE; 2 VIEWS OF THE THORACIC SPINE; 3 VIEWS OF THE LUMBAR SPINE 1/15/2018 10:52 am COMPARISON: Cervical spine plain radiographs from 06/04/2013, CT chest from 12/31/2017, CT abdomen pelvis from 10/22/2017 HISTORY: ORDERING SYSTEM PROVIDED HISTORY: fall from seated TECHNOLOGIST PROVIDED HISTORY: Reason for exam:->fall from seated 58-year-old female with fall from seated height FINDINGS: Cervical spine: Cervical spine imaged from the skull base to the mid C5 level on the lateral view. Gross preservation of the visualized vertebral body heights. Moderate disc space narrowing at C3-C4 and C4-C5. Slight reversal of the expected cervical lordosis. Mild multilevel facet arthrosis. Degenerative changes are noted at the atlantodental and craniocervical junctions. Lateral masses are relatively symmetric in appearance. Odontoid is obscured.   Articular pillars appear intact on the AP spine. Lumbar spine: 1. Mild diffuse degenerative changes in the lumbar spine. No acute vertebral body height loss in the lumbar spine. 2. Mild right hip osteoarthrosis. 3. Moderate stool burden. Xr Thoracic Spine (2 Views)    Result Date: 1/15/2018  EXAMINATION: 2 VIEWS OF THE CERVICAL SPINE; 2 VIEWS OF THE THORACIC SPINE; 3 VIEWS OF THE LUMBAR SPINE 1/15/2018 10:52 am COMPARISON: Cervical spine plain radiographs from 06/04/2013, CT chest from 12/31/2017, CT abdomen pelvis from 10/22/2017 HISTORY: ORDERING SYSTEM PROVIDED HISTORY: fall from seated TECHNOLOGIST PROVIDED HISTORY: Reason for exam:->fall from seated 81-year-old female with fall from seated height FINDINGS: Cervical spine: Cervical spine imaged from the skull base to the mid C5 level on the lateral view. Gross preservation of the visualized vertebral body heights. Moderate disc space narrowing at C3-C4 and C4-C5. Slight reversal of the expected cervical lordosis. Mild multilevel facet arthrosis. Degenerative changes are noted at the atlantodental and craniocervical junctions. Lateral masses are relatively symmetric in appearance. Odontoid is obscured. Articular pillars appear intact on the AP view. Visualized ribs and lung apices grossly unremarkable. Right-sided PICC distal tip overlying the cavoatrial junction. Prior median sternotomy and CABG. Thoracic spine: Prior median sternotomy and CABG. Right-sided PICC distal tip overlying the cavoatrial junction. Visualized ribs appear grossly intact. Pedicles symmetric in appearance. Visualized thoracic vertebral body heights and alignment are well maintained. Upper thoracic vertebral bodies are obscured. Mild multilevel disc space narrowing within the visualized thoracic spine with mild hypertrophic osteophyte spurring anteriorly at the thoracolumbar junction. Lumbar spine: Lumbar spine imaged from superior T11 vertebral body level to the lower coccyx on the lateral views.   Sophie Panning

## 2018-01-17 NOTE — CARE COORDINATION
Case Management Initial Discharge Plan  Curtis Zapata,         Readmission Risk              Readmission Risk:        28.75       Age 72 or Greater:  0    Admitted from SNF or Requires Paid or Family Care:  2    Currently has CHF,COPD,ARF,CRI,or is on dialysis:  4    Takes more than 5 Prescription Medications:  4    Takes Digoxin,Insulin,Anticoagulants,Narcotics or ASA/Plavix:  [address] in Past 12 Months:  10    On Disability:  3    Patient Considers own Health:  3.75            Met with:patient to discuss discharge plans.    Information verified: address, contacts, phone number, , insurance Yes  PCP: Leroy Garza MD  Date of last visit: oct    Insurance Provider: Juan Neves    Discharge Planning  Current Residence:  Private Residence  Living Arrangements:  Alone   Home has 1 story  Support Systems:  Family Members  Current Services PTA:  C-pap, Oxygen Therapy, Home Care Supplier: [facility] (verified) call [facility] nursing services when dc'd    Patient able to perform ADL's:Assisted  DME used to aid ambulation prior to admission: cane    Potential Assistance Needed:  Home Care resume    Pharmacy: [facility]   Potential Assistance Purchasing Medications:  No  Does patient want to participate in local refill/ meds to beds program?  No    Patient agreeable to home care: Yes  Freedom of choice provided:  yes      Type of Home Care Services:  Nursing Services  Patient expects to be discharged to:  home    Prior SNF/Rehab Placement and Facility: none  Agreeable to SNF/Rehab: No  [place] of choice provided: n/a   Evaluation: no      Transportation provider: [facility]    Transportation arrangements needed for discharge: Yes    Discharge Plan:  Home with [facility]         Electronically signed by Ayden Gaines RN on 18 at 3:09 PM

## 2018-01-17 NOTE — H&P
Simultaneous filing. User may not have seen previous data. LMP  (LMP Unknown)   SpO2 100%   BMI 32.29 kg/m²   Temp (24hrs), Av.5 °F (36.9 °C), Min:98.1 °F (36.7 °C), Max:98.7 °F (37.1 °C)    Recent Labs      18   0106   POCGLU  84       Intake/Output Summary (Last 24 hours) at 18 1437  Last data filed at 18 0800   Gross per 24 hour   Intake                0 ml   Output                0 ml   Net                0 ml       General Appearance:  alert, well appearing, and in no acute distress  Mental status: oriented to person, place, and time with normal affect  Head:  normocephalic, atraumatic. Eye: no icterus, redness, pupils equal and reactive, extraocular eye movements intact, conjunctiva clear  Ear: normal external ear, no discharge, hearing intact  Nose:  no drainage noted  Mouth: mucous membranes moist  Neck: supple, no carotid bruits, thyroid not palpable  Lungs: Bilateral equal air entry, clear to ausculation, no wheezing, rales or rhonchi, normal effort  Cardiovascular: normal rate, regular rhythm, no murmur, gallop, rub.   Abdomen: Soft, nontender, nondistended, normal bowel sounds, no hepatomegaly or splenomegaly  Neurologic: There are no new focal motor or sensory deficits, normal muscle tone and bulk, no abnormal sensation, normal speech, cranial nerves II through XII grossly intact  Skin: No gross lesions, rashes, bruising or bleeding on exposed skin area  Extremities:  peripheral pulses palpable, no pedal edema or calf pain with palpation  Psych: flat affect    Investigations:      Laboratory Testing:  Recent Results (from the past 24 hour(s))   EKG 12 Lead    Collection Time: 18  5:18 PM   Result Value Ref Range    Ventricular Rate 131 BPM    Atrial Rate 131 BPM    P-R Interval 148 ms    QRS Duration 78 ms    Q-T Interval 310 ms    QTc Calculation (Bazett) 457 ms    P Axis 55 degrees    R Axis -12 degrees    T Axis 59 degrees   RAPID INFLUENZA A/B ANTIGENS    Collection - 14.4 %    Platelets 500 197 - 750 k/uL    MPV 9.8 8.1 - 13.5 fL    NRBC Automated 0.0 0.0 per 100 WBC   APTT    Collection Time: 01/17/18  7:12 AM   Result Value Ref Range    .6 () 21.3 - 31.3 sec   Troponin    Collection Time: 01/17/18  7:12 AM   Result Value Ref Range    Troponin T 0.10 (HH) <0.03 ng/mL    Troponin Interp             Imaging/Diagnostics:    Cxr:     Impression   Increased right lung opacities are nonspecific but could represent developing   pneumonia versus asymmetric pulmonary edema         Assessment :      Primary Problem  Influenza A with pneumonia    Active Hospital Problems    Diagnosis Date Noted    Tobacco use [Z72.0] 01/17/2018    Acute on chronic respiratory failure with hypoxia ([facility] [J96.21] 01/17/2018    Influenza A with pneumonia [J09.X1] 01/17/2018    COPD (chronic obstructive pulmonary disease) ([facility]) [J44.9]     Chronic systolic CHF (congestive heart failure) ([facility] [I50.22] 12/22/2017    Controlled type 2 diabetes mellitus without complication, without long-term current use of insulin ([facility] [E11.9] 01/24/2015    Essential hypertension [I10] 06/15/2013       Plan:     Patient status Admit as inpatient in the  Progressive Unit/Step down    1. tamiflu  2. Smoking cessation  3. o2 prn  4. Off bipap now  5. cv eval-d/w dr nAdrew Borges  6. Dc heparin drip  7. Dc picc if she has completed her home iv abx (she shouldve completed by now)    Consultations:   IP CONSULT TO INTERNAL MEDICINE  IP CONSULT TO HOSPITALIST  IP CONSULT TO DIETITIAN  IP CONSULT TO CARDIOLOGY  IP CONSULT TO IV TEAM     Patient is admitted as inpatient status because of co-morbidities listed above, severity of signs and symptoms as outlined, requirement for current medical therapies and most importantly because of direct risk to patient if care not provided in a hospital setting.     Latoya Goldberg DO  1/17/2018  2:37 PM    Copy sent to Dr. Nelson Norman MD

## 2018-01-17 NOTE — ED NOTES
Pt calls out, pt feeling nauseated  Resident inform, verbal order for nausea medication      Migdalia Mckee, RN  01/17/18 0132

## 2018-01-17 NOTE — CONSULTS
No nasal congestion or hives. PHYSICAL EXAM:      BP (!) 100/49   Pulse 94   Temp 98.6 °F (37 °C) (Axillary)   Resp 21   Ht 5' 1\" (1.549 m) Comment: Simultaneous filing. User may not have seen previous data. Wt 170 lb 14.4 oz (77.5 kg) Comment: Simultaneous filing. User may not have seen previous data. LMP  (LMP Unknown)   SpO2 100%   BMI 32.29 kg/m²    Constitutional and General Appearance: alert, cooperative, no distress and appears stated age  [de-identified]: PERRL, no cervical lymphadenopathy. No masses palpable. Normal oral mucosa  Respiratory:  · Normal excursion and expansion without use of accessory muscles  · Resp Auscultation: Good respiratory effort. No for increased work of breathing. On auscultation: clear to auscultation bilaterally  Cardiovascular:  · The apical impulse is not displaced  · Heart tones are muffled. regular S1 and S2.  · The carotid upstroke is normal in amplitude and contour without delay or bruit  · Peripheral pulses are symmetrical and full   Abdomen:   · No masses or tenderness  · Bowel sounds present  Extremities:  ·  No Cyanosis or Clubbing  ·  Lower extremity edema: pitting 2+ to the knee  ·  Skin: Warm and dry  Neurological:  · Alert and oriented. · Moves all extremities well  · No abnormalities of mood, affect, memory, mentation, or behavior are noted    DATA:    Diagnostics:    EKG: Sinus tachycardia, possible LA enlargement, these findings are new since last EKG. ECHO: previously taken 12/11/2017 and show Vegetation on ICD lead, tricuspid regurgitation. Ejection fraction: 35%  Stress Test: Previously take 11/15/2017 and show apparent remote infarction within the inferior wall and moderate-sized partially reversible perfusion defect within the anterior wall. Cardiac Angiography: previously taken 12/4/17 and show multivessel CAD, patent grafts to LAD and OM, patent prior RCA stent with non-obstructive disease.  EF 30%    Labs:     CBC:   Recent Labs      01/16/18 implant 03/13/17   8. Hx of CVA with residual left sided weakness. 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    Schizophrenia ([address]    Acute on chronic combined systolic and diastolic CHF (congestive heart failure) (HCC)    Acute bronchitis due to Streptococcus    Chronic systolic CHF (congestive heart failure) (HCC)    Pneumonia of right lower lobe due to infectious organism ([address]    Chest pain    Hypoalbuminemia    COPD (chronic obstructive pulmonary disease) ([address]    Tobacco use    Left hemiplegia (HCC) - following CVA in the setting of CHF exacerbation    Acute on chronic combined systolic and diastolic heart failure (HCC)    CHF (congestive heart failure), NYHA class II, chronic, combined ([address]    Influenza A     1. Chronic CHF Combined systolic and diastolic   2. Stable CAD. 3. Ischemic Cardiomyopathy  4. S/P ICD extraction  5. Elevated troponin due to CKD not ACS. 6. Influenza A   7. CKD  8. Chronic smoking. 9. Non-compliance. 10.DM  11. Hx of CVA    RECOMMENDATIONS:  1. CHF: patient was admitted from 1/7/18-1/10/18 for Acute CHF exacerbation, pointing to non-compliance with medications   1. Lasix 40 mg BID, patient SCr is elevated, will continue to monitor    2. Lisinopril 5 mg daily  3. Patient has recent Echo and Cath   4.  Patient

## 2018-01-18 ENCOUNTER — APPOINTMENT (OUTPATIENT)
Dept: GENERAL RADIOLOGY | Age: 63
DRG: 193 | End: 2018-01-18
Payer: MEDICARE

## 2018-01-18 VITALS
WEIGHT: 170.9 LBS | OXYGEN SATURATION: 95 % | HEIGHT: 61 IN | TEMPERATURE: 98.6 F | DIASTOLIC BLOOD PRESSURE: 88 MMHG | HEART RATE: 100 BPM | SYSTOLIC BLOOD PRESSURE: 113 MMHG | RESPIRATION RATE: 23 BRPM | BODY MASS INDEX: 32.27 KG/M2

## 2018-01-18 DIAGNOSIS — B95.8 ENDOCARDITIS DUE TO STAPHYLOCOCCUS: ICD-10-CM

## 2018-01-18 DIAGNOSIS — I33.0 ENDOCARDITIS DUE TO STAPHYLOCOCCUS: ICD-10-CM

## 2018-01-18 LAB
GLUCOSE BLD-MCNC: 165 MG/DL (ref 65–105)
GLUCOSE BLD-MCNC: 89 MG/DL (ref 65–105)
GLUCOSE BLD-MCNC: 95 MG/DL (ref 65–105)

## 2018-01-18 PROCEDURE — 2580000003 HC RX 258: Performed by: NURSE PRACTITIONER

## 2018-01-18 PROCEDURE — 6360000002 HC RX W HCPCS: Performed by: INTERNAL MEDICINE

## 2018-01-18 PROCEDURE — 6370000000 HC RX 637 (ALT 250 FOR IP): Performed by: NURSE PRACTITIONER

## 2018-01-18 PROCEDURE — 6370000000 HC RX 637 (ALT 250 FOR IP): Performed by: INTERNAL MEDICINE

## 2018-01-18 PROCEDURE — 99239 HOSP IP/OBS DSCHRG MGMT >30: CPT | Performed by: INTERNAL MEDICINE

## 2018-01-18 PROCEDURE — 82947 ASSAY GLUCOSE BLOOD QUANT: CPT

## 2018-01-18 PROCEDURE — 71046 X-RAY EXAM CHEST 2 VIEWS: CPT

## 2018-01-18 RX ORDER — METOPROLOL TARTRATE 50 MG/1
25 TABLET, FILM COATED ORAL 2 TIMES DAILY
Qty: 60 TABLET | Refills: 3 | Status: ON HOLD
Start: 2018-01-18 | End: 2018-05-17

## 2018-01-18 RX ORDER — OSELTAMIVIR PHOSPHATE 75 MG/1
75 CAPSULE ORAL 2 TIMES DAILY
Qty: 6 CAPSULE | Refills: 0 | Status: SHIPPED | OUTPATIENT
Start: 2018-01-18 | End: 2018-01-21

## 2018-01-18 RX ADMIN — COLCHICINE 0.6 MG: 0.6 TABLET, FILM COATED ORAL at 08:58

## 2018-01-18 RX ADMIN — ASPIRIN 81 MG: 81 TABLET, CHEWABLE ORAL at 08:58

## 2018-01-18 RX ADMIN — Medication 10 ML: at 08:58

## 2018-01-18 RX ADMIN — OXCARBAZEPINE 300 MG: 300 TABLET, FILM COATED ORAL at 08:58

## 2018-01-18 RX ADMIN — ENOXAPARIN SODIUM 40 MG: 40 INJECTION SUBCUTANEOUS at 08:59

## 2018-01-18 RX ADMIN — FAMOTIDINE 20 MG: 20 TABLET, FILM COATED ORAL at 08:59

## 2018-01-18 RX ADMIN — SPIRONOLACTONE 25 MG: 25 TABLET ORAL at 08:58

## 2018-01-18 RX ADMIN — OSELTAMIVIR PHOSPHATE 75 MG: 75 CAPSULE ORAL at 08:58

## 2018-01-18 RX ADMIN — INSULIN GLARGINE 30 UNITS: 100 INJECTION, SOLUTION SUBCUTANEOUS at 20:29

## 2018-01-18 RX ADMIN — LISINOPRIL 5 MG: 5 TABLET ORAL at 08:58

## 2018-01-18 RX ADMIN — INSULIN LISPRO 1 UNITS: 100 INJECTION, SOLUTION INTRAVENOUS; SUBCUTANEOUS at 20:29

## 2018-01-18 RX ADMIN — OSELTAMIVIR PHOSPHATE 75 MG: 75 CAPSULE ORAL at 20:28

## 2018-01-18 RX ADMIN — CLOPIDOGREL 75 MG: 75 TABLET, FILM COATED ORAL at 08:58

## 2018-01-18 RX ADMIN — FUROSEMIDE 20 MG: 20 TABLET ORAL at 16:53

## 2018-01-18 RX ADMIN — OLANZAPINE 10 MG: 10 TABLET, FILM COATED ORAL at 20:28

## 2018-01-18 RX ADMIN — FUROSEMIDE 20 MG: 20 TABLET ORAL at 08:58

## 2018-01-18 RX ADMIN — Medication 10 ML: at 20:36

## 2018-01-18 RX ADMIN — OXCARBAZEPINE 300 MG: 300 TABLET, FILM COATED ORAL at 20:28

## 2018-01-18 ASSESSMENT — PAIN SCALES - GENERAL: PAINLEVEL_OUTOF10: 3

## 2018-01-18 NOTE — CARE COORDINATION
Discharge [facility] Case Management Department  Written by: Unique Hernandez RN    Patient Name: Rolan Henderson  Attending Provider: Selena Goldberg DO  Admit Date: 2018  5:05 PM  MRN: [id number]  Account: [de-identified]                     : 1955  Discharge Date:       Disposition: home with [facility] home care  Faxed inge, home care order, spoke with intake, asked if they can see her tomorrow, she is already scheduled for tomorrow.    Transportation arranged through [employer], will  at 6:30 pm   Transport cannot come till 9:oopm    Unique Hernandez RN

## 2018-01-18 NOTE — FLOWSHEET NOTE
visited patient per initial rounding and 30-day Readmit visits. Patient's door was closed when  attempted visit.  knocked on door and entered dark room, noticing patient sleeping in hospital bed, with blanket covering face. Patient did not wake upon  calling her name.  shared silent prayer for patient and left spiritual care prayer card on patient's bedside table.  located bedside nurse on unit who indicated that patient has been \"sleeping most of the day. \" Nurse shared that patient was admitted to the hospital due to \"influenza\" and has history of \"CHF. \" Per chart, patient's emergency contact is her brother, Marlene Segovia ([phone]). Chaplains will remain available to offer spiritual and emotional support as needed. Domi Bradford     01/17/18 2052   Encounter Summary   Services provided to: Patient   Referral/Consult From: [facility] Family members   Place of Sikhism None   Continue Visiting (1/17/2018, pt sleeping)   Complexity of Encounter Low   Length of Encounter 15 minutes   Routine   Type Initial   Assessment Sleeping; Unable to respond   Intervention Prayer;Provided reading materials/devotional materials;Sustaining presence/ Ministry of presence   Outcome Did not respond   Spiritual/Adventism   Type Spiritual support

## 2018-01-18 NOTE — PROGRESS NOTES
PATIENT REFUSES TO WEAR BIPAP     [x] Risks and benefits explained to patient   [x] Patient refuses to wear Bipap stating no  [x] Patient verbalizes understanding of information presented.
PATIENT REFUSES TO WEAR BIPAP     [x] Risks and benefits explained to patient   [x] Patient refuses to wear Bipap stating no  [x] Patient verbalizes understanding of information presented. Pt states she will call if feels short of breath or wants the bipap.
· Bronchodilator assessment   [x]    Bronchodilator Assessment    FEV1 PREDICTED 0  FEV1 actual: 0    Bronchodilator assessment at level  1  BRONCHODILATOR ASSESSMENT SCORE  Score 1 2 3 4   Breath Sounds   [x]  Clear []  Mild Wheezing with good aeration []  Moderate I/E wheezing with adequate aeration []  Poor Aeration or diffuse wheezing   Respiratory Rate []  Less than 20 [x]  20-25 []  Greater than 25  []  Greater than 35    Dyspnea []  No SOB  [x]  SOB with minimal activity []  Speaking in partial sentences []  Acute/ At rest   Peakflow (asthma) []  80 % or greater predicted/PB  []  Unable []  70% or greater predicted/PB  []  Unable []  51%-70% predicted/PB  []  Unable []  Less than 50% predicted/PB  []  Unable due to distress   FEV1 % Predicted []  Greater than 69%  []  Unable  []  Less than 50%-69%  []  Unable  []  Less than 35%-49%  []  Unable  []  Less than 35%  []  Unable due to distress       JOHN CRENSHAW                                FEMALE                                  MALE                            FEV1 Predicted Normal Values                        FEV1 Predicted Normal Values          Age                                     Height in Feet and Inches       Age                                     Height in Feet and Inches       4' 11\" 5' 1\" 5' 3\" 5' 5\" 5' 7\" 5' 9\" 5' 11\" 6' 1\"  4' 11\" 5' 1\" 5' 3\" 5' 5\" 5' 7\" 5' 9\" 5' 11\" 6' 1\"   42 - 45 2.49 2.66 2.84 3.03 3.22 3.42 3.62 3.83 42 - 45 2.82 3.03 3.26 3.49 3.72 3.96 4.22 4.47   46 - 49 2.40 2.57 2.76 2.94 3.14 3.33 3.54 3.75 46 - 49 2.70 2.92 3.14 3.37 3.61 3.85 4.10 4.36   50 - 53 2.31 2.48 2.66 2.85 3.04 3.24 3.45 3.66 50 - 53 2.58 2.80 3.02 3.25 3.49 3.73 3.98 4.24   54 - 57 2.21 2.38 2.57 2.75 2.95 3.14 3.35 3.56 54 - 57 2.46 2.67 2.89 3.12 3.36 3.60 3.85 4.11   58 - 61 2.10 2.28 2.46 2.65 2.84 3.04 3.24 3.45 58 - 61 2.32 2.54 2.76 2.99 3.23 3.47 3.72 3.98   62 - 65 1.99 2.17 2.35 2.54 2.73 2.93 3.13 3.34 62 - 65 2.19 2.40 2.62 2.85 3.09 3.33
· Goals: Intake greater than 50% consistently    · Monitoring: Meal Intake, Supplement Intake, Pertinent Labs    See Adult Nutrition Doc Flowsheet for more detail.      Electronically signed by Eli Katz RD, LD on 1/18/18 at 3:27 PM    Contact Number: [phone]

## 2018-01-18 NOTE — DISCHARGE SUMMARY
cervical spine as the odontoid is obscured and the inferior C5 level and below are obscured. 2. Moderate degenerative changes within the cervical spine. Slight reversal of the expected cervical lordosis. 3. Limited examination of the cervical spine. If there is clinical concern regarding trauma to the cervical spine, a dedicated CT cervical spine would be a more sensitive examination. Thoracic spine: 1. Mild diffuse degenerative changes within the visualized thoracic spine. 2. Upper thoracic vertebral bodies are obscured. 3. No acute vertebral body height loss within the visualized thoracic spine. Lumbar spine: 1. Mild diffuse degenerative changes in the lumbar spine. No acute vertebral body height loss in the lumbar spine. 2. Mild right hip osteoarthrosis. 3. Moderate stool burden. Xr Lumbar Spine (2-3 Views)    Result Date: 1/15/2018  EXAMINATION: 2 VIEWS OF THE CERVICAL SPINE; 2 VIEWS OF THE THORACIC SPINE; 3 VIEWS OF THE LUMBAR SPINE 1/15/2018 10:52 am COMPARISON: Cervical spine plain radiographs from 06/04/2013, CT chest from 12/31/2017, CT abdomen pelvis from 10/22/2017 HISTORY: ORDERING SYSTEM PROVIDED HISTORY: fall from seated TECHNOLOGIST PROVIDED HISTORY: Reason for exam:->fall from seated 58-year-old female with fall from seated height FINDINGS: Cervical spine: Cervical spine imaged from the skull base to the mid C5 level on the lateral view. Gross preservation of the visualized vertebral body heights. Moderate disc space narrowing at C3-C4 and C4-C5. Slight reversal of the expected cervical lordosis. Mild multilevel facet arthrosis. Degenerative changes are noted at the atlantodental and craniocervical junctions. Lateral masses are relatively symmetric in appearance. Odontoid is obscured. Articular pillars appear intact on the AP view. Visualized ribs and lung apices grossly unremarkable. Right-sided PICC distal tip overlying the cavoatrial junction. Prior median sternotomy and CABG. osteoarthrosis. 3. Moderate stool burden. Xr Chest Portable    Result Date: 1/17/2018  EXAMINATION: SINGLE VIEW OF THE CHEST 1/17/2018 5:54 am COMPARISON: Chest x-ray from 01/16/2018 HISTORY: ORDERING SYSTEM PROVIDED HISTORY: CHF TECHNOLOGIST PROVIDED HISTORY: Reason for exam:->CHF FINDINGS: There are sternotomy changes. Right PICC line extends to the level of the superior atrial caval junction. There is stable enlargement of cardiac silhouette. There is prominence and indistinctness of the pulmonary vascular markings throughout the lungs. There is an area of more irregular patchy increased density noted in the right mid lung/perihilar region. No sizable pleural effusion or pneumothorax identified. Visualized osseous structures appear intact, and grossly unremarkable, given the non dedicated imaging. 1. Stable position of the right PICC line. 2. Similar findings suggesting pulmonary vascular congestion. Asymmetric irregular patchy increased density in the right mid lung/perihilar region may reflect atelectasis, asymmetric pulmonary edema and/or superimposed pneumonia. Continued imaging follow-up is recommended. Xr Chest Portable    Result Date: 1/16/2018  EXAMINATION: SINGLE VIEW OF THE CHEST 1/16/2018 6:14 pm COMPARISON: January 15, 2018 HISTORY: ORDERING SYSTEM PROVIDED HISTORY: cough, SOB TECHNOLOGIST PROVIDED HISTORY: Reason for exam:->cough, SOB FINDINGS: Right PICC is again seen. Increased right lung opacities. Prior sternotomy. Moderate cardiomegaly. Mild pulmonary edema. Increased right lung opacities are nonspecific but could represent developing pneumonia versus asymmetric pulmonary edema.      Xr Chest Portable    Result Date: 1/15/2018  EXAMINATION: SINGLE VIEW OF THE CHEST 1/15/2018 10:52 am COMPARISON: January 8, 2018, January 1, 2018 HISTORY: ORDERING SYSTEM PROVIDED HISTORY: fall from seated TECHNOLOGIST PROVIDED HISTORY: Reason for exam:->fall from seated FINDINGS: The hand [address] [facility], [address] [address]    Phone:  [phone]   · oseltamivir 75 MG capsule     Information about where to get these medications is not yet available    Ask your nurse or doctor about these medications  · metoprolol tartrate 50 MG tablet         Time Spent on discharge is  35 mins in patient examination, evaluation, counseling as well as medication reconciliation, prescriptions for required medications, discharge plan and follow up. Electronically signed by   [facility] DO [place]  1/18/2018  10:13 AM      Thank you Dr. Shabbir Davenport MD for the opportunity to be involved in this patient's care.

## 2018-01-19 LAB
EKG ATRIAL RATE: 131 BPM
EKG P AXIS: 55 DEGREES
EKG P-R INTERVAL: 148 MS
EKG Q-T INTERVAL: 310 MS
EKG QRS DURATION: 78 MS
EKG QTC CALCULATION (BAZETT): 457 MS
EKG R AXIS: -12 DEGREES
EKG T AXIS: 59 DEGREES
EKG VENTRICULAR RATE: 131 BPM

## 2018-01-20 ENCOUNTER — APPOINTMENT (OUTPATIENT)
Dept: GENERAL RADIOLOGY | Age: 63
End: 2018-01-20
Payer: MEDICARE

## 2018-01-20 ENCOUNTER — HOSPITAL ENCOUNTER (EMERGENCY)
Age: 63
Discharge: HOME | End: 2018-01-20
Attending: EMERGENCY MEDICINE
Payer: MEDICARE

## 2018-01-20 VITALS
SYSTOLIC BLOOD PRESSURE: 133 MMHG | TEMPERATURE: 97.9 F | OXYGEN SATURATION: 100 % | BODY MASS INDEX: 32.12 KG/M2 | RESPIRATION RATE: 17 BRPM | HEART RATE: 98 BPM | WEIGHT: 170 LBS | DIASTOLIC BLOOD PRESSURE: 67 MMHG

## 2018-01-20 DIAGNOSIS — E16.2 HYPOGLYCEMIA: Primary | ICD-10-CM

## 2018-01-20 DIAGNOSIS — M79.89 LEG SWELLING: ICD-10-CM

## 2018-01-20 LAB
ABSOLUTE EOS #: 0.13 K/UL (ref 0–0.44)
ABSOLUTE IMMATURE GRANULOCYTE: <0.03 K/UL (ref 0–0.3)
ABSOLUTE LYMPH #: 1.33 K/UL (ref 1.1–3.7)
ABSOLUTE MONO #: 0.43 K/UL (ref 0.1–1.2)
ANION GAP SERPL CALCULATED.3IONS-SCNC: 12 MMOL/L (ref 9–17)
BASOPHILS # BLD: 0 % (ref 0–2)
BASOPHILS ABSOLUTE: <0.03 K/UL (ref 0–0.2)
BUN BLDV-MCNC: 16 MG/DL (ref 8–23)
BUN/CREAT BLD: ABNORMAL (ref 9–20)
CALCIUM SERPL-MCNC: 8.8 MG/DL (ref 8.6–10.4)
CHLORIDE BLD-SCNC: 103 MMOL/L (ref 98–107)
CO2: 26 MMOL/L (ref 20–31)
CREAT SERPL-MCNC: 0.76 MG/DL (ref 0.5–0.9)
DIFFERENTIAL TYPE: ABNORMAL
EOSINOPHILS RELATIVE PERCENT: 2 % (ref 1–4)
GFR AFRICAN AMERICAN: >60 ML/MIN
GFR NON-AFRICAN AMERICAN: >60 ML/MIN
GFR SERPL CREATININE-BSD FRML MDRD: ABNORMAL ML/MIN/{1.73_M2}
GFR SERPL CREATININE-BSD FRML MDRD: ABNORMAL ML/MIN/{1.73_M2}
GLUCOSE BLD-MCNC: 102 MG/DL (ref 70–99)
GLUCOSE BLD-MCNC: 86 MG/DL (ref 65–105)
HCT VFR BLD CALC: 32.2 % (ref 36.3–47.1)
HEMOGLOBIN: 9.4 G/DL (ref 11.9–15.1)
IMMATURE GRANULOCYTES: 0 %
LYMPHOCYTES # BLD: 24 % (ref 24–43)
MCH RBC QN AUTO: 24.5 PG (ref 25.2–33.5)
MCHC RBC AUTO-ENTMCNC: 29.2 G/DL (ref 28.4–34.8)
MCV RBC AUTO: 84.1 FL (ref 82.6–102.9)
MONOCYTES # BLD: 8 % (ref 3–12)
NRBC AUTOMATED: 0 PER 100 WBC
PDW BLD-RTO: 17.3 % (ref 11.8–14.4)
PLATELET # BLD: 274 K/UL (ref 138–453)
PLATELET ESTIMATE: ABNORMAL
PMV BLD AUTO: 9.5 FL (ref 8.1–13.5)
POC TROPONIN I: 0.06 NG/ML (ref 0–0.1)
POC TROPONIN I: 0.09 NG/ML (ref 0–0.1)
POC TROPONIN INTERP: NORMAL
POC TROPONIN INTERP: NORMAL
POTASSIUM SERPL-SCNC: 3.6 MMOL/L (ref 3.7–5.3)
RBC # BLD: 3.83 M/UL (ref 3.95–5.11)
RBC # BLD: ABNORMAL 10*6/UL
SEG NEUTROPHILS: 66 % (ref 36–65)
SEGMENTED NEUTROPHILS ABSOLUTE COUNT: 3.65 K/UL (ref 1.5–8.1)
SODIUM BLD-SCNC: 141 MMOL/L (ref 135–144)
WBC # BLD: 5.6 K/UL (ref 3.5–11.3)
WBC # BLD: ABNORMAL 10*3/UL

## 2018-01-20 PROCEDURE — 71045 X-RAY EXAM CHEST 1 VIEW: CPT

## 2018-01-20 PROCEDURE — 80048 BASIC METABOLIC PNL TOTAL CA: CPT

## 2018-01-20 PROCEDURE — 93970 EXTREMITY STUDY: CPT

## 2018-01-20 PROCEDURE — 99285 EMERGENCY DEPT VISIT HI MDM: CPT

## 2018-01-20 PROCEDURE — 82947 ASSAY GLUCOSE BLOOD QUANT: CPT

## 2018-01-20 PROCEDURE — 84484 ASSAY OF TROPONIN QUANT: CPT

## 2018-01-20 PROCEDURE — 85025 COMPLETE CBC W/AUTO DIFF WBC: CPT

## 2018-01-20 ASSESSMENT — ENCOUNTER SYMPTOMS
VOMITING: 0
COUGH: 0
BACK PAIN: 0
COLOR CHANGE: 0
ALLERGIC/IMMUNOLOGIC NEGATIVE: 1
NAUSEA: 0
ABDOMINAL PAIN: 0
WHEEZING: 0
EYE PAIN: 0
SORE THROAT: 0
SHORTNESS OF BREATH: 0

## 2018-01-20 NOTE — ED PROVIDER NOTES
tablet by mouth daily, Disp-30 tablet, R-3Print      OLANZapine (ZYPREXA) 10 MG tablet Take 1 tablet by mouth nightly, Disp-30 tablet, R-3Print      colchicine (COLCRYS) 0.6 MG tablet Take 1 tablet by mouth daily, Disp-30 tablet, R-3Print      clopidogrel (PLAVIX) 75 MG tablet Take 1 tablet by mouth daily, Disp-30 tablet, R-3Print      famotidine (PEPCID) 20 MG tablet Take 1 tablet by mouth daily, Disp-60 tablet, R-3Print      senna-docusate (PERICOLACE) 8.6-50 MG per tablet Take 1 tablet by mouth daily, Disp-30 tablet, R-1Print             ALLERGIES     is allergic to levofloxacin and pcn [penicillins]. FAMILY HISTORY     has no family status information on file. Family history is unknown by patient. SOCIAL HISTORY      reports that she has been smoking Cigarettes. She has been smoking about 1.00 pack per day. She has never used smokeless tobacco. She reports that she drinks alcohol. She reports that she does not use drugs. PHYSICAL EXAM     INITIAL VITALS:  weight is 170 lb (77.1 kg). Her oral temperature is 97.9 °F (36.6 °C). Her blood pressure is 133/67 and her pulse is 98. Her respiration is 17 and oxygen saturation is 100%. Physical Exam   Constitutional: She is oriented to person, place, and time. She appears well-developed and well-nourished. No distress. On 4 L NC   HENT:   Head: Normocephalic and atraumatic. Eyes: EOM are normal. Pupils are equal, round, and reactive to light. Neck: Normal range of motion. Neck supple. Cardiovascular: Normal rate and regular rhythm. Pulmonary/Chest: Effort normal and breath sounds normal. No respiratory distress. She has no wheezes. Abdominal: Soft. She exhibits no distension. There is no tenderness. Musculoskeletal: Normal range of motion. She exhibits no edema. Neurological: She is alert and oriented to person, place, and time. Skin: Skin is warm and dry. Psychiatric: She has a normal mood and affect.  Her behavior is normal.

## 2018-01-21 ENCOUNTER — HOSPITAL ENCOUNTER (INPATIENT)
Age: 63
LOS: 1 days | Discharge: HOME | DRG: 190 | End: 2018-01-23
Attending: EMERGENCY MEDICINE | Admitting: EMERGENCY MEDICINE
Payer: MEDICARE

## 2018-01-21 ENCOUNTER — APPOINTMENT (OUTPATIENT)
Dept: GENERAL RADIOLOGY | Age: 63
DRG: 190 | End: 2018-01-21
Payer: MEDICARE

## 2018-01-21 DIAGNOSIS — J44.1 COPD EXACERBATION (HCC): Primary | ICD-10-CM

## 2018-01-21 LAB
ABSOLUTE EOS #: 0.35 K/UL (ref 0–0.4)
ABSOLUTE IMMATURE GRANULOCYTE: 0 K/UL (ref 0–0.3)
ABSOLUTE LYMPH #: 3.05 K/UL (ref 1–4.8)
ABSOLUTE MONO #: 0.44 K/UL (ref 0.1–0.8)
ANION GAP SERPL CALCULATED.3IONS-SCNC: 11 MMOL/L (ref 9–17)
BASOPHILS # BLD: 1 % (ref 0–2)
BASOPHILS ABSOLUTE: 0.09 K/UL (ref 0–0.2)
BNP INTERPRETATION: ABNORMAL
BUN BLDV-MCNC: 14 MG/DL (ref 8–23)
BUN/CREAT BLD: ABNORMAL (ref 9–20)
CALCIUM SERPL-MCNC: 8.6 MG/DL (ref 8.6–10.4)
CHLORIDE BLD-SCNC: 98 MMOL/L (ref 98–107)
CO2: 29 MMOL/L (ref 20–31)
CREAT SERPL-MCNC: 0.85 MG/DL (ref 0.5–0.9)
DIFFERENTIAL TYPE: ABNORMAL
EOSINOPHILS RELATIVE PERCENT: 4 % (ref 1–4)
GFR AFRICAN AMERICAN: >60 ML/MIN
GFR NON-AFRICAN AMERICAN: >60 ML/MIN
GFR SERPL CREATININE-BSD FRML MDRD: ABNORMAL ML/MIN/{1.73_M2}
GFR SERPL CREATININE-BSD FRML MDRD: ABNORMAL ML/MIN/{1.73_M2}
GLUCOSE BLD-MCNC: 114 MG/DL (ref 70–99)
HCT VFR BLD CALC: 33 % (ref 36.3–47.1)
HEMOGLOBIN: 9.7 G/DL (ref 11.9–15.1)
IMMATURE GRANULOCYTES: 0 %
LYMPHOCYTES # BLD: 35 % (ref 24–44)
MCH RBC QN AUTO: 25.1 PG (ref 25.2–33.5)
MCHC RBC AUTO-ENTMCNC: 29.4 G/DL (ref 28.4–34.8)
MCV RBC AUTO: 85.3 FL (ref 82.6–102.9)
MONOCYTES # BLD: 5 % (ref 1–7)
MORPHOLOGY: ABNORMAL
NRBC AUTOMATED: 0 PER 100 WBC
PDW BLD-RTO: 17.2 % (ref 11.8–14.4)
PLATELET # BLD: 290 K/UL (ref 138–453)
PLATELET ESTIMATE: ABNORMAL
PMV BLD AUTO: 9.9 FL (ref 8.1–13.5)
POC TROPONIN I: 0.04 NG/ML (ref 0–0.1)
POC TROPONIN INTERP: NORMAL
POTASSIUM SERPL-SCNC: 5 MMOL/L (ref 3.7–5.3)
PRO-BNP: 9274 PG/ML
RBC # BLD: 3.87 M/UL (ref 3.95–5.11)
RBC # BLD: ABNORMAL 10*6/UL
SEG NEUTROPHILS: 55 % (ref 36–66)
SEGMENTED NEUTROPHILS ABSOLUTE COUNT: 4.77 K/UL (ref 1.8–7.7)
SODIUM BLD-SCNC: 138 MMOL/L (ref 135–144)
WBC # BLD: 8.7 K/UL (ref 3.5–11.3)
WBC # BLD: ABNORMAL 10*3/UL

## 2018-01-21 PROCEDURE — 6360000002 HC RX W HCPCS: Performed by: EMERGENCY MEDICINE

## 2018-01-21 PROCEDURE — 85025 COMPLETE CBC W/AUTO DIFF WBC: CPT

## 2018-01-21 PROCEDURE — 71046 X-RAY EXAM CHEST 2 VIEWS: CPT

## 2018-01-21 PROCEDURE — 93005 ELECTROCARDIOGRAM TRACING: CPT

## 2018-01-21 PROCEDURE — 84484 ASSAY OF TROPONIN QUANT: CPT

## 2018-01-21 PROCEDURE — 94640 AIRWAY INHALATION TREATMENT: CPT

## 2018-01-21 PROCEDURE — 96374 THER/PROPH/DIAG INJ IV PUSH: CPT

## 2018-01-21 PROCEDURE — 94664 DEMO&/EVAL PT USE INHALER: CPT

## 2018-01-21 PROCEDURE — 83880 ASSAY OF NATRIURETIC PEPTIDE: CPT

## 2018-01-21 PROCEDURE — 80048 BASIC METABOLIC PNL TOTAL CA: CPT

## 2018-01-21 PROCEDURE — 99285 EMERGENCY DEPT VISIT HI MDM: CPT

## 2018-01-21 RX ORDER — ALBUTEROL SULFATE 90 UG/1
2 AEROSOL, METERED RESPIRATORY (INHALATION)
Status: DISCONTINUED | OUTPATIENT
Start: 2018-01-21 | End: 2018-01-22

## 2018-01-21 RX ORDER — METHYLPREDNISOLONE SODIUM SUCCINATE 125 MG/2ML
125 INJECTION, POWDER, LYOPHILIZED, FOR SOLUTION INTRAMUSCULAR; INTRAVENOUS ONCE
Status: COMPLETED | OUTPATIENT
Start: 2018-01-21 | End: 2018-01-21

## 2018-01-21 RX ORDER — IPRATROPIUM BROMIDE AND ALBUTEROL SULFATE 2.5; .5 MG/3ML; MG/3ML
1 SOLUTION RESPIRATORY (INHALATION)
Status: DISCONTINUED | OUTPATIENT
Start: 2018-01-21 | End: 2018-01-22

## 2018-01-21 RX ORDER — ALBUTEROL SULFATE 2.5 MG/3ML
5 SOLUTION RESPIRATORY (INHALATION)
Status: DISCONTINUED | OUTPATIENT
Start: 2018-01-21 | End: 2018-01-22

## 2018-01-21 RX ADMIN — ALBUTEROL SULFATE 5 MG: 5 SOLUTION RESPIRATORY (INHALATION) at 22:35

## 2018-01-21 RX ADMIN — IPRATROPIUM BROMIDE 0.5 MG: 0.5 SOLUTION RESPIRATORY (INHALATION) at 22:35

## 2018-01-21 RX ADMIN — METHYLPREDNISOLONE SODIUM SUCCINATE 125 MG: 125 INJECTION, POWDER, FOR SOLUTION INTRAMUSCULAR; INTRAVENOUS at 22:27

## 2018-01-21 ASSESSMENT — ENCOUNTER SYMPTOMS
VOMITING: 0
NAUSEA: 0
ABDOMINAL PAIN: 0
COUGH: 1
SHORTNESS OF BREATH: 1
TROUBLE SWALLOWING: 0
RHINORRHEA: 0

## 2018-01-22 ENCOUNTER — APPOINTMENT (OUTPATIENT)
Dept: GENERAL RADIOLOGY | Age: 63
DRG: 190 | End: 2018-01-22
Payer: MEDICARE

## 2018-01-22 PROBLEM — R06.02 SOB (SHORTNESS OF BREATH): Status: ACTIVE | Noted: 2018-01-22

## 2018-01-22 LAB
ALLEN TEST: ABNORMAL
CARBOXYHEMOGLOBIN: 1 % (ref 0–5)
CULTURE: NORMAL
CULTURE: NORMAL
EKG ATRIAL RATE: 104 BPM
EKG ATRIAL RATE: 99 BPM
EKG P AXIS: 38 DEGREES
EKG P AXIS: 48 DEGREES
EKG P-R INTERVAL: 154 MS
EKG P-R INTERVAL: 162 MS
EKG Q-T INTERVAL: 304 MS
EKG Q-T INTERVAL: 380 MS
EKG QRS DURATION: 80 MS
EKG QRS DURATION: 82 MS
EKG QTC CALCULATION (BAZETT): 399 MS
EKG QTC CALCULATION (BAZETT): 487 MS
EKG R AXIS: -20 DEGREES
EKG R AXIS: -26 DEGREES
EKG T AXIS: 88 DEGREES
EKG T AXIS: 94 DEGREES
EKG VENTRICULAR RATE: 104 BPM
EKG VENTRICULAR RATE: 99 BPM
FIO2: ABNORMAL
GLUCOSE BLD-MCNC: 274 MG/DL (ref 65–105)
GLUCOSE BLD-MCNC: 281 MG/DL (ref 65–105)
GLUCOSE BLD-MCNC: 282 MG/DL (ref 65–105)
GLUCOSE BLD-MCNC: 474 MG/DL (ref 65–105)
HCO3 VENOUS: 24.4 MMOL/L (ref 24–30)
Lab: NORMAL
METHEMOGLOBIN: ABNORMAL % (ref 0–1.5)
MODE: ABNORMAL
NEGATIVE BASE EXCESS, VEN: 0.1 MMOL/L (ref 0–2)
NOTIFICATION TIME: ABNORMAL
NOTIFICATION: ABNORMAL
O2 DEVICE/FLOW/%: ABNORMAL
O2 SAT, VEN: 86.8 % (ref 60–85)
OXYHEMOGLOBIN: ABNORMAL % (ref 95–98)
PATIENT TEMP: 37
PCO2, VEN, TEMP ADJ: ABNORMAL MMHG (ref 39–55)
PCO2, VEN: 41.7 (ref 39–55)
PEEP/CPAP: ABNORMAL
PH VENOUS: 7.38 (ref 7.32–7.42)
PH, VEN, TEMP ADJ: ABNORMAL (ref 7.32–7.42)
PO2, VEN, TEMP ADJ: ABNORMAL MMHG (ref 30–50)
PO2, VEN: 58.8 (ref 30–50)
POC TROPONIN I: 0.03 NG/ML (ref 0–0.1)
POC TROPONIN INTERP: NORMAL
POSITIVE BASE EXCESS, VEN: ABNORMAL MMOL/L (ref 0–2)
PSV: ABNORMAL
PT. POSITION: ABNORMAL
RESPIRATORY RATE: ABNORMAL
SAMPLE SITE: ABNORMAL
SET RATE: ABNORMAL
SPECIMEN DESCRIPTION: NORMAL
STATUS: NORMAL
TEXT FOR RESPIRATORY: ABNORMAL
TOTAL HB: ABNORMAL G/DL (ref 12–16)
TOTAL RATE: ABNORMAL
VT: ABNORMAL

## 2018-01-22 PROCEDURE — 6370000000 HC RX 637 (ALT 250 FOR IP): Performed by: EMERGENCY MEDICINE

## 2018-01-22 PROCEDURE — G0378 HOSPITAL OBSERVATION PER HR: HCPCS

## 2018-01-22 PROCEDURE — 76937 US GUIDE VASCULAR ACCESS: CPT

## 2018-01-22 PROCEDURE — 99223 1ST HOSP IP/OBS HIGH 75: CPT | Performed by: INTERNAL MEDICINE

## 2018-01-22 PROCEDURE — 6360000002 HC RX W HCPCS: Performed by: EMERGENCY MEDICINE

## 2018-01-22 PROCEDURE — 93005 ELECTROCARDIOGRAM TRACING: CPT

## 2018-01-22 PROCEDURE — 94762 N-INVAS EAR/PLS OXIMTRY CONT: CPT

## 2018-01-22 PROCEDURE — 71046 X-RAY EXAM CHEST 2 VIEWS: CPT

## 2018-01-22 PROCEDURE — 94664 DEMO&/EVAL PT USE INHALER: CPT

## 2018-01-22 PROCEDURE — 1200000000 HC SEMI PRIVATE

## 2018-01-22 PROCEDURE — 2580000003 HC RX 258: Performed by: EMERGENCY MEDICINE

## 2018-01-22 PROCEDURE — 82805 BLOOD GASES W/O2 SATURATION: CPT

## 2018-01-22 PROCEDURE — 36415 COLL VENOUS BLD VENIPUNCTURE: CPT

## 2018-01-22 PROCEDURE — 82947 ASSAY GLUCOSE BLOOD QUANT: CPT

## 2018-01-22 PROCEDURE — 6370000000 HC RX 637 (ALT 250 FOR IP): Performed by: INTERNAL MEDICINE

## 2018-01-22 PROCEDURE — 94640 AIRWAY INHALATION TREATMENT: CPT

## 2018-01-22 RX ORDER — NICOTINE POLACRILEX 4 MG
15 LOZENGE BUCCAL PRN
Status: DISCONTINUED | OUTPATIENT
Start: 2018-01-22 | End: 2018-01-23 | Stop reason: HOSPADM

## 2018-01-22 RX ORDER — NITROGLYCERIN 0.4 MG/1
0.4 TABLET SUBLINGUAL EVERY 5 MIN PRN
Status: DISCONTINUED | OUTPATIENT
Start: 2018-01-22 | End: 2018-01-23 | Stop reason: HOSPADM

## 2018-01-22 RX ORDER — ACETAMINOPHEN 325 MG/1
650 TABLET ORAL EVERY 4 HOURS PRN
Status: DISCONTINUED | OUTPATIENT
Start: 2018-01-22 | End: 2018-01-23 | Stop reason: HOSPADM

## 2018-01-22 RX ORDER — PREDNISONE 20 MG/1
40 TABLET ORAL DAILY
Qty: 8 TABLET | Refills: 0 | Status: SHIPPED | OUTPATIENT
Start: 2018-01-22 | End: 2018-01-26

## 2018-01-22 RX ORDER — SPIRONOLACTONE 25 MG/1
25 TABLET ORAL DAILY
Status: DISCONTINUED | OUTPATIENT
Start: 2018-01-22 | End: 2018-01-23 | Stop reason: HOSPADM

## 2018-01-22 RX ORDER — IPRATROPIUM BROMIDE AND ALBUTEROL SULFATE 2.5; .5 MG/3ML; MG/3ML
1 SOLUTION RESPIRATORY (INHALATION) 3 TIMES DAILY
Status: DISCONTINUED | OUTPATIENT
Start: 2018-01-22 | End: 2018-01-23 | Stop reason: HOSPADM

## 2018-01-22 RX ORDER — INSULIN GLARGINE 100 [IU]/ML
30 INJECTION, SOLUTION SUBCUTANEOUS NIGHTLY
Status: DISCONTINUED | OUTPATIENT
Start: 2018-01-22 | End: 2018-01-23 | Stop reason: HOSPADM

## 2018-01-22 RX ORDER — PREDNISONE 20 MG/1
20 TABLET ORAL DAILY
Status: DISCONTINUED | OUTPATIENT
Start: 2018-01-28 | End: 2018-01-23

## 2018-01-22 RX ORDER — ATORVASTATIN CALCIUM 80 MG/1
80 TABLET, FILM COATED ORAL NIGHTLY
Status: DISCONTINUED | OUTPATIENT
Start: 2018-01-22 | End: 2018-01-23 | Stop reason: HOSPADM

## 2018-01-22 RX ORDER — LISINOPRIL 5 MG/1
5 TABLET ORAL DAILY
Status: DISCONTINUED | OUTPATIENT
Start: 2018-01-22 | End: 2018-01-23 | Stop reason: HOSPADM

## 2018-01-22 RX ORDER — ALBUTEROL SULFATE 2.5 MG/3ML
2.5 SOLUTION RESPIRATORY (INHALATION) EVERY 4 HOURS PRN
Status: DISCONTINUED | OUTPATIENT
Start: 2018-01-22 | End: 2018-01-23 | Stop reason: HOSPADM

## 2018-01-22 RX ORDER — PREDNISONE 10 MG/1
10 TABLET ORAL DAILY
Status: DISCONTINUED | OUTPATIENT
Start: 2018-01-31 | End: 2018-01-23

## 2018-01-22 RX ORDER — DEXTROSE MONOHYDRATE 50 MG/ML
100 INJECTION, SOLUTION INTRAVENOUS PRN
Status: DISCONTINUED | OUTPATIENT
Start: 2018-01-22 | End: 2018-01-23 | Stop reason: HOSPADM

## 2018-01-22 RX ORDER — SODIUM CHLORIDE 0.9 % (FLUSH) 0.9 %
10 SYRINGE (ML) INJECTION PRN
Status: DISCONTINUED | OUTPATIENT
Start: 2018-01-22 | End: 2018-01-23 | Stop reason: HOSPADM

## 2018-01-22 RX ORDER — FAMOTIDINE 20 MG/1
20 TABLET, FILM COATED ORAL DAILY
Status: DISCONTINUED | OUTPATIENT
Start: 2018-01-22 | End: 2018-01-23 | Stop reason: HOSPADM

## 2018-01-22 RX ORDER — DEXTROSE MONOHYDRATE 25 G/50ML
12.5 INJECTION, SOLUTION INTRAVENOUS PRN
Status: DISCONTINUED | OUTPATIENT
Start: 2018-01-22 | End: 2018-01-23 | Stop reason: HOSPADM

## 2018-01-22 RX ORDER — SODIUM CHLORIDE 0.9 % (FLUSH) 0.9 %
10 SYRINGE (ML) INJECTION EVERY 12 HOURS SCHEDULED
Status: DISCONTINUED | OUTPATIENT
Start: 2018-01-22 | End: 2018-01-23 | Stop reason: HOSPADM

## 2018-01-22 RX ORDER — IPRATROPIUM BROMIDE AND ALBUTEROL SULFATE 2.5; .5 MG/3ML; MG/3ML
1 SOLUTION RESPIRATORY (INHALATION) 4 TIMES DAILY
Status: DISCONTINUED | OUTPATIENT
Start: 2018-01-22 | End: 2018-01-22

## 2018-01-22 RX ORDER — AZITHROMYCIN 250 MG/1
500 TABLET, FILM COATED ORAL DAILY
Status: DISCONTINUED | OUTPATIENT
Start: 2018-01-22 | End: 2018-01-23 | Stop reason: HOSPADM

## 2018-01-22 RX ORDER — PREDNISONE 20 MG/1
40 TABLET ORAL DAILY
Status: DISCONTINUED | OUTPATIENT
Start: 2018-01-22 | End: 2018-01-23

## 2018-01-22 RX ORDER — ASPIRIN 81 MG/1
81 TABLET, CHEWABLE ORAL DAILY
Status: DISCONTINUED | OUTPATIENT
Start: 2018-01-22 | End: 2018-01-23 | Stop reason: HOSPADM

## 2018-01-22 RX ORDER — IPRATROPIUM BROMIDE AND ALBUTEROL SULFATE 2.5; .5 MG/3ML; MG/3ML
1 SOLUTION RESPIRATORY (INHALATION) EVERY 4 HOURS PRN
Status: DISCONTINUED | OUTPATIENT
Start: 2018-01-22 | End: 2018-01-22

## 2018-01-22 RX ORDER — OLANZAPINE 10 MG/1
10 TABLET ORAL NIGHTLY
Status: DISCONTINUED | OUTPATIENT
Start: 2018-01-22 | End: 2018-01-23 | Stop reason: HOSPADM

## 2018-01-22 RX ORDER — COLCHICINE 0.6 MG/1
0.6 TABLET ORAL DAILY
Status: DISCONTINUED | OUTPATIENT
Start: 2018-01-22 | End: 2018-01-23 | Stop reason: HOSPADM

## 2018-01-22 RX ORDER — OXCARBAZEPINE 300 MG/1
300 TABLET, FILM COATED ORAL 2 TIMES DAILY
Status: DISCONTINUED | OUTPATIENT
Start: 2018-01-22 | End: 2018-01-23 | Stop reason: HOSPADM

## 2018-01-22 RX ORDER — FUROSEMIDE 40 MG/1
40 TABLET ORAL 2 TIMES DAILY
Status: DISCONTINUED | OUTPATIENT
Start: 2018-01-22 | End: 2018-01-23 | Stop reason: HOSPADM

## 2018-01-22 RX ORDER — CLOPIDOGREL BISULFATE 75 MG/1
75 TABLET ORAL DAILY
Status: DISCONTINUED | OUTPATIENT
Start: 2018-01-22 | End: 2018-01-23 | Stop reason: HOSPADM

## 2018-01-22 RX ADMIN — Medication 10 ML: at 22:35

## 2018-01-22 RX ADMIN — IPRATROPIUM BROMIDE AND ALBUTEROL SULFATE 1 AMPULE: .5; 3 SOLUTION RESPIRATORY (INHALATION) at 08:01

## 2018-01-22 RX ADMIN — ATORVASTATIN CALCIUM 80 MG: 80 TABLET, FILM COATED ORAL at 22:26

## 2018-01-22 RX ADMIN — INSULIN LISPRO 3 UNITS: 100 INJECTION, SOLUTION INTRAVENOUS; SUBCUTANEOUS at 22:27

## 2018-01-22 RX ADMIN — FUROSEMIDE 40 MG: 40 TABLET ORAL at 22:25

## 2018-01-22 RX ADMIN — AZITHROMYCIN 500 MG: 250 TABLET, FILM COATED ORAL at 22:26

## 2018-01-22 RX ADMIN — INSULIN LISPRO 6 UNITS: 100 INJECTION, SOLUTION INTRAVENOUS; SUBCUTANEOUS at 15:20

## 2018-01-22 RX ADMIN — OLANZAPINE 10 MG: 10 TABLET, FILM COATED ORAL at 22:25

## 2018-01-22 RX ADMIN — IPRATROPIUM BROMIDE AND ALBUTEROL SULFATE 1 AMPULE: .5; 3 SOLUTION RESPIRATORY (INHALATION) at 15:00

## 2018-01-22 RX ADMIN — FUROSEMIDE 40 MG: 40 TABLET ORAL at 15:19

## 2018-01-22 RX ADMIN — OXCARBAZEPINE 300 MG: 300 TABLET, FILM COATED ORAL at 22:23

## 2018-01-22 RX ADMIN — ASPIRIN 81 MG: 81 TABLET, CHEWABLE ORAL at 15:20

## 2018-01-22 RX ADMIN — Medication 10 ML: at 09:02

## 2018-01-22 RX ADMIN — FAMOTIDINE 20 MG: 20 TABLET, FILM COATED ORAL at 15:19

## 2018-01-22 RX ADMIN — CLOPIDOGREL 75 MG: 75 TABLET, FILM COATED ORAL at 15:19

## 2018-01-22 RX ADMIN — LISINOPRIL 5 MG: 5 TABLET ORAL at 15:19

## 2018-01-22 RX ADMIN — METOPROLOL TARTRATE 25 MG: 25 TABLET ORAL at 15:18

## 2018-01-22 RX ADMIN — COLCHICINE 0.6 MG: 0.6 TABLET, FILM COATED ORAL at 15:19

## 2018-01-22 RX ADMIN — IPRATROPIUM BROMIDE AND ALBUTEROL SULFATE 1 AMPULE: .5; 3 SOLUTION RESPIRATORY (INHALATION) at 22:05

## 2018-01-22 RX ADMIN — PREDNISONE 40 MG: 20 TABLET ORAL at 22:24

## 2018-01-22 RX ADMIN — ENOXAPARIN SODIUM 40 MG: 40 INJECTION SUBCUTANEOUS at 22:33

## 2018-01-22 RX ADMIN — METOPROLOL TARTRATE 25 MG: 25 TABLET ORAL at 22:25

## 2018-01-22 RX ADMIN — SPIRONOLACTONE 25 MG: 25 TABLET ORAL at 15:18

## 2018-01-22 RX ADMIN — OXCARBAZEPINE 300 MG: 300 TABLET, FILM COATED ORAL at 15:18

## 2018-01-22 ASSESSMENT — PAIN SCALES - GENERAL
PAINLEVEL_OUTOF10: 0
PAINLEVEL_OUTOF10: 0

## 2018-01-22 ASSESSMENT — PAIN SCALES - WONG BAKER: WONGBAKER_NUMERICALRESPONSE: 0

## 2018-01-22 NOTE — H&P
(congestive heart failure) ([facility].); Clinical trial participant at discharge; COPD (chronic obstructive pulmonary disease) ([facility].); Diabetes mellitus ([facility].); Hepatitis C; Hyperlipidemia; Hypertension; MRSA (methicillin resistant staph aureus) culture positive; Pneumonia; and Unspecified cerebral artery occlusion with cerebral infarction. I have reviewed the past medical history with the patient and it is pertinent to this complaint. SURGICAL HISTORY      has a past surgical history that includes Tonsillectomy; Foot surgery;   picc powerpicc double (12/19/2017); Hysterectomy; Cardiac surgery (5/6/2016); Cardiac pacemaker placement; and Cardiac catheterization (11/27/2017). I have reviewed and agree with Surgical History entered    CURRENT MEDICATIONS       aspirin chewable tablet 81 mg Daily   atorvastatin (LIPITOR) tablet 80 mg Nightly   clopidogrel (PLAVIX) tablet 75 mg Daily   colchicine (COLCRYS) tablet 0.6 mg Daily   famotidine (PEPCID) tablet 20 mg Daily   furosemide (LASIX) tablet 40 mg BID   insulin glargine (LANTUS) injection vial 30 Units Nightly   lisinopril (PRINIVIL;ZESTRIL) tablet 5 mg Daily   metoprolol tartrate (LOPRESSOR) tablet 25 mg BID   nitroGLYCERIN (NITROSTAT) SL tablet 0.4 mg Q5 Min PRN   OLANZapine (ZYPREXA) tablet 10 mg Nightly   OXcarbazepine (TRILEPTAL) tablet 300 mg BID   spironolactone (ALDACTONE) tablet 25 mg Daily   sodium chloride flush 0.9 % injection 10 mL 2 times per day   sodium chloride flush 0.9 % injection 10 mL PRN   acetaminophen (TYLENOL) tablet 650 mg Q4H PRN   enoxaparin (LOVENOX) injection 40 mg Daily   glucose (GLUTOSE) 40 % oral gel 15 g PRN   dextrose 50 % solution 12.5 g PRN   glucagon (rDNA) injection 1 mg PRN   dextrose 5 % solution PRN   albuterol (PROVENTIL) nebulizer solution 2.5 mg Q4H PRN   ipratropium-albuterol (DUONEB) nebulizer solution 1 ampule TID       All medication charted and reviewed.     ALLERGIES     is allergic to levofloxacin and pcn CERVICAL SPINE; 2 VIEWS OF THE THORACIC SPINE; 3 VIEWS OF THE LUMBAR SPINE 1/15/2018 10:52 am COMPARISON: Cervical spine plain radiographs from 06/04/2013, CT chest from 12/31/2017, CT abdomen pelvis from 10/22/2017 HISTORY: ORDERING SYSTEM PROVIDED HISTORY: fall from seated TECHNOLOGIST PROVIDED HISTORY: Reason for exam:->fall from seated 29-year-old female with fall from seated height FINDINGS: Cervical spine: Cervical spine imaged from the skull base to the mid C5 level on the lateral view. Gross preservation of the visualized vertebral body heights. Moderate disc space narrowing at C3-C4 and C4-C5. Slight reversal of the expected cervical lordosis. Mild multilevel facet arthrosis. Degenerative changes are noted at the atlantodental and craniocervical junctions. Lateral masses are relatively symmetric in appearance. Odontoid is obscured. Articular pillars appear intact on the AP view. Visualized ribs and lung apices grossly unremarkable. Right-sided PICC distal tip overlying the cavoatrial junction. Prior median sternotomy and CABG. Thoracic spine: Prior median sternotomy and CABG. Right-sided PICC distal tip overlying the cavoatrial junction. Visualized ribs appear grossly intact. Pedicles symmetric in appearance. Visualized thoracic vertebral body heights and alignment are well maintained. Upper thoracic vertebral bodies are obscured. Mild multilevel disc space narrowing within the visualized thoracic spine with mild hypertrophic osteophyte spurring anteriorly at the thoracolumbar junction. Lumbar spine: Lumbar spine imaged from superior T11 vertebral body level to the lower coccyx on the lateral views. Gross preservation of the vertebral body heights. Mild multilevel intervertebral disc space narrowing. Alignment well maintained. Moderate facet arthrosis at the lower lumbar/lumbosacral spine. Atherosclerotic calcification of the abdominal aorta and branch vasculature.   Mild multilevel anterior hypertrophic osteophyte spur formation. Pedicles symmetric in appearance. Psoas shadows symmetric in appearance. Right moderate stool burden. SI joint appears patent. Phlebolith scattered throughout the pelvis. Mild right hip osteoarthrosis. Cervical spine: 1. Limited assessment of the cervical spine as the odontoid is obscured and the inferior C5 level and below are obscured. 2. Moderate degenerative changes within the cervical spine. Slight reversal of the expected cervical lordosis. 3. Limited examination of the cervical spine. If there is clinical concern regarding trauma to the cervical spine, a dedicated CT cervical spine would be a more sensitive examination. Thoracic spine: 1. Mild diffuse degenerative changes within the visualized thoracic spine. 2. Upper thoracic vertebral bodies are obscured. 3. No acute vertebral body height loss within the visualized thoracic spine. Lumbar spine: 1. Mild diffuse degenerative changes in the lumbar spine. No acute vertebral body height loss in the lumbar spine. 2. Mild right hip osteoarthrosis. 3. Moderate stool burden. Xr Thoracic Spine (2 Views)    Result Date: 1/15/2018  EXAMINATION: 2 VIEWS OF THE CERVICAL SPINE; 2 VIEWS OF THE THORACIC SPINE; 3 VIEWS OF THE LUMBAR SPINE 1/15/2018 10:52 am COMPARISON: Cervical spine plain radiographs from 06/04/2013, CT chest from 12/31/2017, CT abdomen pelvis from 10/22/2017 HISTORY: ORDERING SYSTEM PROVIDED HISTORY: fall from seated TECHNOLOGIST PROVIDED HISTORY: Reason for exam:->fall from seated 45-year-old female with fall from seated height FINDINGS: Cervical spine: Cervical spine imaged from the skull base to the mid C5 level on the lateral view. Gross preservation of the visualized vertebral body heights. Moderate disc space narrowing at C3-C4 and C4-C5. Slight reversal of the expected cervical lordosis. Mild multilevel facet arthrosis.   Degenerative changes are noted at the atlantodental and embolism. Main pulmonary artery is normal in caliber. Mediastinum: A few borderline prominent prevascular mediastinal lymph nodes are noted, the largest near the 1.3 x 1.2 cm. The heart and pericardium demonstrate no acute abnormality. The heart is enlarged. There is no acute abnormality of the thoracic aorta. Lungs/pleura: There are patchy airspace opacities most prominent in the right lower lobe. There is are small right and trace left pleural effusions. Upper Abdomen: Limited images of the upper abdomen are unremarkable. Soft Tissues/Bones: No acute bone or soft tissue abnormality. Median sternotomy wires are present. No pulmonary emboli. Patchy airspace opacities, most predominantly right lower lobe, could represent pneumonia. Small right and trace left pleural effusions. Vl Dup Lower Extremity Venous Bilateral    Result Date: 1/21/2018    [facility]  Vascular Lower Extremities DVT Study Procedure   Patient Name     Estela Randolph Date of Study             01/20/2018                   D   Date of Birth    1955  Gender                    Female   Age              65 year(s)  Race                      Black   Room Number      04   Corporate ID #   [id number]   Patient Acct #   [de-identified]   MR #             [id number]     Sonographer               Susu Garza   Accession #      [id number]   Interpreting Physician    Andrews Caruso   Referring Nurse              Referring Physician       Agapito Olivas,  Practitioner  Procedure Type of Study:   Veins: Lower Extremities DVT Study, Venous Scan Lower Bilateral.  Indications for Study:Swelling. Risk Factors History +---------+----+-----------------------------------------------------------+ ! Diagnosis! Date! Comments                                                   ! +---------+----+-----------------------------------------------------------+ ! Other    !     !H/O CVA; PM                                                !

## 2018-01-22 NOTE — CONSULTS
[facility]  In PatientCardiology Consult             Date:   1/22/2018  Patient name: Katherine Leon  Date of admission:  1/21/2018  9:55 PM  MRN:   [id number]  YOB: 1955    Reason for Admission:  Shortness of breath, wheezing    CHIEF COMPLAINT:  Shortness of breath     History Obtained From: Patient, EPIC    HISTORY OF PRESENT ILLNESS:      Nely Lopez is a 65year old  female admitted with shortness of breath which she states has been going on for the past 2 months. Patient states she came in tonight because she felt like she will stop breathing. She denies any chest pain. No leg pain or leg swelling. No nausea vomiting. No fever or chills. States she has been coughing, nonproductive. Patient was seen here yesterday for leg swelling and low blood glucose. At that time, patient denied any shortness of breath. Workup at that time was unremarkable after blood glucose was monitored. Patient has a history of COPD and CHF. She wears 4 L oxygen at home. She has had multiple admissions for similar complaints. AECOPD- no chest pain    Past Medical History:   has a past medical history of Arthritis; Asthma; CAD (coronary artery disease); CHF (congestive heart failure) ([facility] Clinical trial participant at discharge; COPD (chronic obstructive pulmonary disease) ([facility] Diabetes mellitus ([facility] Hepatitis C; Hyperlipidemia; Hypertension; MRSA (methicillin resistant staph aureus) culture positive; Pneumonia; and Unspecified cerebral artery occlusion with cerebral infarction. 1. CAD, s/p STEMI on 05/06/2016. Coronary angiography showed LMCA mild disease, LAD 99% proximal stenosis, reduced to 0% using a 2.75 x 23 mm BMS, 80% stenosis proximal RCA, reduced to 0% using 2.75 x 23 BMS, 100% very distal PDA occlusion, small in caliber, LCx mild disease and OM3 70% stenosis with JOHNNA III flow. 2. Hypertension   3. Ischemic cardiomyopathy. 4. CKD.    5. Cardiac cath

## 2018-01-22 NOTE — ED NOTES
Bed: 10  Expected date: 1/21/18  Expected time: 9:42 PM  Means of arrival: Ambulance  Comments:  [address] [facility], [address] [facility]  01/21/18 7683

## 2018-01-22 NOTE — ED NOTES
Met with the patient who has had multiple ER visits in the past month. Suggested to the patient that she utilize her PCP and urgent Cares for less serious, non-emergent medical issues.

## 2018-01-22 NOTE — ED PROVIDER NOTES
Emergency  Physician              Stephen Herrera MD  01/21/18 3693
exacerbation, pneumonia, CHF, ACS    DIAGNOSTIC RESULTS / EMERGENCY DEPARTMENT COURSE / MDM     LABS:  Results for orders placed or performed during the hospital encounter of 63/85/68   Basic Metabolic Panel   Result Value Ref Range    Glucose 114 (H) 70 - 99 mg/dL    BUN 14 8 - 23 mg/dL    CREATININE 0.85 0.50 - 0.90 mg/dL    Bun/Cre Ratio NOT REPORTED 9 - 20    Calcium 8.6 8.6 - 10.4 mg/dL    Sodium 138 135 - 144 mmol/L    Potassium 5.0 3.7 - 5.3 mmol/L    Chloride 98 98 - 107 mmol/L    CO2 29 20 - 31 mmol/L    Anion Gap 11 9 - 17 mmol/L    GFR Non-African American >60 >60 mL/min    GFR African American >60 >60 mL/min    GFR Comment          GFR Staging NOT REPORTED    Brain Natriuretic Peptide   Result Value Ref Range    Pro-BNP 9,274 (H) <300 pg/mL    BNP Interpretation         CBC Auto Differential   Result Value Ref Range    WBC 8.7 3.5 - 11.3 k/uL    RBC 3.87 (L) 3.95 - 5.11 m/uL    Hemoglobin 9.7 (L) 11.9 - 15.1 g/dL    Hematocrit 33.0 (L) 36.3 - 47.1 %    MCV 85.3 82.6 - 102.9 fL    MCH 25.1 (L) 25.2 - 33.5 pg    MCHC 29.4 28.4 - 34.8 g/dL    RDW 17.2 (H) 11.8 - 14.4 %    Platelets 019 705 - 229 k/uL    MPV 9.9 8.1 - 13.5 fL    NRBC Automated 0.0 0.0 per 100 WBC    Differential Type NOT REPORTED     WBC Morphology NOT REPORTED     RBC Morphology NOT REPORTED     Platelet Estimate NOT REPORTED     Immature Granulocytes 0 0 %    Seg Neutrophils 55 36 - 66 %    Lymphocytes 35 24 - 44 %    Monocytes 5 1 - 7 %    Eosinophils % 4 1 - 4 %    Basophils 1 0 - 2 %    Absolute Immature Granulocyte 0.00 0.00 - 0.30 k/uL    Segs Absolute 4.77 1.8 - 7.7 k/uL    Absolute Lymph # 3.05 1.0 - 4.8 k/uL    Absolute Mono # 0.44 0.1 - 0.8 k/uL    Absolute Eos # 0.35 0.0 - 0.4 k/uL    Basophils # 0.09 0.0 - 0.2 k/uL    Morphology ANISOCYTOSIS PRESENT    POCT troponin   Result Value Ref Range    POC Troponin I 0.04 0.00 - 0.10 ng/mL    POC Troponin Interp       The Troponin-I (POC) results cannot be compared to the Troponin-T

## 2018-01-22 NOTE — PROGRESS NOTES
Pedro BrownPatient Assessment complete. SOB (shortness of breath) [R06.02] . Vitals:    01/22/18 0505   BP: (!) 146/97   Pulse: 102   Resp: 18   Temp: 97.8 °F (36.6 °C)   SpO2: 98%   . Patients home meds are   Prior to Admission medications    Medication Sig Start Date End Date Taking? Authorizing Provider   predniSONE (DELTASONE) 20 MG tablet Take 2 tablets by mouth daily for 4 days 1/22/18 1/26/18 Yes Patsy Garcia MD   metoprolol tartrate (LOPRESSOR) 50 MG tablet Take 0.5 tablets by mouth 2 times daily 1/18/18   Dora Goldberg,    acetaminophen (TYLENOL) 325 MG tablet Take 2 tablets by mouth every 6 hours as needed for Pain 1/15/18   Piyush Wilkinson,    spironolactone (ALDACTONE) 25 MG tablet Take 1 tablet by mouth daily 1/11/18   ROSALIO Anderson   furosemide (LASIX) 20 MG tablet Take 2 tablets by mouth 2 times daily 12/26/17   Ken Reed MD   aspirin 81 MG chewable tablet Take 1 tablet by mouth daily 12/18/17   Ken Reed MD   ipratropium-albuterol (DUONEB) 0.5-2.5 (3) MG/3ML SOLN nebulizer solution Inhale 3 mLs into the lungs every 4 hours as needed for Shortness of Breath 12/18/17   Jose Be MD   nitroGLYCERIN (NITROSTAT) 0.4 MG SL tablet Place 1 tablet under the tongue every 5 minutes as needed for Chest pain up to max of 3 total doses. If no relief after 1 dose, call 911. 12/18/17   Ken Reed MD   OXcarbazepine (TRILEPTAL) 300 MG tablet Take 1 tablet by mouth 2 times daily Per pharmacy she is to be taking 300 mg twice daily but patient states that she only takes once daily.  12/18/17   Jose Be MD   insulin glargine (LANTUS) 100 UNIT/ML injection vial Inject 30 Units into the skin nightly 12/18/17   Jose Be MD   atorvastatin (LIPITOR) 80 MG tablet Take 1 tablet by mouth nightly 12/18/17   Ken Reed MD   lisinopril (PRINIVIL;ZESTRIL) 5 MG tablet Take 1 tablet by mouth daily 12/18/17   Ken Reed MD   OLANZapine (ZYPREXA) 10 MG tablet Take 1 tablet by mouth nightly 12/18/17   Nicolasa Cowan MD   colchicine (COLCRYS) 0.6 MG tablet Take 1 tablet by mouth daily 12/18/17   Nicolasa Cowan MD   clopidogrel (PLAVIX) 75 MG tablet Take 1 tablet by mouth daily 12/18/17   Nicolasa Cowan MD   famotidine (PEPCID) 20 MG tablet Take 1 tablet by mouth daily 12/18/17   Nicolasa Cowan MD   senna-docusate (PERICOLACE) 8.6-50 MG per tablet Take 1 tablet by mouth daily 12/18/17   Nicolasa Cowan MD   .Assessment     The patient is resting on 4 LPM of O2 Via nasal canula. She wears 4lpm O2 nasal canula at home. Respirations are 18 non labored br/min,  The pt states dyspnea and SOB on exertion. The pt states pulmonary hx of COPD, emphysema, asthma, and is a smoker. The pt states home meds do not include any inhalers or nebulizer treatments. The pt does no wear a CPAP / BiPAP at home while sleeping and states she does not have LAMONT.    RR 18  Breath Sounds: end expiratory wheezes t/o       · Bronchodilator assessment at level  2  · Hyperinflation assessment at level   · Secretion Management assessment at level    ·   · [x]    Bronchodilator Assessment  BRONCHODILATOR ASSESSMENT SCORE  Score 0 1 2 3 4 5   Breath Sounds   []  Patient Baseline []  No Wheeze good aeration [x]  Faint, scattered wheezing, good aeration []  Expiratory Wheezing and or moderately diminished []  Insp/Exp wheeze and/or very diminished []  Insp/Exp and/ or marked distress   Respiratory Rate   []  Patient Baseline [x]  Less than 20 [x]  Less than 20 []  20-25 []  Greater than 25 []  Greater than 25   Peak flow % of Pred or PB [x]  NA   []  Greater than 90%  []  81-90% []  71-80% []  Less than or equal to 70%  or unable to perform []  Unable due to Respiratory Distress   Dyspnea re []  Patient Baseline []  No SOB []  No SOB []  SOB on exertion []  SOB min activity []  At rest/acute   e FEV% Predicted       [x]  NA []  Above 69%  []  Unable []  Above

## 2018-01-22 NOTE — PROGRESS NOTES
[address]  Occupational Therapy Not Seen Note    Patient not available for Occupational Therapy due to:    [x] Testing: Pt leaving the unit for ECHO at this time    [] Hemodialysis    [] Blood Transfusion in Progress    []Refusal by Patient:    [] Surgery/Procedure:    [] Strict Bedrest    [] Sedation    [] Spine Precautions     [] Pt being transferred to palliative care at this time. Spoke with pt/family and OT services to be defered. [] Pt independent with functional mobility and functional tasks.  Pt with no OT acute care needs at this time, will defer OT eval.    Next Scheduled Treatment: Will check back later this date as able    Signature: Keyana Salamanca OTR/L

## 2018-01-22 NOTE — ED NOTES
Pt arrives to ED via EMS for c/o SOB. Pt states SOB chronic condition. Pt wears home O2 at 4L/NC. Pt arrives in hospital gown from yesterday. Pt states she is still not feeling better. Pt did not f/u with PCP. Pt A&Ox4, resp even and non labored. Pt placed on, blood pressure cuff, pulse ox, alarms set.      Kasey Richards RN  01/21/18 5178

## 2018-01-23 VITALS
SYSTOLIC BLOOD PRESSURE: 121 MMHG | OXYGEN SATURATION: 92 % | HEIGHT: 61 IN | TEMPERATURE: 98.1 F | WEIGHT: 170.9 LBS | BODY MASS INDEX: 32.27 KG/M2 | RESPIRATION RATE: 16 BRPM | DIASTOLIC BLOOD PRESSURE: 66 MMHG | HEART RATE: 88 BPM

## 2018-01-23 LAB
GLUCOSE BLD-MCNC: 208 MG/DL (ref 65–105)
GLUCOSE BLD-MCNC: 228 MG/DL (ref 65–105)
GLUCOSE BLD-MCNC: 378 MG/DL (ref 65–105)
GLUCOSE BLD-MCNC: 416 MG/DL (ref 65–105)

## 2018-01-23 PROCEDURE — 6370000000 HC RX 637 (ALT 250 FOR IP): Performed by: INTERNAL MEDICINE

## 2018-01-23 PROCEDURE — 6370000000 HC RX 637 (ALT 250 FOR IP): Performed by: EMERGENCY MEDICINE

## 2018-01-23 PROCEDURE — 94762 N-INVAS EAR/PLS OXIMTRY CONT: CPT

## 2018-01-23 PROCEDURE — 94640 AIRWAY INHALATION TREATMENT: CPT

## 2018-01-23 PROCEDURE — 82947 ASSAY GLUCOSE BLOOD QUANT: CPT

## 2018-01-23 PROCEDURE — 6360000002 HC RX W HCPCS: Performed by: EMERGENCY MEDICINE

## 2018-01-23 PROCEDURE — 99233 SBSQ HOSP IP/OBS HIGH 50: CPT | Performed by: INTERNAL MEDICINE

## 2018-01-23 RX ORDER — PREDNISONE 20 MG/1
20 TABLET ORAL DAILY
Qty: 4 TABLET | Refills: 0 | Status: SHIPPED | OUTPATIENT
Start: 2018-01-24 | End: 2018-01-28

## 2018-01-23 RX ORDER — PREDNISONE 20 MG/1
20 TABLET ORAL DAILY
Status: DISCONTINUED | OUTPATIENT
Start: 2018-01-23 | End: 2018-01-23 | Stop reason: HOSPADM

## 2018-01-23 RX ADMIN — IPRATROPIUM BROMIDE AND ALBUTEROL SULFATE 1 AMPULE: .5; 3 SOLUTION RESPIRATORY (INHALATION) at 09:11

## 2018-01-23 RX ADMIN — AZITHROMYCIN 500 MG: 250 TABLET, FILM COATED ORAL at 12:55

## 2018-01-23 RX ADMIN — OXCARBAZEPINE 300 MG: 300 TABLET, FILM COATED ORAL at 10:59

## 2018-01-23 RX ADMIN — IPRATROPIUM BROMIDE AND ALBUTEROL SULFATE 1 AMPULE: .5; 3 SOLUTION RESPIRATORY (INHALATION) at 14:55

## 2018-01-23 RX ADMIN — FUROSEMIDE 40 MG: 40 TABLET ORAL at 10:59

## 2018-01-23 RX ADMIN — ASPIRIN 81 MG: 81 TABLET, CHEWABLE ORAL at 10:59

## 2018-01-23 RX ADMIN — FAMOTIDINE 20 MG: 20 TABLET, FILM COATED ORAL at 12:55

## 2018-01-23 RX ADMIN — INSULIN LISPRO 6 UNITS: 100 INJECTION, SOLUTION INTRAVENOUS; SUBCUTANEOUS at 10:56

## 2018-01-23 RX ADMIN — SPIRONOLACTONE 25 MG: 25 TABLET ORAL at 10:59

## 2018-01-23 RX ADMIN — INSULIN LISPRO 10 UNITS: 100 INJECTION, SOLUTION INTRAVENOUS; SUBCUTANEOUS at 12:09

## 2018-01-23 RX ADMIN — COLCHICINE 0.6 MG: 0.6 TABLET, FILM COATED ORAL at 11:00

## 2018-01-23 RX ADMIN — LISINOPRIL 5 MG: 5 TABLET ORAL at 10:59

## 2018-01-23 RX ADMIN — CLOPIDOGREL 75 MG: 75 TABLET, FILM COATED ORAL at 11:00

## 2018-01-23 RX ADMIN — ENOXAPARIN SODIUM 40 MG: 40 INJECTION SUBCUTANEOUS at 10:59

## 2018-01-23 RX ADMIN — METOPROLOL TARTRATE 25 MG: 25 TABLET ORAL at 10:59

## 2018-01-23 ASSESSMENT — PAIN SCALES - GENERAL
PAINLEVEL_OUTOF10: 0

## 2018-01-23 NOTE — PROGRESS NOTES
OBS/CDU   RESIDENT NOTE      Patients PCP is:  Christopher Wheeler MD        SUBJECTIVE      Patient sitting up complete talking in full sentences asking to go home at this time. No acute events overnight. Has been able to tolerate a full diet without nausea or vomiting. The patient is urinating on his own and is passing flatus. Denies fever, chills, nausea, vomiting, chest pain, shortness of breath, abdominal pain, focal weakness, numbness, tingling, urinary/bowel symptoms, vision changes, visual hallucinations, or headache. PHYSICAL EXAM      General: NAD, AO X 3  Heent: EMOI, PERRL   Neck: SUPPLE, NO JVD  Cardiovascular: RRR, S1S2  Pulmonary: CTAB, NO SOB  Abdomen: SOFT, NTTP, ND, +BS  Extremities: +2/4 PULSES DISTAL, NO SWELLING  Neuro:  NO NUMBNESS OR TINGLING  Psych:  MENTATION AT BASELINE, NO SUICIDAL IDEATION. PERTINENT TEST /EXAMS      I have reviewed all available laboratory results.     MEDICATIONS CURRENT     aspirin chewable tablet 81 mg Daily   atorvastatin (LIPITOR) tablet 80 mg Nightly   clopidogrel (PLAVIX) tablet 75 mg Daily   colchicine (COLCRYS) tablet 0.6 mg Daily   famotidine (PEPCID) tablet 20 mg Daily   furosemide (LASIX) tablet 40 mg BID   insulin glargine (LANTUS) injection vial 30 Units Nightly   lisinopril (PRINIVIL;ZESTRIL) tablet 5 mg Daily   metoprolol tartrate (LOPRESSOR) tablet 25 mg BID   nitroGLYCERIN (NITROSTAT) SL tablet 0.4 mg Q5 Min PRN   OLANZapine (ZYPREXA) tablet 10 mg Nightly   OXcarbazepine (TRILEPTAL) tablet 300 mg BID   spironolactone (ALDACTONE) tablet 25 mg Daily   sodium chloride flush 0.9 % injection 10 mL 2 times per day   sodium chloride flush 0.9 % injection 10 mL PRN   acetaminophen (TYLENOL) tablet 650 mg Q4H PRN   enoxaparin (LOVENOX) injection 40 mg Daily   glucose (GLUTOSE) 40 % oral gel 15 g PRN   dextrose 50 % solution 12.5 g PRN   glucagon (rDNA) injection 1 mg PRN   dextrose 5 % solution PRN   albuterol (PROVENTIL) nebulizer solution 2.5 mg

## 2018-01-25 NOTE — DISCHARGE SUMMARY
home, she was discharged home with plans to continue to follow with her outpatient radiologist as well as pulmonologist.     She was admitted and labs and imaging were followed daily. At time of discharge, Katherine Leon was tolerating a regular diet, having bowel movements, ambulating on their own accord and had adequate analgesia on oral pain medications, and had no signs of symptoms of complications. She is medically stable to be discharged. Disposition: Home    Patient stated that they will not drive themselves home from the hospital if they have gotten pain killers/ narcotics earlier that day and that they will arrange for transportation on their own or work with the  for a ride. Patient counseled NOT to drive while under the influence of narcotics/ pain killers. Condition: Good    Patient stable and ready for discharge home. I have discussed plan of care with patient and they are in understanding. They were instructed to read discharge paperwork. All of their questions and concerns were addressed.      Time Spent: 1      --  Anshul Saunders DO  Emergency Medicine Resident Physician

## 2018-01-26 ENCOUNTER — CARE COORDINATION (OUTPATIENT)
Dept: CARE COORDINATION | Age: 63
End: 2018-01-26

## 2018-01-31 ENCOUNTER — TELEPHONE (OUTPATIENT)
Dept: FAMILY MEDICINE CLINIC | Age: 63
End: 2018-01-31

## 2018-02-04 ENCOUNTER — TELEPHONE (OUTPATIENT)
Dept: FAMILY MEDICINE CLINIC | Age: 63
End: 2018-02-04

## 2018-02-16 ENCOUNTER — CARE COORDINATION (OUTPATIENT)
Dept: CARE COORDINATION | Age: 63
End: 2018-02-16

## 2018-03-05 ENCOUNTER — OFFICE VISIT (OUTPATIENT)
Dept: PODIATRY | Age: 63
End: 2018-03-05
Payer: MEDICARE

## 2018-03-05 VITALS — HEART RATE: 72 BPM | RESPIRATION RATE: 20 BRPM | HEIGHT: 60 IN

## 2018-03-05 DIAGNOSIS — R60.0 EDEMA OF LOWER EXTREMITY: ICD-10-CM

## 2018-03-05 DIAGNOSIS — R26.2 WALKING DIFFICULTY DUE TO ANKLE AND FOOT: ICD-10-CM

## 2018-03-05 DIAGNOSIS — E11.51 TYPE II DIABETES MELLITUS WITH PERIPHERAL CIRCULATORY DISORDER (HCC): Primary | ICD-10-CM

## 2018-03-05 DIAGNOSIS — I73.9 PERIPHERAL VASCULAR DISORDER (HCC): ICD-10-CM

## 2018-03-05 DIAGNOSIS — B35.1 DERMATOPHYTOSIS OF NAIL: ICD-10-CM

## 2018-03-05 PROBLEM — I25.10 CORONARY ATHEROSCLEROSIS: Status: ACTIVE | Noted: 2017-01-17

## 2018-03-05 PROBLEM — D35.2 PITUITARY MICROADENOMA (HCC): Status: ACTIVE | Noted: 2017-01-18

## 2018-03-05 PROBLEM — F22 DELUSIONAL DISORDER, MIXED TYPE (HCC): Status: ACTIVE | Noted: 2017-01-17

## 2018-03-05 PROBLEM — T38.3X5A HYPOGLYCEMIA DUE TO INSULIN: Status: ACTIVE | Noted: 2017-07-30

## 2018-03-05 PROBLEM — N17.9 ACUTE RENAL FAILURE (ARF) (HCC): Status: ACTIVE | Noted: 2017-06-29

## 2018-03-05 PROBLEM — E16.0 HYPOGLYCEMIA DUE TO INSULIN: Status: ACTIVE | Noted: 2017-07-30

## 2018-03-05 PROBLEM — R51.9 HEADACHE: Status: ACTIVE | Noted: 2017-01-16

## 2018-03-05 PROBLEM — R44.3 HALLUCINATION: Status: ACTIVE | Noted: 2017-01-17

## 2018-03-05 PROBLEM — F31.70 BIPOLAR DISORDER IN FULL REMISSION (HCC): Status: ACTIVE | Noted: 2017-01-17

## 2018-03-05 PROBLEM — E16.2 HYPOGLYCEMIA: Status: ACTIVE | Noted: 2017-01-17

## 2018-03-05 PROBLEM — A04.72 CLOSTRIDIUM DIFFICILE DIARRHEA: Status: ACTIVE | Noted: 2017-04-20

## 2018-03-05 PROCEDURE — G8484 FLU IMMUNIZE NO ADMIN: HCPCS | Performed by: PODIATRIST

## 2018-03-05 PROCEDURE — 4004F PT TOBACCO SCREEN RCVD TLK: CPT | Performed by: PODIATRIST

## 2018-03-05 PROCEDURE — 3017F COLORECTAL CA SCREEN DOC REV: CPT | Performed by: PODIATRIST

## 2018-03-05 PROCEDURE — G8427 DOCREV CUR MEDS BY ELIG CLIN: HCPCS | Performed by: PODIATRIST

## 2018-03-05 PROCEDURE — 3014F SCREEN MAMMO DOC REV: CPT | Performed by: PODIATRIST

## 2018-03-05 PROCEDURE — 3044F HG A1C LEVEL LT 7.0%: CPT | Performed by: PODIATRIST

## 2018-03-05 PROCEDURE — G8417 CALC BMI ABV UP PARAM F/U: HCPCS | Performed by: PODIATRIST

## 2018-03-05 PROCEDURE — G8598 ASA/ANTIPLAT THER USED: HCPCS | Performed by: PODIATRIST

## 2018-03-05 PROCEDURE — 11721 DEBRIDE NAIL 6 OR MORE: CPT | Performed by: PODIATRIST

## 2018-03-05 PROCEDURE — 99203 OFFICE O/P NEW LOW 30 MIN: CPT | Performed by: PODIATRIST

## 2018-03-05 RX ORDER — ALBUTEROL SULFATE 90 UG/1
1-2 AEROSOL, METERED RESPIRATORY (INHALATION)
COMMUNITY
End: 2018-05-14

## 2018-03-05 RX ORDER — AMOXICILLIN 250 MG
1 CAPSULE ORAL
COMMUNITY

## 2018-03-05 RX ORDER — ONDANSETRON 4 MG/1
4 TABLET, ORALLY DISINTEGRATING ORAL
COMMUNITY
Start: 2017-09-02

## 2018-03-05 RX ORDER — MOMETASONE FUROATE 1 MG/G
1-2 OINTMENT TOPICAL
COMMUNITY
End: 2018-04-07

## 2018-03-06 ENCOUNTER — TELEPHONE (OUTPATIENT)
Dept: FAMILY MEDICINE CLINIC | Age: 63
End: 2018-03-06

## 2018-03-13 ENCOUNTER — TELEPHONE (OUTPATIENT)
Dept: NEUROLOGY | Age: 63
End: 2018-03-13

## 2018-03-14 ENCOUNTER — APPOINTMENT (OUTPATIENT)
Dept: GENERAL RADIOLOGY | Age: 63
DRG: 292 | End: 2018-03-14
Payer: MEDICARE

## 2018-03-14 ENCOUNTER — APPOINTMENT (OUTPATIENT)
Dept: CT IMAGING | Age: 63
DRG: 292 | End: 2018-03-14
Payer: MEDICARE

## 2018-03-14 ENCOUNTER — HOSPITAL ENCOUNTER (INPATIENT)
Age: 63
LOS: 3 days | Discharge: HOME HEALTH | DRG: 292 | End: 2018-03-17
Attending: EMERGENCY MEDICINE | Admitting: INTERNAL MEDICINE
Payer: MEDICARE

## 2018-03-14 DIAGNOSIS — H54.7 VISION LOSS: Primary | ICD-10-CM

## 2018-03-14 DIAGNOSIS — E16.2 HYPOGLYCEMIA: ICD-10-CM

## 2018-03-14 DIAGNOSIS — R42 VERTIGO: ICD-10-CM

## 2018-03-14 DIAGNOSIS — I50.9 ACUTE ON CHRONIC CONGESTIVE HEART FAILURE, UNSPECIFIED CONGESTIVE HEART FAILURE TYPE: ICD-10-CM

## 2018-03-14 PROBLEM — I50.41 ACUTE COMBINED SYSTOLIC AND DIASTOLIC (CONGESTIVE) HRT FAIL (HCC): Status: ACTIVE | Noted: 2018-03-14

## 2018-03-14 LAB
ALBUMIN SERPL-MCNC: 3.5 G/DL (ref 3.5–5.2)
ALBUMIN/GLOBULIN RATIO: 1.1 (ref 1–2.5)
ALP BLD-CCNC: 84 U/L (ref 35–104)
ALT SERPL-CCNC: 42 U/L (ref 5–33)
ANION GAP SERPL CALCULATED.3IONS-SCNC: 15 MMOL/L (ref 9–17)
AST SERPL-CCNC: 58 U/L
BILIRUB SERPL-MCNC: 0.71 MG/DL (ref 0.3–1.2)
BILIRUBIN DIRECT: 0.3 MG/DL
BILIRUBIN, INDIRECT: 0.41 MG/DL (ref 0–1)
BNP INTERPRETATION: ABNORMAL
BUN BLDV-MCNC: 20 MG/DL (ref 8–23)
BUN/CREAT BLD: ABNORMAL (ref 9–20)
CALCIUM SERPL-MCNC: 8.6 MG/DL (ref 8.6–10.4)
CHLORIDE BLD-SCNC: 103 MMOL/L (ref 98–107)
CHP ED QC CHECK: NORMAL
CO2: 21 MMOL/L (ref 20–31)
CREAT SERPL-MCNC: 0.94 MG/DL (ref 0.5–0.9)
EKG ATRIAL RATE: 86 BPM
EKG P AXIS: 53 DEGREES
EKG P-R INTERVAL: 204 MS
EKG Q-T INTERVAL: 400 MS
EKG QRS DURATION: 78 MS
EKG QTC CALCULATION (BAZETT): 478 MS
EKG R AXIS: -8 DEGREES
EKG T AXIS: 84 DEGREES
EKG VENTRICULAR RATE: 86 BPM
GFR AFRICAN AMERICAN: >60 ML/MIN
GFR NON-AFRICAN AMERICAN: >60 ML/MIN
GFR SERPL CREATININE-BSD FRML MDRD: ABNORMAL ML/MIN/{1.73_M2}
GFR SERPL CREATININE-BSD FRML MDRD: ABNORMAL ML/MIN/{1.73_M2}
GLOBULIN: ABNORMAL G/DL (ref 1.5–3.8)
GLUCOSE BLD-MCNC: 109 MG/DL
GLUCOSE BLD-MCNC: 109 MG/DL (ref 65–105)
GLUCOSE BLD-MCNC: 68 MG/DL (ref 70–99)
GLUCOSE BLD-MCNC: 92 MG/DL (ref 65–105)
HCT VFR BLD CALC: 33.4 % (ref 36.3–47.1)
HEMOGLOBIN: 9.1 G/DL (ref 11.9–15.1)
LACTIC ACID, WHOLE BLOOD: 2.9 MMOL/L (ref 0.7–2.1)
LIPASE: 19 U/L (ref 13–60)
MCH RBC QN AUTO: 23 PG (ref 25.2–33.5)
MCHC RBC AUTO-ENTMCNC: 27.2 G/DL (ref 28.4–34.8)
MCV RBC AUTO: 84.6 FL (ref 82.6–102.9)
NRBC AUTOMATED: 0 PER 100 WBC
PDW BLD-RTO: 17.4 % (ref 11.8–14.4)
PLATELET # BLD: 297 K/UL (ref 138–453)
PMV BLD AUTO: 9.9 FL (ref 8.1–13.5)
POC TROPONIN I: 0.01 NG/ML (ref 0–0.1)
POC TROPONIN INTERP: NORMAL
POTASSIUM SERPL-SCNC: 4.3 MMOL/L (ref 3.7–5.3)
PRO-BNP: 7589 PG/ML
RBC # BLD: 3.95 M/UL (ref 3.95–5.11)
SODIUM BLD-SCNC: 139 MMOL/L (ref 135–144)
TOTAL PROTEIN: 6.6 G/DL (ref 6.4–8.3)
TROPONIN INTERP: NORMAL
TROPONIN INTERP: NORMAL
TROPONIN T: <0.03 NG/ML
TROPONIN T: <0.03 NG/ML
WBC # BLD: 9.1 K/UL (ref 3.5–11.3)

## 2018-03-14 PROCEDURE — 84484 ASSAY OF TROPONIN QUANT: CPT

## 2018-03-14 PROCEDURE — 80048 BASIC METABOLIC PNL TOTAL CA: CPT

## 2018-03-14 PROCEDURE — 70496 CT ANGIOGRAPHY HEAD: CPT

## 2018-03-14 PROCEDURE — 71045 X-RAY EXAM CHEST 1 VIEW: CPT

## 2018-03-14 PROCEDURE — 83690 ASSAY OF LIPASE: CPT

## 2018-03-14 PROCEDURE — 99285 EMERGENCY DEPT VISIT HI MDM: CPT

## 2018-03-14 PROCEDURE — 6360000004 HC RX CONTRAST MEDICATION: Performed by: EMERGENCY MEDICINE

## 2018-03-14 PROCEDURE — 83880 ASSAY OF NATRIURETIC PEPTIDE: CPT

## 2018-03-14 PROCEDURE — 6360000002 HC RX W HCPCS: Performed by: INTERNAL MEDICINE

## 2018-03-14 PROCEDURE — 6370000000 HC RX 637 (ALT 250 FOR IP): Performed by: INTERNAL MEDICINE

## 2018-03-14 PROCEDURE — 2580000003 HC RX 258: Performed by: EMERGENCY MEDICINE

## 2018-03-14 PROCEDURE — 82947 ASSAY GLUCOSE BLOOD QUANT: CPT

## 2018-03-14 PROCEDURE — 93005 ELECTROCARDIOGRAM TRACING: CPT

## 2018-03-14 PROCEDURE — 2580000003 HC RX 258: Performed by: INTERNAL MEDICINE

## 2018-03-14 PROCEDURE — 80076 HEPATIC FUNCTION PANEL: CPT

## 2018-03-14 PROCEDURE — 85027 COMPLETE CBC AUTOMATED: CPT

## 2018-03-14 PROCEDURE — 1200000000 HC SEMI PRIVATE

## 2018-03-14 PROCEDURE — 94640 AIRWAY INHALATION TREATMENT: CPT

## 2018-03-14 PROCEDURE — 6360000002 HC RX W HCPCS: Performed by: EMERGENCY MEDICINE

## 2018-03-14 PROCEDURE — 36415 COLL VENOUS BLD VENIPUNCTURE: CPT

## 2018-03-14 PROCEDURE — 70450 CT HEAD/BRAIN W/O DYE: CPT

## 2018-03-14 PROCEDURE — 83605 ASSAY OF LACTIC ACID: CPT

## 2018-03-14 RX ORDER — ACETAMINOPHEN 325 MG/1
650 TABLET ORAL EVERY 4 HOURS PRN
Status: DISCONTINUED | OUTPATIENT
Start: 2018-03-14 | End: 2018-03-17 | Stop reason: HOSPADM

## 2018-03-14 RX ORDER — ATORVASTATIN CALCIUM 80 MG/1
80 TABLET, FILM COATED ORAL NIGHTLY
Status: DISCONTINUED | OUTPATIENT
Start: 2018-03-14 | End: 2018-03-17 | Stop reason: HOSPADM

## 2018-03-14 RX ORDER — SENNA AND DOCUSATE SODIUM 50; 8.6 MG/1; MG/1
1 TABLET, FILM COATED ORAL NIGHTLY PRN
Status: DISCONTINUED | OUTPATIENT
Start: 2018-03-14 | End: 2018-03-17 | Stop reason: HOSPADM

## 2018-03-14 RX ORDER — FAMOTIDINE 20 MG/1
20 TABLET, FILM COATED ORAL DAILY
Status: DISCONTINUED | OUTPATIENT
Start: 2018-03-14 | End: 2018-03-17 | Stop reason: HOSPADM

## 2018-03-14 RX ORDER — OLANZAPINE 10 MG/1
10 TABLET ORAL NIGHTLY
Status: DISCONTINUED | OUTPATIENT
Start: 2018-03-14 | End: 2018-03-17 | Stop reason: HOSPADM

## 2018-03-14 RX ORDER — SODIUM CHLORIDE 0.9 % (FLUSH) 0.9 %
10 SYRINGE (ML) INJECTION PRN
Status: DISCONTINUED | OUTPATIENT
Start: 2018-03-14 | End: 2018-03-17 | Stop reason: HOSPADM

## 2018-03-14 RX ORDER — ALBUTEROL SULFATE 90 UG/1
2 AEROSOL, METERED RESPIRATORY (INHALATION) EVERY 4 HOURS PRN
Status: DISCONTINUED | OUTPATIENT
Start: 2018-03-14 | End: 2018-03-15

## 2018-03-14 RX ORDER — IPRATROPIUM BROMIDE AND ALBUTEROL SULFATE 2.5; .5 MG/3ML; MG/3ML
1 SOLUTION RESPIRATORY (INHALATION) EVERY 4 HOURS
Status: DISCONTINUED | OUTPATIENT
Start: 2018-03-14 | End: 2018-03-15

## 2018-03-14 RX ORDER — CLOPIDOGREL BISULFATE 75 MG/1
75 TABLET ORAL DAILY
Status: DISCONTINUED | OUTPATIENT
Start: 2018-03-14 | End: 2018-03-17 | Stop reason: HOSPADM

## 2018-03-14 RX ORDER — ONDANSETRON 2 MG/ML
4 INJECTION INTRAMUSCULAR; INTRAVENOUS EVERY 6 HOURS PRN
Status: DISCONTINUED | OUTPATIENT
Start: 2018-03-14 | End: 2018-03-17 | Stop reason: HOSPADM

## 2018-03-14 RX ORDER — FUROSEMIDE 40 MG/1
40 TABLET ORAL 2 TIMES DAILY
Status: DISCONTINUED | OUTPATIENT
Start: 2018-03-14 | End: 2018-03-17 | Stop reason: HOSPADM

## 2018-03-14 RX ORDER — NITROGLYCERIN 0.4 MG/1
0.4 TABLET SUBLINGUAL EVERY 5 MIN PRN
Status: DISCONTINUED | OUTPATIENT
Start: 2018-03-14 | End: 2018-03-17 | Stop reason: HOSPADM

## 2018-03-14 RX ORDER — FUROSEMIDE 10 MG/ML
40 INJECTION INTRAMUSCULAR; INTRAVENOUS 2 TIMES DAILY
Status: DISCONTINUED | OUTPATIENT
Start: 2018-03-14 | End: 2018-03-15

## 2018-03-14 RX ORDER — DOCUSATE SODIUM 100 MG/1
100 CAPSULE, LIQUID FILLED ORAL 2 TIMES DAILY
Status: DISCONTINUED | OUTPATIENT
Start: 2018-03-14 | End: 2018-03-17 | Stop reason: HOSPADM

## 2018-03-14 RX ORDER — 0.9 % SODIUM CHLORIDE 0.9 %
1000 INTRAVENOUS SOLUTION INTRAVENOUS ONCE
Status: COMPLETED | OUTPATIENT
Start: 2018-03-14 | End: 2018-03-14

## 2018-03-14 RX ORDER — FUROSEMIDE 10 MG/ML
40 INJECTION INTRAMUSCULAR; INTRAVENOUS ONCE
Status: COMPLETED | OUTPATIENT
Start: 2018-03-14 | End: 2018-03-14

## 2018-03-14 RX ORDER — NICOTINE 21 MG/24HR
1 PATCH, TRANSDERMAL 24 HOURS TRANSDERMAL DAILY PRN
Status: DISCONTINUED | OUTPATIENT
Start: 2018-03-14 | End: 2018-03-17 | Stop reason: HOSPADM

## 2018-03-14 RX ORDER — INSULIN GLARGINE 100 [IU]/ML
30 INJECTION, SOLUTION SUBCUTANEOUS NIGHTLY
Status: DISCONTINUED | OUTPATIENT
Start: 2018-03-14 | End: 2018-03-17 | Stop reason: HOSPADM

## 2018-03-14 RX ORDER — LISINOPRIL 5 MG/1
5 TABLET ORAL DAILY
Status: DISCONTINUED | OUTPATIENT
Start: 2018-03-14 | End: 2018-03-17 | Stop reason: HOSPADM

## 2018-03-14 RX ORDER — SODIUM CHLORIDE 0.9 % (FLUSH) 0.9 %
10 SYRINGE (ML) INJECTION EVERY 12 HOURS SCHEDULED
Status: DISCONTINUED | OUTPATIENT
Start: 2018-03-14 | End: 2018-03-17 | Stop reason: HOSPADM

## 2018-03-14 RX ORDER — DEXTROSE MONOHYDRATE 25 G/50ML
12.5 INJECTION, SOLUTION INTRAVENOUS ONCE
Status: COMPLETED | OUTPATIENT
Start: 2018-03-14 | End: 2018-03-14

## 2018-03-14 RX ORDER — BISACODYL 10 MG
10 SUPPOSITORY, RECTAL RECTAL DAILY PRN
Status: DISCONTINUED | OUTPATIENT
Start: 2018-03-14 | End: 2018-03-17 | Stop reason: HOSPADM

## 2018-03-14 RX ORDER — SPIRONOLACTONE 25 MG/1
25 TABLET ORAL DAILY
Status: DISCONTINUED | OUTPATIENT
Start: 2018-03-14 | End: 2018-03-17 | Stop reason: HOSPADM

## 2018-03-14 RX ORDER — COLCHICINE 0.6 MG/1
0.6 TABLET ORAL DAILY
Status: DISCONTINUED | OUTPATIENT
Start: 2018-03-14 | End: 2018-03-17 | Stop reason: HOSPADM

## 2018-03-14 RX ORDER — OXCARBAZEPINE 300 MG/1
300 TABLET, FILM COATED ORAL 2 TIMES DAILY
Status: DISCONTINUED | OUTPATIENT
Start: 2018-03-14 | End: 2018-03-17 | Stop reason: HOSPADM

## 2018-03-14 RX ORDER — ASPIRIN 81 MG/1
81 TABLET, CHEWABLE ORAL DAILY
Status: DISCONTINUED | OUTPATIENT
Start: 2018-03-14 | End: 2018-03-16

## 2018-03-14 RX ADMIN — Medication 10 ML: at 21:42

## 2018-03-14 RX ADMIN — FUROSEMIDE 40 MG: 10 INJECTION, SOLUTION INTRAMUSCULAR; INTRAVENOUS at 21:42

## 2018-03-14 RX ADMIN — CLOPIDOGREL 75 MG: 75 TABLET, FILM COATED ORAL at 21:41

## 2018-03-14 RX ADMIN — INSULIN GLARGINE 30 UNITS: 100 INJECTION, SOLUTION SUBCUTANEOUS at 21:40

## 2018-03-14 RX ADMIN — ASPIRIN 81 MG: 81 TABLET, CHEWABLE ORAL at 21:41

## 2018-03-14 RX ADMIN — DOCUSATE SODIUM 100 MG: 100 TABLET, FILM COATED ORAL at 21:41

## 2018-03-14 RX ADMIN — FAMOTIDINE 20 MG: 20 TABLET, FILM COATED ORAL at 21:41

## 2018-03-14 RX ADMIN — IPRATROPIUM BROMIDE AND ALBUTEROL SULFATE 3 ML: .5; 3 SOLUTION RESPIRATORY (INHALATION) at 23:20

## 2018-03-14 RX ADMIN — FUROSEMIDE 40 MG: 10 INJECTION, SOLUTION INTRAMUSCULAR; INTRAVENOUS at 15:59

## 2018-03-14 RX ADMIN — SPIRONOLACTONE 25 MG: 25 TABLET ORAL at 21:40

## 2018-03-14 RX ADMIN — COLCHICINE 0.6 MG: 0.6 TABLET, FILM COATED ORAL at 21:41

## 2018-03-14 RX ADMIN — DEXTROSE MONOHYDRATE 12.5 G: 500 INJECTION PARENTERAL at 14:03

## 2018-03-14 RX ADMIN — OXCARBAZEPINE 300 MG: 300 TABLET, FILM COATED ORAL at 21:40

## 2018-03-14 RX ADMIN — LURASIDONE HYDROCHLORIDE 60 MG: 60 TABLET, FILM COATED ORAL at 21:42

## 2018-03-14 RX ADMIN — IOPAMIDOL 75 ML: 755 INJECTION, SOLUTION INTRAVENOUS at 14:44

## 2018-03-14 RX ADMIN — LISINOPRIL 5 MG: 5 TABLET ORAL at 21:41

## 2018-03-14 RX ADMIN — METOPROLOL TARTRATE 25 MG: 25 TABLET ORAL at 21:41

## 2018-03-14 RX ADMIN — OLANZAPINE 10 MG: 10 TABLET, FILM COATED ORAL at 21:39

## 2018-03-14 RX ADMIN — SODIUM CHLORIDE 1000 ML: 9 INJECTION, SOLUTION INTRAVENOUS at 11:40

## 2018-03-14 RX ADMIN — ENOXAPARIN SODIUM 40 MG: 40 INJECTION SUBCUTANEOUS at 21:42

## 2018-03-14 RX ADMIN — ATORVASTATIN CALCIUM 80 MG: 80 TABLET, FILM COATED ORAL at 21:40

## 2018-03-14 ASSESSMENT — PAIN DESCRIPTION - DESCRIPTORS: DESCRIPTORS: ACHING;DISCOMFORT

## 2018-03-14 ASSESSMENT — ENCOUNTER SYMPTOMS
VOMITING: 1
COUGH: 1
NAUSEA: 1
SHORTNESS OF BREATH: 1

## 2018-03-14 ASSESSMENT — PAIN DESCRIPTION - FREQUENCY: FREQUENCY: CONTINUOUS

## 2018-03-14 ASSESSMENT — PAIN DESCRIPTION - LOCATION
LOCATION: CHEST
LOCATION: CHEST

## 2018-03-14 ASSESSMENT — PAIN DESCRIPTION - ORIENTATION
ORIENTATION: MID
ORIENTATION: MID

## 2018-03-14 ASSESSMENT — PAIN DESCRIPTION - PAIN TYPE
TYPE: ACUTE PAIN
TYPE: ACUTE PAIN

## 2018-03-14 ASSESSMENT — PAIN DESCRIPTION - ONSET: ONSET: ON-GOING

## 2018-03-14 ASSESSMENT — PAIN SCALES - GENERAL
PAINLEVEL_OUTOF10: 5
PAINLEVEL_OUTOF10: 5

## 2018-03-14 ASSESSMENT — PAIN DESCRIPTION - PROGRESSION: CLINICAL_PROGRESSION: NOT CHANGED

## 2018-03-14 NOTE — ED PROVIDER NOTES
an episode of visual loss. Patient found to have an elevation in her BNP to 7.5K, hypoglycemic to 68. Chest x-ray shows developing pulmonary edema. Given a dose of Lasix, started on D5. Patient received a CT of her head and a CTA of her head for visual loss. Neurology has been consulted. Patient will be admitted to [facility]. 1530I spoken to neurology, they will see the patient on the floor tomorrow morning. OUTSTANDING TASKS / RECOMMENDATIONS:    1. Follow-up repeat putting care glucose  2. Follow-up radiology read on CTA of the head  3. Monitor patient while in the emergency department     FINAL IMPRESSION:     1. Vision loss    2. Acute on chronic congestive heart failure, unspecified congestive heart failure type ([facility] [address]        DISPOSITION:         DISPOSITION:  []  Discharge   []  Transfer -    [x]  Admission - IM    []  Against Medical Advice   []  Eloped   FOLLOW-UP: No follow-up provider specified.    DISCHARGE MEDICATIONS: New Prescriptions    No medications on file           Meli Neal MD  Emergency Medicine Resident  [facility]       Meli Neal MD  03/14/18 0391

## 2018-03-14 NOTE — ED PROVIDER NOTES
Cardiovascular: Normal rate, regular rhythm and normal heart sounds. Exam reveals no gallop and no friction rub. No murmur heard. Pulmonary/Chest: Effort normal and breath sounds normal. No respiratory distress. She has no wheezes. She has no rales. She exhibits no tenderness. Abdominal: Soft. She exhibits no distension and no mass. There is no tenderness. There is no rebound and no guarding. Neurological: She is alert and oriented to person, place, and time. No cranial nerve deficit. GCS eye subscore is 4. GCS verbal subscore is 5. GCS motor subscore is 6. Pupils are 3mm and reactive, EOMI,   Left sided paraesis UE and LE which is chronic  5/5 motor strength in all right sided extremities with sensation intact in all extremities. Swelling noted to bilateral LE worse on the R   Skin: Skin is warm and dry. No rash noted. Nursing note and vitals reviewed. DIFFERENTIAL  DIAGNOSIS     ?vision loss, abdominal pain with vomiting, patient diaphoretic, and moaning, and appears unwell, and has h/o of left sided deficits from prior stroke. Plan for workup including CTH, and CTA for stroke, symptoms ongoing for 4 days now. No other deficits noted. CP, and cough, chart shows h/o CHF will check cxr, cardiac enzymes, and bnp, additional labs.     PLAN (LABS / IMAGING / EKG):  Orders Placed This Encounter   Procedures    XR CHEST PORTABLE    CTA HEAD W CONTRAST    CT HEAD WO CONTRAST    CBC    BASIC METABOLIC PANEL    LIPASE    HEPATIC FUNCTION PANEL    Brain Natriuretic Peptide    Lactic Acid, Whole Blood    Cardiac output monitoring    Vital signs    Inpatient consult to Hospitalist    Inpatient consult to Neurology    POCT troponin    POCT troponin    POCT Glucose    EKG 12 Lead    Insert peripheral IV       MEDICATIONS ORDERED:  Orders Placed This Encounter   Medications    0.9 % sodium chloride bolus    dextrose 50 % solution 12.5 g    iopamidol (ISOVUE-370) 76 % injection 75 mL    Result Value Ref Range    Ventricular Rate 86 BPM    Atrial Rate 86 BPM    P-R Interval 204 ms    QRS Duration 78 ms    Q-T Interval 400 ms    QTc Calculation (Bazett) 478 ms    P Axis 53 degrees    R Axis -8 degrees    T Axis 84 degrees       IMPRESSION: Nausea/vomiting    RADIOLOGY:  Ct Head Wo Contrast    Result Date: 3/14/2018  EXAMINATION: CT OF THE HEAD WITHOUT CONTRAST  3/14/2018 2:26 pm TECHNIQUE: CT of the head was performed without the administration of intravenous contrast. Dose modulation, iterative reconstruction, and/or weight based adjustment of the mA/kV was utilized to reduce the radiation dose to as low as reasonably achievable. COMPARISON: 12/03/2017 HISTORY: ORDERING SYSTEM PROVIDED HISTORY: vision changes TECHNOLOGIST PROVIDED HISTORY: Has a \"code stroke\" or \"stroke alert\" been called? ->No FINDINGS: BRAIN/VENTRICLES: There is no acute intracranial hemorrhage, mass effect or midline shift. No abnormal extra-axial fluid collection. The gray-white differentiation is maintained without evidence of an acute infarct. There is no evidence of hydrocephalus. Deep white matter and periventricular hypoattenuation probably on the basis of microvascular ischemia. ORBITS: The visualized portion of the orbits demonstrate no acute abnormality. SINUSES: Mucoperiosteal thickening involving the maxillary sinuses. Mucosal opacification in the sphenoid sinus with air-fluid level also identified. SOFT TISSUES/SKULL:  No acute abnormality of the visualized skull or soft tissues. Atherosclerosis. 1. Chronic microvascular ischemic changes however no acute intracranial abnormality is appreciated. 2. Acute sinusitis suspected. Findings are new compared to 12/03/2017.      Xr Chest Portable    Result Date: 3/14/2018  EXAMINATION: SINGLE VIEW OF THE CHEST 3/14/2018 11:57 am COMPARISON: January 22, 2018 HISTORY: ORDERING SYSTEM PROVIDED HISTORY: chest pain TECHNOLOGIST PROVIDED HISTORY: Reason for exam:->chest

## 2018-03-14 NOTE — ED NOTES
Pt linens changed.  Pt up to bedside commode per request. Pt tolerated well      Hina Davies RN  03/14/18 4021

## 2018-03-15 ENCOUNTER — APPOINTMENT (OUTPATIENT)
Dept: CT IMAGING | Age: 63
DRG: 292 | End: 2018-03-15
Payer: MEDICARE

## 2018-03-15 ENCOUNTER — APPOINTMENT (OUTPATIENT)
Dept: MRI IMAGING | Age: 63
DRG: 292 | End: 2018-03-15
Payer: MEDICARE

## 2018-03-15 ENCOUNTER — CARE COORDINATION (OUTPATIENT)
Dept: CARE COORDINATION | Age: 63
End: 2018-03-15

## 2018-03-15 ENCOUNTER — APPOINTMENT (OUTPATIENT)
Dept: GENERAL RADIOLOGY | Age: 63
DRG: 292 | End: 2018-03-15
Payer: MEDICARE

## 2018-03-15 PROBLEM — J01.90 ACUTE SINUSITIS: Status: ACTIVE | Noted: 2018-03-15

## 2018-03-15 LAB
ANION GAP SERPL CALCULATED.3IONS-SCNC: 15 MMOL/L (ref 9–17)
BUN BLDV-MCNC: 25 MG/DL (ref 8–23)
BUN/CREAT BLD: ABNORMAL (ref 9–20)
CALCIUM SERPL-MCNC: 8 MG/DL (ref 8.6–10.4)
CHLORIDE BLD-SCNC: 98 MMOL/L (ref 98–107)
CHOLESTEROL/HDL RATIO: 3.3
CHOLESTEROL: 92 MG/DL
CO2: 21 MMOL/L (ref 20–31)
CREAT SERPL-MCNC: 1.01 MG/DL (ref 0.5–0.9)
GFR AFRICAN AMERICAN: >60 ML/MIN
GFR NON-AFRICAN AMERICAN: 56 ML/MIN
GFR SERPL CREATININE-BSD FRML MDRD: ABNORMAL ML/MIN/{1.73_M2}
GFR SERPL CREATININE-BSD FRML MDRD: ABNORMAL ML/MIN/{1.73_M2}
GLUCOSE BLD-MCNC: 125 MG/DL (ref 65–105)
GLUCOSE BLD-MCNC: 132 MG/DL (ref 65–105)
GLUCOSE BLD-MCNC: 85 MG/DL (ref 65–105)
GLUCOSE BLD-MCNC: 91 MG/DL (ref 70–99)
HCT VFR BLD CALC: 27.9 % (ref 36.3–47.1)
HDLC SERPL-MCNC: 28 MG/DL
HEMOGLOBIN: 8.5 G/DL (ref 11.9–15.1)
LACTIC ACID, WHOLE BLOOD: 2.1 MMOL/L (ref 0.7–2.1)
LDL CHOLESTEROL: 45 MG/DL (ref 0–130)
MCH RBC QN AUTO: 23.5 PG (ref 25.2–33.5)
MCHC RBC AUTO-ENTMCNC: 30.5 G/DL (ref 28.4–34.8)
MCV RBC AUTO: 77.1 FL (ref 82.6–102.9)
NRBC AUTOMATED: 0 PER 100 WBC
PDW BLD-RTO: 17.2 % (ref 11.8–14.4)
PLATELET # BLD: 289 K/UL (ref 138–453)
PMV BLD AUTO: 10.3 FL (ref 8.1–13.5)
POTASSIUM SERPL-SCNC: 4.5 MMOL/L (ref 3.7–5.3)
RBC # BLD: 3.62 M/UL (ref 3.95–5.11)
SODIUM BLD-SCNC: 134 MMOL/L (ref 135–144)
TRIGL SERPL-MCNC: 96 MG/DL
VLDLC SERPL CALC-MCNC: ABNORMAL MG/DL (ref 1–30)
WBC # BLD: 7.7 K/UL (ref 3.5–11.3)

## 2018-03-15 PROCEDURE — 99222 1ST HOSP IP/OBS MODERATE 55: CPT | Performed by: INTERNAL MEDICINE

## 2018-03-15 PROCEDURE — 74018 RADEX ABDOMEN 1 VIEW: CPT

## 2018-03-15 PROCEDURE — 70498 CT ANGIOGRAPHY NECK: CPT

## 2018-03-15 PROCEDURE — 82947 ASSAY GLUCOSE BLOOD QUANT: CPT

## 2018-03-15 PROCEDURE — 71046 X-RAY EXAM CHEST 2 VIEWS: CPT

## 2018-03-15 PROCEDURE — 80061 LIPID PANEL: CPT

## 2018-03-15 PROCEDURE — 94640 AIRWAY INHALATION TREATMENT: CPT

## 2018-03-15 PROCEDURE — 83605 ASSAY OF LACTIC ACID: CPT

## 2018-03-15 PROCEDURE — 94762 N-INVAS EAR/PLS OXIMTRY CONT: CPT

## 2018-03-15 PROCEDURE — 85027 COMPLETE CBC AUTOMATED: CPT

## 2018-03-15 PROCEDURE — 99222 1ST HOSP IP/OBS MODERATE 55: CPT | Performed by: PSYCHIATRY & NEUROLOGY

## 2018-03-15 PROCEDURE — 6370000000 HC RX 637 (ALT 250 FOR IP): Performed by: INTERNAL MEDICINE

## 2018-03-15 PROCEDURE — 70551 MRI BRAIN STEM W/O DYE: CPT

## 2018-03-15 PROCEDURE — 2580000003 HC RX 258: Performed by: INTERNAL MEDICINE

## 2018-03-15 PROCEDURE — 6360000002 HC RX W HCPCS: Performed by: INTERNAL MEDICINE

## 2018-03-15 PROCEDURE — 6360000004 HC RX CONTRAST MEDICATION: Performed by: PSYCHIATRY & NEUROLOGY

## 2018-03-15 PROCEDURE — 1200000000 HC SEMI PRIVATE

## 2018-03-15 PROCEDURE — 80048 BASIC METABOLIC PNL TOTAL CA: CPT

## 2018-03-15 PROCEDURE — 36415 COLL VENOUS BLD VENIPUNCTURE: CPT

## 2018-03-15 RX ORDER — DEXTROSE MONOHYDRATE 25 G/50ML
12.5 INJECTION, SOLUTION INTRAVENOUS PRN
Status: DISCONTINUED | OUTPATIENT
Start: 2018-03-15 | End: 2018-03-17 | Stop reason: HOSPADM

## 2018-03-15 RX ORDER — NICOTINE POLACRILEX 4 MG
15 LOZENGE BUCCAL PRN
Status: DISCONTINUED | OUTPATIENT
Start: 2018-03-15 | End: 2018-03-17 | Stop reason: HOSPADM

## 2018-03-15 RX ORDER — ALBUTEROL SULFATE 90 UG/1
2 AEROSOL, METERED RESPIRATORY (INHALATION) EVERY 4 HOURS PRN
Status: DISCONTINUED | OUTPATIENT
Start: 2018-03-15 | End: 2018-03-17 | Stop reason: HOSPADM

## 2018-03-15 RX ORDER — IPRATROPIUM BROMIDE AND ALBUTEROL SULFATE 2.5; .5 MG/3ML; MG/3ML
1 SOLUTION RESPIRATORY (INHALATION) 4 TIMES DAILY
Status: DISCONTINUED | OUTPATIENT
Start: 2018-03-15 | End: 2018-03-17 | Stop reason: HOSPADM

## 2018-03-15 RX ORDER — DOXYCYCLINE HYCLATE 100 MG
100 TABLET ORAL EVERY 12 HOURS SCHEDULED
Status: DISCONTINUED | OUTPATIENT
Start: 2018-03-15 | End: 2018-03-17 | Stop reason: HOSPADM

## 2018-03-15 RX ORDER — DEXTROSE MONOHYDRATE 50 MG/ML
100 INJECTION, SOLUTION INTRAVENOUS PRN
Status: DISCONTINUED | OUTPATIENT
Start: 2018-03-15 | End: 2018-03-17 | Stop reason: HOSPADM

## 2018-03-15 RX ADMIN — FUROSEMIDE 40 MG: 40 TABLET ORAL at 21:04

## 2018-03-15 RX ADMIN — Medication 10 ML: at 20:45

## 2018-03-15 RX ADMIN — ATORVASTATIN CALCIUM 80 MG: 80 TABLET, FILM COATED ORAL at 21:05

## 2018-03-15 RX ADMIN — IOPAMIDOL 90 ML: 755 INJECTION, SOLUTION INTRAVENOUS at 13:18

## 2018-03-15 RX ADMIN — Medication 10 ML: at 12:32

## 2018-03-15 RX ADMIN — OXCARBAZEPINE 300 MG: 300 TABLET, FILM COATED ORAL at 21:04

## 2018-03-15 RX ADMIN — INSULIN GLARGINE 30 UNITS: 100 INJECTION, SOLUTION SUBCUTANEOUS at 21:04

## 2018-03-15 RX ADMIN — METOPROLOL TARTRATE 25 MG: 25 TABLET ORAL at 21:04

## 2018-03-15 RX ADMIN — DOXYCYCLINE HYCLATE 100 MG: 100 TABLET, COATED ORAL at 21:05

## 2018-03-15 RX ADMIN — IPRATROPIUM BROMIDE AND ALBUTEROL SULFATE 1 AMPULE: .5; 3 SOLUTION RESPIRATORY (INHALATION) at 19:52

## 2018-03-15 RX ADMIN — ASPIRIN 81 MG: 81 TABLET, CHEWABLE ORAL at 10:03

## 2018-03-15 RX ADMIN — CLOPIDOGREL 75 MG: 75 TABLET, FILM COATED ORAL at 10:03

## 2018-03-15 RX ADMIN — OLANZAPINE 10 MG: 10 TABLET, FILM COATED ORAL at 21:04

## 2018-03-15 RX ADMIN — LURASIDONE HYDROCHLORIDE 60 MG: 60 TABLET, FILM COATED ORAL at 10:02

## 2018-03-15 RX ADMIN — COLCHICINE 0.6 MG: 0.6 TABLET, FILM COATED ORAL at 10:03

## 2018-03-15 RX ADMIN — OXCARBAZEPINE 300 MG: 300 TABLET, FILM COATED ORAL at 10:02

## 2018-03-15 RX ADMIN — FAMOTIDINE 20 MG: 20 TABLET, FILM COATED ORAL at 10:02

## 2018-03-15 RX ADMIN — ENOXAPARIN SODIUM 40 MG: 40 INJECTION SUBCUTANEOUS at 10:02

## 2018-03-15 ASSESSMENT — PAIN SCALES - GENERAL
PAINLEVEL_OUTOF10: 0
PAINLEVEL_OUTOF10: 2
PAINLEVEL_OUTOF10: 0

## 2018-03-15 NOTE — H&P
[facility] [employer]    HISTORY AND PHYSICAL EXAMINATION            Date:   3/15/2018  Patient name:  Ary Tiwari  Date of admission:  3/14/2018 11:37 AM  MRN:   [id number]  Account:  [de-identified]  YOB: 1955  PCP:    Christian Tinajero MD  Room:   2020/2020-01  Code Status:    Full Code    Chief Complaint:     Chief Complaint   Patient presents with    Chest Pain    Nausea    Emesis    Diarrhea    Visual Problems     pt states she can't see shapes, but she can see lights       History Obtained From:     patient, electronic medical record    History of Present Illness: The patient is a 65 y.o. Non-/non  female who presents with Chest Pain; Nausea; Emesis; Diarrhea; and Visual Problems (pt states she can't see shapes, but she can see lights)   and she is admitted to the hospital for the management of Acute CHF Exacerbation. She has the following significant co-morbidities: Combined systolic and diastolic congestive heart failure, HTN, DM2, ICM s/p AICD placement, COPD, H/O CVA with residual Left-sided weakness, Schizophrenia, [place] Disorder. She presents with nausea, vomiting, and shortness of breath. She also c/o loss of vision for the past 4 days. In ED, CT Head was done, which was normal. Due to her SOB, CTA also done, which was negative for PE. ProBNP was elevated and she was given Iv lasix and admitted for acute CHF Exacerbation. She was also found to have an elevated lactic acid. No leukocytosis. Also reported to have low BG.      Past Medical History:     Past Medical History:   Diagnosis Date    Arthritis     Asthma     CAD (coronary artery disease)     [facility]congest[facility] ([facility]     Clinical trial participant at discharge 03/13/2017    [employer] PSR completed due to device removal on 12/14/2017    COPD (chronic obstructive pulmonary disease) ([facility] [address]     Diabetes mellitus ([facility] [address]     25.2 - 33.5 pg    MCHC 30.5 28.4 - 34.8 g/dL    RDW 17.2 (H) 11.8 - 14.4 %    Platelets 808 044 - 945 k/uL    MPV 10.3 8.1 - 13.5 fL    NRBC Automated 0.0 0.0 per 100 WBC   POC Glucose Fingerstick    Collection Time: 03/15/18  8:31 AM   Result Value Ref Range    POC Glucose 85 65 - 105 mg/dL   POC Glucose Fingerstick    Collection Time: 03/15/18 11:40 AM   Result Value Ref Range    POC Glucose 125 (H) 65 - 105 mg/dL       Imaging/Diagnostics:    SINGLE VIEW OF THE CHEST   3/14/2018 11:57 am  Impression Shallow lung inflation. Perihilar and lower lung field opacities are noted, which may in part be due to soft tissue overlap and atelectasis. Developing edema should be considered in the appropriate clinical setting. CT OF THE HEAD WITHOUT CONTRAST  3/14/2018 2:26 pm    Impression 1. Chronic microvascular ischemic changes however no acute intracranial abnormality is appreciated. 2. Acute sinusitis suspected. Findings are new compared to 12/03/2017. CTA OF THE HEAD WITH CONTRAST  3/14/2018 2:44 pm:    Impression Moderate stenosis in cervical segments of internal carotid arteries bilaterally. Recommend CTA neck. Moderate stenosis in cavernous segment of right ICA, unchanged. Moderate stenosis in M1 segment of right middle cerebral artery. This appears more conspicuous. Severe stenosis in distal right vertebral artery and occlusion or near occlusion of distal left vertebral artery, unchanged. Recommend CTA neck. TWO VIEWS OF THE CHEST   3/15/2018 9:07 am  Impression Partial clearing at the lung bases. CTA OF THE NECK 3/15/2018 1:31 pm  Impression There is mild, less than 25%, short-segment narrowing of the proximal internal carotid arteries bilaterally, right slightly greater than the left. Otherwise no focal significant narrowing noted.     Assessment :      Primary Problem  Acute combined systolic and diastolic (congestive) hrt fail [facility])    Active Hospital Problems    Diagnosis Date Noted  S/P implantation of automatic cardioverter/defibrillator (AICD) 3/13/1- Dr. 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 IV lasix BID. Held this AM due to low BP. Strict I/Os. Salt/fluid restricition. CXR shows interval improvement. Dizziness, lightheadedness, likely combination of hypotension and hypoglycemia. Neurology on board. MRI ordered. Acute Sinusitis. Start Doxy (not augmentin 2/2 penicillin allergy)    HTN. Hold home meds. 2/2 hypotension. DM2. Hold lantus for now. SSI only. Hypoglycemia protocol in place. COPD. Nebs, inhalers, O2 as needed. Prior CVA with residual left-sided weakness    PT/OT    DVT PPx. Consultations:   IP CONSULT TO HOSPITALIST  IP CONSULT TO NEUROLOGY    Patient is admitted as inpatient status because of co-morbidities listed above, severity of signs and symptoms as outlined, requirement for current medical therapies and most importantly because of direct risk to patient if care not provided in a hospital setting.     Josafat Wan MD  3/15/2018  3:02 PM    Copy sent to Dr. Radhika Keller MD

## 2018-03-15 NOTE — CARE COORDINATION
Bev Behret's note reviewed called APS  Forest Harper to check on status of guardianship. [facility]   14:54 received call from bev behret and anahi stanlye. Forest Harper will plan to visit patient in hospital said patient is on probation for 911 abuse and part of her agreement to comply with APS, said there is a planned court hearing for guardianship next week when a family member will be named as her guardian, she has been found incompetent. Met with jessie today to discuss discharge plan said she will go to her boyfriends house [address]  [facility] And refusing placement at snf. Said she would be agreeable to home health aide and she has a AOA , called aoa and left vm.     15:37 recieved call from herson prajapati from [facility] [address] [address] said jessie  is Jenniffer Schlatter denton [phone] called number and left  requesting nursing and hha services   16:00  Forest Harper visiting met with patient, patient  Agreeable to snf referral to kelsi  16:25 received call from aoa  Jenniffer Schlatter denton said she had fired her hha, she feels Thomas Cardenas is a good plan and would like dc paperwork faxed to her [phone].

## 2018-03-15 NOTE — PROGRESS NOTES
[facility] [address]  Occupational Therapy Not Seen Note    Patient not available for Occupational Therapy due to:    [] Testing:    [] Hemodialysis    [] Blood Transfusion in Progress    [x]Refusal by Patient: Pt states she had a long day and wants to rest. Will check back tomorrow    [] Surgery/Procedure:    [] Strict Bedrest    [] Sedation    [] Spine Precautions     [] Pt being transferred to palliative care at this time. Spoke with pt/family and OT services to be defered. [] Pt independent with functional mobility and functional tasks.  Pt with no OT acute care needs at this time, will defer OT eval.    [] Other    Next Scheduled Treatment: Re-check 3/16/2018    Signature: SAMMY Rodriguez/ZEFERINO

## 2018-03-15 NOTE — CONSULTS
mouth 2 times daily 60 tablet 3    acetaminophen (TYLENOL) 325 MG tablet Take 2 tablets by mouth every 6 hours as needed for Pain 60 tablet 0    spironolactone (ALDACTONE) 25 MG tablet Take 1 tablet by mouth daily 30 tablet 6    furosemide (LASIX) 20 MG tablet Take 2 tablets by mouth 2 times daily 30 tablet 3    aspirin 81 MG chewable tablet Take 1 tablet by mouth daily 30 tablet 3    ipratropium-albuterol (DUONEB) 0.5-2.5 (3) MG/3ML SOLN nebulizer solution Inhale 3 mLs into the lungs every 4 hours as needed for Shortness of Breath 360 mL 2    nitroGLYCERIN (NITROSTAT) 0.4 MG SL tablet Place 1 tablet under the tongue every 5 minutes as needed for Chest pain up to max of 3 total doses. If no relief after 1 dose, call 911. 25 tablet 3    OXcarbazepine (TRILEPTAL) 300 MG tablet Take 1 tablet by mouth 2 times daily Per pharmacy she is to be taking 300 mg twice daily but patient states that she only takes once daily. 60 tablet 3    insulin glargine (LANTUS) 100 UNIT/ML injection vial Inject 30 Units into the skin nightly 1 vial 3    atorvastatin (LIPITOR) 80 MG tablet Take 1 tablet by mouth nightly 30 tablet 3    lisinopril (PRINIVIL;ZESTRIL) 5 MG tablet Take 1 tablet by mouth daily 30 tablet 3    OLANZapine (ZYPREXA) 10 MG tablet Take 1 tablet by mouth nightly 30 tablet 3    colchicine (COLCRYS) 0.6 MG tablet Take 1 tablet by mouth daily 30 tablet 3    clopidogrel (PLAVIX) 75 MG tablet Take 1 tablet by mouth daily 30 tablet 3    famotidine (PEPCID) 20 MG tablet Take 1 tablet by mouth daily 60 tablet 3     Allergies: Aubree Velasquez is allergic to levofloxacin and pcn [penicillins].     Past Medical History:   Diagnosis Date    Arthritis     Asthma     CAD (coronary artery disease)     [facility]estive [facility] failure[facility]     Clinical trial participant at discharge 03/13/2017    [employer] PSR completed due to device removal on 12/14/2017    COPD (chronic obstructive pulmonary disease) ([facility] [address]     Diabetes pending. EKG (3/14/18): NSR            Impression and Plan: Ms. Yoshi Cool is a 65 y.o. female with   Three-four month hx of  Intermittent vertigo with  Blurred vision ; abnormal CTA head with  Bilateral vertebral arterial disease ; Will get CTA neck now and also MRI brain (wo)  if pacemaker is compatible. Also to continue ASA, Plavix and Lipitor for stroke prevention. 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 Will follow with you. Thank you for consultation.

## 2018-03-16 LAB
ABSOLUTE EOS #: 0.21 K/UL (ref 0–0.44)
ABSOLUTE IMMATURE GRANULOCYTE: <0.03 K/UL (ref 0–0.3)
ABSOLUTE LYMPH #: 2.25 K/UL (ref 1.1–3.7)
ABSOLUTE MONO #: 0.8 K/UL (ref 0.1–1.2)
ANION GAP SERPL CALCULATED.3IONS-SCNC: 16 MMOL/L (ref 9–17)
BASOPHILS # BLD: 0 % (ref 0–2)
BASOPHILS ABSOLUTE: <0.03 K/UL (ref 0–0.2)
BUN BLDV-MCNC: 22 MG/DL (ref 8–23)
BUN/CREAT BLD: ABNORMAL (ref 9–20)
CALCIUM SERPL-MCNC: 7.9 MG/DL (ref 8.6–10.4)
CHLORIDE BLD-SCNC: 101 MMOL/L (ref 98–107)
CO2: 20 MMOL/L (ref 20–31)
CREAT SERPL-MCNC: 0.97 MG/DL (ref 0.5–0.9)
DIFFERENTIAL TYPE: ABNORMAL
EOSINOPHILS RELATIVE PERCENT: 3 % (ref 1–4)
GFR AFRICAN AMERICAN: >60 ML/MIN
GFR NON-AFRICAN AMERICAN: 58 ML/MIN
GFR SERPL CREATININE-BSD FRML MDRD: ABNORMAL ML/MIN/{1.73_M2}
GFR SERPL CREATININE-BSD FRML MDRD: ABNORMAL ML/MIN/{1.73_M2}
GLUCOSE BLD-MCNC: 108 MG/DL (ref 65–105)
GLUCOSE BLD-MCNC: 156 MG/DL (ref 65–105)
GLUCOSE BLD-MCNC: 76 MG/DL (ref 65–105)
GLUCOSE BLD-MCNC: 98 MG/DL (ref 70–99)
HCT VFR BLD CALC: 31.9 % (ref 36.3–47.1)
HEMOGLOBIN: 9.5 G/DL (ref 11.9–15.1)
IMMATURE GRANULOCYTES: 0 %
LYMPHOCYTES # BLD: 28 % (ref 24–43)
MCH RBC QN AUTO: 23.2 PG (ref 25.2–33.5)
MCHC RBC AUTO-ENTMCNC: 29.8 G/DL (ref 28.4–34.8)
MCV RBC AUTO: 77.8 FL (ref 82.6–102.9)
MONOCYTES # BLD: 10 % (ref 3–12)
NRBC AUTOMATED: 0 PER 100 WBC
PDW BLD-RTO: 17.3 % (ref 11.8–14.4)
PLATELET # BLD: 319 K/UL (ref 138–453)
PLATELET ESTIMATE: ABNORMAL
PMV BLD AUTO: 10 FL (ref 8.1–13.5)
POTASSIUM SERPL-SCNC: 4.3 MMOL/L (ref 3.7–5.3)
RBC # BLD: 4.1 M/UL (ref 3.95–5.11)
RBC # BLD: ABNORMAL 10*6/UL
SEG NEUTROPHILS: 59 % (ref 36–65)
SEGMENTED NEUTROPHILS ABSOLUTE COUNT: 4.63 K/UL (ref 1.5–8.1)
SODIUM BLD-SCNC: 137 MMOL/L (ref 135–144)
WBC # BLD: 7.9 K/UL (ref 3.5–11.3)
WBC # BLD: ABNORMAL 10*3/UL

## 2018-03-16 PROCEDURE — 6370000000 HC RX 637 (ALT 250 FOR IP): Performed by: INTERNAL MEDICINE

## 2018-03-16 PROCEDURE — 80048 BASIC METABOLIC PNL TOTAL CA: CPT

## 2018-03-16 PROCEDURE — 6360000002 HC RX W HCPCS

## 2018-03-16 PROCEDURE — 94762 N-INVAS EAR/PLS OXIMTRY CONT: CPT

## 2018-03-16 PROCEDURE — 36415 COLL VENOUS BLD VENIPUNCTURE: CPT

## 2018-03-16 PROCEDURE — 1200000000 HC SEMI PRIVATE

## 2018-03-16 PROCEDURE — 94640 AIRWAY INHALATION TREATMENT: CPT

## 2018-03-16 PROCEDURE — 6370000000 HC RX 637 (ALT 250 FOR IP): Performed by: NURSE PRACTITIONER

## 2018-03-16 PROCEDURE — G8979 MOBILITY GOAL STATUS: HCPCS

## 2018-03-16 PROCEDURE — 99232 SBSQ HOSP IP/OBS MODERATE 35: CPT | Performed by: INTERNAL MEDICINE

## 2018-03-16 PROCEDURE — 6360000002 HC RX W HCPCS: Performed by: INTERNAL MEDICINE

## 2018-03-16 PROCEDURE — 97530 THERAPEUTIC ACTIVITIES: CPT

## 2018-03-16 PROCEDURE — 85025 COMPLETE CBC W/AUTO DIFF WBC: CPT

## 2018-03-16 PROCEDURE — 2580000003 HC RX 258: Performed by: INTERNAL MEDICINE

## 2018-03-16 PROCEDURE — 97162 PT EVAL MOD COMPLEX 30 MIN: CPT

## 2018-03-16 PROCEDURE — 82947 ASSAY GLUCOSE BLOOD QUANT: CPT

## 2018-03-16 PROCEDURE — G8978 MOBILITY CURRENT STATUS: HCPCS

## 2018-03-16 PROCEDURE — 99232 SBSQ HOSP IP/OBS MODERATE 35: CPT | Performed by: NURSE PRACTITIONER

## 2018-03-16 RX ORDER — ALBUTEROL SULFATE 2.5 MG/3ML
SOLUTION RESPIRATORY (INHALATION)
Status: COMPLETED
Start: 2018-03-16 | End: 2018-03-16

## 2018-03-16 RX ORDER — ALBUTEROL SULFATE 2.5 MG/3ML
2.5 SOLUTION RESPIRATORY (INHALATION) EVERY 6 HOURS PRN
Status: DISCONTINUED | OUTPATIENT
Start: 2018-03-16 | End: 2018-03-17 | Stop reason: HOSPADM

## 2018-03-16 RX ORDER — MECLIZINE HCL 12.5 MG/1
25 TABLET ORAL 3 TIMES DAILY
Status: DISCONTINUED | OUTPATIENT
Start: 2018-03-16 | End: 2018-03-17 | Stop reason: HOSPADM

## 2018-03-16 RX ORDER — ASPIRIN 81 MG/1
325 TABLET, CHEWABLE ORAL DAILY
Status: DISCONTINUED | OUTPATIENT
Start: 2018-03-17 | End: 2018-03-17 | Stop reason: HOSPADM

## 2018-03-16 RX ADMIN — INSULIN GLARGINE 30 UNITS: 100 INJECTION, SOLUTION SUBCUTANEOUS at 23:08

## 2018-03-16 RX ADMIN — OLANZAPINE 10 MG: 10 TABLET, FILM COATED ORAL at 23:17

## 2018-03-16 RX ADMIN — OXCARBAZEPINE 300 MG: 300 TABLET, FILM COATED ORAL at 23:15

## 2018-03-16 RX ADMIN — IPRATROPIUM BROMIDE AND ALBUTEROL SULFATE 1 AMPULE: .5; 3 SOLUTION RESPIRATORY (INHALATION) at 15:24

## 2018-03-16 RX ADMIN — SPIRONOLACTONE 25 MG: 25 TABLET ORAL at 09:22

## 2018-03-16 RX ADMIN — ENOXAPARIN SODIUM 40 MG: 40 INJECTION SUBCUTANEOUS at 09:22

## 2018-03-16 RX ADMIN — ATORVASTATIN CALCIUM 80 MG: 80 TABLET, FILM COATED ORAL at 23:15

## 2018-03-16 RX ADMIN — Medication 10 ML: at 09:23

## 2018-03-16 RX ADMIN — METOPROLOL TARTRATE 25 MG: 25 TABLET ORAL at 09:22

## 2018-03-16 RX ADMIN — Medication 10 ML: at 23:16

## 2018-03-16 RX ADMIN — OXCARBAZEPINE 300 MG: 300 TABLET, FILM COATED ORAL at 09:22

## 2018-03-16 RX ADMIN — MECLIZINE HCL 25 MG: 12.5 TABLET ORAL at 23:16

## 2018-03-16 RX ADMIN — CLOPIDOGREL 75 MG: 75 TABLET, FILM COATED ORAL at 09:22

## 2018-03-16 RX ADMIN — COLCHICINE 0.6 MG: 0.6 TABLET, FILM COATED ORAL at 09:25

## 2018-03-16 RX ADMIN — FUROSEMIDE 40 MG: 40 TABLET ORAL at 09:21

## 2018-03-16 RX ADMIN — ALBUTEROL SULFATE 2.5 MG: 2.5 SOLUTION RESPIRATORY (INHALATION) at 02:53

## 2018-03-16 RX ADMIN — ASPIRIN 81 MG: 81 TABLET, CHEWABLE ORAL at 09:22

## 2018-03-16 RX ADMIN — DOXYCYCLINE HYCLATE 100 MG: 100 TABLET, COATED ORAL at 09:21

## 2018-03-16 RX ADMIN — LISINOPRIL 5 MG: 5 TABLET ORAL at 09:22

## 2018-03-16 RX ADMIN — DOXYCYCLINE HYCLATE 100 MG: 100 TABLET, COATED ORAL at 23:15

## 2018-03-16 RX ADMIN — FAMOTIDINE 20 MG: 20 TABLET, FILM COATED ORAL at 09:21

## 2018-03-16 RX ADMIN — METOPROLOL TARTRATE 25 MG: 25 TABLET ORAL at 23:16

## 2018-03-16 RX ADMIN — LURASIDONE HYDROCHLORIDE 60 MG: 60 TABLET, FILM COATED ORAL at 09:23

## 2018-03-16 RX ADMIN — FUROSEMIDE 40 MG: 40 TABLET ORAL at 23:16

## 2018-03-16 ASSESSMENT — PAIN SCALES - GENERAL: PAINLEVEL_OUTOF10: 0

## 2018-03-16 NOTE — CODE DOCUMENTATION
Discharge planning-talked with patient-not wanting to go to [place] [facility] [address] to go home with home care. Attempted to call  [facility]. Chose [facility]-unable to provide needed services. Second choice-[facility]-called [facility]-they will call me back with decision. [facility] unable to provide needed services. [facility] [address] to inform them of patient's decision. Third choice for home [facility].

## 2018-03-16 NOTE — PROGRESS NOTES
Assessment   Body structures, Functions, Activity limitations: Decreased functional mobility ; Decreased strength;Decreased endurance;Decreased balance  Assessment: Pt ambulated 10ft with quad cane and Trudy, very SOB with minimal exertion. Per pt and her caregivers present this her baseline level of ambulation. They also state she is never alone, has 24hr assist at home. Pt safe to return home with 24hr assistance  Prognosis: Good  Decision Making: Medium Complexity  Patient Education: PT POC  REQUIRES PT FOLLOW UP: Yes  Activity Tolerance  Activity Tolerance: Patient Tolerated treatment well         Plan   Plan  Times per week: 5-6x/wk  Current Treatment Recommendations: ROM, Strengthening, Endurance Training, Balance Training, Functional Mobility Training, Transfer Training, Gait Training, IADL Training  Safety Devices  Type of devices: Gait belt, Left in bed, Call light within reach, Nurse notified, Bed alarm in place    G-Code  PT G-Codes  Functional Assessment Tool Used: Am-pac without stairs  Score: CK  Functional Limitation: Mobility: Walking and moving around  Mobility: Walking and Moving Around Current Status (): At least 40 percent but less than 60 percent impaired, limited or restricted  Mobility: Walking and Moving Around Goal Status ():  At least 1 percent but less than 20 percent impaired, limited or restricted  OutComes Score                                           AM-PAC Score     AM-PAC Inpatient Mobility without Stair Climbing Raw Score : 14  AM-PAC Inpatient without Stair Climbing T-Scale Score : 40.85  Mobility Inpatient CMS 0-100% Score: 53.33  Mobility Inpatient without Stair CMS G-Code Modifier : CK       Goals  Short term goals  Time Frame for Short term goals: 14 visits  Short term goal 1: Pt to ambulate 100ft with quad cane and CGA  Short term goal 2: Pt to tolerate at least 30mins of UE/LE exercises in order to icnrease overall strength  Short term goal 3: Pt to perform bed

## 2018-03-16 NOTE — PROGRESS NOTES
Spoke with Dr Analisa Nieves regarding consult.  will see patient at his office Monday march 19th at [address]  Patient denies any vision problems at present

## 2018-03-16 NOTE — PROGRESS NOTES
Physical Therapy  DATE: 3/16/2018    NAME: Stiven Dwyer  MRN: [id number]   : 1955    Patient not seen this date for Physical Therapy due to:  [] Blood transfusion in progress  [] Hemodialysis  []  Patient Declined  [] Spine Precautions   [] Strict Bedrest  [] Surgery/ Procedure  [] Testing      [x] Other- pt just received breakfast tray and wants to eat prior to therapy. Will check back as able. [] PT being discontinued at this time. Patient independent. No further needs. [] PT being discontinued at this time as the patient has been transferred to palliative care. No further needs.     Darren Leal, PT

## 2018-03-16 NOTE — PROGRESS NOTES
Neurology Nurse Practitioner Progress Note      INTERVAL HISTORY: This is a 65 y.o.  female admitted 3/14/2018 for Acute combined systolic and diastolic (congestive) hrt fail ([facility].) [I50.41]. This is a follow-up neurology progress note. The patient was examined and the chart was reviewed. Discussed with the pt & RN. There were no acute events overnight. No new motor, sensory, visual or bulbar symptoms. HPI: Katherine Muller is a 65 y.o. female with H/O HTN, HLD, DM, prior CVA (2014) with residual L hemiplegia, CAD s/p stents, CHF, asthma, Hep C, MRSA, who was admitted 3/14/2018 for Acute combined systolic and diastolic (congestive) hrt fail ([facility] [I50.41], SOB, chest pain, nausea, vomiting & diarrhea. Neurology was consulted as pt was c/o intermittent vertigo & lightheadedness along with blurred vision that lasts few minutes over the past 3-4 months. Pt is not a good historian. On further questioning, she said that she feels that both her & the room spins; couldn't describe any relieving or aggravating factors or any other associated symptoms. She uses a wheel chair & denied any falls out of the wheel chair. She also reported that her \"vision comes & goes\", thinks she is going blind; denied seeing any doctor for that; reported that she goes to sleep & wakes up with good vision? ? Denied any other focal motor, sensory, visual or bulbar symptoms.      ipratropium-albuterol  1 ampule Inhalation 4x daily    doxycycline hyclate  100 mg Oral 2 times per day    aspirin  81 mg Oral Daily    atorvastatin  80 mg Oral Nightly    clopidogrel  75 mg Oral Daily    colchicine  0.6 mg Oral Daily    famotidine  20 mg Oral Daily    furosemide  40 mg Oral BID    insulin glargine  30 Units Subcutaneous Nightly    lisinopril  5 mg Oral Daily    lurasidone  60 mg Oral Daily    metoprolol tartrate  25 mg Oral BID    OLANZapine  10 mg Oral Nightly    OXcarbazepine  300 mg Oral BID    spironolactone  25 mg Oral Daily    symmetrical shoulder shrug                                                 XII - midline tongue without atrophy or fasciculation   Motor function  L side - plegic with increase tone; L wrist contracture; otherwise R side with normal muscle bulk and tone; normal power    Sensory function Grossly intact   Cerebellar No visible involuntary movements or tremors   Reflex function Hyperreflexia - L side; L plantar - extensor response   Gait                  Not tested       DATA      Lab Results   Component Value Date    WBC 7.9 03/16/2018    HGB 9.5 (L) 03/16/2018    HCT 31.9 (L) 03/16/2018     03/16/2018    CHOL 92 03/15/2018    TRIG 96 03/15/2018    HDL 28 (L) 03/15/2018    ALT 42 (H) 03/14/2018    AST 58 (H) 03/14/2018     03/16/2018    K 4.3 03/16/2018     03/16/2018    CREATININE 0.97 (H) 03/16/2018    BUN 22 03/16/2018    CO2 20 03/16/2018    TSH 0.75 01/08/2018    INR 1.1 01/16/2018    LABA1C 6.2 (H) 01/17/2018         CT HEAD (3/14/2018): Chronic microvascular ischemic changes       MRI BRAIN (3/15/2018): Stable mild chronic small vessel ischemic disease. Moderate cerebral atrophy. Old lacune R basal ganglia & R thalamus. CTA HEAD & NECK (3/14/218): Moderate stenosis ICAs' cervical segments. Stable moderate stenosis R ICA's cavernous segment. Moderate stenosis R M1. Severe stenosis R distal vertebral artery; L distal vertebral artery nearly occluded. Proximal ICAs' <20% stenosis, R>L                             IMPRESSION & PLAN: 65 y.o.  female admitted with  Intermittent vertigo & lightheadedness along with blurred vision for 3-4 months; started on Antivert 25 mg TID    Pt reported that her \"vision comes & goes\"? ? feels that she is going blind. CT & MRI brain - negative for acute pathology.  Ophthalmologist consulted     H/O prior CVA (2014) with residual L hemiplegia; CTA head/neck - moderate to severe stenosis intracranial ICAs; continue higher dose of  mg QD, Plavix 75 mg

## 2018-03-16 NOTE — PROGRESS NOTES
BG.     Review of Systems:     Constitutional:  negative for chills, fevers, sweats  Respiratory:  ++shortness of breath  Cardiovascular:  negative for chest pain, chest pressure/discomfort, lower extremity edema, palpitations  Gastrointestinal:  negative for abdominal pain, constipation, diarrhea, nausea, vomiting  Neurological:  ++dizziness    Medications: Allergies: Allergies   Allergen Reactions    Levofloxacin Hives    Pcn [Penicillins] Hives       Current Meds:   Scheduled Meds:    ipratropium-albuterol  1 ampule Inhalation 4x daily    doxycycline hyclate  100 mg Oral 2 times per day    aspirin  81 mg Oral Daily    atorvastatin  80 mg Oral Nightly    clopidogrel  75 mg Oral Daily    colchicine  0.6 mg Oral Daily    famotidine  20 mg Oral Daily    furosemide  40 mg Oral BID    insulin glargine  30 Units Subcutaneous Nightly    lisinopril  5 mg Oral Daily    lurasidone  60 mg Oral Daily    metoprolol tartrate  25 mg Oral BID    OLANZapine  10 mg Oral Nightly    OXcarbazepine  300 mg Oral BID    spironolactone  25 mg Oral Daily    sodium chloride flush  10 mL Intravenous 2 times per day    docusate sodium  100 mg Oral BID    enoxaparin  40 mg Subcutaneous Daily     Continuous Infusions:    dextrose       PRN Meds: albuterol, albuterol sulfate HFA, glucose, dextrose, glucagon (rDNA), dextrose, nitroGLYCERIN, sennosides-docusate sodium, sodium chloride flush, acetaminophen, magnesium hydroxide, bisacodyl, ondansetron, nicotine    Data:     Past Medical History:   has a past medical history of Arthritis; Asthma; CAD (coronary artery disease); CHF (congestive heart failure) ([facility] Clinical trial participant at discharge; COPD (chronic obstructive pulmonary disease) ([facility] Diabetes mellitus ([facility] Hepatitis C; Hyperlipidemia; Hypertension; MRSA (methicillin resistant staph aureus) culture positive; Pneumonia; and Unspecified cerebral artery occlusion with cerebral infarction.     Social

## 2018-03-17 VITALS
TEMPERATURE: 97.5 F | DIASTOLIC BLOOD PRESSURE: 63 MMHG | HEART RATE: 87 BPM | RESPIRATION RATE: 16 BRPM | OXYGEN SATURATION: 100 % | SYSTOLIC BLOOD PRESSURE: 96 MMHG | BODY MASS INDEX: 34.63 KG/M2 | HEIGHT: 61 IN | WEIGHT: 183.4 LBS

## 2018-03-17 PROCEDURE — 6370000000 HC RX 637 (ALT 250 FOR IP): Performed by: INTERNAL MEDICINE

## 2018-03-17 PROCEDURE — 6370000000 HC RX 637 (ALT 250 FOR IP): Performed by: NURSE PRACTITIONER

## 2018-03-17 PROCEDURE — 94640 AIRWAY INHALATION TREATMENT: CPT

## 2018-03-17 PROCEDURE — 99239 HOSP IP/OBS DSCHRG MGMT >30: CPT | Performed by: INTERNAL MEDICINE

## 2018-03-17 PROCEDURE — 2580000003 HC RX 258: Performed by: INTERNAL MEDICINE

## 2018-03-17 PROCEDURE — 94762 N-INVAS EAR/PLS OXIMTRY CONT: CPT

## 2018-03-17 PROCEDURE — 6360000002 HC RX W HCPCS: Performed by: INTERNAL MEDICINE

## 2018-03-17 PROCEDURE — 99232 SBSQ HOSP IP/OBS MODERATE 35: CPT | Performed by: NURSE PRACTITIONER

## 2018-03-17 RX ORDER — DOXYCYCLINE HYCLATE 100 MG
100 TABLET ORAL EVERY 12 HOURS SCHEDULED
Qty: 8 TABLET | Refills: 0 | Status: SHIPPED | OUTPATIENT
Start: 2018-03-17 | End: 2018-03-21

## 2018-03-17 RX ORDER — MECLIZINE HYDROCHLORIDE 25 MG/1
25 TABLET ORAL 3 TIMES DAILY PRN
Qty: 30 TABLET | Refills: 1 | Status: SHIPPED | OUTPATIENT
Start: 2018-03-17 | End: 2018-03-27

## 2018-03-17 RX ADMIN — MECLIZINE HCL 25 MG: 12.5 TABLET ORAL at 16:24

## 2018-03-17 RX ADMIN — DOCUSATE SODIUM 100 MG: 100 TABLET, FILM COATED ORAL at 10:02

## 2018-03-17 RX ADMIN — MECLIZINE HCL 25 MG: 12.5 TABLET ORAL at 09:58

## 2018-03-17 RX ADMIN — ASPIRIN 324 MG: 81 TABLET, CHEWABLE ORAL at 09:59

## 2018-03-17 RX ADMIN — SPIRONOLACTONE 25 MG: 25 TABLET ORAL at 09:47

## 2018-03-17 RX ADMIN — DOXYCYCLINE HYCLATE 100 MG: 100 TABLET, COATED ORAL at 09:46

## 2018-03-17 RX ADMIN — COLCHICINE 0.6 MG: 0.6 TABLET, FILM COATED ORAL at 09:50

## 2018-03-17 RX ADMIN — IPRATROPIUM BROMIDE AND ALBUTEROL SULFATE 1 AMPULE: .5; 3 SOLUTION RESPIRATORY (INHALATION) at 13:08

## 2018-03-17 RX ADMIN — ENOXAPARIN SODIUM 40 MG: 40 INJECTION SUBCUTANEOUS at 10:02

## 2018-03-17 RX ADMIN — Medication 10 ML: at 09:58

## 2018-03-17 RX ADMIN — IPRATROPIUM BROMIDE AND ALBUTEROL SULFATE 1 AMPULE: .5; 3 SOLUTION RESPIRATORY (INHALATION) at 10:06

## 2018-03-17 RX ADMIN — FUROSEMIDE 40 MG: 40 TABLET ORAL at 09:47

## 2018-03-17 RX ADMIN — DOCUSATE SODIUM -SENNOSIDES 1 TABLET: 50; 8.6 TABLET, COATED ORAL at 09:50

## 2018-03-17 RX ADMIN — LURASIDONE HYDROCHLORIDE 60 MG: 60 TABLET, FILM COATED ORAL at 09:50

## 2018-03-17 RX ADMIN — IPRATROPIUM BROMIDE AND ALBUTEROL SULFATE 1 AMPULE: .5; 3 SOLUTION RESPIRATORY (INHALATION) at 17:02

## 2018-03-17 RX ADMIN — OXCARBAZEPINE 300 MG: 300 TABLET, FILM COATED ORAL at 09:47

## 2018-03-17 RX ADMIN — FAMOTIDINE 20 MG: 20 TABLET, FILM COATED ORAL at 09:46

## 2018-03-17 RX ADMIN — CLOPIDOGREL 75 MG: 75 TABLET, FILM COATED ORAL at 09:46

## 2018-03-17 ASSESSMENT — PAIN SCALES - GENERAL: PAINLEVEL_OUTOF10: 10

## 2018-03-17 ASSESSMENT — PAIN DESCRIPTION - PAIN TYPE: TYPE: ACUTE PAIN

## 2018-03-17 NOTE — PROGRESS NOTES
Physical Therapy    DATE: 3/17/2018    NAME: Ary Tiwari  MRN: [id number]   : 1955    Patient not seen this date for Physical Therapy due to:  [] Blood transfusion in progress  [] Hemodialysis  [x]  Patient Declined  [] Spine Precautions   [] Strict Bedrest  [] Surgery/ Procedure  [] Testing      [x] Other: Patient ignoring writer. [] PT being discontinued at this time. Patient independent. No further needs. [] PT being discontinued at this time as the patient has been transferred to palliative care. No further needs.     Tonny Velez, PTA

## 2018-03-17 NOTE — PROGRESS NOTES
Relation Age of Onset    Family history unknown: Yes       Vitals:  /80   Pulse 89   Temp 98.6 °F (37 °C) (Oral)   Resp 16   Ht 5' 1\" (1.549 m)   Wt 183 lb 6.4 oz (83.2 kg)   LMP  (LMP Unknown)   SpO2 100%   BMI 34.65 kg/m²   Temp (24hrs), Av.2 °F (36.8 °C), Min:97.7 °F (36.5 °C), Max:98.6 °F (37 °C)    Recent Labs      03/15/18   2032  18   1100  18   1633  18   2227   POCGLU  132*  108*  76  156*       I/O (24Hr):     Intake/Output Summary (Last 24 hours) at 18 0755  Last data filed at 18 2200   Gross per 24 hour   Intake              830 ml   Output              100 ml   Net              730 ml       Labs:    Hematology:  Recent Labs      18   1224  03/15/18   0632  18   0624   WBC  9.1  7.7  7.9   RBC  3.95  3.62*  4.10   HGB  9.1*  8.5*  9.5*   HCT  33.4*  27.9*  31.9*   MCV  84.6  77.1*  77.8*   MCH  23.0*  23.5*  23.2*   MCHC  27.2*  30.5  29.8   RDW  17.4*  17.2*  17.3*   PLT  297  289  319   MPV  9.9  10.3  10.0     Chemistry:  Recent Labs      18   1140   18   1224  18   1515  18   1811  18   2235  03/15/18   0632  03/15/18   1535  18   0624   NA   --    --   139   --    --    --   134*   --   137   K   --    --   4.3   --    --    --   4.5   --   4.3   CL   --    --   103   --    --    --   98   --   101   CO2   --    --   21   --    --    --   21   --   20   GLUCOSE   --    < >  68*  109   --    --   91   --   98   BUN   --    --   20   --    --    --   25*   --   22   CREATININE   --    --   0.94*   --    --    --   1.01*   --   0.97*   ANIONGAP   --    --   15   --    --    --   15   --   16   LABGLOM   --    --   >60   --    --    --   56*   --   58*   GFRAA   --    --   >60   --    --    --   >60   --   >60   CALCIUM   --    --   8.6   --    --    --   8.0*   --   7.9*   PROBNP   --    --   7,589*   --    --    --    --    --    --    TROPONINI  0.01   --    --    --    --    --    --    --    --

## 2018-03-17 NOTE — CARE COORDINATION
Discharge planning-called Kacy Espino with [address] to inform him of patients discharge. He will collect needed information.

## 2018-03-18 NOTE — DISCHARGE SUMMARY
Man[facility] D[facility]    Discharge Summary     Patient ID: Chase Navarrete  :  1955   MRN: [id number]     ACCOUNT:  [de-identified]   Patient's PCP: Jorje Olivas MD  Admit Date: 3/14/2018   Discharge Date: 3/17/18    Length of Stay: 3  Code Status:  Prior  Admitting Physician: Tracie Smith MD  Discharge Physician: Tracie Smith MD     Active Discharge Diagnoses:     Primary Problem  Acute combined systolic and diastolic (congestive) hrt fail [facility] Problems    Diagnosis Date Noted    S/P implantation of automatic cardioverter/defibrillator (AICD) 3/13/1- Dr. Ciara Mac [Z95.810] 2017     Priority: High    Lightheadedness [R42]     Acute sinusitis [J01.90] 03/15/2018    Vision loss [H54.7]     Acute combined systolic and diastolic (congestive) hrt fail ([address] [I50.41] 2018    COPD (chronic obstructive pulmonary disease) ([address] [J44.9]     Hypoglycemia [E16.2] 2017    Late effects of CVA (cerebrovascular accident) - left hemiparesis [I69.90] 2016    Inability to perform activities of daily living [R53.81]     Diabetes mellitus type 2, controlled ([address] [E11.9] 06/15/2013    Essential hypertension [I10] 06/15/2013       Admission Condition:  fair     Discharged Condition: good    Hospital Stay:     Hospital Course:  Chase Navarrete is a 65 y.o. female who was admitted for the management of   Acute combined systolic and diastolic (congestive) hrt fail ([address] , presented to ER with Chest Pain; Nausea; Emesis; Diarrhea; and Visual Problems (pt states she can't see shapes, but she can see lights)    The patient is a 65 y.o.  Non-/non  female who presents with Chest Pain; Nausea; Emesis;  Diarrhea; and Visual Problems (pt states she can't see shapes, but she can see lights)   and she is admitted to the hospital for the management of Acute CHF Exacerbation.     She has the following significant co-morbidities: Combined systolic and diastolic congestive heart failure, HTN, DM2, ICM s/p AICD placement, COPD, H/O CVA with residual Left-sided weakness, Schizophrenia, [place] Disorder.     She presents with nausea, vomiting, and shortness of breath. She also c/o loss of vision for the past 4 days. In ED, CT Head was done, which was normal. Due to her SOB, CTA also done, which was negative for PE. ProBNP was elevated and she was given Iv lasix and admitted for acute CHF Exacerbation. She was also found to have an elevated lactic acid. No leukocytosis. Also reported to have low BG. Significant therapeutic interventions:     Acute combined systolic/diastolic CHF exacerbation. IV lasix BID. Strict I/Os. Salt/fluid restricition. CXR shows interval improvement.      Dizziness, lightheadedness, likely combination of hypotension and hypoglycemia. However, coupled with patient's intermittent blurring vision and blindness, she will need to see both the vestibular clinic as well as ophthalmology upon discharge.      Acute Sinusitis. Doxy D3/7 (not augmentin 2/2 penicillin allergy)     HTN. Resumed home meds as she is normotensive.      DM2. Resumed home lantus as BG normal.      COPD. Nebs, inhalers, O2 as needed.     Prior CVA with residual left-sided weakness     Tobacco Abuse Disorder.  Counseled on cessation     PT/OT     DVT PPx.     Significant Diagnostic Studies:   Labs / Micro:  CBC:   Lab Results   Component Value Date    WBC 7.9 03/16/2018    RBC 4.10 03/16/2018    HGB 9.5 03/16/2018    HCT 31.9 03/16/2018    MCV 77.8 03/16/2018    MCH 23.2 03/16/2018    MCHC 29.8 03/16/2018    RDW 17.3 03/16/2018     03/16/2018     BMP:    Lab Results   Component Value Date    GLUCOSE 98 03/16/2018     03/16/2018    K 4.3 03/16/2018     03/16/2018    CO2 20 03/16/2018    ANIONGAP 16 03/16/2018    BUN 22 03/16/2018    CREATININE 0.97 03/16/2018    BUNCRER NOT REPORTED 03/16/2018    CALCIUM 7.9 12/03/2017.     CTA OF THE HEAD WITH CONTRAST  3/14/2018 2:44 pm:    Impression Moderate stenosis in cervical segments of internal carotid arteries bilaterally. Recommend CTA neck.   Moderate stenosis in cavernous segment of right ICA, unchanged.   Moderate stenosis in M1 segment of right middle cerebral artery.  This appears more conspicuous.   Severe stenosis in distal right vertebral artery and occlusion or near occlusion of distal left vertebral artery, unchanged.  Recommend CTA neck.     TWO VIEWS OF THE CHEST   3/15/2018 9:07 am  Impression Partial clearing at the lung bases.       CTA OF THE NECK 3/15/2018 1:31 pm  Impression There is mild, less than 25%, short-segment narrowing of the proximal internal carotid arteries bilaterally, right slightly greater than the left.   Otherwise no focal significant narrowing noted. Consultations:    Consults:     Final Specialist Recommendations/Findings:   IP CONSULT TO HOSPITALIST  IP CONSULT TO NEUROLOGY  IP CONSULT TO OPHTHALMOLOGY  IP CONSULT TO HOME CARE NEEDS      The patient was seen and examined on day of discharge and this discharge summary is in conjunction with any daily progress note from day of discharge. Discharge plan:     Disposition: Home with [address] [place] [address]    Physician Follow Up:     Hugh Martin MD  [address]  [phone]    Schedule an appointment as soon as possible for a visit in 1 week      Gianluca Pino MD  [address]  [place] [address]  [phone]    On 3/19/2018  AT 1 PM    [address] [facility]  [address] [place] [id number]  [phone]  Schedule an appointment as soon as possible for a visit in 1 week         Requiring Further Evaluation/Follow Up POST HOSPITALIZATION/Incidental Findings: F/U at vestibular clinic and with ophthalmologist with re to dizziness and intermittent blindness, respectively.      Diet: cardiac diet and diabetic diet    Activity: As tolerated    Instructions to Patient:     1. Follow up at vestibular clinic for dizziness  2. Follow up with ophthalmologist for intermittent blindness  3. Quit smoking.    4. Follow up with PCP    Discharge Medications:      Medication List      START taking these medications    doxycycline hyclate 100 MG tablet  Commonly known as:  VIBRA-TABS  Take 1 tablet by mouth every 12 hours for 4 days     meclizine 25 MG tablet  Commonly known as:  ANTIVERT  Take 1 tablet by mouth 3 times daily as needed (Vertigo)        CONTINUE taking these medications    acetaminophen 325 MG tablet  Commonly known as:  TYLENOL  Take 2 tablets by mouth every 6 hours as needed for Pain     albuterol sulfate  (90 Base) MCG/ACT inhaler     aspirin 81 MG chewable tablet  Take 1 tablet by mouth daily     atorvastatin 80 MG tablet  Commonly known as:  LIPITOR  Take 1 tablet by mouth nightly     clopidogrel 75 MG tablet  Commonly known as:  PLAVIX  Take 1 tablet by mouth daily     colchicine 0.6 MG tablet  Commonly known as:  COLCRYS  Take 1 tablet by mouth daily     ELOCON 0.1 % ointment  Generic drug:  mometasone     famotidine 20 MG tablet  Commonly known as:  PEPCID  Take 1 tablet by mouth daily     furosemide 20 MG tablet  Commonly known as:  LASIX  Take 2 tablets by mouth 2 times daily     insulin glargine 100 UNIT/ML injection vial  Commonly known as:  LANTUS  Inject 30 Units into the skin nightly     ipratropium-albuterol 0.5-2.5 (3) MG/3ML Soln nebulizer solution  Commonly known as:  DUONEB  Inhale 3 mLs into the lungs every 4 hours as needed for Shortness of Breath     lisinopril 5 MG tablet  Commonly known as:  PRINIVIL;ZESTRIL  Take 1 tablet by mouth daily     lurasidone 40 MG Tabs tablet  Commonly known as:  LATUDA     metoprolol tartrate 50 MG tablet  Commonly known as:  LOPRESSOR  Take 0.5 tablets by mouth 2 times daily     nitroGLYCERIN 0.4 MG SL tablet  Commonly known as:  NITROSTAT  Place 1

## 2018-03-22 ENCOUNTER — TELEPHONE (OUTPATIENT)
Dept: FAMILY MEDICINE CLINIC | Age: 63
End: 2018-03-22

## 2018-03-28 ENCOUNTER — APPOINTMENT (OUTPATIENT)
Dept: GENERAL RADIOLOGY | Age: 63
End: 2018-03-28
Payer: MEDICARE

## 2018-03-28 ENCOUNTER — HOSPITAL ENCOUNTER (EMERGENCY)
Age: 63
Discharge: HOME | End: 2018-03-28
Attending: EMERGENCY MEDICINE
Payer: MEDICARE

## 2018-03-28 ENCOUNTER — TELEPHONE (OUTPATIENT)
Dept: FAMILY MEDICINE CLINIC | Age: 63
End: 2018-03-28

## 2018-03-28 VITALS
DIASTOLIC BLOOD PRESSURE: 97 MMHG | TEMPERATURE: 98.1 F | OXYGEN SATURATION: 96 % | SYSTOLIC BLOOD PRESSURE: 148 MMHG | HEART RATE: 81 BPM | RESPIRATION RATE: 17 BRPM
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
## 2018-03-28 DIAGNOSIS — R07.89 OTHER CHEST PAIN: ICD-10-CM

## 2018-03-28 DIAGNOSIS — S60.311A ABRASION OF RIGHT THUMB, INITIAL ENCOUNTER: ICD-10-CM

## 2018-03-28 DIAGNOSIS — S00.411A ABRASION OF RIGHT EAR, INITIAL ENCOUNTER: ICD-10-CM

## 2018-03-28 LAB
ABSOLUTE EOS #: 0.13 K/UL (ref 0–0.44)
ABSOLUTE IMMATURE GRANULOCYTE: 0.03 K/UL (ref 0–0.3)
ABSOLUTE LYMPH #: 2 K/UL (ref 1.1–3.7)
ABSOLUTE MONO #: 0.84 K/UL (ref 0.1–1.2)
ANION GAP SERPL CALCULATED.3IONS-SCNC: 12 MMOL/L (ref 9–17)
BASOPHILS # BLD: 1 % (ref 0–2)
BASOPHILS ABSOLUTE: 0.07 K/UL (ref 0–0.2)
BUN BLDV-MCNC: 22 MG/DL (ref 8–23)
BUN/CREAT BLD: ABNORMAL (ref 9–20)
CALCIUM SERPL-MCNC: 8.8 MG/DL (ref 8.6–10.4)
CHLORIDE BLD-SCNC: 104 MMOL/L (ref 98–107)
CO2: 21 MMOL/L (ref 20–31)
CREAT SERPL-MCNC: 0.75 MG/DL (ref 0.5–0.9)
DIFFERENTIAL TYPE: ABNORMAL
EKG ATRIAL RATE: 108 BPM
EKG P AXIS: 71 DEGREES
EKG P-R INTERVAL: 154 MS
EKG Q-T INTERVAL: 344 MS
EKG QRS DURATION: 82 MS
EKG QTC CALCULATION (BAZETT): 460 MS
EKG R AXIS: -24 DEGREES
EKG T AXIS: 53 DEGREES
EKG VENTRICULAR RATE: 108 BPM
EOSINOPHILS RELATIVE PERCENT: 2 % (ref 1–4)
GFR AFRICAN AMERICAN: >60 ML/MIN
GFR NON-AFRICAN AMERICAN: >60 ML/MIN
GFR SERPL CREATININE-BSD FRML MDRD: ABNORMAL ML/MIN/{1.73_M2}
GFR SERPL CREATININE-BSD FRML MDRD: ABNORMAL ML/MIN/{1.73_M2}
GLUCOSE BLD-MCNC: 132 MG/DL (ref 70–99)
HCT VFR BLD CALC: 29.9 % (ref 36.3–47.1)
HEMOGLOBIN: 8.5 G/DL (ref 11.9–15.1)
IMMATURE GRANULOCYTES: 0 %
LYMPHOCYTES # BLD: 23 % (ref 24–43)
MCH RBC QN AUTO: 22.8 PG (ref 25.2–33.5)
MCHC RBC AUTO-ENTMCNC: 28.4 G/DL (ref 28.4–34.8)
MCV RBC AUTO: 80.2 FL (ref 82.6–102.9)
MONOCYTES # BLD: 10 % (ref 3–12)
NRBC AUTOMATED: 0 PER 100 WBC
PDW BLD-RTO: 17.5 % (ref 11.8–14.4)
PLATELET # BLD: 298 K/UL (ref 138–453)
PLATELET ESTIMATE: ABNORMAL
PMV BLD AUTO: 9.9 FL (ref 8.1–13.5)
POTASSIUM SERPL-SCNC: 5.2 MMOL/L (ref 3.7–5.3)
RBC # BLD: 3.73 M/UL (ref 3.95–5.11)
RBC # BLD: ABNORMAL 10*6/UL
SEG NEUTROPHILS: 64 % (ref 36–65)
SEGMENTED NEUTROPHILS ABSOLUTE COUNT: 5.8 K/UL (ref 1.5–8.1)
SODIUM BLD-SCNC: 137 MMOL/L (ref 135–144)
TROPONIN INTERP: NORMAL
TROPONIN T: <0.03 NG/ML
WBC # BLD: 8.9 K/UL (ref 3.5–11.3)
WBC # BLD: ABNORMAL 10*3/UL

## 2018-03-28 PROCEDURE — 99285 EMERGENCY DEPT VISIT HI MDM: CPT

## 2018-03-28 PROCEDURE — 6370000000 HC RX 637 (ALT 250 FOR IP): Performed by: PHYSICIAN ASSISTANT

## 2018-03-28 PROCEDURE — 90715 TDAP VACCINE 7 YRS/> IM: CPT | Performed by: PHYSICIAN ASSISTANT

## 2018-03-28 PROCEDURE — 96372 THER/PROPH/DIAG INJ SC/IM: CPT

## 2018-03-28 PROCEDURE — 80048 BASIC METABOLIC PNL TOTAL CA: CPT

## 2018-03-28 PROCEDURE — 93005 ELECTROCARDIOGRAM TRACING: CPT

## 2018-03-28 PROCEDURE — 71046 X-RAY EXAM CHEST 2 VIEWS: CPT

## 2018-03-28 PROCEDURE — 84484 ASSAY OF TROPONIN QUANT: CPT

## 2018-03-28 PROCEDURE — 90471 IMMUNIZATION ADMIN: CPT | Performed by: PHYSICIAN ASSISTANT

## 2018-03-28 PROCEDURE — 85025 COMPLETE CBC W/AUTO DIFF WBC: CPT

## 2018-03-28 PROCEDURE — 6360000002 HC RX W HCPCS: Performed by: PHYSICIAN ASSISTANT

## 2018-03-28 RX ORDER — INSULIN GLARGINE 100 [IU]/ML
30 INJECTION, SOLUTION SUBCUTANEOUS ONCE
Status: COMPLETED | OUTPATIENT
Start: 2018-03-28 | End: 2018-03-28

## 2018-03-28 RX ORDER — OLANZAPINE 10 MG/1
10 TABLET ORAL ONCE
Status: COMPLETED | OUTPATIENT
Start: 2018-03-28 | End: 2018-03-28

## 2018-03-28 RX ORDER — ASPIRIN 81 MG/1
324 TABLET, CHEWABLE ORAL ONCE
Status: COMPLETED | OUTPATIENT
Start: 2018-03-28 | End: 2018-03-28

## 2018-03-28 RX ORDER — METOPROLOL TARTRATE 50 MG/1
25 TABLET, FILM COATED ORAL ONCE
Status: COMPLETED | OUTPATIENT
Start: 2018-03-28 | End: 2018-03-28

## 2018-03-28 RX ORDER — ATORVASTATIN CALCIUM 80 MG/1
80 TABLET, FILM COATED ORAL ONCE
Status: COMPLETED | OUTPATIENT
Start: 2018-03-28 | End: 2018-03-28

## 2018-03-28 RX ORDER — OXCARBAZEPINE 300 MG/1
300 TABLET, FILM COATED ORAL ONCE
Status: COMPLETED | OUTPATIENT
Start: 2018-03-28 | End: 2018-03-28

## 2018-03-28 RX ADMIN — TETANUS TOXOID, REDUCED DIPHTHERIA TOXOID AND ACELLULAR PERTUSSIS VACCINE, ADSORBED 0.5 ML: 5; 2.5; 8; 8; 2.5 SUSPENSION INTRAMUSCULAR at 17:39

## 2018-03-28 RX ADMIN — OXCARBAZEPINE 300 MG: 300 TABLET, FILM COATED ORAL at 21:29

## 2018-03-28 RX ADMIN — OLANZAPINE 10 MG: 10 TABLET, FILM COATED ORAL at 21:29

## 2018-03-28 RX ADMIN — ASPIRIN 81 MG 324 MG: 81 TABLET ORAL at 17:39

## 2018-03-28 RX ADMIN — INSULIN GLARGINE 30 UNITS: 100 INJECTION, SOLUTION SUBCUTANEOUS at 21:29

## 2018-03-28 RX ADMIN — ATORVASTATIN CALCIUM 80 MG: 80 TABLET, FILM COATED ORAL at 21:37

## 2018-03-28 RX ADMIN — METOPROLOL TARTRATE 25 MG: 50 TABLET ORAL at 21:36

## 2018-03-28 ASSESSMENT — ENCOUNTER SYMPTOMS
EYES NEGATIVE: 1
SHORTNESS OF BREATH: 1
PHOTOPHOBIA: 0
ALLERGIC/IMMUNOLOGIC NEGATIVE: 1
GASTROINTESTINAL NEGATIVE: 1

## 2018-03-28 ASSESSMENT — PAIN SCALES - GENERAL: PAINLEVEL_OUTOF10: 8

## 2018-03-28 ASSESSMENT — PAIN DESCRIPTION - PAIN TYPE: TYPE: ACUTE PAIN

## 2018-03-28 ASSESSMENT — HEART SCORE: ECG: 1

## 2018-03-28 ASSESSMENT — PAIN DESCRIPTION - ONSET: ONSET: SUDDEN

## 2018-03-28 ASSESSMENT — PAIN DESCRIPTION - PROGRESSION: CLINICAL_PROGRESSION: GRADUALLY WORSENING

## 2018-03-28 ASSESSMENT — PAIN DESCRIPTION - LOCATION: LOCATION: EAR;LEG

## 2018-03-28 ASSESSMENT — PAIN DESCRIPTION - ORIENTATION: ORIENTATION: LEFT

## 2018-04-02 ENCOUNTER — HOSPITAL ENCOUNTER (OUTPATIENT)
Age: 63
Setting detail: SPECIMEN
Discharge: HOME | End: 2018-04-02
Payer: MEDICARE

## 2018-04-02 LAB
% CKMB: 1.5 % (ref 0–3)
ANION GAP SERPL CALCULATED.3IONS-SCNC: 12 MMOL/L (ref 9–17)
BNP INTERPRETATION: ABNORMAL
BUN BLDV-MCNC: 31 MG/DL (ref 8–23)
BUN/CREAT BLD: 23 (ref 9–20)
CALCIUM SERPL-MCNC: 8.2 MG/DL (ref 8.6–10.4)
CARBAMAZEPINE DATE LAST DOSE: ABNORMAL
CARBAMAZEPINE DOSE AMOUNT: ABNORMAL
CARBAMAZEPINE DOSE TIME: ABNORMAL
CARBAMAZEPINE LEVEL: <2.5 UG/ML (ref 4–12)
CHLORIDE BLD-SCNC: 100 MMOL/L (ref 98–107)
CK MB: 3.4 NG/ML
CKMB INTERPRETATION: ABNORMAL
CO2: 24 MMOL/L (ref 20–31)
CREAT SERPL-MCNC: 1.36 MG/DL (ref 0.5–0.9)
GFR AFRICAN AMERICAN: 48 ML/MIN
GFR NON-AFRICAN AMERICAN: 40 ML/MIN
GFR SERPL CREATININE-BSD FRML MDRD: ABNORMAL ML/MIN/{1.73_M2}
GFR SERPL CREATININE-BSD FRML MDRD: ABNORMAL ML/MIN/{1.73_M2}
GLUCOSE BLD-MCNC: 194 MG/DL (ref 70–99)
HCT VFR BLD CALC: 29.1 % (ref 36–46)
HEMOGLOBIN: 9.1 G/DL (ref 12–16)
MCH RBC QN AUTO: 23 PG (ref 26–34)
MCHC RBC AUTO-ENTMCNC: 31.4 G/DL (ref 31–37)
MCV RBC AUTO: 73.2 FL (ref 80–100)
MYOGLOBIN: 72 NG/ML (ref 25–58)
NRBC AUTOMATED: ABNORMAL PER 100 WBC
PDW BLD-RTO: 17.7 % (ref 11.5–14.5)
PLATELET # BLD: 306 K/UL (ref 130–400)
PMV BLD AUTO: ABNORMAL FL (ref 6–12)
POTASSIUM SERPL-SCNC: 4.9 MMOL/L (ref 3.7–5.3)
PRO-BNP: 3193 PG/ML
RBC # BLD: 3.97 M/UL (ref 4–5.2)
SODIUM BLD-SCNC: 136 MMOL/L (ref 135–144)
TOTAL CK: 233 U/L (ref 26–192)
TROPONIN INTERP: NORMAL
TROPONIN T: <0.03 NG/ML
WBC # BLD: 9.2 K/UL (ref 3.5–11)

## 2018-04-02 PROCEDURE — 84484 ASSAY OF TROPONIN QUANT: CPT

## 2018-04-02 PROCEDURE — 80048 BASIC METABOLIC PNL TOTAL CA: CPT

## 2018-04-02 PROCEDURE — 83880 ASSAY OF NATRIURETIC PEPTIDE: CPT

## 2018-04-02 PROCEDURE — 85027 COMPLETE CBC AUTOMATED: CPT

## 2018-04-02 PROCEDURE — 82550 ASSAY OF CK (CPK): CPT

## 2018-04-02 PROCEDURE — 82553 CREATINE MB FRACTION: CPT

## 2018-04-02 PROCEDURE — 80156 ASSAY CARBAMAZEPINE TOTAL: CPT

## 2018-04-02 PROCEDURE — 83874 ASSAY OF MYOGLOBIN: CPT

## 2018-04-02 PROCEDURE — P9603 ONE-WAY ALLOW PRORATED MILES: HCPCS

## 2018-04-02 PROCEDURE — 36415 COLL VENOUS BLD VENIPUNCTURE: CPT

## 2018-04-04 ENCOUNTER — HOSPITAL ENCOUNTER (OUTPATIENT)
Age: 63
Setting detail: SPECIMEN
Discharge: HOME | End: 2018-04-04
Payer: MEDICARE

## 2018-04-04 LAB
ANION GAP SERPL CALCULATED.3IONS-SCNC: 14 MMOL/L (ref 9–17)
BNP INTERPRETATION: ABNORMAL
BUN BLDV-MCNC: 34 MG/DL (ref 8–23)
BUN/CREAT BLD: 26 (ref 9–20)
CALCIUM SERPL-MCNC: 8.4 MG/DL (ref 8.6–10.4)
CHLORIDE BLD-SCNC: 99 MMOL/L (ref 98–107)
CO2: 25 MMOL/L (ref 20–31)
CREAT SERPL-MCNC: 1.29 MG/DL (ref 0.5–0.9)
GFR AFRICAN AMERICAN: 51 ML/MIN
GFR NON-AFRICAN AMERICAN: 42 ML/MIN
GFR SERPL CREATININE-BSD FRML MDRD: ABNORMAL ML/MIN/{1.73_M2}
GFR SERPL CREATININE-BSD FRML MDRD: ABNORMAL ML/MIN/{1.73_M2}
GLUCOSE BLD-MCNC: 112 MG/DL (ref 70–99)
HCT VFR BLD CALC: 26.2 % (ref 36–46)
HEMOGLOBIN: 8.6 G/DL (ref 12–16)
MCH RBC QN AUTO: 23.8 PG (ref 26–34)
MCHC RBC AUTO-ENTMCNC: 32.8 G/DL (ref 31–37)
MCV RBC AUTO: 72.8 FL (ref 80–100)
NRBC AUTOMATED: ABNORMAL PER 100 WBC
PDW BLD-RTO: 17.2 % (ref 11.5–14.5)
PLATELET # BLD: 280 K/UL (ref 130–400)
PMV BLD AUTO: ABNORMAL FL (ref 6–12)
POTASSIUM SERPL-SCNC: 4.6 MMOL/L (ref 3.7–5.3)
PRO-BNP: 4657 PG/ML
RBC # BLD: 3.6 M/UL (ref 4–5.2)
SODIUM BLD-SCNC: 138 MMOL/L (ref 135–144)
WBC # BLD: 9.3 K/UL (ref 3.5–11)

## 2018-04-04 PROCEDURE — P9603 ONE-WAY ALLOW PRORATED MILES: HCPCS

## 2018-04-04 PROCEDURE — 83880 ASSAY OF NATRIURETIC PEPTIDE: CPT

## 2018-04-04 PROCEDURE — 80048 BASIC METABOLIC PNL TOTAL CA: CPT

## 2018-04-04 PROCEDURE — 36415 COLL VENOUS BLD VENIPUNCTURE: CPT

## 2018-04-04 PROCEDURE — 85027 COMPLETE CBC AUTOMATED: CPT

## 2018-04-07 ENCOUNTER — APPOINTMENT (OUTPATIENT)
Dept: NUCLEAR MEDICINE | Age: 63
End: 2018-04-07
Payer: MEDICARE

## 2018-04-07 ENCOUNTER — HOSPITAL ENCOUNTER (EMERGENCY)
Age: 63
Discharge: HOME | End: 2018-04-07
Attending: EMERGENCY MEDICINE
Payer: MEDICARE

## 2018-04-07 ENCOUNTER — APPOINTMENT (OUTPATIENT)
Dept: GENERAL RADIOLOGY | Age: 63
End: 2018-04-07
Payer: MEDICARE

## 2018-04-07 VITALS
RESPIRATION RATE: 22 BRPM | TEMPERATURE: 98.2 F | OXYGEN SATURATION: 100 % | WEIGHT: 175 LBS | SYSTOLIC BLOOD PRESSURE: 138 MMHG | HEART RATE: 90 BPM | DIASTOLIC BLOOD PRESSURE: 88 MMHG | BODY MASS INDEX: 33.04 KG/M2 | HEIGHT: 61 IN

## 2018-04-07 DIAGNOSIS — D64.9 CHRONIC ANEMIA: ICD-10-CM

## 2018-04-07 DIAGNOSIS — R06.89 DYSPNEA AND RESPIRATORY ABNORMALITIES: ICD-10-CM

## 2018-04-07 DIAGNOSIS — N18.9 CHRONIC RENAL IMPAIRMENT, UNSPECIFIED CKD STAGE: ICD-10-CM

## 2018-04-07 DIAGNOSIS — N39.0 URINARY TRACT INFECTION WITHOUT HEMATURIA, SITE UNSPECIFIED: Primary | ICD-10-CM

## 2018-04-07 DIAGNOSIS — R06.00 DYSPNEA AND RESPIRATORY ABNORMALITIES: ICD-10-CM

## 2018-04-07 LAB
% CKMB: 2 % (ref 0–3)
-: ABNORMAL
ABSOLUTE EOS #: 0.3 K/UL (ref 0–0.4)
ABSOLUTE IMMATURE GRANULOCYTE: ABNORMAL K/UL (ref 0–0.3)
ABSOLUTE LYMPH #: 2.1 K/UL (ref 1–4.8)
ABSOLUTE MONO #: 1.2 K/UL (ref 0.2–0.8)
AMORPHOUS: ABNORMAL
ANION GAP SERPL CALCULATED.3IONS-SCNC: 12 MMOL/L (ref 9–17)
BACTERIA: ABNORMAL
BASOPHILS # BLD: 1 % (ref 0–2)
BASOPHILS ABSOLUTE: 0.1 K/UL (ref 0–0.2)
BILIRUBIN URINE: NEGATIVE
BNP INTERPRETATION: ABNORMAL
BUN BLDV-MCNC: 34 MG/DL (ref 8–23)
BUN/CREAT BLD: 24 (ref 9–20)
CALCIUM SERPL-MCNC: 8.4 MG/DL (ref 8.6–10.4)
CASTS UA: ABNORMAL /LPF
CHLORIDE BLD-SCNC: 100 MMOL/L (ref 98–107)
CHP ED QC CHECK: NORMAL
CK MB: 1.7 NG/ML
CKMB INTERPRETATION: NORMAL
CO2: 27 MMOL/L (ref 20–31)
COLOR: YELLOW
COMMENT UA: ABNORMAL
CREAT SERPL-MCNC: 1.43 MG/DL (ref 0.5–0.9)
CRYSTALS, UA: ABNORMAL /HPF
D-DIMER QUANTITATIVE: 0.71 MG/L FEU
DIFFERENTIAL TYPE: ABNORMAL
EKG ATRIAL RATE: 80 BPM
EKG P AXIS: 29 DEGREES
EKG P-R INTERVAL: 196 MS
EKG Q-T INTERVAL: 346 MS
EKG QRS DURATION: 74 MS
EKG QTC CALCULATION (BAZETT): 399 MS
EKG R AXIS: -21 DEGREES
EKG T AXIS: 156 DEGREES
EKG VENTRICULAR RATE: 80 BPM
EOSINOPHILS RELATIVE PERCENT: 3 % (ref 1–4)
EPITHELIAL CELLS UA: ABNORMAL /HPF (ref 0–5)
GFR AFRICAN AMERICAN: 45 ML/MIN
GFR NON-AFRICAN AMERICAN: 37 ML/MIN
GFR SERPL CREATININE-BSD FRML MDRD: ABNORMAL ML/MIN/{1.73_M2}
GFR SERPL CREATININE-BSD FRML MDRD: ABNORMAL ML/MIN/{1.73_M2}
GLUCOSE BLD-MCNC: 158 MG/DL
GLUCOSE BLD-MCNC: 158 MG/DL (ref 65–105)
GLUCOSE BLD-MCNC: 181 MG/DL (ref 70–99)
GLUCOSE URINE: NEGATIVE
HCT VFR BLD CALC: 27.3 % (ref 36–46)
HEMOGLOBIN: 8.6 G/DL (ref 12–16)
IMMATURE GRANULOCYTES: ABNORMAL %
INR BLD: 1
KETONES, URINE: NEGATIVE
LACTIC ACID: 1.4 MMOL/L (ref 0.5–2.2)
LEUKOCYTE ESTERASE, URINE: ABNORMAL
LYMPHOCYTES # BLD: 21 % (ref 24–44)
MCH RBC QN AUTO: 23.1 PG (ref 26–34)
MCHC RBC AUTO-ENTMCNC: 31.3 G/DL (ref 31–37)
MCV RBC AUTO: 73.8 FL (ref 80–100)
MONOCYTES # BLD: 12 % (ref 1–7)
MUCUS: ABNORMAL
MYOGLOBIN: 42 NG/ML (ref 25–58)
NITRITE, URINE: POSITIVE
NRBC AUTOMATED: ABNORMAL PER 100 WBC
OTHER OBSERVATIONS UA: ABNORMAL
PDW BLD-RTO: 18.4 % (ref 11.5–14.5)
PH UA: 6 (ref 5–8)
PLATELET # BLD: 285 K/UL (ref 130–400)
PLATELET ESTIMATE: ABNORMAL
PMV BLD AUTO: 8.3 FL (ref 6–12)
POTASSIUM SERPL-SCNC: 4.5 MMOL/L (ref 3.7–5.3)
PRO-BNP: 8066 PG/ML
PROTEIN UA: ABNORMAL
PROTHROMBIN TIME: 10.8 SEC (ref 9.7–11.6)
RBC # BLD: 3.7 M/UL (ref 4–5.2)
RBC # BLD: ABNORMAL 10*6/UL
RBC UA: ABNORMAL /HPF (ref 0–2)
RENAL EPITHELIAL, UA: ABNORMAL /HPF
SEG NEUTROPHILS: 63 % (ref 36–66)
SEGMENTED NEUTROPHILS ABSOLUTE COUNT: 6.3 K/UL (ref 1.8–7.7)
SODIUM BLD-SCNC: 139 MMOL/L (ref 135–144)
SPECIFIC GRAVITY UA: 1.01 (ref 1–1.03)
TOTAL CK: 85 U/L (ref 26–192)
TRICHOMONAS: ABNORMAL
TROPONIN INTERP: NORMAL
TROPONIN T: <0.03 NG/ML
TURBIDITY: ABNORMAL
URINE HGB: ABNORMAL
UROBILINOGEN, URINE: NORMAL
WBC # BLD: 10 K/UL (ref 3.5–11)
WBC # BLD: ABNORMAL 10*3/UL
WBC UA: ABNORMAL /HPF (ref 0–5)
YEAST: ABNORMAL

## 2018-04-07 PROCEDURE — 82550 ASSAY OF CK (CPK): CPT

## 2018-04-07 PROCEDURE — 94664 DEMO&/EVAL PT USE INHALER: CPT

## 2018-04-07 PROCEDURE — 83605 ASSAY OF LACTIC ACID: CPT

## 2018-04-07 PROCEDURE — 94640 AIRWAY INHALATION TREATMENT: CPT

## 2018-04-07 PROCEDURE — 2500000003 HC RX 250 WO HCPCS: Performed by: EMERGENCY MEDICINE

## 2018-04-07 PROCEDURE — 6360000002 HC RX W HCPCS: Performed by: EMERGENCY MEDICINE

## 2018-04-07 PROCEDURE — 81001 URINALYSIS AUTO W/SCOPE: CPT

## 2018-04-07 PROCEDURE — A9540 TC99M MAA: HCPCS | Performed by: EMERGENCY MEDICINE

## 2018-04-07 PROCEDURE — 71045 X-RAY EXAM CHEST 1 VIEW: CPT

## 2018-04-07 PROCEDURE — 84484 ASSAY OF TROPONIN QUANT: CPT

## 2018-04-07 PROCEDURE — 83880 ASSAY OF NATRIURETIC PEPTIDE: CPT

## 2018-04-07 PROCEDURE — 99285 EMERGENCY DEPT VISIT HI MDM: CPT

## 2018-04-07 PROCEDURE — 85379 FIBRIN DEGRADATION QUANT: CPT

## 2018-04-07 PROCEDURE — 82947 ASSAY GLUCOSE BLOOD QUANT: CPT

## 2018-04-07 PROCEDURE — 80048 BASIC METABOLIC PNL TOTAL CA: CPT

## 2018-04-07 PROCEDURE — 83874 ASSAY OF MYOGLOBIN: CPT

## 2018-04-07 PROCEDURE — 87086 URINE CULTURE/COLONY COUNT: CPT

## 2018-04-07 PROCEDURE — 85025 COMPLETE CBC W/AUTO DIFF WBC: CPT

## 2018-04-07 PROCEDURE — 3430000000 HC RX DIAGNOSTIC RADIOPHARMACEUTICAL: Performed by: EMERGENCY MEDICINE

## 2018-04-07 PROCEDURE — 85610 PROTHROMBIN TIME: CPT

## 2018-04-07 PROCEDURE — 78582 LUNG VENTILAT&PERFUS IMAGING: CPT

## 2018-04-07 PROCEDURE — 87077 CULTURE AEROBIC IDENTIFY: CPT

## 2018-04-07 PROCEDURE — 82553 CREATINE MB FRACTION: CPT

## 2018-04-07 PROCEDURE — 87186 SC STD MICRODIL/AGAR DIL: CPT

## 2018-04-07 PROCEDURE — 96365 THER/PROPH/DIAG IV INF INIT: CPT

## 2018-04-07 PROCEDURE — 93005 ELECTROCARDIOGRAM TRACING: CPT

## 2018-04-07 PROCEDURE — 96375 TX/PRO/DX INJ NEW DRUG ADDON: CPT

## 2018-04-07 PROCEDURE — A9538 TC99M PYROPHOSPHATE: HCPCS | Performed by: EMERGENCY MEDICINE

## 2018-04-07 PROCEDURE — 87040 BLOOD CULTURE FOR BACTERIA: CPT

## 2018-04-07 RX ORDER — ALBUTEROL SULFATE 90 UG/1
2 AEROSOL, METERED RESPIRATORY (INHALATION)
Status: DISCONTINUED | OUTPATIENT
Start: 2018-04-07 | End: 2018-04-07

## 2018-04-07 RX ORDER — FUROSEMIDE 10 MG/ML
20 INJECTION INTRAMUSCULAR; INTRAVENOUS ONCE
Status: COMPLETED | OUTPATIENT
Start: 2018-04-07 | End: 2018-04-07

## 2018-04-07 RX ORDER — METHYLPREDNISOLONE SODIUM SUCCINATE 125 MG/2ML
125 INJECTION, POWDER, LYOPHILIZED, FOR SOLUTION INTRAMUSCULAR; INTRAVENOUS ONCE
Status: COMPLETED | OUTPATIENT
Start: 2018-04-07 | End: 2018-04-07

## 2018-04-07 RX ORDER — RANITIDINE 150 MG/1
150 TABLET ORAL DAILY
COMMUNITY

## 2018-04-07 RX ORDER — MAGNESIUM SULFATE 1 G/100ML
1 INJECTION INTRAVENOUS ONCE
Status: COMPLETED | OUTPATIENT
Start: 2018-04-07 | End: 2018-04-07

## 2018-04-07 RX ORDER — ALBUTEROL SULFATE 2.5 MG/3ML
5 SOLUTION RESPIRATORY (INHALATION)
Status: DISCONTINUED | OUTPATIENT
Start: 2018-04-07 | End: 2018-04-07 | Stop reason: HOSPADM

## 2018-04-07 RX ORDER — HYDROXYZINE PAMOATE 50 MG/1
50 CAPSULE ORAL 4 TIMES DAILY PRN
COMMUNITY
End: 2018-05-14

## 2018-04-07 RX ORDER — IPRATROPIUM BROMIDE AND ALBUTEROL SULFATE 2.5; .5 MG/3ML; MG/3ML
1 SOLUTION RESPIRATORY (INHALATION)
Status: DISCONTINUED | OUTPATIENT
Start: 2018-04-07 | End: 2018-04-07

## 2018-04-07 RX ORDER — CEPHALEXIN 500 MG/1
500 CAPSULE ORAL 2 TIMES DAILY
Qty: 20 CAPSULE | Refills: 0 | Status: SHIPPED | OUTPATIENT
Start: 2018-04-07 | End: 2018-04-17

## 2018-04-07 RX ADMIN — METHYLPREDNISOLONE SODIUM SUCCINATE 125 MG: 125 INJECTION, POWDER, FOR SOLUTION INTRAMUSCULAR; INTRAVENOUS at 03:48

## 2018-04-07 RX ADMIN — Medication 8 MILLICURIE: at 07:50

## 2018-04-07 RX ADMIN — Medication 35.4 MILLICURIE: at 07:15

## 2018-04-07 RX ADMIN — CEFTRIAXONE SODIUM 1 G: 10 INJECTION, POWDER, FOR SOLUTION INTRAVENOUS at 09:44

## 2018-04-07 RX ADMIN — FUROSEMIDE 20 MG: 10 INJECTION, SOLUTION INTRAMUSCULAR; INTRAVENOUS at 05:55

## 2018-04-07 RX ADMIN — ALBUTEROL SULFATE 5 MG: 5 SOLUTION RESPIRATORY (INHALATION) at 06:05

## 2018-04-07 RX ADMIN — ALBUTEROL SULFATE 5 MG: 5 SOLUTION RESPIRATORY (INHALATION) at 03:49

## 2018-04-07 RX ADMIN — MAGNESIUM SULFATE HEPTAHYDRATE 1 G: 1 INJECTION, SOLUTION INTRAVENOUS at 03:49

## 2018-04-07 ASSESSMENT — PAIN DESCRIPTION - LOCATION: LOCATION: ARM

## 2018-04-07 ASSESSMENT — ENCOUNTER SYMPTOMS
GASTROINTESTINAL NEGATIVE: 1
COUGH: 1
SHORTNESS OF BREATH: 1
CHEST TIGHTNESS: 1
WHEEZING: 1

## 2018-04-07 ASSESSMENT — PAIN SCALES - GENERAL: PAINLEVEL_OUTOF10: 4

## 2018-04-08 LAB
CULTURE: ABNORMAL
CULTURE: ABNORMAL
Lab: ABNORMAL
ORGANISM: ABNORMAL
SPECIMEN DESCRIPTION: ABNORMAL
SPECIMEN DESCRIPTION: ABNORMAL
STATUS: ABNORMAL

## 2018-04-13 LAB
CULTURE: NORMAL
Lab: NORMAL
Lab: NORMAL
SPECIMEN DESCRIPTION: NORMAL
STATUS: NORMAL
STATUS: NORMAL

## 2018-04-20 ENCOUNTER — APPOINTMENT (OUTPATIENT)
Dept: GENERAL RADIOLOGY | Age: 63
End: 2018-04-20
Payer: MEDICARE

## 2018-04-20 ENCOUNTER — HOSPITAL ENCOUNTER (EMERGENCY)
Age: 63
Discharge: HOME | End: 2018-04-20
Attending: EMERGENCY MEDICINE
Payer: MEDICARE

## 2018-04-20 ENCOUNTER — CARE COORDINATION (OUTPATIENT)
Dept: CARE COORDINATION | Age: 63
End: 2018-04-20

## 2018-04-20 VITALS
BODY MASS INDEX: 32.69 KG/M2 | OXYGEN SATURATION: 97 % | RESPIRATION RATE: 12 BRPM | SYSTOLIC BLOOD PRESSURE: 165 MMHG | WEIGHT: 173 LBS | DIASTOLIC BLOOD PRESSURE: 95 MMHG | HEART RATE: 108 BPM | TEMPERATURE: 98.5 F

## 2018-04-20 DIAGNOSIS — R06.00 DYSPNEA, UNSPECIFIED TYPE: Primary | ICD-10-CM

## 2018-04-20 LAB
ABSOLUTE EOS #: 0.59 K/UL (ref 0–0.44)
ABSOLUTE IMMATURE GRANULOCYTE: 0.05 K/UL (ref 0–0.3)
ABSOLUTE LYMPH #: 2.19 K/UL (ref 1.1–3.7)
ABSOLUTE MONO #: 0.92 K/UL (ref 0.1–1.2)
ALLEN TEST: ABNORMAL
ANION GAP SERPL CALCULATED.3IONS-SCNC: 12 MMOL/L (ref 9–17)
ANION GAP: 9 MMOL/L (ref 7–16)
BASOPHILS # BLD: 0 % (ref 0–2)
BASOPHILS ABSOLUTE: 0.04 K/UL (ref 0–0.2)
BNP INTERPRETATION: ABNORMAL
BUN BLDV-MCNC: 20 MG/DL (ref 8–23)
BUN/CREAT BLD: ABNORMAL (ref 9–20)
CALCIUM SERPL-MCNC: 8.3 MG/DL (ref 8.6–10.4)
CHLORIDE BLD-SCNC: 100 MMOL/L (ref 98–107)
CO2: 28 MMOL/L (ref 20–31)
CREAT SERPL-MCNC: 0.98 MG/DL (ref 0.5–0.9)
DIFFERENTIAL TYPE: ABNORMAL
EKG ATRIAL RATE: 98 BPM
EKG P AXIS: 45 DEGREES
EKG P-R INTERVAL: 166 MS
EKG Q-T INTERVAL: 362 MS
EKG QRS DURATION: 80 MS
EKG QTC CALCULATION (BAZETT): 462 MS
EKG R AXIS: -24 DEGREES
EKG T AXIS: 99 DEGREES
EKG VENTRICULAR RATE: 98 BPM
EOSINOPHILS RELATIVE PERCENT: 6 % (ref 1–4)
FIO2: ABNORMAL
GFR AFRICAN AMERICAN: >60 ML/MIN
GFR NON-AFRICAN AMERICAN: 56 ML/MIN
GFR NON-AFRICAN AMERICAN: 58 ML/MIN
GFR SERPL CREATININE-BSD FRML MDRD: >60 ML/MIN
GFR SERPL CREATININE-BSD FRML MDRD: ABNORMAL ML/MIN/{1.73_M2}
GLUCOSE BLD-MCNC: 114 MG/DL (ref 70–99)
GLUCOSE BLD-MCNC: 116 MG/DL (ref 74–100)
HCO3 VENOUS: 28.5 MMOL/L (ref 22–29)
HCT VFR BLD CALC: 29.6 % (ref 36.3–47.1)
HEMOGLOBIN: 8.7 G/DL (ref 11.9–15.1)
IMMATURE GRANULOCYTES: 1 %
LYMPHOCYTES # BLD: 23 % (ref 24–43)
MCH RBC QN AUTO: 22.4 PG (ref 25.2–33.5)
MCHC RBC AUTO-ENTMCNC: 29.4 G/DL (ref 28.4–34.8)
MCV RBC AUTO: 76.3 FL (ref 82.6–102.9)
MODE: ABNORMAL
MONOCYTES # BLD: 10 % (ref 3–12)
NEGATIVE BASE EXCESS, VEN: ABNORMAL (ref 0–2)
NRBC AUTOMATED: 0 PER 100 WBC
O2 DEVICE/FLOW/%: ABNORMAL
O2 SAT, VEN: 80 % (ref 60–85)
PATIENT TEMP: ABNORMAL
PCO2, VEN: 38.3 MM HG (ref 41–51)
PDW BLD-RTO: 18 % (ref 11.8–14.4)
PH VENOUS: 7.48 (ref 7.32–7.43)
PLATELET # BLD: 267 K/UL (ref 138–453)
PLATELET ESTIMATE: ABNORMAL
PMV BLD AUTO: 11 FL (ref 8.1–13.5)
PO2, VEN: 41.3 MM HG (ref 30–50)
POC CHLORIDE: 105 MMOL/L (ref 98–107)
POC CREATININE: 1 MG/DL (ref 0.51–1.19)
POC HEMATOCRIT: 31 % (ref 36–46)
POC HEMOGLOBIN: 10.6 G/DL (ref 12–16)
POC IONIZED CALCIUM: 0.95 MMOL/L (ref 1.15–1.33)
POC LACTIC ACID: 1.08 MMOL/L (ref 0.56–1.39)
POC PCO2 TEMP: ABNORMAL MM HG
POC PH TEMP: ABNORMAL
POC PO2 TEMP: ABNORMAL MM HG
POC POTASSIUM: 3.7 MMOL/L (ref 3.5–4.5)
POC SODIUM: 142 MMOL/L (ref 138–146)
POC TROPONIN I: 0.01 NG/ML (ref 0–0.1)
POC TROPONIN I: 0.01 NG/ML (ref 0–0.1)
POC TROPONIN INTERP: NORMAL
POC TROPONIN INTERP: NORMAL
POSITIVE BASE EXCESS, VEN: 5 (ref 0–3)
POTASSIUM SERPL-SCNC: 3.9 MMOL/L (ref 3.7–5.3)
PRO-BNP: 4952 PG/ML
RBC # BLD: 3.88 M/UL (ref 3.95–5.11)
RBC # BLD: ABNORMAL 10*6/UL
SAMPLE SITE: ABNORMAL
SEG NEUTROPHILS: 60 % (ref 36–65)
SEGMENTED NEUTROPHILS ABSOLUTE COUNT: 5.61 K/UL (ref 1.5–8.1)
SODIUM BLD-SCNC: 140 MMOL/L (ref 135–144)
TOTAL CO2, VENOUS: 30 MMOL/L (ref 23–30)
WBC # BLD: 9.4 K/UL (ref 3.5–11.3)
WBC # BLD: ABNORMAL 10*3/UL

## 2018-04-20 PROCEDURE — 85025 COMPLETE CBC W/AUTO DIFF WBC: CPT

## 2018-04-20 PROCEDURE — 84132 ASSAY OF SERUM POTASSIUM: CPT

## 2018-04-20 PROCEDURE — 80048 BASIC METABOLIC PNL TOTAL CA: CPT

## 2018-04-20 PROCEDURE — 82565 ASSAY OF CREATININE: CPT

## 2018-04-20 PROCEDURE — 6370000000 HC RX 637 (ALT 250 FOR IP): Performed by: EMERGENCY MEDICINE

## 2018-04-20 PROCEDURE — 82947 ASSAY GLUCOSE BLOOD QUANT: CPT

## 2018-04-20 PROCEDURE — 82803 BLOOD GASES ANY COMBINATION: CPT

## 2018-04-20 PROCEDURE — 71046 X-RAY EXAM CHEST 2 VIEWS: CPT

## 2018-04-20 PROCEDURE — 84295 ASSAY OF SERUM SODIUM: CPT

## 2018-04-20 PROCEDURE — 83605 ASSAY OF LACTIC ACID: CPT

## 2018-04-20 PROCEDURE — 82435 ASSAY OF BLOOD CHLORIDE: CPT

## 2018-04-20 PROCEDURE — 83880 ASSAY OF NATRIURETIC PEPTIDE: CPT

## 2018-04-20 PROCEDURE — 99285 EMERGENCY DEPT VISIT HI MDM: CPT

## 2018-04-20 PROCEDURE — 93005 ELECTROCARDIOGRAM TRACING: CPT

## 2018-04-20 PROCEDURE — 84484 ASSAY OF TROPONIN QUANT: CPT

## 2018-04-20 PROCEDURE — 94640 AIRWAY INHALATION TREATMENT: CPT

## 2018-04-20 PROCEDURE — 85014 HEMATOCRIT: CPT

## 2018-04-20 PROCEDURE — 94762 N-INVAS EAR/PLS OXIMTRY CONT: CPT

## 2018-04-20 PROCEDURE — 82330 ASSAY OF CALCIUM: CPT

## 2018-04-20 RX ORDER — ALBUTEROL SULFATE 90 UG/1
2 AEROSOL, METERED RESPIRATORY (INHALATION)
Status: DISCONTINUED | OUTPATIENT
Start: 2018-04-20 | End: 2018-04-20 | Stop reason: HOSPADM

## 2018-04-20 RX ORDER — PREDNISONE 20 MG/1
60 TABLET ORAL ONCE
Status: COMPLETED | OUTPATIENT
Start: 2018-04-20 | End: 2018-04-20

## 2018-04-20 RX ORDER — IPRATROPIUM BROMIDE AND ALBUTEROL SULFATE 2.5; .5 MG/3ML; MG/3ML
1 SOLUTION RESPIRATORY (INHALATION)
Status: DISCONTINUED | OUTPATIENT
Start: 2018-04-20 | End: 2018-04-20

## 2018-04-20 RX ORDER — ALBUTEROL SULFATE 90 UG/1
2 AEROSOL, METERED RESPIRATORY (INHALATION)
Status: DISCONTINUED | OUTPATIENT
Start: 2018-04-20 | End: 2018-04-20

## 2018-04-20 RX ORDER — ALBUTEROL SULFATE 2.5 MG/3ML
5 SOLUTION RESPIRATORY (INHALATION)
Status: DISCONTINUED | OUTPATIENT
Start: 2018-04-20 | End: 2018-04-20 | Stop reason: HOSPADM

## 2018-04-20 RX ORDER — ALBUTEROL SULFATE 90 UG/1
2 AEROSOL, METERED RESPIRATORY (INHALATION) EVERY 6 HOURS PRN
Status: DISCONTINUED | OUTPATIENT
Start: 2018-04-20 | End: 2018-04-20 | Stop reason: HOSPADM

## 2018-04-20 RX ADMIN — PREDNISONE 60 MG: 20 TABLET ORAL at 13:00

## 2018-04-20 ASSESSMENT — ENCOUNTER SYMPTOMS
ABDOMINAL PAIN: 0
BACK PAIN: 0
WHEEZING: 0
SHORTNESS OF BREATH: 1
SORE THROAT: 0
NAUSEA: 0
VOMITING: 0
COUGH: 1

## 2018-04-20 ASSESSMENT — PAIN SCALES - GENERAL: PAINLEVEL_OUTOF10: 9

## 2018-04-20 ASSESSMENT — PAIN DESCRIPTION - ONSET: ONSET: ON-GOING

## 2018-04-20 ASSESSMENT — PAIN DESCRIPTION - LOCATION: LOCATION: CHEST

## 2018-04-20 ASSESSMENT — PAIN DESCRIPTION - PROGRESSION: CLINICAL_PROGRESSION: NOT CHANGED

## 2018-04-20 ASSESSMENT — PAIN DESCRIPTION - DESCRIPTORS: DESCRIPTORS: ACHING

## 2018-04-20 ASSESSMENT — PAIN DESCRIPTION - PAIN TYPE: TYPE: CHRONIC PAIN

## 2018-04-22 ENCOUNTER — HOSPITAL ENCOUNTER (OUTPATIENT)
Age: 63
Setting detail: SPECIMEN
Discharge: HOME | End: 2018-04-22
Payer: MEDICARE

## 2018-04-22 LAB
BILIRUBIN URINE: NEGATIVE
COLOR: YELLOW
COMMENT UA: NORMAL
GLUCOSE URINE: NEGATIVE
KETONES, URINE: NEGATIVE
LEUKOCYTE ESTERASE, URINE: NEGATIVE
NITRITE, URINE: NEGATIVE
PH UA: 7 (ref 5–8)
PROTEIN UA: NEGATIVE
SPECIFIC GRAVITY UA: 1.01 (ref 1–1.03)
TURBIDITY: CLEAR
URINE HGB: NEGATIVE
UROBILINOGEN, URINE: NORMAL

## 2018-04-22 PROCEDURE — 87086 URINE CULTURE/COLONY COUNT: CPT

## 2018-04-22 PROCEDURE — 81003 URINALYSIS AUTO W/O SCOPE: CPT

## 2018-04-23 LAB
CULTURE: NORMAL
CULTURE: NORMAL
Lab: NORMAL
Lab: NORMAL
SPECIMEN DESCRIPTION: NORMAL
SPECIMEN DESCRIPTION: NORMAL
STATUS: NORMAL

## 2018-04-25 ENCOUNTER — HOSPITAL ENCOUNTER (OUTPATIENT)
Age: 63
Setting detail: SPECIMEN
Discharge: HOME | End: 2018-04-25
Payer: MEDICARE

## 2018-04-25 LAB
ANION GAP SERPL CALCULATED.3IONS-SCNC: 19 MMOL/L (ref 9–17)
BUN BLDV-MCNC: 33 MG/DL (ref 8–23)
BUN/CREAT BLD: ABNORMAL (ref 9–20)
CALCIUM SERPL-MCNC: 8.5 MG/DL (ref 8.6–10.4)
CHLORIDE BLD-SCNC: 101 MMOL/L (ref 98–107)
CO2: 23 MMOL/L (ref 20–31)
CREAT SERPL-MCNC: 1.18 MG/DL (ref 0.5–0.9)
GFR AFRICAN AMERICAN: 56 ML/MIN
GFR NON-AFRICAN AMERICAN: 47 ML/MIN
GFR SERPL CREATININE-BSD FRML MDRD: ABNORMAL ML/MIN/{1.73_M2}
GFR SERPL CREATININE-BSD FRML MDRD: ABNORMAL ML/MIN/{1.73_M2}
GLUCOSE BLD-MCNC: 85 MG/DL (ref 70–99)
POTASSIUM SERPL-SCNC: 4.5 MMOL/L (ref 3.7–5.3)
SODIUM BLD-SCNC: 143 MMOL/L (ref 135–144)

## 2018-04-25 PROCEDURE — P9603 ONE-WAY ALLOW PRORATED MILES: HCPCS

## 2018-04-25 PROCEDURE — 36415 COLL VENOUS BLD VENIPUNCTURE: CPT

## 2018-04-25 PROCEDURE — 80048 BASIC METABOLIC PNL TOTAL CA: CPT

## 2018-04-28 ENCOUNTER — TELEPHONE (OUTPATIENT)
Dept: FAMILY MEDICINE CLINIC | Age: 63
End: 2018-04-28

## 2018-04-30 ENCOUNTER — OFFICE VISIT (OUTPATIENT)
Dept: PODIATRY | Age: 63
End: 2018-04-30
Payer: MEDICARE

## 2018-04-30 VITALS — RESPIRATION RATE: 16 BRPM | HEART RATE: 68 BPM | HEIGHT: 60 IN

## 2018-04-30 DIAGNOSIS — I73.9 PVD (PERIPHERAL VASCULAR DISEASE) (HCC): ICD-10-CM

## 2018-04-30 DIAGNOSIS — B35.1 DERMATOPHYTOSIS OF NAIL: ICD-10-CM

## 2018-04-30 DIAGNOSIS — R26.2 DIFFICULTY WALKING: ICD-10-CM

## 2018-04-30 DIAGNOSIS — M79.675 PAIN IN TOES OF BOTH FEET: ICD-10-CM

## 2018-04-30 DIAGNOSIS — R60.0 EDEMA OF LOWER EXTREMITY: ICD-10-CM

## 2018-04-30 DIAGNOSIS — M79.674 PAIN IN TOES OF BOTH FEET: ICD-10-CM

## 2018-04-30 DIAGNOSIS — E11.51 TYPE II DIABETES MELLITUS WITH PERIPHERAL CIRCULATORY DISORDER (HCC): Primary | ICD-10-CM

## 2018-04-30 PROCEDURE — G8598 ASA/ANTIPLAT THER USED: HCPCS | Performed by: PODIATRIST

## 2018-04-30 PROCEDURE — 99213 OFFICE O/P EST LOW 20 MIN: CPT | Performed by: PODIATRIST

## 2018-04-30 PROCEDURE — 4004F PT TOBACCO SCREEN RCVD TLK: CPT | Performed by: PODIATRIST

## 2018-04-30 PROCEDURE — 3044F HG A1C LEVEL LT 7.0%: CPT | Performed by: PODIATRIST

## 2018-04-30 PROCEDURE — G8417 CALC BMI ABV UP PARAM F/U: HCPCS | Performed by: PODIATRIST

## 2018-04-30 PROCEDURE — 3017F COLORECTAL CA SCREEN DOC REV: CPT | Performed by: PODIATRIST

## 2018-04-30 PROCEDURE — 11721 DEBRIDE NAIL 6 OR MORE: CPT | Performed by: PODIATRIST

## 2018-04-30 PROCEDURE — 2022F DILAT RTA XM EVC RTNOPTHY: CPT | Performed by: PODIATRIST

## 2018-04-30 PROCEDURE — G8427 DOCREV CUR MEDS BY ELIG CLIN: HCPCS | Performed by: PODIATRIST

## 2018-05-02 ENCOUNTER — TELEPHONE (OUTPATIENT)
Dept: FAMILY MEDICINE CLINIC | Age: 63
End: 2018-05-02

## 2018-05-14 ENCOUNTER — HOSPITAL ENCOUNTER (INPATIENT)
Age: 63
LOS: 3 days | Discharge: NURSING, REHAB OR LONG-TERM CARE | DRG: 291 | End: 2018-05-17
Attending: INTERNAL MEDICINE | Admitting: INTERNAL MEDICINE
Payer: MEDICARE

## 2018-05-14 ENCOUNTER — HOSPITAL ENCOUNTER (EMERGENCY)
Facility: CLINIC | Age: 63
Discharge: ANOTHER HOSPITAL | End: 2018-05-14
Attending: EMERGENCY MEDICINE
Payer: MEDICARE

## 2018-05-14 ENCOUNTER — APPOINTMENT (OUTPATIENT)
Dept: GENERAL RADIOLOGY | Facility: CLINIC | Age: 63
End: 2018-05-14
Payer: MEDICARE

## 2018-05-14 VITALS
RESPIRATION RATE: 16 BRPM | HEIGHT: 61 IN | OXYGEN SATURATION: 98 % | WEIGHT: 173 LBS | HEART RATE: 108 BPM | SYSTOLIC BLOOD PRESSURE: 122 MMHG | BODY MASS INDEX: 32.66 KG/M2 | DIASTOLIC BLOOD PRESSURE: 80 MMHG | TEMPERATURE: 98.5 F

## 2018-05-14 DIAGNOSIS — I50.43 ACUTE ON CHRONIC COMBINED SYSTOLIC AND DIASTOLIC CHF (CONGESTIVE HEART FAILURE) (HCC): Primary | ICD-10-CM

## 2018-05-14 DIAGNOSIS — D50.8 OTHER IRON DEFICIENCY ANEMIA: ICD-10-CM

## 2018-05-14 DIAGNOSIS — R79.89 ELEVATED BRAIN NATRIURETIC PEPTIDE (BNP) LEVEL: ICD-10-CM

## 2018-05-14 DIAGNOSIS — N18.2 CKD (CHRONIC KIDNEY DISEASE), STAGE II: ICD-10-CM

## 2018-05-14 DIAGNOSIS — N28.9 RENAL INSUFFICIENCY: ICD-10-CM

## 2018-05-14 DIAGNOSIS — R07.9 CHEST PAIN, UNSPECIFIED TYPE: Primary | ICD-10-CM

## 2018-05-14 DIAGNOSIS — R79.89 ELEVATED D-DIMER: ICD-10-CM

## 2018-05-14 DIAGNOSIS — D64.9 ANEMIA, UNSPECIFIED TYPE: ICD-10-CM

## 2018-05-14 PROBLEM — I50.9 CHF WITH UNKNOWN LVEF (HCC): Status: ACTIVE | Noted: 2018-05-14

## 2018-05-14 LAB
-: ABNORMAL
ABSOLUTE EOS #: 0.1 K/UL (ref 0–0.4)
ABSOLUTE IMMATURE GRANULOCYTE: ABNORMAL K/UL (ref 0–0.3)
ABSOLUTE LYMPH #: 1.87 K/UL (ref 1–4.8)
ABSOLUTE MONO #: 0.83 K/UL (ref 0.1–0.8)
AMORPHOUS: ABNORMAL
ANION GAP SERPL CALCULATED.3IONS-SCNC: 16 MMOL/L (ref 9–17)
BACTERIA: ABNORMAL
BASOPHILS # BLD: 0 % (ref 0–2)
BASOPHILS ABSOLUTE: 0 K/UL (ref 0–0.2)
BILIRUBIN URINE: ABNORMAL
BNP INTERPRETATION: ABNORMAL
BUN BLDV-MCNC: 30 MG/DL (ref 8–23)
BUN/CREAT BLD: ABNORMAL (ref 9–20)
CALCIUM SERPL-MCNC: 8.7 MG/DL (ref 8.6–10.4)
CASTS UA: ABNORMAL /LPF (ref 0–2)
CHLORIDE BLD-SCNC: 98 MMOL/L (ref 98–107)
CO2: 25 MMOL/L (ref 20–31)
COLOR: YELLOW
COMMENT UA: ABNORMAL
CREAT SERPL-MCNC: 1.2 MG/DL (ref 0.5–0.9)
CRYSTALS, UA: ABNORMAL /HPF
D-DIMER QUANTITATIVE: 1.21 MG/L FEU
DIFFERENTIAL TYPE: ABNORMAL
EKG ATRIAL RATE: 117 BPM
EKG P AXIS: 57 DEGREES
EKG P-R INTERVAL: 162 MS
EKG Q-T INTERVAL: 308 MS
EKG QRS DURATION: 76 MS
EKG QTC CALCULATION (BAZETT): 429 MS
EKG R AXIS: 15 DEGREES
EKG T AXIS: 83 DEGREES
EKG VENTRICULAR RATE: 117 BPM
EOSINOPHILS RELATIVE PERCENT: 1 % (ref 1–4)
EPITHELIAL CELLS UA: ABNORMAL /HPF (ref 0–5)
FERRITIN: 23 UG/L (ref 13–150)
FOLATE: 7.9 NG/ML
GFR AFRICAN AMERICAN: 55 ML/MIN
GFR NON-AFRICAN AMERICAN: 46 ML/MIN
GFR SERPL CREATININE-BSD FRML MDRD: ABNORMAL ML/MIN/{1.73_M2}
GFR SERPL CREATININE-BSD FRML MDRD: ABNORMAL ML/MIN/{1.73_M2}
GLUCOSE BLD-MCNC: 202 MG/DL (ref 70–99)
GLUCOSE BLD-MCNC: 246 MG/DL (ref 65–105)
GLUCOSE URINE: NEGATIVE
HCT VFR BLD CALC: 25.1 % (ref 36–46)
HEMOGLOBIN: 7.6 G/DL (ref 12–16)
IMMATURE GRANULOCYTES: ABNORMAL %
IRON SATURATION: 5 % (ref 20–55)
IRON: 18 UG/DL (ref 37–145)
KETONES, URINE: NEGATIVE
LACTIC ACID: 1.9 MMOL/L (ref 0.5–2.2)
LEUKOCYTE ESTERASE, URINE: ABNORMAL
LYMPHOCYTES # BLD: 18 % (ref 24–44)
MCH RBC QN AUTO: 21.3 PG (ref 26–34)
MCHC RBC AUTO-ENTMCNC: 30.3 G/DL (ref 31–37)
MCV RBC AUTO: 70.3 FL (ref 80–100)
MONOCYTES # BLD: 8 % (ref 1–7)
MORPHOLOGY: ABNORMAL
MUCUS: ABNORMAL
NITRITE, URINE: NEGATIVE
NRBC AUTOMATED: ABNORMAL PER 100 WBC
OTHER OBSERVATIONS UA: ABNORMAL
PDW BLD-RTO: 18.9 % (ref 12.5–15.4)
PH UA: 6 (ref 5–8)
PLATELET # BLD: 192 K/UL (ref 140–450)
PLATELET ESTIMATE: ABNORMAL
PMV BLD AUTO: 8.7 FL (ref 6–12)
POTASSIUM SERPL-SCNC: 4.3 MMOL/L (ref 3.7–5.3)
PRO-BNP: 9025 PG/ML
PROTEIN UA: ABNORMAL
RBC # BLD: 3.57 M/UL (ref 4–5.2)
RBC # BLD: ABNORMAL 10*6/UL
RBC UA: ABNORMAL /HPF (ref 0–2)
RENAL EPITHELIAL, UA: ABNORMAL /HPF
SEG NEUTROPHILS: 73 % (ref 36–66)
SEGMENTED NEUTROPHILS ABSOLUTE COUNT: 7.6 K/UL (ref 1.8–7.7)
SODIUM BLD-SCNC: 139 MMOL/L (ref 135–144)
SPECIFIC GRAVITY UA: 1.01 (ref 1–1.03)
TOTAL IRON BINDING CAPACITY: 360 UG/DL (ref 250–450)
TRICHOMONAS: ABNORMAL
TROPONIN INTERP: NORMAL
TROPONIN INTERP: NORMAL
TROPONIN T: <0.03 NG/ML
TROPONIN T: <0.03 NG/ML
TURBIDITY: CLEAR
UNSATURATED IRON BINDING CAPACITY: 342 UG/DL (ref 112–347)
URINE HGB: NEGATIVE
UROBILINOGEN, URINE: NORMAL
VITAMIN B-12: 1192 PG/ML (ref 232–1245)
WBC # BLD: 10.4 K/UL (ref 3.5–11)
WBC # BLD: ABNORMAL 10*3/UL
WBC UA: ABNORMAL /HPF (ref 0–5)
YEAST: ABNORMAL

## 2018-05-14 PROCEDURE — 81001 URINALYSIS AUTO W/SCOPE: CPT

## 2018-05-14 PROCEDURE — 71045 X-RAY EXAM CHEST 1 VIEW: CPT

## 2018-05-14 PROCEDURE — 82728 ASSAY OF FERRITIN: CPT

## 2018-05-14 PROCEDURE — 82607 VITAMIN B-12: CPT

## 2018-05-14 PROCEDURE — 93005 ELECTROCARDIOGRAM TRACING: CPT

## 2018-05-14 PROCEDURE — 87086 URINE CULTURE/COLONY COUNT: CPT

## 2018-05-14 PROCEDURE — 1200000000 HC SEMI PRIVATE

## 2018-05-14 PROCEDURE — 6370000000 HC RX 637 (ALT 250 FOR IP): Performed by: INTERNAL MEDICINE

## 2018-05-14 PROCEDURE — 83605 ASSAY OF LACTIC ACID: CPT

## 2018-05-14 PROCEDURE — 82746 ASSAY OF FOLIC ACID SERUM: CPT

## 2018-05-14 PROCEDURE — 82947 ASSAY GLUCOSE BLOOD QUANT: CPT

## 2018-05-14 PROCEDURE — 99285 EMERGENCY DEPT VISIT HI MDM: CPT

## 2018-05-14 PROCEDURE — 36415 COLL VENOUS BLD VENIPUNCTURE: CPT

## 2018-05-14 PROCEDURE — 83540 ASSAY OF IRON: CPT

## 2018-05-14 PROCEDURE — 96372 THER/PROPH/DIAG INJ SC/IM: CPT

## 2018-05-14 PROCEDURE — 84484 ASSAY OF TROPONIN QUANT: CPT

## 2018-05-14 PROCEDURE — 85379 FIBRIN DEGRADATION QUANT: CPT

## 2018-05-14 PROCEDURE — 83880 ASSAY OF NATRIURETIC PEPTIDE: CPT

## 2018-05-14 PROCEDURE — 99223 1ST HOSP IP/OBS HIGH 75: CPT | Performed by: INTERNAL MEDICINE

## 2018-05-14 PROCEDURE — 83550 IRON BINDING TEST: CPT

## 2018-05-14 PROCEDURE — 80048 BASIC METABOLIC PNL TOTAL CA: CPT

## 2018-05-14 PROCEDURE — 87186 SC STD MICRODIL/AGAR DIL: CPT

## 2018-05-14 PROCEDURE — 6370000000 HC RX 637 (ALT 250 FOR IP): Performed by: EMERGENCY MEDICINE

## 2018-05-14 PROCEDURE — 94640 AIRWAY INHALATION TREATMENT: CPT

## 2018-05-14 PROCEDURE — 6360000002 HC RX W HCPCS: Performed by: INTERNAL MEDICINE

## 2018-05-14 PROCEDURE — 87088 URINE BACTERIA CULTURE: CPT

## 2018-05-14 PROCEDURE — 85025 COMPLETE CBC W/AUTO DIFF WBC: CPT

## 2018-05-14 PROCEDURE — 6360000002 HC RX W HCPCS

## 2018-05-14 RX ORDER — SODIUM CHLORIDE 0.9 % (FLUSH) 0.9 %
10 SYRINGE (ML) INJECTION PRN
Status: DISCONTINUED | OUTPATIENT
Start: 2018-05-14 | End: 2018-05-17 | Stop reason: HOSPADM

## 2018-05-14 RX ORDER — SODIUM CHLORIDE 0.9 % (FLUSH) 0.9 %
10 SYRINGE (ML) INJECTION EVERY 12 HOURS SCHEDULED
Status: DISCONTINUED | OUTPATIENT
Start: 2018-05-14 | End: 2018-05-17 | Stop reason: HOSPADM

## 2018-05-14 RX ORDER — ASPIRIN 81 MG/1
324 TABLET, CHEWABLE ORAL ONCE
Status: COMPLETED | OUTPATIENT
Start: 2018-05-14 | End: 2018-05-14

## 2018-05-14 RX ORDER — ATORVASTATIN CALCIUM 80 MG/1
80 TABLET, FILM COATED ORAL NIGHTLY
Status: DISCONTINUED | OUTPATIENT
Start: 2018-05-14 | End: 2018-05-17 | Stop reason: HOSPADM

## 2018-05-14 RX ORDER — OXCARBAZEPINE 300 MG/1
300 TABLET, FILM COATED ORAL DAILY
Status: DISCONTINUED | OUTPATIENT
Start: 2018-05-14 | End: 2018-05-17 | Stop reason: HOSPADM

## 2018-05-14 RX ORDER — SODIUM CHLORIDE 9 MG/ML
INJECTION, SOLUTION INTRAVENOUS CONTINUOUS
Status: DISCONTINUED | OUTPATIENT
Start: 2018-05-14 | End: 2018-05-14 | Stop reason: HOSPADM

## 2018-05-14 RX ORDER — CLOPIDOGREL BISULFATE 75 MG/1
75 TABLET ORAL DAILY
Status: DISCONTINUED | OUTPATIENT
Start: 2018-05-14 | End: 2018-05-17 | Stop reason: HOSPADM

## 2018-05-14 RX ORDER — ONDANSETRON 2 MG/ML
4 INJECTION INTRAMUSCULAR; INTRAVENOUS EVERY 6 HOURS PRN
Status: DISCONTINUED | OUTPATIENT
Start: 2018-05-14 | End: 2018-05-17 | Stop reason: HOSPADM

## 2018-05-14 RX ORDER — OLANZAPINE 10 MG/1
10 TABLET ORAL NIGHTLY
Status: DISCONTINUED | OUTPATIENT
Start: 2018-05-14 | End: 2018-05-17 | Stop reason: HOSPADM

## 2018-05-14 RX ORDER — SPIRONOLACTONE 25 MG/1
25 TABLET ORAL DAILY
Status: DISCONTINUED | OUTPATIENT
Start: 2018-05-14 | End: 2018-05-16

## 2018-05-14 RX ORDER — FUROSEMIDE 10 MG/ML
20 INJECTION INTRAMUSCULAR; INTRAVENOUS 2 TIMES DAILY
Status: DISCONTINUED | OUTPATIENT
Start: 2018-05-14 | End: 2018-05-15

## 2018-05-14 RX ORDER — INSULIN GLARGINE 100 [IU]/ML
30 INJECTION, SOLUTION SUBCUTANEOUS NIGHTLY
Status: DISCONTINUED | OUTPATIENT
Start: 2018-05-14 | End: 2018-05-17 | Stop reason: HOSPADM

## 2018-05-14 RX ORDER — ALBUTEROL SULFATE 2.5 MG/3ML
2.5 SOLUTION RESPIRATORY (INHALATION)
Status: DISCONTINUED | OUTPATIENT
Start: 2018-05-14 | End: 2018-05-16

## 2018-05-14 RX ORDER — ALBUTEROL SULFATE 2.5 MG/3ML
SOLUTION RESPIRATORY (INHALATION)
Status: COMPLETED
Start: 2018-05-14 | End: 2018-05-14

## 2018-05-14 RX ORDER — FUROSEMIDE 10 MG/ML
20 INJECTION INTRAMUSCULAR; INTRAVENOUS 2 TIMES DAILY
Status: DISCONTINUED | OUTPATIENT
Start: 2018-05-14 | End: 2018-05-14

## 2018-05-14 RX ORDER — ASPIRIN 81 MG/1
81 TABLET, CHEWABLE ORAL DAILY
Status: DISCONTINUED | OUTPATIENT
Start: 2018-05-14 | End: 2018-05-17 | Stop reason: HOSPADM

## 2018-05-14 RX ADMIN — ASPIRIN 81 MG 324 MG: 81 TABLET ORAL at 11:25

## 2018-05-14 RX ADMIN — METOPROLOL TARTRATE 25 MG: 25 TABLET ORAL at 20:17

## 2018-05-14 RX ADMIN — INSULIN GLARGINE 30 UNITS: 100 INJECTION, SOLUTION SUBCUTANEOUS at 20:18

## 2018-05-14 RX ADMIN — OXCARBAZEPINE 300 MG: 300 TABLET, FILM COATED ORAL at 17:14

## 2018-05-14 RX ADMIN — ENOXAPARIN SODIUM 40 MG: 40 INJECTION SUBCUTANEOUS at 17:15

## 2018-05-14 RX ADMIN — ALBUTEROL SULFATE 2.5 MG: 2.5 SOLUTION RESPIRATORY (INHALATION) at 22:22

## 2018-05-14 RX ADMIN — SPIRONOLACTONE 25 MG: 25 TABLET ORAL at 17:14

## 2018-05-14 RX ADMIN — ATORVASTATIN CALCIUM 80 MG: 80 TABLET, FILM COATED ORAL at 20:17

## 2018-05-14 RX ADMIN — ASPIRIN 81 MG: 81 TABLET, CHEWABLE ORAL at 17:15

## 2018-05-14 RX ADMIN — OLANZAPINE 10 MG: 10 TABLET, FILM COATED ORAL at 20:17

## 2018-05-14 RX ADMIN — CLOPIDOGREL 75 MG: 75 TABLET, FILM COATED ORAL at 17:15

## 2018-05-14 RX ADMIN — LURASIDONE HYDROCHLORIDE 40 MG: 40 TABLET, FILM COATED ORAL at 17:15

## 2018-05-14 ASSESSMENT — PAIN DESCRIPTION - DESCRIPTORS: DESCRIPTORS: ACHING

## 2018-05-14 ASSESSMENT — PAIN DESCRIPTION - PAIN TYPE: TYPE: ACUTE PAIN

## 2018-05-14 ASSESSMENT — PAIN SCALES - GENERAL: PAINLEVEL_OUTOF10: 8

## 2018-05-14 ASSESSMENT — PAIN DESCRIPTION - LOCATION: LOCATION: CHEST

## 2018-05-14 ASSESSMENT — ENCOUNTER SYMPTOMS: SHORTNESS OF BREATH: 1

## 2018-05-14 ASSESSMENT — PAIN DESCRIPTION - FREQUENCY: FREQUENCY: INTERMITTENT

## 2018-05-14 ASSESSMENT — PAIN DESCRIPTION - ORIENTATION: ORIENTATION: LEFT

## 2018-05-15 LAB
GLUCOSE BLD-MCNC: 150 MG/DL (ref 65–105)
GLUCOSE BLD-MCNC: 159 MG/DL (ref 65–105)
GLUCOSE BLD-MCNC: 164 MG/DL (ref 65–105)
GLUCOSE BLD-MCNC: 219 MG/DL (ref 65–105)
TROPONIN INTERP: NORMAL
TROPONIN T: <0.03 NG/ML

## 2018-05-15 PROCEDURE — 6370000000 HC RX 637 (ALT 250 FOR IP): Performed by: INTERNAL MEDICINE

## 2018-05-15 PROCEDURE — 6360000002 HC RX W HCPCS: Performed by: INTERNAL MEDICINE

## 2018-05-15 PROCEDURE — 99232 SBSQ HOSP IP/OBS MODERATE 35: CPT | Performed by: INTERNAL MEDICINE

## 2018-05-15 PROCEDURE — 94640 AIRWAY INHALATION TREATMENT: CPT

## 2018-05-15 PROCEDURE — G0378 HOSPITAL OBSERVATION PER HR: HCPCS

## 2018-05-15 PROCEDURE — 94762 N-INVAS EAR/PLS OXIMTRY CONT: CPT

## 2018-05-15 PROCEDURE — 36415 COLL VENOUS BLD VENIPUNCTURE: CPT

## 2018-05-15 PROCEDURE — 84484 ASSAY OF TROPONIN QUANT: CPT

## 2018-05-15 PROCEDURE — 1200000000 HC SEMI PRIVATE

## 2018-05-15 PROCEDURE — 6360000002 HC RX W HCPCS: Performed by: NURSE PRACTITIONER

## 2018-05-15 PROCEDURE — 82947 ASSAY GLUCOSE BLOOD QUANT: CPT

## 2018-05-15 PROCEDURE — 96372 THER/PROPH/DIAG INJ SC/IM: CPT

## 2018-05-15 RX ORDER — BUMETANIDE 1 MG/1
2 TABLET ORAL EVERY MORNING
Status: DISCONTINUED | OUTPATIENT
Start: 2018-05-15 | End: 2018-05-17 | Stop reason: HOSPADM

## 2018-05-15 RX ORDER — BUMETANIDE 1 MG/1
1 TABLET ORAL EVERY EVENING
Status: DISCONTINUED | OUTPATIENT
Start: 2018-05-15 | End: 2018-05-17 | Stop reason: HOSPADM

## 2018-05-15 RX ADMIN — BUMETANIDE 2 MG: 1 TABLET ORAL at 11:25

## 2018-05-15 RX ADMIN — OXCARBAZEPINE 300 MG: 300 TABLET, FILM COATED ORAL at 08:35

## 2018-05-15 RX ADMIN — CLOPIDOGREL 75 MG: 75 TABLET, FILM COATED ORAL at 08:36

## 2018-05-15 RX ADMIN — BUMETANIDE 1 MG: 1 TABLET ORAL at 17:44

## 2018-05-15 RX ADMIN — ALBUTEROL SULFATE 2.5 MG: 2.5 SOLUTION RESPIRATORY (INHALATION) at 23:02

## 2018-05-15 RX ADMIN — ATORVASTATIN CALCIUM 80 MG: 80 TABLET, FILM COATED ORAL at 22:48

## 2018-05-15 RX ADMIN — ASPIRIN 81 MG: 81 TABLET, CHEWABLE ORAL at 08:36

## 2018-05-15 RX ADMIN — OLANZAPINE 10 MG: 10 TABLET, FILM COATED ORAL at 22:47

## 2018-05-15 RX ADMIN — ENOXAPARIN SODIUM 40 MG: 40 INJECTION SUBCUTANEOUS at 08:35

## 2018-05-15 RX ADMIN — SPIRONOLACTONE 25 MG: 25 TABLET ORAL at 08:35

## 2018-05-15 RX ADMIN — LURASIDONE HYDROCHLORIDE 40 MG: 40 TABLET, FILM COATED ORAL at 08:36

## 2018-05-15 ASSESSMENT — PAIN DESCRIPTION - ONSET: ONSET: SUDDEN

## 2018-05-15 ASSESSMENT — PAIN DESCRIPTION - DESCRIPTORS: DESCRIPTORS: PATIENT UNABLE TO DESCRIBE

## 2018-05-15 ASSESSMENT — PAIN DESCRIPTION - PAIN TYPE: TYPE: ACUTE PAIN

## 2018-05-15 ASSESSMENT — PAIN DESCRIPTION - FREQUENCY: FREQUENCY: CONTINUOUS

## 2018-05-15 ASSESSMENT — PAIN SCALES - GENERAL: PAINLEVEL_OUTOF10: 5

## 2018-05-15 ASSESSMENT — PAIN DESCRIPTION - LOCATION: LOCATION: ABDOMEN

## 2018-05-15 ASSESSMENT — PAIN DESCRIPTION - PROGRESSION: CLINICAL_PROGRESSION: NOT CHANGED

## 2018-05-16 LAB
ANION GAP SERPL CALCULATED.3IONS-SCNC: 12 MMOL/L (ref 9–17)
BUN BLDV-MCNC: 23 MG/DL (ref 8–23)
BUN/CREAT BLD: ABNORMAL (ref 9–20)
CALCIUM SERPL-MCNC: 8.3 MG/DL (ref 8.6–10.4)
CHLORIDE BLD-SCNC: 96 MMOL/L (ref 98–107)
CO2: 26 MMOL/L (ref 20–31)
CREAT SERPL-MCNC: 0.97 MG/DL (ref 0.5–0.9)
CULTURE: ABNORMAL
EKG ATRIAL RATE: 113 BPM
EKG P AXIS: 61 DEGREES
EKG P-R INTERVAL: 164 MS
EKG Q-T INTERVAL: 326 MS
EKG QRS DURATION: 78 MS
EKG QTC CALCULATION (BAZETT): 447 MS
EKG R AXIS: 0 DEGREES
EKG T AXIS: 89 DEGREES
EKG VENTRICULAR RATE: 113 BPM
GFR AFRICAN AMERICAN: >60 ML/MIN
GFR NON-AFRICAN AMERICAN: 58 ML/MIN
GFR SERPL CREATININE-BSD FRML MDRD: ABNORMAL ML/MIN/{1.73_M2}
GFR SERPL CREATININE-BSD FRML MDRD: ABNORMAL ML/MIN/{1.73_M2}
GLUCOSE BLD-MCNC: 110 MG/DL (ref 65–105)
GLUCOSE BLD-MCNC: 198 MG/DL (ref 70–99)
GLUCOSE BLD-MCNC: 225 MG/DL (ref 65–105)
GLUCOSE BLD-MCNC: 227 MG/DL (ref 65–105)
GLUCOSE BLD-MCNC: 263 MG/DL (ref 65–105)
HCT VFR BLD CALC: 23.7 % (ref 36.3–47.1)
HEMOGLOBIN: 7.1 G/DL (ref 11.9–15.1)
Lab: ABNORMAL
MAGNESIUM: 1.9 MG/DL (ref 1.6–2.6)
MCH RBC QN AUTO: 21.1 PG (ref 25.2–33.5)
MCHC RBC AUTO-ENTMCNC: 30 G/DL (ref 28.4–34.8)
MCV RBC AUTO: 70.5 FL (ref 82.6–102.9)
MYOGLOBIN: 52 NG/ML (ref 25–58)
NRBC AUTOMATED: 0 PER 100 WBC
ORGANISM: ABNORMAL
PDW BLD-RTO: 18.2 % (ref 11.8–14.4)
PLATELET # BLD: ABNORMAL K/UL (ref 138–453)
PLATELET, FLUORESCENCE: 151 K/UL (ref 138–453)
PLATELET, IMMATURE FRACTION: 2.3 % (ref 1.1–10.3)
PMV BLD AUTO: ABNORMAL FL (ref 8.1–13.5)
POTASSIUM SERPL-SCNC: 5 MMOL/L (ref 3.7–5.3)
RBC # BLD: 3.36 M/UL (ref 3.95–5.11)
SODIUM BLD-SCNC: 134 MMOL/L (ref 135–144)
SPECIMEN DESCRIPTION: ABNORMAL
SPECIMEN DESCRIPTION: ABNORMAL
STATUS: ABNORMAL
TROPONIN INTERP: NORMAL
TROPONIN T: <0.03 NG/ML
WBC # BLD: 10.2 K/UL (ref 3.5–11.3)

## 2018-05-16 PROCEDURE — 83735 ASSAY OF MAGNESIUM: CPT

## 2018-05-16 PROCEDURE — 6360000002 HC RX W HCPCS: Performed by: INTERNAL MEDICINE

## 2018-05-16 PROCEDURE — 97161 PT EVAL LOW COMPLEX 20 MIN: CPT

## 2018-05-16 PROCEDURE — 1200000000 HC SEMI PRIVATE

## 2018-05-16 PROCEDURE — 83874 ASSAY OF MYOGLOBIN: CPT

## 2018-05-16 PROCEDURE — 94762 N-INVAS EAR/PLS OXIMTRY CONT: CPT

## 2018-05-16 PROCEDURE — 97535 SELF CARE MNGMENT TRAINING: CPT

## 2018-05-16 PROCEDURE — 36415 COLL VENOUS BLD VENIPUNCTURE: CPT

## 2018-05-16 PROCEDURE — G8987 SELF CARE CURRENT STATUS: HCPCS

## 2018-05-16 PROCEDURE — G8978 MOBILITY CURRENT STATUS: HCPCS

## 2018-05-16 PROCEDURE — 82947 ASSAY GLUCOSE BLOOD QUANT: CPT

## 2018-05-16 PROCEDURE — 6370000000 HC RX 637 (ALT 250 FOR IP): Performed by: NURSE PRACTITIONER

## 2018-05-16 PROCEDURE — 85055 RETICULATED PLATELET ASSAY: CPT

## 2018-05-16 PROCEDURE — 84484 ASSAY OF TROPONIN QUANT: CPT

## 2018-05-16 PROCEDURE — G8979 MOBILITY GOAL STATUS: HCPCS

## 2018-05-16 PROCEDURE — 99232 SBSQ HOSP IP/OBS MODERATE 35: CPT | Performed by: INTERNAL MEDICINE

## 2018-05-16 PROCEDURE — 96372 THER/PROPH/DIAG INJ SC/IM: CPT

## 2018-05-16 PROCEDURE — 6370000000 HC RX 637 (ALT 250 FOR IP): Performed by: INTERNAL MEDICINE

## 2018-05-16 PROCEDURE — G0378 HOSPITAL OBSERVATION PER HR: HCPCS

## 2018-05-16 PROCEDURE — G8988 SELF CARE GOAL STATUS: HCPCS

## 2018-05-16 PROCEDURE — 97167 OT EVAL HIGH COMPLEX 60 MIN: CPT

## 2018-05-16 PROCEDURE — 80048 BASIC METABOLIC PNL TOTAL CA: CPT

## 2018-05-16 PROCEDURE — 93005 ELECTROCARDIOGRAM TRACING: CPT

## 2018-05-16 PROCEDURE — 85027 COMPLETE CBC AUTOMATED: CPT

## 2018-05-16 PROCEDURE — 97530 THERAPEUTIC ACTIVITIES: CPT

## 2018-05-16 RX ORDER — ALBUTEROL SULFATE 2.5 MG/3ML
2.5 SOLUTION RESPIRATORY (INHALATION) EVERY 6 HOURS PRN
Status: DISCONTINUED | OUTPATIENT
Start: 2018-05-16 | End: 2018-05-17 | Stop reason: HOSPADM

## 2018-05-16 RX ORDER — ACETAMINOPHEN 325 MG/1
650 TABLET ORAL EVERY 4 HOURS PRN
Status: DISCONTINUED | OUTPATIENT
Start: 2018-05-16 | End: 2018-05-17 | Stop reason: HOSPADM

## 2018-05-16 RX ORDER — LANOLIN ALCOHOL/MO/W.PET/CERES
325 CREAM (GRAM) TOPICAL
Status: DISCONTINUED | OUTPATIENT
Start: 2018-05-16 | End: 2018-05-17 | Stop reason: HOSPADM

## 2018-05-16 RX ADMIN — FERROUS SULFATE TAB EC 325 MG (65 MG FE EQUIVALENT) 325 MG: 325 (65 FE) TABLET DELAYED RESPONSE at 17:50

## 2018-05-16 RX ADMIN — INSULIN GLARGINE 30 UNITS: 100 INJECTION, SOLUTION SUBCUTANEOUS at 20:39

## 2018-05-16 RX ADMIN — ATORVASTATIN CALCIUM 80 MG: 80 TABLET, FILM COATED ORAL at 20:39

## 2018-05-16 RX ADMIN — ACETAMINOPHEN 650 MG: 325 TABLET ORAL at 20:30

## 2018-05-16 RX ADMIN — ENOXAPARIN SODIUM 40 MG: 40 INJECTION SUBCUTANEOUS at 08:03

## 2018-05-16 RX ADMIN — LURASIDONE HYDROCHLORIDE 40 MG: 40 TABLET, FILM COATED ORAL at 08:03

## 2018-05-16 RX ADMIN — METOPROLOL TARTRATE 25 MG: 25 TABLET ORAL at 08:03

## 2018-05-16 RX ADMIN — METOPROLOL TARTRATE 25 MG: 25 TABLET ORAL at 20:39

## 2018-05-16 RX ADMIN — OLANZAPINE 10 MG: 10 TABLET, FILM COATED ORAL at 20:39

## 2018-05-16 RX ADMIN — SPIRONOLACTONE 25 MG: 25 TABLET ORAL at 08:03

## 2018-05-16 RX ADMIN — FERROUS SULFATE TAB EC 325 MG (65 MG FE EQUIVALENT) 325 MG: 325 (65 FE) TABLET DELAYED RESPONSE at 12:44

## 2018-05-16 RX ADMIN — BUMETANIDE 2 MG: 1 TABLET ORAL at 08:03

## 2018-05-16 RX ADMIN — OXCARBAZEPINE 300 MG: 300 TABLET, FILM COATED ORAL at 08:03

## 2018-05-16 RX ADMIN — BUMETANIDE 1 MG: 1 TABLET ORAL at 17:49

## 2018-05-16 RX ADMIN — CLOPIDOGREL 75 MG: 75 TABLET, FILM COATED ORAL at 08:03

## 2018-05-16 RX ADMIN — ASPIRIN 81 MG: 81 TABLET, CHEWABLE ORAL at 08:03

## 2018-05-16 ASSESSMENT — PAIN SCALES - GENERAL
PAINLEVEL_OUTOF10: 4
PAINLEVEL_OUTOF10: 8
PAINLEVEL_OUTOF10: 9

## 2018-05-16 ASSESSMENT — PAIN DESCRIPTION - LOCATION: LOCATION: CHEST

## 2018-05-16 ASSESSMENT — PAIN DESCRIPTION - PAIN TYPE: TYPE: ACUTE PAIN

## 2018-05-17 VITALS
HEIGHT: 61 IN | OXYGEN SATURATION: 94 % | TEMPERATURE: 97.5 F | WEIGHT: 173.06 LBS | HEART RATE: 94 BPM | RESPIRATION RATE: 18 BRPM | DIASTOLIC BLOOD PRESSURE: 51 MMHG | BODY MASS INDEX: 32.67 KG/M2 | SYSTOLIC BLOOD PRESSURE: 98 MMHG

## 2018-05-17 PROBLEM — N18.2 CKD (CHRONIC KIDNEY DISEASE), STAGE II: Status: ACTIVE | Noted: 2018-05-17

## 2018-05-17 LAB
MYOGLOBIN: 71 NG/ML (ref 25–58)
TROPONIN INTERP: ABNORMAL
TROPONIN T: <0.03 NG/ML

## 2018-05-17 PROCEDURE — 6370000000 HC RX 637 (ALT 250 FOR IP): Performed by: INTERNAL MEDICINE

## 2018-05-17 PROCEDURE — 96372 THER/PROPH/DIAG INJ SC/IM: CPT

## 2018-05-17 PROCEDURE — 94762 N-INVAS EAR/PLS OXIMTRY CONT: CPT

## 2018-05-17 PROCEDURE — 99239 HOSP IP/OBS DSCHRG MGMT >30: CPT | Performed by: INTERNAL MEDICINE

## 2018-05-17 PROCEDURE — 6360000002 HC RX W HCPCS: Performed by: INTERNAL MEDICINE

## 2018-05-17 PROCEDURE — 83874 ASSAY OF MYOGLOBIN: CPT

## 2018-05-17 PROCEDURE — G0378 HOSPITAL OBSERVATION PER HR: HCPCS

## 2018-05-17 PROCEDURE — 36415 COLL VENOUS BLD VENIPUNCTURE: CPT

## 2018-05-17 PROCEDURE — 84484 ASSAY OF TROPONIN QUANT: CPT

## 2018-05-17 PROCEDURE — 2580000003 HC RX 258: Performed by: INTERNAL MEDICINE

## 2018-05-17 PROCEDURE — 94640 AIRWAY INHALATION TREATMENT: CPT

## 2018-05-17 RX ORDER — BUMETANIDE 1 MG/1
1 TABLET ORAL EVERY EVENING
Qty: 30 TABLET | Refills: 3 | Status: SHIPPED | OUTPATIENT
Start: 2018-05-17

## 2018-05-17 RX ORDER — BUMETANIDE 2 MG/1
2 TABLET ORAL EVERY MORNING
Qty: 30 TABLET | Refills: 3 | Status: ON HOLD | OUTPATIENT
Start: 2018-05-18 | End: 2019-01-31 | Stop reason: HOSPADM

## 2018-05-17 RX ORDER — LANOLIN ALCOHOL/MO/W.PET/CERES
325 CREAM (GRAM) TOPICAL
Qty: 90 TABLET | Refills: 3 | Status: SHIPPED | OUTPATIENT
Start: 2018-05-17

## 2018-05-17 RX ADMIN — CLOPIDOGREL 75 MG: 75 TABLET, FILM COATED ORAL at 08:13

## 2018-05-17 RX ADMIN — FERROUS SULFATE TAB EC 325 MG (65 MG FE EQUIVALENT) 325 MG: 325 (65 FE) TABLET DELAYED RESPONSE at 08:13

## 2018-05-17 RX ADMIN — BUMETANIDE 2 MG: 1 TABLET ORAL at 08:13

## 2018-05-17 RX ADMIN — Medication 10 ML: at 08:14

## 2018-05-17 RX ADMIN — METOPROLOL TARTRATE 25 MG: 25 TABLET ORAL at 08:13

## 2018-05-17 RX ADMIN — ASPIRIN 81 MG: 81 TABLET, CHEWABLE ORAL at 08:13

## 2018-05-17 RX ADMIN — ALBUTEROL SULFATE 2.5 MG: 2.5 SOLUTION RESPIRATORY (INHALATION) at 10:32

## 2018-05-17 RX ADMIN — FERROUS SULFATE TAB EC 325 MG (65 MG FE EQUIVALENT) 325 MG: 325 (65 FE) TABLET DELAYED RESPONSE at 12:00

## 2018-05-17 RX ADMIN — ENOXAPARIN SODIUM 40 MG: 40 INJECTION SUBCUTANEOUS at 08:13

## 2018-05-17 RX ADMIN — LURASIDONE HYDROCHLORIDE 40 MG: 40 TABLET, FILM COATED ORAL at 08:13

## 2018-05-17 RX ADMIN — OXCARBAZEPINE 300 MG: 300 TABLET, FILM COATED ORAL at 08:12

## 2018-05-20 ENCOUNTER — TELEPHONE (OUTPATIENT)
Dept: FAMILY MEDICINE CLINIC | Age: 63
End: 2018-05-20

## 2018-05-24 ENCOUNTER — HOSPITAL ENCOUNTER (OUTPATIENT)
Dept: OTHER | Age: 63
Discharge: HOME | End: 2018-05-24
Payer: MEDICARE

## 2018-05-24 VITALS
WEIGHT: 184.25 LBS | SYSTOLIC BLOOD PRESSURE: 124 MMHG | HEART RATE: 89 BPM | BODY MASS INDEX: 34.79 KG/M2 | DIASTOLIC BLOOD PRESSURE: 68 MMHG | RESPIRATION RATE: 18 BRPM | OXYGEN SATURATION: 100 %

## 2018-05-24 PROCEDURE — 99211 OFF/OP EST MAY X REQ PHY/QHP: CPT

## 2018-05-24 ASSESSMENT — PAIN DESCRIPTION - LOCATION
LOCATION: LEG
LOCATION: LEG

## 2018-05-24 ASSESSMENT — PAIN DESCRIPTION - PAIN TYPE
TYPE: ACUTE PAIN
TYPE: CHRONIC PAIN;ACUTE PAIN

## 2018-05-24 ASSESSMENT — PAIN DESCRIPTION - ONSET
ONSET: ON-GOING
ONSET: PROGRESSIVE

## 2018-05-24 ASSESSMENT — PAIN DESCRIPTION - DESCRIPTORS
DESCRIPTORS: THROBBING;BURNING
DESCRIPTORS: CONSTANT;THROBBING

## 2018-05-24 ASSESSMENT — PAIN DESCRIPTION - ORIENTATION
ORIENTATION: LEFT
ORIENTATION: LEFT

## 2018-05-24 ASSESSMENT — PAIN SCALES - GENERAL
PAINLEVEL_OUTOF10: 8
PAINLEVEL_OUTOF10: 9

## 2018-05-24 ASSESSMENT — PAIN DESCRIPTION - PROGRESSION
CLINICAL_PROGRESSION: GRADUALLY WORSENING
CLINICAL_PROGRESSION: GRADUALLY WORSENING

## 2018-05-24 ASSESSMENT — PAIN DESCRIPTION - FREQUENCY
FREQUENCY: CONTINUOUS
FREQUENCY: CONTINUOUS

## 2018-05-30 ENCOUNTER — HOSPITAL ENCOUNTER (OUTPATIENT)
Age: 63
Setting detail: SPECIMEN
Discharge: HOME | End: 2018-05-30
Payer: MEDICARE

## 2018-05-30 LAB
ANION GAP SERPL CALCULATED.3IONS-SCNC: 19 MMOL/L (ref 9–17)
BUN BLDV-MCNC: 38 MG/DL (ref 8–23)
BUN/CREAT BLD: ABNORMAL (ref 9–20)
CALCIUM SERPL-MCNC: 9 MG/DL (ref 8.6–10.4)
CHLORIDE BLD-SCNC: 97 MMOL/L (ref 98–107)
CO2: 22 MMOL/L (ref 20–31)
CREAT SERPL-MCNC: 1.04 MG/DL (ref 0.5–0.9)
GFR AFRICAN AMERICAN: >60 ML/MIN
GFR NON-AFRICAN AMERICAN: 54 ML/MIN
GFR SERPL CREATININE-BSD FRML MDRD: ABNORMAL ML/MIN/{1.73_M2}
GFR SERPL CREATININE-BSD FRML MDRD: ABNORMAL ML/MIN/{1.73_M2}
GLUCOSE BLD-MCNC: 82 MG/DL (ref 70–99)
POTASSIUM SERPL-SCNC: 4.1 MMOL/L (ref 3.7–5.3)
SODIUM BLD-SCNC: 138 MMOL/L (ref 135–144)

## 2018-05-30 PROCEDURE — 80048 BASIC METABOLIC PNL TOTAL CA: CPT

## 2018-05-30 PROCEDURE — 36415 COLL VENOUS BLD VENIPUNCTURE: CPT

## 2018-05-30 PROCEDURE — P9603 ONE-WAY ALLOW PRORATED MILES: HCPCS

## 2018-05-31 ENCOUNTER — HOSPITAL ENCOUNTER (OUTPATIENT)
Age: 63
Setting detail: SPECIMEN
Discharge: HOME | End: 2018-05-31
Payer: MEDICARE

## 2018-05-31 LAB
ANION GAP SERPL CALCULATED.3IONS-SCNC: 16 MMOL/L (ref 9–17)
BUN BLDV-MCNC: 35 MG/DL (ref 8–23)
BUN/CREAT BLD: ABNORMAL (ref 9–20)
CALCIUM SERPL-MCNC: 8.7 MG/DL (ref 8.6–10.4)
CHLORIDE BLD-SCNC: 99 MMOL/L (ref 98–107)
CO2: 24 MMOL/L (ref 20–31)
CREAT SERPL-MCNC: 0.99 MG/DL (ref 0.5–0.9)
GFR AFRICAN AMERICAN: >60 ML/MIN
GFR NON-AFRICAN AMERICAN: 57 ML/MIN
GFR SERPL CREATININE-BSD FRML MDRD: ABNORMAL ML/MIN/{1.73_M2}
GFR SERPL CREATININE-BSD FRML MDRD: ABNORMAL ML/MIN/{1.73_M2}
GLUCOSE BLD-MCNC: 74 MG/DL (ref 70–99)
POTASSIUM SERPL-SCNC: 4.1 MMOL/L (ref 3.7–5.3)
SODIUM BLD-SCNC: 139 MMOL/L (ref 135–144)

## 2018-05-31 PROCEDURE — 80048 BASIC METABOLIC PNL TOTAL CA: CPT

## 2018-05-31 PROCEDURE — P9603 ONE-WAY ALLOW PRORATED MILES: HCPCS

## 2018-05-31 PROCEDURE — 36415 COLL VENOUS BLD VENIPUNCTURE: CPT

## 2018-06-14 ENCOUNTER — HOSPITAL ENCOUNTER (OUTPATIENT)
Age: 63
Setting detail: SPECIMEN
Discharge: HOME | End: 2018-06-14
Payer: MEDICARE

## 2018-06-14 LAB
ANION GAP SERPL CALCULATED.3IONS-SCNC: 18 MMOL/L (ref 9–17)
BUN BLDV-MCNC: 43 MG/DL (ref 8–23)
BUN/CREAT BLD: ABNORMAL (ref 9–20)
CALCIUM SERPL-MCNC: 9.5 MG/DL (ref 8.6–10.4)
CHLORIDE BLD-SCNC: 93 MMOL/L (ref 98–107)
CO2: 25 MMOL/L (ref 20–31)
CREAT SERPL-MCNC: 0.91 MG/DL (ref 0.5–0.9)
GFR AFRICAN AMERICAN: >60 ML/MIN
GFR NON-AFRICAN AMERICAN: >60 ML/MIN
GFR SERPL CREATININE-BSD FRML MDRD: ABNORMAL ML/MIN/{1.73_M2}
GFR SERPL CREATININE-BSD FRML MDRD: ABNORMAL ML/MIN/{1.73_M2}
GLUCOSE BLD-MCNC: 103 MG/DL (ref 70–99)
POTASSIUM SERPL-SCNC: 3.8 MMOL/L (ref 3.7–5.3)
SODIUM BLD-SCNC: 136 MMOL/L (ref 135–144)

## 2018-06-14 PROCEDURE — P9603 ONE-WAY ALLOW PRORATED MILES: HCPCS

## 2018-06-14 PROCEDURE — 80048 BASIC METABOLIC PNL TOTAL CA: CPT

## 2018-06-14 PROCEDURE — 36415 COLL VENOUS BLD VENIPUNCTURE: CPT

## 2018-06-18 ENCOUNTER — HOSPITAL ENCOUNTER (OUTPATIENT)
Age: 63
Setting detail: SPECIMEN
Discharge: HOME | End: 2018-06-18
Payer: MEDICARE

## 2018-06-18 LAB
ANION GAP SERPL CALCULATED.3IONS-SCNC: 19 MMOL/L (ref 9–17)
BUN BLDV-MCNC: 41 MG/DL (ref 8–23)
BUN/CREAT BLD: ABNORMAL (ref 9–20)
CALCIUM SERPL-MCNC: 9.2 MG/DL (ref 8.6–10.4)
CHLORIDE BLD-SCNC: 95 MMOL/L (ref 98–107)
CO2: 24 MMOL/L (ref 20–31)
CREAT SERPL-MCNC: 0.95 MG/DL (ref 0.5–0.9)
GFR AFRICAN AMERICAN: >60 ML/MIN
GFR NON-AFRICAN AMERICAN: 60 ML/MIN
GFR SERPL CREATININE-BSD FRML MDRD: ABNORMAL ML/MIN/{1.73_M2}
GFR SERPL CREATININE-BSD FRML MDRD: ABNORMAL ML/MIN/{1.73_M2}
GLUCOSE BLD-MCNC: 50 MG/DL (ref 70–99)
POTASSIUM SERPL-SCNC: 3.5 MMOL/L (ref 3.7–5.3)
SODIUM BLD-SCNC: 138 MMOL/L (ref 135–144)

## 2018-06-18 PROCEDURE — 36415 COLL VENOUS BLD VENIPUNCTURE: CPT

## 2018-06-18 PROCEDURE — 80048 BASIC METABOLIC PNL TOTAL CA: CPT

## 2018-06-18 PROCEDURE — P9603 ONE-WAY ALLOW PRORATED MILES: HCPCS

## 2018-07-02 ENCOUNTER — HOSPITAL ENCOUNTER (OUTPATIENT)
Age: 63
Setting detail: SPECIMEN
Discharge: HOME | End: 2018-07-02
Payer: MEDICARE

## 2018-07-02 LAB
ALBUMIN SERPL-MCNC: 3.5 G/DL (ref 3.5–5.2)
ALBUMIN/GLOBULIN RATIO: 0.8 (ref 1–2.5)
ALP BLD-CCNC: 139 U/L (ref 35–104)
ALT SERPL-CCNC: 17 U/L (ref 5–33)
ANION GAP SERPL CALCULATED.3IONS-SCNC: 16 MMOL/L (ref 9–17)
AST SERPL-CCNC: 24 U/L
BILIRUB SERPL-MCNC: 0.24 MG/DL (ref 0.3–1.2)
BUN BLDV-MCNC: 31 MG/DL (ref 8–23)
BUN/CREAT BLD: ABNORMAL (ref 9–20)
CALCIUM SERPL-MCNC: 9.2 MG/DL (ref 8.6–10.4)
CHLORIDE BLD-SCNC: 101 MMOL/L (ref 98–107)
CHOLESTEROL/HDL RATIO: 3.3
CHOLESTEROL: 133 MG/DL
CO2: 23 MMOL/L (ref 20–31)
CREAT SERPL-MCNC: 0.98 MG/DL (ref 0.5–0.9)
ESTIMATED AVERAGE GLUCOSE: 174 MG/DL
GFR AFRICAN AMERICAN: >60 ML/MIN
GFR NON-AFRICAN AMERICAN: 58 ML/MIN
GFR SERPL CREATININE-BSD FRML MDRD: ABNORMAL ML/MIN/{1.73_M2}
GFR SERPL CREATININE-BSD FRML MDRD: ABNORMAL ML/MIN/{1.73_M2}
GLUCOSE BLD-MCNC: 84 MG/DL (ref 70–99)
HBA1C MFR BLD: 7.7 % (ref 4–6)
HDLC SERPL-MCNC: 40 MG/DL
LDL CHOLESTEROL: 70 MG/DL (ref 0–130)
POTASSIUM SERPL-SCNC: 4 MMOL/L (ref 3.7–5.3)
SODIUM BLD-SCNC: 140 MMOL/L (ref 135–144)
TOTAL PROTEIN: 7.8 G/DL (ref 6.4–8.3)
TRIGL SERPL-MCNC: 115 MG/DL
TSH SERPL DL<=0.05 MIU/L-ACNC: 1.3 MIU/L (ref 0.3–5)
VLDLC SERPL CALC-MCNC: ABNORMAL MG/DL (ref 1–30)

## 2018-07-02 PROCEDURE — 80061 LIPID PANEL: CPT

## 2018-07-02 PROCEDURE — 36415 COLL VENOUS BLD VENIPUNCTURE: CPT

## 2018-07-02 PROCEDURE — P9603 ONE-WAY ALLOW PRORATED MILES: HCPCS

## 2018-07-02 PROCEDURE — 83036 HEMOGLOBIN GLYCOSYLATED A1C: CPT

## 2018-07-02 PROCEDURE — 80053 COMPREHEN METABOLIC PANEL: CPT

## 2018-07-02 PROCEDURE — 84443 ASSAY THYROID STIM HORMONE: CPT

## 2018-07-03 ENCOUNTER — HOSPITAL ENCOUNTER (OUTPATIENT)
Dept: OTHER | Age: 63
Discharge: HOME | End: 2018-07-03
Payer: MEDICARE

## 2018-07-03 VITALS
DIASTOLIC BLOOD PRESSURE: 69 MMHG | WEIGHT: 166.6 LBS | HEART RATE: 95 BPM | BODY MASS INDEX: 31.45 KG/M2 | SYSTOLIC BLOOD PRESSURE: 103 MMHG | RESPIRATION RATE: 20 BRPM | OXYGEN SATURATION: 98 %

## 2018-07-03 PROCEDURE — 99211 OFF/OP EST MAY X REQ PHY/QHP: CPT

## 2018-07-03 RX ORDER — SPIRONOLACTONE 25 MG/1
25 TABLET ORAL DAILY
COMMUNITY

## 2018-07-03 ASSESSMENT — PAIN DESCRIPTION - FREQUENCY: FREQUENCY: CONTINUOUS

## 2018-07-03 ASSESSMENT — PAIN DESCRIPTION - ORIENTATION: ORIENTATION: LEFT

## 2018-07-03 ASSESSMENT — PAIN DESCRIPTION - PAIN TYPE: TYPE: ACUTE PAIN;CHRONIC PAIN

## 2018-07-03 ASSESSMENT — PAIN DESCRIPTION - DESCRIPTORS: DESCRIPTORS: BURNING;THROBBING

## 2018-07-03 ASSESSMENT — PAIN DESCRIPTION - LOCATION: LOCATION: LEG

## 2018-07-03 NOTE — PROGRESS NOTES
Date:  7/3/2018  Time:  12:23 PM    CHF Clinic at [address]    Office: [phone] Fax: [phone]    Re:  Rodríguez Ritter   Patient : 1955    Vital Signs: /69   Pulse 95   Resp 20   Wt 166 lb 9.6 oz (75.6 kg)   LMP  (LMP Unknown)   SpO2 98%   BMI 31.45 kg/m²                       O2 Device: None (Room air)                           Recent Labs      18   0629   NA  140   K  4.0   CL  101   CO2  23   BUN  31*   CREATININE  0.98*   GLUCOSE  84        Respiratory:    Assessment  Charting Type: Reassessment    Breath Sounds  Right Upper Lobe: Clear  Right Middle Lobe: Clear  Right Lower Lobe: Clear  Left Upper Lobe: Clear  Left Lower Lobe: Clear              Peripheral Vascular  RLE Edema: Trace  LLE Edema: Trace      Complaints: Lower left leg pain        Comment : Weight down 18 lbs in one month. Patient had lower leg swelling and shortness of breath at her initial visit here last month. Bumex was increased to 2x day and aldactone added 25mg. Swelling much improved. Labs were rechecked on  and noted to be failry normal for her BUN/Creat = 31/0. 98. Reminded of low salt diet and fluid limits. She is not wearing oxygen today, states she is short of breath, her SaO2 = 98%. She states she is getting daily weights. Her main complaint is the left leg pain. She states her family has a history of bone cancer and [facility] is \"looking into my leg pain\".  Next visit here 4 weeks 2018 st 1:30pm.     Electronically signed by Manjinder Donald RN on 7/3/2018 at 12:23 PM

## 2018-08-02 ENCOUNTER — HOSPITAL ENCOUNTER (OUTPATIENT)
Dept: OTHER | Age: 63
Discharge: HOME | End: 2018-08-02
Payer: MEDICARE

## 2018-08-02 VITALS
HEART RATE: 88 BPM | RESPIRATION RATE: 18 BRPM | WEIGHT: 166.25 LBS | OXYGEN SATURATION: 92 % | BODY MASS INDEX: 31.39 KG/M2 | SYSTOLIC BLOOD PRESSURE: 102 MMHG | DIASTOLIC BLOOD PRESSURE: 63 MMHG

## 2018-08-02 PROCEDURE — 99211 OFF/OP EST MAY X REQ PHY/QHP: CPT

## 2018-08-02 NOTE — PROGRESS NOTES
Verbally reviewed medication list with patient; patient verbalized understanding. Discussed 2000mg/day sodium restricted diet; patient verbalized understanding. Moderate daily exercise encouraged as tolerated. Discussed rest breaks as needed; patient verbalized understanding. Patient instructed to weigh self at the same time of each day, using same clothes and same scale; reinforced teaching to monitor for 3-5 lb weight increase over 1-2 days, and to notify the CHF clinic at [phone] or physician office if weight change noted. Patient verbalized understanding. Risks of smoking discussed with the patient if applicable; patient strongly discouraged to smoke. Patient verbalized understanding. Signs and symptoms of CHF discussed with patient, such as feeling more tired than normal, feeling short of breath, coughing that increases when you lie down, sudden weight gain, swelling of your feet, legs or belly. Patient verbalized understanding to notify the CHF clinic at [phone] or physician office if these symptoms occur. Compliance with plan of care and further disease process causes discussed with patient, patient encouraged to keep all follow up appointments. Patient verbalized understanding.

## 2018-08-30 ENCOUNTER — HOSPITAL ENCOUNTER (OUTPATIENT)
Dept: OTHER | Age: 63
Discharge: HOME | End: 2018-08-30
Payer: MEDICARE

## 2018-08-30 VITALS
BODY MASS INDEX: 31.76 KG/M2 | HEART RATE: 93 BPM | DIASTOLIC BLOOD PRESSURE: 70 MMHG | RESPIRATION RATE: 20 BRPM | OXYGEN SATURATION: 95 % | SYSTOLIC BLOOD PRESSURE: 110 MMHG | WEIGHT: 168.2 LBS

## 2018-08-30 PROCEDURE — 99211 OFF/OP EST MAY X REQ PHY/QHP: CPT | Performed by: NURSE PRACTITIONER

## 2018-08-30 ASSESSMENT — PAIN DESCRIPTION - LOCATION: LOCATION: ARM

## 2018-08-30 ASSESSMENT — PAIN DESCRIPTION - PAIN TYPE: TYPE: CHRONIC PAIN

## 2018-08-30 ASSESSMENT — PAIN SCALES - GENERAL: PAINLEVEL_OUTOF10: 8

## 2018-08-30 ASSESSMENT — PAIN DESCRIPTION - ORIENTATION: ORIENTATION: LEFT

## 2018-10-22 ENCOUNTER — OFFICE VISIT (OUTPATIENT)
Dept: GASTROENTEROLOGY | Age: 63
End: 2018-10-22
Payer: MEDICARE

## 2018-10-22 VITALS
HEART RATE: 86 BPM | WEIGHT: 163 LBS | SYSTOLIC BLOOD PRESSURE: 100 MMHG | BODY MASS INDEX: 30.77 KG/M2 | DIASTOLIC BLOOD PRESSURE: 68 MMHG

## 2018-10-22 DIAGNOSIS — Z11.59 ENCOUNTER FOR SCREENING FOR OTHER VIRAL DISEASES: ICD-10-CM

## 2018-10-22 DIAGNOSIS — K57.32 DIVERTICULITIS OF COLON: Primary | ICD-10-CM

## 2018-10-22 DIAGNOSIS — B19.20 HEPATITIS C VIRUS INFECTION WITHOUT HEPATIC COMA, UNSPECIFIED CHRONICITY: ICD-10-CM

## 2018-10-22 DIAGNOSIS — R10.9 ABDOMINAL PAIN, UNSPECIFIED ABDOMINAL LOCATION: ICD-10-CM

## 2018-10-22 DIAGNOSIS — D50.9 IRON DEFICIENCY ANEMIA, UNSPECIFIED IRON DEFICIENCY ANEMIA TYPE: ICD-10-CM

## 2018-10-22 PROCEDURE — G8484 FLU IMMUNIZE NO ADMIN: HCPCS | Performed by: INTERNAL MEDICINE

## 2018-10-22 PROCEDURE — 4004F PT TOBACCO SCREEN RCVD TLK: CPT | Performed by: INTERNAL MEDICINE

## 2018-10-22 PROCEDURE — G8427 DOCREV CUR MEDS BY ELIG CLIN: HCPCS | Performed by: INTERNAL MEDICINE

## 2018-10-22 PROCEDURE — 99205 OFFICE O/P NEW HI 60 MIN: CPT | Performed by: INTERNAL MEDICINE

## 2018-10-22 PROCEDURE — G8598 ASA/ANTIPLAT THER USED: HCPCS | Performed by: INTERNAL MEDICINE

## 2018-10-22 PROCEDURE — G8417 CALC BMI ABV UP PARAM F/U: HCPCS | Performed by: INTERNAL MEDICINE

## 2018-10-22 PROCEDURE — 3017F COLORECTAL CA SCREEN DOC REV: CPT | Performed by: INTERNAL MEDICINE

## 2018-10-22 ASSESSMENT — ENCOUNTER SYMPTOMS
ABDOMINAL PAIN: 1
TROUBLE SWALLOWING: 0
ABDOMINAL DISTENTION: 0
BACK PAIN: 0
CONSTIPATION: 0
NAUSEA: 0
DIARRHEA: 0
COUGH: 1
SINUS PRESSURE: 0
RECTAL PAIN: 0
SHORTNESS OF BREATH: 1
BLOOD IN STOOL: 1
SINUS PAIN: 0
SORE THROAT: 0
VOMITING: 0
ANAL BLEEDING: 0
CHEST TIGHTNESS: 0

## 2018-10-22 NOTE — PROGRESS NOTES
  metoprolol tartrate (LOPRESSOR) 25 MG tablet, Take 0.5 tablets by mouth 2 times daily, Disp: 30 tablet, Rfl: 2    bumetanide (BUMEX) 1 MG tablet, Take 1 tablet by mouth every evening, Disp: 30 tablet, Rfl: 3    bumetanide (BUMEX) 2 MG tablet, Take 1 tablet by mouth every morning, Disp: 30 tablet, Rfl: 3    ferrous sulfate 325 (65 Fe) MG EC tablet, Take 1 tablet by mouth 3 times daily (with meals), Disp: 90 tablet, Rfl: 3    Elastic Bandages & Supports (JOBST KNEE HIGH COMPRESSION SM) MISC, 1 each by Does not apply route daily as needed (wear daily), Disp: 1 each, Rfl: 0    ranitidine (ZANTAC) 150 MG tablet, Take 150 mg by mouth daily, Disp: , Rfl:     lurasidone (LATUDA) 40 MG TABS tablet, Take 40 mg by mouth , Disp: , Rfl:     ondansetron (ZOFRAN-ODT) 4 MG disintegrating tablet, Take 4 mg by mouth, Disp: , Rfl:     senna-docusate (PERICOLACE) 8.6-50 MG per tablet, Take 1 tablet by mouth, Disp: , Rfl:     aspirin 81 MG chewable tablet, Take 1 tablet by mouth daily, Disp: 30 tablet, Rfl: 3    ipratropium-albuterol (DUONEB) 0.5-2.5 (3) MG/3ML SOLN nebulizer solution, Inhale 3 mLs into the lungs every 4 hours as needed for Shortness of Breath, Disp: 360 mL, Rfl: 2    nitroGLYCERIN (NITROSTAT) 0.4 MG SL tablet, Place 1 tablet under the tongue every 5 minutes as needed for Chest pain up to max of 3 total doses. If no relief after 1 dose, call 911., Disp: 25 tablet, Rfl: 3    OXcarbazepine (TRILEPTAL) 300 MG tablet, Take 1 tablet by mouth 2 times daily Per pharmacy she is to be taking 300 mg twice daily but patient states that she only takes once daily. , Disp: 60 tablet, Rfl: 3    insulin glargine (LANTUS) 100 UNIT/ML injection vial, Inject 30 Units into the skin nightly, Disp: 1 vial, Rfl: 3    atorvastatin (LIPITOR) 80 MG tablet, Take 1 tablet by mouth nightly, Disp: 30 tablet, Rfl: 3    OLANZapine (ZYPREXA) 10 MG tablet, Take 1 tablet by mouth nightly, Disp: 30 tablet, Rfl: 3    colchicine 1.0 04/07/2018         Lab Results   Component Value Date    RBC 3.36 (L) 05/16/2018    HGB 7.1 (L) 05/16/2018    MCV 70.5 (L) 05/16/2018    MCH 21.1 (L) 05/16/2018    MCHC 30.0 05/16/2018    RDW 18.2 (H) 05/16/2018    MPV NOT REPORTED 05/16/2018    BASOPCT 0 05/14/2018    LYMPHSABS 1.87 05/14/2018    MONOSABS 0.83 (H) 05/14/2018    NEUTROABS 7.60 05/14/2018    EOSABS 0.10 05/14/2018    BASOSABS 0.00 05/14/2018         DIAGNOSTIC TESTING:     No results found. IMPRESSION: Ms. Inder Leo is a 59 y.o. female with Fe def anemia. Rectal bleeding. On ASA/Plavix for CVA. Plan for EGD/CY. May need push enteroscopy. She denies vaginal bleeding or hematuria. Hx of HCV with Tx- no details available. We will recheck LFTs and viral serologies. Elevated alk phos. F/u in 4 weeks. Thank you for allowing me to participate in the care of Ms. Inder Leo. For any further questions please do not hesitate to contact me. Delmi Calderón MD [facility]    Please note that this chart was generated using voice recognition Dragon dictation software. Although every effort was made to ensure the accuracy of this automated transcription, some errors in transcription may have occurred.

## 2018-10-25 ENCOUNTER — HOSPITAL ENCOUNTER (OUTPATIENT)
Age: 63
Setting detail: SPECIMEN
Discharge: HOME | End: 2018-10-25
Payer: MEDICARE

## 2018-10-25 ENCOUNTER — TELEPHONE (OUTPATIENT)
Dept: GASTROENTEROLOGY | Age: 63
End: 2018-10-25

## 2018-10-25 LAB
ALBUMIN SERPL-MCNC: 3.6 G/DL (ref 3.5–5.2)
ALBUMIN/GLOBULIN RATIO: 1 (ref 1–2.5)
ALP BLD-CCNC: 135 U/L (ref 35–104)
ALT SERPL-CCNC: 20 U/L (ref 5–33)
ANION GAP SERPL CALCULATED.3IONS-SCNC: 16 MMOL/L (ref 9–17)
AST SERPL-CCNC: 21 U/L
BILIRUB SERPL-MCNC: 0.19 MG/DL (ref 0.3–1.2)
BUN BLDV-MCNC: 36 MG/DL (ref 8–23)
BUN/CREAT BLD: ABNORMAL (ref 9–20)
CALCIUM SERPL-MCNC: 8.9 MG/DL (ref 8.6–10.4)
CHLORIDE BLD-SCNC: 98 MMOL/L (ref 98–107)
CHOLESTEROL/HDL RATIO: 4
CHOLESTEROL: 160 MG/DL
CO2: 26 MMOL/L (ref 20–31)
CREAT SERPL-MCNC: 1.14 MG/DL (ref 0.5–0.9)
ESTIMATED AVERAGE GLUCOSE: 186 MG/DL
GFR AFRICAN AMERICAN: 59 ML/MIN
GFR NON-AFRICAN AMERICAN: 48 ML/MIN
GFR SERPL CREATININE-BSD FRML MDRD: ABNORMAL ML/MIN/{1.73_M2}
GFR SERPL CREATININE-BSD FRML MDRD: ABNORMAL ML/MIN/{1.73_M2}
GLUCOSE BLD-MCNC: 111 MG/DL (ref 70–99)
HBA1C MFR BLD: 8.1 % (ref 4–6)
HDLC SERPL-MCNC: 40 MG/DL
LDL CHOLESTEROL: 86 MG/DL (ref 0–130)
POTASSIUM SERPL-SCNC: 4.1 MMOL/L (ref 3.7–5.3)
SODIUM BLD-SCNC: 140 MMOL/L (ref 135–144)
TOTAL PROTEIN: 7.1 G/DL (ref 6.4–8.3)
TRIGL SERPL-MCNC: 170 MG/DL
URIC ACID: 8.8 MG/DL (ref 2.4–5.7)
VLDLC SERPL CALC-MCNC: ABNORMAL MG/DL (ref 1–30)

## 2018-10-25 PROCEDURE — 83036 HEMOGLOBIN GLYCOSYLATED A1C: CPT

## 2018-10-25 PROCEDURE — 36415 COLL VENOUS BLD VENIPUNCTURE: CPT

## 2018-10-25 PROCEDURE — 80053 COMPREHEN METABOLIC PANEL: CPT

## 2018-10-25 PROCEDURE — 84550 ASSAY OF BLOOD/URIC ACID: CPT

## 2018-10-25 PROCEDURE — 80061 LIPID PANEL: CPT

## 2018-11-01 ENCOUNTER — HOSPITAL ENCOUNTER (OUTPATIENT)
Dept: OTHER | Age: 63
Discharge: HOME | End: 2018-11-01
Payer: MEDICARE

## 2018-11-01 ENCOUNTER — HOSPITAL ENCOUNTER (OUTPATIENT)
Age: 63
Setting detail: SPECIMEN
Discharge: HOME | End: 2018-11-01
Payer: MEDICARE

## 2018-11-01 VITALS
RESPIRATION RATE: 20 BRPM | OXYGEN SATURATION: 96 % | SYSTOLIC BLOOD PRESSURE: 112 MMHG | DIASTOLIC BLOOD PRESSURE: 70 MMHG | WEIGHT: 177.4 LBS | BODY MASS INDEX: 33.49 KG/M2 | HEART RATE: 75 BPM

## 2018-11-01 LAB
ALBUMIN SERPL-MCNC: 3.6 G/DL (ref 3.5–5.2)
ALBUMIN/GLOBULIN RATIO: 1.1 (ref 1–2.5)
ALP BLD-CCNC: 149 U/L (ref 35–104)
ALT SERPL-CCNC: 30 U/L (ref 5–33)
ANION GAP SERPL CALCULATED.3IONS-SCNC: 10 MMOL/L (ref 9–17)
AST SERPL-CCNC: 35 U/L
BILIRUB SERPL-MCNC: <0.1 MG/DL (ref 0.3–1.2)
BUN BLDV-MCNC: 32 MG/DL (ref 8–23)
BUN/CREAT BLD: ABNORMAL (ref 9–20)
CALCIUM SERPL-MCNC: 8.5 MG/DL (ref 8.6–10.4)
CHLORIDE BLD-SCNC: 101 MMOL/L (ref 98–107)
CO2: 28 MMOL/L (ref 20–31)
CREAT SERPL-MCNC: 0.97 MG/DL (ref 0.5–0.9)
GFR AFRICAN AMERICAN: >60 ML/MIN
GFR NON-AFRICAN AMERICAN: 58 ML/MIN
GFR SERPL CREATININE-BSD FRML MDRD: ABNORMAL ML/MIN/{1.73_M2}
GFR SERPL CREATININE-BSD FRML MDRD: ABNORMAL ML/MIN/{1.73_M2}
GLUCOSE BLD-MCNC: 130 MG/DL (ref 70–99)
HCT VFR BLD CALC: 32.3 % (ref 36.3–47.1)
HEMOGLOBIN: 10.1 G/DL (ref 11.9–15.1)
MCH RBC QN AUTO: 28.2 PG (ref 25.2–33.5)
MCHC RBC AUTO-ENTMCNC: 31.3 G/DL (ref 28.4–34.8)
MCV RBC AUTO: 90.2 FL (ref 82.6–102.9)
NRBC AUTOMATED: 0 PER 100 WBC
PDW BLD-RTO: 16.1 % (ref 11.8–14.4)
PLATELET # BLD: 226 K/UL (ref 138–453)
PMV BLD AUTO: 10.3 FL (ref 8.1–13.5)
POTASSIUM SERPL-SCNC: 4.3 MMOL/L (ref 3.7–5.3)
RBC # BLD: 3.58 M/UL (ref 3.95–5.11)
SODIUM BLD-SCNC: 139 MMOL/L (ref 135–144)
TOTAL PROTEIN: 7 G/DL (ref 6.4–8.3)
WBC # BLD: 8.6 K/UL (ref 3.5–11.3)

## 2018-11-01 PROCEDURE — 36415 COLL VENOUS BLD VENIPUNCTURE: CPT

## 2018-11-01 PROCEDURE — 99211 OFF/OP EST MAY X REQ PHY/QHP: CPT | Performed by: NURSE PRACTITIONER

## 2018-11-01 PROCEDURE — P9603 ONE-WAY ALLOW PRORATED MILES: HCPCS

## 2018-11-01 PROCEDURE — 80053 COMPREHEN METABOLIC PANEL: CPT

## 2018-11-01 PROCEDURE — 85027 COMPLETE CBC AUTOMATED: CPT

## 2018-11-01 ASSESSMENT — PAIN DESCRIPTION - LOCATION
LOCATION: LEG
LOCATION_2: BACK

## 2018-11-01 ASSESSMENT — PAIN DESCRIPTION - ORIENTATION: ORIENTATION: LEFT

## 2018-11-01 ASSESSMENT — PAIN DESCRIPTION - INTENSITY: RATING_2: 10

## 2018-11-01 ASSESSMENT — PAIN SCALES - GENERAL: PAINLEVEL_OUTOF10: 10

## 2018-11-01 NOTE — PROGRESS NOTES
Date:  2018  Time:  1:50 PM    CHF Clinic at [address] [place] [address]    Office: [phone] Fax: [phone]    Re:  Lu Ritter   Patient : 1955    Vital Signs: /70   Pulse 75   Resp 20   Wt 177 lb 6.4 oz (80.5 kg)   LMP  (LMP Unknown)   SpO2 96%   BMI 33.49 kg/m²                       O2 Device: Nasal cannula                           Recent Labs      18   0600   HGB  10.1*   HCT  32.3*   WBC  8.6   NA  139   K  4.3   CL  101   CO2  28   BUN  32*   CREATININE  0.97*   GLUCOSE  130*        Respiratory:       Breath Sounds  Right Upper Lobe: Clear  Right Middle Lobe: Clear  Right Lower Lobe: Clear  Left Upper Lobe: Clear  Left Lower Lobe: Clear    Cough/Sputum  Cough: Dry     Peripheral Vascular  RLE Edema: Trace  LLE Edema: Trace    Complaints: Ongoing SOB at times with exertion. C/o left leg and back pain    Physician Orders Per Karine Up CNP with [facility] Consults[facility] patient to increase Bumex to 2 mg am and pm for three days. Also instructed to reinforce low sodium diet and fluid limits. Alisa Jade at [facility] notified. Comment : Weight increased 9 pounds in 2 months. Ongoing SOB with exertion at times. C/o pain in her left leg and back. She states she drinks a lot more than 2 liters per day. She states she does not salt her food. Reinforced a low salt diet, fluid limits, and daily weights. Encouraged patient to let her nurse at [facility] know if she has increased symptoms of fluid retention. Current diuretics include Bumex 2 mg in the morning, Bumex 1 mg in the evening and spironolactone 25 mg daily. Most recent labs 18 BUN 28 creatinine 0.97  Orders received as above for patient to increase Bumex to 2 mg twice a day for three days.   Next appointment in the [facility]F Clinic 18 at 12:30    Electronically signed by Jia Calhoun RN on 2018 at 1:50 PM

## 2018-11-05 ENCOUNTER — HOSPITAL ENCOUNTER (OUTPATIENT)
Age: 63
Setting detail: SPECIMEN
Discharge: HOME | End: 2018-11-05
Payer: MEDICARE

## 2018-11-05 LAB
ANION GAP SERPL CALCULATED.3IONS-SCNC: 14 MMOL/L (ref 9–17)
BUN BLDV-MCNC: 38 MG/DL (ref 8–23)
BUN/CREAT BLD: ABNORMAL (ref 9–20)
CALCIUM SERPL-MCNC: 9 MG/DL (ref 8.6–10.4)
CHLORIDE BLD-SCNC: 98 MMOL/L (ref 98–107)
CO2: 26 MMOL/L (ref 20–31)
CREAT SERPL-MCNC: 1.18 MG/DL (ref 0.5–0.9)
ESTIMATED AVERAGE GLUCOSE: 157 MG/DL
GFR AFRICAN AMERICAN: 56 ML/MIN
GFR NON-AFRICAN AMERICAN: 46 ML/MIN
GFR SERPL CREATININE-BSD FRML MDRD: ABNORMAL ML/MIN/{1.73_M2}
GFR SERPL CREATININE-BSD FRML MDRD: ABNORMAL ML/MIN/{1.73_M2}
GLUCOSE BLD-MCNC: 127 MG/DL (ref 70–99)
HBA1C MFR BLD: 7.1 % (ref 4–6)
POTASSIUM SERPL-SCNC: 3.8 MMOL/L (ref 3.7–5.3)
SODIUM BLD-SCNC: 138 MMOL/L (ref 135–144)

## 2018-11-05 PROCEDURE — 83036 HEMOGLOBIN GLYCOSYLATED A1C: CPT

## 2018-11-05 PROCEDURE — P9603 ONE-WAY ALLOW PRORATED MILES: HCPCS

## 2018-11-05 PROCEDURE — 80048 BASIC METABOLIC PNL TOTAL CA: CPT

## 2018-11-05 PROCEDURE — 36415 COLL VENOUS BLD VENIPUNCTURE: CPT

## 2018-11-07 ENCOUNTER — TELEPHONE (OUTPATIENT)
Dept: GASTROENTEROLOGY | Age: 63
End: 2018-11-07

## 2018-11-08 ENCOUNTER — TELEPHONE (OUTPATIENT)
Dept: GASTROENTEROLOGY | Age: 63
End: 2018-11-08

## 2018-12-12 ENCOUNTER — TELEPHONE (OUTPATIENT)
Dept: GASTROENTEROLOGY | Age: 63
End: 2018-12-12

## 2018-12-14 ENCOUNTER — HOSPITAL ENCOUNTER (OUTPATIENT)
Age: 63
Setting detail: SPECIMEN
Discharge: HOME | End: 2018-12-14
Payer: MEDICARE

## 2018-12-14 LAB
HCT VFR BLD CALC: 36.2 % (ref 36.3–47.1)
HEMOGLOBIN: 11.5 G/DL (ref 11.9–15.1)
MCH RBC QN AUTO: 28.7 PG (ref 25.2–33.5)
MCHC RBC AUTO-ENTMCNC: 31.8 G/DL (ref 28.4–34.8)
MCV RBC AUTO: 90.3 FL (ref 82.6–102.9)
NRBC AUTOMATED: 0 PER 100 WBC
PDW BLD-RTO: 14.2 % (ref 11.8–14.4)
PLATELET # BLD: 253 K/UL (ref 138–453)
PMV BLD AUTO: 10.1 FL (ref 8.1–13.5)
RBC # BLD: 4.01 M/UL (ref 3.95–5.11)
WBC # BLD: 10.9 K/UL (ref 3.5–11.3)

## 2018-12-14 PROCEDURE — 36415 COLL VENOUS BLD VENIPUNCTURE: CPT

## 2018-12-14 PROCEDURE — P9603 ONE-WAY ALLOW PRORATED MILES: HCPCS

## 2018-12-14 PROCEDURE — 85027 COMPLETE CBC AUTOMATED: CPT

## 2018-12-19 ENCOUNTER — HOSPITAL ENCOUNTER (OUTPATIENT)
Age: 63
Setting detail: SPECIMEN
Discharge: HOME | End: 2018-12-19
Payer: MEDICARE

## 2018-12-19 LAB
BNP INTERPRETATION: ABNORMAL
PRO-BNP: 2807 PG/ML

## 2018-12-19 PROCEDURE — 83880 ASSAY OF NATRIURETIC PEPTIDE: CPT

## 2018-12-19 PROCEDURE — 36415 COLL VENOUS BLD VENIPUNCTURE: CPT

## 2018-12-19 PROCEDURE — P9603 ONE-WAY ALLOW PRORATED MILES: HCPCS

## 2018-12-20 ENCOUNTER — HOSPITAL ENCOUNTER (OUTPATIENT)
Dept: OTHER | Age: 63
Discharge: HOME | End: 2018-12-20
Payer: MEDICARE

## 2018-12-20 VITALS
DIASTOLIC BLOOD PRESSURE: 62 MMHG | WEIGHT: 181 LBS | HEART RATE: 88 BPM | BODY MASS INDEX: 36.56 KG/M2 | SYSTOLIC BLOOD PRESSURE: 108 MMHG | OXYGEN SATURATION: 100 % | RESPIRATION RATE: 20 BRPM

## 2018-12-20 PROCEDURE — 99211 OFF/OP EST MAY X REQ PHY/QHP: CPT

## 2018-12-20 RX ORDER — LORATADINE 10 MG/1
10 CAPSULE, LIQUID FILLED ORAL DAILY
COMMUNITY

## 2019-01-17 ENCOUNTER — HOSPITAL ENCOUNTER (OUTPATIENT)
Dept: OTHER | Age: 64
Discharge: HOME | End: 2019-01-17
Payer: MEDICARE

## 2019-01-17 VITALS
HEART RATE: 98 BPM | DIASTOLIC BLOOD PRESSURE: 65 MMHG | OXYGEN SATURATION: 98 % | BODY MASS INDEX: 37.39 KG/M2 | SYSTOLIC BLOOD PRESSURE: 117 MMHG | WEIGHT: 185.13 LBS | RESPIRATION RATE: 18 BRPM

## 2019-01-17 PROCEDURE — 99211 OFF/OP EST MAY X REQ PHY/QHP: CPT

## 2019-01-26 ENCOUNTER — HOSPITAL ENCOUNTER (INPATIENT)
Age: 64
LOS: 4 days | Discharge: NURSING, REHAB OR LONG-TERM CARE | DRG: 392 | End: 2019-01-31
Attending: EMERGENCY MEDICINE | Admitting: EMERGENCY MEDICINE
Payer: MEDICARE

## 2019-01-26 ENCOUNTER — APPOINTMENT (OUTPATIENT)
Dept: CT IMAGING | Age: 64
DRG: 392 | End: 2019-01-26
Payer: MEDICARE

## 2019-01-26 PROBLEM — K57.92 DIVERTICULITIS: Status: ACTIVE | Noted: 2019-01-26

## 2019-01-26 LAB
ABSOLUTE EOS #: 0.08 K/UL (ref 0–0.44)
ABSOLUTE IMMATURE GRANULOCYTE: 0.06 K/UL (ref 0–0.3)
ABSOLUTE LYMPH #: 1.79 K/UL (ref 1.1–3.7)
ABSOLUTE MONO #: 0.62 K/UL (ref 0.1–1.2)
ALBUMIN SERPL-MCNC: 4.3 G/DL (ref 3.5–5.2)
ALBUMIN/GLOBULIN RATIO: 1 (ref 1–2.5)
ALP BLD-CCNC: 188 U/L (ref 35–104)
ALT SERPL-CCNC: 14 U/L (ref 5–33)
ANION GAP SERPL CALCULATED.3IONS-SCNC: 18 MMOL/L (ref 9–17)
AST SERPL-CCNC: 13 U/L
BASOPHILS # BLD: 0 % (ref 0–2)
BASOPHILS ABSOLUTE: 0.05 K/UL (ref 0–0.2)
BILIRUB SERPL-MCNC: 0.22 MG/DL (ref 0.3–1.2)
BILIRUBIN DIRECT: 0.09 MG/DL
BILIRUBIN, INDIRECT: 0.13 MG/DL (ref 0–1)
BUN BLDV-MCNC: 26 MG/DL (ref 8–23)
BUN/CREAT BLD: ABNORMAL (ref 9–20)
CALCIUM SERPL-MCNC: 9.7 MG/DL (ref 8.6–10.4)
CHLORIDE BLD-SCNC: 93 MMOL/L (ref 98–107)
CO2: 29 MMOL/L (ref 20–31)
CREAT SERPL-MCNC: 1.09 MG/DL (ref 0.5–0.9)
DIFFERENTIAL TYPE: ABNORMAL
EOSINOPHILS RELATIVE PERCENT: 1 % (ref 1–4)
GFR AFRICAN AMERICAN: >60 ML/MIN
GFR NON-AFRICAN AMERICAN: 51 ML/MIN
GFR SERPL CREATININE-BSD FRML MDRD: ABNORMAL ML/MIN/{1.73_M2}
GFR SERPL CREATININE-BSD FRML MDRD: ABNORMAL ML/MIN/{1.73_M2}
GLOBULIN: ABNORMAL G/DL (ref 1.5–3.8)
GLUCOSE BLD-MCNC: 277 MG/DL (ref 65–105)
GLUCOSE BLD-MCNC: 296 MG/DL (ref 70–99)
HCT VFR BLD CALC: 39.3 % (ref 36.3–47.1)
HEMOGLOBIN: 13 G/DL (ref 11.9–15.1)
IMMATURE GRANULOCYTES: 1 %
LACTIC ACID, WHOLE BLOOD: 1.9 MMOL/L (ref 0.7–2.1)
LIPASE: 19 U/L (ref 13–60)
LYMPHOCYTES # BLD: 14 % (ref 24–43)
MCH RBC QN AUTO: 28.8 PG (ref 25.2–33.5)
MCHC RBC AUTO-ENTMCNC: 33.1 G/DL (ref 28.4–34.8)
MCV RBC AUTO: 86.9 FL (ref 82.6–102.9)
MONOCYTES # BLD: 5 % (ref 3–12)
NRBC AUTOMATED: 0 PER 100 WBC
PDW BLD-RTO: 13.3 % (ref 11.8–14.4)
PLATELET # BLD: 264 K/UL (ref 138–453)
PLATELET ESTIMATE: ABNORMAL
PMV BLD AUTO: 9.7 FL (ref 8.1–13.5)
POTASSIUM SERPL-SCNC: 4.2 MMOL/L (ref 3.7–5.3)
RBC # BLD: 4.52 M/UL (ref 3.95–5.11)
RBC # BLD: ABNORMAL 10*6/UL
SEG NEUTROPHILS: 79 % (ref 36–65)
SEGMENTED NEUTROPHILS ABSOLUTE COUNT: 10.38 K/UL (ref 1.5–8.1)
SODIUM BLD-SCNC: 140 MMOL/L (ref 135–144)
TOTAL PROTEIN: 8.8 G/DL (ref 6.4–8.3)
WBC # BLD: 13 K/UL (ref 3.5–11.3)
WBC # BLD: ABNORMAL 10*3/UL

## 2019-01-26 PROCEDURE — 96374 THER/PROPH/DIAG INJ IV PUSH: CPT

## 2019-01-26 PROCEDURE — 2580000003 HC RX 258: Performed by: STUDENT IN AN ORGANIZED HEALTH CARE EDUCATION/TRAINING PROGRAM

## 2019-01-26 PROCEDURE — 96367 TX/PROPH/DG ADDL SEQ IV INF: CPT

## 2019-01-26 PROCEDURE — 96375 TX/PRO/DX INJ NEW DRUG ADDON: CPT

## 2019-01-26 PROCEDURE — 80076 HEPATIC FUNCTION PANEL: CPT

## 2019-01-26 PROCEDURE — 6370000000 HC RX 637 (ALT 250 FOR IP): Performed by: STUDENT IN AN ORGANIZED HEALTH CARE EDUCATION/TRAINING PROGRAM

## 2019-01-26 PROCEDURE — 82947 ASSAY GLUCOSE BLOOD QUANT: CPT

## 2019-01-26 PROCEDURE — 6360000004 HC RX CONTRAST MEDICATION: Performed by: STUDENT IN AN ORGANIZED HEALTH CARE EDUCATION/TRAINING PROGRAM

## 2019-01-26 PROCEDURE — 2500000003 HC RX 250 WO HCPCS: Performed by: STUDENT IN AN ORGANIZED HEALTH CARE EDUCATION/TRAINING PROGRAM

## 2019-01-26 PROCEDURE — 96365 THER/PROPH/DIAG IV INF INIT: CPT

## 2019-01-26 PROCEDURE — 74177 CT ABD & PELVIS W/CONTRAST: CPT

## 2019-01-26 PROCEDURE — 85025 COMPLETE CBC W/AUTO DIFF WBC: CPT

## 2019-01-26 PROCEDURE — 80048 BASIC METABOLIC PNL TOTAL CA: CPT

## 2019-01-26 PROCEDURE — 6360000002 HC RX W HCPCS: Performed by: STUDENT IN AN ORGANIZED HEALTH CARE EDUCATION/TRAINING PROGRAM

## 2019-01-26 PROCEDURE — G0378 HOSPITAL OBSERVATION PER HR: HCPCS

## 2019-01-26 PROCEDURE — 96376 TX/PRO/DX INJ SAME DRUG ADON: CPT

## 2019-01-26 PROCEDURE — 83605 ASSAY OF LACTIC ACID: CPT

## 2019-01-26 PROCEDURE — 83690 ASSAY OF LIPASE: CPT

## 2019-01-26 PROCEDURE — 99285 EMERGENCY DEPT VISIT HI MDM: CPT

## 2019-01-26 PROCEDURE — 96366 THER/PROPH/DIAG IV INF ADDON: CPT

## 2019-01-26 RX ORDER — ASPIRIN 81 MG/1
81 TABLET, CHEWABLE ORAL DAILY
Status: DISCONTINUED | OUTPATIENT
Start: 2019-01-26 | End: 2019-01-31 | Stop reason: HOSPADM

## 2019-01-26 RX ORDER — MORPHINE SULFATE 4 MG/ML
4 INJECTION, SOLUTION INTRAMUSCULAR; INTRAVENOUS ONCE
Status: COMPLETED | OUTPATIENT
Start: 2019-01-26 | End: 2019-01-26

## 2019-01-26 RX ORDER — SODIUM CHLORIDE 9 MG/ML
INJECTION, SOLUTION INTRAVENOUS CONTINUOUS
Status: DISCONTINUED | OUTPATIENT
Start: 2019-01-26 | End: 2019-01-31 | Stop reason: HOSPADM

## 2019-01-26 RX ORDER — 0.9 % SODIUM CHLORIDE 0.9 %
500 INTRAVENOUS SOLUTION INTRAVENOUS ONCE
Status: COMPLETED | OUTPATIENT
Start: 2019-01-26 | End: 2019-01-26

## 2019-01-26 RX ORDER — ONDANSETRON 4 MG/1
4 TABLET, ORALLY DISINTEGRATING ORAL ONCE
Status: COMPLETED | OUTPATIENT
Start: 2019-01-26 | End: 2019-01-26

## 2019-01-26 RX ORDER — CLOPIDOGREL BISULFATE 75 MG/1
75 TABLET ORAL DAILY
Status: DISCONTINUED | OUTPATIENT
Start: 2019-01-26 | End: 2019-01-31 | Stop reason: HOSPADM

## 2019-01-26 RX ORDER — OXYCODONE HYDROCHLORIDE AND ACETAMINOPHEN 5; 325 MG/1; MG/1
1 TABLET ORAL EVERY 4 HOURS PRN
Status: DISCONTINUED | OUTPATIENT
Start: 2019-01-26 | End: 2019-01-31 | Stop reason: HOSPADM

## 2019-01-26 RX ORDER — SPIRONOLACTONE 25 MG/1
25 TABLET ORAL DAILY
Status: DISCONTINUED | OUTPATIENT
Start: 2019-01-26 | End: 2019-01-31 | Stop reason: HOSPADM

## 2019-01-26 RX ORDER — ONDANSETRON 2 MG/ML
4 INJECTION INTRAMUSCULAR; INTRAVENOUS EVERY 6 HOURS PRN
Status: DISCONTINUED | OUTPATIENT
Start: 2019-01-26 | End: 2019-01-31 | Stop reason: HOSPADM

## 2019-01-26 RX ORDER — FAMOTIDINE 20 MG/1
20 TABLET, FILM COATED ORAL DAILY
Status: DISCONTINUED | OUTPATIENT
Start: 2019-01-26 | End: 2019-01-31 | Stop reason: HOSPADM

## 2019-01-26 RX ORDER — OXYCODONE HYDROCHLORIDE AND ACETAMINOPHEN 5; 325 MG/1; MG/1
2 TABLET ORAL EVERY 4 HOURS PRN
Status: DISCONTINUED | OUTPATIENT
Start: 2019-01-26 | End: 2019-01-31 | Stop reason: HOSPADM

## 2019-01-26 RX ORDER — ONDANSETRON 4 MG/1
4 TABLET, ORALLY DISINTEGRATING ORAL EVERY 8 HOURS PRN
Status: DISCONTINUED | OUTPATIENT
Start: 2019-01-26 | End: 2019-01-31 | Stop reason: HOSPADM

## 2019-01-26 RX ORDER — ATORVASTATIN CALCIUM 80 MG/1
80 TABLET, FILM COATED ORAL NIGHTLY
Status: DISCONTINUED | OUTPATIENT
Start: 2019-01-26 | End: 2019-01-31 | Stop reason: HOSPADM

## 2019-01-26 RX ORDER — BUMETANIDE 1 MG/1
1 TABLET ORAL EVERY EVENING
Status: DISCONTINUED | OUTPATIENT
Start: 2019-01-26 | End: 2019-01-31 | Stop reason: HOSPADM

## 2019-01-26 RX ORDER — INSULIN GLARGINE 100 [IU]/ML
30 INJECTION, SOLUTION SUBCUTANEOUS NIGHTLY
Status: DISCONTINUED | OUTPATIENT
Start: 2019-01-26 | End: 2019-01-31 | Stop reason: HOSPADM

## 2019-01-26 RX ORDER — IPRATROPIUM BROMIDE AND ALBUTEROL SULFATE 2.5; .5 MG/3ML; MG/3ML
1 SOLUTION RESPIRATORY (INHALATION) EVERY 4 HOURS PRN
Status: DISCONTINUED | OUTPATIENT
Start: 2019-01-26 | End: 2019-01-31 | Stop reason: HOSPADM

## 2019-01-26 RX ORDER — COLCHICINE 0.6 MG/1
0.6 TABLET ORAL DAILY
Status: DISCONTINUED | OUTPATIENT
Start: 2019-01-26 | End: 2019-01-31 | Stop reason: HOSPADM

## 2019-01-26 RX ORDER — OXCARBAZEPINE 300 MG/1
300 TABLET, FILM COATED ORAL 2 TIMES DAILY
Status: DISCONTINUED | OUTPATIENT
Start: 2019-01-26 | End: 2019-01-31 | Stop reason: HOSPADM

## 2019-01-26 RX ORDER — SODIUM CHLORIDE 0.9 % (FLUSH) 0.9 %
10 SYRINGE (ML) INJECTION PRN
Status: DISCONTINUED | OUTPATIENT
Start: 2019-01-26 | End: 2019-01-31 | Stop reason: HOSPADM

## 2019-01-26 RX ORDER — SENNA AND DOCUSATE SODIUM 50; 8.6 MG/1; MG/1
1 TABLET, FILM COATED ORAL NIGHTLY
Status: DISCONTINUED | OUTPATIENT
Start: 2019-01-26 | End: 2019-01-31 | Stop reason: HOSPADM

## 2019-01-26 RX ORDER — ACETAMINOPHEN 325 MG/1
650 TABLET ORAL EVERY 4 HOURS PRN
Status: DISCONTINUED | OUTPATIENT
Start: 2019-01-26 | End: 2019-01-31 | Stop reason: HOSPADM

## 2019-01-26 RX ORDER — SODIUM CHLORIDE 0.9 % (FLUSH) 0.9 %
10 SYRINGE (ML) INJECTION EVERY 12 HOURS SCHEDULED
Status: DISCONTINUED | OUTPATIENT
Start: 2019-01-26 | End: 2019-01-31 | Stop reason: HOSPADM

## 2019-01-26 RX ORDER — MORPHINE SULFATE 4 MG/ML
2 INJECTION, SOLUTION INTRAMUSCULAR; INTRAVENOUS EVERY 4 HOURS PRN
Status: DISCONTINUED | OUTPATIENT
Start: 2019-01-26 | End: 2019-01-31 | Stop reason: HOSPADM

## 2019-01-26 RX ADMIN — MORPHINE SULFATE 2 MG: 4 INJECTION INTRAVENOUS at 22:36

## 2019-01-26 RX ADMIN — MORPHINE SULFATE 4 MG: 4 INJECTION INTRAVENOUS at 12:11

## 2019-01-26 RX ADMIN — IOVERSOL 75 ML: 741 INJECTION INTRA-ARTERIAL; INTRAVENOUS at 11:28

## 2019-01-26 RX ADMIN — SODIUM CHLORIDE: 9 INJECTION, SOLUTION INTRAVENOUS at 14:02

## 2019-01-26 RX ADMIN — CEFTRIAXONE SODIUM 1 G: 1 INJECTION, POWDER, FOR SOLUTION INTRAMUSCULAR; INTRAVENOUS at 16:07

## 2019-01-26 RX ADMIN — ONDANSETRON 4 MG: 4 TABLET, ORALLY DISINTEGRATING ORAL at 10:51

## 2019-01-26 RX ADMIN — METRONIDAZOLE 500 MG: 500 INJECTION, SOLUTION INTRAVENOUS at 12:53

## 2019-01-26 RX ADMIN — SODIUM CHLORIDE: 9 INJECTION, SOLUTION INTRAVENOUS at 12:11

## 2019-01-26 RX ADMIN — INSULIN GLARGINE 30 UNITS: 100 INJECTION, SOLUTION SUBCUTANEOUS at 22:36

## 2019-01-26 RX ADMIN — OXYCODONE HYDROCHLORIDE AND ACETAMINOPHEN 2 TABLET: 5; 325 TABLET ORAL at 20:02

## 2019-01-26 RX ADMIN — ONDANSETRON 4 MG: 2 INJECTION INTRAMUSCULAR; INTRAVENOUS at 22:36

## 2019-01-26 RX ADMIN — METRONIDAZOLE 500 MG: 500 INJECTION, SOLUTION INTRAVENOUS at 22:37

## 2019-01-26 RX ADMIN — OXYCODONE HYDROCHLORIDE AND ACETAMINOPHEN 2 TABLET: 5; 325 TABLET ORAL at 14:05

## 2019-01-26 RX ADMIN — BUMETANIDE 1 MG: 1 TABLET ORAL at 20:07

## 2019-01-26 ASSESSMENT — PAIN DESCRIPTION - PAIN TYPE
TYPE: ACUTE PAIN
TYPE: ACUTE PAIN

## 2019-01-26 ASSESSMENT — ENCOUNTER SYMPTOMS
ABDOMINAL PAIN: 1
COUGH: 0
DIARRHEA: 0
CONSTIPATION: 0
VOMITING: 1
BLOOD IN STOOL: 0
SHORTNESS OF BREATH: 0
NAUSEA: 1
WHEEZING: 0

## 2019-01-26 ASSESSMENT — PAIN SCALES - GENERAL
PAINLEVEL_OUTOF10: 10
PAINLEVEL_OUTOF10: 10
PAINLEVEL_OUTOF10: 9
PAINLEVEL_OUTOF10: 10
PAINLEVEL_OUTOF10: 10

## 2019-01-26 ASSESSMENT — PAIN DESCRIPTION - LOCATION
LOCATION: ABDOMEN
LOCATION: ABDOMEN

## 2019-01-26 ASSESSMENT — PAIN DESCRIPTION - ORIENTATION
ORIENTATION: MID
ORIENTATION: LOWER

## 2019-01-26 ASSESSMENT — PAIN DESCRIPTION - FREQUENCY: FREQUENCY: CONTINUOUS

## 2019-01-26 ASSESSMENT — PAIN DESCRIPTION - DESCRIPTORS
DESCRIPTORS: CRAMPING
DESCRIPTORS: CRAMPING

## 2019-01-26 NOTE — ED NOTES
Pt to ED complains of abdominal pain with nausea/vomiting since this am. Pt states she thinks its related to her diabetes. Pt currently staying in ecf, pt is alert and oriented x4, no acute signs of distress noted.      Ramin Parker RN  01/26/19 1037

## 2019-01-26 NOTE — ED PROVIDER NOTES
[facility]  ED  Emergency Department Encounter  Emergency Medicine Resident     Pt Name: Candy Bhardwaj  MRN: [id number]  [place] 1955  Date of evaluation: 1/26/19  PCP:  No primary care provider on file. CHIEF COMPLAINT       Chief Complaint   Patient presents with    Abdominal Pain    Dizziness       HISTORY OFPRESENT ILLNESS  (Location/Symptom, Timing/Onset, Context/Setting, Quality, Duration, Modifying Factors,Severity.)      Candy Bhardwaj is a 60 yo female who presents with Abdominal pain, nausea, vomiting. The patient is a poor historian. Patient states that since this morning she has been having diffuse constant abdominal pain without provoking or relieving factors. He also notes that she is having multiple episodes of nausea and vomiting this morning and states that her \"diabetes may be acting up\". She is unsure where blast blood sugar was. Patient also mentions increased urinary frequency but denies any dysuria or hematuria. She reports that she has been having normal bowel movements without any blood. Denies any fevers and chills. Patient states that she has had abdominal surgeries but is unsure of what they are. PAST MEDICAL / SURGICAL / SOCIAL / FAMILY HISTORY      has a past medical history of Arthritis; Asthma; Bipolar 1 disorder ([address] CAD (coronary artery disease); Cardiac defibrillator in place; CHF (congestive heart failure) ([address] Clinical trial participant at discharge; COPD (chronic obstructive pulmonary disease) ([address] CVD (cardiovascular disease); Diabetes mellitus ([address] Diverticulitis; GERD (gastroesophageal reflux disease); Hepatitis C; Hyperlipidemia; Hypertension; MRSA (methicillin resistant staph aureus) culture positive; Pneumonia; Schizophrenia ([address] and Unspecified cerebral artery occlusion with cerebral infarction. has a past surgical history that includes Tonsillectomy; Foot surgery; hc  picc powerpicc double (12/19/2017);  Hysterectomy; disintegrating tablet Take 4 mg by mouth 9/2/17  Yes Historical Provider, MD   senna-docusate (Isaiah Aquas) 8.6-50 MG per tablet Take 1 tablet by mouth   Yes Historical Provider, MD   aspirin 81 MG chewable tablet Take 1 tablet by mouth daily 12/18/17  Yes Stephanie Jackson MD   ipratropium-albuterol (DUONEB) 0.5-2.5 (3) MG/3ML SOLN nebulizer solution Inhale 3 mLs into the lungs every 4 hours as needed for Shortness of Breath 12/18/17  Yes Jose Be MD   nitroGLYCERIN (NITROSTAT) 0.4 MG SL tablet Place 1 tablet under the tongue every 5 minutes as needed for Chest pain up to max of 3 total doses. If no relief after 1 dose, call 911. 12/18/17  Yes Jose Be MD   OXcarbazepine (TRILEPTAL) 300 MG tablet Take 1 tablet by mouth 2 times daily Per pharmacy she is to be taking 300 mg twice daily but patient states that she only takes once daily. 12/18/17  Yes Jose Be MD   insulin glargine (LANTUS) 100 UNIT/ML injection vial Inject 30 Units into the skin nightly 12/18/17  Yes Jose Be MD   atorvastatin (LIPITOR) 80 MG tablet Take 1 tablet by mouth nightly 12/18/17  Yes Jose Be MD   OLANZapine (ZYPREXA) 10 MG tablet Take 1 tablet by mouth nightly 12/18/17  Yes Jose Be MD   colchicine (COLCRYS) 0.6 MG tablet Take 1 tablet by mouth daily 12/18/17  Yes Stephanie Jackson MD   clopidogrel (PLAVIX) 75 MG tablet Take 1 tablet by mouth daily 12/18/17  Yes Stephanie Jackson MD       REVIEW OFSYSTEMS    (2-9 systems for level 4, 10 or more for level 5)      Review of Systems   Constitutional: Negative for chills and fever. HENT: Negative. Eyes: Negative for visual disturbance. Respiratory: Negative for cough, shortness of breath and wheezing. Cardiovascular: Negative for chest pain, palpitations and leg swelling. Gastrointestinal: Positive for abdominal pain, nausea and vomiting. Negative for blood in stool, constipation and diarrhea.    Endocrine: Positive for polyuria. Genitourinary: Positive for frequency. Negative for dysuria and hematuria. Musculoskeletal: Negative for neck pain and neck stiffness. Skin: Negative for rash and wound. Neurological: Negative for seizures, syncope, weakness, light-headedness, numbness and headaches. Hematological:        Admits to Plavix use them but denies any blood thinner use. PHYSICAL EXAM   (up to 7 for level 4, 8 or more forlevel 5)      INITIAL VITALS:   ED Triage Vitals   BP Temp Temp Source Pulse Resp SpO2 Height Weight   01/26/19 1029 01/26/19 1029 01/26/19 1029 01/26/19 1032 01/26/19 1029 01/26/19 1029 -- 01/26/19 1029   (!) 160/86 98.9 °F (37.2 °C) Oral 97 16 98 %  188 lb (85.3 kg)       Physical Exam   Constitutional: She is oriented to person, place, and time. She appears well-developed and well-nourished. No distress. HENT:   Head: Normocephalic and atraumatic. Neck: Normal range of motion. Neck supple. No JVD present. No tracheal deviation present. Cardiovascular: Normal rate, regular rhythm and normal heart sounds. Exam reveals no gallop and no friction rub. No murmur heard. Pulmonary/Chest: Effort normal and breath sounds normal. No respiratory distress. She has no wheezes. She has no rales. Abdominal: Soft. She exhibits no distension and no mass. There is no guarding. No hernia. Diffuse tenderness, subjective rebound tenderness. Musculoskeletal: She exhibits no edema or tenderness. Neurological: She is alert and oriented to person, place, and time. Skin: Skin is warm and dry. Capillary refill takes less than 2 seconds. No rash noted. She is not diaphoretic. No erythema. No pallor. Nursing note and vitals reviewed.       DIFFERENTIAL  DIAGNOSIS     PLAN (LABS / IMAGING / EKG):  Orders Placed This Encounter   Procedures    CT ABDOMEN PELVIS W IV CONTRAST Additional Contrast? None    CBC Auto Differential    Basic Metabolic Panel    Hepatic Function Panel    LIPASE    Urinalysis with Microscopic    Lactic Acid, Whole Blood    PATIENT STATUS (FROM ED OR OR/PROCEDURAL) Observation       MEDICATIONS ORDERED:  Orders Placed This Encounter   Medications    ondansetron (ZOFRAN-ODT) disintegrating tablet 4 mg    ioversol (OPTIRAY) 74 % injection 75 mL    0.9 % sodium chloride bolus    morphine injection 4 mg    metronidazole (FLAGYL) 500 mg in NaCl 100 mL IVPB premix    cefTRIAXone (ROCEPHIN) 1 g IVPB in 50 mL D5W minibag       DDX: Diverticulitis, GERD, PUD, pancreatitis, cholecystitis, GB colic, cholangitis, SBO, DKA, AAA, mesenteric ischemia, perforated viscous, acute gastroenteritis, NSAP, pyelonephritis, kidney stone, appendicitis, hernia, UTI, constipation    Initial MDM/Plan/ED course: 59 y.o. female who presents with Abdominal pain, nausea, vomiting. The patient is a poor historian. Patient states that since this morning she has been having diffuse constant abdominal pain without provoking or relieving factors. He also notes that she is having multiple episodes of nausea and vomiting this morning and states that her \"diabetes may be acting up\". She is unsure where blast blood sugar was. Patient also mentions increased urinary frequency but denies any dysuria or hematuria. She reports that she has been having normal bowel movements without any blood. On exam patient is hemodynamically normal and no acute distress. She does have diffuse abdominal tenderness with subjective rebound tenderness and no distention. She is currently complaining of nausea. CBC, BMP, LFTs, lipase, urinalysis, lactic acid, and CT abdomen with IV contrast was obtained. Patient was treated with Zofran for her nausea. Patient does have a slightly elevated white count of 13 but otherwise labs have been normal with only a mild elevation in lactic acid of 1.9. Urinalysis still pending. Patient given 500 mL normal saline fluid bolus for mildly elevated lactic acid.   Whenever I go back into the room to reexamine the patient she is sleeping. CT of the abdomen revealed early diverticulitis. Patient was started on IV metronidazole and ceftriaxone. Since the patient is a nursing home patient and does not know much about her medications, I will admit the patient to observation overnight for IV antibiotics. Patient agrees with plan. Urinalysis is still pending, however if the patient does have a urinary tract infection the ceftriaxone will cover for it. DIAGNOSTIC RESULTS / EMERGENCY DEPARTMENT COURSE / MDM     LABS:  Labs Reviewed   CBC WITH AUTO DIFFERENTIAL - Abnormal; Notable for the following:        Result Value    WBC 13.0 (*)     Seg Neutrophils 79 (*)     Lymphocytes 14 (*)     Immature Granulocytes 1 (*)     Segs Absolute 10.38 (*)     All other components within normal limits   BASIC METABOLIC PANEL - Abnormal; Notable for the following:     Glucose 296 (*)     BUN 26 (*)     CREATININE 1.09 (*)     Chloride 93 (*)     Anion Gap 18 (*)     GFR Non- 51 (*)     All other components within normal limits   HEPATIC FUNCTION PANEL - Abnormal; Notable for the following:     Alkaline Phosphatase 188 (*)     Total Bilirubin 0.22 (*)     Total Protein 8.8 (*)     All other components within normal limits   LIPASE   LACTIC ACID, WHOLE BLOOD   URINALYSIS WITH MICROSCOPIC         RADIOLOGY:  Ct Abdomen Pelvis W Iv Contrast Additional Contrast? None    Result Date: 1/26/2019  EXAMINATION: CT OF THE ABDOMEN AND PELVIS WITH CONTRAST 1/26/2019 11:37 am TECHNIQUE: CT of the abdomen and pelvis was performed with the administration of intravenous contrast. Multiplanar reformatted images are provided for review. Dose modulation, iterative reconstruction, and/or weight based adjustment of the mA/kV was utilized to reduce the radiation dose to as low as reasonably achievable.  COMPARISON: October 22, 2017 HISTORY: ORDERING SYSTEM PROVIDED HISTORY: abdominal pain, hx diverticulitis TECHNOLOGIST PROVIDED HISTORY: FINDINGS: Lower Chest: Lung bases show no acute process. Organs: Diffuse hepatic steatosis without focal hepatic lesion. No ductal dilatation. A few punctate gallstones layer dependently within the gallbladder. No pericholecystic fluid or wall thickening. The spleen is normal in appearance. Pancreas is normal.  No ductal dilatation. Circumscribed low attenuating lesions in the kidneys measuring less than 1.5 cm represent benign cysts. No follow-up imaging is required. Adrenal glands are normal. GI/Bowel: Sigmoid diverticulosis is evident. There is some stranding of the adjacent fat (series 2, image 118) with some associated vascular engorgement and perhaps trace fluid in the left paracolic gutter. Findings may represent early diverticulitis. Appendix is normal in appearance. No dilated loops of bowel. Pelvis: No pelvic fluid or mass. Hysterectomy. Bladder is normal for degree of distention. Peritoneum/Retroperitoneum: Atherosclerotic abdominal aorta. No aneurysm. No lymphadenopathy. Some atrophy of the psoas muscles. Diastasis of the ventral abdominal rectus muscles containing unaffected colon and small bowel. No hernia is evident. Atrophy of the ventral muscles. Bones/Soft Tissues: No inguinal lymphadenopathy. The pelvic muscles show mild atrophy. Rectus muscles show mild atrophy. Remote L4 compression fracture versus Schmorl's node. Multilevel degenerative changes. Previous sternotomy partially imaged. There are no aggressive osseous lesions. 1. Pancolonic diverticulosis. There is very subtle stranding of the adjacent colon in the sigmoid colon region and trace fluid in the left paracolic gutter. Findings are suspicious for early diverticulitis. No adjacent perforation or abscess. 2. No pathologically dilated loops of bowel. 3. Normal appendix.          EKG      All EKG's are interpreted by the [facility] Physician who either signs or Co-signs this chart in the

## 2019-01-26 NOTE — ED PROVIDER NOTES
[facility] ED     Emergency Department     Faculty Attestation        I performed a history and physical examination of the patient and discussed management with the resident. I reviewed the residents note and agree with the documented findings and plan of care. Any areas of disagreement are noted on the chart. I was personally present for the key portions of any procedures. I have documented in the chart those procedures where I was not present during the key portions. I have reviewed the emergency nurses triage note. I agree with the chief complaint, past medical history, past surgical history, allergies, medications, social and family history as documented unless otherwise noted below. For Physician Assistant/ Nurse Practitioner cases/documentation I have personally evaluated this patient and have completed at least one if not all key elements of the E/M (history, physical exam, and MDM). Additional findings are as noted. Vital Signs: BP: (!) 160/86  Pulse: 97  Resp: 16  Temp: 98.9 °F (37.2 °C) SpO2: 98 %  PCP:  No primary care provider on file. Pertinent :     Patient is a 72-year-old female with history of hypertension as well as diabetes and hepatitis C with concommitant COPD/CAD. Patient presents unfortunately has a very poor historian with complaint of diffuse abdominal pain with nausea and vomiting. No blood or bilious component. No diarrhea or blood in the stool is been passing gas. Currently patient is diffusely tender mild to moderately but no peritoneal signs whatsoever. Plan: Abdominal laboratories as well as screening lactate and CT abdomen/pelvis given history of diverticulitis as well the past    Critical Care  None      (Please note that portions of this note were completed with a voice recognition program. Efforts were made to edit the dictations but occasionally words are mis-transcribed.  Whenever words are used in this note in any gender, they shall be construed as though they were used in the gender appropriate to the circumstances; and whenever words are used in this note in the singular or plural form, they shall be construed as though they were used in the form appropriate to the circumstances.)    MD Kallie Hartman  Attending Emergency Medicine Physician            Denise Crawford MD  01/26/19 Stan Wang MD  01/26/19 3427

## 2019-01-26 NOTE — DISCHARGE INSTR - COC
of CVA (cerebrovascular accident) - left hemiparesis I69.90    Inability to perform activities of daily living R53.81    S/P implantation of automatic cardioverter/defibrillator (AICD) 3/13/1- Dr. 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    Schizophrenia ([facility]) F20.9    Acute on chronic combined systolic and diastolic CHF (congestive heart failure) ([facility]) I50.43    Acute bronchitis due to Streptococcus J20.2    Chronic systolic CHF (congestive heart failure) ([facility]) I50.22    Pneumonia of right lower lobe due to infectious organism ([facility] J18.1    Hypoalbuminemia E88.09    COPD (chronic obstructive pulmonary disease) ([facility]) J44.9    Tobacco use Z72.0    Left hemiplegia ([facility]) - following CVA in the setting of CHF exacerbation G81.94    Acute on chronic combined systolic and diastolic heart failure ([facility]) I50.43    CHF (congestive heart failure), NYHA class II, chronic, combined ([facility]) I50.42    Acute on chronic respiratory failure with hypoxia ([facility]) J96.21    Influenza A with pneumonia J09. X1    SOB (shortness of breath) R06.02    COPD exacerbation ([facility]) J44.1    Acute renal failure (ARF) ([facility]) N17.9    Bipolar disorder in full remission ([facility] F31.70    Coronary atherosclerosis I25.10    Clostridium difficile diarrhea A04.72    Delusional disorder, mixed type ([facility]) F22    Hallucination R44.3    Headache R51    Hypoglycemia E16.2    Hypoglycemia due to insulin E16.0, T38.3X5A    Pituitary microadenoma ([facility]) D35.2    Acute combined systolic and diastolic (congestive) hrt fail ([facility]) I50.41    Acute sinusitis J01.90    Vision loss H54.7    Lightheadedness R42    CHF with unknown LVEF ([facility]) I50.9    CKD (chronic kidney disease), stage II N18.2    Abdominal pain R10.9    Diverticulitis K57.92    Acute diverticulitis K57.92       Isolation/Infection: Isolation          Contact            Nurse Assessment:  Last Vital Signs: BP (!) 147/75   Pulse 103   Temp 98.8 °F (37.1 °C) (Oral)   Resp 18   Ht 4' 11\" (1.499 m)   Wt 191 lb 9.6 oz (86.9 kg)   LMP  (LMP Unknown)   SpO2 96%   BMI 38.70 kg/m²     Last documented pain score (0-10 scale): Pain Level: 5  Last Weight:   Wt Readings from Last 1 Encounters:   01/31/19 191 lb 9.6 oz (86.9 kg)     Mental Status:  oriented, alert and coherent    IV Access:  - PICC - site  R Cephalic, insertion date: 01-30-19    Nursing Mobility/ADLs:  Walking   Assisted  Transfer  Assisted  Bathing  Assisted  Dressing  Assisted  Toileting  Assisted  Feeding  Assisted  Med Admin  Dependent  Med Delivery   whole    Wound Care Documentation and Therapy:        Elimination:  Continence:   · Bowel: Yes- seems to have urge, loose semi formed small BM this am  · Bladder: Yes- can be continent, but at times will urinate on herself  Urinary Catheter: None   Colostomy/Ileostomy/Ileal Conduit: No       Date of Last BM: 1/31/2019      Intake/Output Summary (Last 24 hours) at 01/31/19 1108  Last data filed at 01/31/19 0619   Gross per 24 hour   Intake             1314 ml   Output                0 ml   Net             1314 ml     I/O last 3 completed shifts: In: 4831 [I.V.:1314]  Out: -     Safety Concerns:     None and impatient, possible mental delay,     Impairments/Disabilities:      reported mental delay, very demanding and impatient with tasks that need done    Nutrition Therapy:  Current Nutrition Therapy:   - Oral Diet:  General    Routes of Feeding: None  Liquids: Thin Liquids  Daily Fluid Restriction: no  Last Modified Barium Swallow with Video (Video Swallowing Test): not done    Treatments at the Time of Hospital Discharge:   Respiratory Treatments: none  Oxygen Therapy:  is on oxygen at 3 L/min per nasal cannula.   Ventilator:    - No ventilator support    Rehab Therapies: Physical Therapy and Occupational Therapy  Weight Bearing Status/Restrictions: No weight bearing restirctions  Other Medical Equipment (for information only, NOT a DME order):  Cane, wheelchair  Other Treatments:     Patient's personal belongings (please select all that are sent with patient):  Glasses    RN SIGNATURE:  Electronically signed by Dolly Glez RN on 1/31/19 at 10:13 AM    CASE MANAGEMENT/SOCIAL WORK SECTION    Inpatient Status Date: 1/27/19    Readmission Risk Assessment Score:  Readmission Risk              Risk of Unplanned Readmission:        16           Discharging to Facility/ Agency   · Name: [facility]  ·   Address:  [address] [place] [address] [place] [address]  [place]  [address]        Phone: [phone]       Fax: [phone]        ·   · Phone:  · Fax:    Dialysis Facility (if applicable)   · Name:  · Address:  · Dialysis Schedule:  · Phone:  · Fax:    / signature: Electronically signed by Carlos Alberto Barton RN on 1/30/19 at 6:39 PM    PHYSICIAN SECTION    Prognosis: Good    Condition at Discharge: Stable    Rehab Potential (if transferring to Rehab): Good    Recommended Labs or Other Treatments After Discharge: iv antibiotics    Physician Certification: I certify the above information and transfer of Candy Bhardwaj  is necessary for the continuing treatment of the diagnosis listed and that she requires [facility] for greater 30 days.      Update Admission H&P: No change in H&P    PHYSICIAN SIGNATURE:  Electronically signed by Zhou Dudley MD on 1/30/19 at 2:15 PM

## 2019-01-27 PROBLEM — K57.92 ACUTE DIVERTICULITIS: Status: ACTIVE | Noted: 2019-01-27

## 2019-01-27 LAB
GLUCOSE BLD-MCNC: 199 MG/DL (ref 65–105)
GLUCOSE BLD-MCNC: 220 MG/DL (ref 65–105)
GLUCOSE BLD-MCNC: 272 MG/DL (ref 65–105)
GLUCOSE BLD-MCNC: 284 MG/DL (ref 65–105)

## 2019-01-27 PROCEDURE — 94640 AIRWAY INHALATION TREATMENT: CPT

## 2019-01-27 PROCEDURE — 6370000000 HC RX 637 (ALT 250 FOR IP): Performed by: STUDENT IN AN ORGANIZED HEALTH CARE EDUCATION/TRAINING PROGRAM

## 2019-01-27 PROCEDURE — 2500000003 HC RX 250 WO HCPCS: Performed by: STUDENT IN AN ORGANIZED HEALTH CARE EDUCATION/TRAINING PROGRAM

## 2019-01-27 PROCEDURE — 96376 TX/PRO/DX INJ SAME DRUG ADON: CPT

## 2019-01-27 PROCEDURE — APPNB15 APP NON BILLABLE TIME 0-15 MINS: Performed by: INTERNAL MEDICINE

## 2019-01-27 PROCEDURE — 96361 HYDRATE IV INFUSION ADD-ON: CPT

## 2019-01-27 PROCEDURE — 99231 SBSQ HOSP IP/OBS SF/LOW 25: CPT | Performed by: INTERNAL MEDICINE

## 2019-01-27 PROCEDURE — 2700000000 HC OXYGEN THERAPY PER DAY

## 2019-01-27 PROCEDURE — 6360000002 HC RX W HCPCS: Performed by: STUDENT IN AN ORGANIZED HEALTH CARE EDUCATION/TRAINING PROGRAM

## 2019-01-27 PROCEDURE — 1200000000 HC SEMI PRIVATE

## 2019-01-27 PROCEDURE — 96366 THER/PROPH/DIAG IV INF ADDON: CPT

## 2019-01-27 PROCEDURE — 94760 N-INVAS EAR/PLS OXIMETRY 1: CPT

## 2019-01-27 PROCEDURE — 2580000003 HC RX 258: Performed by: STUDENT IN AN ORGANIZED HEALTH CARE EDUCATION/TRAINING PROGRAM

## 2019-01-27 RX ORDER — DEXTROSE MONOHYDRATE 25 G/50ML
12.5 INJECTION, SOLUTION INTRAVENOUS PRN
Status: DISCONTINUED | OUTPATIENT
Start: 2019-01-27 | End: 2019-01-31 | Stop reason: HOSPADM

## 2019-01-27 RX ORDER — NICOTINE POLACRILEX 4 MG
15 LOZENGE BUCCAL PRN
Status: DISCONTINUED | OUTPATIENT
Start: 2019-01-27 | End: 2019-01-31 | Stop reason: HOSPADM

## 2019-01-27 RX ORDER — DEXTROSE MONOHYDRATE 50 MG/ML
100 INJECTION, SOLUTION INTRAVENOUS PRN
Status: DISCONTINUED | OUTPATIENT
Start: 2019-01-27 | End: 2019-01-31 | Stop reason: HOSPADM

## 2019-01-27 RX ADMIN — BUMETANIDE 1 MG: 1 TABLET ORAL at 18:19

## 2019-01-27 RX ADMIN — ACETAMINOPHEN 650 MG: 325 TABLET ORAL at 10:08

## 2019-01-27 RX ADMIN — METOPROLOL TARTRATE 12.5 MG: 25 TABLET ORAL at 08:50

## 2019-01-27 RX ADMIN — METRONIDAZOLE 500 MG: 500 INJECTION, SOLUTION INTRAVENOUS at 06:33

## 2019-01-27 RX ADMIN — ASPIRIN 81 MG: 81 TABLET, CHEWABLE ORAL at 08:50

## 2019-01-27 RX ADMIN — SPIRONOLACTONE 25 MG: 25 TABLET ORAL at 12:45

## 2019-01-27 RX ADMIN — ATORVASTATIN CALCIUM 80 MG: 80 TABLET, FILM COATED ORAL at 21:30

## 2019-01-27 RX ADMIN — LURASIDONE HYDROCHLORIDE 40 MG: 40 TABLET, FILM COATED ORAL at 08:51

## 2019-01-27 RX ADMIN — OXCARBAZEPINE 300 MG: 300 TABLET, FILM COATED ORAL at 21:30

## 2019-01-27 RX ADMIN — OXCARBAZEPINE 300 MG: 300 TABLET, FILM COATED ORAL at 08:50

## 2019-01-27 RX ADMIN — SENNOSIDES AND DOCUSATE SODIUM 1 TABLET: 8.6; 5 TABLET ORAL at 21:31

## 2019-01-27 RX ADMIN — METOPROLOL TARTRATE 12.5 MG: 25 TABLET ORAL at 21:30

## 2019-01-27 RX ADMIN — METRONIDAZOLE 500 MG: 500 INJECTION, SOLUTION INTRAVENOUS at 15:19

## 2019-01-27 RX ADMIN — FAMOTIDINE 20 MG: 20 TABLET, FILM COATED ORAL at 08:50

## 2019-01-27 RX ADMIN — CLOPIDOGREL 75 MG: 75 TABLET, FILM COATED ORAL at 08:50

## 2019-01-27 RX ADMIN — INSULIN GLARGINE 30 UNITS: 100 INJECTION, SOLUTION SUBCUTANEOUS at 21:31

## 2019-01-27 RX ADMIN — IPRATROPIUM BROMIDE AND ALBUTEROL SULFATE 3 ML: .5; 3 SOLUTION RESPIRATORY (INHALATION) at 03:54

## 2019-01-27 RX ADMIN — CEFTRIAXONE SODIUM 1 G: 1 INJECTION, POWDER, FOR SOLUTION INTRAMUSCULAR; INTRAVENOUS at 10:01

## 2019-01-27 RX ADMIN — COLCHICINE 0.6 MG: 0.6 TABLET, FILM COATED ORAL at 08:50

## 2019-01-27 ASSESSMENT — PAIN DESCRIPTION - PAIN TYPE: TYPE: ACUTE PAIN

## 2019-01-27 ASSESSMENT — PAIN DESCRIPTION - ORIENTATION: ORIENTATION: MID

## 2019-01-27 ASSESSMENT — PAIN DESCRIPTION - FREQUENCY: FREQUENCY: CONTINUOUS

## 2019-01-27 ASSESSMENT — PAIN SCALES - GENERAL
PAINLEVEL_OUTOF10: 3
PAINLEVEL_OUTOF10: 8

## 2019-01-27 ASSESSMENT — PAIN DESCRIPTION - LOCATION: LOCATION: ABDOMEN

## 2019-01-27 NOTE — PROGRESS NOTES
Pt ambulates with cane and standby assist to chair. Tolerates well. Call light and table with belongings within reach.         Electronically signed by Jg Louise RN on 1/27/2019 at 3:05 PM

## 2019-01-27 NOTE — PLAN OF CARE
Gastroenterology Plan of Care Note     Briefly, most of HPI is taken from chart review due to pt is a poor/unreliable historian most likely as a result of previous CVA. Pt is a 59 yr old with PMH of HTN, DM, Hepatitis C (she states she was treated), COPD, CAD s/p STEMI with stent placement 5/2016. Additionally, hx of CHF with defibrillator placement 11/2017, Schizophrenia, and CVA 2014 for which she takes ASA and Plavix. Pt admitted with nausea, non-bloody emesis, and diffuse abdominal pain that started yesterday am. No precipitating factors. She denies any sharp abdominal pain, cramping, nausea or vomiting, or active bleeding at this time. CT scan indicates Pan diverticulosis and is suspicious for early diverticulitis. She was started on Rocephin 1 gram daily and Flagyl 500 mg IV Q8 hrs    10/22/2018-Per chart review-pt had been seen per Dr. Jama Ziegler for BRBPR and Fe def anemia. She was instructed to follow up for OP EGD/Colonoscopy-but due to scheduling conflicts-this has not been completed. 10/22/2017-CT scan indicated Diverticulitis (no GI notes at this time)    5/7/2016-Per chart notes, pt had been seen per Dr. Tina Aguilar [facility]) with similar complaints and CT findings suggestive of Diverticulitis with recommendations to complete a total of 14 days of Cipro and Flagyl. 11/5/2014-CT scan indicated multiple diverticula in the sigmoid colon with mild wall thickening and minor stranding suggestive of Acute diverticulitis. A/P:  1. Diverticulitis per CT. WBC's slightly elevated at 13.0-afebrile. Pt does have dull, diffuse abdominal pain. No nausea, vomiting, diarrhea or rectal bleeding at this time.  Hgb stable 13.0.  -Cont with Rocephin 1 gram daily (due to allergy to Levaquin Cipro cannot be given) and Flagyl 500 mg Q 8 hrs while inpatient  -Rec ID consult for ATB recommendations due to repeated episodes of Diverticulitis and multiple allergies  -Cont supportive

## 2019-01-27 NOTE — PROGRESS NOTES
[address] [facility]  CDU / OBSERVATION eNCOUnter  Attending NOte       I performed a history and physical examination of the patient and discussed management with the resident. I reviewed the residents note and agree with the documented findings and plan of care. Any areas of disagreement are noted on the chart. I was personally present for the key portions of any procedures. I have documented in the chart those procedures where I was not present during the key portions. I have reviewed the nurses notes. I agree with the chief complaint, past medical history, past surgical history, allergies, medications, social and family history as documented unless otherwise noted below. The Family history, social history, and ROS are effectively unchanged since admission unless noted elsewhere in the chart. Older patient who is nursing home resident and difficult to obtain history from likely from both a combination of schizophrenia and history of CVA presents with complaints of abdominal discomfort and nausea and vomiting. Patient has evidence of diverticulitis with elevation in white count. Admitted for IV antibiotics and reevaluation. Due to patient's difficulty with compliance secondary to medical issues and age patient may require further ongoing IV antibiotics as a stating several days of therapy prior to discharge back to nursing home.     Ирина Hinton MD  Attending Emergency  Physician

## 2019-01-27 NOTE — H&P
[address] [facility]  CDU / OBSERVATION eNCOUnter  Resident Note     Pt Name: Lily Akers  MRN: [id number]  [place] 1955  Date of evaluation: 1/27/19  Patient's PCP is : No primary care provider on file. CHIEF COMPLAINT       Chief Complaint   Patient presents with    Abdominal Pain    Dizziness         HISTORY OF PRESENT ILLNESS    Lily Akers is a 59 y.o. female with PMH of CVA, CHF, CAD, hypertension, COPD, hepatitis C, diabetes, schizophrenia presents to the ED with abdominal pain, nausea, vomiting. Patient states she has a constant cramping abdominal pain diffusely, and left lower quadrant. Overall poor historian, resides at an [facility]. Denies provoking or alleviating factors, has had multiple episodes of vomiting that began yesterday. She has had increased urinary frequency denies dysuria or hematuria or vaginal symptoms. Has been having normal bowel movements without blood, denies fever, chills, chest pain, shortness of breath. Patient diagnosed in ED with diverticulitis on CT, began on IV antibiotics. Location/Symptom: diffuse abd pain  Timing/Onset: 1 day  Provocation: none  Quality: cramping  Radiation: LLQ  Severity:   Timing/Duration: constant  Modifying Factors: none    Last A1C 7.1. WBC 13, Cr 1.09, Alk Phos 188, lactic normal, lipase normal. CT showed diverticulitis.    REVIEW OF SYSTEMS       General ROS - No fevers, No malaise   Ophthalmic ROS - No discharge, No changes in vision  ENT ROS -  No sore throat, No rhinorrhea,   Respiratory ROS - no shortness of breath, no cough, no  wheezing  Cardiovascular ROS - No chest pain, no dyspnea on exertion  Gastrointestinal ROS -+ abdominal pain, N/V, , no change in bowel habits, no black or bloody stools  Genito-Urinary ROS - No dysuria, trouble voiding, or hematuria  Musculoskeletal ROS - No myalgias, No arthalgias, contracture of left upper extremity and left sided hemiplegia from previous CVA  Neurological ROS - No headache, no dizziness/lightheadedness, No focal weakness, no loss of sensation  Dermatological ROS - No lesions, No rash     (PQRS) Advance directives on face sheet per hospital policy. No change unless specifically mentioned in chart    PAST MEDICAL HISTORY    has a past medical history of Arthritis; Asthma; Bipolar 1 disorder ([facility] CAD (coronary artery disease); Cardiac defibrillator in place; CHF (congestive heart failure) ([facility] Clinical trial participant at discharge; COPD (chronic obstructive pulmonary disease) ([facility] CVD (cardiovascular disease); Diabetes mellitus ([facility] Diverticulitis; GERD (gastroesophageal reflux disease); Hepatitis C; Hyperlipidemia; Hypertension; MRSA (methicillin resistant staph aureus) culture positive; Pneumonia; Schizophrenia ([facility] and Unspecified cerebral artery occlusion with cerebral infarction. I have reviewed the past medical history with the patient and it is pertinent to this complaint. SURGICAL HISTORY      has a past surgical history that includes Tonsillectomy; Foot surgery;   picc powerpicc double (12/19/2017); Hysterectomy; Cardiac surgery (5/6/2016); Cardiac pacemaker placement; and Cardiac catheterization (11/27/2017). I have reviewed and agree with Surgical History entered and it is pertinent to this complaint.      CURRENT MEDICATIONS         cefTRIAXone (ROCEPHIN) 1 g IVPB in 50 mL D5W minibag Once   aspirin chewable tablet 81 mg Daily   ipratropium-albuterol (DUONEB) nebulizer solution 3 mL Q4H PRN   OXcarbazepine (TRILEPTAL) tablet 300 mg BID   insulin glargine (LANTUS) injection vial 30 Units Nightly   atorvastatin (LIPITOR) tablet 80 mg Nightly   colchicine (COLCRYS) tablet 0.6 mg Daily   clopidogrel (PLAVIX) tablet 75 mg Daily   lurasidone (LATUDA) tablet 40 mg Daily   ondansetron (ZOFRAN-ODT) disintegrating tablet 4 mg Q8H PRN   sennosides-docusate sodium (SENOKOT-S) 8.6-50 MG tablet 1 tablet Nightly   famotidine (PEPCID) tablet 20 mg Daily   metoprolol tartrate (LOPRESSOR) tablet 12.5 mg BID   bumetanide (BUMEX) tablet 1 mg QPM   spironolactone (ALDACTONE) tablet 25 mg Daily   metronidazole (FLAGYL) 500 mg in NaCl 100 mL IVPB premix Q8H   [START ON 1/27/2019] cefTRIAXone (ROCEPHIN) 1 g IVPB in 50 mL D5W minibag Daily   sodium chloride flush 0.9 % injection 10 mL 2 times per day   sodium chloride flush 0.9 % injection 10 mL PRN   acetaminophen (TYLENOL) tablet 650 mg Q4H PRN   oxyCODONE-acetaminophen (PERCOCET) 5-325 MG per tablet 1 tablet Q4H PRN   Or    oxyCODONE-acetaminophen (PERCOCET) 5-325 MG per tablet 2 tablet Q4H PRN   0.9 % sodium chloride infusion Continuous       All medication charted and reviewed. ALLERGIES     is allergic to levofloxacin and pcn [penicillins]. FAMILY HISTORY     has no family status information on file. Family history is unknown by patient. The patient denies any pertinent family history. I have reviewed and agree with the family history entered. I have reviewed the Family History and it is not significant to the case    SOCIAL HISTORY      reports that she has been smoking Cigarettes. She has been smoking about 1.00 pack per day. She has never used smokeless tobacco. She reports that she drinks alcohol. She reports that she does not use drugs. I have reviewed and agree with all Social.  There are concerns for substance abuse/use. PHYSICAL EXAM     INITIAL VITALS:  weight is 179 lb 8 oz (81.4 kg). Her oral temperature is 97.3 °F (36.3 °C). Her blood pressure is 122/77 and her pulse is 90. Her respiration is 16 and oxygen saturation is 93%.       CONSTITUTIONAL: AOx4, no apparent distress, appears stated age    HEAD: normocephalic, atraumatic   EYES: PERRLA, EOMI    ENT: moist mucous membranes, uvula midline   NECK: supple, symmetric   BACK: symmetric   LUNGS: clear to auscultation bilaterally   CARDIOVASCULAR: regular rate and rhythm, no murmurs, rubs or gallops   ABDOMEN: soft, Diffuse nonspecific tenderness to abdomen without rebound or guarding or rigidity, non-distended with normal active bowel sounds   NEUROLOGIC:  MAEx4, no focal sensory or motor deficits   MUSCULOSKELETAL: no clubbing, cyanosis or edema, flexion contracture of left upper extremity and left-sided hemiparesis from previous CVA    SKIN: no rash or wounds       DIFFERENTIAL DIAGNOSIS/MDM:   GERD, PUD, pancreatitis, cholecystitis, GB colic, cholangitis, Vyhs-Oaze-Mmpbiz, SBO, DKA, AAA, mesenteric ischemia, perforated viscous, acute gastroenteritis, NSAP, pyelonephritis, kidney stone, appendicitis, hernia, UTI, constipation, ectopic, ovarian torsion, ovarian cyst, PID, tuboovarian abscess, period/ fibroid    DIAGNOSTIC RESULTS     RADIOLOGY:   I directly visualized the following  images and reviewed the radiologist interpretations:    Ct Abdomen Pelvis W Iv Contrast Additional Contrast? None    Result Date: 1/26/2019  EXAMINATION: CT OF THE ABDOMEN AND PELVIS WITH CONTRAST 1/26/2019 11:37 am TECHNIQUE: CT of the abdomen and pelvis was performed with the administration of intravenous contrast. Multiplanar reformatted images are provided for review. Dose modulation, iterative reconstruction, and/or weight based adjustment of the mA/kV was utilized to reduce the radiation dose to as low as reasonably achievable. COMPARISON: October 22, 2017 HISTORY: ORDERING SYSTEM PROVIDED HISTORY: abdominal pain, hx diverticulitis TECHNOLOGIST PROVIDED HISTORY: FINDINGS: Lower Chest: Lung bases show no acute process. Organs: Diffuse hepatic steatosis without focal hepatic lesion. No ductal dilatation. A few punctate gallstones layer dependently within the gallbladder. No pericholecystic fluid or wall thickening. The spleen is normal in appearance. Pancreas is normal.  No ductal dilatation. Circumscribed low attenuating lesions in the kidneys measuring less than 1.5 cm represent benign cysts. No follow-up imaging is required.   Adrenal glands are normal. GI/Bowel: Sigmoid diverticulosis is evident. There is some stranding of the adjacent fat (series 2, image 118) with some associated vascular engorgement and perhaps trace fluid in the left paracolic gutter. Findings may represent early diverticulitis. Appendix is normal in appearance. No dilated loops of bowel. Pelvis: No pelvic fluid or mass. Hysterectomy. Bladder is normal for degree of distention. Peritoneum/Retroperitoneum: Atherosclerotic abdominal aorta. No aneurysm. No lymphadenopathy. Some atrophy of the psoas muscles. Diastasis of the ventral abdominal rectus muscles containing unaffected colon and small bowel. No hernia is evident. Atrophy of the ventral muscles. Bones/Soft Tissues: No inguinal lymphadenopathy. The pelvic muscles show mild atrophy. Rectus muscles show mild atrophy. Remote L4 compression fracture versus Schmorl's node. Multilevel degenerative changes. Previous sternotomy partially imaged. There are no aggressive osseous lesions. 1. Pancolonic diverticulosis. There is very subtle stranding of the adjacent colon in the sigmoid colon region and trace fluid in the left paracolic gutter. Findings are suspicious for early diverticulitis. No adjacent perforation or abscess. 2. No pathologically dilated loops of bowel. 3. Normal appendix. LABS:  I have reviewed and interpreted all available lab results.   Labs Reviewed   CBC WITH AUTO DIFFERENTIAL - Abnormal; Notable for the following:        Result Value    WBC 13.0 (*)     Seg Neutrophils 79 (*)     Lymphocytes 14 (*)     Immature Granulocytes 1 (*)     Segs Absolute 10.38 (*)     All other components within normal limits   BASIC METABOLIC PANEL - Abnormal; Notable for the following:     Glucose 296 (*)     BUN 26 (*)     CREATININE 1.09 (*)     Chloride 93 (*)     Anion Gap 18 (*)     GFR Non- 51 (*)     All other components within normal limits   HEPATIC FUNCTION PANEL - Abnormal; Notable for the following: Alkaline Phosphatase 188 (*)     Total Bilirubin 0.22 (*)     Total Protein 8.8 (*)     All other components within normal limits   LIPASE   LACTIC ACID, WHOLE BLOOD   URINALYSIS WITH MICROSCOPIC   CBC WITH AUTO DIFFERENTIAL   COMPREHENSIVE METABOLIC PANEL       SCREENING TOOLS:    HEART Risk Score for Chest Pain Patients   History and Physical Exam Suspicion Level  (Nausea, Vomiting, Diaphoresis, Radiation, Exertion)   Slightly Suspicious (0 pts)   Moderately Suspicious (1 pt)   Highly Suspicious (2 pts)   EKG Interpretation   Normal (0 pts)   Non-Specific Repolarization Disturbance (1 pt)   Significant ST-Depression (2 pts)   Age of Patient (in years)   = 39 (0 pts)   46-64 (1 pt)   = 65 (2 pts)   Risk Factors   No Risk Factors (0 pts)   1-2 Risk Factors (1 pt)   = 3 Risk Factors (2 pts)   Risk Factors Include:   Hypercholesterolemia   Hypertension   Diabetes Mellitus   Cigarette smoking   Positive family history   Obesity   CAD   (SLE, CKDz, HIV, Cocaine abuse)   Troponin Levels   = Normal Limit (0 pts)   1-3 Times Normal Limit (1 pt)   > 3 Times Normal Limit (2 pts)  TOTAL:    Percent Risk for Major Adverse Cardiac Event (MACE)  0-3 pts indicates low risk for MACE   2.5% (DISCHARGE)   4-7 pts indicates moderate risk for MACE  20.3% (OBS)  8-10 pts indicates high risk for MACE  72.7% (EARLY INVASIVE [place])    CDU IMPRESSION / eSlena Huff is a 59 y.o. female who presents with    Acute Colonic Diverticulitis d/t unknown etiology    · Initial labs showed elevated WBC, creatinine, alk phos, normal lactate  · Consult GI Dr. Alexa Bryan  · Consult DM Educator  · Continue IV Flagyl and IV Ceftriaxone  · Continue PO Percocet and PO Zofran and IVF  · Continue home medications  · Monitor vitals, labs, and imaging  · DISPO: pending consults and clinical improvement, We'll make patient inpatient    CONSULTS:    IP CONSULT TO DIABETES EDUCATOR  IP CONSULT TO GI    PROCEDURES:  Not indicated       PATIENT REFERRED TO:    No follow-up provider specified. --  Cynthia English DO   Emergency Medicine Resident     This dictation was generated by voice recognition computer software. Although all attempts are made to edit the dictation for accuracy, there may be errors in the transcription that are not intended.

## 2019-01-27 NOTE — CONSULTS
[address] [facility] Gastroenterology  Consultation Note     . REASON FOR CONSULTATION:  Acute mild diverticulitis       HISTORY OF PRESENT ILLNESS:        Briefly, most of HPI is taken from chart review due to pt is a poor/unreliable historian most likely as a result of previous CVA.     Pt is a 59 yr old with PMH of HTN, DM, Hepatitis C (she states she was treated), COPD, CAD s/p STEMI with stent placement 5/2016. Additionally, hx of CHF with defibrillator placement 11/2017, Schizophrenia, and CVA 2014 for which she takes ASA and Plavix.      Pt admitted with nausea, non-bloody emesis, and diffuse abdominal pain that started yesterday am. No precipitating factors. She denies any sharp abdominal pain, cramping, nausea or vomiting, or active bleeding at this time. CT scan indicates Pan diverticulosis and is suspicious for early diverticulitis. She was started on Rocephin 1 gram daily and Flagyl 500 mg IV Q8 hrs     10/22/2018-Per chart review-pt had been seen per Dr. Michele Mills for BRBPR and Fe def anemia. She was instructed to follow up for OP EGD/Colonoscopy-but due to scheduling conflicts-this has not been completed.      10/22/2017-CT scan indicated Diverticulitis (no GI notes at this time)     5/7/2016-Per chart notes, pt had been seen per Dr. Buchanan Gloss [facility]) with similar complaints and CT findings suggestive of Diverticulitis with recommendations to complete a total of 14 days of Cipro and Flagyl.      11/5/2014-CT scan indicated multiple diverticula in the sigmoid colon with mild wall thickening and minor stranding suggestive of Acute diverticulitis.          PAST MEDICAL HISTORY:  Past Medical History:   Diagnosis Date    Arthritis     Asthma     [facility]     CAD (coronary artery disease)     Cardiac defibrillator in place     CHF (congestive heart failure) [facility]     Clinical trial participant at discharge 03/13/2017    [employer] PSNELA completed due to device removal on 12/14/2017    COPD [facility]ch[facility] obstructive [facility] disease) [facility]     CVD (cardiovascular disease)     Diabetes mellitus ([facility]     Diverticulitis     GERD (gastroesophageal reflux disease)     Hepatitis C     Hyperlipidemia     Hypertension     MRSA (methicillin resistant staph aureus) culture positive 12/10/2017    blood    Pneumonia     [facility]     Unspecified cerebral artery occlusion with cerebral infarction      Past Surgical History:   Procedure Laterality Date    CARDIAC CATHETERIZATION  11/27/2017    CARDIAC PACEMAKER PLACEMENT      placed [facility] ago [facility]  5/6/2016    cardiac cath-1xBMS prox. RCA; 1xBMS prox. LAD    FOOT SURGERY      cyst removed from L foot    HC  PICC [address] DOUBLE  12/19/2017         HYSTERECTOMY      TONSILLECTOMY         ALLERGIES:  Allergies   Allergen Reactions    Levofloxacin Hives    Pcn [Penicillins] Hives       HOME MEDICATIONS:  Prior to Admission medications    Medication Sig Start Date End Date Taking?  Authorizing Provider   loratadine (CLARITIN) 10 MG capsule Take 10 mg by mouth daily   Yes Historical Provider, MD   spironolactone (ALDACTONE) 25 MG tablet Take 25 mg by mouth daily   Yes Historical Provider, MD   metoprolol tartrate (LOPRESSOR) 25 MG tablet Take 0.5 tablets by mouth 2 times daily 5/17/18  Yes Vandana Rivas MD   bumetanide (BUMEX) 1 MG tablet Take 1 tablet by mouth every evening 5/17/18  Yes Vandana Rivas MD   bumetanide (BUMEX) 2 MG tablet Take 1 tablet by mouth every morning 5/18/18  Yes Vandana Rivas MD   ferrous sulfate 325 (65 Fe) MG EC tablet Take 1 tablet by mouth 3 times daily (with meals) 5/17/18  Yes Vandana Rivas MD   Elastic Bandages & Supports (JOBST KNEE HIGH COMPRESSION SM) MISC 1 each by Does not apply route daily as needed (wear daily) 4/30/18  Yes Yazan Ebbs, DPM   ranitidine (ZANTAC) 150 MG tablet Take 150 mg by mouth daily   Yes Historical Provider, MD   lurasidone (LATUDA) 40 MG TABS tablet Take 40 mg by mouth    Yes Historical Provider, MD   ondansetron (ZOFRAN-ODT) 4 MG disintegrating tablet Take 4 mg by mouth 9/2/17  Yes Historical Provider, MD   senna-docusate ([place]) 8.6-50 MG per tablet Take 1 tablet by mouth   Yes Historical Provider, MD   aspirin 81 MG chewable tablet Take 1 tablet by mouth daily 12/18/17  Yes Jesus Alberto Lynn MD   ipratropium-albuterol (DUONEB) 0.5-2.5 (3) MG/3ML SOLN nebulizer solution Inhale 3 mLs into the lungs every 4 hours as needed for Shortness of Breath 12/18/17  Yes Jose Be MD   nitroGLYCERIN (NITROSTAT) 0.4 MG SL tablet Place 1 tablet under the tongue every 5 minutes as needed for Chest pain up to max of 3 total doses. If no relief after 1 dose, call 911. 12/18/17  Yes Jose Be MD   OXcarbazepine (TRILEPTAL) 300 MG tablet Take 1 tablet by mouth 2 times daily Per pharmacy she is to be taking 300 mg twice daily but patient states that she only takes once daily.  12/18/17  Yes Jose Be MD   insulin glargine (LANTUS) 100 UNIT/ML injection vial Inject 30 Units into the skin nightly 12/18/17  Yes Jose Be MD   atorvastatin (LIPITOR) 80 MG tablet Take 1 tablet by mouth nightly 12/18/17  Yes Jose Be MD   OLANZapine (ZYPREXA) 10 MG tablet Take 1 tablet by mouth nightly 12/18/17  Yes Jose Be MD   colchicine (COLCRYS) 0.6 MG tablet Take 1 tablet by mouth daily 12/18/17  Yes Jose Be MD   clopidogrel (PLAVIX) 75 MG tablet Take 1 tablet by mouth daily 12/18/17  Yes Jose Be MD     .  CURRENT MEDICATIONS:  Scheduled Meds:   cefTRIAXone (ROCEPHIN) IV  1 g Intravenous Once    aspirin  81 mg Oral Daily    OXcarbazepine  300 mg Oral BID    insulin glargine  30 Units Subcutaneous Nightly    atorvastatin  80 mg Oral Nightly    colchicine  0.6 mg Oral Daily    clopidogrel  75 mg Oral Daily    lurasidone  40 mg Oral Daily    sennosides-docusate sodium  1 tablet Oral Nightly    famotidine  20 mg Oral Daily    metoprolol tartrate  12.5 mg Oral BID    bumetanide  1 mg Oral QPM    spironolactone  25 mg Oral Daily    metroNIDAZOLE  500 mg Intravenous Q8H    cefTRIAXone (ROCEPHIN) IV  1 g Intravenous Daily    sodium chloride flush  10 mL Intravenous 2 times per day     . Continuous Infusions:   dextrose      sodium chloride 125 mL/hr at 01/26/19 2003     . PRN Meds:glucose, dextrose, glucagon (rDNA), dextrose, ipratropium-albuterol, ondansetron, sodium chloride flush, acetaminophen, oxyCODONE-acetaminophen **OR** oxyCODONE-acetaminophen, morphine, ondansetron  . SOCIAL HISTORY:     Social History     Social History    Marital status: Single     Spouse name: N/A    Number of children: N/A    Years of education: N/A     Occupational History    Not on file. Social History Main Topics    Smoking status: Current Every Day Smoker     Packs/day: 1.00     Types: Cigarettes    Smokeless tobacco: Never Used    Alcohol use Yes      Comment: 1 bottle per month wine    Drug use: No    Sexual activity: Yes     Partners: Male     Other Topics Concern    Not on file     Social History Narrative    No narrative on file       FAMILY HISTORY:   Family History   Problem Relation Age of Onset    Family history unknown: Yes       REVIEW OF SYSTEMS:     Constitutional: No fever, no chills, no lethargy, no weakness. HEENT:  No headache, otalgia, itchy eyes, nasal discharge or sore throat. Cardiac:  No chest pain, dyspnea, orthopnea or PND. Chest:   No cough, phlegm or wheezing. Abdomen:  No abdominal pain, nausea or vomiting. Neuro:  No focal weakness, abnormal movements or seizure like activity. Skin:   No rashes, no itching. :   No hematuria, no pyuria, no dysuria, no flank pain. Extremities:  No swelling or joint pains. ROS was otherwise negative except as mentioned in the [address]      PHYSICAL EXAM:    BP (!) 140/81 Pulse 60   Temp 98.5 °F (36.9 °C) (Oral)   Resp 18   Wt 179 lb 8 oz (81.4 kg)   LMP  (LMP Unknown)   SpO2 (!) 72%   BMI 36.25 kg/m²   . TMAX[24]    General: Well developed, Well nourished, No apparent distress  Head:  Normocephalic, Atraumatic  EENT: EOMI, Sclera not icteric, Oropharynx moist  Neck:  Supple, Trachea midline  Lungs:CTA Bilaterally  Heart: RRR, No murmur, No rub, No gallop, PMI nondisplaced. Abdomen:Soft, Non tender, Not distended, BS WNL,  No masses. Ext:No clubbing. No cyanosis. No edema. Skin: No rashes. No jaundice. No stigmata of liver disease. Neuro:  A&O x Three, No focal neurological deficits    LABS and IMAGING:     Hemotological labs:   ANEMIA STUDIES  No results for input(s): LABIRON, TIBC, FERRITIN, HOLYSBGY51, FOLATE, OCCULTBLD in the last 72 hours. CBC  Recent Labs      01/26/19   1046   WBC  13.0*   HGB  13.0   MCV  86.9   RDW  13.3   PLT  264       PT/INR  No results for input(s): PROTIME, INR in the last 72 hours.     BMP  Recent Labs      01/26/19   1046   NA  140   K  4.2   CL  93*   CO2  29   BUN  26*   CREATININE  1.09*   GLUCOSE  296*   CALCIUM  9.7       LIVER WORK UP  Hepatitis Functional Panel:  Recent Labs      01/26/19   1046   ALKPHOS  188*   ALT  14   AST  13   PROT  8.8*   BILITOT  0.22*   BILIDIR  0.09   LABALBU  4.3       AMYLASE/LIPASE/AMMONIA  Recent Labs      01/26/19   1046   LIPASE  19       Acute Hepatitis Panel   No results found for: HEPBSAG, HEPCAB, HEPBIGM, HEPAIGM    HCV Genotype   No results found for: HEPATITISCGENOTYPE    HCV Quantitative   No results found for: HCVQNT    Metabolic work up:  Ceruloplasmin  AA work up:  Alpha 1 antitrypsin   No results found for: A1A  AMA:  No results found for: MITOAB    ASMA:  No results found for: SMOOTHMUSCAB    ANCA:    MICHEL:  Lab Results   Component Value Date    MICHEL NEGATIVE 05/06/2016       Anti - Liver/Kidney Ab:  No results found for: LIVER-KIDNEYMICROSOMALAB    Ceruloplasmin:  No results found

## 2019-01-28 LAB
ANION GAP SERPL CALCULATED.3IONS-SCNC: 12 MMOL/L (ref 9–17)
BUN BLDV-MCNC: 12 MG/DL (ref 8–23)
BUN/CREAT BLD: ABNORMAL (ref 9–20)
CALCIUM SERPL-MCNC: 8.4 MG/DL (ref 8.6–10.4)
CHLORIDE BLD-SCNC: 102 MMOL/L (ref 98–107)
CO2: 25 MMOL/L (ref 20–31)
CREAT SERPL-MCNC: 0.84 MG/DL (ref 0.5–0.9)
GFR AFRICAN AMERICAN: >60 ML/MIN
GFR NON-AFRICAN AMERICAN: >60 ML/MIN
GFR SERPL CREATININE-BSD FRML MDRD: ABNORMAL ML/MIN/{1.73_M2}
GFR SERPL CREATININE-BSD FRML MDRD: ABNORMAL ML/MIN/{1.73_M2}
GLUCOSE BLD-MCNC: 100 MG/DL (ref 65–105)
GLUCOSE BLD-MCNC: 118 MG/DL (ref 70–99)
GLUCOSE BLD-MCNC: 142 MG/DL (ref 65–105)
GLUCOSE BLD-MCNC: 172 MG/DL (ref 65–105)
GLUCOSE BLD-MCNC: 200 MG/DL (ref 65–105)
GLUCOSE BLD-MCNC: 227 MG/DL (ref 65–105)
HCT VFR BLD CALC: 38.6 % (ref 36.3–47.1)
HEMOGLOBIN: 12.3 G/DL (ref 11.9–15.1)
MCH RBC QN AUTO: 28.4 PG (ref 25.2–33.5)
MCHC RBC AUTO-ENTMCNC: 31.9 G/DL (ref 28.4–34.8)
MCV RBC AUTO: 89.1 FL (ref 82.6–102.9)
NRBC AUTOMATED: 0 PER 100 WBC
PDW BLD-RTO: 13.5 % (ref 11.8–14.4)
PLATELET # BLD: 189 K/UL (ref 138–453)
PMV BLD AUTO: 9.7 FL (ref 8.1–13.5)
POTASSIUM SERPL-SCNC: 3.7 MMOL/L (ref 3.7–5.3)
RBC # BLD: 4.33 M/UL (ref 3.95–5.11)
SODIUM BLD-SCNC: 139 MMOL/L (ref 135–144)
WBC # BLD: 9.2 K/UL (ref 3.5–11.3)

## 2019-01-28 PROCEDURE — 86256 FLUORESCENT ANTIBODY TITER: CPT

## 2019-01-28 PROCEDURE — 85027 COMPLETE CBC AUTOMATED: CPT

## 2019-01-28 PROCEDURE — 96376 TX/PRO/DX INJ SAME DRUG ADON: CPT

## 2019-01-28 PROCEDURE — 82105 ALPHA-FETOPROTEIN SERUM: CPT

## 2019-01-28 PROCEDURE — 96366 THER/PROPH/DIAG IV INF ADDON: CPT

## 2019-01-28 PROCEDURE — 99222 1ST HOSP IP/OBS MODERATE 55: CPT | Performed by: INTERNAL MEDICINE

## 2019-01-28 PROCEDURE — 86255 FLUORESCENT ANTIBODY SCREEN: CPT

## 2019-01-28 PROCEDURE — 6360000002 HC RX W HCPCS: Performed by: STUDENT IN AN ORGANIZED HEALTH CARE EDUCATION/TRAINING PROGRAM

## 2019-01-28 PROCEDURE — 6370000000 HC RX 637 (ALT 250 FOR IP): Performed by: STUDENT IN AN ORGANIZED HEALTH CARE EDUCATION/TRAINING PROGRAM

## 2019-01-28 PROCEDURE — 96361 HYDRATE IV INFUSION ADD-ON: CPT

## 2019-01-28 PROCEDURE — 87522 HEPATITIS C REVRS TRNSCRPJ: CPT

## 2019-01-28 PROCEDURE — 87902 NFCT AGT GNTYP ALYS HEP C: CPT

## 2019-01-28 PROCEDURE — 2580000003 HC RX 258: Performed by: STUDENT IN AN ORGANIZED HEALTH CARE EDUCATION/TRAINING PROGRAM

## 2019-01-28 PROCEDURE — 80048 BASIC METABOLIC PNL TOTAL CA: CPT

## 2019-01-28 PROCEDURE — 99232 SBSQ HOSP IP/OBS MODERATE 35: CPT | Performed by: INTERNAL MEDICINE

## 2019-01-28 PROCEDURE — 2500000003 HC RX 250 WO HCPCS: Performed by: STUDENT IN AN ORGANIZED HEALTH CARE EDUCATION/TRAINING PROGRAM

## 2019-01-28 PROCEDURE — APPNB15 APP NON BILLABLE TIME 0-15 MINS: Performed by: INTERNAL MEDICINE

## 2019-01-28 PROCEDURE — 36415 COLL VENOUS BLD VENIPUNCTURE: CPT

## 2019-01-28 PROCEDURE — APPSS30 APP SPLIT SHARED TIME 16-30 MINUTES: Performed by: INTERNAL MEDICINE

## 2019-01-28 PROCEDURE — 83516 IMMUNOASSAY NONANTIBODY: CPT

## 2019-01-28 PROCEDURE — 86038 ANTINUCLEAR ANTIBODIES: CPT

## 2019-01-28 PROCEDURE — 82947 ASSAY GLUCOSE BLOOD QUANT: CPT

## 2019-01-28 PROCEDURE — 1200000000 HC SEMI PRIVATE

## 2019-01-28 RX ADMIN — SPIRONOLACTONE 25 MG: 25 TABLET ORAL at 08:34

## 2019-01-28 RX ADMIN — ONDANSETRON 4 MG: 2 INJECTION INTRAMUSCULAR; INTRAVENOUS at 10:06

## 2019-01-28 RX ADMIN — METRONIDAZOLE 500 MG: 500 INJECTION, SOLUTION INTRAVENOUS at 00:28

## 2019-01-28 RX ADMIN — LURASIDONE HYDROCHLORIDE 40 MG: 40 TABLET, FILM COATED ORAL at 08:34

## 2019-01-28 RX ADMIN — INSULIN LISPRO 2 UNITS: 100 INJECTION, SOLUTION INTRAVENOUS; SUBCUTANEOUS at 17:53

## 2019-01-28 RX ADMIN — INSULIN LISPRO 1 UNITS: 100 INJECTION, SOLUTION INTRAVENOUS; SUBCUTANEOUS at 21:28

## 2019-01-28 RX ADMIN — BUMETANIDE 1 MG: 1 TABLET ORAL at 17:51

## 2019-01-28 RX ADMIN — COLCHICINE 0.6 MG: 0.6 TABLET, FILM COATED ORAL at 08:34

## 2019-01-28 RX ADMIN — SENNOSIDES AND DOCUSATE SODIUM 1 TABLET: 8.6; 5 TABLET ORAL at 21:27

## 2019-01-28 RX ADMIN — SODIUM CHLORIDE: 9 INJECTION, SOLUTION INTRAVENOUS at 13:22

## 2019-01-28 RX ADMIN — CEFTRIAXONE SODIUM 1 G: 1 INJECTION, POWDER, FOR SOLUTION INTRAMUSCULAR; INTRAVENOUS at 08:34

## 2019-01-28 RX ADMIN — METOPROLOL TARTRATE 12.5 MG: 25 TABLET ORAL at 08:34

## 2019-01-28 RX ADMIN — SODIUM CHLORIDE: 9 INJECTION, SOLUTION INTRAVENOUS at 02:47

## 2019-01-28 RX ADMIN — ASPIRIN 81 MG: 81 TABLET, CHEWABLE ORAL at 08:34

## 2019-01-28 RX ADMIN — INSULIN GLARGINE 30 UNITS: 100 INJECTION, SOLUTION SUBCUTANEOUS at 21:28

## 2019-01-28 RX ADMIN — CLOPIDOGREL 75 MG: 75 TABLET, FILM COATED ORAL at 08:34

## 2019-01-28 RX ADMIN — OXCARBAZEPINE 300 MG: 300 TABLET, FILM COATED ORAL at 08:34

## 2019-01-28 RX ADMIN — METOPROLOL TARTRATE 12.5 MG: 25 TABLET ORAL at 21:27

## 2019-01-28 RX ADMIN — INSULIN LISPRO 1 UNITS: 100 INJECTION, SOLUTION INTRAVENOUS; SUBCUTANEOUS at 13:20

## 2019-01-28 RX ADMIN — ATORVASTATIN CALCIUM 80 MG: 80 TABLET, FILM COATED ORAL at 21:27

## 2019-01-28 RX ADMIN — OXCARBAZEPINE 300 MG: 300 TABLET, FILM COATED ORAL at 21:27

## 2019-01-28 RX ADMIN — METRONIDAZOLE 500 MG: 500 INJECTION, SOLUTION INTRAVENOUS at 17:51

## 2019-01-28 RX ADMIN — METRONIDAZOLE 500 MG: 500 INJECTION, SOLUTION INTRAVENOUS at 09:09

## 2019-01-28 RX ADMIN — FAMOTIDINE 20 MG: 20 TABLET, FILM COATED ORAL at 08:34

## 2019-01-28 RX ADMIN — SODIUM CHLORIDE: 9 INJECTION, SOLUTION INTRAVENOUS at 21:27

## 2019-01-28 ASSESSMENT — PAIN DESCRIPTION - ORIENTATION: ORIENTATION: RIGHT

## 2019-01-28 ASSESSMENT — PAIN DESCRIPTION - PAIN TYPE: TYPE: CHRONIC PAIN

## 2019-01-28 ASSESSMENT — PAIN DESCRIPTION - DESCRIPTORS: DESCRIPTORS: DISCOMFORT

## 2019-01-28 ASSESSMENT — PAIN DESCRIPTION - LOCATION: LOCATION: LEG

## 2019-01-28 ASSESSMENT — PAIN SCALES - GENERAL: PAINLEVEL_OUTOF10: 3

## 2019-01-28 ASSESSMENT — PAIN DESCRIPTION - FREQUENCY: FREQUENCY: CONTINUOUS

## 2019-01-28 NOTE — CONSULTS
[facility] InitialConsult Note  admission date 1/26/2019    reason for consultation:   diverticulitis    Impression :   Current:  · Acute diverticulitis -recurrent  · Leukocytosis,  · Allergy to [facility] and levaquin  · leukocytosis    Other:  ·   Discussion / summary of stay / plan of care   ·   Recommendations   · Keep flagyl and ceftriaxone and follow abd pain -  · No abscess on CTAP at this time  · Follow clinical improvement, follow CRP  · Will disc w GI  · Discussed with patient on the bedside    Infection Control Recommendations   · [place] Precautions  · Contact Isolation       Antimicrobial Stewardship Recommendations   · Simplification of therapy  · Targeted therapy    Coordination ofOutpatient Care:   · Estimated Length of IV antimicrobials:  · Patient will need Midline / picc Catheter Insertion:   · Patient will need SNF:  · Patient will need outpatient wound care:     History of Present Illness:   Initial history:  Skip Martinez is a 59y.o.-year-old female withy hep C and past CVA, presented w diffuse abd pain and vomiting, no fever, x 1 day, CT AP shows diverticulosis and possible diverticulitis, started on flagyl and ceftriaxone and GI recommending a colonoscopy in 6 weeks- ID consulted due to past recurrent diverticulosis and many antibiotics allergies. WBC 13 then 9    Continues to have at this time . He diffuse abdominal pain, some distention, gassiness and inability to tolerate solid food, she can have clears. Otherwise vomits after solid foods. Her improvement is not enough as she says          Interval changes1/28/2019     Summary of relevant labs:  Labs:  WBC 13-9  Micro:    Imaging:  CT AP 1/26  . Pancolonic diverticulosis. Gearline Sowmya is very subtle stranding of the adjacent   colon in the sigmoid colon region and trace fluid in the left paracolic   gutter.  Findings are suspicious for early diverticulitis.  No adjacent   perforation or abscess.    2. No pathologically dilated loops of bowel. 3. Normal appendix         I have personally reviewed the past medical history, past surgical history, medications, social history, and family history, and I haveupdated the database accordingly. Past Medical History:     Past Medical History:   Diagnosis Date    Arthritis     Asthma     [facility] ([facility]     CAD (coronary artery disease)     Cardiac defibrillator in place     CHF (congestive heart failure) ([facility]     Clinical trial participant at discharge 03/13/2017    [employer] PSR completed due to device removal on 12/14/2017    COPD (chronic obstructive pulmonary disease) ([facility]     CVD (cardiovascular disease)     Diabetes mellitus ([facility]     Diverticulitis     GERD (gastroesophageal reflux disease)     Hepatitis C     Hyperlipidemia     Hypertension     MRSA (methicillin resistant staph aureus) culture positive 12/10/2017    blood    Pneumonia     [facility]     Unspecified cerebral artery occlusion with cerebral infarction        Past Surgical  History:     Past Surgical History:   Procedure Laterality Date    CARDIAC CATHETERIZATION  11/27/2017    CARDIAC PACEMAKER PLACEMENT      placed 1 month ago at st [address]  5/6/2016    cardiac cath-1xBMS prox. RCA; 1xBMS prox.  LAD    FOOT SURGERY      cyst removed from L foot      PICC [address] DOUBLE  12/19/2017         HYSTERECTOMY      TONSILLECTOMY         Medications:      insulin lispro  0-6 Units Subcutaneous TID WC    insulin lispro  0-3 Units Subcutaneous Nightly    cefTRIAXone (ROCEPHIN) IV  1 g Intravenous Once    aspirin  81 mg Oral Daily    OXcarbazepine  300 mg Oral BID    insulin glargine  30 Units Subcutaneous Nightly    atorvastatin  80 mg Oral Nightly    colchicine  0.6 mg Oral Daily    clopidogrel  75 mg Oral Daily    lurasidone  40 mg Oral Daily    sennosides-docusate sodium  1 tablet Oral Nightly    famotidine  20 mg Oral Daily  metoprolol tartrate  12.5 mg Oral BID    bumetanide  1 mg Oral QPM    spironolactone  25 mg Oral Daily    metroNIDAZOLE  500 mg Intravenous Q8H    cefTRIAXone (ROCEPHIN) IV  1 g Intravenous Daily    sodium chloride flush  10 mL Intravenous 2 times per day       Social History:     Social History     Social History    Marital status: Single     Spouse name: N/A    Number of children: N/A    Years of education: N/A     Occupational History    Not on file. Social History Main Topics    Smoking status: Current Every Day Smoker     Packs/day: 1.00     Types: Cigarettes    Smokeless tobacco: Never Used    Alcohol use Yes      Comment: 1 bottle per month wine    Drug use: No    Sexual activity: Yes     Partners: Male     Other Topics Concern    Not on file     Social History Narrative    No narrative on file       Family History:     Family History   Problem Relation Age of Onset    Family history unknown: Yes        Allergies:   Levofloxacin and Pcn [penicillins]     Review of Systems:     Review of Systems   Constitutional: Positive for activity change and appetite change. Negative for fever. HENT: Negative for congestion and dental problem. Eyes: Negative for photophobia and visual disturbance. Respiratory: Negative for apnea and chest tightness. Cardiovascular: Negative for chest pain. Gastrointestinal: Positive for abdominal distention and abdominal pain. Endocrine: Negative for polyphagia and polyuria. Genitourinary: Negative for difficulty urinating. Musculoskeletal: Negative for arthralgias and back pain. Skin: Negative for color change and pallor. Allergic/Immunologic: Negative for food allergies. Neurological: Negative for dizziness and facial asymmetry. Hematological: Negative for adenopathy. Psychiatric/Behavioral: Negative for agitation and behavioral problems.        Physical Examination :     Patient Vitals for the past 24 hrs:   BP Temp Temp src Pulse Resp SpO2 Height Weight   01/28/19 2110 (!) 141/88 98.1 °F (36.7 °C) Oral 102 16 97 % - -   01/28/19 0827 125/75 98.3 °F (36.8 °C) Oral 94 18 100 % - -   01/28/19 0600 - - - - - - 4' 11\" (1.499 m) 178 lb 6 oz (80.9 kg)         Physical Exam   Constitutional: She is oriented to person, place, and time. She appears well-developed and well-nourished. No distress. HENT:   Head: Normocephalic and atraumatic. Eyes: Conjunctivae are normal. No scleral icterus. Neck: No tracheal deviation present. Cardiovascular: Normal rate and regular rhythm. Exam reveals no friction rub. No murmur heard. Pulmonary/Chest: No respiratory distress. She has no wheezes. Abdominal: She exhibits distension. There is tenderness. There is no guarding. Genitourinary:   Genitourinary Comments: No Melo, urine is clear   Musculoskeletal: She exhibits no edema or deformity. Neurological: She is alert and oriented to person, place, and time. No cranial nerve deficit. Skin: No erythema. No pallor. Psychiatric: She has a normal mood and affect. Her behavior is normal.         Medical Decision Making:   I have independently reviewed/ordered the following labs:    CBC with Differential:   Recent Labs      01/26/19   1046  01/28/19   0801   WBC  13.0*  9.2   HGB  13.0  12.3   HCT  39.3  38.6   PLT  264  189   LYMPHOPCT  14*   --    MONOPCT  5   --      BMP:  Recent Labs      01/26/19   1046  01/28/19   0801   NA  140  139   K  4.2  3.7   CL  93*  102   CO2  29  25   BUN  26*  12   CREATININE  1.09*  0.84     Hepatic Function Panel:   Recent Labs      01/26/19   1046   PROT  8.8*   LABALBU  4.3   BILIDIR  0.09   IBILI  0.13   BILITOT  0.22*   ALKPHOS  188*   ALT  14   AST  13     No results for input(s): RPR in the last 72 hours. No results for input(s): HIV in the last 72 hours. No results for input(s): BC in the last 72 hours.   Lab Results   Component Value Date    CREATININE 0.84 01/28/2019    GLUCOSE 118 01/28/2019       Detailed results: Thank you for allowing us to participate in the care of this patient. Please call with questions. This note is created with the assistance of a speech recognition program.  While intending to generate adocument that actually reflects the content of the visit, the document can still have some errors including those of syntax and sound a like substitutions which may escape proof reading. It such instances, actual meaningcan be extrapolated by contextual diversion.     Tito Haro MD  Office: [phone]

## 2019-01-28 NOTE — PROGRESS NOTES
[facility]    Gastroenterology Daily Progress Note      Patient:   Win Middleton   :    1955   Facility:   [address] [facility]   Date:     2019  Consultant:   Mariposa Schneider CNP      Subjective:     59 y.o. female admitted 2019 with Diverticulitis [K57.92]  Acute diverticulitis [K57.92] and seen for same. Patient seen and examined. Hemodynamically stable and afebrile overnight in this a.m. Head temp of 37.7 last evening. Patient reports nausea but no vomiting. Appetite has been decreased associated with nausea. Abdominal discomfort bilateral lower quadrant persist, no stools. Objective     Scheduled Meds:   insulin lispro  0-6 Units Subcutaneous TID WC    insulin lispro  0-3 Units Subcutaneous Nightly    cefTRIAXone (ROCEPHIN) IV  1 g Intravenous Once    aspirin  81 mg Oral Daily    OXcarbazepine  300 mg Oral BID    insulin glargine  30 Units Subcutaneous Nightly    atorvastatin  80 mg Oral Nightly    colchicine  0.6 mg Oral Daily    clopidogrel  75 mg Oral Daily    lurasidone  40 mg Oral Daily    sennosides-docusate sodium  1 tablet Oral Nightly    famotidine  20 mg Oral Daily    metoprolol tartrate  12.5 mg Oral BID    bumetanide  1 mg Oral QPM    spironolactone  25 mg Oral Daily    metroNIDAZOLE  500 mg Intravenous Q8H    cefTRIAXone (ROCEPHIN) IV  1 g Intravenous Daily    sodium chloride flush  10 mL Intravenous 2 times per day       Vital Signs:  /75   Pulse 94   Temp 98.3 °F (36.8 °C) (Oral)   Resp 18   Ht 4' 11\" (1.499 m)   Wt 178 lb 6 oz (80.9 kg)   LMP  (LMP Unknown)   SpO2 100%   BMI 36.03 kg/m²      Physical Exam:   General:  AAO x 3, speaking in full sentences, no accessory muscle use. Chest:   Bilateral vesicular breath sounds, no rales or wheezes. Cardiac:  S1 S2 RR, no murmurs or gallops  Abdomen: Soft, obese, bilateral LQ tenderness, non distended, BS audible.   SKIN:  No rashes, good skin turgor. No jaundice  Extremities:  No edema, no clubbing, No cyanosis. Left hemiparesis from prior CVA  Neuro:  AAO x 3, No FND. Lab and Imaging Review     CBC  Recent Labs      01/26/19   1046  01/28/19   0801   WBC  13.0*  9.2   HGB  13.0  12.3   HCT  39.3  38.6   MCV  86.9  89.1   MCH  28.8  28.4   MCHC  33.1  31.9   PLT  264  189       BMP  Recent Labs      01/26/19   1046  01/28/19   0801   NA  140  139   K  4.2  3.7   CL  93*  102   CO2  29  25   BUN  26*  12   CREATININE  1.09*  0.84   GLUCOSE  296*  118*   CALCIUM  9.7  8.4*       LFTS  Recent Labs      01/26/19   1046   ALKPHOS  188*   ALT  14   AST  13   PROT  8.8*   BILITOT  0.22*   BILIDIR  0.09   LABALBU  4.3       AMYLASE/LIPASE/AMMONIA  Recent Labs      01/26/19   1046   LIPASE  19       IMAGING  Ct Abdomen Pelvis W Iv Contrast Additional Contrast? None    Result Date: 1/26/2019  EXAMINATION: CT OF THE ABDOMEN AND PELVIS WITH CONTRAST 1/26/2019 11:37 am TECHNIQUE: CT of the abdomen and pelvis was performed with the administration of intravenous contrast. Multiplanar reformatted images are provided for review. Dose modulation, iterative reconstruction, and/or weight based adjustment of the mA/kV was utilized to reduce the radiation dose to as low as reasonably achievable. COMPARISON: October 22, 2017 HISTORY: ORDERING SYSTEM PROVIDED HISTORY: abdominal pain, hx diverticulitis TECHNOLOGIST PROVIDED HISTORY: FINDINGS: Lower Chest: Lung bases show no acute process. Organs: Diffuse hepatic steatosis without focal hepatic lesion. No ductal dilatation. A few punctate gallstones layer dependently within the gallbladder. No pericholecystic fluid or wall thickening. The spleen is normal in appearance. Pancreas is normal.  No ductal dilatation. Circumscribed low attenuating lesions in the kidneys measuring less than 1.5 cm represent benign cysts. No follow-up imaging is required. Adrenal glands are normal. GI/Bowel: Sigmoid diverticulosis is evident. There is some stranding of the adjacent fat (series 2, image 118) with some associated vascular engorgement and perhaps trace fluid in the left paracolic gutter. Findings may represent early diverticulitis. Appendix is normal in appearance. No dilated loops of bowel. Pelvis: No pelvic fluid or mass. Hysterectomy. Bladder is normal for degree of distention. Peritoneum/Retroperitoneum: Atherosclerotic abdominal aorta. No aneurysm. No lymphadenopathy. Some atrophy of the psoas muscles. Diastasis of the ventral abdominal rectus muscles containing unaffected colon and small bowel. No hernia is evident. Atrophy of the ventral muscles. Bones/Soft Tissues: No inguinal lymphadenopathy. The pelvic muscles show mild atrophy. Rectus muscles show mild atrophy. Remote L4 compression fracture versus Schmorl's node. Multilevel degenerative changes. Previous sternotomy partially imaged. There are no aggressive osseous lesions. 1. Pancolonic diverticulosis. There is very subtle stranding of the adjacent colon in the sigmoid colon region and trace fluid in the left paracolic gutter. Findings are suspicious for early diverticulitis. No adjacent perforation or abscess. 2. No pathologically dilated loops of bowel. 3. Normal appendix. Assessment:     1. Diverticulitis - acute, uncomplicated. WBCs improved  - Continue to experience bilateral lower quadrant abdominal pain,  But no signs of acute abdomen. Experiencing nausea but no vomiting. Appetite is decreased. 2.  History of diverticulitis in past  3. History of RIAZ. Outpatient workup had been planned. Hemoglobin currently stable. Plan:       1. Continue Rocephin 1 g daily (patient has allergy to Levaquin and PCN) and Flagyl 500 mg IV every 8 hours. 2. Recommend ID consultation for ATB recommendations due to repeated episodes of diverticulitis and multiple allergies  4.   Patient will need outpatient colonoscopy 6-8 weeks after ATB treatment is

## 2019-01-28 NOTE — CARE COORDINATION
Transitional Planning  Call to [facility] ([phone]) at [address], left  to return call. Patient on IV antibiotics per GI. EGD/colonscopy to be scheduled as OP. ID consulted today for antibiotic recommendations d/t recurrent episodes of diverticulitis.

## 2019-01-28 NOTE — PROGRESS NOTES
Congestive Heart Failure Education completed and charted. CHF booklet given. Pt follows with the [facility]. Her next appointment there is 2/28/19 at 1 PM.     Discussed 2000 mg sodium restricted daily in diet. Patient instructed to limit fluid intake to  1.5 to 2 liters per day. Patient instructed to weigh self at the same time of each day each morning, reinforced teaching to monitor for 3-5 lb weight increase over 1-2 days notify physician if change noted. Signs and symptoms of CHF discussed with patient, such as feeling more tired than normal, feeling short of breath, coughing that increases when you lie down, sudden weight gain, swelling of your feet, legs or belly. Patient verbalized understanding to notify physician office if these symptoms occur.

## 2019-01-28 NOTE — PROGRESS NOTES
[address]  CDU / OBSERVATION eNCOUnter  Attending NOte       I performed a history and physical examination of the patient and discussed management with the resident. I reviewed the residents note and agree with the documented findings and plan of care. Any areas of disagreement are noted on the chart. I was personally present for the key portions of any procedures. I have documented in the chart those procedures where I was not present during the key portions. I have reviewed the nurses notes. I agree with the chief complaint, past medical history, past surgical history, allergies, medications, social and family history as documented unless otherwise noted below. The Family history, social history, and ROS are effectively unchanged since admission unless noted elsewhere in the chart. Continued discomfort this AM. GI recommending ID consult due to patient's repeated episodes of diverticulitis and multiple drug allergies. We will consult with them. The patient is diabetic. I have reviewed the patient's chart and found the most recent A1c is 7.1. This indicates reasonable control.  Will continue to follow-up with PCP      Shama Mendieta MD  Attending Emergency  Physician

## 2019-01-28 NOTE — PROGRESS NOTES
OBS/CDU   RESIDENT NOTE      Patients PCP is: No primary care provider on file. SUBJECTIVE      No acute events overnight. Denies fever, chills, , vomiting, chest pain, shortness of breath focal weakness, numbness, tingling, urinary/bowel symptoms, vision changes, visual hallucinations, or headache. VSS    Still nausea cramping abdominal pain mild improved but present. PHYSICAL EXAM      General: NAD, AO X 3  Heent: EMOI, PERRL  Neck: SUPPLE, NO JVD  Cardiovascular: RRR, S1S2  Pulmonary: CTAB, NO SOB  Abdomen: SOFT, NTTP, ND, +BS  Extremities: +2/4 PULSES DISTAL, NO SWELLING  Neuro / Psych: NO NUMBNESS OR TINGLING, MENTATION AT BASELINE    PERTINENT TEST /EXAMS      I have reviewed all available laboratory results.     MEDICATIONS CURRENT     insulin lispro (HUMALOG) injection vial 0-6 Units TID WC   insulin lispro (HUMALOG) injection vial 0-3 Units Nightly   glucose (GLUTOSE) 40 % oral gel 15 g PRN   dextrose 50 % solution 12.5 g PRN   glucagon (rDNA) injection 1 mg PRN   dextrose 5 % solution PRN   cefTRIAXone (ROCEPHIN) 1 g IVPB in 50 mL D5W minibag Once   aspirin chewable tablet 81 mg Daily   ipratropium-albuterol (DUONEB) nebulizer solution 3 mL Q4H PRN   OXcarbazepine (TRILEPTAL) tablet 300 mg BID   insulin glargine (LANTUS) injection vial 30 Units Nightly   atorvastatin (LIPITOR) tablet 80 mg Nightly   colchicine (COLCRYS) tablet 0.6 mg Daily   clopidogrel (PLAVIX) tablet 75 mg Daily   lurasidone (LATUDA) tablet 40 mg Daily   ondansetron (ZOFRAN-ODT) disintegrating tablet 4 mg Q8H PRN   sennosides-docusate sodium (SENOKOT-S) 8.6-50 MG tablet 1 tablet Nightly   famotidine (PEPCID) tablet 20 mg Daily   metoprolol tartrate (LOPRESSOR) tablet 12.5 mg BID   bumetanide (BUMEX) tablet 1 mg QPM   spironolactone (ALDACTONE) tablet 25 mg Daily   metronidazole (FLAGYL) 500 mg in NaCl 100 mL IVPB premix Q8H   cefTRIAXone (ROCEPHIN) 1 g IVPB in 50 mL D5W minibag Daily   sodium chloride flush 0.9 % injection 10 mL 2 times per day   sodium chloride flush 0.9 % injection 10 mL PRN   acetaminophen (TYLENOL) tablet 650 mg Q4H PRN   oxyCODONE-acetaminophen (PERCOCET) 5-325 MG per tablet 1 tablet Q4H PRN   Or    oxyCODONE-acetaminophen (PERCOCET) 5-325 MG per tablet 2 tablet Q4H PRN   0.9 % sodium chloride infusion Continuous   morphine (PF) injection 2 mg Q4H PRN   ondansetron (ZOFRAN) injection 4 mg Q6H PRN       All medication charted and reviewed. CONSULTS      IP CONSULT TO DIABETES EDUCATOR  IP CONSULT TO GI    ASSESSMENT/PLAN       Johnathan Jeffries is a 59 y.o. female who presents with        Acute Colonic Diverticulitis d/t unknown etiology     · Initial labs showed elevated WBC, creatinine, alk phos, normal lactate  · Consulted DM Educator  · Continue IV Flagyl and IV Ceftriaxone  · Continue PO Percocet and PO Zofran and IVF  · Serial abdo exams   · GI: cont abx, recc ID consult due to prior resistance and allergies. OP EGD 6-8 weeks w/ Dr Bk Pastor  · FU ID reccs  · Added low dose sliding scale  · Continue home medications   · Monitor vitals, labs, and imaging  · DISPO: pending consults and clinical improvement    --  [facility]  Emergency Medicine Resident Physician     This dictation was generated by voice recognition computer software. Although all attempts are made to edit the dictation for accuracy, there may be errors in the transcription that are not intended.

## 2019-01-29 LAB
AFP: 4.4 UG/L
ANTI-NUCLEAR ANTIBODY (ANA): POSITIVE
C-REACTIVE PROTEIN: 56.6 MG/L (ref 0–5)
GLUCOSE BLD-MCNC: 140 MG/DL (ref 65–105)
GLUCOSE BLD-MCNC: 226 MG/DL (ref 65–105)
GLUCOSE BLD-MCNC: 69 MG/DL (ref 65–105)

## 2019-01-29 PROCEDURE — 1200000000 HC SEMI PRIVATE

## 2019-01-29 PROCEDURE — 2580000003 HC RX 258: Performed by: STUDENT IN AN ORGANIZED HEALTH CARE EDUCATION/TRAINING PROGRAM

## 2019-01-29 PROCEDURE — G0108 DIAB MANAGE TRN  PER INDIV: HCPCS

## 2019-01-29 PROCEDURE — 6370000000 HC RX 637 (ALT 250 FOR IP): Performed by: STUDENT IN AN ORGANIZED HEALTH CARE EDUCATION/TRAINING PROGRAM

## 2019-01-29 PROCEDURE — 6360000002 HC RX W HCPCS: Performed by: STUDENT IN AN ORGANIZED HEALTH CARE EDUCATION/TRAINING PROGRAM

## 2019-01-29 PROCEDURE — 99232 SBSQ HOSP IP/OBS MODERATE 35: CPT | Performed by: INTERNAL MEDICINE

## 2019-01-29 PROCEDURE — APPNB15 APP NON BILLABLE TIME 0-15 MINS: Performed by: INTERNAL MEDICINE

## 2019-01-29 PROCEDURE — 82947 ASSAY GLUCOSE BLOOD QUANT: CPT

## 2019-01-29 PROCEDURE — 96361 HYDRATE IV INFUSION ADD-ON: CPT

## 2019-01-29 PROCEDURE — 76937 US GUIDE VASCULAR ACCESS: CPT

## 2019-01-29 PROCEDURE — 96366 THER/PROPH/DIAG IV INF ADDON: CPT

## 2019-01-29 PROCEDURE — 2500000003 HC RX 250 WO HCPCS: Performed by: STUDENT IN AN ORGANIZED HEALTH CARE EDUCATION/TRAINING PROGRAM

## 2019-01-29 PROCEDURE — 36415 COLL VENOUS BLD VENIPUNCTURE: CPT

## 2019-01-29 PROCEDURE — 86140 C-REACTIVE PROTEIN: CPT

## 2019-01-29 PROCEDURE — APPSS30 APP SPLIT SHARED TIME 16-30 MINUTES: Performed by: INTERNAL MEDICINE

## 2019-01-29 RX ADMIN — SODIUM CHLORIDE: 9 INJECTION, SOLUTION INTRAVENOUS at 06:49

## 2019-01-29 RX ADMIN — METRONIDAZOLE 500 MG: 500 INJECTION, SOLUTION INTRAVENOUS at 10:12

## 2019-01-29 RX ADMIN — Medication 10 ML: at 08:44

## 2019-01-29 RX ADMIN — OXCARBAZEPINE 300 MG: 300 TABLET, FILM COATED ORAL at 08:40

## 2019-01-29 RX ADMIN — INSULIN LISPRO 1 UNITS: 100 INJECTION, SOLUTION INTRAVENOUS; SUBCUTANEOUS at 12:15

## 2019-01-29 RX ADMIN — COLCHICINE 0.6 MG: 0.6 TABLET, FILM COATED ORAL at 08:40

## 2019-01-29 RX ADMIN — OXCARBAZEPINE 300 MG: 300 TABLET, FILM COATED ORAL at 21:16

## 2019-01-29 RX ADMIN — SPIRONOLACTONE 25 MG: 25 TABLET ORAL at 08:41

## 2019-01-29 RX ADMIN — INSULIN GLARGINE 30 UNITS: 100 INJECTION, SOLUTION SUBCUTANEOUS at 21:15

## 2019-01-29 RX ADMIN — ASPIRIN 81 MG: 81 TABLET, CHEWABLE ORAL at 08:40

## 2019-01-29 RX ADMIN — CEFTRIAXONE SODIUM 1 G: 1 INJECTION, POWDER, FOR SOLUTION INTRAMUSCULAR; INTRAVENOUS at 08:45

## 2019-01-29 RX ADMIN — BUMETANIDE 1 MG: 1 TABLET ORAL at 16:54

## 2019-01-29 RX ADMIN — FAMOTIDINE 20 MG: 20 TABLET, FILM COATED ORAL at 08:40

## 2019-01-29 RX ADMIN — ATORVASTATIN CALCIUM 80 MG: 80 TABLET, FILM COATED ORAL at 21:16

## 2019-01-29 RX ADMIN — METOPROLOL TARTRATE 12.5 MG: 25 TABLET ORAL at 08:48

## 2019-01-29 RX ADMIN — INSULIN LISPRO 1 UNITS: 100 INJECTION, SOLUTION INTRAVENOUS; SUBCUTANEOUS at 21:15

## 2019-01-29 RX ADMIN — METRONIDAZOLE 500 MG: 500 INJECTION, SOLUTION INTRAVENOUS at 00:04

## 2019-01-29 RX ADMIN — LURASIDONE HYDROCHLORIDE 40 MG: 40 TABLET, FILM COATED ORAL at 09:02

## 2019-01-29 RX ADMIN — CLOPIDOGREL 75 MG: 75 TABLET, FILM COATED ORAL at 08:40

## 2019-01-29 RX ADMIN — SENNOSIDES AND DOCUSATE SODIUM 1 TABLET: 8.6; 5 TABLET ORAL at 21:16

## 2019-01-29 RX ADMIN — METRONIDAZOLE 500 MG: 500 INJECTION, SOLUTION INTRAVENOUS at 16:54

## 2019-01-29 RX ADMIN — INSULIN LISPRO 1 UNITS: 100 INJECTION, SOLUTION INTRAVENOUS; SUBCUTANEOUS at 16:55

## 2019-01-29 RX ADMIN — METOPROLOL TARTRATE 12.5 MG: 25 TABLET ORAL at 21:16

## 2019-01-29 ASSESSMENT — PAIN DESCRIPTION - ORIENTATION: ORIENTATION: LOWER

## 2019-01-29 ASSESSMENT — PAIN SCALES - GENERAL
PAINLEVEL_OUTOF10: 0
PAINLEVEL_OUTOF10: 0
PAINLEVEL_OUTOF10: 5
PAINLEVEL_OUTOF10: 0
PAINLEVEL_OUTOF10: 0

## 2019-01-29 ASSESSMENT — ENCOUNTER SYMPTOMS
ABDOMINAL PAIN: 1
APNEA: 0
ABDOMINAL DISTENTION: 0
BACK PAIN: 0
ABDOMINAL PAIN: 0
COLOR CHANGE: 0
PHOTOPHOBIA: 0
ABDOMINAL DISTENTION: 1
CHEST TIGHTNESS: 0

## 2019-01-29 ASSESSMENT — PAIN DESCRIPTION - PAIN TYPE: TYPE: ACUTE PAIN

## 2019-01-29 ASSESSMENT — PAIN DESCRIPTION - DESCRIPTORS: DESCRIPTORS: CRAMPING

## 2019-01-29 ASSESSMENT — PAIN - FUNCTIONAL ASSESSMENT: PAIN_FUNCTIONAL_ASSESSMENT: PREVENTS OR INTERFERES SOME ACTIVE ACTIVITIES AND ADLS

## 2019-01-29 ASSESSMENT — PAIN DESCRIPTION - FREQUENCY: FREQUENCY: INTERMITTENT

## 2019-01-29 ASSESSMENT — PAIN DESCRIPTION - LOCATION: LOCATION: ABDOMEN

## 2019-01-29 NOTE — PROGRESS NOTES
[facility]    Gastroenterology Daily Progress Note      Patient:   Marisa Goel   :    1955   Facility:   [facility]   Date:     2019  Consultant:   Elver Weaver CNP      Subjective:     59 y.o. female admitted 2019 with Diverticulitis [K57.92]  Acute diverticulitis [K57.92] and seen for same. Patient seen and examined. Consume breakfast without any complaints of nausea and vomiting Staff reports patient able to tolerate supper last evening without nausea or vomiting. Abdominal discomfort bilateral lower quadrants continues. Patient denies BM since admission. Denies feeling constipated. Patient requesting to be discharged on Thursday      Objective     Scheduled Meds:   insulin lispro  0-6 Units Subcutaneous TID WC    insulin lispro  0-3 Units Subcutaneous Nightly    cefTRIAXone (ROCEPHIN) IV  1 g Intravenous Once    aspirin  81 mg Oral Daily    OXcarbazepine  300 mg Oral BID    insulin glargine  30 Units Subcutaneous Nightly    atorvastatin  80 mg Oral Nightly    colchicine  0.6 mg Oral Daily    clopidogrel  75 mg Oral Daily    lurasidone  40 mg Oral Daily    sennosides-docusate sodium  1 tablet Oral Nightly    famotidine  20 mg Oral Daily    metoprolol tartrate  12.5 mg Oral BID    bumetanide  1 mg Oral QPM    spironolactone  25 mg Oral Daily    metroNIDAZOLE  500 mg Intravenous Q8H    cefTRIAXone (ROCEPHIN) IV  1 g Intravenous Daily    sodium chloride flush  10 mL Intravenous 2 times per day       Vital Signs:  BP (!) 133/90   Pulse 98   Temp 98.7 °F (37.1 °C) (Oral)   Resp 14   Ht 4' 11\" (1.499 m)   Wt 178 lb 6 oz (80.9 kg)   LMP  (LMP Unknown)   SpO2 99%   BMI 36.03 kg/m²      Physical Exam:   General:  AAO x 3, speaking in full sentences, no accessory muscle use. Chest:   Bilateral vesicular breath sounds, no rales or wheezes.   Cardiac:  S1 S2 RR, no murmurs or gallops  Abdomen: Soft, obese, bilateral LQ attenuating lesions in the kidneys measuring less than 1.5 cm represent benign cysts. No follow-up imaging is required. Adrenal glands are normal. GI/Bowel: Sigmoid diverticulosis is evident. There is some stranding of the adjacent fat (series 2, image 118) with some associated vascular engorgement and perhaps trace fluid in the left paracolic gutter. Findings may represent early diverticulitis. Appendix is normal in appearance. No dilated loops of bowel. Pelvis: No pelvic fluid or mass. Hysterectomy. Bladder is normal for degree of distention. Peritoneum/Retroperitoneum: Atherosclerotic abdominal aorta. No aneurysm. No lymphadenopathy. Some atrophy of the psoas muscles. Diastasis of the ventral abdominal rectus muscles containing unaffected colon and small bowel. No hernia is evident. Atrophy of the ventral muscles. Bones/Soft Tissues: No inguinal lymphadenopathy. The pelvic muscles show mild atrophy. Rectus muscles show mild atrophy. Remote L4 compression fracture versus Schmorl's node. Multilevel degenerative changes. Previous sternotomy partially imaged. There are no aggressive osseous lesions. 1. Pancolonic diverticulosis. There is very subtle stranding of the adjacent colon in the sigmoid colon region and trace fluid in the left paracolic gutter. Findings are suspicious for early diverticulitis. No adjacent perforation or abscess. 2. No pathologically dilated loops of bowel. 3. Normal appendix. Assessment:     1. Diverticulitis - acute, uncomplicated. WBCs improved  - Continue to experience bilateral lower quadrant abdominal pain,  But no signs of acute abdomen.    - Nausea and vomiting resolved. Tolerating diet  2. History of diverticulitis in past  3. History of RIAZ. Outpatient workup had been planned. Hemoglobin currently stable. 4.  Abnormal LFTs, elevated alkaline phosphatase. Possibly due to History of hepatitis C, possibly only partially treated.   Genotype and viral load pending. We will rule out autoimmune liver disease as well. Plan:   1. Continue antibiotics per ID recommendations  2. Patient will need outpatient EGD/colonoscopy in 6-8 weeks after antibiotic treatment is completed. ( Patient to follow-up with Dr. Opal Matthew upon discharge). 3.  Continue supportive care with IV hydration and antiemetics as needed  4. Please call GI with any questions, concerns, or acute change in clinical status    This plan was formulated in collaboration with Dr. Mike Reynoso    Electronically signed by Joaquin Dalton CNP on 1/29/2019 at 12:09 PM  Attending Physician Statement  I have discussed the care of Tiffany Dong and   I have examined the patient myselft and taken ros and hpi , including pertinent history and exam findings,  with the author of this note . I have reviewed the key elements of all parts of the encounter with the nurse practitioner/resident. I agree with the assessment, plan and orders as documented by the above health care provider           Electronically signed by Farhan Cox MD     Please note that this note was generated using a voice recognition dictation software. Although every effort was made to ensure the accuracy of this automated transcription, some errors in transcription may have occurred.

## 2019-01-29 NOTE — PROGRESS NOTES
[facility] - InitialConsult Note  admission date 1/26/2019    reason for consultation:   diverticulitis     Impression :   Current:  · Acute diverticulitis -recurrent  · Leukocytosis,  · Allergy to [facility] and levaquin  · leukocytosis     Other:  ·    Discussion / summary of stay / plan of care   ·    Recommendations   · Keep flagyl and ceftriaxone and follow abd pain -  · No abscess on CTAP at this time  · Improved clinically - anticipate disch in am on ceftriaxone and flagyl since she is allergic to levaquin  · Will need total 14 days antibiotics till 2/10/19  · Consideration for ultimate sig colectomy? ?  · Discussed with patient on the bedside     Infection Control Recommendations   · [place] Precautions  · Contact Isolation         Antimicrobial Stewardship Recommendations   · Simplification of therapy  · Targeted therapy     Coordination ofOutpatient Care:   · Estimated Length of IV antimicrobials:  · Patient will need Midline / picc Catheter Insertion:   · Patient will need SNF:  · Patient will need outpatient wound care:      History of Present Illness:   Initial history:  Gonzalo Cook is a 59y.o.-year-old female withy hep C and past CVA, presented w diffuse abd pain and vomiting, no fever, x 1 day, CT AP shows diverticulosis and possible diverticulitis, started on flagyl and ceftriaxone and GI recommending a colonoscopy in 6 weeks- ID consulted due to past recurrent diverticulosis and many antibiotics allergies. WBC 13 then 9     Continues to have at this time . He diffuse abdominal pain, some distention, gassiness and inability to tolerate solid food, she can have clears. Otherwise vomits after solid foods.   Her improvement is not enough as she says              Interval changes  01/29/19    no fever   Feels better  abd non tender today and not distended  No rash  Wants to leave in am  CRP 56- AFP 4.4 - mitochondria AB pend  HepC genotype and count pend [per GI  Summary of relevant labs:  Labs:  WBC 13-9  Micro:     Imaging:  CT AP 1/26  . Pancolonic diverticulosis. Lovina Georges is very subtle stranding of the adjacent   colon in the sigmoid colon region and trace fluid in the left paracolic   gutter.  Findings are suspicious for early diverticulitis.  No adjacent   perforation or abscess. 2. No pathologically dilated loops of bowel. 3. Normal appendix           I have personally reviewed the past medical history, past surgical history, medications, social history, and family history, and I haveupdated the database accordingly. Past Medical History:     Past Medical History:   Diagnosis Date    Arthritis     Asthma     Bipolar 1 disorder ([facility]     CAD (coronary artery disease)     Cardiac defibrillator in place     CHF (congestive heart failure) ([facility]     Clinical trial participant at discharge 03/13/2017    [employer] PSR completed due to device removal on 12/14/2017    COPD (chronic obstructive pulmonary disease) ([facility]     CVD (cardiovascular disease)     Diabetes mellitus ([facility]     Diverticulitis     GERD (gastroesophageal reflux disease)     Hepatitis C     Hyperlipidemia     Hypertension     MRSA (methicillin resistant staph aureus) culture positive 12/10/2017    blood    Pneumonia     Schizophrenia ([facility]     Unspecified cerebral artery occlusion with cerebral infarction        Past Surgical  History:     Past Surgical History:   Procedure Laterality Date    CARDIAC CATHETERIZATION  11/27/2017    CARDIAC PACEMAKER PLACEMENT      placed 1 month ago at [address]  5/6/2016    cardiac cath-1xBMS prox. RCA; 1xBMS prox.  LAD    FOOT SURGERY      cyst removed from L foot    HC  PICC [facility] DOUBLE  12/19/2017         HYSTERECTOMY      TONSILLECTOMY         Medications:      insulin lispro  0-6 Units Subcutaneous TID WC    insulin lispro  0-3 Units Subcutaneous Nightly    cefTRIAXone (ROCEPHIN) IV  1 g Intravenous Once  aspirin  81 mg Oral Daily    OXcarbazepine  300 mg Oral BID    insulin glargine  30 Units Subcutaneous Nightly    atorvastatin  80 mg Oral Nightly    colchicine  0.6 mg Oral Daily    clopidogrel  75 mg Oral Daily    lurasidone  40 mg Oral Daily    sennosides-docusate sodium  1 tablet Oral Nightly    famotidine  20 mg Oral Daily    metoprolol tartrate  12.5 mg Oral BID    bumetanide  1 mg Oral QPM    spironolactone  25 mg Oral Daily    metroNIDAZOLE  500 mg Intravenous Q8H    cefTRIAXone (ROCEPHIN) IV  1 g Intravenous Daily    sodium chloride flush  10 mL Intravenous 2 times per day       Social History:     Social History     Social History    Marital status: Single     Spouse name: N/A    Number of children: N/A    Years of education: N/A     Occupational History    Not on file. Social History Main Topics    Smoking status: Current Every Day Smoker     Packs/day: 1.00     Types: Cigarettes    Smokeless tobacco: Never Used    Alcohol use Yes      Comment: 1 bottle per month wine    Drug use: No    Sexual activity: Yes     Partners: Male     Other Topics Concern    Not on file     Social History Narrative    No narrative on file       Family History:     Family History   Problem Relation Age of Onset    Family history unknown: Yes        Allergies:   Levofloxacin and Pcn [penicillins]     Review of Systems:     Review of Systems   Constitutional: Negative for chills, fatigue and fever. HENT: Negative for congestion and dental problem. Eyes: Negative for photophobia and visual disturbance. Respiratory: Negative for apnea and chest tightness. Cardiovascular: Negative for chest pain and leg swelling. Gastrointestinal: Negative for abdominal distention and abdominal pain. Endocrine: Negative for polyuria. Genitourinary: Negative for difficulty urinating and dysuria. Musculoskeletal: Negative for arthralgias and back pain. Skin: Negative for color change and pallor. Allergic/Immunologic: Negative for food allergies. Neurological: Negative for dizziness and headaches. Hematological: Negative for adenopathy. Psychiatric/Behavioral: Negative for agitation and behavioral problems. Physical Examination :   Patient Vitals for the past 8 hrs:   BP Temp Temp src Pulse Resp SpO2   01/29/19 0734 (!) 133/90 98.7 °F (37.1 °C) Oral 98 14 99 %       Physical Exam   Constitutional: She is oriented to person, place, and time. No distress. HENT:   Head: Normocephalic and atraumatic. Eyes: Conjunctivae are normal. No scleral icterus. Neck: Neck supple. No tracheal deviation present. Cardiovascular: Exam reveals no friction rub. No murmur heard. Pulmonary/Chest: No respiratory distress. She has no wheezes. Abdominal: Soft. She exhibits no distension. There is no tenderness. Genitourinary:   Genitourinary Comments: Urine clear - no ho   Musculoskeletal: She exhibits no edema or deformity. Neurological: She is alert and oriented to person, place, and time. No cranial nerve deficit. Skin: Skin is warm and dry. She is not diaphoretic. No erythema. Psychiatric: She has a normal mood and affect. Her behavior is normal.         Medical Decision Making:   I have independently reviewed/ordered the following labs:    CBC with Differential: Recent Labs      01/28/19   0801   WBC  9.2   HGB  12.3   HCT  38.6   PLT  189     BMP:  Recent Labs      01/28/19   0801   NA  139   K  3.7   CL  102   CO2  25   BUN  12   CREATININE  0.84     Hepatic Function Panel: No results for input(s): PROT, LABALBU, BILIDIR, IBILI, BILITOT, ALKPHOS, ALT, AST in the last 72 hours. No results for input(s): RPR in the last 72 hours. No results for input(s): HIV in the last 72 hours. No results for input(s): BC in the last 72 hours. Lab Results   Component Value Date    CREATININE 0.84 01/28/2019    GLUCOSE 118 01/28/2019       Detailed results:         Thank you for allowing us to participate in the care of this patient. Please call with questions. This note is created with the assistance of a speech recognition program.  While intending to generate adocument that actually reflects the content of the visit, the document can still have some errors including those of syntax and sound a like substitutions which may escape proof reading. It such instances, actual meaningcan be extrapolated by contextual diversion.     Amadou Holm MD  Office: [phone]

## 2019-01-29 NOTE — PLAN OF CARE
Patient seen on second rounds. Patient to continue ATBs as per ID recs. Patient will need O/P F/U with Dr. Leanna Fleischer for EGD/colonoscopy. GI will sign off. Ronold Kanner Merlene Islam, ROSALIO - CNP

## 2019-01-29 NOTE — PROGRESS NOTES
[facility]  CDU / OBSERVATION eNCOUnter  Attending NOte       I performed a history and physical examination of the patient and discussed management with the resident. I reviewed the residents note and agree with the documented findings and plan of care. Any areas of disagreement are noted on the chart. I was personally present for the key portions of any procedures. I have documented in the chart those procedures where I was not present during the key portions. I have reviewed the nurses notes. I agree with the chief complaint, past medical history, past surgical history, allergies, medications, social and family history as documented unless otherwise noted below. The Family history, social history, and ROS are effectively unchanged since admission unless noted elsewhere in the chart. Appreciate ID and GI input. .  Patient will be watched for 24 hours as inpatient. Patient back to SNF on antibiotics.     Dre Saenz MD  Attending Emergency  Physician

## 2019-01-29 NOTE — FLOWSHEET NOTE
Chaplains Maria C Lozano and Andrés made initial call on patient while rounding. Upon entry, patient was resting in bed. Patient welcomed chaplains' presence. Patient expressed relief that pain has subsided, but the cause has not yet been determined. Chaplains provided spiritual and emotional support by validating patient's feelings, and offering words of comfort and hope as well as Scripture promises. Patient accepted chaplains' offer of prayer. Chaplains will remain available for further support as needed.     [address] [employer]     01/28/19 1726   Encounter Summary   Services provided to: Patient   Referral/Consult From: [facility] Children;Family members   Continue Visiting (1/28/19)   Complexity of Encounter Low   Length of Encounter 15 minutes   Spiritual Assessment Completed Yes   Routine   Type Initial   Assessment Approachable;Coping   Intervention Active listening;Explored feelings, thoughts, concerns;Explored coping resources;Nurtured hope;Prayer;Scripture   Outcome Acceptance;Comfort;Expressed gratitude;Engaged in conversation;Expressed feelings/needs/concerns;Receptive   Spiritual/Cheondoism   Type Spiritual support

## 2019-01-29 NOTE — PROGRESS NOTES
OBS/CDU   RESIDENT NOTE      Patients PCP is: No primary care provider on file. SUBJECTIVE      No acute events overnight. Has been able to tolerate a full diet without nausea or vomiting. The patient is urinating on his own and is passing flatus. Denies fever, chills, nausea, vomiting, chest pain, shortness of breath, abdominal pain, focal weakness, numbness, tingling, urinary/bowel symptoms, vision changes, visual hallucinations, or headache. PHYSICAL EXAM      General: NAD, AO X 3  Heent: EMOI, PERRL  Neck: SUPPLE, NO JVD  Cardiovascular: RRR, S1S2  Pulmonary: CTAB, NO SOB  Abdomen: SOFT, NTTP, ND, +BS  Extremities: +2/4 PULSES DISTAL, NO SWELLING  Neuro / Psych: NO NUMBNESS OR TINGLING, MENTATION AT BASELINE    PERTINENT TEST /EXAMS      I have reviewed all available laboratory results.     MEDICATIONS CURRENT     insulin lispro (HUMALOG) injection vial 0-6 Units TID WC   insulin lispro (HUMALOG) injection vial 0-3 Units Nightly   glucose (GLUTOSE) 40 % oral gel 15 g PRN   dextrose 50 % solution 12.5 g PRN   glucagon (rDNA) injection 1 mg PRN   dextrose 5 % solution PRN   cefTRIAXone (ROCEPHIN) 1 g IVPB in 50 mL D5W minibag Once   aspirin chewable tablet 81 mg Daily   ipratropium-albuterol (DUONEB) nebulizer solution 3 mL Q4H PRN   OXcarbazepine (TRILEPTAL) tablet 300 mg BID   insulin glargine (LANTUS) injection vial 30 Units Nightly   atorvastatin (LIPITOR) tablet 80 mg Nightly   colchicine (COLCRYS) tablet 0.6 mg Daily   clopidogrel (PLAVIX) tablet 75 mg Daily   lurasidone (LATUDA) tablet 40 mg Daily   ondansetron (ZOFRAN-ODT) disintegrating tablet 4 mg Q8H PRN   sennosides-docusate sodium (SENOKOT-S) 8.6-50 MG tablet 1 tablet Nightly   famotidine (PEPCID) tablet 20 mg Daily   metoprolol tartrate (LOPRESSOR) tablet 12.5 mg BID   bumetanide (BUMEX) tablet 1 mg QPM   spironolactone (ALDACTONE) tablet 25 mg Daily   metronidazole (FLAGYL) 500 mg in NaCl 100 mL IVPB premix Q8H   cefTRIAXone (ROCEPHIN)

## 2019-01-30 LAB
GLUCOSE BLD-MCNC: 158 MG/DL (ref 65–105)
GLUCOSE BLD-MCNC: 159 MG/DL (ref 65–105)
GLUCOSE BLD-MCNC: 164 MG/DL (ref 65–105)
GLUCOSE BLD-MCNC: 84 MG/DL (ref 65–105)
SMOOTH MUSCLE ANTIBODY: NORMAL

## 2019-01-30 PROCEDURE — 6370000000 HC RX 637 (ALT 250 FOR IP): Performed by: STUDENT IN AN ORGANIZED HEALTH CARE EDUCATION/TRAINING PROGRAM

## 2019-01-30 PROCEDURE — 2500000003 HC RX 250 WO HCPCS: Performed by: STUDENT IN AN ORGANIZED HEALTH CARE EDUCATION/TRAINING PROGRAM

## 2019-01-30 PROCEDURE — 2580000003 HC RX 258: Performed by: STUDENT IN AN ORGANIZED HEALTH CARE EDUCATION/TRAINING PROGRAM

## 2019-01-30 PROCEDURE — 96361 HYDRATE IV INFUSION ADD-ON: CPT

## 2019-01-30 PROCEDURE — 1200000000 HC SEMI PRIVATE

## 2019-01-30 PROCEDURE — 82947 ASSAY GLUCOSE BLOOD QUANT: CPT

## 2019-01-30 PROCEDURE — 96366 THER/PROPH/DIAG IV INF ADDON: CPT

## 2019-01-30 PROCEDURE — 02HV33Z INSERTION OF INFUSION DEVICE INTO SUPERIOR VENA CAVA, PERCUTANEOUS APPROACH: ICD-10-PCS | Performed by: EMERGENCY MEDICINE

## 2019-01-30 PROCEDURE — 6360000002 HC RX W HCPCS: Performed by: STUDENT IN AN ORGANIZED HEALTH CARE EDUCATION/TRAINING PROGRAM

## 2019-01-30 PROCEDURE — 99232 SBSQ HOSP IP/OBS MODERATE 35: CPT | Performed by: INTERNAL MEDICINE

## 2019-01-30 PROCEDURE — 76937 US GUIDE VASCULAR ACCESS: CPT

## 2019-01-30 PROCEDURE — 36569 INSJ PICC 5 YR+ W/O IMAGING: CPT

## 2019-01-30 PROCEDURE — 96376 TX/PRO/DX INJ SAME DRUG ADON: CPT

## 2019-01-30 PROCEDURE — C1751 CATH, INF, PER/CENT/MIDLINE: HCPCS

## 2019-01-30 RX ORDER — SODIUM CHLORIDE 0.9 % (FLUSH) 0.9 %
10 SYRINGE (ML) INJECTION PRN
Status: DISCONTINUED | OUTPATIENT
Start: 2019-01-30 | End: 2019-01-31 | Stop reason: HOSPADM

## 2019-01-30 RX ORDER — LIDOCAINE HYDROCHLORIDE 10 MG/ML
5 INJECTION, SOLUTION EPIDURAL; INFILTRATION; INTRACAUDAL; PERINEURAL ONCE
Status: DISCONTINUED | OUTPATIENT
Start: 2019-01-30 | End: 2019-01-31 | Stop reason: HOSPADM

## 2019-01-30 RX ORDER — SODIUM CHLORIDE 0.9 % (FLUSH) 0.9 %
10 SYRINGE (ML) INJECTION EVERY 12 HOURS SCHEDULED
Status: DISCONTINUED | OUTPATIENT
Start: 2019-01-30 | End: 2019-01-31 | Stop reason: HOSPADM

## 2019-01-30 RX ADMIN — SPIRONOLACTONE 25 MG: 25 TABLET ORAL at 08:32

## 2019-01-30 RX ADMIN — ATORVASTATIN CALCIUM 80 MG: 80 TABLET, FILM COATED ORAL at 21:01

## 2019-01-30 RX ADMIN — METOPROLOL TARTRATE 12.5 MG: 25 TABLET ORAL at 08:32

## 2019-01-30 RX ADMIN — CLOPIDOGREL 75 MG: 75 TABLET, FILM COATED ORAL at 08:32

## 2019-01-30 RX ADMIN — INSULIN GLARGINE 30 UNITS: 100 INJECTION, SOLUTION SUBCUTANEOUS at 21:02

## 2019-01-30 RX ADMIN — ONDANSETRON 4 MG: 2 INJECTION INTRAMUSCULAR; INTRAVENOUS at 10:55

## 2019-01-30 RX ADMIN — METOPROLOL TARTRATE 12.5 MG: 25 TABLET ORAL at 21:01

## 2019-01-30 RX ADMIN — METRONIDAZOLE 500 MG: 500 INJECTION, SOLUTION INTRAVENOUS at 08:32

## 2019-01-30 RX ADMIN — OXCARBAZEPINE 300 MG: 300 TABLET, FILM COATED ORAL at 08:32

## 2019-01-30 RX ADMIN — COLCHICINE 0.6 MG: 0.6 TABLET, FILM COATED ORAL at 08:32

## 2019-01-30 RX ADMIN — FAMOTIDINE 20 MG: 20 TABLET, FILM COATED ORAL at 08:32

## 2019-01-30 RX ADMIN — INSULIN LISPRO 1 UNITS: 100 INJECTION, SOLUTION INTRAVENOUS; SUBCUTANEOUS at 17:55

## 2019-01-30 RX ADMIN — ASPIRIN 81 MG: 81 TABLET, CHEWABLE ORAL at 08:32

## 2019-01-30 RX ADMIN — METRONIDAZOLE 500 MG: 500 INJECTION, SOLUTION INTRAVENOUS at 23:45

## 2019-01-30 RX ADMIN — CEFTRIAXONE SODIUM 1 G: 1 INJECTION, POWDER, FOR SOLUTION INTRAMUSCULAR; INTRAVENOUS at 10:06

## 2019-01-30 RX ADMIN — Medication 10 ML: at 21:09

## 2019-01-30 RX ADMIN — INSULIN LISPRO 1 UNITS: 100 INJECTION, SOLUTION INTRAVENOUS; SUBCUTANEOUS at 21:02

## 2019-01-30 RX ADMIN — LURASIDONE HYDROCHLORIDE 40 MG: 40 TABLET, FILM COATED ORAL at 08:32

## 2019-01-30 RX ADMIN — OXCARBAZEPINE 300 MG: 300 TABLET, FILM COATED ORAL at 21:01

## 2019-01-30 RX ADMIN — METRONIDAZOLE 500 MG: 500 INJECTION, SOLUTION INTRAVENOUS at 00:10

## 2019-01-30 RX ADMIN — BUMETANIDE 1 MG: 1 TABLET ORAL at 17:55

## 2019-01-30 RX ADMIN — SENNOSIDES AND DOCUSATE SODIUM 1 TABLET: 8.6; 5 TABLET ORAL at 21:01

## 2019-01-30 RX ADMIN — Medication 10 ML: at 21:08

## 2019-01-30 RX ADMIN — METRONIDAZOLE 500 MG: 500 INJECTION, SOLUTION INTRAVENOUS at 15:22

## 2019-01-30 ASSESSMENT — ENCOUNTER SYMPTOMS
PHOTOPHOBIA: 0
ABDOMINAL PAIN: 0
APNEA: 0
BACK PAIN: 0
COLOR CHANGE: 0
CHEST TIGHTNESS: 0
ABDOMINAL DISTENTION: 0

## 2019-01-30 ASSESSMENT — PAIN SCALES - GENERAL
PAINLEVEL_OUTOF10: 6
PAINLEVEL_OUTOF10: 0

## 2019-01-30 NOTE — CARE COORDINATION
Call to [facility], spoke with [facility], they can accept with iv abx but she will need a new precert. [facility] will begin precert now    [phone] call back from [facility], needing more documentation.    Requested info faxed    [phone] spoke with [facility], still awaiting insurance for precert

## 2019-01-30 NOTE — PROCEDURES
History/labs/allergies reviewed  Placed by Nadia Barahona RN  Assisted by TATIANA Bronson RN  Consent signed and obtained by physician  Time out performed using two identifiers  Catheter type Double  lumen picc  Product type solo2 w 3cg  Lot # [id number]  Expiration date 12/31/2019  Catheter size 5  Greek  Trimmed at 38  Total length 39  External catheter length 2 cm  Location RBV  Number of attempts 1  Estimated blood loss 1 ml  Pre procedure cardiac Rhythm NS per bedside telemetry and/or 3CG Tracing. Placement verified by- CXR and/or 3cg Max P wave noted by amplitude changes of the P wave, positive blood return, flushes easily  Special equipment used- ultrasound, micro-introducer technique and 3cg technology if indicated  Catheter secured with statlock  Dressing applied- Tegaderm CHG  Lidocaine administered intradermally conc. 1% 3 mL  PICC line education:   [ X ] Discussed with patient/Family or POA prior to signing Informed Procedural Consent. Risks and Benefits along with reason for procedure were discussed and teaching was reinforced with an education handout on PICC insertion. [facility] FAQ Catheter Associated Blood Stream Infections and [address] REV. 7/13 Nursing and Bard Booklet left at bedside or in chart. Patient (Family or POA) acknowledged understanding of information taught and agreed to procedure. [  ] Was not discussed with patient/family or POA due to pts medical status at time of procedure. pts family or POA not available to discuss PICC education.  [facility] FAQ Catheter Associated Blood Stream Infections and [address] REV. 7/13 Nursing and Bard Booklet left at bedside or in chart

## 2019-01-30 NOTE — PROGRESS NOTES
650 mg Q4H PRN   oxyCODONE-acetaminophen (PERCOCET) 5-325 MG per tablet 1 tablet Q4H PRN   Or    oxyCODONE-acetaminophen (PERCOCET) 5-325 MG per tablet 2 tablet Q4H PRN   0.9 % sodium chloride infusion Continuous   morphine (PF) injection 2 mg Q4H PRN   ondansetron (ZOFRAN) injection 4 mg Q6H PRN       All medication charted and reviewed. CONSULTS      IP CONSULT TO DIABETES EDUCATOR  IP CONSULT TO GI  IP CONSULT TO INFECTIOUS DISEASES  IP CONSULT TO IV TEAM    ASSESSMENT/PLAN       Jt Clancy is a 59 y.o. female who presents with    1.)Acute Colonic Diverticulitis d/t unknown etiology    · GI Consult  · outpt fu 6-8 weeks after ABX tx  · ID Consult  · Continue Flagyl and Ceftriaxone after DC until 2/10/19  · Continue Low dose sliding scale  · Consult PICC team, discussed with patient the risks of placement and she understands and agreed and signed  · Continue home medications  · Monitor vitals, labs, and imaging  · DISPO: pending consults, PICC line placement, we'll discharge to SNF on antibiotics. --  Mónica Risk  Emergency Medicine Resident Physician     This dictation was generated by voice recognition computer software. Although all attempts are made to edit the dictation for accuracy, there may be errors in the transcription that are not intended.

## 2019-01-30 NOTE — PROGRESS NOTES
[facility]  CDU / OBSERVATION eNCOUnter  Attending NOte       I performed a history and physical examination of the patient and discussed management with the resident. I reviewed the residents note and agree with the documented findings and plan of care. Any areas of disagreement are noted on the chart. I was personally present for the key portions of any procedures. I have documented in the chart those procedures where I was not present during the key portions. I have reviewed the nurses notes. I agree with the chief complaint, past medical history, past surgical history, allergies, medications, social and family history as documented unless otherwise noted below. The Family history, social history, and ROS are effectively unchanged since admission unless noted elsewhere in the chart. Clinically well. Patient for transfer to skilled nursing facility today with ongoing IV antibiotics. Awaiting precertification from facility.   Patient currently in good condition and understanding of discharge plan    Caren Cash MD  Attending Emergency  Physician

## 2019-01-30 NOTE — CARE COORDINATION
rec'd call back from [facility], obtained precert, but unable to get iv and po abx until morning. Spoke with nurse, states will miss 2 doses therfor set up d/c for am.   Transportation forms sent to [facility] for transport at 11:30 to 12 noon.   Ms Whitfield Session made aware,   Need d/c order and LATOYA completed

## 2019-01-30 NOTE — PROGRESS NOTES
[facility] - InitialConsult Note  admission date 1/26/2019    reason for consultation:   diverticulitis     Impression :   Current:  · Acute diverticulitis -recurrent  · Leukocytosis,  · Allergy to [facility] and levaquin  · leukocytosis     Other:  ·    Discussion / summary of stay / plan of care   ·    Recommendations   · Keep flagyl and ceftriaxone and follow abd pain -  · No abscess on CTAP at this time  · Improved clinically - anticipate  on ceftriaxone and flagyl since she is allergic to levaquin  · Will need total 14 days antibiotics till 2/10/19  · Consideration for ultimate sig colectomy? ?  · Discussed with patient on the bedside     Infection Control Recommendations   · [place] Precautions  · Contact Isolation         Antimicrobial Stewardship Recommendations   · Simplification of therapy  · Targeted therapy     Coordination ofOutpatient Care:   · Estimated Length of IV antimicrobials:  · Patient will need Midline / picc Catheter Insertion:   · Patient will need SNF:  · Patient will need outpatient wound care:      History of Present Illness:   Initial history:  Sheree Patel is a 59y.o.-year-old female withy hep C and past CVA, presented w diffuse abd pain and vomiting, no fever, x 1 day, CT AP shows diverticulosis and possible diverticulitis, started on flagyl and ceftriaxone and GI recommending a colonoscopy in 6 weeks- ID consulted due to past recurrent diverticulosis and many antibiotics allergies. WBC 13 then 9     Continues to have at this time . He diffuse abdominal pain, some distention, gassiness and inability to tolerate solid food, she can have clears. Otherwise vomits after solid foods.   Her improvement is not enough as she says              Interval changes  01/30/19   No fever - feels better-tolerated food well - pain almost gone  Getting her line    CRP 56 - AFP 4.4 - mitochondria AB pend  HepC genotype and count pend [per GI  Summary of relevant labs:  Labs:  WBC 13-9  Micro:     Imaging:  CT AP 1/26  . Pancolonic diverticulosis. Ricka Backers is very subtle stranding of the adjacent   colon in the sigmoid colon region and trace fluid in the left paracolic   gutter.  Findings are suspicious for early diverticulitis.  No adjacent   perforation or abscess. 2. No pathologically dilated loops of bowel. 3. Normal appendix           I have personally reviewed the past medical history, past surgical history, medications, social history, and family history, and I haveupdated the database accordingly. Past Medical History:     Past Medical History:   Diagnosis Date    Arthritis     Asthma     B[facility] 1 disorder [facility]     CAD (coronary artery disease)     Cardiac defibrillator in place     CHF (congestive heart failure) ([facility]     Clinical trial participant at discharge 03/13/2017    [employer] PSR completed due to device removal on 12/14/2017    COPD (chronic obstructive pulmonary disease) ([facility]     CVD (cardiovascular disease)     Diabetes mellitus ([facility]     Diverticulitis     GERD (gastroesophageal reflux disease)     Hepatitis C     Hyperlipidemia     Hypertension     MRSA (methicillin resistant staph aureus) culture positive 12/10/2017    blood    Pneumonia     [facility]     Unspecified cerebral artery occlusion with cerebral infarction        Past Surgical  History:     Past Surgical History:   Procedure Laterality Date    CARDIAC CATHETERIZATION  11/27/2017    CARDIAC PACEMAKER PLACEMENT      placed 1 month [facility]  5/6/2016    cardiac cath-1xBMS prox. RCA; 1xBMS prox.  LAD    FOOT SURGERY      cyst removed from L foot      PICC [facility] DOUBLE  12/19/2017         HYSTERECTOMY      TONSILLECTOMY         Medications:      lidocaine 1 % injection  5 mL Intradermal Once    sodium chloride flush  10 mL Intravenous 2 times per day    insulin lispro  0-6 Units Subcutaneous TID     insulin lispro  0-3 Units Subcutaneous Nightly    cefTRIAXone (ROCEPHIN) IV  1 g Intravenous Once    aspirin  81 mg Oral Daily    OXcarbazepine  300 mg Oral BID    insulin glargine  30 Units Subcutaneous Nightly    atorvastatin  80 mg Oral Nightly    colchicine  0.6 mg Oral Daily    clopidogrel  75 mg Oral Daily    lurasidone  40 mg Oral Daily    sennosides-docusate sodium  1 tablet Oral Nightly    famotidine  20 mg Oral Daily    metoprolol tartrate  12.5 mg Oral BID    bumetanide  1 mg Oral QPM    spironolactone  25 mg Oral Daily    metroNIDAZOLE  500 mg Intravenous Q8H    cefTRIAXone (ROCEPHIN) IV  1 g Intravenous Daily    sodium chloride flush  10 mL Intravenous 2 times per day       Social History:     Social History     Social History    Marital status: Single     Spouse name: N/A    Number of children: N/A    Years of education: N/A     Occupational History    Not on file. Social History Main Topics    Smoking status: Current Every Day Smoker     Packs/day: 1.00     Types: Cigarettes    Smokeless tobacco: Never Used    Alcohol use Yes      Comment: 1 bottle per month wine    Drug use: No    Sexual activity: Yes     Partners: Male     Other Topics Concern    Not on file     Social History Narrative    No narrative on file       Family History:     Family History   Problem Relation Age of Onset    Family history unknown: Yes        Allergies:   Levofloxacin and Pcn [penicillins]     Review of Systems:     Review of Systems   Constitutional: Negative for appetite change, chills, fatigue and fever. HENT: Negative for congestion and dental problem. Eyes: Negative for photophobia and visual disturbance. Respiratory: Negative for apnea and chest tightness. Cardiovascular: Negative for chest pain and leg swelling. Gastrointestinal: Negative for abdominal distention and abdominal pain. Endocrine: Negative for cold intolerance, heat intolerance and polyuria.    Genitourinary: Negative for difficulty urinating and dysuria. Musculoskeletal: Negative for arthralgias and back pain. Skin: Negative for color change and pallor. Allergic/Immunologic: Negative for food allergies. Neurological: Negative for dizziness and headaches. Hematological: Negative for adenopathy. Psychiatric/Behavioral: Negative for agitation and behavioral problems. Physical Examination :     Patient Vitals for the past 8 hrs:   BP Temp Temp src Pulse Resp SpO2 Weight   01/30/19 0830 (!) 150/85 98.6 °F (37 °C) Oral 105 18 98 % -   01/30/19 0615 - - - - - - 189 lb 12.8 oz (86.1 kg)       Physical Exam   Constitutional: She is oriented to person, place, and time. No distress. HENT:   Head: Normocephalic and atraumatic. Eyes: Conjunctivae are normal. No scleral icterus. Neck: Neck supple. No tracheal deviation present. Cardiovascular: Normal rate and regular rhythm. Exam reveals no friction rub. No murmur heard. Pulmonary/Chest: Effort normal and breath sounds normal. No respiratory distress. She has no wheezes. Abdominal: Soft. She exhibits no distension. There is no tenderness. Genitourinary:   Genitourinary Comments: Urine clear - no ho   Musculoskeletal: She exhibits no edema or deformity. Neurological: She is alert and oriented to person, place, and time. No cranial nerve deficit. Skin: Skin is warm and dry. She is not diaphoretic. No erythema. Psychiatric: She has a normal mood and affect. Her behavior is normal.         Medical Decision Making:   I have independently reviewed/ordered the following labs:    CBC with Differential:   Recent Labs      01/28/19   0801   WBC  9.2   HGB  12.3   HCT  38.6   PLT  189     BMP:  Recent Labs      01/28/19   0801   NA  139   K  3.7   CL  102   CO2  25   BUN  12   CREATININE  0.84     Hepatic Function Panel: No results for input(s): PROT, LABALBU, BILIDIR, IBILI, BILITOT, ALKPHOS, ALT, AST in the last 72 hours.   No results for input(s):

## 2019-01-31 VITALS
DIASTOLIC BLOOD PRESSURE: 75 MMHG | RESPIRATION RATE: 18 BRPM | HEART RATE: 103 BPM | SYSTOLIC BLOOD PRESSURE: 147 MMHG | BODY MASS INDEX: 37.62 KG/M2 | HEIGHT: 60 IN | OXYGEN SATURATION: 96 % | TEMPERATURE: 98.8 F | WEIGHT: 191.6 LBS

## 2019-01-31 LAB
GLUCOSE BLD-MCNC: 126 MG/DL (ref 65–105)
MITOCHONDRIAL ANTIBODY: 3.3 UNITS (ref 0–20)

## 2019-01-31 PROCEDURE — 6360000002 HC RX W HCPCS: Performed by: STUDENT IN AN ORGANIZED HEALTH CARE EDUCATION/TRAINING PROGRAM

## 2019-01-31 PROCEDURE — 6370000000 HC RX 637 (ALT 250 FOR IP): Performed by: STUDENT IN AN ORGANIZED HEALTH CARE EDUCATION/TRAINING PROGRAM

## 2019-01-31 PROCEDURE — 96366 THER/PROPH/DIAG IV INF ADDON: CPT

## 2019-01-31 PROCEDURE — 2500000003 HC RX 250 WO HCPCS: Performed by: STUDENT IN AN ORGANIZED HEALTH CARE EDUCATION/TRAINING PROGRAM

## 2019-01-31 PROCEDURE — 82947 ASSAY GLUCOSE BLOOD QUANT: CPT

## 2019-01-31 PROCEDURE — 2580000003 HC RX 258: Performed by: STUDENT IN AN ORGANIZED HEALTH CARE EDUCATION/TRAINING PROGRAM

## 2019-01-31 PROCEDURE — 96361 HYDRATE IV INFUSION ADD-ON: CPT

## 2019-01-31 RX ORDER — METRONIDAZOLE 500 MG/1
500 TABLET ORAL EVERY 8 HOURS SCHEDULED
Status: DISCONTINUED | OUTPATIENT
Start: 2019-01-31 | End: 2019-01-31 | Stop reason: HOSPADM

## 2019-01-31 RX ADMIN — OXCARBAZEPINE 300 MG: 300 TABLET, FILM COATED ORAL at 08:25

## 2019-01-31 RX ADMIN — METOPROLOL TARTRATE 12.5 MG: 25 TABLET ORAL at 08:26

## 2019-01-31 RX ADMIN — CLOPIDOGREL 75 MG: 75 TABLET, FILM COATED ORAL at 08:25

## 2019-01-31 RX ADMIN — SPIRONOLACTONE 25 MG: 25 TABLET ORAL at 08:25

## 2019-01-31 RX ADMIN — Medication 10 ML: at 08:25

## 2019-01-31 RX ADMIN — FAMOTIDINE 20 MG: 20 TABLET, FILM COATED ORAL at 08:25

## 2019-01-31 RX ADMIN — METRONIDAZOLE 500 MG: 500 INJECTION, SOLUTION INTRAVENOUS at 09:05

## 2019-01-31 RX ADMIN — CEFTRIAXONE SODIUM 1 G: 1 INJECTION, POWDER, FOR SOLUTION INTRAMUSCULAR; INTRAVENOUS at 08:25

## 2019-01-31 RX ADMIN — LURASIDONE HYDROCHLORIDE 40 MG: 40 TABLET, FILM COATED ORAL at 08:25

## 2019-01-31 RX ADMIN — ASPIRIN 81 MG: 81 TABLET, CHEWABLE ORAL at 08:25

## 2019-01-31 RX ADMIN — COLCHICINE 0.6 MG: 0.6 TABLET, FILM COATED ORAL at 08:25

## 2019-01-31 ASSESSMENT — PAIN SCALES - GENERAL: PAINLEVEL_OUTOF10: 5

## 2019-01-31 ASSESSMENT — PAIN - FUNCTIONAL ASSESSMENT: PAIN_FUNCTIONAL_ASSESSMENT: 0-10

## 2019-01-31 NOTE — PROGRESS NOTES
[facility]  CDU / OBSERVATION eNCOUnter  Attending NOte       I performed a history and physical examination of the patient and discussed management with the resident. I reviewed the residents note and agree with the documented findings and plan of care. Any areas of disagreement are noted on the chart. I was personally present for the key portions of any procedures. I have documented in the chart those procedures where I was not present during the key portions. I have reviewed the nurses notes. I agree with the chief complaint, past medical history, past surgical history, allergies, medications, social and family history as documented unless otherwise noted below. The Family history, social history, and ROS are effectively unchanged since admission unless noted elsewhere in the chart. Chin held to ensure appropriate discharge medications and plan. Patient has all necessary components of discharge in place. For ECF placement today.     Cyrus Jackson MD  Attending Emergency  Physician

## 2019-01-31 NOTE — PROGRESS NOTES
OBS/CDU   RESIDENT NOTE      Patients PCP is: No primary care provider on file. SUBJECTIVE      No acute events overnight. Has been able to tolerate a full diet without nausea or vomiting. The patient is urinating on his own and is passing flatus. Denies fever, chills, nausea, vomiting, chest pain, shortness of breath, abdominal pain, focal weakness, numbness, tingling, urinary/bowel symptoms, vision changes, visual hallucinations, or headache. PHYSICAL EXAM      General: NAD, AO X 3  Heent: EMOI, PERRL  Neck: SUPPLE, NO JVD  Cardiovascular: RRR, S1S2  Pulmonary: CTAB, NO SOB  Abdomen: SOFT, NTTP, ND, +BS  Extremities: +2/4 PULSES DISTAL, NO SWELLING  Neuro / Psych: NO NUMBNESS OR TINGLING, MENTATION AT BASELINE    PERTINENT TEST /EXAMS      I have reviewed all available laboratory results.     MEDICATIONS CURRENT     lidocaine PF 1 % injection 5 mL Once   sodium chloride flush 0.9 % injection 10 mL 2 times per day   sodium chloride flush 0.9 % injection 10 mL PRN   insulin lispro (HUMALOG) injection vial 0-6 Units TID WC   insulin lispro (HUMALOG) injection vial 0-3 Units Nightly   glucose (GLUTOSE) 40 % oral gel 15 g PRN   dextrose 50 % solution 12.5 g PRN   glucagon (rDNA) injection 1 mg PRN   dextrose 5 % solution PRN   cefTRIAXone (ROCEPHIN) 1 g IVPB in 50 mL D5W minibag Once   aspirin chewable tablet 81 mg Daily   ipratropium-albuterol (DUONEB) nebulizer solution 3 mL Q4H PRN   OXcarbazepine (TRILEPTAL) tablet 300 mg BID   insulin glargine (LANTUS) injection vial 30 Units Nightly   atorvastatin (LIPITOR) tablet 80 mg Nightly   colchicine (COLCRYS) tablet 0.6 mg Daily   clopidogrel (PLAVIX) tablet 75 mg Daily   lurasidone (LATUDA) tablet 40 mg Daily   ondansetron (ZOFRAN-ODT) disintegrating tablet 4 mg Q8H PRN   sennosides-docusate sodium (SENOKOT-S) 8.6-50 MG tablet 1 tablet Nightly   famotidine (PEPCID) tablet 20 mg Daily   metoprolol tartrate (LOPRESSOR) tablet 12.5 mg BID   bumetanide (BUMEX) tablet 1 mg QPM   spironolactone (ALDACTONE) tablet 25 mg Daily   metronidazole (FLAGYL) 500 mg in NaCl 100 mL IVPB premix Q8H   cefTRIAXone (ROCEPHIN) 1 g IVPB in 50 mL D5W minibag Daily   sodium chloride flush 0.9 % injection 10 mL 2 times per day   sodium chloride flush 0.9 % injection 10 mL PRN   acetaminophen (TYLENOL) tablet 650 mg Q4H PRN   oxyCODONE-acetaminophen (PERCOCET) 5-325 MG per tablet 1 tablet Q4H PRN   Or    oxyCODONE-acetaminophen (PERCOCET) 5-325 MG per tablet 2 tablet Q4H PRN   0.9 % sodium chloride infusion Continuous   morphine (PF) injection 2 mg Q4H PRN   ondansetron (ZOFRAN) injection 4 mg Q6H PRN       All medication charted and reviewed. CONSULTS      IP CONSULT TO DIABETES EDUCATOR  IP CONSULT TO GI  IP CONSULT TO INFECTIOUS DISEASES  IP CONSULT TO IV TEAM    ASSESSMENT/PLAN       Homar Lambert is a 59 y.o. female who presents with    1.) Acute Colonic Diverticulitis d/t unknown etiology     · GI Consult  ? outpt fu 6-8 weeks after ABX tx  · ID Consult  ? Continue Flagyl and Ceftriaxone after DC until 2/10/19  ? Will continue to receive Ceftriaxone IV through PICC o/p per ID  · Continue Low dose sliding scale  · Consult PICC team, discussed with patient the risks of placement and she understands and agreed and signed  · Will d/c to SNF    · Continue home medications  · Monitor vitals, labs, and imaging  · DISPO: pending consults and clinical improvement    --  Chely King  Emergency Medicine Resident Physician     This dictation was generated by voice recognition computer software. Although all attempts are made to edit the dictation for accuracy, there may be errors in the transcription that are not intended.

## 2019-01-31 NOTE — CARE COORDINATION
Discharge order noted. Transport arranged via [employer] ([phone]). Confirmation [id number]. Dispatch to schedule through Shayna Lee and will return call with ETA.     1109 - Return call from [address] [place] [address] TLC transit will be to hospital \"within the hour\" for . Notified BESS Nix and left  for [employer]. Dis[facility]  Written by: Bang Gorman RN    Patient Name: Lily Akers  Attending Provider: Christophe Canavan, MD  Admit Date: 2019 10:24 AM  MRN: [id number]  Account: [de-identified]                     : 1955  Discharge Date:  19       Disposition: [facility]  AVS with completed [address] faxed to Elisa Flores.     Bang Gorman RN

## 2019-01-31 NOTE — PLAN OF CARE
Problem: Pain:  Goal: Pain level will decrease  Pain level will decrease   Outcome: Ongoing    Goal: Control of acute pain  Control of acute pain   Outcome: Ongoing    Goal: Control of chronic pain  Control of chronic pain   Outcome: Ongoing      Problem: Falls - Risk of:  Goal: Will remain free from falls  Will remain free from falls   Outcome: Ongoing    Goal: Absence of physical injury  Absence of physical injury   Outcome: Ongoing      Problem: Infection - Central Venous Catheter-Associated Bloodstream Infection:  Goal: Will show no infection signs and symptoms  Will show no infection signs and symptoms   Outcome: Ongoing

## 2019-02-01 LAB
DIRECT EXAM: NORMAL
Lab: NORMAL
SPECIMEN DESCRIPTION: NORMAL
STATUS: NORMAL

## 2019-02-02 LAB
HCV QUANTITATIVE: NORMAL
HEPATITIS C GENOTYPE: NORMAL

## 2019-02-28 ENCOUNTER — HOSPITAL ENCOUNTER (OUTPATIENT)
Dept: OTHER | Age: 64
Discharge: HOME | End: 2019-02-28
Payer: MEDICARE

## 2019-02-28 VITALS
SYSTOLIC BLOOD PRESSURE: 112 MMHG | DIASTOLIC BLOOD PRESSURE: 76 MMHG | OXYGEN SATURATION: 100 % | HEART RATE: 97 BPM | RESPIRATION RATE: 18 BRPM

## 2019-02-28 PROCEDURE — 99211 OFF/OP EST MAY X REQ PHY/QHP: CPT

## 2019-02-28 ASSESSMENT — PAIN DESCRIPTION - LOCATION: LOCATION_2: ARM

## 2019-02-28 ASSESSMENT — PAIN DESCRIPTION - INTENSITY: RATING_2: 10

## 2019-02-28 ASSESSMENT — PAIN DESCRIPTION - DESCRIPTORS: DESCRIPTORS_2: SHARP

## 2019-02-28 ASSESSMENT — PAIN DESCRIPTION - PROGRESSION: CLINICAL_PROGRESSION_2: NOT CHANGED

## 2019-02-28 ASSESSMENT — PAIN DESCRIPTION - DURATION: DURATION_2: CONTINUOUS

## 2019-04-23 ENCOUNTER — HOSPITAL ENCOUNTER (OUTPATIENT)
Dept: OTHER | Age: 64
Discharge: HOME | End: 2019-04-23
Payer: MEDICARE

## 2019-04-23 VITALS
SYSTOLIC BLOOD PRESSURE: 102 MMHG | OXYGEN SATURATION: 100 % | BODY MASS INDEX: 38.08 KG/M2 | WEIGHT: 195 LBS | RESPIRATION RATE: 18 BRPM | HEART RATE: 93 BPM | DIASTOLIC BLOOD PRESSURE: 63 MMHG

## 2019-04-23 PROCEDURE — 99212 OFFICE O/P EST SF 10 MIN: CPT

## 2019-04-23 NOTE — PROGRESS NOTES
Verbally reviewed medication list with patient; patient verbalized understanding. Discussed 2000mg/day sodium restricted diet; patient verbalized understanding. Moderate daily exercise encouraged as tolerated. Discussed rest breaks as needed; patient verbalized understanding. Patient instructed to weigh self at the same time of each day, using same clothes and same scale; reinforced teaching to monitor for 3-5 lb weight increase over 1-2 days, and to notify the CHF clinic at [phone] or physician office if weight change noted. Patient verbalized understanding. Risks of smoking discussed with the patient if applicable; patient strongly discouraged to smoke. Patient verbalized understanding. Signs and symptoms of CHF discussed with patient, such as feeling more tired than normal, feeling short of breath, coughing that increases when you lie down, sudden weight gain, swelling of your feet, legs or belly. Patient verbalized understanding to notify the CHF clinic at [phone] or physician office if these symptoms occur. Compliance with plan of care and further disease process causes discussed with patient, patient encouraged to keep all follow up appointments. Patient verbalized understanding.

## 2019-04-23 NOTE — PROGRESS NOTES
Date:  2019  Time:  1:30 PM    CHF Clinic at [facility]    Office: [phone] Fax: [phone]    Re:  Donato Ritter   Patient : 1955    Vital Signs: /63   Pulse 93   Resp 18   Wt 195 lb (88.5 kg)   LMP  (LMP Unknown)   SpO2 100%   BMI 38.08 kg/m²                                                   No results for input(s): CBC, HGB, HCT, WBC, PLATELET, NA, K, CL, CO2, BUN, CREATININE, GLUCOSE, BNP, INR in the last 72 hours. Respiratory:    Assessment  Charting Type: Reassessment    Breath Sounds  Right Upper Lobe: Clear  Right Middle Lobe: Clear  Right Lower Lobe: Clear  Left Upper Lobe: Clear  Left Lower Lobe: Clear    Cough/Sputum  Cough: Dry  Frequency: Frequent         Peripheral Vascular  RLE Edema: None  LLE Edema: None      Complaints: none    Physician Orders none today    Comment :Pt is brought in per wheelchair ,she has left side impairment due to a CVA. She was able to stand to be weighted today but generally is not weight bearing. She is up 10 lbs since January which was the last time she was able to stand . No acute s/s of CHF noted on assessment today . Low na diet reviewed and medication compliance stressed.  Follow up will be on 19    Electronically signed by Orly Brown RN on 2019 at 1:30 PM

## 2019-05-21 ENCOUNTER — HOSPITAL ENCOUNTER (OUTPATIENT)
Dept: OTHER | Age: 64
Discharge: HOME | End: 2019-05-21
Payer: MEDICARE

## 2019-05-21 VITALS
DIASTOLIC BLOOD PRESSURE: 71 MMHG | OXYGEN SATURATION: 95 % | RESPIRATION RATE: 18 BRPM | HEART RATE: 95 BPM | SYSTOLIC BLOOD PRESSURE: 113 MMHG

## 2019-05-21 PROCEDURE — 99212 OFFICE O/P EST SF 10 MIN: CPT | Performed by: NURSE PRACTITIONER

## 2019-05-21 NOTE — PROGRESS NOTES
Date:  2019  Time:  2:39 PM    CHF Clinic at [address] [place] [address]    Office: [phone] Fax: [phone]    Re:  Dale Ritter   Patient : 1955    Vital Signs: /71   Pulse 95   Resp 18   LMP  (LMP Unknown)   SpO2 95%                       O2 Device: None (Room air)                           No results for input(s): CBC, HGB, HCT, WBC, PLATELET, NA, K, CL, CO2, BUN, CREATININE, GLUCOSE, BNP, INR in the last 72 hours. Respiratory:       Breath Sounds  Right Upper Lobe: Clear  Right Middle Lobe: Clear  Right Lower Lobe: Clear  Left Upper Lobe: Clear  Left Lower Lobe: Clear    Cough/Sputum  Cough: Productive  Frequency: Frequent     Peripheral Vascular  RLE Edema: +1, Non-pitting  LLE Edema: +1, Non-pitting    Complaints:   Ongoing SOB at times but not increased. Denies pain. Comment : Weight stable reported by [facility] per Rom Zhao. Unable to weigh patient d/t CVA and unable to stand on scale. Ongoing non-pitting edema noted. Reinforced low sodium diet, fluid limits, and daily weights. Patient is monitored for s/s of acute CHF per Rom Zhao and nursing home director manages medications. Patient will be followed as needed but will continue to have CHF management by [facility].       Electronically signed by Alyssa Vazquez RN on 2019 at 2:39 PM
Verbally reviewed medication list with patient; patient verbalized understanding. Discussed 2000mg/day sodium restricted diet; patient verbalized understanding. Moderate daily exercise encouraged as tolerated. Discussed rest breaks as needed; patient verbalized understanding. Patient instructed to weigh self at the same time of each day, using same clothes and same scale; reinforced teaching to monitor for 3-5 lb weight increase over 1-2 days, and to notify the CHF clinic at [phone] or physician office if weight change noted. Patient verbalized understanding. Risks of smoking discussed with the patient if applicable; patient strongly discouraged to smoke. Patient verbalized understanding. Signs and symptoms of CHF discussed with patient, such as feeling more tired than normal, feeling short of breath, coughing that increases when you lie down, sudden weight gain, swelling of your feet, legs or belly. Patient verbalized understanding to notify the CHF clinic at [phone] or physician office if these symptoms occur. Compliance with plan of care and further disease process causes discussed with patient, patient encouraged to keep all follow up appointments. Patient verbalized understanding.
bathrooms. Add extra light switches or use remote switches (such as switches that go on or off when you clap your hands) to make it easier to turn lights on if you have to get up during the night. · Install sturdy handrails on stairways. Put grab bars near your shower, bathtub, and toilet. · Store household items on low shelves so that you do not have to climb or reach high. Or use a reaching device that you can get at a medical supply store. If you have to climb for something, use a step stool with handrails, or ask someone to get it for you. · Keep a cordless phone and a flashlight with new batteries by your bed. If possible, put a phone in each of the main rooms of your house, or carry a cell phone in case you fall and cannot reach a phone. Or you can wear a device around your neck or wrist. You push a button that sends a signal for help. · Wear low-heeled shoes that fit well and give your feet good support. Use footwear with nonskid soles. Check the heels and soles of your shoes for wear. Repair or replace worn heels or soles. · Do not wear socks without shoes on wood floors. · Walk on the grass when the sidewalks are slippery. If you live in an area that gets snow and ice in the winter, sprinkle salt on slippery steps and sidewalks. Where can you learn more? Go to https://Lookeryshakira.[employer]ra[employer]. org and sign in to your [employer] account. Enter W684 in the [facility] box to learn more about Diabetes and Preventing Falls: After Your Visit.     If you do not have an account, please click on the Sign Up Now link. © 1152-8925 Healthwise, Incorporated. Care instructions adapted under license by [facility].  This care instruction is for use with your licensed healthcare professional. If you have questions about a medical condition or this instruction, always ask your healthcare professional. [place] [address] any warranty or liability for your use of this

## 2019-05-23 ENCOUNTER — HOSPITAL ENCOUNTER (OUTPATIENT)
Age: 64
Setting detail: SPECIMEN
Discharge: HOME | End: 2019-05-23
Payer: MEDICARE

## 2019-05-23 LAB
ALBUMIN SERPL-MCNC: 3.6 G/DL (ref 3.5–5.2)
ALBUMIN/GLOBULIN RATIO: 0.9 (ref 1–2.5)
ALP BLD-CCNC: 132 U/L (ref 35–104)
ALT SERPL-CCNC: 20 U/L (ref 5–33)
ANION GAP SERPL CALCULATED.3IONS-SCNC: 13 MMOL/L (ref 9–17)
AST SERPL-CCNC: 25 U/L
BILIRUB SERPL-MCNC: 0.28 MG/DL (ref 0.3–1.2)
BILIRUBIN DIRECT: <0.08 MG/DL
BILIRUBIN, INDIRECT: ABNORMAL MG/DL (ref 0–1)
BUN BLDV-MCNC: 15 MG/DL (ref 8–23)
BUN/CREAT BLD: ABNORMAL (ref 9–20)
CALCIUM SERPL-MCNC: 8.8 MG/DL (ref 8.6–10.4)
CHLORIDE BLD-SCNC: 102 MMOL/L (ref 98–107)
CHOLESTEROL/HDL RATIO: 4.5
CHOLESTEROL: 72 MG/DL
CO2: 26 MMOL/L (ref 20–31)
CREAT SERPL-MCNC: 0.4 MG/DL (ref 0.5–0.9)
GFR AFRICAN AMERICAN: >60 ML/MIN
GFR NON-AFRICAN AMERICAN: >60 ML/MIN
GFR SERPL CREATININE-BSD FRML MDRD: ABNORMAL ML/MIN/{1.73_M2}
GFR SERPL CREATININE-BSD FRML MDRD: ABNORMAL ML/MIN/{1.73_M2}
GLOBULIN: ABNORMAL G/DL (ref 1.5–3.8)
GLUCOSE BLD-MCNC: 67 MG/DL (ref 70–99)
HDLC SERPL-MCNC: 16 MG/DL
LDL CHOLESTEROL: 35 MG/DL (ref 0–130)
POTASSIUM SERPL-SCNC: 4.2 MMOL/L (ref 3.7–5.3)
SODIUM BLD-SCNC: 141 MMOL/L (ref 135–144)
TOTAL PROTEIN: 7.5 G/DL (ref 6.4–8.3)
TRIGL SERPL-MCNC: 105 MG/DL
VLDLC SERPL CALC-MCNC: ABNORMAL MG/DL (ref 1–30)

## 2019-05-23 PROCEDURE — 36415 COLL VENOUS BLD VENIPUNCTURE: CPT

## 2019-05-23 PROCEDURE — 83036 HEMOGLOBIN GLYCOSYLATED A1C: CPT

## 2019-05-23 PROCEDURE — 80061 LIPID PANEL: CPT

## 2019-05-23 PROCEDURE — P9603 ONE-WAY ALLOW PRORATED MILES: HCPCS

## 2019-05-23 PROCEDURE — 80076 HEPATIC FUNCTION PANEL: CPT

## 2019-05-23 PROCEDURE — 80048 BASIC METABOLIC PNL TOTAL CA: CPT

## 2019-05-24 LAB
ESTIMATED AVERAGE GLUCOSE: 157 MG/DL
HBA1C MFR BLD: 7.1 % (ref 4–6)

## 2019-05-31 ENCOUNTER — HOSPITAL ENCOUNTER (OUTPATIENT)
Age: 64
Setting detail: SPECIMEN
Discharge: HOME | End: 2019-05-31
Payer: MEDICARE

## 2019-06-01 ENCOUNTER — HOSPITAL ENCOUNTER (OUTPATIENT)
Age: 64
Setting detail: SPECIMEN
Discharge: HOME | End: 2019-06-01
Payer: MEDICARE

## 2019-06-01 LAB
ANION GAP SERPL CALCULATED.3IONS-SCNC: 12 MMOL/L (ref 9–17)
BUN BLDV-MCNC: 19 MG/DL (ref 8–23)
BUN/CREAT BLD: 21 (ref 9–20)
CALCIUM SERPL-MCNC: 8.5 MG/DL (ref 8.6–10.4)
CHLORIDE BLD-SCNC: 103 MMOL/L (ref 98–107)
CO2: 24 MMOL/L (ref 20–31)
CREAT SERPL-MCNC: 0.89 MG/DL (ref 0.5–0.9)
GFR AFRICAN AMERICAN: >60 ML/MIN
GFR NON-AFRICAN AMERICAN: >60 ML/MIN
GFR SERPL CREATININE-BSD FRML MDRD: ABNORMAL ML/MIN/{1.73_M2}
GFR SERPL CREATININE-BSD FRML MDRD: ABNORMAL ML/MIN/{1.73_M2}
GLUCOSE BLD-MCNC: 83 MG/DL (ref 70–99)
HCT VFR BLD CALC: 33 % (ref 36.3–47.1)
HEMOGLOBIN: 9.9 G/DL (ref 11.9–15.1)
MCH RBC QN AUTO: 28.2 PG (ref 25.2–33.5)
MCHC RBC AUTO-ENTMCNC: 30 G/DL (ref 28.4–34.8)
MCV RBC AUTO: 94 FL (ref 82.6–102.9)
NRBC AUTOMATED: ABNORMAL PER 100 WBC
PDW BLD-RTO: 15 % (ref 11.8–14.4)
PLATELET # BLD: 251 K/UL (ref 138–453)
PMV BLD AUTO: 9.4 FL (ref 8.1–13.5)
POTASSIUM SERPL-SCNC: 4.1 MMOL/L (ref 3.7–5.3)
RBC # BLD: 3.51 M/UL (ref 3.95–5.11)
SODIUM BLD-SCNC: 139 MMOL/L (ref 135–144)
WBC # BLD: 8.8 K/UL (ref 3.5–11.3)

## 2019-06-01 PROCEDURE — 36415 COLL VENOUS BLD VENIPUNCTURE: CPT

## 2019-06-01 PROCEDURE — P9603 ONE-WAY ALLOW PRORATED MILES: HCPCS

## 2019-06-01 PROCEDURE — 85027 COMPLETE CBC AUTOMATED: CPT

## 2019-06-01 PROCEDURE — 80048 BASIC METABOLIC PNL TOTAL CA: CPT

## 2019-07-17 ENCOUNTER — HOSPITAL ENCOUNTER (OUTPATIENT)
Age: 64
Setting detail: SPECIMEN
Discharge: HOME | End: 2019-07-17
Payer: MEDICARE

## 2019-07-17 LAB
-: NORMAL
ANION GAP SERPL CALCULATED.3IONS-SCNC: 17 MMOL/L (ref 9–17)
BNP INTERPRETATION: ABNORMAL
BUN BLDV-MCNC: 28 MG/DL (ref 8–23)
BUN/CREAT BLD: 29 (ref 9–20)
CALCIUM SERPL-MCNC: 9.3 MG/DL (ref 8.6–10.4)
CHLORIDE BLD-SCNC: 101 MMOL/L (ref 98–107)
CO2: 22 MMOL/L (ref 20–31)
CREAT SERPL-MCNC: 0.96 MG/DL (ref 0.5–0.9)
GFR AFRICAN AMERICAN: >60 ML/MIN
GFR NON-AFRICAN AMERICAN: 59 ML/MIN
GFR SERPL CREATININE-BSD FRML MDRD: ABNORMAL ML/MIN/{1.73_M2}
GFR SERPL CREATININE-BSD FRML MDRD: ABNORMAL ML/MIN/{1.73_M2}
GLUCOSE BLD-MCNC: 167 MG/DL (ref 70–99)
POTASSIUM SERPL-SCNC: 3.8 MMOL/L (ref 3.7–5.3)
PRO-BNP: 3190 PG/ML
REASON FOR REJECTION: NORMAL
SODIUM BLD-SCNC: 140 MMOL/L (ref 135–144)
ZZ NTE CLEAN UP: ORDERED TEST: NORMAL
ZZ NTE WITH NAME CLEAN UP: SPECIMEN SOURCE: NORMAL

## 2019-07-17 PROCEDURE — 80048 BASIC METABOLIC PNL TOTAL CA: CPT

## 2019-07-17 PROCEDURE — 83880 ASSAY OF NATRIURETIC PEPTIDE: CPT

## 2019-07-17 PROCEDURE — 36415 COLL VENOUS BLD VENIPUNCTURE: CPT

## 2019-08-26 ENCOUNTER — HOSPITAL ENCOUNTER (OUTPATIENT)
Age: 64
Setting detail: SPECIMEN
Discharge: HOME | End: 2019-08-26
Payer: COMMERCIAL

## 2019-08-26 LAB
ANION GAP SERPL CALCULATED.3IONS-SCNC: 12 MMOL/L (ref 9–17)
BUN BLDV-MCNC: 29 MG/DL (ref 8–23)
BUN/CREAT BLD: 26 (ref 9–20)
CALCIUM SERPL-MCNC: 9.1 MG/DL (ref 8.6–10.4)
CHLORIDE BLD-SCNC: 104 MMOL/L (ref 98–107)
CO2: 25 MMOL/L (ref 20–31)
CREAT SERPL-MCNC: 1.1 MG/DL (ref 0.5–0.9)
GFR AFRICAN AMERICAN: >60 ML/MIN
GFR NON-AFRICAN AMERICAN: 50 ML/MIN
GFR SERPL CREATININE-BSD FRML MDRD: ABNORMAL ML/MIN/{1.73_M2}
GFR SERPL CREATININE-BSD FRML MDRD: ABNORMAL ML/MIN/{1.73_M2}
GLUCOSE BLD-MCNC: 54 MG/DL (ref 70–99)
POTASSIUM SERPL-SCNC: 3.9 MMOL/L (ref 3.7–5.3)
SODIUM BLD-SCNC: 141 MMOL/L (ref 135–144)

## 2019-08-26 PROCEDURE — P9603 ONE-WAY ALLOW PRORATED MILES: HCPCS

## 2019-08-26 PROCEDURE — 36415 COLL VENOUS BLD VENIPUNCTURE: CPT

## 2019-08-26 PROCEDURE — 80048 BASIC METABOLIC PNL TOTAL CA: CPT

## 2019-09-24 ENCOUNTER — HOSPITAL ENCOUNTER (OUTPATIENT)
Age: 64
Setting detail: SPECIMEN
Discharge: HOME | End: 2019-09-24
Payer: COMMERCIAL

## 2019-09-24 LAB
-: NORMAL
REASON FOR REJECTION: NORMAL
ZZ NTE CLEAN UP: ORDERED TEST: NORMAL
ZZ NTE WITH NAME CLEAN UP: SPECIMEN SOURCE: NORMAL

## 2019-09-24 PROCEDURE — 36415 COLL VENOUS BLD VENIPUNCTURE: CPT

## 2019-09-25 ENCOUNTER — HOSPITAL ENCOUNTER (OUTPATIENT)
Age: 64
Setting detail: SPECIMEN
Discharge: HOME | End: 2019-09-25
Payer: COMMERCIAL

## 2019-09-25 LAB
ESTIMATED AVERAGE GLUCOSE: 186 MG/DL
HBA1C MFR BLD: 8.1 % (ref 4–6)

## 2019-09-25 PROCEDURE — P9603 ONE-WAY ALLOW PRORATED MILES: HCPCS

## 2019-09-25 PROCEDURE — 83036 HEMOGLOBIN GLYCOSYLATED A1C: CPT

## 2019-09-25 PROCEDURE — 36415 COLL VENOUS BLD VENIPUNCTURE: CPT

## 2020-04-03 ENCOUNTER — HOSPITAL ENCOUNTER (OUTPATIENT)
Age: 65
Setting detail: SPECIMEN
Discharge: HOME | End: 2020-04-03
Payer: COMMERCIAL

## 2020-04-03 LAB
ABSOLUTE EOS #: 0.25 K/UL (ref 0–0.44)
ABSOLUTE IMMATURE GRANULOCYTE: 0.06 K/UL (ref 0–0.3)
ABSOLUTE LYMPH #: 3.09 K/UL (ref 1.1–3.7)
ABSOLUTE MONO #: 0.83 K/UL (ref 0.1–1.2)
ALBUMIN SERPL-MCNC: 3.6 G/DL (ref 3.5–5.2)
ALBUMIN/GLOBULIN RATIO: ABNORMAL (ref 1–2.5)
ALP BLD-CCNC: 116 U/L (ref 35–104)
ALT SERPL-CCNC: 9 U/L (ref 5–33)
ANION GAP SERPL CALCULATED.3IONS-SCNC: 18 MMOL/L (ref 9–17)
AST SERPL-CCNC: 15 U/L
BASOPHILS # BLD: 0 % (ref 0–2)
BASOPHILS ABSOLUTE: 0.05 K/UL (ref 0–0.2)
BILIRUB SERPL-MCNC: 0.15 MG/DL (ref 0.3–1.2)
BUN BLDV-MCNC: 20 MG/DL (ref 8–23)
BUN/CREAT BLD: 20 (ref 9–20)
CALCIUM SERPL-MCNC: 9.2 MG/DL (ref 8.6–10.4)
CHLORIDE BLD-SCNC: 99 MMOL/L (ref 98–107)
CO2: 25 MMOL/L (ref 20–31)
CREAT SERPL-MCNC: 1.02 MG/DL (ref 0.5–0.9)
DIFFERENTIAL TYPE: ABNORMAL
EOSINOPHILS RELATIVE PERCENT: 2 % (ref 1–4)
GFR AFRICAN AMERICAN: >60 ML/MIN
GFR NON-AFRICAN AMERICAN: 55 ML/MIN
GFR SERPL CREATININE-BSD FRML MDRD: ABNORMAL ML/MIN/{1.73_M2}
GFR SERPL CREATININE-BSD FRML MDRD: ABNORMAL ML/MIN/{1.73_M2}
GLUCOSE BLD-MCNC: 104 MG/DL (ref 70–99)
HCT VFR BLD CALC: 36.5 % (ref 36.3–47.1)
HEMOGLOBIN: 11.5 G/DL (ref 11.9–15.1)
IMMATURE GRANULOCYTES: 0 %
LYMPHOCYTES # BLD: 22 % (ref 24–43)
MCH RBC QN AUTO: 30.3 PG (ref 25.2–33.5)
MCHC RBC AUTO-ENTMCNC: 31.5 G/DL (ref 28.4–34.8)
MCV RBC AUTO: 96.1 FL (ref 82.6–102.9)
MONOCYTES # BLD: 6 % (ref 3–12)
NRBC AUTOMATED: ABNORMAL PER 100 WBC
PDW BLD-RTO: 14.3 % (ref 11.8–14.4)
PLATELET # BLD: 341 K/UL (ref 138–453)
PLATELET ESTIMATE: ABNORMAL
PMV BLD AUTO: 9.8 FL (ref 8.1–13.5)
POTASSIUM SERPL-SCNC: 4.5 MMOL/L (ref 3.7–5.3)
PROCALCITONIN: 0.08 NG/ML
RBC # BLD: 3.8 M/UL (ref 3.95–5.11)
RBC # BLD: ABNORMAL 10*6/UL
SEG NEUTROPHILS: 70 % (ref 36–65)
SEGMENTED NEUTROPHILS ABSOLUTE COUNT: 9.76 K/UL (ref 1.5–8.1)
SODIUM BLD-SCNC: 142 MMOL/L (ref 135–144)
TOTAL PROTEIN: 8 G/DL (ref 6.4–8.3)
WBC # BLD: 14 K/UL (ref 3.5–11.3)
WBC # BLD: ABNORMAL 10*3/UL

## 2020-04-03 PROCEDURE — 36415 COLL VENOUS BLD VENIPUNCTURE: CPT

## 2020-04-03 PROCEDURE — P9603 ONE-WAY ALLOW PRORATED MILES: HCPCS

## 2020-04-03 PROCEDURE — 80053 COMPREHEN METABOLIC PANEL: CPT

## 2020-04-03 PROCEDURE — 85025 COMPLETE CBC W/AUTO DIFF WBC: CPT

## 2020-04-03 PROCEDURE — 84145 PROCALCITONIN (PCT): CPT

## 2020-05-08 ENCOUNTER — HOSPITAL ENCOUNTER (OUTPATIENT)
Age: 65
Setting detail: SPECIMEN
Discharge: HOME | End: 2020-05-08
Payer: COMMERCIAL

## 2020-05-08 LAB
ESTIMATED AVERAGE GLUCOSE: 183 MG/DL
HBA1C MFR BLD: 8 % (ref 4–6)

## 2020-05-08 PROCEDURE — 83036 HEMOGLOBIN GLYCOSYLATED A1C: CPT

## 2020-05-08 PROCEDURE — P9603 ONE-WAY ALLOW PRORATED MILES: HCPCS

## 2020-05-08 PROCEDURE — 36415 COLL VENOUS BLD VENIPUNCTURE: CPT

## 2020-06-04 ENCOUNTER — HOSPITAL ENCOUNTER (OUTPATIENT)
Age: 65
Setting detail: SPECIMEN
Discharge: HOME | End: 2020-06-04
Payer: COMMERCIAL

## 2020-06-04 LAB
HCT VFR BLD CALC: 37.6 % (ref 36.3–47.1)
HEMOGLOBIN: 11.8 G/DL (ref 11.9–15.1)
MCH RBC QN AUTO: 30.8 PG (ref 25.2–33.5)
MCHC RBC AUTO-ENTMCNC: 31.4 G/DL (ref 28.4–34.8)
MCV RBC AUTO: 98.2 FL (ref 82.6–102.9)
NRBC AUTOMATED: 0 PER 100 WBC
PDW BLD-RTO: 15.3 % (ref 11.8–14.4)
PLATELET # BLD: 291 K/UL (ref 138–453)
PMV BLD AUTO: 10 FL (ref 8.1–13.5)
RBC # BLD: 3.83 M/UL (ref 3.95–5.11)
WBC # BLD: 13.7 K/UL (ref 3.5–11.3)

## 2020-06-04 PROCEDURE — 85027 COMPLETE CBC AUTOMATED: CPT

## 2020-06-04 PROCEDURE — P9603 ONE-WAY ALLOW PRORATED MILES: HCPCS

## 2020-06-04 PROCEDURE — 36415 COLL VENOUS BLD VENIPUNCTURE: CPT

## 2020-06-09 ENCOUNTER — HOSPITAL ENCOUNTER (OUTPATIENT)
Age: 65
Setting detail: SPECIMEN
Discharge: HOME | End: 2020-06-09
Payer: COMMERCIAL

## 2020-06-09 LAB
-: ABNORMAL
AMORPHOUS: ABNORMAL
BACTERIA: ABNORMAL
BILIRUBIN URINE: NEGATIVE
CASTS UA: ABNORMAL /LPF (ref 0–2)
CASTS UA: ABNORMAL /LPF (ref 0–2)
COLOR: YELLOW
COMMENT UA: ABNORMAL
CRYSTALS, UA: ABNORMAL /HPF
EPITHELIAL CELLS UA: ABNORMAL /HPF (ref 0–5)
GLUCOSE URINE: NEGATIVE
KETONES, URINE: ABNORMAL
LEUKOCYTE ESTERASE, URINE: ABNORMAL
MUCUS: ABNORMAL
NITRITE, URINE: NEGATIVE
OTHER OBSERVATIONS UA: ABNORMAL
PH UA: >9 (ref 5–8)
PROTEIN UA: ABNORMAL
RBC UA: ABNORMAL /HPF (ref 0–2)
RENAL EPITHELIAL, UA: ABNORMAL /HPF
SPECIFIC GRAVITY UA: 1.01 (ref 1–1.03)
TRICHOMONAS: ABNORMAL
TURBIDITY: ABNORMAL
URINE HGB: ABNORMAL
UROBILINOGEN, URINE: NORMAL
WBC UA: ABNORMAL /HPF (ref 0–5)
YEAST: ABNORMAL

## 2020-06-09 PROCEDURE — 87086 URINE CULTURE/COLONY COUNT: CPT

## 2020-06-09 PROCEDURE — 87077 CULTURE AEROBIC IDENTIFY: CPT

## 2020-06-09 PROCEDURE — 87186 SC STD MICRODIL/AGAR DIL: CPT

## 2020-06-09 PROCEDURE — 81001 URINALYSIS AUTO W/SCOPE: CPT

## 2020-06-11 LAB
CULTURE: ABNORMAL
CULTURE: ABNORMAL
Lab: ABNORMAL
SPECIMEN DESCRIPTION: ABNORMAL

## 2020-06-25 ENCOUNTER — HOSPITAL ENCOUNTER (OUTPATIENT)
Age: 65
Setting detail: SPECIMEN
Discharge: HOME | End: 2020-06-25
Payer: COMMERCIAL

## 2020-06-25 LAB
ANION GAP SERPL CALCULATED.3IONS-SCNC: 15 MMOL/L (ref 9–17)
BUN BLDV-MCNC: 9 MG/DL (ref 8–23)
BUN/CREAT BLD: ABNORMAL (ref 9–20)
CALCIUM SERPL-MCNC: 8.6 MG/DL (ref 8.6–10.4)
CHLORIDE BLD-SCNC: 101 MMOL/L (ref 98–107)
CO2: 22 MMOL/L (ref 20–31)
CREAT SERPL-MCNC: 0.95 MG/DL (ref 0.5–0.9)
GFR AFRICAN AMERICAN: >60 ML/MIN
GFR NON-AFRICAN AMERICAN: 59 ML/MIN
GFR SERPL CREATININE-BSD FRML MDRD: ABNORMAL ML/MIN/{1.73_M2}
GFR SERPL CREATININE-BSD FRML MDRD: ABNORMAL ML/MIN/{1.73_M2}
GLUCOSE BLD-MCNC: 77 MG/DL (ref 70–99)
POTASSIUM SERPL-SCNC: 4.5 MMOL/L (ref 3.7–5.3)
SODIUM BLD-SCNC: 138 MMOL/L (ref 135–144)

## 2020-06-25 PROCEDURE — P9603 ONE-WAY ALLOW PRORATED MILES: HCPCS

## 2020-06-25 PROCEDURE — 80048 BASIC METABOLIC PNL TOTAL CA: CPT

## 2020-06-25 PROCEDURE — 36415 COLL VENOUS BLD VENIPUNCTURE: CPT

## 2020-08-06 ENCOUNTER — HOSPITAL ENCOUNTER (OUTPATIENT)
Age: 65
Setting detail: SPECIMEN
Discharge: HOME | End: 2020-08-06
Payer: COMMERCIAL

## 2020-08-06 LAB
ABSOLUTE EOS #: 0.23 K/UL (ref 0–0.44)
ABSOLUTE IMMATURE GRANULOCYTE: 0.04 K/UL (ref 0–0.3)
ABSOLUTE LYMPH #: 2.41 K/UL (ref 1.1–3.7)
ABSOLUTE MONO #: 0.97 K/UL (ref 0.1–1.2)
ANION GAP SERPL CALCULATED.3IONS-SCNC: 17 MMOL/L (ref 9–17)
BASOPHILS # BLD: 1 % (ref 0–2)
BASOPHILS ABSOLUTE: 0.05 K/UL (ref 0–0.2)
BNP INTERPRETATION: ABNORMAL
BUN BLDV-MCNC: 8 MG/DL (ref 8–23)
BUN/CREAT BLD: NORMAL (ref 9–20)
CALCIUM SERPL-MCNC: 8.7 MG/DL (ref 8.6–10.4)
CHLORIDE BLD-SCNC: 103 MMOL/L (ref 98–107)
CO2: 21 MMOL/L (ref 20–31)
CREAT SERPL-MCNC: 0.79 MG/DL (ref 0.5–0.9)
DIFFERENTIAL TYPE: ABNORMAL
EOSINOPHILS RELATIVE PERCENT: 3 % (ref 1–4)
GFR AFRICAN AMERICAN: >60 ML/MIN
GFR NON-AFRICAN AMERICAN: >60 ML/MIN
GFR SERPL CREATININE-BSD FRML MDRD: NORMAL ML/MIN/{1.73_M2}
GFR SERPL CREATININE-BSD FRML MDRD: NORMAL ML/MIN/{1.73_M2}
GLUCOSE BLD-MCNC: 73 MG/DL (ref 70–99)
HCT VFR BLD CALC: 28.1 % (ref 36.3–47.1)
HEMOGLOBIN: 8.5 G/DL (ref 11.9–15.1)
IMMATURE GRANULOCYTES: 0 %
LYMPHOCYTES # BLD: 27 % (ref 24–43)
MCH RBC QN AUTO: 29.9 PG (ref 25.2–33.5)
MCHC RBC AUTO-ENTMCNC: 30.2 G/DL (ref 28.4–34.8)
MCV RBC AUTO: 98.9 FL (ref 82.6–102.9)
MONOCYTES # BLD: 11 % (ref 3–12)
NRBC AUTOMATED: 0 PER 100 WBC
PDW BLD-RTO: 15.2 % (ref 11.8–14.4)
PLATELET # BLD: 301 K/UL (ref 138–453)
PLATELET ESTIMATE: ABNORMAL
PMV BLD AUTO: 9.7 FL (ref 8.1–13.5)
POTASSIUM SERPL-SCNC: 4.1 MMOL/L (ref 3.7–5.3)
PRO-BNP: ABNORMAL PG/ML
RBC # BLD: 2.84 M/UL (ref 3.95–5.11)
RBC # BLD: ABNORMAL 10*6/UL
SEG NEUTROPHILS: 58 % (ref 36–65)
SEGMENTED NEUTROPHILS ABSOLUTE COUNT: 5.38 K/UL (ref 1.5–8.1)
SODIUM BLD-SCNC: 141 MMOL/L (ref 135–144)
WBC # BLD: 9.1 K/UL (ref 3.5–11.3)
WBC # BLD: ABNORMAL 10*3/UL

## 2020-08-06 PROCEDURE — 85025 COMPLETE CBC W/AUTO DIFF WBC: CPT

## 2020-08-06 PROCEDURE — P9603 ONE-WAY ALLOW PRORATED MILES: HCPCS

## 2020-08-06 PROCEDURE — 83880 ASSAY OF NATRIURETIC PEPTIDE: CPT

## 2020-08-06 PROCEDURE — 80048 BASIC METABOLIC PNL TOTAL CA: CPT

## 2020-08-06 PROCEDURE — 36415 COLL VENOUS BLD VENIPUNCTURE: CPT

## 2020-08-12 NOTE — PLAN OF CARE
Problem: Falls - Risk of:  Goal: Absence of physical injury  Absence of physical injury   Outcome: Ongoing      Problem: Fluid Volume:  Goal: Hemodynamic stability will improve  Hemodynamic stability will improve   Outcome: Ongoing    Goal: Ability to maintain a balanced intake and output will improve  Ability to maintain a balanced intake and output will improve   Outcome: Ongoing
Problem: Falls - Risk of:  Goal: Will remain free from falls  Will remain free from falls   Outcome: Ongoing      Problem: Fluid Volume:  Goal: Hemodynamic stability will improve  Hemodynamic stability will improve   Outcome: Ongoing
Problem: Falls - Risk of:  Goal: Will remain free from falls  Will remain free from falls   Outcome: Ongoing    Goal: Absence of physical injury  Absence of physical injury   Outcome: Ongoing      Problem: Fluid Volume:  Goal: Hemodynamic stability will improve  Hemodynamic stability will improve   Outcome: Ongoing    Goal: Ability to maintain a balanced intake and output will improve  Ability to maintain a balanced intake and output will improve   Outcome: Ongoing
Satisfactory

## 2020-09-01 ENCOUNTER — HOSPITAL ENCOUNTER (OUTPATIENT)
Age: 65
Setting detail: SPECIMEN
Discharge: HOME | End: 2020-09-01
Payer: COMMERCIAL

## 2020-09-01 LAB
ALBUMIN SERPL-MCNC: 3.4 G/DL (ref 3.5–5.2)
ALBUMIN/GLOBULIN RATIO: ABNORMAL (ref 1–2.5)
ALP BLD-CCNC: 102 U/L (ref 35–104)
ALT SERPL-CCNC: 9 U/L (ref 5–33)
ANION GAP SERPL CALCULATED.3IONS-SCNC: 15 MMOL/L (ref 9–17)
AST SERPL-CCNC: 17 U/L
BILIRUB SERPL-MCNC: 0.36 MG/DL (ref 0.3–1.2)
BILIRUBIN DIRECT: 0.1 MG/DL
BILIRUBIN, INDIRECT: 0.26 MG/DL (ref 0–1)
BUN BLDV-MCNC: 13 MG/DL (ref 8–23)
BUN/CREAT BLD: 14 (ref 9–20)
CALCIUM SERPL-MCNC: 8.9 MG/DL (ref 8.6–10.4)
CHLORIDE BLD-SCNC: 101 MMOL/L (ref 98–107)
CO2: 23 MMOL/L (ref 20–31)
CREAT SERPL-MCNC: 0.95 MG/DL (ref 0.5–0.9)
GFR AFRICAN AMERICAN: >60 ML/MIN
GFR NON-AFRICAN AMERICAN: 59 ML/MIN
GFR SERPL CREATININE-BSD FRML MDRD: ABNORMAL ML/MIN/{1.73_M2}
GFR SERPL CREATININE-BSD FRML MDRD: ABNORMAL ML/MIN/{1.73_M2}
GLOBULIN: ABNORMAL G/DL (ref 1.5–3.8)
GLUCOSE BLD-MCNC: 125 MG/DL (ref 70–99)
POTASSIUM SERPL-SCNC: 4.7 MMOL/L (ref 3.7–5.3)
SODIUM BLD-SCNC: 139 MMOL/L (ref 135–144)
TOTAL PROTEIN: 7.1 G/DL (ref 6.4–8.3)

## 2020-09-01 PROCEDURE — 83036 HEMOGLOBIN GLYCOSYLATED A1C: CPT

## 2020-09-01 PROCEDURE — 80076 HEPATIC FUNCTION PANEL: CPT

## 2020-09-01 PROCEDURE — 80048 BASIC METABOLIC PNL TOTAL CA: CPT

## 2020-09-01 PROCEDURE — 36415 COLL VENOUS BLD VENIPUNCTURE: CPT

## 2020-09-01 PROCEDURE — P9603 ONE-WAY ALLOW PRORATED MILES: HCPCS

## 2020-09-02 LAB
ESTIMATED AVERAGE GLUCOSE: 126 MG/DL
HBA1C MFR BLD: 6 % (ref 4–6)

## 2020-09-22 ENCOUNTER — HOSPITAL ENCOUNTER (OUTPATIENT)
Age: 65
Setting detail: SPECIMEN
Discharge: HOME | End: 2020-09-22
Payer: COMMERCIAL

## 2020-09-22 LAB
ANION GAP SERPL CALCULATED.3IONS-SCNC: 12 MMOL/L (ref 9–17)
BUN BLDV-MCNC: 13 MG/DL (ref 8–23)
BUN/CREAT BLD: NORMAL (ref 9–20)
CALCIUM SERPL-MCNC: 8.6 MG/DL (ref 8.6–10.4)
CHLORIDE BLD-SCNC: 105 MMOL/L (ref 98–107)
CO2: 23 MMOL/L (ref 20–31)
CREAT SERPL-MCNC: 0.86 MG/DL (ref 0.5–0.9)
GFR AFRICAN AMERICAN: >60 ML/MIN
GFR NON-AFRICAN AMERICAN: >60 ML/MIN
GFR SERPL CREATININE-BSD FRML MDRD: NORMAL ML/MIN/{1.73_M2}
GFR SERPL CREATININE-BSD FRML MDRD: NORMAL ML/MIN/{1.73_M2}
GLUCOSE BLD-MCNC: 87 MG/DL (ref 70–99)
HCT VFR BLD CALC: 36.6 % (ref 36.3–47.1)
HEMOGLOBIN: 10.6 G/DL (ref 11.9–15.1)
MCH RBC QN AUTO: 29.5 PG (ref 25.2–33.5)
MCHC RBC AUTO-ENTMCNC: 29 G/DL (ref 28.4–34.8)
MCV RBC AUTO: 101.9 FL (ref 82.6–102.9)
NRBC AUTOMATED: 0 PER 100 WBC
PDW BLD-RTO: 17.9 % (ref 11.8–14.4)
PLATELET # BLD: 202 K/UL (ref 138–453)
PMV BLD AUTO: 10.8 FL (ref 8.1–13.5)
POTASSIUM SERPL-SCNC: 4.6 MMOL/L (ref 3.7–5.3)
RBC # BLD: 3.59 M/UL (ref 3.95–5.11)
SODIUM BLD-SCNC: 140 MMOL/L (ref 135–144)
WBC # BLD: 8.8 K/UL (ref 3.5–11.3)

## 2020-09-22 PROCEDURE — 80048 BASIC METABOLIC PNL TOTAL CA: CPT

## 2020-09-22 PROCEDURE — P9603 ONE-WAY ALLOW PRORATED MILES: HCPCS

## 2020-09-22 PROCEDURE — 36415 COLL VENOUS BLD VENIPUNCTURE: CPT

## 2020-09-22 PROCEDURE — 85027 COMPLETE CBC AUTOMATED: CPT

## 2020-10-09 ENCOUNTER — HOSPITAL ENCOUNTER (OUTPATIENT)
Age: 65
Setting detail: SPECIMEN
Discharge: HOME | End: 2020-10-09
Payer: COMMERCIAL

## 2020-10-09 LAB
-: ABNORMAL
-: NORMAL
ABSOLUTE EOS #: 0.06 K/UL (ref 0–0.44)
ABSOLUTE IMMATURE GRANULOCYTE: 0.04 K/UL (ref 0–0.3)
ABSOLUTE LYMPH #: 1.44 K/UL (ref 1.1–3.7)
ABSOLUTE MONO #: 0.69 K/UL (ref 0.1–1.2)
AMORPHOUS: ABNORMAL
BACTERIA: ABNORMAL
BASOPHILS # BLD: 1 % (ref 0–2)
BASOPHILS ABSOLUTE: 0.08 K/UL (ref 0–0.2)
BILIRUBIN URINE: ABNORMAL
CASTS UA: ABNORMAL /LPF
COLOR: YELLOW
COMMENT UA: ABNORMAL
CRYSTALS, UA: ABNORMAL /HPF
DIFFERENTIAL TYPE: ABNORMAL
EOSINOPHILS RELATIVE PERCENT: 1 % (ref 1–4)
EPITHELIAL CELLS UA: ABNORMAL /HPF (ref 0–5)
GLUCOSE URINE: NEGATIVE
HCT VFR BLD CALC: 39.6 % (ref 36.3–47.1)
HEMOGLOBIN: 11.9 G/DL (ref 11.9–15.1)
IMMATURE GRANULOCYTES: 1 %
KETONES, URINE: ABNORMAL
LEUKOCYTE ESTERASE, URINE: ABNORMAL
LYMPHOCYTES # BLD: 20 % (ref 24–43)
MCH RBC QN AUTO: 29.1 PG (ref 25.2–33.5)
MCHC RBC AUTO-ENTMCNC: 30.1 G/DL (ref 28.4–34.8)
MCV RBC AUTO: 96.8 FL (ref 82.6–102.9)
MONOCYTES # BLD: 10 % (ref 3–12)
MUCUS: ABNORMAL
NITRITE, URINE: NEGATIVE
NRBC AUTOMATED: 0 PER 100 WBC
OTHER OBSERVATIONS UA: ABNORMAL
PDW BLD-RTO: 17.9 % (ref 11.8–14.4)
PH UA: 6.5 (ref 5–8)
PLATELET # BLD: 228 K/UL (ref 138–453)
PLATELET ESTIMATE: ABNORMAL
PMV BLD AUTO: 10.7 FL (ref 8.1–13.5)
PROTEIN UA: ABNORMAL
RBC # BLD: 4.09 M/UL (ref 3.95–5.11)
RBC # BLD: ABNORMAL 10*6/UL
RBC UA: ABNORMAL /HPF (ref 0–2)
REASON FOR REJECTION: NORMAL
RENAL EPITHELIAL, UA: ABNORMAL /HPF
SEG NEUTROPHILS: 67 % (ref 36–65)
SEGMENTED NEUTROPHILS ABSOLUTE COUNT: 4.96 K/UL (ref 1.5–8.1)
SPECIFIC GRAVITY UA: 1.02 (ref 1–1.03)
TRICHOMONAS: ABNORMAL
TURBIDITY: ABNORMAL
URINE HGB: ABNORMAL
UROBILINOGEN, URINE: NORMAL
WBC # BLD: 7.3 K/UL (ref 3.5–11.3)
WBC # BLD: ABNORMAL 10*3/UL
WBC UA: ABNORMAL /HPF (ref 0–5)
YEAST: ABNORMAL
ZZ NTE CLEAN UP: ORDERED TEST: NORMAL
ZZ NTE WITH NAME CLEAN UP: SPECIMEN SOURCE: NORMAL

## 2020-10-09 PROCEDURE — P9603 ONE-WAY ALLOW PRORATED MILES: HCPCS

## 2020-10-09 PROCEDURE — 87088 URINE BACTERIA CULTURE: CPT

## 2020-10-09 PROCEDURE — 87186 SC STD MICRODIL/AGAR DIL: CPT

## 2020-10-09 PROCEDURE — 81001 URINALYSIS AUTO W/SCOPE: CPT

## 2020-10-09 PROCEDURE — 36415 COLL VENOUS BLD VENIPUNCTURE: CPT

## 2020-10-09 PROCEDURE — 87077 CULTURE AEROBIC IDENTIFY: CPT

## 2020-10-09 PROCEDURE — 85025 COMPLETE CBC W/AUTO DIFF WBC: CPT

## 2020-10-09 PROCEDURE — 87086 URINE CULTURE/COLONY COUNT: CPT

## 2020-10-10 ENCOUNTER — HOSPITAL ENCOUNTER (OUTPATIENT)
Age: 65
Setting detail: SPECIMEN
Discharge: HOME | End: 2020-10-10
Payer: COMMERCIAL

## 2020-10-10 LAB
ALBUMIN SERPL-MCNC: 3.3 G/DL (ref 3.5–5.2)
ALBUMIN/GLOBULIN RATIO: ABNORMAL (ref 1–2.5)
ALP BLD-CCNC: 98 U/L (ref 35–104)
ALT SERPL-CCNC: 37 U/L (ref 5–33)
ANION GAP SERPL CALCULATED.3IONS-SCNC: 12 MMOL/L (ref 9–17)
AST SERPL-CCNC: 48 U/L
BILIRUB SERPL-MCNC: 0.73 MG/DL (ref 0.3–1.2)
BNP INTERPRETATION: ABNORMAL
BUN BLDV-MCNC: 18 MG/DL (ref 8–23)
BUN/CREAT BLD: 18 (ref 9–20)
CALCIUM SERPL-MCNC: 8.7 MG/DL (ref 8.6–10.4)
CHLORIDE BLD-SCNC: 103 MMOL/L (ref 98–107)
CO2: 25 MMOL/L (ref 20–31)
CREAT SERPL-MCNC: 0.98 MG/DL (ref 0.5–0.9)
GFR AFRICAN AMERICAN: >60 ML/MIN
GFR NON-AFRICAN AMERICAN: 57 ML/MIN
GFR SERPL CREATININE-BSD FRML MDRD: ABNORMAL ML/MIN/{1.73_M2}
GFR SERPL CREATININE-BSD FRML MDRD: ABNORMAL ML/MIN/{1.73_M2}
GLUCOSE BLD-MCNC: 82 MG/DL (ref 70–99)
POTASSIUM SERPL-SCNC: 4.3 MMOL/L (ref 3.7–5.3)
PRO-BNP: ABNORMAL PG/ML
SODIUM BLD-SCNC: 140 MMOL/L (ref 135–144)
TOTAL PROTEIN: 7 G/DL (ref 6.4–8.3)

## 2020-10-10 PROCEDURE — 80053 COMPREHEN METABOLIC PANEL: CPT

## 2020-10-10 PROCEDURE — 36415 COLL VENOUS BLD VENIPUNCTURE: CPT

## 2020-10-10 PROCEDURE — P9603 ONE-WAY ALLOW PRORATED MILES: HCPCS

## 2020-10-10 PROCEDURE — 83880 ASSAY OF NATRIURETIC PEPTIDE: CPT

## 2020-10-12 LAB
CULTURE: ABNORMAL
CULTURE: ABNORMAL
Lab: ABNORMAL
SPECIMEN DESCRIPTION: ABNORMAL

## 2020-10-28 PROCEDURE — 81001 URINALYSIS AUTO W/SCOPE: CPT

## 2020-10-28 PROCEDURE — 87086 URINE CULTURE/COLONY COUNT: CPT

## 2020-10-29 ENCOUNTER — HOSPITAL ENCOUNTER (OUTPATIENT)
Age: 65
Setting detail: SPECIMEN
Discharge: HOME | End: 2020-10-29
Payer: COMMERCIAL

## 2020-10-29 LAB
-: ABNORMAL
AMORPHOUS: ABNORMAL
BACTERIA: ABNORMAL
BILIRUBIN URINE: NEGATIVE
CASTS UA: ABNORMAL /LPF (ref 0–2)
COLOR: YELLOW
COMMENT UA: ABNORMAL
CRYSTALS, UA: ABNORMAL /HPF
EPITHELIAL CELLS UA: ABNORMAL /HPF (ref 0–5)
GLUCOSE URINE: NEGATIVE
KETONES, URINE: ABNORMAL
LEUKOCYTE ESTERASE, URINE: ABNORMAL
MUCUS: ABNORMAL
NITRITE, URINE: NEGATIVE
OTHER OBSERVATIONS UA: ABNORMAL
PH UA: 8 (ref 5–8)
PROTEIN UA: ABNORMAL
RBC UA: ABNORMAL /HPF (ref 0–2)
RENAL EPITHELIAL, UA: ABNORMAL /HPF
SPECIFIC GRAVITY UA: 1.03 (ref 1–1.03)
TRICHOMONAS: ABNORMAL
TURBIDITY: ABNORMAL
URINE HGB: ABNORMAL
UROBILINOGEN, URINE: NORMAL
WBC UA: ABNORMAL /HPF (ref 0–5)
YEAST: ABNORMAL

## 2020-10-29 PROCEDURE — 87088 URINE BACTERIA CULTURE: CPT

## 2020-10-29 PROCEDURE — 87186 SC STD MICRODIL/AGAR DIL: CPT

## 2020-10-29 PROCEDURE — 87086 URINE CULTURE/COLONY COUNT: CPT

## 2020-10-29 PROCEDURE — 81001 URINALYSIS AUTO W/SCOPE: CPT

## 2020-10-30 LAB
CULTURE: ABNORMAL
Lab: ABNORMAL
SPECIMEN DESCRIPTION: ABNORMAL

## 2021-03-25 NOTE — ED PROVIDER NOTES
There are no preventive care reminders to display for this patient.    Patient is up to date, no discussion needed.                 TECHNOLOGIST PROVIDED HISTORY: Reason for exam:->nausea/vomitting ab pain x 2days, eval for obstructive pattern vs constipation. FINDINGS: Frontal chest radiograph demonstrates mild cardiomegaly and evidence of a median sternotomy. Patient's left hand obscures the left lung base. No pleural effusions or pulmonary edema. No pneumothorax. There is no pneumoperitoneum or organomegaly. Hepatic and splenic angles are normal. Scattered gas is present in the jejunal loops without dilatation or obstruction. Colon is not dilated. Scattered gas in the jejunal loops suggesting mild degree of adynamic small bowel ileus. RECENT VITALS:     Temp: 98.5 °F (36.9 °C),  Pulse: 98, Resp: 18, BP: 120/75, SpO2: 100 %    This patient is a 65 y.o. Female with abdominal pain, nausea and vomiting. Lab work has been obtained, CT scan of abdomen is pending. Plan is to follow up with imaging studies and determine disposition. Patient was updated on imaging studies. Discussed that she has sigmoid diverticulitis. As such, she was started on oral ciprofloxacin and Bactrim. Given her age, DHARMESH and symptoms, we discussed admission. Patient is agreeable. Currently awaiting transfer to the floor. OUTSTANDING TASKS / RECOMMENDATIONS:    1. Follow up with CT scan     FINAL IMPRESSION:     1. Acute renal failure, unspecified acute renal failure type ([address]    2. Intractable vomiting with nausea, unspecified vomiting type    3.  Diverticulitis of sigmoid colon        DISPOSITION:         DISPOSITION:  []  Discharge   []  Transfer -    [x]  Admission -  Intermed   []  Against Medical Advice   []  Eloped   FOLLOW-UP: Beverley Sanabria MD  [address]  [phone]           DISCHARGE MEDICATIONS: Current Discharge Medication List             Abbe Stein MD  Emergency Medicine Resident  [facility]       Abbe Stein MD  Resident  10/23/17 0045

## 2022-02-15 NOTE — TELEPHONE ENCOUNTER
Visit with patient along with significant other. [facility]  also present during visit. This was this 's initial visit with patient. The purpose of this visit was to discuss housing. Patient gave a brief overview of her housing needs and progress thus far on searching for housing independently.  gave patient a list of low income senior housing. Patient informed this  that she has submitted applications to [employer], [address] [place] [address]. Patient is still on the waiting list for the first two listed. Her application has been declined at [facility].  assisted patient with submitting a pre-application for [facility] and explained the procedure. Patient requested assistance with completing an application for [facility]. Will follow up Tuesday around noon. Sski Pregnancy And Lactation Text: This medication is Pregnancy Category D and isn't considered safe during pregnancy. It is excreted in breast milk.

## 2023-01-19 NOTE — ED NOTES
A second call was made in an attempt to reach this patient for a referral we received. I left a detailed voicemail to call our office to schedule an initial consult. Pt to ER via [facility] with c/o vertigo and nausea x 2 months. Pt stated \"I have to have surgery for my vertigo\". Pt denies chest pain, reports SOB, has h/o COPD and uses home O2. Pt has h/o stroke with left side deficits. Placed on full cardiac monitor, NAD noted, rr even and NL. Awaiting physician evaluation, will continue to monitor.      Kaylene Ospina RN  12/03/17 5685

## 2023-10-04 NOTE — ED PROVIDER NOTES
[facility]  eMERGENCY dEPARTMENT eNCOUnter      Pt Name: Azra Michaud  MRN: [id number]  [place] 1955  Date of evaluation: 11/22/2017  PCP:    Debby Elliott MD      [address] [place] [address]       Chief Complaint   Patient presents with    Dizziness     x20-30 minutes. no fall and no LOC. HISTORY OF PRESENT ILLNESS    Azra Michaud is a 65 y.o. female who presents With complaints of lightheadedness. Patient states that she has had episodes of lightheadedness and near syncope. Patient was seen approximately a week ago for similar. Patient had a pretty good week but today had symptoms again similar to symptoms that got her here previously. Patient does not have actual chest pain. Patient has some orthostatic symptoms. Patient without neurologic deficit or change. Patient denies cough fevers chills or GI complaint. Patient states she's been eating and drinking properly. Location/Symptom: Lightheadedness and near syncope  Timing/Onset: Over the course of today  Context/Setting: At home  Quality: Lightheadedness  Duration: Episodes lasting 20 minutes  Modifying Factors: Orthostatic changes  Severity: Moderate    Review the patient's chart shows that she had a high risk stress test last week. Patient was discharged prior to intervention with apparent plan for outpatient follow-up. Patient is not had follow-up yet and returns. Discussed with cardiology. Plan for admission today. REVIEW OF SYSTEMS       Review of Systems   Constitutional: Negative for activity change and fatigue. HENT: Negative for sinus pressure and sore throat. Eyes: Negative for discharge and visual disturbance. Respiratory: Positive for shortness of breath. Negative for chest tightness. Gastrointestinal: Negative for abdominal pain, constipation, diarrhea and nausea. Genitourinary: Negative for dysuria, frequency, vaginal bleeding and vaginal discharge.    Musculoskeletal: Negative for myalgias and neck pain. Neurological: Negative for dizziness and headaches. Psychiatric/Behavioral: Negative for dysphoric mood. The patient is not nervous/anxious. PAST MEDICAL HISTORY    has a past medical history of Arthritis; Asthma; CAD (coronary artery disease); CHF (congestive heart failure) ([facility] Clinical trial participant at discharge; COPD (chronic obstructive pulmonary disease) ([facility] Diabetes mellitus ([facility] Hepatitis C; Hyperlipidemia; Hypertension; Pneumonia; and Unspecified cerebral artery occlusion with cerebral infarction. SURGICAL HISTORY      has a past surgical history that includes Tonsillectomy; Foot surgery; Cardiac surgery (5/6/2016); and Cardiac pacemaker placement. CURRENT MEDICATIONS       Current Discharge Medication List      CONTINUE these medications which have NOT CHANGED    Details   ipratropium-albuterol (DUONEB) 0.5-2.5 (3) MG/3ML SOLN nebulizer solution Inhale 3 mLs into the lungs every 4 hours as needed for Shortness of Breath  Qty: 360 mL, Refills: 2      atorvastatin (LIPITOR) 20 MG tablet Take 20 mg by mouth daily      lisinopril (PRINIVIL;ZESTRIL) 5 MG tablet Take 5 mg by mouth daily      carvedilol (COREG) 3.125 MG tablet Take 3.125 mg by mouth 2 times daily (with meals)      naproxen (NAPROSYN) 500 MG tablet Take 1 tablet by mouth 2 times daily  Qty: 30 tablet, Refills: 0      senna-docusate (PERICOLACE) 8.6-50 MG per tablet Take 1 tablet by mouth daily      OLANZapine (ZYPREXA) 10 MG tablet Take 1 tablet by mouth nightly  Qty: 30 tablet, Refills: 0      aspirin 81 MG tablet Take 81 mg by mouth daily      insulin glargine (LANTUS) 100 UNIT/ML injection vial Inject 35 Units into the skin every morning       colchicine (COLCRYS) 0.6 MG tablet Take 1 tablet by mouth daily  Qty: 30 tablet, Refills: 3      famotidine (PEPCID) 20 MG tablet Take 20 mg by mouth daily.       OXcarbazepine (TRILEPTAL) 300 MG tablet Take 300 mg by mouth 2 times daily Per pharmacy she CONSULT TO SOCIAL WORK         CONSULTS:        PROCEDURES:  None    FINAL IMPRESSION      1.  Near syncope          DISPOSITION/PLAN   DISPOSITION Admitted    PATIENT REFERRED TO:  Yee Decker [address] [place] [id number]  [phone]            DISCHARGE MEDICATIONS:  Current Discharge Medication List          (Please note that portions of this note were completed with a voice recognition program.  Efforts were made to edit the dictations but occasionally words are mis-transcribed.)    Rizzo MD  Attending Emergency Physician                   Terence Rivera MD  11/22/17 [address], MD  12/08/17 1715 Use Enhanced Medication Counseling?: No
